# Patient Record
Sex: MALE | Race: BLACK OR AFRICAN AMERICAN | Employment: PART TIME
[De-identification: names, ages, dates, MRNs, and addresses within clinical notes are randomized per-mention and may not be internally consistent; named-entity substitution may affect disease eponyms.]

---

## 2017-01-16 ENCOUNTER — PATIENT MESSAGE (OUTPATIENT)
Dept: INTERNAL MEDICINE | Facility: CLINIC | Age: 37
End: 2017-01-16

## 2017-02-06 ENCOUNTER — OFFICE VISIT (OUTPATIENT)
Dept: INTERNAL MEDICINE | Facility: CLINIC | Age: 37
End: 2017-02-06

## 2017-02-06 VITALS
SYSTOLIC BLOOD PRESSURE: 135 MMHG | BODY MASS INDEX: 37.19 KG/M2 | HEIGHT: 77 IN | DIASTOLIC BLOOD PRESSURE: 86 MMHG | WEIGHT: 315 LBS | HEART RATE: 79 BPM

## 2017-02-06 DIAGNOSIS — G47.33 OSA ON CPAP: ICD-10-CM

## 2017-02-06 DIAGNOSIS — I10 ESSENTIAL HYPERTENSION: Primary | ICD-10-CM

## 2017-02-06 DIAGNOSIS — E66.01 MORBID OBESITY DUE TO EXCESS CALORIES (HCC): ICD-10-CM

## 2017-02-06 DIAGNOSIS — Z13.31 POSITIVE DEPRESSION SCREENING: ICD-10-CM

## 2017-02-06 DIAGNOSIS — R73.03 PREDIABETES: ICD-10-CM

## 2017-02-06 DIAGNOSIS — Z99.89 OSA ON CPAP: ICD-10-CM

## 2017-02-06 DIAGNOSIS — I42.9 CARDIOMYOPATHY (HCC): ICD-10-CM

## 2017-02-06 DIAGNOSIS — R79.89 LOW VITAMIN D LEVEL: ICD-10-CM

## 2017-02-06 DIAGNOSIS — J31.0 CHRONIC RHINITIS: ICD-10-CM

## 2017-02-06 DIAGNOSIS — K76.0 FATTY LIVER DISEASE, NONALCOHOLIC: ICD-10-CM

## 2017-02-06 PROCEDURE — G8431 POS CLIN DEPRES SCRN F/U DOC: HCPCS | Performed by: INTERNAL MEDICINE

## 2017-02-06 PROCEDURE — 99214 OFFICE O/P EST MOD 30 MIN: CPT | Performed by: INTERNAL MEDICINE

## 2017-02-06 RX ORDER — HYDROCODONE BITARTRATE AND ACETAMINOPHEN 5; 325 MG/1; MG/1
TABLET ORAL
Refills: 0 | COMMUNITY
Start: 2017-01-27 | End: 2017-04-06 | Stop reason: ALTCHOICE

## 2017-02-06 RX ORDER — FLUTICASONE PROPIONATE 50 MCG
1 SPRAY, SUSPENSION (ML) NASAL DAILY
Qty: 1 BOTTLE | Refills: 3 | Status: SHIPPED | OUTPATIENT
Start: 2017-02-06 | End: 2017-06-01 | Stop reason: SDUPTHER

## 2017-02-06 RX ORDER — ERGOCALCIFEROL 1.25 MG/1
CAPSULE ORAL
Qty: 4 CAPSULE | Refills: 2 | OUTPATIENT
Start: 2017-02-06

## 2017-02-06 RX ORDER — ERGOCALCIFEROL 1.25 MG/1
CAPSULE ORAL
Qty: 4 CAPSULE | Refills: 2 | Status: CANCELLED | OUTPATIENT
Start: 2017-02-06

## 2017-02-06 RX ORDER — MELOXICAM 15 MG/1
TABLET ORAL
Refills: 0 | COMMUNITY
Start: 2017-01-16 | End: 2017-04-06 | Stop reason: ALTCHOICE

## 2017-02-06 ASSESSMENT — ENCOUNTER SYMPTOMS
ABDOMINAL PAIN: 0
EYE REDNESS: 0
BACK PAIN: 0
COUGH: 0
NAUSEA: 0
BLURRED VISION: 0
CONSTIPATION: 0
SHORTNESS OF BREATH: 0
SORE THROAT: 0

## 2017-02-06 ASSESSMENT — PATIENT HEALTH QUESTIONNAIRE - PHQ9
9. THOUGHTS THAT YOU WOULD BE BETTER OFF DEAD, OR OF HURTING YOURSELF: 3
SUM OF ALL RESPONSES TO PHQ QUESTIONS 1-9: 15
6. FEELING BAD ABOUT YOURSELF - OR THAT YOU ARE A FAILURE OR HAVE LET YOURSELF OR YOUR FAMILY DOWN: 2
1. LITTLE INTEREST OR PLEASURE IN DOING THINGS: 2
8. MOVING OR SPEAKING SO SLOWLY THAT OTHER PEOPLE COULD HAVE NOTICED. OR THE OPPOSITE, BEING SO FIGETY OR RESTLESS THAT YOU HAVE BEEN MOVING AROUND A LOT MORE THAN USUAL: 2
3. TROUBLE FALLING OR STAYING ASLEEP: 2
SUM OF ALL RESPONSES TO PHQ9 QUESTIONS 1 & 2: 4
4. FEELING TIRED OR HAVING LITTLE ENERGY: 0
7. TROUBLE CONCENTRATING ON THINGS, SUCH AS READING THE NEWSPAPER OR WATCHING TELEVISION: 0
10. IF YOU CHECKED OFF ANY PROBLEMS, HOW DIFFICULT HAVE THESE PROBLEMS MADE IT FOR YOU TO DO YOUR WORK, TAKE CARE OF THINGS AT HOME, OR GET ALONG WITH OTHER PEOPLE: 0
5. POOR APPETITE OR OVEREATING: 2
2. FEELING DOWN, DEPRESSED OR HOPELESS: 2

## 2017-02-09 ENCOUNTER — OFFICE VISIT (OUTPATIENT)
Dept: BEHAVIORAL/MENTAL HEALTH | Facility: CLINIC | Age: 37
End: 2017-02-09

## 2017-02-09 ENCOUNTER — TELEPHONE (OUTPATIENT)
Dept: INTERNAL MEDICINE | Facility: CLINIC | Age: 37
End: 2017-02-09

## 2017-02-09 ENCOUNTER — HOSPITAL ENCOUNTER (OUTPATIENT)
Age: 37
Setting detail: SPECIMEN
Discharge: HOME | End: 2017-02-09
Payer: MEDICAID

## 2017-02-09 DIAGNOSIS — R79.89 LOW VITAMIN D LEVEL: ICD-10-CM

## 2017-02-09 DIAGNOSIS — R73.03 PREDIABETES: ICD-10-CM

## 2017-02-09 DIAGNOSIS — E66.01 MORBID OBESITY DUE TO EXCESS CALORIES (HCC): ICD-10-CM

## 2017-02-09 DIAGNOSIS — K76.0 FATTY LIVER DISEASE, NONALCOHOLIC: ICD-10-CM

## 2017-02-09 DIAGNOSIS — I10 ESSENTIAL HYPERTENSION: ICD-10-CM

## 2017-02-09 DIAGNOSIS — Z13.31 POSITIVE DEPRESSION SCREENING: Primary | ICD-10-CM

## 2017-02-09 LAB
CHOLESTEROL/HDL RATIO: 5.3
CHOLESTEROL: 169 MG/DL
ESTIMATED AVERAGE GLUCOSE: 123 MG/DL
HBA1C MFR BLD: 5.9 % (ref 4–6)
HDLC SERPL-MCNC: 32 MG/DL
LDL CHOLESTEROL: 109 MG/DL (ref 0–130)
TRIGL SERPL-MCNC: 138 MG/DL
TSH SERPL DL<=0.05 MIU/L-ACNC: 2.26 MIU/L (ref 0.3–5)
VITAMIN D 25-HYDROXY: 24.4 NG/ML (ref 30–100)
VLDLC SERPL CALC-MCNC: ABNORMAL MG/DL (ref 1–30)

## 2017-02-09 PROCEDURE — 82306 VITAMIN D 25 HYDROXY: CPT

## 2017-02-09 PROCEDURE — 84443 ASSAY THYROID STIM HORMONE: CPT

## 2017-02-09 PROCEDURE — 90791 PSYCH DIAGNOSTIC EVALUATION: CPT | Performed by: PSYCHOLOGIST

## 2017-02-09 PROCEDURE — 83036 HEMOGLOBIN GLYCOSYLATED A1C: CPT

## 2017-02-09 PROCEDURE — 80061 LIPID PANEL: CPT

## 2017-02-09 PROCEDURE — 36415 COLL VENOUS BLD VENIPUNCTURE: CPT

## 2017-02-14 ENCOUNTER — PATIENT MESSAGE (OUTPATIENT)
Dept: INTERNAL MEDICINE | Age: 37
End: 2017-02-14

## 2017-02-21 ENCOUNTER — OFFICE VISIT (OUTPATIENT)
Dept: BEHAVIORAL/MENTAL HEALTH | Facility: CLINIC | Age: 37
End: 2017-02-21

## 2017-02-21 DIAGNOSIS — F33.0 MAJOR DEPRESSIVE DISORDER, RECURRENT, MILD (HCC): Primary | ICD-10-CM

## 2017-02-21 PROCEDURE — 90834 PSYTX W PT 45 MINUTES: CPT | Performed by: PSYCHOLOGIST

## 2017-02-21 ASSESSMENT — PATIENT HEALTH QUESTIONNAIRE - PHQ9
9. THOUGHTS THAT YOU WOULD BE BETTER OFF DEAD, OR OF HURTING YOURSELF: 1
2. FEELING DOWN, DEPRESSED OR HOPELESS: 1
8. MOVING OR SPEAKING SO SLOWLY THAT OTHER PEOPLE COULD HAVE NOTICED. OR THE OPPOSITE, BEING SO FIGETY OR RESTLESS THAT YOU HAVE BEEN MOVING AROUND A LOT MORE THAN USUAL: 0
10. IF YOU CHECKED OFF ANY PROBLEMS, HOW DIFFICULT HAVE THESE PROBLEMS MADE IT FOR YOU TO DO YOUR WORK, TAKE CARE OF THINGS AT HOME, OR GET ALONG WITH OTHER PEOPLE: 1
7. TROUBLE CONCENTRATING ON THINGS, SUCH AS READING THE NEWSPAPER OR WATCHING TELEVISION: 0
1. LITTLE INTEREST OR PLEASURE IN DOING THINGS: 0
6. FEELING BAD ABOUT YOURSELF - OR THAT YOU ARE A FAILURE OR HAVE LET YOURSELF OR YOUR FAMILY DOWN: 1
4. FEELING TIRED OR HAVING LITTLE ENERGY: 0
SUM OF ALL RESPONSES TO PHQ QUESTIONS 1-9: 6
3. TROUBLE FALLING OR STAYING ASLEEP: 1
SUM OF ALL RESPONSES TO PHQ9 QUESTIONS 1 & 2: 1
5. POOR APPETITE OR OVEREATING: 2

## 2017-02-23 ENCOUNTER — OFFICE VISIT (OUTPATIENT)
Dept: BARIATRICS/WEIGHT MGMT | Facility: CLINIC | Age: 37
End: 2017-02-23

## 2017-02-23 VITALS
HEIGHT: 76 IN | HEART RATE: 72 BPM | WEIGHT: 315 LBS | BODY MASS INDEX: 38.36 KG/M2 | DIASTOLIC BLOOD PRESSURE: 68 MMHG | SYSTOLIC BLOOD PRESSURE: 128 MMHG

## 2017-02-23 DIAGNOSIS — K76.0 FATTY LIVER DISEASE, NONALCOHOLIC: ICD-10-CM

## 2017-02-23 DIAGNOSIS — R73.03 PREDIABETES: ICD-10-CM

## 2017-02-23 DIAGNOSIS — Z99.89 OSA ON CPAP: ICD-10-CM

## 2017-02-23 DIAGNOSIS — I42.9 CARDIOMYOPATHY (HCC): ICD-10-CM

## 2017-02-23 DIAGNOSIS — E66.01 OBESITY, MORBID, BMI 40.0-49.9 (HCC): ICD-10-CM

## 2017-02-23 DIAGNOSIS — I10 ESSENTIAL HYPERTENSION: Primary | ICD-10-CM

## 2017-02-23 DIAGNOSIS — G47.33 OSA ON CPAP: ICD-10-CM

## 2017-02-23 PROCEDURE — 99204 OFFICE O/P NEW MOD 45 MIN: CPT | Performed by: NURSE PRACTITIONER

## 2017-02-28 RX ORDER — IBUPROFEN 800 MG/1
800 TABLET ORAL EVERY 8 HOURS PRN
Qty: 30 TABLET | Refills: 2 | Status: SHIPPED | OUTPATIENT
Start: 2017-02-28 | End: 2017-05-08 | Stop reason: SDUPTHER

## 2017-03-03 DIAGNOSIS — I10 ESSENTIAL HYPERTENSION: ICD-10-CM

## 2017-03-03 RX ORDER — HYDROCHLOROTHIAZIDE 25 MG/1
TABLET ORAL
Qty: 30 TABLET | Refills: 6 | Status: SHIPPED | OUTPATIENT
Start: 2017-03-03 | End: 2017-06-12 | Stop reason: SDUPTHER

## 2017-03-03 RX ORDER — LISINOPRIL 20 MG/1
TABLET ORAL
Qty: 30 TABLET | Refills: 6 | Status: SHIPPED | OUTPATIENT
Start: 2017-03-03 | End: 2017-06-12 | Stop reason: SDUPTHER

## 2017-03-07 ENCOUNTER — OFFICE VISIT (OUTPATIENT)
Dept: BEHAVIORAL/MENTAL HEALTH | Facility: CLINIC | Age: 37
End: 2017-03-07

## 2017-03-07 ENCOUNTER — HOSPITAL ENCOUNTER (OUTPATIENT)
Age: 37
Setting detail: SPECIMEN
Discharge: HOME | End: 2017-03-07
Payer: MEDICAID

## 2017-03-07 DIAGNOSIS — E87.6 HYPOKALEMIA: ICD-10-CM

## 2017-03-07 DIAGNOSIS — I42.9 CARDIOMYOPATHY (HCC): ICD-10-CM

## 2017-03-07 DIAGNOSIS — I10 ESSENTIAL HYPERTENSION: ICD-10-CM

## 2017-03-07 DIAGNOSIS — K76.0 FATTY LIVER DISEASE, NONALCOHOLIC: ICD-10-CM

## 2017-03-07 DIAGNOSIS — G47.33 OSA ON CPAP: ICD-10-CM

## 2017-03-07 DIAGNOSIS — F33.0 MAJOR DEPRESSIVE DISORDER, RECURRENT, MILD (HCC): Primary | ICD-10-CM

## 2017-03-07 DIAGNOSIS — R73.03 PREDIABETES: ICD-10-CM

## 2017-03-07 DIAGNOSIS — Z99.89 OSA ON CPAP: ICD-10-CM

## 2017-03-07 DIAGNOSIS — E55.9 VITAMIN D DEFICIENCY: Primary | ICD-10-CM

## 2017-03-07 DIAGNOSIS — E66.01 OBESITY, MORBID, BMI 40.0-49.9 (HCC): ICD-10-CM

## 2017-03-07 LAB
ABSOLUTE EOS #: 0.2 K/UL (ref 0–0.4)
ABSOLUTE LYMPH #: 1.9 K/UL (ref 1–4.8)
ABSOLUTE MONO #: 0.5 K/UL (ref 0.1–1.2)
ALBUMIN SERPL-MCNC: 4 G/DL (ref 3.5–5.2)
ALBUMIN/GLOBULIN RATIO: 1 (ref 1–2.5)
ALP BLD-CCNC: 82 U/L (ref 40–129)
ALT SERPL-CCNC: 13 U/L (ref 5–41)
ANION GAP SERPL CALCULATED.3IONS-SCNC: 16 MMOL/L (ref 9–17)
AST SERPL-CCNC: 12 U/L
BASOPHILS # BLD: 0 % (ref 0–2)
BASOPHILS ABSOLUTE: 0 K/UL (ref 0–0.2)
BILIRUB SERPL-MCNC: 0.44 MG/DL (ref 0.3–1.2)
BUN BLDV-MCNC: 15 MG/DL (ref 6–20)
BUN/CREAT BLD: ABNORMAL (ref 9–20)
CALCIUM SERPL-MCNC: 9.3 MG/DL (ref 8.6–10.4)
CHLORIDE BLD-SCNC: 101 MMOL/L (ref 98–107)
CO2: 26 MMOL/L (ref 20–31)
CREAT SERPL-MCNC: 1.13 MG/DL (ref 0.7–1.2)
DIFFERENTIAL TYPE: ABNORMAL
EOSINOPHILS RELATIVE PERCENT: 2 % (ref 1–4)
GFR AFRICAN AMERICAN: >60 ML/MIN
GFR NON-AFRICAN AMERICAN: >60 ML/MIN
GFR SERPL CREATININE-BSD FRML MDRD: ABNORMAL ML/MIN/{1.73_M2}
GFR SERPL CREATININE-BSD FRML MDRD: ABNORMAL ML/MIN/{1.73_M2}
GLUCOSE BLD-MCNC: 95 MG/DL (ref 70–99)
HCT VFR BLD CALC: 39.1 % (ref 41–53)
HEMOGLOBIN: 13 G/DL (ref 13.5–17.5)
LYMPHOCYTES # BLD: 26 % (ref 24–44)
MCH RBC QN AUTO: 28.6 PG (ref 26–34)
MCHC RBC AUTO-ENTMCNC: 33.1 G/DL (ref 31–37)
MCV RBC AUTO: 86.4 FL (ref 80–100)
MONOCYTES # BLD: 6 % (ref 2–11)
PDW BLD-RTO: 16.1 % (ref 12.5–15.4)
PLATELET # BLD: 296 K/UL (ref 140–450)
PLATELET ESTIMATE: ABNORMAL
PMV BLD AUTO: 8.1 FL (ref 6–12)
POTASSIUM SERPL-SCNC: 3.6 MMOL/L (ref 3.7–5.3)
RBC # BLD: 4.53 M/UL (ref 4.5–5.9)
RBC # BLD: ABNORMAL 10*6/UL
SEG NEUTROPHILS: 66 % (ref 36–66)
SEGMENTED NEUTROPHILS ABSOLUTE COUNT: 4.9 K/UL (ref 1.8–7.7)
SODIUM BLD-SCNC: 143 MMOL/L (ref 135–144)
TOTAL PROTEIN: 8 G/DL (ref 6.4–8.3)
URIC ACID: 7 MG/DL (ref 3.4–7)
WBC # BLD: 7.4 K/UL (ref 3.5–11)
WBC # BLD: ABNORMAL 10*3/UL

## 2017-03-07 PROCEDURE — 80053 COMPREHEN METABOLIC PANEL: CPT

## 2017-03-07 PROCEDURE — 90834 PSYTX W PT 45 MINUTES: CPT | Performed by: PSYCHOLOGIST

## 2017-03-07 PROCEDURE — 36415 COLL VENOUS BLD VENIPUNCTURE: CPT

## 2017-03-07 PROCEDURE — 84550 ASSAY OF BLOOD/URIC ACID: CPT

## 2017-03-07 PROCEDURE — 85025 COMPLETE CBC W/AUTO DIFF WBC: CPT

## 2017-03-07 RX ORDER — POTASSIUM CHLORIDE 750 MG/1
10 TABLET, EXTENDED RELEASE ORAL DAILY
Qty: 30 TABLET | Refills: 0 | Status: SHIPPED | OUTPATIENT
Start: 2017-03-07 | End: 2018-04-04 | Stop reason: ALTCHOICE

## 2017-03-07 RX ORDER — ERGOCALCIFEROL 1.25 MG/1
50000 CAPSULE ORAL WEEKLY
Qty: 8 CAPSULE | Refills: 0 | Status: SHIPPED | OUTPATIENT
Start: 2017-03-07 | End: 2017-04-06

## 2017-03-10 ENCOUNTER — TELEPHONE (OUTPATIENT)
Dept: BARIATRICS/WEIGHT MGMT | Facility: CLINIC | Age: 37
End: 2017-03-10

## 2017-03-21 ENCOUNTER — OFFICE VISIT (OUTPATIENT)
Dept: BEHAVIORAL/MENTAL HEALTH CLINIC | Age: 37
End: 2017-03-21
Payer: MEDICAID

## 2017-03-21 DIAGNOSIS — F33.0 MAJOR DEPRESSIVE DISORDER, RECURRENT, MILD (HCC): Primary | ICD-10-CM

## 2017-03-21 PROCEDURE — 90837 PSYTX W PT 60 MINUTES: CPT | Performed by: PSYCHOLOGIST

## 2017-03-23 ENCOUNTER — HOSPITAL ENCOUNTER (OUTPATIENT)
Age: 37
Discharge: HOME | End: 2017-03-23
Payer: MEDICAID

## 2017-03-23 LAB
EKG ATRIAL RATE: 77 BPM
EKG P AXIS: 65 DEGREES
EKG P-R INTERVAL: 156 MS
EKG Q-T INTERVAL: 366 MS
EKG QRS DURATION: 90 MS
EKG QTC CALCULATION (BAZETT): 414 MS
EKG R AXIS: 23 DEGREES
EKG T AXIS: 12 DEGREES
EKG VENTRICULAR RATE: 77 BPM

## 2017-03-23 PROCEDURE — 93005 ELECTROCARDIOGRAM TRACING: CPT

## 2017-04-06 ENCOUNTER — OFFICE VISIT (OUTPATIENT)
Dept: BEHAVIORAL/MENTAL HEALTH CLINIC | Age: 37
End: 2017-04-06
Payer: MEDICAID

## 2017-04-06 ENCOUNTER — OFFICE VISIT (OUTPATIENT)
Dept: INTERNAL MEDICINE | Age: 37
End: 2017-04-06
Payer: MEDICAID

## 2017-04-06 VITALS
DIASTOLIC BLOOD PRESSURE: 84 MMHG | HEART RATE: 71 BPM | WEIGHT: 315 LBS | BODY MASS INDEX: 49.9 KG/M2 | SYSTOLIC BLOOD PRESSURE: 132 MMHG

## 2017-04-06 DIAGNOSIS — F33.0 MAJOR DEPRESSIVE DISORDER, RECURRENT, MILD (HCC): Primary | ICD-10-CM

## 2017-04-06 DIAGNOSIS — E66.01 MORBID OBESITY WITH BODY MASS INDEX (BMI) OF 45.0 TO 49.9 IN ADULT (HCC): ICD-10-CM

## 2017-04-06 DIAGNOSIS — E55.9 VITAMIN D DEFICIENCY: ICD-10-CM

## 2017-04-06 DIAGNOSIS — Z99.89 OSA ON CPAP: ICD-10-CM

## 2017-04-06 DIAGNOSIS — I10 ESSENTIAL HYPERTENSION: Primary | ICD-10-CM

## 2017-04-06 DIAGNOSIS — R73.03 PREDIABETES: ICD-10-CM

## 2017-04-06 DIAGNOSIS — K76.0 FATTY LIVER DISEASE, NONALCOHOLIC: ICD-10-CM

## 2017-04-06 DIAGNOSIS — G47.33 OSA ON CPAP: ICD-10-CM

## 2017-04-06 DIAGNOSIS — F32.9 SINGLE CURRENT EPISODE OF MAJOR DEPRESSIVE DISORDER, UNSPECIFIED DEPRESSION EPISODE SEVERITY: ICD-10-CM

## 2017-04-06 PROCEDURE — 99213 OFFICE O/P EST LOW 20 MIN: CPT | Performed by: INTERNAL MEDICINE

## 2017-04-06 PROCEDURE — 90834 PSYTX W PT 45 MINUTES: CPT | Performed by: PSYCHOLOGIST

## 2017-04-06 RX ORDER — ERGOCALCIFEROL 1.25 MG/1
50000 CAPSULE ORAL WEEKLY
Qty: 8 CAPSULE | Refills: 0 | Status: CANCELLED | OUTPATIENT
Start: 2017-04-06 | End: 2017-05-26

## 2017-04-11 ENCOUNTER — TELEPHONE (OUTPATIENT)
Dept: INTERNAL MEDICINE | Age: 37
End: 2017-04-11

## 2017-04-18 ENCOUNTER — OFFICE VISIT (OUTPATIENT)
Dept: BARIATRICS/WEIGHT MGMT | Age: 37
End: 2017-04-18
Payer: MEDICAID

## 2017-04-18 VITALS
HEART RATE: 72 BPM | BODY MASS INDEX: 38.36 KG/M2 | HEIGHT: 76 IN | SYSTOLIC BLOOD PRESSURE: 116 MMHG | WEIGHT: 315 LBS | DIASTOLIC BLOOD PRESSURE: 78 MMHG

## 2017-04-18 DIAGNOSIS — E66.01 OBESITY, MORBID, BMI 40.0-49.9 (HCC): ICD-10-CM

## 2017-04-18 DIAGNOSIS — G47.33 OSA ON CPAP: Primary | ICD-10-CM

## 2017-04-18 DIAGNOSIS — I10 ESSENTIAL HYPERTENSION: ICD-10-CM

## 2017-04-18 DIAGNOSIS — Z99.89 OSA ON CPAP: Primary | ICD-10-CM

## 2017-04-18 DIAGNOSIS — R73.03 PREDIABETES: ICD-10-CM

## 2017-04-18 DIAGNOSIS — K76.0 FATTY LIVER DISEASE, NONALCOHOLIC: ICD-10-CM

## 2017-04-18 PROCEDURE — 99213 OFFICE O/P EST LOW 20 MIN: CPT | Performed by: NURSE PRACTITIONER

## 2017-04-20 ENCOUNTER — OFFICE VISIT (OUTPATIENT)
Dept: BEHAVIORAL/MENTAL HEALTH CLINIC | Age: 37
End: 2017-04-20
Payer: MEDICAID

## 2017-04-20 DIAGNOSIS — F33.0 MAJOR DEPRESSIVE DISORDER, RECURRENT, MILD (HCC): Primary | ICD-10-CM

## 2017-04-20 PROCEDURE — 90832 PSYTX W PT 30 MINUTES: CPT | Performed by: PSYCHOLOGIST

## 2017-04-25 ENCOUNTER — OFFICE VISIT (OUTPATIENT)
Dept: BARIATRICS/WEIGHT MGMT | Age: 37
End: 2017-04-25
Payer: MEDICAID

## 2017-04-25 VITALS
DIASTOLIC BLOOD PRESSURE: 74 MMHG | WEIGHT: 315 LBS | BODY MASS INDEX: 38.36 KG/M2 | SYSTOLIC BLOOD PRESSURE: 122 MMHG | HEIGHT: 76 IN

## 2017-04-25 DIAGNOSIS — I42.9 CARDIOMYOPATHY (HCC): ICD-10-CM

## 2017-04-25 DIAGNOSIS — E66.01 OBESITY, MORBID, BMI 40.0-49.9 (HCC): ICD-10-CM

## 2017-04-25 DIAGNOSIS — Z99.89 OSA ON CPAP: ICD-10-CM

## 2017-04-25 DIAGNOSIS — K76.0 FATTY LIVER DISEASE, NONALCOHOLIC: ICD-10-CM

## 2017-04-25 DIAGNOSIS — R73.03 PREDIABETES: ICD-10-CM

## 2017-04-25 DIAGNOSIS — I10 ESSENTIAL HYPERTENSION: Primary | ICD-10-CM

## 2017-04-25 DIAGNOSIS — G47.33 OSA ON CPAP: ICD-10-CM

## 2017-04-25 PROCEDURE — 99213 OFFICE O/P EST LOW 20 MIN: CPT | Performed by: NURSE PRACTITIONER

## 2017-04-27 ENCOUNTER — OFFICE VISIT (OUTPATIENT)
Dept: INTERNAL MEDICINE | Age: 37
End: 2017-04-27
Payer: MEDICAID

## 2017-04-27 VITALS
BODY MASS INDEX: 38.36 KG/M2 | HEART RATE: 87 BPM | SYSTOLIC BLOOD PRESSURE: 145 MMHG | DIASTOLIC BLOOD PRESSURE: 105 MMHG | HEIGHT: 76 IN | WEIGHT: 315 LBS

## 2017-04-27 DIAGNOSIS — R73.03 PREDIABETES: ICD-10-CM

## 2017-04-27 DIAGNOSIS — M17.0 PRIMARY OSTEOARTHRITIS OF BOTH KNEES: Primary | ICD-10-CM

## 2017-04-27 DIAGNOSIS — E66.01 MORBID OBESITY WITH BMI OF 45.0-49.9, ADULT (HCC): ICD-10-CM

## 2017-04-27 DIAGNOSIS — I10 ESSENTIAL HYPERTENSION: ICD-10-CM

## 2017-04-27 PROCEDURE — 99213 OFFICE O/P EST LOW 20 MIN: CPT | Performed by: INTERNAL MEDICINE

## 2017-04-27 RX ORDER — HYDROCODONE BITARTRATE AND ACETAMINOPHEN 5; 325 MG/1; MG/1
TABLET ORAL
Refills: 0 | COMMUNITY
Start: 2017-04-10 | End: 2017-10-08

## 2017-04-27 ASSESSMENT — ENCOUNTER SYMPTOMS
SHORTNESS OF BREATH: 0
COUGH: 0
BACK PAIN: 0

## 2017-04-28 ASSESSMENT — ENCOUNTER SYMPTOMS
CONSTIPATION: 0
ABDOMINAL PAIN: 0
HEARTBURN: 1
NAUSEA: 0

## 2017-05-02 ENCOUNTER — OFFICE VISIT (OUTPATIENT)
Dept: BEHAVIORAL/MENTAL HEALTH CLINIC | Age: 37
End: 2017-05-02
Payer: MEDICAID

## 2017-05-02 DIAGNOSIS — F33.0 MAJOR DEPRESSIVE DISORDER, RECURRENT, MILD (HCC): Primary | ICD-10-CM

## 2017-05-02 PROCEDURE — 90832 PSYTX W PT 30 MINUTES: CPT | Performed by: PSYCHOLOGIST

## 2017-05-05 ENCOUNTER — OFFICE VISIT (OUTPATIENT)
Dept: BARIATRICS/WEIGHT MGMT | Age: 37
End: 2017-05-05
Payer: MEDICAID

## 2017-05-05 VITALS
BODY MASS INDEX: 38.36 KG/M2 | DIASTOLIC BLOOD PRESSURE: 74 MMHG | WEIGHT: 315 LBS | HEIGHT: 76 IN | HEART RATE: 74 BPM | SYSTOLIC BLOOD PRESSURE: 126 MMHG

## 2017-05-05 DIAGNOSIS — Z99.89 OSA ON CPAP: ICD-10-CM

## 2017-05-05 DIAGNOSIS — E66.01 OBESITY, MORBID, BMI 40.0-49.9 (HCC): ICD-10-CM

## 2017-05-05 DIAGNOSIS — I10 ESSENTIAL HYPERTENSION: Primary | ICD-10-CM

## 2017-05-05 DIAGNOSIS — K76.0 FATTY LIVER DISEASE, NONALCOHOLIC: ICD-10-CM

## 2017-05-05 DIAGNOSIS — R73.03 PREDIABETES: ICD-10-CM

## 2017-05-05 DIAGNOSIS — G47.33 OSA ON CPAP: ICD-10-CM

## 2017-05-05 PROCEDURE — 99213 OFFICE O/P EST LOW 20 MIN: CPT | Performed by: NURSE PRACTITIONER

## 2017-05-08 DIAGNOSIS — J31.0 CHRONIC RHINITIS: ICD-10-CM

## 2017-05-08 DIAGNOSIS — I10 ESSENTIAL HYPERTENSION: ICD-10-CM

## 2017-05-08 RX ORDER — ECHINACEA PURPUREA EXTRACT 125 MG
1 TABLET ORAL PRN
Qty: 1 BOTTLE | Refills: 3 | Status: SHIPPED | OUTPATIENT
Start: 2017-05-08 | End: 2017-12-07 | Stop reason: SDUPTHER

## 2017-05-08 RX ORDER — IBUPROFEN 800 MG/1
800 TABLET ORAL EVERY 8 HOURS PRN
Qty: 30 TABLET | Refills: 2 | Status: SHIPPED | OUTPATIENT
Start: 2017-05-08 | End: 2017-07-10 | Stop reason: SDUPTHER

## 2017-05-12 ENCOUNTER — OFFICE VISIT (OUTPATIENT)
Dept: BARIATRICS/WEIGHT MGMT | Age: 37
End: 2017-05-12
Payer: MEDICAID

## 2017-05-12 VITALS
DIASTOLIC BLOOD PRESSURE: 70 MMHG | SYSTOLIC BLOOD PRESSURE: 124 MMHG | BODY MASS INDEX: 48.95 KG/M2 | HEART RATE: 76 BPM | WEIGHT: 315 LBS

## 2017-05-12 DIAGNOSIS — I42.9 CARDIOMYOPATHY (HCC): ICD-10-CM

## 2017-05-12 DIAGNOSIS — Z99.89 OSA ON CPAP: ICD-10-CM

## 2017-05-12 DIAGNOSIS — R73.03 PREDIABETES: ICD-10-CM

## 2017-05-12 DIAGNOSIS — E66.01 OBESITY, MORBID, BMI 40.0-49.9 (HCC): ICD-10-CM

## 2017-05-12 DIAGNOSIS — G47.33 OSA ON CPAP: ICD-10-CM

## 2017-05-12 DIAGNOSIS — I10 ESSENTIAL HYPERTENSION: Primary | ICD-10-CM

## 2017-05-12 DIAGNOSIS — K76.0 FATTY LIVER DISEASE, NONALCOHOLIC: ICD-10-CM

## 2017-05-12 PROCEDURE — 99213 OFFICE O/P EST LOW 20 MIN: CPT | Performed by: NURSE PRACTITIONER

## 2017-05-16 ENCOUNTER — OFFICE VISIT (OUTPATIENT)
Dept: BEHAVIORAL/MENTAL HEALTH CLINIC | Age: 37
End: 2017-05-16
Payer: MEDICAID

## 2017-05-16 DIAGNOSIS — F33.0 MAJOR DEPRESSIVE DISORDER, RECURRENT, MILD (HCC): Primary | ICD-10-CM

## 2017-05-16 PROCEDURE — 90834 PSYTX W PT 45 MINUTES: CPT | Performed by: PSYCHOLOGIST

## 2017-05-19 ENCOUNTER — OFFICE VISIT (OUTPATIENT)
Dept: BARIATRICS/WEIGHT MGMT | Age: 37
End: 2017-05-19
Payer: MEDICAID

## 2017-05-19 VITALS
SYSTOLIC BLOOD PRESSURE: 118 MMHG | HEIGHT: 76 IN | WEIGHT: 315 LBS | BODY MASS INDEX: 38.36 KG/M2 | DIASTOLIC BLOOD PRESSURE: 72 MMHG | HEART RATE: 74 BPM

## 2017-05-19 DIAGNOSIS — I10 ESSENTIAL HYPERTENSION: ICD-10-CM

## 2017-05-19 DIAGNOSIS — K76.0 FATTY LIVER DISEASE, NONALCOHOLIC: ICD-10-CM

## 2017-05-19 DIAGNOSIS — E66.01 OBESITY, MORBID, BMI 40.0-49.9 (HCC): ICD-10-CM

## 2017-05-19 DIAGNOSIS — R73.03 PREDIABETES: ICD-10-CM

## 2017-05-19 DIAGNOSIS — Z99.89 OSA ON CPAP: Primary | ICD-10-CM

## 2017-05-19 DIAGNOSIS — G47.33 OSA ON CPAP: Primary | ICD-10-CM

## 2017-05-19 DIAGNOSIS — I42.9 CARDIOMYOPATHY (HCC): ICD-10-CM

## 2017-05-19 PROCEDURE — 99213 OFFICE O/P EST LOW 20 MIN: CPT | Performed by: NURSE PRACTITIONER

## 2017-05-26 ENCOUNTER — OFFICE VISIT (OUTPATIENT)
Dept: BARIATRICS/WEIGHT MGMT | Age: 37
End: 2017-05-26
Payer: MEDICAID

## 2017-05-26 VITALS
BODY MASS INDEX: 38.36 KG/M2 | WEIGHT: 315 LBS | HEIGHT: 76 IN | DIASTOLIC BLOOD PRESSURE: 74 MMHG | HEART RATE: 68 BPM | SYSTOLIC BLOOD PRESSURE: 124 MMHG

## 2017-05-26 DIAGNOSIS — K76.0 FATTY LIVER DISEASE, NONALCOHOLIC: ICD-10-CM

## 2017-05-26 DIAGNOSIS — Z99.89 OSA ON CPAP: ICD-10-CM

## 2017-05-26 DIAGNOSIS — I10 ESSENTIAL HYPERTENSION: Primary | ICD-10-CM

## 2017-05-26 DIAGNOSIS — E66.01 OBESITY, MORBID, BMI 40.0-49.9 (HCC): ICD-10-CM

## 2017-05-26 DIAGNOSIS — I42.9 CARDIOMYOPATHY (HCC): ICD-10-CM

## 2017-05-26 DIAGNOSIS — R73.03 PREDIABETES: ICD-10-CM

## 2017-05-26 DIAGNOSIS — G47.33 OSA ON CPAP: ICD-10-CM

## 2017-05-26 PROCEDURE — 99213 OFFICE O/P EST LOW 20 MIN: CPT | Performed by: NURSE PRACTITIONER

## 2017-06-01 DIAGNOSIS — J31.0 CHRONIC RHINITIS: ICD-10-CM

## 2017-06-01 RX ORDER — FLUTICASONE PROPIONATE 50 MCG
SPRAY, SUSPENSION (ML) NASAL
Qty: 1 BOTTLE | Refills: 3 | Status: SHIPPED | OUTPATIENT
Start: 2017-06-01 | End: 2017-09-02 | Stop reason: SDUPTHER

## 2017-06-05 ENCOUNTER — OFFICE VISIT (OUTPATIENT)
Dept: BEHAVIORAL/MENTAL HEALTH CLINIC | Age: 37
End: 2017-06-05
Payer: MEDICAID

## 2017-06-05 DIAGNOSIS — F33.0 MAJOR DEPRESSIVE DISORDER, RECURRENT, MILD (HCC): Primary | ICD-10-CM

## 2017-06-05 DIAGNOSIS — E87.6 HYPOKALEMIA: ICD-10-CM

## 2017-06-05 PROCEDURE — 90834 PSYTX W PT 45 MINUTES: CPT | Performed by: PSYCHOLOGIST

## 2017-06-05 RX ORDER — POTASSIUM CHLORIDE 750 MG/1
10 TABLET, EXTENDED RELEASE ORAL DAILY
Qty: 30 TABLET | Refills: 0 | Status: CANCELLED | OUTPATIENT
Start: 2017-06-05

## 2017-06-05 ASSESSMENT — PATIENT HEALTH QUESTIONNAIRE - PHQ9
6. FEELING BAD ABOUT YOURSELF - OR THAT YOU ARE A FAILURE OR HAVE LET YOURSELF OR YOUR FAMILY DOWN: 3
SUM OF ALL RESPONSES TO PHQ9 QUESTIONS 1 & 2: 1
9. THOUGHTS THAT YOU WOULD BE BETTER OFF DEAD, OR OF HURTING YOURSELF: 1
2. FEELING DOWN, DEPRESSED OR HOPELESS: 1
3. TROUBLE FALLING OR STAYING ASLEEP: 2
1. LITTLE INTEREST OR PLEASURE IN DOING THINGS: 0
8. MOVING OR SPEAKING SO SLOWLY THAT OTHER PEOPLE COULD HAVE NOTICED. OR THE OPPOSITE, BEING SO FIGETY OR RESTLESS THAT YOU HAVE BEEN MOVING AROUND A LOT MORE THAN USUAL: 0
4. FEELING TIRED OR HAVING LITTLE ENERGY: 0
7. TROUBLE CONCENTRATING ON THINGS, SUCH AS READING THE NEWSPAPER OR WATCHING TELEVISION: 0
SUM OF ALL RESPONSES TO PHQ QUESTIONS 1-9: 8
10. IF YOU CHECKED OFF ANY PROBLEMS, HOW DIFFICULT HAVE THESE PROBLEMS MADE IT FOR YOU TO DO YOUR WORK, TAKE CARE OF THINGS AT HOME, OR GET ALONG WITH OTHER PEOPLE: 1
5. POOR APPETITE OR OVEREATING: 1

## 2017-06-05 ASSESSMENT — ANXIETY QUESTIONNAIRES
2. NOT BEING ABLE TO STOP OR CONTROL WORRYING: 1-SEVERAL DAYS
4. TROUBLE RELAXING: 3-NEARLY EVERY DAY
1. FEELING NERVOUS, ANXIOUS, OR ON EDGE: 2-OVER HALF THE DAYS
7. FEELING AFRAID AS IF SOMETHING AWFUL MIGHT HAPPEN: 0-NOT AT ALL SURE
5. BEING SO RESTLESS THAT IT IS HARD TO SIT STILL: 0-NOT AT ALL SURE
GAD7 TOTAL SCORE: 9
6. BECOMING EASILY ANNOYED OR IRRITABLE: 0-NOT AT ALL SURE
3. WORRYING TOO MUCH ABOUT DIFFERENT THINGS: 3-NEARLY EVERY DAY

## 2017-06-07 RX ORDER — ERGOCALCIFEROL 1.25 MG/1
CAPSULE ORAL
Qty: 8 CAPSULE | Refills: 0 | Status: SHIPPED | OUTPATIENT
Start: 2017-06-07 | End: 2017-07-17

## 2017-06-12 DIAGNOSIS — I10 ESSENTIAL HYPERTENSION: ICD-10-CM

## 2017-06-12 RX ORDER — LISINOPRIL 20 MG/1
20 TABLET ORAL DAILY
Qty: 30 TABLET | Refills: 6 | Status: SHIPPED | OUTPATIENT
Start: 2017-06-12 | End: 2018-01-04 | Stop reason: SDUPTHER

## 2017-06-12 RX ORDER — HYDROCHLOROTHIAZIDE 25 MG/1
25 TABLET ORAL DAILY
Qty: 30 TABLET | Refills: 6 | Status: SHIPPED | OUTPATIENT
Start: 2017-06-12 | End: 2017-10-06 | Stop reason: SDUPTHER

## 2017-06-16 ENCOUNTER — OFFICE VISIT (OUTPATIENT)
Dept: BARIATRICS/WEIGHT MGMT | Age: 37
End: 2017-06-16
Payer: MEDICAID

## 2017-06-16 VITALS
SYSTOLIC BLOOD PRESSURE: 122 MMHG | DIASTOLIC BLOOD PRESSURE: 72 MMHG | WEIGHT: 315 LBS | HEART RATE: 68 BPM | BODY MASS INDEX: 38.36 KG/M2 | HEIGHT: 76 IN

## 2017-06-16 DIAGNOSIS — I10 ESSENTIAL HYPERTENSION: Primary | ICD-10-CM

## 2017-06-16 DIAGNOSIS — Z99.89 OSA ON CPAP: ICD-10-CM

## 2017-06-16 DIAGNOSIS — G47.33 OSA ON CPAP: ICD-10-CM

## 2017-06-16 DIAGNOSIS — R73.03 PREDIABETES: ICD-10-CM

## 2017-06-16 DIAGNOSIS — K76.0 FATTY LIVER DISEASE, NONALCOHOLIC: ICD-10-CM

## 2017-06-16 DIAGNOSIS — I42.9 CARDIOMYOPATHY (HCC): ICD-10-CM

## 2017-06-16 DIAGNOSIS — E66.01 OBESITY, MORBID, BMI 40.0-49.9 (HCC): ICD-10-CM

## 2017-06-16 PROCEDURE — 99213 OFFICE O/P EST LOW 20 MIN: CPT | Performed by: NURSE PRACTITIONER

## 2017-06-23 ENCOUNTER — OFFICE VISIT (OUTPATIENT)
Dept: BARIATRICS/WEIGHT MGMT | Age: 37
End: 2017-06-23
Payer: MEDICAID

## 2017-06-23 VITALS
WEIGHT: 315 LBS | BODY MASS INDEX: 38.36 KG/M2 | SYSTOLIC BLOOD PRESSURE: 118 MMHG | HEIGHT: 76 IN | HEART RATE: 76 BPM | DIASTOLIC BLOOD PRESSURE: 74 MMHG

## 2017-06-23 DIAGNOSIS — Z99.89 OSA ON CPAP: ICD-10-CM

## 2017-06-23 DIAGNOSIS — I10 ESSENTIAL HYPERTENSION: Primary | ICD-10-CM

## 2017-06-23 DIAGNOSIS — I42.9 CARDIOMYOPATHY (HCC): ICD-10-CM

## 2017-06-23 DIAGNOSIS — G47.33 OSA ON CPAP: ICD-10-CM

## 2017-06-23 DIAGNOSIS — E66.01 OBESITY, MORBID, BMI 40.0-49.9 (HCC): ICD-10-CM

## 2017-06-23 DIAGNOSIS — K76.0 FATTY LIVER DISEASE, NONALCOHOLIC: ICD-10-CM

## 2017-06-23 DIAGNOSIS — R73.03 PREDIABETES: ICD-10-CM

## 2017-06-23 PROCEDURE — 99213 OFFICE O/P EST LOW 20 MIN: CPT | Performed by: NURSE PRACTITIONER

## 2017-06-26 ENCOUNTER — TELEPHONE (OUTPATIENT)
Dept: BEHAVIORAL/MENTAL HEALTH CLINIC | Age: 37
End: 2017-06-26

## 2017-06-30 ENCOUNTER — OFFICE VISIT (OUTPATIENT)
Dept: BARIATRICS/WEIGHT MGMT | Age: 37
End: 2017-06-30
Payer: MEDICAID

## 2017-06-30 VITALS
DIASTOLIC BLOOD PRESSURE: 68 MMHG | SYSTOLIC BLOOD PRESSURE: 122 MMHG | BODY MASS INDEX: 38.36 KG/M2 | HEIGHT: 76 IN | HEART RATE: 68 BPM | WEIGHT: 315 LBS

## 2017-06-30 DIAGNOSIS — G47.33 OSA ON CPAP: ICD-10-CM

## 2017-06-30 DIAGNOSIS — I42.9 CARDIOMYOPATHY (HCC): ICD-10-CM

## 2017-06-30 DIAGNOSIS — Z99.89 OSA ON CPAP: ICD-10-CM

## 2017-06-30 DIAGNOSIS — R73.03 PREDIABETES: ICD-10-CM

## 2017-06-30 DIAGNOSIS — K76.0 FATTY LIVER DISEASE, NONALCOHOLIC: ICD-10-CM

## 2017-06-30 DIAGNOSIS — I10 ESSENTIAL HYPERTENSION: Primary | ICD-10-CM

## 2017-06-30 DIAGNOSIS — E66.01 OBESITY, MORBID, BMI 40.0-49.9 (HCC): ICD-10-CM

## 2017-06-30 PROCEDURE — 99213 OFFICE O/P EST LOW 20 MIN: CPT | Performed by: NURSE PRACTITIONER

## 2017-07-07 ENCOUNTER — OFFICE VISIT (OUTPATIENT)
Dept: BARIATRICS/WEIGHT MGMT | Age: 37
End: 2017-07-07
Payer: MEDICAID

## 2017-07-07 VITALS
SYSTOLIC BLOOD PRESSURE: 122 MMHG | DIASTOLIC BLOOD PRESSURE: 74 MMHG | BODY MASS INDEX: 38.36 KG/M2 | HEIGHT: 76 IN | WEIGHT: 315 LBS | HEART RATE: 70 BPM

## 2017-07-07 DIAGNOSIS — I10 ESSENTIAL HYPERTENSION: Primary | ICD-10-CM

## 2017-07-07 DIAGNOSIS — Z99.89 OSA ON CPAP: ICD-10-CM

## 2017-07-07 DIAGNOSIS — G47.33 OSA ON CPAP: ICD-10-CM

## 2017-07-07 DIAGNOSIS — K76.0 FATTY LIVER DISEASE, NONALCOHOLIC: ICD-10-CM

## 2017-07-07 DIAGNOSIS — I42.9 CARDIOMYOPATHY (HCC): ICD-10-CM

## 2017-07-07 DIAGNOSIS — E66.01 OBESITY, MORBID, BMI 40.0-49.9 (HCC): ICD-10-CM

## 2017-07-07 DIAGNOSIS — R73.03 PREDIABETES: ICD-10-CM

## 2017-07-07 PROCEDURE — 99213 OFFICE O/P EST LOW 20 MIN: CPT | Performed by: NURSE PRACTITIONER

## 2017-07-10 RX ORDER — IBUPROFEN 800 MG/1
800 TABLET ORAL EVERY 8 HOURS PRN
Qty: 30 TABLET | Refills: 0 | Status: SHIPPED | OUTPATIENT
Start: 2017-07-10 | End: 2017-07-17 | Stop reason: SDUPTHER

## 2017-07-14 ENCOUNTER — OFFICE VISIT (OUTPATIENT)
Dept: BARIATRICS/WEIGHT MGMT | Age: 37
End: 2017-07-14
Payer: MEDICAID

## 2017-07-14 VITALS
DIASTOLIC BLOOD PRESSURE: 74 MMHG | HEIGHT: 76 IN | WEIGHT: 315 LBS | BODY MASS INDEX: 38.36 KG/M2 | HEART RATE: 72 BPM | SYSTOLIC BLOOD PRESSURE: 126 MMHG

## 2017-07-14 DIAGNOSIS — K76.0 FATTY LIVER DISEASE, NONALCOHOLIC: ICD-10-CM

## 2017-07-14 DIAGNOSIS — I42.9 CARDIOMYOPATHY (HCC): ICD-10-CM

## 2017-07-14 DIAGNOSIS — I10 ESSENTIAL HYPERTENSION: Primary | ICD-10-CM

## 2017-07-14 DIAGNOSIS — E66.01 OBESITY, MORBID, BMI 40.0-49.9 (HCC): ICD-10-CM

## 2017-07-14 DIAGNOSIS — G47.33 OSA ON CPAP: ICD-10-CM

## 2017-07-14 DIAGNOSIS — R73.03 PREDIABETES: ICD-10-CM

## 2017-07-14 DIAGNOSIS — Z99.89 OSA ON CPAP: ICD-10-CM

## 2017-07-14 PROCEDURE — 99213 OFFICE O/P EST LOW 20 MIN: CPT | Performed by: NURSE PRACTITIONER

## 2017-07-17 ENCOUNTER — OFFICE VISIT (OUTPATIENT)
Dept: BEHAVIORAL/MENTAL HEALTH CLINIC | Age: 37
End: 2017-07-17
Payer: MEDICAID

## 2017-07-17 ENCOUNTER — OFFICE VISIT (OUTPATIENT)
Dept: INTERNAL MEDICINE | Age: 37
End: 2017-07-17
Payer: MEDICAID

## 2017-07-17 VITALS
DIASTOLIC BLOOD PRESSURE: 87 MMHG | SYSTOLIC BLOOD PRESSURE: 124 MMHG | HEIGHT: 76 IN | WEIGHT: 315 LBS | BODY MASS INDEX: 38.36 KG/M2 | HEART RATE: 69 BPM

## 2017-07-17 DIAGNOSIS — M17.0 PRIMARY OSTEOARTHRITIS OF BOTH KNEES: ICD-10-CM

## 2017-07-17 DIAGNOSIS — E66.01 OBESITY, MORBID, BMI 40.0-49.9 (HCC): ICD-10-CM

## 2017-07-17 DIAGNOSIS — F33.1 MODERATE EPISODE OF RECURRENT MAJOR DEPRESSIVE DISORDER (HCC): ICD-10-CM

## 2017-07-17 DIAGNOSIS — G47.33 OSA ON CPAP: ICD-10-CM

## 2017-07-17 DIAGNOSIS — E55.9 VITAMIN D DEFICIENCY: ICD-10-CM

## 2017-07-17 DIAGNOSIS — R73.03 PREDIABETES: ICD-10-CM

## 2017-07-17 DIAGNOSIS — K76.0 FATTY LIVER DISEASE, NONALCOHOLIC: ICD-10-CM

## 2017-07-17 DIAGNOSIS — Z99.89 OSA ON CPAP: ICD-10-CM

## 2017-07-17 DIAGNOSIS — I10 ESSENTIAL HYPERTENSION: Primary | ICD-10-CM

## 2017-07-17 DIAGNOSIS — F33.0 MAJOR DEPRESSIVE DISORDER, RECURRENT, MILD (HCC): Primary | ICD-10-CM

## 2017-07-17 PROCEDURE — 90832 PSYTX W PT 30 MINUTES: CPT | Performed by: PSYCHOLOGIST

## 2017-07-17 PROCEDURE — 99214 OFFICE O/P EST MOD 30 MIN: CPT | Performed by: INTERNAL MEDICINE

## 2017-07-17 RX ORDER — ERGOCALCIFEROL 1.25 MG/1
CAPSULE ORAL
Qty: 8 CAPSULE | Refills: 2 | Status: CANCELLED | OUTPATIENT
Start: 2017-07-17

## 2017-07-17 RX ORDER — IBUPROFEN 800 MG/1
800 TABLET ORAL EVERY 8 HOURS PRN
Qty: 30 TABLET | Refills: 2 | Status: SHIPPED | OUTPATIENT
Start: 2017-07-17 | End: 2017-08-13

## 2017-07-17 ASSESSMENT — ENCOUNTER SYMPTOMS
COUGH: 0
CONSTIPATION: 0
HEARTBURN: 1
NAUSEA: 0
BACK PAIN: 0
SHORTNESS OF BREATH: 0
ABDOMINAL PAIN: 0

## 2017-07-20 ENCOUNTER — HOSPITAL ENCOUNTER (OUTPATIENT)
Age: 37
Setting detail: SPECIMEN
Discharge: HOME | End: 2017-07-20
Payer: MEDICAID

## 2017-07-20 DIAGNOSIS — R73.03 PREDIABETES: ICD-10-CM

## 2017-07-20 DIAGNOSIS — I10 ESSENTIAL HYPERTENSION: ICD-10-CM

## 2017-07-20 DIAGNOSIS — K76.0 FATTY LIVER DISEASE, NONALCOHOLIC: ICD-10-CM

## 2017-07-20 DIAGNOSIS — E55.9 VITAMIN D DEFICIENCY: ICD-10-CM

## 2017-07-20 LAB
ALBUMIN SERPL-MCNC: 4.2 G/DL (ref 3.5–5.2)
ALBUMIN/GLOBULIN RATIO: 1.2 (ref 1–2.5)
ALP BLD-CCNC: 72 U/L (ref 40–129)
ALT SERPL-CCNC: 39 U/L (ref 5–41)
ANION GAP SERPL CALCULATED.3IONS-SCNC: 16 MMOL/L (ref 9–17)
AST SERPL-CCNC: 25 U/L
BILIRUB SERPL-MCNC: 0.27 MG/DL (ref 0.3–1.2)
BILIRUBIN DIRECT: <0.08 MG/DL
BILIRUBIN, INDIRECT: ABNORMAL MG/DL (ref 0–1)
BUN BLDV-MCNC: 15 MG/DL (ref 6–20)
BUN/CREAT BLD: ABNORMAL (ref 9–20)
CALCIUM SERPL-MCNC: 9.6 MG/DL (ref 8.6–10.4)
CHLORIDE BLD-SCNC: 97 MMOL/L (ref 98–107)
CHOLESTEROL/HDL RATIO: 4.3
CHOLESTEROL: 136 MG/DL
CO2: 26 MMOL/L (ref 20–31)
CREAT SERPL-MCNC: 0.96 MG/DL (ref 0.7–1.2)
ESTIMATED AVERAGE GLUCOSE: 123 MG/DL
GFR AFRICAN AMERICAN: >60 ML/MIN
GFR NON-AFRICAN AMERICAN: >60 ML/MIN
GFR SERPL CREATININE-BSD FRML MDRD: ABNORMAL ML/MIN/{1.73_M2}
GFR SERPL CREATININE-BSD FRML MDRD: ABNORMAL ML/MIN/{1.73_M2}
GLOBULIN: ABNORMAL G/DL (ref 1.5–3.8)
GLUCOSE BLD-MCNC: 98 MG/DL (ref 70–99)
HBA1C MFR BLD: 5.9 % (ref 4–6)
HCT VFR BLD CALC: 40.1 % (ref 41–53)
HDLC SERPL-MCNC: 32 MG/DL
HEMOGLOBIN: 13.2 G/DL (ref 13.5–17.5)
LDL CHOLESTEROL: 86 MG/DL (ref 0–130)
MCH RBC QN AUTO: 28.7 PG (ref 26–34)
MCHC RBC AUTO-ENTMCNC: 32.8 G/DL (ref 31–37)
MCV RBC AUTO: 87.3 FL (ref 80–100)
PDW BLD-RTO: 15.9 % (ref 12.5–15.4)
PLATELET # BLD: 253 K/UL (ref 140–450)
PMV BLD AUTO: 8.5 FL (ref 6–12)
POTASSIUM SERPL-SCNC: 3.5 MMOL/L (ref 3.7–5.3)
RBC # BLD: 4.59 M/UL (ref 4.5–5.9)
SODIUM BLD-SCNC: 139 MMOL/L (ref 135–144)
TOTAL PROTEIN: 7.7 G/DL (ref 6.4–8.3)
TRIGL SERPL-MCNC: 89 MG/DL
VITAMIN D 25-HYDROXY: 35.8 NG/ML (ref 30–100)
VLDLC SERPL CALC-MCNC: ABNORMAL MG/DL (ref 1–30)
WBC # BLD: 6.7 K/UL (ref 3.5–11)

## 2017-07-20 PROCEDURE — 80048 BASIC METABOLIC PNL TOTAL CA: CPT

## 2017-07-20 PROCEDURE — 83036 HEMOGLOBIN GLYCOSYLATED A1C: CPT

## 2017-07-20 PROCEDURE — 80076 HEPATIC FUNCTION PANEL: CPT

## 2017-07-20 PROCEDURE — 82306 VITAMIN D 25 HYDROXY: CPT

## 2017-07-20 PROCEDURE — 85027 COMPLETE CBC AUTOMATED: CPT

## 2017-07-20 PROCEDURE — 80061 LIPID PANEL: CPT

## 2017-07-20 PROCEDURE — 36415 COLL VENOUS BLD VENIPUNCTURE: CPT

## 2017-08-01 ENCOUNTER — OFFICE VISIT (OUTPATIENT)
Dept: BARIATRICS/WEIGHT MGMT | Age: 37
End: 2017-08-01
Payer: MEDICAID

## 2017-08-01 VITALS
DIASTOLIC BLOOD PRESSURE: 70 MMHG | HEART RATE: 74 BPM | WEIGHT: 315 LBS | BODY MASS INDEX: 45.25 KG/M2 | SYSTOLIC BLOOD PRESSURE: 126 MMHG

## 2017-08-01 DIAGNOSIS — K76.0 FATTY LIVER DISEASE, NONALCOHOLIC: ICD-10-CM

## 2017-08-01 DIAGNOSIS — R73.03 PREDIABETES: ICD-10-CM

## 2017-08-01 DIAGNOSIS — E66.01 OBESITY, MORBID, BMI 40.0-49.9 (HCC): ICD-10-CM

## 2017-08-01 DIAGNOSIS — Z99.89 OSA ON CPAP: ICD-10-CM

## 2017-08-01 DIAGNOSIS — G47.33 OSA ON CPAP: ICD-10-CM

## 2017-08-01 DIAGNOSIS — I10 ESSENTIAL HYPERTENSION: Primary | ICD-10-CM

## 2017-08-01 PROCEDURE — 99213 OFFICE O/P EST LOW 20 MIN: CPT | Performed by: NURSE PRACTITIONER

## 2017-08-13 ENCOUNTER — APPOINTMENT (OUTPATIENT)
Dept: GENERAL RADIOLOGY | Age: 37
End: 2017-08-13
Payer: MEDICAID

## 2017-08-13 ENCOUNTER — HOSPITAL ENCOUNTER (EMERGENCY)
Age: 37
Discharge: HOME | End: 2017-08-13
Attending: EMERGENCY MEDICINE
Payer: MEDICAID

## 2017-08-13 VITALS
OXYGEN SATURATION: 96 % | RESPIRATION RATE: 18 BRPM | TEMPERATURE: 99.5 F | SYSTOLIC BLOOD PRESSURE: 138 MMHG | HEART RATE: 103 BPM | DIASTOLIC BLOOD PRESSURE: 83 MMHG

## 2017-08-13 DIAGNOSIS — S39.012A LOW BACK STRAIN, INITIAL ENCOUNTER: Primary | ICD-10-CM

## 2017-08-13 PROCEDURE — 6370000000 HC RX 637 (ALT 250 FOR IP): Performed by: EMERGENCY MEDICINE

## 2017-08-13 PROCEDURE — 99283 EMERGENCY DEPT VISIT LOW MDM: CPT

## 2017-08-13 PROCEDURE — 72100 X-RAY EXAM L-S SPINE 2/3 VWS: CPT

## 2017-08-13 RX ORDER — CYCLOBENZAPRINE HCL 10 MG
10 TABLET ORAL ONCE
Status: COMPLETED | OUTPATIENT
Start: 2017-08-13 | End: 2017-08-13

## 2017-08-13 RX ORDER — IBUPROFEN 800 MG/1
800 TABLET ORAL EVERY 8 HOURS PRN
Qty: 30 TABLET | Refills: 0 | Status: SHIPPED | OUTPATIENT
Start: 2017-08-13 | End: 2017-10-08

## 2017-08-13 RX ORDER — IBUPROFEN 800 MG/1
800 TABLET ORAL ONCE
Status: COMPLETED | OUTPATIENT
Start: 2017-08-13 | End: 2017-08-13

## 2017-08-13 RX ORDER — CYCLOBENZAPRINE HCL 10 MG
10 TABLET ORAL 3 TIMES DAILY PRN
Qty: 30 TABLET | Refills: 0 | Status: SHIPPED | OUTPATIENT
Start: 2017-08-13 | End: 2017-08-23

## 2017-08-13 RX ADMIN — IBUPROFEN 800 MG: 800 TABLET ORAL at 20:52

## 2017-08-13 RX ADMIN — CYCLOBENZAPRINE HYDROCHLORIDE 10 MG: 10 TABLET, FILM COATED ORAL at 20:52

## 2017-08-13 ASSESSMENT — PAIN DESCRIPTION - DESCRIPTORS: DESCRIPTORS: ACHING

## 2017-08-13 ASSESSMENT — PAIN DESCRIPTION - ORIENTATION: ORIENTATION: LOWER

## 2017-08-13 ASSESSMENT — PAIN DESCRIPTION - PAIN TYPE: TYPE: ACUTE PAIN

## 2017-08-13 ASSESSMENT — PAIN SCALES - GENERAL: PAINLEVEL_OUTOF10: 9

## 2017-08-13 ASSESSMENT — PAIN DESCRIPTION - LOCATION: LOCATION: BACK

## 2017-08-21 ENCOUNTER — OFFICE VISIT (OUTPATIENT)
Dept: BEHAVIORAL/MENTAL HEALTH CLINIC | Age: 37
End: 2017-08-21
Payer: MEDICAID

## 2017-08-21 DIAGNOSIS — F33.0 MAJOR DEPRESSIVE DISORDER, RECURRENT, MILD (HCC): Primary | ICD-10-CM

## 2017-08-21 PROCEDURE — 90834 PSYTX W PT 45 MINUTES: CPT | Performed by: PSYCHOLOGIST

## 2017-08-25 ENCOUNTER — TELEPHONE (OUTPATIENT)
Dept: BEHAVIORAL/MENTAL HEALTH CLINIC | Age: 37
End: 2017-08-25

## 2017-08-28 ENCOUNTER — OFFICE VISIT (OUTPATIENT)
Dept: INTERNAL MEDICINE | Age: 37
End: 2017-08-28
Payer: MEDICAID

## 2017-08-28 VITALS
HEART RATE: 86 BPM | BODY MASS INDEX: 38.36 KG/M2 | DIASTOLIC BLOOD PRESSURE: 84 MMHG | HEIGHT: 76 IN | WEIGHT: 315 LBS | SYSTOLIC BLOOD PRESSURE: 122 MMHG

## 2017-08-28 DIAGNOSIS — H00.15 CHALAZION LEFT LOWER EYELID: Primary | ICD-10-CM

## 2017-08-28 PROCEDURE — 99213 OFFICE O/P EST LOW 20 MIN: CPT | Performed by: STUDENT IN AN ORGANIZED HEALTH CARE EDUCATION/TRAINING PROGRAM

## 2017-08-28 RX ORDER — ERYTHROMYCIN 20 MG/ML
SOLUTION TOPICAL
Qty: 60 ML | Refills: 1 | Status: CANCELLED | OUTPATIENT
Start: 2017-08-28

## 2017-08-30 RX ORDER — ERGOCALCIFEROL 1.25 MG/1
CAPSULE ORAL
Qty: 8 CAPSULE | Refills: 0 | OUTPATIENT
Start: 2017-08-30

## 2017-09-02 DIAGNOSIS — J31.0 CHRONIC RHINITIS: ICD-10-CM

## 2017-09-05 ENCOUNTER — OFFICE VISIT (OUTPATIENT)
Dept: BARIATRICS/WEIGHT MGMT | Age: 37
End: 2017-09-05
Payer: MEDICAID

## 2017-09-05 VITALS
HEIGHT: 76 IN | BODY MASS INDEX: 38.36 KG/M2 | WEIGHT: 315 LBS | DIASTOLIC BLOOD PRESSURE: 74 MMHG | HEART RATE: 72 BPM | SYSTOLIC BLOOD PRESSURE: 130 MMHG

## 2017-09-05 DIAGNOSIS — I10 ESSENTIAL HYPERTENSION: Primary | ICD-10-CM

## 2017-09-05 DIAGNOSIS — R73.03 PREDIABETES: ICD-10-CM

## 2017-09-05 DIAGNOSIS — Z99.89 OSA ON CPAP: ICD-10-CM

## 2017-09-05 DIAGNOSIS — G47.33 OSA ON CPAP: ICD-10-CM

## 2017-09-05 DIAGNOSIS — K76.0 FATTY LIVER DISEASE, NONALCOHOLIC: ICD-10-CM

## 2017-09-05 DIAGNOSIS — E66.01 OBESITY, MORBID, BMI 40.0-49.9 (HCC): ICD-10-CM

## 2017-09-05 PROCEDURE — 99213 OFFICE O/P EST LOW 20 MIN: CPT | Performed by: NURSE PRACTITIONER

## 2017-09-05 RX ORDER — FLUTICASONE PROPIONATE 50 MCG
SPRAY, SUSPENSION (ML) NASAL
Qty: 1 BOTTLE | Refills: 3 | Status: SHIPPED | OUTPATIENT
Start: 2017-09-05 | End: 2017-12-07 | Stop reason: SDUPTHER

## 2017-09-07 DIAGNOSIS — I10 ESSENTIAL HYPERTENSION: ICD-10-CM

## 2017-09-14 ENCOUNTER — TELEPHONE (OUTPATIENT)
Dept: BARIATRICS/WEIGHT MGMT | Age: 37
End: 2017-09-14

## 2017-09-18 DIAGNOSIS — E55.9 VITAMIN D DEFICIENCY: ICD-10-CM

## 2017-10-03 ENCOUNTER — OFFICE VISIT (OUTPATIENT)
Dept: INTERNAL MEDICINE | Age: 37
End: 2017-10-03
Payer: MEDICAID

## 2017-10-03 ENCOUNTER — OFFICE VISIT (OUTPATIENT)
Dept: BEHAVIORAL/MENTAL HEALTH CLINIC | Age: 37
End: 2017-10-03
Payer: MEDICAID

## 2017-10-03 VITALS
DIASTOLIC BLOOD PRESSURE: 83 MMHG | SYSTOLIC BLOOD PRESSURE: 130 MMHG | HEIGHT: 77 IN | HEART RATE: 75 BPM | TEMPERATURE: 97.3 F | WEIGHT: 315 LBS | BODY MASS INDEX: 37.19 KG/M2

## 2017-10-03 DIAGNOSIS — E66.01 OBESITY, MORBID, BMI 40.0-49.9 (HCC): ICD-10-CM

## 2017-10-03 DIAGNOSIS — R73.03 PREDIABETES: ICD-10-CM

## 2017-10-03 DIAGNOSIS — I10 ESSENTIAL HYPERTENSION: ICD-10-CM

## 2017-10-03 DIAGNOSIS — K76.0 FATTY LIVER DISEASE, NONALCOHOLIC: ICD-10-CM

## 2017-10-03 DIAGNOSIS — M17.0 PRIMARY OSTEOARTHRITIS OF BOTH KNEES: ICD-10-CM

## 2017-10-03 DIAGNOSIS — Z23 NEED FOR IMMUNIZATION AGAINST INFLUENZA: ICD-10-CM

## 2017-10-03 DIAGNOSIS — H00.015 HORDEOLUM EXTERNUM OF LEFT LOWER EYELID: Primary | ICD-10-CM

## 2017-10-03 DIAGNOSIS — F33.0 MAJOR DEPRESSIVE DISORDER, RECURRENT, MILD (HCC): Primary | ICD-10-CM

## 2017-10-03 PROCEDURE — 90471 IMMUNIZATION ADMIN: CPT | Performed by: INTERNAL MEDICINE

## 2017-10-03 PROCEDURE — 99213 OFFICE O/P EST LOW 20 MIN: CPT | Performed by: INTERNAL MEDICINE

## 2017-10-03 PROCEDURE — 90688 IIV4 VACCINE SPLT 0.5 ML IM: CPT | Performed by: INTERNAL MEDICINE

## 2017-10-03 PROCEDURE — 90832 PSYTX W PT 30 MINUTES: CPT | Performed by: PSYCHOLOGIST

## 2017-10-03 RX ORDER — OFLOXACIN 3 MG/ML
1 SOLUTION/ DROPS OPHTHALMIC 4 TIMES DAILY
Qty: 1 BOTTLE | Refills: 0 | Status: SHIPPED | OUTPATIENT
Start: 2017-10-03 | End: 2017-10-13

## 2017-10-03 ASSESSMENT — ENCOUNTER SYMPTOMS
EYE DISCHARGE: 0
EYE PAIN: 0
BACK PAIN: 0
ABDOMINAL PAIN: 0
BLURRED VISION: 0
COUGH: 0
CONSTIPATION: 0
HEARTBURN: 0
SHORTNESS OF BREATH: 0
EYE REDNESS: 0
NAUSEA: 0

## 2017-10-03 NOTE — PROGRESS NOTES
Behavioral Health Consultation  Hannah JASON  Licensed Clinical Psychologist #[id number]  10/3/2017  9:14 AM- 9:50 AM    Time spent with Patient: 36 minutes  This is patient's 12th  [facility] consultation. Reason for Consult:  depression  Referring Provider: Amy Cartwright MD    Feedback given to PCP. S:   Previous Recommendation(s):   1. See book chapter on ways that you can learn to control your emotions such as anger.- no mention. Pt processed his significant progress with weight loss. Explore the impact of support and lack of support through different interpersonal relationships, and the impact that these experiences have on his mood and motivation. Plans to take the driving test soon. O:  MSE:   Mood was Euthymic, with Full & Congruent affect. Suicidal ideation was denied. Homicidal ideation was denied. Hygiene was Good. Dress was Appropriate and Casual.   Behavior was WNL & unremarkable with Yes observation or self-report of difficulties standing/ambulating. Attitude was Engageable and Friendly. Eye-contact was Good. Speech: rate- WNL, rhythm-  WNL, volume- WNL  Verbalizations were  verbose. Thought processes were intact and goal-oriented without evidence of delusions, hallucinations, obsessions, or andrew; with moderate cognitive distortions. Associations were characterized by intact cognitive processes. Pt was orientated oriented to person, place, time, and general circumstances;  recent:  good and remote:  good. Insight and judgment were estimated to be fair, AEB, a fair  understanding of cyclical maladaptive patterns, and the ability to use insight to inform behavior change. A:   Pt making good progress at challing cyclical maladaptive patterns and continues to persevere in his retraining of his family as to how to treat him. Training in deep breathing today and practiced together.      Pt interventions:  Supportive techniques, CBT to target symptoms of anxiety and worry and Explained relaxed breathing technique in detail and practiced this with pt in visit    Pt Behavioral Change Plan:   1. Remember the rapid eye-movement exercise before you study and before you start question 1 on the test. Look to the R and L without moving your head, 20 seconds then stop. 2. Practice deep breathing 3 times per day; morning, midday, nighttime, or before and after you study or are stressed. 10 breathes each time, which should be less than 2 minutes. 3. Check out YouTube video: Catherine Lucio Rights vs Privileges   4. Follow up in 2 weeks. Diagnosis:  The encounter diagnosis was Major depressive disorder, recurrent, mild ([facility] [address]       Diagnosis Date    Anxiety     Arthritis     CAD (coronary artery disease)     Fatty liver     Fatty liver disease, nonalcoholic 0/60/3590    Hypertension     Obesity     Unspecified sleep apnea     cpap       History:    Medications:   Current Outpatient Prescriptions   Medication Sig Dispense Refill    ofloxacin (OCUFLOX) 0.3 % solution Place 1 drop into the left eye 4 times daily for 10 days 1 Bottle 0    metoprolol tartrate (LOPRESSOR) 25 MG tablet take 1 tablet by mouth twice a day 60 tablet 5    fluticasone (FLONASE) 50 MCG/ACT nasal spray instill 1 spray into each nostril once daily 1 Bottle 3    ibuprofen (ADVIL;MOTRIN) 800 MG tablet Take 1 tablet by mouth every 8 hours as needed for Pain 30 tablet 0    vitamin D (CHOLECALCIFEROL) 1000 units TABS tablet Take 1 tablet by mouth daily 30 tablet 5    lisinopril (PRINIVIL;ZESTRIL) 20 MG tablet Take 1 tablet by mouth daily 30 tablet 6    hydrochlorothiazide (HYDRODIURIL) 25 MG tablet Take 1 tablet by mouth daily 30 tablet 6    sodium chloride (ALTAMIST SPRAY) 0.65 % nasal spray 1 spray by Nasal route as needed for Congestion 1 Bottle 3    HYDROcodone-acetaminophen (NORCO) 5-325 MG per tablet take 1 tablet by mouth every 4 hours to every 6 hours if needed pain  0    potassium chloride (KLOR-CON M) 10 MEQ extended release tablet Take 1 tablet by mouth daily 30 tablet 0    albuterol sulfate HFA (PROAIR HFA) 108 (90 BASE) MCG/ACT inhaler Inhale 2 puffs into the lungs every 6 hours as needed for Wheezing or Shortness of Breath Space out to every 6 hours as symptoms improve. 1 Inhaler 3     No current facility-administered medications for this visit. Social History:   Social History     Social History    Marital status: Single     Spouse name: N/A    Number of children: N/A    Years of education: N/A     Occupational History    Not on file. Social History Main Topics    Smoking status: Never Smoker    Smokeless tobacco: Never Used    Alcohol use No    Drug use: No    Sexual activity: Not on file     Other Topics Concern    Not on file     Social History Narrative    ** Merged History Encounter **            TOBACCO:   reports that he has never smoked. He has never used smokeless tobacco.  ETOH:   reports that he does not drink alcohol.     Family History:   Family History   Problem Relation Age of Onset    Arthritis Mother     Diabetes Father     Arthritis Father     Stroke Maternal Grandmother     Heart Disease Maternal Grandmother     Stroke Maternal Grandfather     Heart Disease Maternal Grandfather     Early Death Maternal Grandfather     Stroke Paternal Grandmother     Heart Disease Paternal Grandmother     Stroke Paternal Grandfather     Heart Disease Paternal Grandfather     Early Death Paternal Grandfather

## 2017-10-03 NOTE — MR AVS SNAPSHOT
After Visit Summary             Julio Morales   10/3/2017 10:00 AM   Office Visit    Description:  Male : 1980   Provider:  Neal Art PSYD   Department:  Tiffany Patel at [address] [place] [address] and Future Appointments         Below is a list of your follow-up and future appointments. This may not be a complete list as you may have made appointments directly with providers that we are not aware of or your providers may have made some for you. Please call your providers to confirm appointments. It is important to keep your appointments. Please bring your current insurance card, photo ID, co-pay, and all medication bottles to your appointment. If self-pay, payment is expected at the time of service. Your To-Do List     Future Appointments Provider Department Dept Phone    10/10/2017 1:00 PM Mack Kelly NP [facility] [phone]    Please arrive 15 minutes prior to appointment time, bring insurance card and photo ID.     10/16/2017 8:00 AM [address] [facility]    Please arrive 15 minutes prior to appointment time, bring insurance card and photo ID.     2018 9:30 AM Madina Morris MD [address] [place] [address] [phone]    Please arrive 15 minutes prior to appointment time, bring insurance card and photo ID. Information from Your Visit        Department     Name Address Phone Fax    Tiffany Patel at [address] [phone]      You Were Seen for:         Comments    Major depressive disorder, recurrent, mild ([facility]   [141253]         Vital Signs     Smoking Status                   Never Smoker           Additional Information about your Body Mass Index (BMI)           Your BMI as listed above is considered obese (30 or more). BMI is an estimate of body fat, calculated from your height and weight.   The higher at first.  With practice, it will feel more natural.    4.  Take some deep breaths, concentrating on only moving your stomach. Hold each breath for 2 to 3 seconds before exhaling. 5.   Get your mind on your side    One other important factor in getting relaxed is your mind. Your mind and body are connected. The mind influences the body and the body influences the mind. What you do with your mind when you are trying to relax is very important. The key is to avoid thinking about stressful things. You can think about      Neutral things (e.g., counting, saying a word like calm or relax)   Pleasant things (e.g., imagining a pleasant place)    6. Return to regular breathing, continuing to breathe so that only your stomach moves. 7.  It is recommended that you practice 2 times per day, 10 minutes each time. Today's Medication Changes          These changes are accurate as of: 10/3/17  9:51 AM.  If you have any questions, ask your nurse or doctor. START taking these medications           ofloxacin 0.3 % solution   Commonly known as:  OCUFLOX   Instructions:  Place 1 drop into the left eye 4 times daily for 10 days   Quantity:  1 Bottle   Refills:  0   Started by:  Glenny Mauricio MD            Where to Get Your Medications      These medications were sent to [address]  Rosa Su [phone] Brenden Regan [phone]  [facility] [address]     Phone:  [phone]     ofloxacin 0.3 % solution               Your Current Medications Are              ofloxacin (OCUFLOX) 0.3 % solution Place 1 drop into the left eye 4 times daily for 10 days    metoprolol tartrate (LOPRESSOR) 25 MG tablet take 1 tablet by mouth twice a day    fluticasone (FLONASE) 50 MCG/ACT nasal spray instill 1 spray into each nostril once daily    ibuprofen (ADVIL;MOTRIN) 800 MG tablet Take 1 tablet by mouth every 8 hours as needed for Pain vitamin D (CHOLECALCIFEROL) 1000 units TABS tablet Take 1 tablet by mouth daily    lisinopril (PRINIVIL;ZESTRIL) 20 MG tablet Take 1 tablet by mouth daily    hydrochlorothiazide (HYDRODIURIL) 25 MG tablet Take 1 tablet by mouth daily    sodium chloride (ALTAMIST SPRAY) 0.65 % nasal spray 1 spray by Nasal route as needed for Congestion    HYDROcodone-acetaminophen (NORCO) 5-325 MG per tablet take 1 tablet by mouth every 4 hours to every 6 hours if needed pain    potassium chloride (KLOR-CON M) 10 MEQ extended release tablet Take 1 tablet by mouth daily    albuterol sulfate HFA (PROAIR HFA) 108 (90 BASE) MCG/ACT inhaler Inhale 2 puffs into the lungs every 6 hours as needed for Wheezing or Shortness of Breath Space out to every 6 hours as symptoms improve. Allergies              Apple Swelling         Additional Information        Basic Information     Date Of Birth Sex Race Ethnicity Preferred Language Preferred Written Language    1980 Male Black Non-/Non  English English      Problem List as of 10/3/2017  Date Reviewed: 10/3/2017                Hypokalemia    Vitamin D deficiency    Obesity, morbid, BMI 40.0-49.9 ([facility] [address]    MIGEL on CPAP    Prediabetes    Positive depression screening    Fatty liver disease, nonalcoholic    Hypertension      Immunizations as of 10/3/2017     Name Date    Influenza Virus Vaccine 10/9/2015    Influenza, Jay Pillai, 3 Years and older, IM 10/3/2016    Influenza, Quadv, 6 mo and older, IM (FLULAVAL QUADV) 10/3/2017    Pneumococcal Polysaccharide (Pwcvenesm89) 1/13/2016    Tdap (Boostrix, Adacel) 10/9/2015      Preventive Care        Date Due    Tetanus Combination Vaccine (2 - Td) 10/9/2025            CloudLink Techhart Signup           Our records indicate that you have an active TakeLessons account. You can view your After Visit Summary by going to [email or web address] and logging in with your TakeLessons username and password.

## 2017-10-03 NOTE — PROGRESS NOTES
Chronic Disease Visit Information    BP Readings from Last 3 Encounters:   09/05/17 130/74   08/28/17 122/84   08/13/17 138/83          Hemoglobin A1C (%)   Date Value   07/20/2017 5.9   02/09/2017 5.9   10/25/2016 5.7     LDL Cholesterol (mg/dL)   Date Value   07/20/2017 86     HDL (mg/dL)   Date Value   07/20/2017 32 (L)     BUN (mg/dL)   Date Value   07/20/2017 15     CREATININE (mg/dL)   Date Value   07/20/2017 0.96     Glucose (mg/dL)   Date Value   07/20/2017 98   05/08/2012 83            Have you changed or started any medications since your last visit including any over-the-counter medicines, vitamins, or herbal medicines? no   Are you having any side effects from any of your medications? -  no  Have you stopped taking any of your medications? Is so, why? -  no    Have you seen any other physician or provider since your last visit? No  Have you had any other diagnostic tests since your last visit? No  Have you been seen in the emergency room and/or had an admission to a hospital since we last saw you? No  Have you had your annual diabetic retinal (eye) exam? No  Have you had your routine dental cleaning in the past 6 months? no    Have you activated your [email or web address] account? If not, what are your barriers?  Yes     Patient Care Team:  Leti Carlos MD as PCP - General (Internal Medicine)  Steven Khan MD as Surgeon (Orthopedic Surgery)         Medical History Review  Past Medical, Family, and Social History reviewed and does not contribute to the patient presenting condition    Health Maintenance   Topic Date Due    Flu vaccine (1) 10/28/2017 (Originally 9/1/2017)    DTaP/Tdap/Td vaccine (2 - Td) 10/09/2025    Pneumococcal med risk  Completed    HIV screen  Completed

## 2017-10-03 NOTE — PATIENT INSTRUCTIONS
1. Remember the rapid eye-movement exercise before you study and before you start question 1 on the test. Look to the R and L without moving your head, 20 seconds then stop. 2. Practice deep breathing 3 times per day; morning, midday, nighttime, or before and after you study or are stressed. 10 breathes each time, which should be less than 2 minutes. 3. Check out [email or web address] video: Luz Haney Rights vs Privileges   4. Follow up in 2 weeks. Relaxation:  Diaphragmatic Breathing             ______________________________________________________________________________    1. Sit in a comfortable position  2. Place one hand on your stomach and the other on your chest  3. Try to breathe so that only your stomach rises and falls    As you inhale, concentrate on your chest remaining relatively still while your stomach rises. It may be helpful for you to imagine that your pants are too big and you need to push your stomach out to hold them up. When exhaling, allow your stomach to fall in and the air to fully escape. Inhale slowly. You may choose to hold the air in for about a second. Exhale slowly. Dont push the air out, but just let the natural pressure of your body slowly move it out. It is normal for this healthy method of breathing to feel a little awkward at first.  With practice, it will feel more natural.    4.  Take some deep breaths, concentrating on only moving your stomach. Hold each breath for 2 to 3 seconds before exhaling. 5.   Get your mind on your side    One other important factor in getting relaxed is your mind. Your mind and body are connected. The mind influences the body and the body influences the mind. What you do with your mind when you are trying to relax is very important. The key is to avoid thinking about stressful things.       You can think about      Neutral things (e.g., counting, saying a word like calm or

## 2017-10-03 NOTE — PATIENT INSTRUCTIONS
Your medications for this visit were escribed to your preferred pharmacy. Avs was given and reviewed appt card given with next appt.  MM

## 2017-10-03 NOTE — MR AVS SNAPSHOT
After Visit Summary             Hua Peter   10/3/2017 8:45 AM   Office Visit    Description:  Male : 1980   Provider:  Levi Casarez MD   Department:  [facility] and Future Appointments         Below is a list of your follow-up and future appointments. This may not be a complete list as you may have made appointments directly with providers that we are not aware of or your providers may have made some for you. Please call your providers to confirm appointments. It is important to keep your appointments. Please bring your current insurance card, photo ID, co-pay, and all medication bottles to your appointment. If self-pay, payment is expected at the time of service. Your To-Do List     Future Appointments Provider Department Dept Phone    10/3/2017 10:00 AM [address] at [facility]    Please arrive 15 minutes prior to appointment time, bring insurance card and photo ID.     10/10/2017 1:00 PM Fany Valdes NP [facility] [phone]    Please arrive 15 minutes prior to appointment time, bring insurance card and photo ID.     2018 9:30 AM MD RICH [place]/ [place] [address] [phone]    Please arrive 15 minutes prior to appointment time, bring insurance card and photo ID. Follow-Up    Return in about 3 months (around 1/3/2018).          Information from Your Visit        Department     Name Address Phone Fax    [address] Charity Ovalle chris  [address] [phone]      You Were Seen for:         Comments    Hordeolum externum of left lower eyelid   [[id number]]         Vital Signs     Blood Pressure Pulse Temperature Height Weight Body Mass Index    130/83 (Site: Left Arm, Position: Sitting, Cuff Size: Large Adult) 75 97.3 °F (36.3 °C) (Oral) 6' 4.5\" (1.943 m) 342 lb (155.1 kg) 41.09 kg/m2    Smoking Status Tetanus Combination Vaccine (2 - Td) 10/9/2025            MyChart Signup           Our records indicate that you have an active Redditt account. You can view your After Visit Summary by going to [email or web address] and logging in with your GetNotes username and password. If you don't have a GetNotes username and password but a parent or guardian has access to your record, the parent or guardian should login with their own GetNotes username and password and access your record to view the After Visit Summary. Additional Information  If you have questions, please contact the physician practice where you receive care. Remember, GetNotes is NOT to be used for urgent needs. For medical emergencies, dial 911. For questions regarding your GetNotes account call [phone]. If you have a clinical question, please call your doctor's office.

## 2017-10-03 NOTE — PROGRESS NOTES
[facility]    Progress Note    Date of patient's visit: 10/3/2017    Patient's Name:  Bernadette Cummings    YOB: 1980            Patient Care Team:  Mini Stanford MD as PCP - General (Internal Medicine)  Leslie Cohn MD as Surgeon (Orthopedic Surgery)    REASON FOR VISIT: Routine outpatient follow     Chief Complaint   Patient presents with    Stye     patient states that he has had this for about 2 months in the left eye          HISTORY OF PRESENT ILLNESS:    History was obtained from the patient. Bernadette Cummings is a 42 y.o. is here for routine follow up of HTN, Morbid obesity, prediabetes. He is c/o left eye stye for 2 months. He denies pain. He denies discharge. It's in his lower eyelid. He says previously he had one in his right upper eyelid which lasted for years. He is denying any bloody vision. He says he's been using warm compresses on and off. He continues to follow up with Bariatric clinic. He has lost 45 pounds. He continues to follow up with  for his depression. He is feeling better. His blood pressure is better today. He has a history of hypertension, morbid obesity, prediabetes, fatty liver and obstructive sleep apnea along with osteoarthritis of his knees. He says is taking ibuprofen for pain. He was given meloxicam by Dr. Joss Randall which he is not taking as it has not helped in the past.  He has a follow-up again in 2 weeks' time. He says his left knee is been hurting more and swelling more than the right side. GERD symptoms are better. He has decreased his NSAID use and eating healthier. Past Medical History:   Diagnosis Date    Anxiety     Arthritis     CAD (coronary artery disease)     Fatty liver     Fatty liver disease, nonalcoholic 9/55/3535    Hypertension     Obesity     Unspecified sleep apnea     cpap       No past surgical history on file.       ALLERGIES      Allergies   Allergen redness. Respiratory: Negative for cough and shortness of breath. Cardiovascular: Negative for chest pain, palpitations and leg swelling. Gastrointestinal: Negative for abdominal pain, constipation, heartburn and nausea. Genitourinary: Negative for dysuria and hematuria. Musculoskeletal: Positive for joint pain. Negative for back pain, falls and myalgias. Skin: Negative for itching and rash. Neurological: Negative for dizziness, sensory change, focal weakness, loss of consciousness, weakness and headaches. Psychiatric/Behavioral: Positive for depression. Negative for substance abuse and suicidal ideas. PHYSICAL EXAM:      Vitals:    10/03/17 0854   BP: 130/83   Site: Left Arm   Position: Sitting   Cuff Size: Large Adult   Pulse: 75   Temp: 97.3 °F (36.3 °C)   TempSrc: Oral   Weight: (!) 342 lb (155.1 kg)   Height: 6' 4.5\" (1.943 m)     Body mass index is 41.09 kg/(m^2). BP Readings from Last 3 Encounters:   10/03/17 130/83   09/05/17 130/74   08/28/17 122/84        Wt Readings from Last 3 Encounters:   10/03/17 (!) 342 lb (155.1 kg)   09/05/17 (!) 359 lb 6.4 oz (163 kg)   08/28/17 (!) 363 lb (164.7 kg)       Physical Exam   Constitutional: He is oriented to person, place, and time and well-developed, well-nourished, and in no distress. No distress. HENT:   Head: Normocephalic and atraumatic. Eyes: Conjunctivae and EOM are normal. Pupils are equal, round, and reactive to light. No scleral icterus. Cardiovascular: Normal rate and regular rhythm. Pulmonary/Chest: Effort normal and breath sounds normal. He has no wheezes. Musculoskeletal: He exhibits no edema. Right knee: He exhibits normal range of motion, no swelling and no effusion. No tenderness found. Left knee: He exhibits normal range of motion, no swelling, no effusion, no ecchymosis, no deformity and normal patellar mobility. Tenderness found. Medial joint line and lateral joint line tenderness noted.  No following healthy behaviors: nutrition, exercise and medication adherence    2. Reviewed prior labs and health maintenance. 3. Discussed use, benefit, and side effects of prescribed medications. Barriers to medication compliance addressed. All patient questions answered. Pt voiced understanding.        Bhavana Rebolledo  Attending Physician, [address] [place] [address], Internal Medicine Residency Program  [address] [place] [address]  10/3/2017, 8:56 AM

## 2017-10-06 DIAGNOSIS — I10 ESSENTIAL HYPERTENSION: ICD-10-CM

## 2017-10-06 NOTE — TELEPHONE ENCOUNTER
From: Bernadette Cummings  To: Mini Stanford MD  Sent: 10/5/2017 4:39 PM EDT  Subject: Medication Renewal Request    Original authorizing provider: Mini Stanford MD    [address] [facility] [place] Sellers would like a refill of the following medications:  hydrochlorothiazide (HYDRODIURIL) 25 MG tablet Mnii Stanford MD]    Preferred pharmacy: [place] [address].  Jose Me [phone] - F [phone]    Comment:

## 2017-10-08 ENCOUNTER — HOSPITAL ENCOUNTER (EMERGENCY)
Age: 37
Discharge: HOME | End: 2017-10-08
Attending: EMERGENCY MEDICINE
Payer: MEDICAID

## 2017-10-08 VITALS
BODY MASS INDEX: 38.36 KG/M2 | OXYGEN SATURATION: 99 % | HEART RATE: 86 BPM | HEIGHT: 76 IN | DIASTOLIC BLOOD PRESSURE: 90 MMHG | RESPIRATION RATE: 18 BRPM | SYSTOLIC BLOOD PRESSURE: 155 MMHG | WEIGHT: 315 LBS | TEMPERATURE: 98.1 F

## 2017-10-08 DIAGNOSIS — M25.561 CHRONIC PAIN OF RIGHT KNEE: Primary | ICD-10-CM

## 2017-10-08 DIAGNOSIS — G89.29 CHRONIC PAIN OF RIGHT KNEE: Primary | ICD-10-CM

## 2017-10-08 PROCEDURE — 99283 EMERGENCY DEPT VISIT LOW MDM: CPT

## 2017-10-08 RX ORDER — NAPROXEN 500 MG/1
500 TABLET ORAL 2 TIMES DAILY
Qty: 60 TABLET | Refills: 0 | Status: SHIPPED | OUTPATIENT
Start: 2017-10-08 | End: 2018-04-04 | Stop reason: ALTCHOICE

## 2017-10-08 RX ORDER — HYDROCODONE BITARTRATE AND ACETAMINOPHEN 5; 325 MG/1; MG/1
1 TABLET ORAL EVERY 6 HOURS PRN
Qty: 10 TABLET | Refills: 0 | Status: SHIPPED | OUTPATIENT
Start: 2017-10-08 | End: 2017-10-15

## 2017-10-08 RX ORDER — NAPROXEN 500 MG/1
500 TABLET ORAL 2 TIMES DAILY
Qty: 60 TABLET | Refills: 0 | Status: SHIPPED | OUTPATIENT
Start: 2017-10-08 | End: 2017-10-08

## 2017-10-08 RX ORDER — HYDROCODONE BITARTRATE AND ACETAMINOPHEN 5; 325 MG/1; MG/1
1 TABLET ORAL EVERY 6 HOURS PRN
Qty: 10 TABLET | Refills: 0 | Status: SHIPPED | OUTPATIENT
Start: 2017-10-08 | End: 2017-10-08

## 2017-10-08 ASSESSMENT — PAIN SCALES - GENERAL: PAINLEVEL_OUTOF10: 10

## 2017-10-08 ASSESSMENT — PAIN DESCRIPTION - ORIENTATION: ORIENTATION: RIGHT

## 2017-10-08 ASSESSMENT — PAIN DESCRIPTION - FREQUENCY: FREQUENCY: CONTINUOUS

## 2017-10-08 ASSESSMENT — PAIN DESCRIPTION - PROGRESSION: CLINICAL_PROGRESSION: NOT CHANGED

## 2017-10-08 ASSESSMENT — ENCOUNTER SYMPTOMS: BACK PAIN: 0

## 2017-10-08 ASSESSMENT — PAIN DESCRIPTION - PAIN TYPE: TYPE: ACUTE PAIN;CHRONIC PAIN

## 2017-10-08 ASSESSMENT — PAIN DESCRIPTION - DESCRIPTORS: DESCRIPTORS: CONSTANT

## 2017-10-08 ASSESSMENT — PAIN DESCRIPTION - LOCATION: LOCATION: KNEE

## 2017-10-08 NOTE — ED PROVIDER NOTES
[facility]  ED  Emergency Department Encounter  Emergency Medicine Resident     Pt Name: Julio Morales  MRN: [id number]  [place] 1980  Date of evaluation: 10/8/17  PCP:  Madina Morris MD    [address] [place] [address]       Chief Complaint   Patient presents with    Knee Pain     Pt reports right knee pain that is chronic and reports that the pain got worse last week. Pt reports he was washing clothes when the pain began, denies fall or injury. Pt ambulates with steady gait       HISTORY OF PRESENT ILLNESS  (Location/Symptom, Timing/Onset, Context/Setting, Quality, Duration, Modifying Factors, Severity.)      Julio Morales is a 42 y.o. male who presents with Chronic right knee pain. Patient states that the pain started increasing while he was washing his clothes. Patient denies any acute trauma to his right knee pain. Patient states that he is being followed by Dr. Saurabh Foster his orthopedic surgeon who has been giving him steroid injections to the right knee. Patient states that he last received one of these approximate one month ago. PAST MEDICAL / SURGICAL / SOCIAL / FAMILY HISTORY      has a past medical history of Anxiety; Arthritis; CAD (coronary artery disease); Fatty liver; Fatty liver disease, nonalcoholic; Hypertension; Obesity; and Unspecified sleep apnea. has no past surgical history on file. Social History     Social History    Marital status: Single     Spouse name: N/A    Number of children: N/A    Years of education: N/A     Occupational History    Not on file.      Social History Main Topics    Smoking status: Never Smoker    Smokeless tobacco: Never Used    Alcohol use No    Drug use: No    Sexual activity: Not on file     Other Topics Concern    Not on file     Social History Narrative    ** Merged History Encounter **            Family History   Problem Relation Age of Onset    Arthritis Mother     Diabetes Father     Arthritis Father     Stroke Maternal SYSTEMS    (2-9 systems for level 4, 10 or more for level 5)      Review of Systems   Constitutional: Negative for chills and fever. Musculoskeletal: Positive for arthralgias. Negative for back pain and neck pain. Skin: Negative for rash. Neurological: Negative for dizziness, weakness and numbness. PHYSICAL EXAM   (up to 7 for level 4, 8 or more for level 5)      INITIAL VITALS:   BP (!) 155/90   Pulse 86   Temp 98.1 °F (36.7 °C) (Oral)   Resp 18   Ht 6' 4\" (1.93 m)   Wt (!) 338 lb (153.3 kg)   SpO2 99%   BMI 41.14 kg/m²     Physical Exam   Constitutional: He is oriented to person, place, and time. He appears well-developed and well-nourished. HENT:   Head: Normocephalic and atraumatic. Musculoskeletal:   +2 PT DP pulses to the right foot. No obvious effusion no warmth of the joint, no overlying erythema of the skin. Negative anterior-posterior drawer test, no tenderness to palpation over the popliteal fossa, lateral or medial aspects of the knee. Patient has 5/5 strength with flexion and extension of the right knee. Neurological: He is alert and oriented to person, place, and time. Skin: Skin is warm. No rash noted. DIFFERENTIAL  DIAGNOSIS     PLAN (LABS / IMAGING / EKG):  Orders Placed This Encounter   Procedures    Apply ace wrap       MEDICATIONS ORDERED:  Orders Placed This Encounter   Medications    DISCONTD: naproxen (NAPROSYN) 500 MG tablet     Sig: Take 1 tablet by mouth 2 times daily     Dispense:  60 tablet     Refill:  0    DISCONTD: HYDROcodone-acetaminophen (NORCO) 5-325 MG per tablet     Sig: Take 1 tablet by mouth every 6 hours as needed for Pain . Dispense:  10 tablet     Refill:  0    naproxen (NAPROSYN) 500 MG tablet     Sig: Take 1 tablet by mouth 2 times daily     Dispense:  60 tablet     Refill:  0    HYDROcodone-acetaminophen (NORCO) 5-325 MG per tablet     Sig: Take 1 tablet by mouth every 6 hours as needed for Pain .      Dispense:  10 tablet Refill:  0       DDX: Posterior arthritis, DJD, septic joint, ligamentous injury    DIAGNOSTIC RESULTS / EMERGENCY DEPARTMENT COURSE / MDM     LABS:  No results found for this visit on 10/08/17. IMPRESSION: 53-year-old male presenting with acute on chronic right knee pain. Plan for switching his analgesia from Motrin to naproxen and discharged patient home with a short course of Norco for his pain. Low suspicion for septic joint given no erythema or warmth to the joint itself. Patient has no other constitutional symptoms. Patient states that he has crutches at home. We will also provide patient with an Ace wrap for the right knee. Also advised patient to continue to ice his knees 20-30 minutes on and 20-30 minutes off. Patient agreeable with this plan. Patient does have follow-up with his orthopedic surgeon. Patient was to be discharged at this time. RADIOLOGY:  None     EKG  None    EMERGENCY DEPARTMENT COURSE:      PROCEDURES:  Procedures    CONSULTS:  None    CRITICAL CARE:  None    FINAL IMPRESSION      1.  Chronic pain of right knee          DISPOSITION / PLAN     DISPOSITION Decision to Discharge    PATIENT REFERRED TO:  Renetta Valencia MD  [address] [place] [address] Rd[address] [place] [phone]      For further evaluation of your knee pain      DISCHARGE MEDICATIONS:  Discharge Medication List as of 10/8/2017  5:37 PM          Isis Dahl MD  Emergency Medicine Resident    (Please note that portions of this note were completed with a voice recognition program.  Efforts were made to edit the dictations but occasionally words are mis-transcribed.)        Isis Dahl MD  Resident  10/08/17 3210

## 2017-10-08 NOTE — ED NOTES
Pt arrived to ED alert and oriented x4. Pt reports chronic right knee pain. Pt reports that he always has right knee pain but reports that in the past week it has gotten worse. Pt states that he was washing clothes when the pain worsened. Pt denies recent injury or fall. Pt reports numbness and tingling to RLE, reports that these symptoms are new. Pt has full sensation in RLE, ambulates with steady gait. RR even and unlabored. NAD noted. Will continue to monitor.       Juno Fraga RN  10/08/17 5315

## 2017-10-09 RX ORDER — HYDROCHLOROTHIAZIDE 25 MG/1
25 TABLET ORAL DAILY
Qty: 30 TABLET | Refills: 6 | Status: SHIPPED | OUTPATIENT
Start: 2017-10-09 | End: 2018-04-04 | Stop reason: ALTCHOICE

## 2017-10-10 ENCOUNTER — OFFICE VISIT (OUTPATIENT)
Dept: BARIATRICS/WEIGHT MGMT | Age: 37
End: 2017-10-10
Payer: MEDICAID

## 2017-10-10 VITALS
WEIGHT: 315 LBS | SYSTOLIC BLOOD PRESSURE: 128 MMHG | HEIGHT: 76 IN | HEART RATE: 74 BPM | BODY MASS INDEX: 38.36 KG/M2 | DIASTOLIC BLOOD PRESSURE: 76 MMHG

## 2017-10-10 DIAGNOSIS — R73.03 PREDIABETES: ICD-10-CM

## 2017-10-10 DIAGNOSIS — I10 ESSENTIAL HYPERTENSION: Primary | ICD-10-CM

## 2017-10-10 DIAGNOSIS — K76.0 FATTY LIVER DISEASE, NONALCOHOLIC: ICD-10-CM

## 2017-10-10 DIAGNOSIS — Z99.89 OSA ON CPAP: ICD-10-CM

## 2017-10-10 DIAGNOSIS — E66.01 OBESITY, MORBID, BMI 40.0-49.9 (HCC): ICD-10-CM

## 2017-10-10 DIAGNOSIS — G47.33 OSA ON CPAP: ICD-10-CM

## 2017-10-10 PROCEDURE — 99213 OFFICE O/P EST LOW 20 MIN: CPT | Performed by: NURSE PRACTITIONER

## 2017-10-10 NOTE — PROGRESS NOTES
naproxen (NAPROSYN) 500 MG tablet Take 1 tablet by mouth 2 times daily 60 tablet 0    HYDROcodone-acetaminophen (NORCO) 5-325 MG per tablet Take 1 tablet by mouth every 6 hours as needed for Pain . 10 tablet 0    ofloxacin (OCUFLOX) 0.3 % solution Place 1 drop into the left eye 4 times daily for 10 days 1 Bottle 0    metoprolol tartrate (LOPRESSOR) 25 MG tablet take 1 tablet by mouth twice a day 60 tablet 5    fluticasone (FLONASE) 50 MCG/ACT nasal spray instill 1 spray into each nostril once daily 1 Bottle 3    vitamin D (CHOLECALCIFEROL) 1000 units TABS tablet Take 1 tablet by mouth daily 30 tablet 5    lisinopril (PRINIVIL;ZESTRIL) 20 MG tablet Take 1 tablet by mouth daily 30 tablet 6    sodium chloride (ALTAMIST SPRAY) 0.65 % nasal spray 1 spray by Nasal route as needed for Congestion 1 Bottle 3    potassium chloride (KLOR-CON M) 10 MEQ extended release tablet Take 1 tablet by mouth daily 30 tablet 0    albuterol sulfate HFA (PROAIR HFA) 108 (90 BASE) MCG/ACT inhaler Inhale 2 puffs into the lungs every 6 hours as needed for Wheezing or Shortness of Breath Space out to every 6 hours as symptoms improve. 1 Inhaler 3     No current facility-administered medications for this visit. Vital Signs:  /76 (Site: Left Arm, Position: Sitting, Cuff Size: Large Adult)   Pulse 74   Ht 6' 3.98\" (1.93 m)   Wt (!) 336 lb 3.2 oz (152.5 kg)   BMI 40.94 kg/m²     BMI/Height/Weight:  Body mass index is 40.94 kg/m². Review of Systems  Constitutional: Weight loss      Objective:      Physical Exam   Vital signs reviewed. General Appearance: Well-developed and well-nourished. No acute distress. Skin: Warm, dry. Head: Normocephalic. Pulmonary/Chest: Normal respiratory effort. Musculoskeletal: Movement x4. Neurological:  Alert and oriented. Individual goal for this encounter:  Eat Healthy. Get down in weight and lose my belly. Build up my muscles and keep the fat off.   Help out people to lose weight. [x] Vital signs reviewed and discussed with patient.   [] Labs/EKG reviewed and discussed with patient. [] Blood sugar log reviewed and discussed with patient. [] Physical activity discussed. [] Medication changes recommended. [] Smoking cessation discussed. [x] Specific questions/concerns addressed. Specific group medical question(s) addressed in this encounter:   Discussion about obesity and obesity hypoventilation syndrome. Group discussion topic for this encounter:     [] Welcome Jumpstart   [] Be a Fat & Calorie    [] Healthy Eating   [] Move Those Muscles   [] Tip the Calorie Balance   [] Take charge of What's Around You   [] Problem Solving   [] Four Keys to Healthy Eating Out   [] S[place] of Lifestyle Change   [] Jumpstart your Activity Plan   [] Make Social Cues Work for [place]   [] Ways to Stay Motivated   [] Preparing for Long Term Self-Management   [] More Volume, Fewer Calories   [] Balance Your Thoughts   [] Strengthen your Exercise Program   [] Mindful Eating   [x] Stress and Time Managment   [] Standing Up for Your Health   [] Heart Health   [] Stretching:  The Truth About Flexibility   [] Looking Back and Looking Forward    Total time:  90 minutes, greater than 50% of visit spent in group counseling/education. Assessment:      1. Essential hypertension     2. Prediabetes     3. MIGEL on CPAP     4. Fatty liver disease, nonalcoholic     5. Obesity, morbid, BMI 40.0-49.9 ([address]         Plan:      Track food and weight. Return to clinic as per group medical appointment schedule. Follow-up:  Return in about 1 month (around 11/10/2017). Orders:  No orders of the defined types were placed in this encounter. Prescriptions:  No orders of the defined types were placed in this encounter.       Electronically signed by:  Pedro Duron CNP

## 2017-10-16 ENCOUNTER — OFFICE VISIT (OUTPATIENT)
Dept: BEHAVIORAL/MENTAL HEALTH CLINIC | Age: 37
End: 2017-10-16
Payer: MEDICAID

## 2017-10-16 DIAGNOSIS — F33.0 MAJOR DEPRESSIVE DISORDER, RECURRENT, MILD (HCC): Primary | ICD-10-CM

## 2017-10-16 PROCEDURE — 90834 PSYTX W PT 45 MINUTES: CPT | Performed by: PSYCHOLOGIST

## 2017-10-16 NOTE — PATIENT INSTRUCTIONS
1. Remember the rapid eye-movement exercise before you study and before you start question 1 on the test. Look to the R and L without moving your head, 20 seconds then stop. 2. Practice deep breathing 3 times per day; morning, midday, nighttime, or before and after you study or are stressed. 10 breathes each time, which should be less than 2 minutes. 3. Check out [employer] video: Catherine Dickeyy Rights vs Privileges   4. New ideas to help you move forward: 1. \"cut the wires\" so that others cannot push your \"buttons. \" 2. Learn different ways to send communications to other people in ways that increase the probability or chance that your message is received in the way that you intended. There is a large difference for most people with their intention with communication and the actually impact, and we only get to deal with the impact and not our intention. 5. Follow up in 1 month. Deep Breathing       What Is Deep Breathing? Deep breathing involves using your diaphragm muscle to help bring about a state of physiological relaxation. The diaphragm is a large muscle that rests across the bottom of your rib cage. When you inhale, the diaphragm muscle drops, opening up space so air can come in. When watching someone do this it looks like your stomach is filling with air. This type of breathing helps activate the part of your nervous system that controls relaxation. It can lead to decreased heart rate, blood pressure, decreased muscle tension, and overall feelings of relaxation. Why Be Concerned With How Im Breathing?  To increase your awareness of the role that breathing plays in increased physical tension and in contributing to increasing your bodys stress response.  To lower your level of stress-related arousal and tension.  To give you a method of taking calm, relaxing breaths to break the cycle of increasing arousal during stressful situations.      What Is the Best Way To Use Deep Breathing four.   Hold your breath for a count of seven.  Exhale completely through your mouth, making a whoosh sound to a count of eight.  This is one breath. Now inhale again and repeat the cycle three more times for a total of four breaths. Note that you always inhale quietly through your nose and exhale audibly through your mouth. The tip of your tongue stays in position the whole time. Exhalation takes twice as long as inhalation. The absolute time you spend on each phase is not important; the ratio of 4:7:8 is important. If you have trouble holding your breath, speed the exercise up but keep to the ratio of 4:7:8 for the three phases. With practice you can slow it all down and get used to inhaling and exhaling more and more deeply. This exercise is a natural tranquilizer for the nervous system. Unlike tranquilizing drugs, which are often effective when you first take them but then lose their power over time, this exercise is subtle when you first try it but gains in power with repetition and practice. Do it at least twice a day. You cannot do it too frequently. Do NOT do more than 4 breaths at one time for the first month of practice. Later, if you wish, you can extend it to eight breaths. If you feel a little lightheaded when you first breathe this way, do not be concerned; it will pass. Once you develop this technique by practicing it every day, it will be a very useful tool that you will always have with you. Use it whenever anything upsetting happens - before you react. Use it whenever you are aware of internal tension. Use it to help you fall asleep. This exercise cannot be recommended too highly. Everyone can benefit from it. Exercise 3: Meditation exercise  Breath Counting  If you want to get a feel for this challenging work, try your hand at breath counting, a deceptively simple technique. Sit in a comfortable position with the spine straight and head inclined slightly forward.  Gently close your eyes and take a few deep breaths. Then let the breath come naturally without trying to influence it. Ideally it will be quiet and slow, but depth and rhythm may vary.  To begin the exercise, count \"one\" to yourself as you exhale.  The next time you exhale, count \"two,\" and so on up to [place]. \"   Then begin a new cycle, counting \"one\" on the next exhalation. Never count higher than \"five,\" and count only when you exhale. You will know your attention has wandered when you find yourself up to \"eight,\" \"12,\" even \"19. \"  Try to do 10 minutes of this form of meditation.

## 2017-10-16 NOTE — PROGRESS NOTES
Shortness of Breath Space out to every 6 hours as symptoms improve. 1 Inhaler 3     No current facility-administered medications for this visit. Social History:   Social History     Social History    Marital status: Single     Spouse name: N/A    Number of children: N/A    Years of education: N/A     Occupational History    Not on file. Social History Main Topics    Smoking status: Never Smoker    Smokeless tobacco: Never Used    Alcohol use No    Drug use: No    Sexual activity: Not on file     Other Topics Concern    Not on file     Social History Narrative    ** Merged History Encounter **            TOBACCO:   reports that he has never smoked. He has never used smokeless tobacco.  ETOH:   reports that he does not drink alcohol.     Family History:   Family History   Problem Relation Age of Onset    Arthritis Mother     Diabetes Father     Arthritis Father     Stroke Maternal Grandmother     Heart Disease Maternal Grandmother     Stroke Maternal Grandfather     Heart Disease Maternal Grandfather     Early Death Maternal Grandfather     Stroke Paternal Grandmother     Heart Disease Paternal Grandmother     Stroke Paternal Grandfather     Heart Disease Paternal Grandfather     Early Death Paternal Grandfather

## 2017-11-07 ENCOUNTER — OFFICE VISIT (OUTPATIENT)
Dept: BARIATRICS/WEIGHT MGMT | Age: 37
End: 2017-11-07
Payer: MEDICAID

## 2017-11-07 VITALS
BODY MASS INDEX: 38.36 KG/M2 | HEART RATE: 62 BPM | DIASTOLIC BLOOD PRESSURE: 68 MMHG | WEIGHT: 315 LBS | HEIGHT: 76 IN | SYSTOLIC BLOOD PRESSURE: 115 MMHG

## 2017-11-07 DIAGNOSIS — R73.03 PREDIABETES: ICD-10-CM

## 2017-11-07 DIAGNOSIS — G47.33 OSA ON CPAP: ICD-10-CM

## 2017-11-07 DIAGNOSIS — Z99.89 OSA ON CPAP: ICD-10-CM

## 2017-11-07 DIAGNOSIS — E66.01 OBESITY, MORBID, BMI 40.0-49.9 (HCC): ICD-10-CM

## 2017-11-07 DIAGNOSIS — I10 ESSENTIAL HYPERTENSION: Primary | ICD-10-CM

## 2017-11-07 DIAGNOSIS — K76.0 FATTY LIVER DISEASE, NONALCOHOLIC: ICD-10-CM

## 2017-11-07 PROCEDURE — 99213 OFFICE O/P EST LOW 20 MIN: CPT | Performed by: NURSE PRACTITIONER

## 2017-11-07 PROCEDURE — G8427 DOCREV CUR MEDS BY ELIG CLIN: HCPCS | Performed by: NURSE PRACTITIONER

## 2017-11-07 PROCEDURE — G8484 FLU IMMUNIZE NO ADMIN: HCPCS | Performed by: NURSE PRACTITIONER

## 2017-11-07 PROCEDURE — 1036F TOBACCO NON-USER: CPT | Performed by: NURSE PRACTITIONER

## 2017-11-07 PROCEDURE — G8417 CALC BMI ABV UP PARAM F/U: HCPCS | Performed by: NURSE PRACTITIONER

## 2017-11-07 NOTE — PROGRESS NOTES
Group Lifestyle Balance Follow-up Progress Note      Subjective     Patient is here for group medical appointment for Group Lifestyle Balance weight management program follow-up for the chronic conditions of HTN, Fatty Liver, Prediabetes, MIGEL, . Patient continues on the program and tolerating well. Total weight loss to date is 92 lbs. No current issues. Allergies: Allergies   Allergen Reactions    Apple Swelling       Past Medical History:     Past Medical History:   Diagnosis Date    Anxiety     Arthritis     CAD (coronary artery disease)     Fatty liver     Fatty liver disease, nonalcoholic 4/09/8816    Hypertension     Obesity     Unspecified sleep apnea     cpap   . Past Surgical History:  History reviewed. No pertinent surgical history. Family History:  Family History   Problem Relation Age of Onset    Arthritis Mother     Diabetes Father     Arthritis Father     Stroke Maternal Grandmother     Heart Disease Maternal Grandmother     Stroke Maternal Grandfather     Heart Disease Maternal Grandfather     Early Death Maternal Grandfather     Stroke Paternal Grandmother     Heart Disease Paternal Grandmother     Stroke Paternal Grandfather     Heart Disease Paternal Grandfather     Early Death Paternal Grandfather        Social History:  Social History     Social History    Marital status: Single     Spouse name: N/A    Number of children: N/A    Years of education: N/A     Occupational History    Not on file.      Social History Main Topics    Smoking status: Never Smoker    Smokeless tobacco: Never Used    Alcohol use No    Drug use: No    Sexual activity: Not on file     Other Topics Concern    Not on file     Social History Narrative    ** Merged History Encounter **            Current Medications:  Current Outpatient Prescriptions   Medication Sig Dispense Refill    hydrochlorothiazide (HYDRODIURIL) 25 MG tablet Take 1 tablet by mouth daily 30 tablet 6   

## 2017-11-27 ENCOUNTER — OFFICE VISIT (OUTPATIENT)
Dept: BEHAVIORAL/MENTAL HEALTH CLINIC | Age: 37
End: 2017-11-27
Payer: MEDICAID

## 2017-11-27 DIAGNOSIS — F33.0 MAJOR DEPRESSIVE DISORDER, RECURRENT, MILD (HCC): Primary | ICD-10-CM

## 2017-11-27 PROCEDURE — 90832 PSYTX W PT 30 MINUTES: CPT | Performed by: PSYCHOLOGIST

## 2017-11-27 NOTE — PROGRESS NOTES
Behavioral Health Consultation  Krys JASON  Licensed Clinical Psychologist #[id number]  11/27/2017  9:58 AM- 10:28 AM    Time spent with Patient: 30 minutes  This is patient's 14th  [facility] consultation. Reason for Consult:  depression  Referring Provider: Levi Casarez MD    Feedback given to PCP. S:   Previous Recommendation(s)10/16/17:   - no mention, when inquired he stated that he attempted to watch the video; however, was repeatedly interrupted and has not completed. Holiday stress with mother's criticizing his cooking, in front of his significant other. This reportedly resulted in the next day his throwing out of all the food that he cooked. This was noted as self- improvement as he maintained his composure in the moment without becoming outwardly angry or disrespecting. Pt also reports he has reached triple digit weight loss. O:  MSE:   Mood was Euthymic, with Full & Congruent affect. Suicidal ideation was denied. Homicidal ideation was denied. Hygiene was Good. Dress was Appropriate and Casual.   Behavior was WNL & unremarkable with Yes observation or self-report of difficulties standing/ambulating. Attitude was Engageable and Friendly. Eye-contact was Good. Speech: rate- WNL, rhythm-  slightly dysarthric, volume- WNL  Verbalizations were  verbose. Thought processes were intact and goal-oriented without evidence of delusions, hallucinations, obsessions, or andrew; with moderate cognitive distortions. Associations were characterized by circumstantial cognitive processes. Pt was orientated oriented to person, place, time, and general circumstances;  recent:  good and remote:  good. Insight and judgment were estimated to be fair, AEB, a fair  understanding of cyclical maladaptive patterns, and the ability to use insight to inform behavior change. A:   Processed interaction; what went well, and what could be different or better in the future.      Pt interventions:  Trained in strategies for increasing balanced thinking, Trained in improving communication skills and Supportive techniques      Pt Behavioral Change Plan:   1. Remember the rapid eye-movement exercise before you study and before you start question 1 on the test. Look to the R and L without moving your head, 20 seconds then stop. 2. Practice deep breathing 3 times per day; morning, midday, nighttime, or before and after you study or are stressed. 10 breathes each time, which should be less than 2 minutes. 3. Check out [employer] video: Serge Amble Rights vs Privileges   4. New ideas to help you move forward: 1. \"cut the wires\" so that others cannot push your \"buttons. \" 2. Learn different ways to send communications to other people in ways that increase the probability or chance that your message is received in the way that you intended. There is a large difference for most people with their intention with communication and the actually impact, and we only get to deal with the impact and not our intention. 5. Follow up in 2 weeks. Diagnosis:  The encounter diagnosis was Major depressive disorder, recurrent, mild ([facility] [address]       Diagnosis Date    Anxiety     Arthritis     CAD (coronary artery disease)     Fatty liver     Fatty liver disease, nonalcoholic 7/09/5493    Hypertension     Obesity     Unspecified sleep apnea     cpap       History:    Medications:   Current Outpatient Prescriptions   Medication Sig Dispense Refill    hydrochlorothiazide (HYDRODIURIL) 25 MG tablet Take 1 tablet by mouth daily 30 tablet 6    naproxen (NAPROSYN) 500 MG tablet Take 1 tablet by mouth 2 times daily 60 tablet 0    metoprolol tartrate (LOPRESSOR) 25 MG tablet take 1 tablet by mouth twice a day 60 tablet 5    fluticasone (FLONASE) 50 MCG/ACT nasal spray instill 1 spray into each nostril once daily 1 Bottle 3    vitamin D (CHOLECALCIFEROL) 1000 units TABS tablet Take 1 tablet by mouth daily 30 tablet 5    lisinopril

## 2017-12-07 DIAGNOSIS — I10 ESSENTIAL HYPERTENSION: ICD-10-CM

## 2017-12-07 DIAGNOSIS — J31.0 CHRONIC RHINITIS: ICD-10-CM

## 2017-12-07 RX ORDER — ECHINACEA PURPUREA EXTRACT 125 MG
1 TABLET ORAL PRN
Qty: 1 BOTTLE | Refills: 3 | Status: SHIPPED | OUTPATIENT
Start: 2017-12-07 | End: 2018-04-04 | Stop reason: ALTCHOICE

## 2017-12-07 RX ORDER — FLUTICASONE PROPIONATE 50 MCG
1 SPRAY, SUSPENSION (ML) NASAL DAILY
Qty: 1 BOTTLE | Refills: 3 | Status: SHIPPED | OUTPATIENT
Start: 2017-12-07 | End: 2018-04-04 | Stop reason: ALTCHOICE

## 2017-12-12 ENCOUNTER — OFFICE VISIT (OUTPATIENT)
Dept: BARIATRICS/WEIGHT MGMT | Age: 37
End: 2017-12-12
Payer: MEDICAID

## 2017-12-12 VITALS
DIASTOLIC BLOOD PRESSURE: 70 MMHG | SYSTOLIC BLOOD PRESSURE: 122 MMHG | BODY MASS INDEX: 36.28 KG/M2 | WEIGHT: 297.9 LBS | HEIGHT: 76 IN | HEART RATE: 72 BPM

## 2017-12-12 DIAGNOSIS — K76.0 FATTY LIVER DISEASE, NONALCOHOLIC: ICD-10-CM

## 2017-12-12 DIAGNOSIS — I10 ESSENTIAL HYPERTENSION: Primary | ICD-10-CM

## 2017-12-12 DIAGNOSIS — E66.9 OBESITY (BMI 30-39.9): ICD-10-CM

## 2017-12-12 DIAGNOSIS — R73.03 PREDIABETES: ICD-10-CM

## 2017-12-12 DIAGNOSIS — G47.33 OSA ON CPAP: ICD-10-CM

## 2017-12-12 DIAGNOSIS — Z99.89 OSA ON CPAP: ICD-10-CM

## 2017-12-12 PROBLEM — E66.01 OBESITY, MORBID, BMI 40.0-49.9 (HCC): Status: RESOLVED | Noted: 2017-02-23 | Resolved: 2017-12-12

## 2017-12-12 PROCEDURE — 99213 OFFICE O/P EST LOW 20 MIN: CPT | Performed by: NURSE PRACTITIONER

## 2017-12-12 PROCEDURE — G8484 FLU IMMUNIZE NO ADMIN: HCPCS | Performed by: NURSE PRACTITIONER

## 2017-12-12 PROCEDURE — 1036F TOBACCO NON-USER: CPT | Performed by: NURSE PRACTITIONER

## 2017-12-12 PROCEDURE — G8427 DOCREV CUR MEDS BY ELIG CLIN: HCPCS | Performed by: NURSE PRACTITIONER

## 2017-12-12 PROCEDURE — G8417 CALC BMI ABV UP PARAM F/U: HCPCS | Performed by: NURSE PRACTITIONER

## 2017-12-12 NOTE — PROGRESS NOTES
Congestion 1 Bottle 3    fluticasone (FLONASE) 50 MCG/ACT nasal spray 1 spray by Nasal route daily 1 Bottle 3    metoprolol tartrate (LOPRESSOR) 25 MG tablet take 1 tablet by mouth twice a day 60 tablet 2    hydrochlorothiazide (HYDRODIURIL) 25 MG tablet Take 1 tablet by mouth daily 30 tablet 6    naproxen (NAPROSYN) 500 MG tablet Take 1 tablet by mouth 2 times daily 60 tablet 0    vitamin D (CHOLECALCIFEROL) 1000 units TABS tablet Take 1 tablet by mouth daily 30 tablet 5    lisinopril (PRINIVIL;ZESTRIL) 20 MG tablet Take 1 tablet by mouth daily 30 tablet 6    potassium chloride (KLOR-CON M) 10 MEQ extended release tablet Take 1 tablet by mouth daily 30 tablet 0    albuterol sulfate HFA (PROAIR HFA) 108 (90 BASE) MCG/ACT inhaler Inhale 2 puffs into the lungs every 6 hours as needed for Wheezing or Shortness of Breath Space out to every 6 hours as symptoms improve. 1 Inhaler 3     No current facility-administered medications for this visit. Vital Signs:  /70 (Site: Right Arm, Position: Sitting, Cuff Size: Large Adult)   Pulse 72   Ht 6' 3.98\" (1.93 m)   Wt 297 lb 14.4 oz (135.1 kg)   BMI 36.28 kg/m²     BMI/Height/Weight:  Body mass index is 36.28 kg/m². Review of Systems  Constitutional: Weight loss      Objective:      Physical Exam   Vital signs reviewed. General Appearance: Well-developed and well-nourished. No acute distress. Skin: Warm, dry. Head: Normocephalic. Pulmonary/Chest: Normal respiratory effort. Musculoskeletal: Movement x4. Neurological:  Alert and oriented. Individual goal for this encounter:  Eat Healthy. Get down in weight and lose my belly. Build up my muscles and keep the fat off. Help out people to lose weight. [x] Vital signs reviewed and discussed with patient.   [] Labs/EKG reviewed and discussed with patient. [] Blood sugar log reviewed and discussed with patient. [] Physical activity discussed.    [] Medication changes recommended. [] Smoking cessation discussed. [x] Specific questions/concerns addressed. Specific group medical question(s) addressed in this encounter:   Discussion about Vitamin D.      Group discussion topic for this encounter:     [] Welcome Sebas Jurado   [] Be a Fat & Calorie    [] Healthy Eating   [] Move Those Muscles   [] Tip the Calorie Balance   [] Take charge of What's Around You   [] Problem Solving   [] Four Keys to Healthy Eating Out   [] Slippery Henry of Lifestyle Change   [] Jumpstart your Activity Plan   [] Make Social Cues Work for Hola Shane   [] Ways to Stay Motivated   [] Preparing for Long Term Self-Management   [x] More Volume, Fewer Calories   [] Balance Your Thoughts   [] Strengthen your Exercise Program   [] Mindful Eating   [] Stress and Time Managment   [] Standing Up for Your Health   [] Heart Health   [] Stretching:  The Truth About Flexibility   [] Looking Back and Looking Forward    Total time:  90 minutes, greater than 50% of visit spent in group counseling/education. Assessment:      1. Essential hypertension     2. MIGEL on CPAP     3. Prediabetes     4. Fatty liver disease, nonalcoholic     5. Obesity (BMI 30-39. 9)         Plan:      Track food and weight. Return to clinic as per group medical appointment schedule. Follow-up:  Return in about 1 month (around 1/12/2018). Orders:  No orders of the defined types were placed in this encounter. Prescriptions:  No orders of the defined types were placed in this encounter.       Electronically signed by:  Alea Alonso CNP

## 2017-12-19 ENCOUNTER — OFFICE VISIT (OUTPATIENT)
Dept: BEHAVIORAL/MENTAL HEALTH CLINIC | Age: 37
End: 2017-12-19
Payer: MEDICAID

## 2017-12-19 DIAGNOSIS — F33.0 MAJOR DEPRESSIVE DISORDER, RECURRENT, MILD (HCC): Primary | ICD-10-CM

## 2017-12-19 PROCEDURE — 90832 PSYTX W PT 30 MINUTES: CPT | Performed by: PSYCHOLOGIST

## 2017-12-19 ASSESSMENT — PATIENT HEALTH QUESTIONNAIRE - PHQ9
6. FEELING BAD ABOUT YOURSELF - OR THAT YOU ARE A FAILURE OR HAVE LET YOURSELF OR YOUR FAMILY DOWN: 0
8. MOVING OR SPEAKING SO SLOWLY THAT OTHER PEOPLE COULD HAVE NOTICED. OR THE OPPOSITE, BEING SO FIGETY OR RESTLESS THAT YOU HAVE BEEN MOVING AROUND A LOT MORE THAN USUAL: 1
4. FEELING TIRED OR HAVING LITTLE ENERGY: 0
10. IF YOU CHECKED OFF ANY PROBLEMS, HOW DIFFICULT HAVE THESE PROBLEMS MADE IT FOR YOU TO DO YOUR WORK, TAKE CARE OF THINGS AT HOME, OR GET ALONG WITH OTHER PEOPLE: 1
2. FEELING DOWN, DEPRESSED OR HOPELESS: 0
3. TROUBLE FALLING OR STAYING ASLEEP: 2
SUM OF ALL RESPONSES TO PHQ QUESTIONS 1-9: 5
7. TROUBLE CONCENTRATING ON THINGS, SUCH AS READING THE NEWSPAPER OR WATCHING TELEVISION: 0
9. THOUGHTS THAT YOU WOULD BE BETTER OFF DEAD, OR OF HURTING YOURSELF: 0
1. LITTLE INTEREST OR PLEASURE IN DOING THINGS: 1
SUM OF ALL RESPONSES TO PHQ9 QUESTIONS 1 & 2: 1
5. POOR APPETITE OR OVEREATING: 1

## 2017-12-19 NOTE — PROGRESS NOTES
Behavioral Health Consultation  Ramy JASON  Licensed Clinical Psychologist #[id number]  12/19/2017  10:57 AM- 11:30 AM    Time spent with Patient: 33 minutes  This is patient's 15th  [facility] consultation. Reason for Consult:  depression  Referring Provider: Tracy Putnam MD    Feedback given to PCP. S:   Previous Recommendation(s):   1. Remember the rapid eye-movement exercise before you study and before you start question 1 on the test. Look to the R and L without moving your head, 20 seconds then stop. - notice retention increased and shared with his partner. 2. Practice deep breathing 3 times per day; morning, midday, nighttime, or before and after you study or are stressed. 10 breathes each time, which should be less than 2 minutes. - yes to good effect; more challenging with cousin. 3. Check out [employer] video: [employer] Poll Rights vs Privileges-  Enjoyed. 4. New ideas to help you move forward: 1. \"cut the wires\" so that others cannot push your \"buttons. \" 2. Learn different ways to send communications to other people in ways that increase the probability or chance that your message is received in the way that you intended. There is a large difference for most people with their intention with communication and the actually impact, and we only get to deal with the impact and not our intention. - continued to explore    Despite losing his wallet with ID, Social Security card, and credit card has not allowed that situation to be a problem or a sources of high stress. Also working to accept his step-father who reportedly has dementia. O:  MSE:   Mood was Euthymic, with Full & Congruent affect. Suicidal ideation was denied. Homicidal ideation was denied. Hygiene was Good. Dress was Appropriate and Casual.   Behavior was WNL & unremarkable with Sometimes observation or self-report of difficulties standing/ambulating. Attitude was Engageable and Friendly. Eye-contact was Good.   Speech: rate- WNL, (HYDRODIURIL) 25 MG tablet Take 1 tablet by mouth daily 30 tablet 6    naproxen (NAPROSYN) 500 MG tablet Take 1 tablet by mouth 2 times daily 60 tablet 0    vitamin D (CHOLECALCIFEROL) 1000 units TABS tablet Take 1 tablet by mouth daily 30 tablet 5    lisinopril (PRINIVIL;ZESTRIL) 20 MG tablet Take 1 tablet by mouth daily 30 tablet 6    potassium chloride (KLOR-CON M) 10 MEQ extended release tablet Take 1 tablet by mouth daily 30 tablet 0    albuterol sulfate HFA (PROAIR HFA) 108 (90 BASE) MCG/ACT inhaler Inhale 2 puffs into the lungs every 6 hours as needed for Wheezing or Shortness of Breath Space out to every 6 hours as symptoms improve. 1 Inhaler 3     No current facility-administered medications for this visit. Social History:   Social History     Social History    Marital status: Single     Spouse name: N/A    Number of children: N/A    Years of education: N/A     Occupational History    Not on file. Social History Main Topics    Smoking status: Never Smoker    Smokeless tobacco: Never Used    Alcohol use No    Drug use: No    Sexual activity: Not on file     Other Topics Concern    Not on file     Social History Narrative    ** Merged History Encounter **            TOBACCO:   reports that he has never smoked. He has never used smokeless tobacco.  ETOH:   reports that he does not drink alcohol.     Family History:   Family History   Problem Relation Age of Onset    Arthritis Mother     Diabetes Father     Arthritis Father     Stroke Maternal Grandmother     Heart Disease Maternal Grandmother     Stroke Maternal Grandfather     Heart Disease Maternal Grandfather     Early Death Maternal Grandfather     Stroke Paternal Grandmother     Heart Disease Paternal Grandmother     Stroke Paternal Grandfather     Heart Disease Paternal Grandfather     Early Death Paternal Grandfather

## 2018-01-04 ENCOUNTER — OFFICE VISIT (OUTPATIENT)
Dept: BEHAVIORAL/MENTAL HEALTH CLINIC | Age: 38
End: 2018-01-04
Payer: MEDICAID

## 2018-01-04 ENCOUNTER — OFFICE VISIT (OUTPATIENT)
Dept: INTERNAL MEDICINE | Age: 38
End: 2018-01-04
Payer: MEDICAID

## 2018-01-04 VITALS
HEART RATE: 69 BPM | HEIGHT: 76 IN | DIASTOLIC BLOOD PRESSURE: 85 MMHG | BODY MASS INDEX: 36.53 KG/M2 | WEIGHT: 300 LBS | SYSTOLIC BLOOD PRESSURE: 134 MMHG

## 2018-01-04 DIAGNOSIS — Z99.89 OSA ON CPAP: ICD-10-CM

## 2018-01-04 DIAGNOSIS — E66.9 OBESITY (BMI 30-39.9): ICD-10-CM

## 2018-01-04 DIAGNOSIS — K76.0 FATTY LIVER DISEASE, NONALCOHOLIC: ICD-10-CM

## 2018-01-04 DIAGNOSIS — I10 ESSENTIAL HYPERTENSION: Primary | ICD-10-CM

## 2018-01-04 DIAGNOSIS — F33.0 MAJOR DEPRESSIVE DISORDER, RECURRENT, MILD (HCC): Primary | ICD-10-CM

## 2018-01-04 DIAGNOSIS — F32.4 MAJOR DEPRESSIVE DISORDER WITH SINGLE EPISODE, IN PARTIAL REMISSION (HCC): ICD-10-CM

## 2018-01-04 DIAGNOSIS — G47.33 OSA ON CPAP: ICD-10-CM

## 2018-01-04 DIAGNOSIS — R73.03 PREDIABETES: ICD-10-CM

## 2018-01-04 PROCEDURE — 90837 PSYTX W PT 60 MINUTES: CPT | Performed by: PSYCHOLOGIST

## 2018-01-04 PROCEDURE — 1036F TOBACCO NON-USER: CPT | Performed by: INTERNAL MEDICINE

## 2018-01-04 PROCEDURE — G8427 DOCREV CUR MEDS BY ELIG CLIN: HCPCS | Performed by: INTERNAL MEDICINE

## 2018-01-04 PROCEDURE — G8417 CALC BMI ABV UP PARAM F/U: HCPCS | Performed by: INTERNAL MEDICINE

## 2018-01-04 PROCEDURE — G8484 FLU IMMUNIZE NO ADMIN: HCPCS | Performed by: INTERNAL MEDICINE

## 2018-01-04 PROCEDURE — 99213 OFFICE O/P EST LOW 20 MIN: CPT | Performed by: INTERNAL MEDICINE

## 2018-01-04 RX ORDER — LISINOPRIL 20 MG/1
20 TABLET ORAL DAILY
Qty: 30 TABLET | Refills: 6 | Status: SHIPPED | OUTPATIENT
Start: 2018-01-04 | End: 2018-04-10 | Stop reason: SDUPTHER

## 2018-01-04 NOTE — PROGRESS NOTES
[facility]    Progress Note    Date of patient's visit: 1/4/2018    Patient's Name:  Rolin Mcardle    YOB: 1980            Patient Care Team:  Franci Bonilla MD as PCP - General (Internal Medicine)  Daya Nye MD as Surgeon (Orthopedic Surgery)    REASON FOR VISIT: Routine outpatient follow     Chief Complaint   Patient presents with    Hypertension     pt states he has no concerns     Hand Pain     pt states he has some pain in his left hand due to frost bites          HISTORY OF PRESENT ILLNESS:    History was obtained from the patient. Rolin Mcardle is a 42 y.o. is here for Follow-up on hypertension, major depression and morbid obesity. He has lost mother 100 pounds in the last year. He is exercising daily and monitoring his diet. He is following up with bariatric clinic and the dietitian. He feels much better. He says his depression is also improved. He still following up with Dr. Karli Cruz regularly. He was recently helping his mother with groceries. He thinks he got frostbite though he has no ulcers or damage to his skin on the hands. He is having some pain at the tips of his fingers. He would like labs done. He has a history of prediabetes and fatty liver. Past Medical History:   Diagnosis Date    Anxiety     Arthritis     CAD (coronary artery disease)     Fatty liver     Fatty liver disease, nonalcoholic 5/54/5834    Hypertension     Obesity     Unspecified sleep apnea     cpap       History reviewed. No pertinent surgical history.       ALLERGIES      Allergies   Allergen Reactions    Apple Swelling       MEDICATIONS:      Current Outpatient Prescriptions on File Prior to Visit   Medication Sig Dispense Refill    fluticasone (FLONASE) 50 MCG/ACT nasal spray 1 spray by Nasal route daily 1 Bottle 3    metoprolol tartrate (LOPRESSOR) 25 MG tablet take 1 tablet by mouth twice a day 60 tablet 2   

## 2018-01-04 NOTE — PROGRESS NOTES
handout on mindfulness. 3. Follow up in 2 weeks. Diagnosis:  There were no encounter diagnoses. Diagnosis Date    Anxiety     Arthritis     CAD (coronary artery disease)     Fatty liver     Fatty liver disease, nonalcoholic 9/21/3701    Hypertension     Obesity     Unspecified sleep apnea     cpap       History:    Medications:   Current Outpatient Prescriptions   Medication Sig Dispense Refill    sodium chloride (ALTAMIST SPRAY) 0.65 % nasal spray 1 spray by Nasal route as needed for Congestion 1 Bottle 3    fluticasone (FLONASE) 50 MCG/ACT nasal spray 1 spray by Nasal route daily 1 Bottle 3    metoprolol tartrate (LOPRESSOR) 25 MG tablet take 1 tablet by mouth twice a day 60 tablet 2    hydrochlorothiazide (HYDRODIURIL) 25 MG tablet Take 1 tablet by mouth daily 30 tablet 6    naproxen (NAPROSYN) 500 MG tablet Take 1 tablet by mouth 2 times daily 60 tablet 0    vitamin D (CHOLECALCIFEROL) 1000 units TABS tablet Take 1 tablet by mouth daily 30 tablet 5    lisinopril (PRINIVIL;ZESTRIL) 20 MG tablet Take 1 tablet by mouth daily 30 tablet 6    potassium chloride (KLOR-CON M) 10 MEQ extended release tablet Take 1 tablet by mouth daily 30 tablet 0    albuterol sulfate HFA (PROAIR HFA) 108 (90 BASE) MCG/ACT inhaler Inhale 2 puffs into the lungs every 6 hours as needed for Wheezing or Shortness of Breath Space out to every 6 hours as symptoms improve. 1 Inhaler 3     No current facility-administered medications for this visit. Social History:   Social History     Social History    Marital status: Single     Spouse name: N/A    Number of children: N/A    Years of education: N/A     Occupational History    Not on file.      Social History Main Topics    Smoking status: Never Smoker    Smokeless tobacco: Never Used    Alcohol use No    Drug use: No    Sexual activity: Not on file     Other Topics Concern    Not on file     Social History Narrative    ** Merged History Encounter **

## 2018-01-07 ASSESSMENT — ENCOUNTER SYMPTOMS
ABDOMINAL PAIN: 0
BLURRED VISION: 0
CONSTIPATION: 0
COUGH: 0
NAUSEA: 0
SINUS PAIN: 0
EYE REDNESS: 0
SHORTNESS OF BREATH: 0
SPUTUM PRODUCTION: 0

## 2018-01-08 ENCOUNTER — HOSPITAL ENCOUNTER (OUTPATIENT)
Dept: PHYSICAL THERAPY | Age: 38
Setting detail: THERAPIES SERIES
Discharge: HOME | End: 2018-01-08
Payer: MEDICAID

## 2018-01-08 PROCEDURE — 97110 THERAPEUTIC EXERCISES: CPT

## 2018-01-08 PROCEDURE — 97161 PT EVAL LOW COMPLEX 20 MIN: CPT

## 2018-01-08 NOTE — CONSULTS
Demonstrates/verbalizes understanding of HEP/Ed previously given. Treatment Plan:  [x] Therapeutic Exercise    [] Modalities:  [x] Dry Needling  [] Therapeutic Activity    [] Ultrasound  [] Electrical Stimulation  [] Gait Training     [] Massage       [] Lumbar/Cervical Traction  [x] Neuromuscular Re-education [x] Cold/hotpack [] Iontophoresis: 4 mg/mL  [x] Instruction in HEP             Dexamethasone Sodium  [] Manual Therapy             Phosphate 40-80 mAmin  [] Aquatic Therapy  [x] Vasocompression/    [] Other:       Game Ready    []  Medication allergies reviewed for use of    Dexamethasone Sodium Phosphate 4mg/ml     with iontophoresis treatments. Pt is not allergic. Frequency:  2 x/week for 18 visits        Todays Treatment:  Modalities:   Precautions:  Exercises:  Exercise Reps/ Time Weight/ Level Comments   SLR, SLR with ER, bridge, bridge with butterfly 10 x ea  HEP  RED tband for butterfly -- issued green tband for home   Prone hip ext 10 x ea  HEP   Side hip abd 10 x ea  HEP               Other:    Specific Instructions for next treatment:  Improve bilateral knee/hip/glut/core strength.       Evaluation Complexity:  History (Personal factors, comorbidities) [] 0 [x] 1-2 [] 3+   Exam (limitations, restrictions) [x] 1-2 [] 3 [] 4+   Clinical presentation (progression) [x] Stable [] Evolving  [] Unstable   Decision Making [x] Low [] Moderate [] High    [x] Low Complexity [] Moderate Complexity [] High Complexity       Treatment Charges: Mins Units   [x] Evaluation       [x]  Low       []  Moderate       []  High 15 1   []  Modalities     [x]  Ther Exercise 15 1   []  Manual Therapy     []  Ther Activities     []  Aquatics     []  Vasocompression     []  Other       TOTAL TREATMENT TIME: 30    Time in:1403   Time UXH:4784    Electronically signed by: Don Aranda PT        Physician Signature:________________________________Date:__________________  By signing above or cosigning this note, I have reviewed this plan of care and certify a need for medically necessary rehabilitation services.      *PLEASE SIGN ABOVE AND FAX BACK ALL PAGES*

## 2018-01-09 ENCOUNTER — OFFICE VISIT (OUTPATIENT)
Dept: BARIATRICS/WEIGHT MGMT | Age: 38
End: 2018-01-09
Payer: MEDICAID

## 2018-01-09 VITALS
SYSTOLIC BLOOD PRESSURE: 118 MMHG | BODY MASS INDEX: 35.29 KG/M2 | WEIGHT: 289.8 LBS | DIASTOLIC BLOOD PRESSURE: 68 MMHG | HEIGHT: 76 IN | HEART RATE: 72 BPM

## 2018-01-09 DIAGNOSIS — E66.9 OBESITY (BMI 30-39.9): ICD-10-CM

## 2018-01-09 DIAGNOSIS — Z99.89 OSA ON CPAP: ICD-10-CM

## 2018-01-09 DIAGNOSIS — I10 ESSENTIAL HYPERTENSION: Primary | ICD-10-CM

## 2018-01-09 DIAGNOSIS — R73.03 PREDIABETES: ICD-10-CM

## 2018-01-09 DIAGNOSIS — K76.0 FATTY LIVER DISEASE, NONALCOHOLIC: ICD-10-CM

## 2018-01-09 DIAGNOSIS — G47.33 OSA ON CPAP: ICD-10-CM

## 2018-01-09 PROCEDURE — G8427 DOCREV CUR MEDS BY ELIG CLIN: HCPCS | Performed by: NURSE PRACTITIONER

## 2018-01-09 PROCEDURE — 99213 OFFICE O/P EST LOW 20 MIN: CPT | Performed by: NURSE PRACTITIONER

## 2018-01-09 PROCEDURE — 1036F TOBACCO NON-USER: CPT | Performed by: NURSE PRACTITIONER

## 2018-01-09 PROCEDURE — G8417 CALC BMI ABV UP PARAM F/U: HCPCS | Performed by: NURSE PRACTITIONER

## 2018-01-09 PROCEDURE — G8484 FLU IMMUNIZE NO ADMIN: HCPCS | Performed by: NURSE PRACTITIONER

## 2018-01-10 ENCOUNTER — HOSPITAL ENCOUNTER (OUTPATIENT)
Dept: PHYSICAL THERAPY | Age: 38
Setting detail: THERAPIES SERIES
Discharge: HOME | End: 2018-01-10
Payer: MEDICAID

## 2018-01-10 PROCEDURE — 97110 THERAPEUTIC EXERCISES: CPT

## 2018-01-15 ENCOUNTER — HOSPITAL ENCOUNTER (OUTPATIENT)
Dept: PHYSICAL THERAPY | Age: 38
Setting detail: THERAPIES SERIES
Discharge: HOME | End: 2018-01-15
Payer: MEDICAID

## 2018-01-15 PROCEDURE — 97110 THERAPEUTIC EXERCISES: CPT

## 2018-01-15 NOTE — FLOWSHEET NOTE
[x] [facility]       Outpatient Physical        Therapy       [address]       Phone: [phone]       Fax: [phone] [] [facility] at [address]       Phone: [phone]       Fax: [phone] [] [address] [facility]  [address] [place] [address]   Phone: [phone]   Fax:  [phone]     Physical Therapy Daily Treatment Note    Date:  1/15/2018  Patient Name:  Jeff Zhu    :  1980  MRN: [id number]   Physician: Dr. Phillips Pro: [facility]  Medical Diagnosis: Osteoarthritis of knee, bilateral               Rehab Codes: M 25.561, M 25.562, M 62.81, R 26.2  Onset date:                      Next 's appt.: 2018  Visit# / total visits:   Cancels/No Shows: 0/0    Subjective:    Pain:  [x] Yes  [] No Location:  Tomy knees Pain Rating: (0-10 scale) 8/10 R,  4/10 L  Pain altered Tx:  [x] No  [] Yes  Action:  Comments:   Reports he fell on icy steps today and hit his back. States he is fine and can address PT. No outward bruising or swelling noted on back. Objective:  Modalities: ICE PRN. Precautions:  Exercises:  Exercise Reps/ Time Weight/ Level Comments   Scit fit  5 mins L2 Added 1/15               Sitting Tomy LE      HS stretching 3x20\"   stool. Piriformis stretching 3x20\"  Figure 4 with LE on mat. LAQw/ iso add 15x3\"  Ball,     Hip abd 15x2 Green TB  HEP, progressed sets 1/15           Supine Tomy LE      Heel prop 3 mins  Blue 1/2 bolster tomy knees, added 15    SLR tomy  10x 1.5 lbs L  A-R LE HEP   SLR ER tomy  10 x ea 1.5 lbs L  A-R LE HEP   Bridging 12x3\"  HEP   Bridging with butterfly 12x Green  HEP     side lying      SL IT band stretch  Fwd and bwd 2x20\"   R LE bwd  only 1/15 HEP.     Side hip abd 10 x ea  AA-R  A-L HEP, held due to time constraints 1/15   SL clamshells  12x A Held due to tie         Prone hip ext 10x HEP          Standing tomy LE       Gastro stretch 3x20\"  Wedge,     4 way hip w/ iso abdom 15x  1.5 lbs Added 1/15   Lateral step ups  15x/1.5 lbs 4 in  Added  wt 1/15   Retro step ups  15x/1.5 lbs 4 in Added  wt 1/15   TG squats 15x L25 Verbal cues for eccentric, controlled extension. Added 1/15                         Other: Issued fall prevention handout.      Specific Instructions for next treatment:  Improve bilateral knee/hip/glut/core strength. Treatment Charges: Mins Units   []  Modalities     [x]  Ther Exercise 50 3   []  Manual Therapy     []  Ther Activities     []  Aquatics     []  Vasocompression     []  Other     Total Treatment time  50 3       Assessment: [x] Progressing toward. Initiated sci fit. Pt demonstrates fairly good understanding of stretching techniques. Added, TG squats and  standing 4 way PRE hip exercises for strengthening/stability. [] No change. [] Other:       STG: (to be met in 9 treatments)  1. ? Pain: Right knee pain 4/10 at max; left knee pain 3/10 at max  2. ? Strength: Able to complete 30 minutes of strengthening exs with minimal fatigue  3. ? Function: LEFS score of 40% functionally impaired or less   4. Independent with Home Exercise Programs     LTG: (to be met in 18 treatments)  1. Right knee pain of 1/10 at max with activity  2. Left knee pain 0/10  3. LEFS score of 30% functionally impaired or less  4. Patient to report improved ability to do physical activity with less knee pain and fatigue. 5. Patient to report not being awoken due to right knee pain.                    Patient goals: \"Get better\"    Pt. Education:  [x] Yes  [] No  [] Reviewed Prior HEP/Ed  Method of Education: [x] Verbal  [x] Demo  [x] Written  Comprehension of Education:  [x] Verbalizes understanding. [x] Demonstrates understanding. [x] Needs review IT and and piriformis stretching. [x] Demonstrates/verbalizes HEP/Ed previously given.   1/10/18:Stretching: HS, piriformis, IT band, hip

## 2018-01-17 ENCOUNTER — HOSPITAL ENCOUNTER (OUTPATIENT)
Dept: PHYSICAL THERAPY | Age: 38
Setting detail: THERAPIES SERIES
Discharge: HOME | End: 2018-01-17
Payer: MEDICAID

## 2018-01-17 PROCEDURE — 97110 THERAPEUTIC EXERCISES: CPT

## 2018-01-17 NOTE — FLOWSHEET NOTE
[x] [facility]       Outpatient Physical        Therapy       [address]       Phone: [phone]       Fax: [phone] [] [facility] at [address]       Phone: [phone]       Fax: [phone] [] [place]. [facility]5 [facility] Promotion  [address] [place] [address]   Phone: [phone]   Fax:  [phone]     Physical Therapy Daily Treatment Note    Date:  2018  Patient Name:  Alan Hill    :  1980  MRN: [id number]   Physician: Dr. Purvis Nic: [facility]  Medical Diagnosis: Osteoarthritis of knee, bilateral               Rehab Codes: M 25.561, M 25.562, M 62.81, R 26.2  Onset date:                      Next 's appt.: 2018  Visit# / total visits:   Cancels/No Shows: 0/0    Subjective:    Pain:  [x] Yes  [] No Location:  Tomy knees Pain Rating: (0-10 scale) 9/10 R,  7/10 L  Pain altered Tx:  [x] No  [] Yes  Action:  Comments:    Reports pain is up both knees but reports he is walking more due to wt management exercises (does not have orthotics) Reports LB is painful also. Objective:  Modalities: ICE PRN. Precautions:  Exercises:  Exercise Reps/ Time Weight/ Level Comments   Scit fit  5 mins L2                 Sitting Tomy LE      HS stretching 3x20\"   stool. Piriformis stretching 3x20\"  Figure 4 with LE on mat. LAQ w/ iso add 15x3\"  Ball,     Hip abd 15x Green TB  HEP, progressed sets 1/15           Supine Tomy LE      SKTC  3x20\"  Education for home.     Heel prop 3 mins  Blue 1/2 bolster tomy knees, added /15    SLR tomy  10x   HEP   SLR ER tomy  10 x ea  HEP   Bridging 15x3\"  HEP   Bridging with butterfly 12x Green  HEP           side lying      SL IT band stretch  Fwd and bwd 2x20\"   R LE bwd      Side hip abd 10 x ea  AA-R  A-L HEP, held due to time constraints 1/15   SL clamshells  12x A           Prone hip ext 10x  HEP Written  Comprehension of Education:  [x] Verbalizes understanding. [x] Demonstrates understanding. [x] Needs review IT and and piriformis stretching. [x] Demonstrates/verbalizes HEP/Ed previously given. 1/10/18:Stretching: HS, piriformis, IT band, hip abd, clamshells SL.supine hip flexor stretching. 1/15/18: Fall prevention handout. Plan: [x] Continue per plan of care.    [] Other:      Time In: 5906    PM        Time Out: 145   PM  Electronically signed by:  Nahum Harmon PTA

## 2018-01-18 ENCOUNTER — HOSPITAL ENCOUNTER (OUTPATIENT)
Age: 38
Setting detail: SPECIMEN
Discharge: HOME | End: 2018-01-18
Payer: MEDICAID

## 2018-01-18 ENCOUNTER — OFFICE VISIT (OUTPATIENT)
Dept: BEHAVIORAL/MENTAL HEALTH CLINIC | Age: 38
End: 2018-01-18
Payer: MEDICAID

## 2018-01-18 ENCOUNTER — HOSPITAL ENCOUNTER (EMERGENCY)
Age: 38
Discharge: HOME | End: 2018-01-18
Attending: EMERGENCY MEDICINE
Payer: MEDICAID

## 2018-01-18 ENCOUNTER — APPOINTMENT (OUTPATIENT)
Dept: GENERAL RADIOLOGY | Age: 38
End: 2018-01-18
Payer: MEDICAID

## 2018-01-18 VITALS
OXYGEN SATURATION: 98 % | TEMPERATURE: 97.2 F | BODY MASS INDEX: 34.58 KG/M2 | SYSTOLIC BLOOD PRESSURE: 148 MMHG | RESPIRATION RATE: 18 BRPM | DIASTOLIC BLOOD PRESSURE: 80 MMHG | HEART RATE: 97 BPM | WEIGHT: 284 LBS | HEIGHT: 76 IN

## 2018-01-18 DIAGNOSIS — I10 ESSENTIAL HYPERTENSION: ICD-10-CM

## 2018-01-18 DIAGNOSIS — F33.0 MAJOR DEPRESSIVE DISORDER, RECURRENT, MILD (HCC): Primary | ICD-10-CM

## 2018-01-18 DIAGNOSIS — K76.0 FATTY LIVER DISEASE, NONALCOHOLIC: ICD-10-CM

## 2018-01-18 DIAGNOSIS — R73.03 PREDIABETES: ICD-10-CM

## 2018-01-18 DIAGNOSIS — S39.012A STRAIN OF LUMBAR REGION, INITIAL ENCOUNTER: Primary | ICD-10-CM

## 2018-01-18 LAB
ALBUMIN SERPL-MCNC: 4.4 G/DL (ref 3.5–5.2)
ALBUMIN/GLOBULIN RATIO: 1.3 (ref 1–2.5)
ALP BLD-CCNC: 65 U/L (ref 40–129)
ALT SERPL-CCNC: 17 U/L (ref 5–41)
ANION GAP SERPL CALCULATED.3IONS-SCNC: 14 MMOL/L (ref 9–17)
AST SERPL-CCNC: 15 U/L
BILIRUB SERPL-MCNC: 0.61 MG/DL (ref 0.3–1.2)
BUN BLDV-MCNC: 19 MG/DL (ref 6–20)
BUN/CREAT BLD: ABNORMAL (ref 9–20)
CALCIUM SERPL-MCNC: 10.1 MG/DL (ref 8.6–10.4)
CHLORIDE BLD-SCNC: 97 MMOL/L (ref 98–107)
CHOLESTEROL/HDL RATIO: 5.1
CHOLESTEROL: 162 MG/DL
CO2: 27 MMOL/L (ref 20–31)
CREAT SERPL-MCNC: 1.33 MG/DL (ref 0.7–1.2)
ESTIMATED AVERAGE GLUCOSE: 111 MG/DL
GFR AFRICAN AMERICAN: >60 ML/MIN
GFR NON-AFRICAN AMERICAN: 60 ML/MIN
GFR SERPL CREATININE-BSD FRML MDRD: ABNORMAL ML/MIN/{1.73_M2}
GFR SERPL CREATININE-BSD FRML MDRD: ABNORMAL ML/MIN/{1.73_M2}
GLUCOSE BLD-MCNC: 81 MG/DL (ref 70–99)
HBA1C MFR BLD: 5.5 % (ref 4–6)
HCT VFR BLD CALC: 42 % (ref 40.7–50.3)
HDLC SERPL-MCNC: 32 MG/DL
HEMOGLOBIN: 13.4 G/DL (ref 13–17)
LDL CHOLESTEROL: 113 MG/DL (ref 0–130)
MCH RBC QN AUTO: 29.3 PG (ref 25.2–33.5)
MCHC RBC AUTO-ENTMCNC: 31.9 G/DL (ref 28.4–34.8)
MCV RBC AUTO: 91.7 FL (ref 82.6–102.9)
NRBC AUTOMATED: 0 PER 100 WBC
PDW BLD-RTO: 15.9 % (ref 11.8–14.4)
PLATELET # BLD: 280 K/UL (ref 138–453)
PMV BLD AUTO: 11.2 FL (ref 8.1–13.5)
POTASSIUM SERPL-SCNC: 3.4 MMOL/L (ref 3.7–5.3)
RBC # BLD: 4.58 M/UL (ref 4.21–5.77)
SODIUM BLD-SCNC: 138 MMOL/L (ref 135–144)
TOTAL PROTEIN: 7.9 G/DL (ref 6.4–8.3)
TRIGL SERPL-MCNC: 87 MG/DL
VLDLC SERPL CALC-MCNC: ABNORMAL MG/DL (ref 1–30)
WBC # BLD: 7.5 K/UL (ref 3.5–11.3)

## 2018-01-18 PROCEDURE — 85027 COMPLETE CBC AUTOMATED: CPT

## 2018-01-18 PROCEDURE — 36415 COLL VENOUS BLD VENIPUNCTURE: CPT

## 2018-01-18 PROCEDURE — 72100 X-RAY EXAM L-S SPINE 2/3 VWS: CPT

## 2018-01-18 PROCEDURE — 6370000000 HC RX 637 (ALT 250 FOR IP): Performed by: EMERGENCY MEDICINE

## 2018-01-18 PROCEDURE — 90832 PSYTX W PT 30 MINUTES: CPT | Performed by: PSYCHOLOGIST

## 2018-01-18 PROCEDURE — 83036 HEMOGLOBIN GLYCOSYLATED A1C: CPT

## 2018-01-18 PROCEDURE — 80053 COMPREHEN METABOLIC PANEL: CPT

## 2018-01-18 PROCEDURE — 99283 EMERGENCY DEPT VISIT LOW MDM: CPT

## 2018-01-18 PROCEDURE — 72072 X-RAY EXAM THORAC SPINE 3VWS: CPT

## 2018-01-18 PROCEDURE — 80061 LIPID PANEL: CPT

## 2018-01-18 RX ORDER — CYCLOBENZAPRINE HCL 10 MG
10 TABLET ORAL 3 TIMES DAILY PRN
Qty: 15 TABLET | Refills: 0 | Status: SHIPPED | OUTPATIENT
Start: 2018-01-18 | End: 2018-01-28

## 2018-01-18 RX ORDER — CYCLOBENZAPRINE HCL 10 MG
10 TABLET ORAL ONCE
Status: COMPLETED | OUTPATIENT
Start: 2018-01-18 | End: 2018-01-18

## 2018-01-18 RX ORDER — IBUPROFEN 800 MG/1
800 TABLET ORAL EVERY 8 HOURS PRN
Qty: 30 TABLET | Refills: 0 | Status: SHIPPED | OUTPATIENT
Start: 2018-01-18 | End: 2018-04-04 | Stop reason: ALTCHOICE

## 2018-01-18 RX ORDER — IBUPROFEN 800 MG/1
800 TABLET ORAL ONCE
Status: COMPLETED | OUTPATIENT
Start: 2018-01-18 | End: 2018-01-18

## 2018-01-18 RX ADMIN — CYCLOBENZAPRINE HYDROCHLORIDE 10 MG: 10 TABLET, FILM COATED ORAL at 11:23

## 2018-01-18 RX ADMIN — IBUPROFEN 800 MG: 800 TABLET, FILM COATED ORAL at 11:24

## 2018-01-18 ASSESSMENT — ENCOUNTER SYMPTOMS
COUGH: 0
SHORTNESS OF BREATH: 0
VOMITING: 0
BACK PAIN: 1

## 2018-01-18 ASSESSMENT — PAIN DESCRIPTION - LOCATION: LOCATION: BACK

## 2018-01-18 ASSESSMENT — PAIN DESCRIPTION - ORIENTATION: ORIENTATION: LOWER

## 2018-01-18 ASSESSMENT — PAIN SCALES - GENERAL
PAINLEVEL_OUTOF10: 10
PAINLEVEL_OUTOF10: 10

## 2018-01-18 ASSESSMENT — PAIN DESCRIPTION - DESCRIPTORS: DESCRIPTORS: CONSTANT

## 2018-01-18 ASSESSMENT — PAIN DESCRIPTION - PAIN TYPE: TYPE: ACUTE PAIN

## 2018-01-18 ASSESSMENT — PAIN DESCRIPTION - PROGRESSION: CLINICAL_PROGRESSION: NOT CHANGED

## 2018-01-18 ASSESSMENT — PAIN DESCRIPTION - FREQUENCY: FREQUENCY: CONTINUOUS

## 2018-01-18 NOTE — ED PROVIDER NOTES
[facility] ED  Emergency Department Encounter  Emergency Medicine Resident     Pt Name: Maura Paulino  MRN: [id number]  [place] 1980  Date of evaluation: 1/18/18  PCP:  Cosmo Dinh MD    [address] [place] [address]       Chief Complaint   Patient presents with    Back Pain     Pt reports lower back pain since Sunday s/p fall       HISTORY OF PRESENT ILLNESS  (Location/Symptom, Timing/Onset, Context/Setting, Quality, Duration, Modifying Factors, Severity.)      Maura Paulino is a 42 y.o. male who presents with Back pain. Patient states he fell on the ice 4 days ago. States he didn't try and take Tylenol however symptoms have not improved. He denies hitting his head or loss of consciousness. He denies any extremity injury or any other injury. He has not had any nausea or vomiting or abdominal pain. He denies any history of chronic back pain however states when he has had back pain in the past that \"pills don't work\". He denies any numbness, tingling, urinary retention or incontinence, saddle anesthesia, weakness. PAST MEDICAL / SURGICAL / SOCIAL / FAMILY HISTORY      has a past medical history of Anxiety; Arthritis; CAD (coronary artery disease); Fatty liver; Fatty liver disease, nonalcoholic; Hypertension; Obesity; and Unspecified sleep apnea. has no past surgical history on file. Social History     Social History    Marital status: Single     Spouse name: N/A    Number of children: N/A    Years of education: N/A     Occupational History    Not on file.      Social History Main Topics    Smoking status: Never Smoker    Smokeless tobacco: Never Used    Alcohol use No    Drug use: No    Sexual activity: Not on file     Other Topics Concern    Not on file     Social History Narrative    ** Merged History Encounter **            Family History   Problem Relation Age of Onset    Arthritis Mother     Diabetes Father     Arthritis Father     Stroke Maternal Grandmother  Heart Disease Maternal Grandmother     Stroke Maternal Grandfather     Heart Disease Maternal Grandfather     Early Death Maternal Grandfather     Stroke Paternal Grandmother     Heart Disease Paternal Grandmother     Stroke Paternal Grandfather     Heart Disease Paternal Grandfather     Early Death Paternal Grandfather        Allergies:  Apple    Home Medications:  Prior to Admission medications    Medication Sig Start Date End Date Taking? Authorizing Provider   ibuprofen (ADVIL;MOTRIN) 800 MG tablet Take 1 tablet by mouth every 8 hours as needed for Pain 1/18/18  Yes Guerline Agosto DO   cyclobenzaprine (FLEXERIL) 10 MG tablet Take 1 tablet by mouth 3 times daily as needed for Muscle spasms 1/18/18 1/28/18 Yes Guerline Agosto DO   lisinopril (PRINIVIL;ZESTRIL) 20 MG tablet Take 1 tablet by mouth daily 1/4/18   Kendra Peoples MD   sodium chloride (ALTAMIST SPRAY) 0.65 % nasal spray 1 spray by Nasal route as needed for Congestion 12/7/17   Kendra Peoples MD   fluticasone Rogenia Crater) 50 MCG/ACT nasal spray 1 spray by Nasal route daily 12/7/17   Kendra Peoples MD   metoprolol tartrate (LOPRESSOR) 25 MG tablet take 1 tablet by mouth twice a day 12/7/17   Kendra Peoples MD   hydrochlorothiazide (HYDRODIURIL) 25 MG tablet Take 1 tablet by mouth daily 10/9/17   Kendra Peoples MD   naproxen (NAPROSYN) 500 MG tablet Take 1 tablet by mouth 2 times daily 10/8/17   Marah Rojas MD   vitamin D (CHOLECALCIFEROL) 1000 units TABS tablet Take 1 tablet by mouth daily 7/17/17   Kendra Peoples MD   potassium chloride (KLOR-CON M) 10 MEQ extended release tablet Take 1 tablet by mouth daily 3/7/17   Elvia Perrin NP   albuterol sulfate HFA (PROAIR HFA) 108 (90 BASE) MCG/ACT inhaler Inhale 2 puffs into the lungs every 6 hours as needed for Wheezing or Shortness of Breath Space out to every 6 hours as symptoms improve.  10/3/16   Kendra Peoples MD       REVIEW OF SYSTEMS    (2-9 systems for level 4, 10 or more ibuprofen (ADVIL;MOTRIN) 800 MG tablet     Sig: Take 1 tablet by mouth every 8 hours as needed for Pain     Dispense:  30 tablet     Refill:  0    cyclobenzaprine (FLEXERIL) 10 MG tablet     Sig: Take 1 tablet by mouth 3 times daily as needed for Muscle spasms     Dispense:  15 tablet     Refill:  0       DDX: Low back pain following a fall, given midline nature we'll check x-rays. There is also tenderness over paraspinal muscles. No advice or warning symptoms. No other injuries. We'll check x-rays, medicate with ibuprofen and Flexeril and reassess. DIAGNOSTIC RESULTS / EMERGENCY DEPARTMENT COURSE / [facility]   RADIOLOGY:  Xr Thoracic Spine (3 Views)    Result Date: 1/18/2018  EXAMINATION: 3 VIEWS OF THE THORACIC SPINE 1/18/2018 11:09 am COMPARISON: None. HISTORY: Reason for exam:->Pain over thoracic/lumbar spine after fall 4 days ago FINDINGS: Thoracic vertebral bodies are normal in height and alignment. Intervertebral disc spaces are maintained. Mild scattered anterior osteophytosis. No evidence of fracture. Pedicles are symmetric and intact. Visualized lungs are clear. No acute abnormality of the thoracic spine. Xr Lumbar Spine (2-3 Views)    Result Date: 1/18/2018  EXAMINATION: 3 VIEWS OF THE LUMBAR SPINE 1/18/2018 11:09 am COMPARISON: August 13, 2017 HISTORY: Reason for exam:->Pain over thoracic/lumbar spine after fall 4 days ago FINDINGS: Lumbar vertebral bodies are normal in height and alignment. Re- demonstration of L1 limbus vertebrae. No evidence of fracture. Visualized sacrum is unremarkable. Mild scattered anterior osteophytosis. Stable lumbar spine with no acute abnormality. EMERGENCY DEPARTMENT COURSE:  11:43 AM  X-rays negative, patient feeling better after symptomatic medication. We'll discharge her same medications. Advised that he follow-up with his PCP, call for appointment today or tomorrow.   Patient already enrolled in physical therapy and plans to talk with them as well.  Recommend he return immediately for any urinary retention, urinary incontinence, fecal incontinence, saddle anesthesia, or any other worsening or changing symptoms. Patient verbalized understanding, will discharge. PROCEDURES:  None    CONSULTS:  None    CRITICAL CARE:  None    FINAL IMPRESSION      1.  Strain of lumbar region, initial encounter          DISPOSITION / PLAN     DISPOSITION Decision To Discharge 01/18/2018 11:31:56 AM      PATIENT REFERRED TO:  Hoyle Barthel, MD [address] [facility] [address] [facility] [address] [facility] [address]  [phone]    In 2 days      [facility]  [address] [facility] [address]  [phone]    As needed, If symptoms worsen      DISCHARGE MEDICATIONS:  Discharge Medication List as of 1/18/2018 11:34 AM      START taking these medications    Details   ibuprofen (ADVIL;MOTRIN) 800 MG tablet Take 1 tablet by mouth every 8 hours as needed for Pain, Disp-30 tablet, R-0Print      cyclobenzaprine (FLEXERIL) 10 MG tablet Take 1 tablet by mouth 3 times daily as needed for Muscle spasms, Disp-15 tablet, R-0Print             Kisha Mcginnis DO  Emergency Medicine Resident    (Please note that portions of this note were completed with a voice recognition program.  Efforts were made to edit the dictations but occasionally words are mis-transcribed.)       Kisha Mcginnis DO  Resident  01/18/18 3497

## 2018-01-18 NOTE — ED NOTES
Dr. Madhu Rosales and Dr. Jose Francisco Aguilar at bedside to assess pt     Yumiko Strickland RN  01/18/18 4311

## 2018-01-18 NOTE — ED NOTES
Pt arrived to ED alert and oriented x4. Pt reports lower back pain since Sunday. Pt reports that he slipped and fell on ice, reports that he has been trying to put off being seen but Motrin and Tylenol are not helping with the pain. Pt ambulates with steady gait, denies numbness or tingling to extremities. RR even and unlabored. NAD noted. Will continue to monitor.       Niyah Bennett RN  01/18/18 2141

## 2018-01-22 ENCOUNTER — HOSPITAL ENCOUNTER (OUTPATIENT)
Dept: PHYSICAL THERAPY | Age: 38
Setting detail: THERAPIES SERIES
Discharge: HOME | End: 2018-01-22
Payer: MEDICAID

## 2018-01-22 PROCEDURE — 97110 THERAPEUTIC EXERCISES: CPT

## 2018-01-24 ENCOUNTER — HOSPITAL ENCOUNTER (OUTPATIENT)
Dept: PHYSICAL THERAPY | Age: 38
Setting detail: THERAPIES SERIES
Discharge: HOME | End: 2018-01-24
Payer: MEDICAID

## 2018-01-24 PROCEDURE — 97110 THERAPEUTIC EXERCISES: CPT

## 2018-01-24 NOTE — FLOWSHEET NOTE
fatigue. 5. Patient to report not being awoken due to right knee pain.                    Patient goals: \"Get better\"    Pt. Education:  [x] Yes  [] No  [x] Reviewed Prior HEP/Ed  Method of Education: [x] Verbal  [x] Demo  [] Written  Comprehension of Education:  [x] Verbalizes understanding. [x] Demonstrates understanding. [x] Needs review IT and and piriformis stretching. [x] Demonstrates/verbalizes HEP/Ed previously given. 1/10/18:Stretching: HS, piriformis, IT band, hip abd, clamshells SL.supine hip flexor stretching. 1/15/18: Fall prevention handout. Plan: [x] Continue per plan of care.    [] Other:      Time In:  8577   PM        Time Out:   155   PM  Electronically signed by:  Yared Bustamante PTA

## 2018-01-29 ENCOUNTER — HOSPITAL ENCOUNTER (OUTPATIENT)
Dept: PHYSICAL THERAPY | Age: 38
Setting detail: THERAPIES SERIES
Discharge: HOME | End: 2018-01-29
Payer: MEDICAID

## 2018-01-29 PROCEDURE — 97110 THERAPEUTIC EXERCISES: CPT

## 2018-01-29 NOTE — FLOWSHEET NOTE
[x] [facility]       Outpatient Physical        Therapy       [address]       Phone: [phone]       Fax: [phone] [] [facility] for Health Promotion at [address] [facility]       Phone: [phone]       Fax: [phone] [] [facility] for Health Promotion  [address] [facility]   Phone: [phone]   Fax:  [phone]     Physical Therapy Daily Treatment Note    Date:  2018  Patient Name:  Lizabeth Awan    :  1980  MRN: [id number]   Physician: Dr. Marilyn Rosales: [facility]  Medical Diagnosis: Osteoarthritis of knee, bilateral               Rehab Codes: M 25.561, M 25.562, M 62.81, R 26.2  Onset date:                      Next 's appt.: 2018  Visit# / total visits:   Cancels/No Shows: 0/0    Subjective:    Pain:  [x] Yes  [] No Location:  Jesus knees Pain Rating: (0-10 scale) 7/10 R knee,  4/10 L knee   Pain altered Tx:  [x] No  [] Yes  Action:   Comments   Reports the k taping (edema tech) was helpful. He removed tape this AM.  Reports he walked 2 hrs a day and he was  swimming (wt loss mngt program)  Objective:  Modalities: JESUS CP knees x 10 mins post exercises- pt declined   Precautions:  Exercises:  Exercise Reps/ Time Weight/ Level Comments   Scit fit  6 mins L2                  Sitting Jesus LE      HS stretching 3x20\"   stool. Piriformis stretching 3x20\"  Figure 4 with LE on mat.    LAQ w/ iso add 15x3\"  Ball,     Hip abd 15x Green TB  HEP, progressed sets 1/15     HS curls  15x green          Supine Jesus LE      Heel prop 3 mins  Blue 1/2 bolster jesus knees, added 1/15    SLR jesus  15x 1.5 lbs HEP,  Increased reps    SLR ER jesus  15 x ea 1.5 lbs HEP, increased reps    Bridging 15x3\"  HEP   Bridging with butterfly 12x Green  HEP          Side lying      SL IT band stretch  Fwd and bwd 2x20\"   R LE bwd      Side hip abd 10 x ea   1.5 lbs HEP,    SL

## 2018-01-31 ENCOUNTER — HOSPITAL ENCOUNTER (OUTPATIENT)
Dept: PHYSICAL THERAPY | Age: 38
Setting detail: THERAPIES SERIES
Discharge: HOME | End: 2018-01-31
Payer: MEDICAID

## 2018-01-31 PROCEDURE — 97110 THERAPEUTIC EXERCISES: CPT

## 2018-02-05 ENCOUNTER — HOSPITAL ENCOUNTER (OUTPATIENT)
Dept: PHYSICAL THERAPY | Age: 38
Setting detail: THERAPIES SERIES
Discharge: HOME | End: 2018-02-05
Payer: MEDICAID

## 2018-02-05 PROCEDURE — 97110 THERAPEUTIC EXERCISES: CPT

## 2018-02-05 NOTE — FLOWSHEET NOTE
[x] Julisa [facility]       Outpatient Physical        Therapy       [address]       Phone: [phone]       Fax: [phone] [] [facility] Promotion at [address]       Phone: [phone]       Fax: [phone] [] [facility]. [address] [place] [address] Health Promotion  [address] [place] [address]   Phone: [phone]   Fax:  [phone]     Physical Therapy Daily Treatment Note    Date:  2018  Patient Name:  Olivia Mendez    :  1980  MRN: [id number]   Physician: Dr. Hernandez Maze: [facility]  Medical Diagnosis: Osteoarthritis of knee, bilateral               Rehab Codes: M 25.561, M 25.562, M 62.81, R 26.2  Onset date:                      Next 's appt.: 2018  Visit# / total visits:   Cancels/No Shows: 0/0    Subjective:    Pain:  [x] Yes  [] No Location:  Jesus knees Pain Rating: (0-10 scale) 6/10 R knee,  3/10 L knee   Pain altered Tx:  [x] No  [] Yes  Action:   Comments    Reports he did a fair amount of jogging over weekend. Reports R knee pain remains higher than L. Addressing HEP. Objective:  Modalities: JESUS CP knees x 10 mins post exercises -pt declined   Precautions:  Exercises:  Exercise Reps/ Time Weight/ Level Comments   Scit fit  6 mins L2                  Sitting Jesus LE      HS stretching 3x20\"   stool. Piriformis stretching 3x20\"  Figure 4 with LE on mat.    LAQ w/ iso add 15x3\" 2 lbs Ball, added wt     HS curls  15x green          Supine Jesus LE      Heel prop 3 mins  Blue / bolster jesus knees, added 1/15    SLR jesus  15x  2  lbs HEP, increased wt     SLR ER jesus  15 x ea  1.5 lbs  lbs HEP    Bridging 15x3\"  HEP   Bridging with butterfly 15x Green  HEP          Side lying      SL IT band stretch  Fwd and bwd 2x20\"   R LE bwd      Side hip abd 10 x ea    2 lbs HEP,     SL clamshells 15x   green             Prone      Prone hip ext 10x 1.5   lbs HEP,

## 2018-02-06 ENCOUNTER — OFFICE VISIT (OUTPATIENT)
Dept: BARIATRICS/WEIGHT MGMT | Age: 38
End: 2018-02-06
Payer: MEDICAID

## 2018-02-06 VITALS
HEART RATE: 72 BPM | WEIGHT: 278.9 LBS | BODY MASS INDEX: 33.96 KG/M2 | HEIGHT: 76 IN | DIASTOLIC BLOOD PRESSURE: 70 MMHG | SYSTOLIC BLOOD PRESSURE: 118 MMHG

## 2018-02-06 DIAGNOSIS — I10 ESSENTIAL HYPERTENSION: Primary | ICD-10-CM

## 2018-02-06 DIAGNOSIS — Z99.89 OSA ON CPAP: ICD-10-CM

## 2018-02-06 DIAGNOSIS — G47.33 OSA ON CPAP: ICD-10-CM

## 2018-02-06 DIAGNOSIS — R73.03 PREDIABETES: ICD-10-CM

## 2018-02-06 DIAGNOSIS — K76.0 FATTY LIVER DISEASE, NONALCOHOLIC: ICD-10-CM

## 2018-02-06 DIAGNOSIS — E66.9 OBESITY (BMI 30-39.9): ICD-10-CM

## 2018-02-06 PROCEDURE — G8484 FLU IMMUNIZE NO ADMIN: HCPCS | Performed by: NURSE PRACTITIONER

## 2018-02-06 PROCEDURE — G8427 DOCREV CUR MEDS BY ELIG CLIN: HCPCS | Performed by: NURSE PRACTITIONER

## 2018-02-06 PROCEDURE — 1036F TOBACCO NON-USER: CPT | Performed by: NURSE PRACTITIONER

## 2018-02-06 PROCEDURE — G8417 CALC BMI ABV UP PARAM F/U: HCPCS | Performed by: NURSE PRACTITIONER

## 2018-02-06 PROCEDURE — 99213 OFFICE O/P EST LOW 20 MIN: CPT | Performed by: NURSE PRACTITIONER

## 2018-02-07 ENCOUNTER — HOSPITAL ENCOUNTER (OUTPATIENT)
Dept: PHYSICAL THERAPY | Age: 38
Setting detail: THERAPIES SERIES
Discharge: HOME | End: 2018-02-07
Payer: MEDICAID

## 2018-02-07 ENCOUNTER — PATIENT MESSAGE (OUTPATIENT)
Dept: INTERNAL MEDICINE | Age: 38
End: 2018-02-07

## 2018-02-07 PROCEDURE — 97110 THERAPEUTIC EXERCISES: CPT

## 2018-02-07 NOTE — FLOWSHEET NOTE
[x] [facility]       Outpatient Physical        Therapy       [address]       Phone: [phone]       Fax: [phone] [] [facility] for Health Promotion at [address] [facility]       Phone: [phone]       Fax: [phone] [] Edi [facility]osie Breaker for Health Promotion  [address] [facility]   Phone: [phone]   Fax:  [phone]     Physical Therapy Daily Treatment Note    Date:  2018  Patient Name:  Author Orlando    :  1980  MRN: [id number]   Physician: Dr. Corbett Law: [facility]  Medical Diagnosis: Osteoarthritis of knee, bilateral               Rehab Codes: M 25.561, M 25.562, M 62.81, R 26.2  Onset date:                      Next 's appt.: 2018  Visit# / total visits: 10/18  Cancels/No Shows: 0/0    Subjective:    Pain:  [x] Yes  [] No Location:  Jesus knees Pain Rating: (0-10 scale) 5/10 R knee,  0/10 L knee   Pain altered Tx:  [x] No  [] Yes  Action:   Comments    Nothing new to report. Objective:  Modalities: JESUS CP knees x 10 mins post exercises -pt declined   Precautions:  Exercises:  Exercise Reps/ Time Weight/ Level Comments   Scit fit  6 mins L2                  Sitting Jesus LE      HS stretching 3x20\"   stool. Piriformis stretching 3x20\"  Figure 4 with LE on mat.    LAQ w/ iso add 20x3\" 2 lbs Ball, increased reps 2/7    HS curls  20x green Increased reps 2/7         Supine Jesus LE      Heel prop 3 mins  Blue /2 bolster jesus knees, added 1/15    SLR jesus  15x  2  lbs HEP,     SLR ER jesus  15 x ea  2 lbs  lbs HEP    Bridging 15x3\"  HEP   Bridging with butterfly 15x Green  HEP          Side lying      SL IT band stretch  Fwd and bwd 2x20\"   R LE bwd      Side hip abd/add 15 x ea    2 lbs    Added hip adduction and progressed from with abd   SL clamshells 15x   green             Prone      Prone hip ext 10x 1.5   lbs HEP,     Prone quad stretch 3x20\"  Belt,  lg functionally impaired or less  4. Patient to report improved ability to do physical activity with less knee pain and fatigue. 5. Patient to report not being awoken due to right knee pain.                    Patient goals: \"Get better\"    Pt. Education:  [x] Yes  [] No  [x] Reviewed Prior HEP/Ed  Method of Education: [x] Verbal  [x] Demo  [] Written  Comprehension of Education:  [x] Verbalizes understanding. [x] Demonstrates understanding. [x] Needs review IT and and piriformis stretching. [x] Demonstrates/verbalizes HEP/Ed previously given. 1/10/18:Stretching: HS, piriformis, IT band, hip abd, clamshells SL.supine hip flexor stretching. 1/15/18: Fall prevention handout. Plan: [x] Continue per plan of care.    [] Other:       Time In:  5806    PM        Time Out:  130    PM  Electronically signed by:  Kali Paulino PTA

## 2018-02-13 ENCOUNTER — HOSPITAL ENCOUNTER (EMERGENCY)
Age: 38
Discharge: HOME | End: 2018-02-13
Payer: MEDICAID

## 2018-02-13 VITALS
TEMPERATURE: 98.4 F | WEIGHT: 260 LBS | BODY MASS INDEX: 31.66 KG/M2 | HEART RATE: 85 BPM | SYSTOLIC BLOOD PRESSURE: 132 MMHG | DIASTOLIC BLOOD PRESSURE: 72 MMHG | RESPIRATION RATE: 18 BRPM | OXYGEN SATURATION: 98 %

## 2018-02-13 DIAGNOSIS — S39.012A BACK STRAIN, INITIAL ENCOUNTER: Primary | ICD-10-CM

## 2018-02-13 LAB
-: NORMAL
AMORPHOUS: NORMAL
BACTERIA: NORMAL
BILIRUBIN URINE: NEGATIVE
CASTS UA: NORMAL /LPF
COLOR: YELLOW
COMMENT UA: ABNORMAL
CRYSTALS, UA: NORMAL /HPF
EPITHELIAL CELLS UA: NORMAL /HPF (ref 0–5)
GLUCOSE URINE: NEGATIVE
KETONES, URINE: ABNORMAL
LEUKOCYTE ESTERASE, URINE: NEGATIVE
MUCUS: NORMAL
NITRITE, URINE: NEGATIVE
OTHER OBSERVATIONS UA: NORMAL
PH UA: 5.5 (ref 5–9)
PROTEIN UA: NEGATIVE
RBC UA: NORMAL /HPF (ref 0–2)
RENAL EPITHELIAL, UA: NORMAL /HPF
SPECIFIC GRAVITY UA: 1.02 (ref 1.01–1.02)
TRICHOMONAS: NORMAL
TURBIDITY: CLEAR
URINE HGB: NEGATIVE
UROBILINOGEN, URINE: NORMAL
WBC UA: NORMAL /HPF (ref 0–5)
YEAST: NORMAL

## 2018-02-13 PROCEDURE — 6370000000 HC RX 637 (ALT 250 FOR IP): Performed by: PHYSICIAN ASSISTANT

## 2018-02-13 PROCEDURE — 99283 EMERGENCY DEPT VISIT LOW MDM: CPT

## 2018-02-13 PROCEDURE — 81001 URINALYSIS AUTO W/SCOPE: CPT

## 2018-02-13 RX ORDER — METHOCARBAMOL 750 MG/1
750 TABLET, FILM COATED ORAL 4 TIMES DAILY
Qty: 20 TABLET | Refills: 0 | Status: SHIPPED | OUTPATIENT
Start: 2018-02-13 | End: 2018-02-18

## 2018-02-13 RX ORDER — IBUPROFEN 800 MG/1
800 TABLET ORAL ONCE
Status: COMPLETED | OUTPATIENT
Start: 2018-02-13 | End: 2018-02-13

## 2018-02-13 RX ADMIN — IBUPROFEN 800 MG: 800 TABLET, FILM COATED ORAL at 15:18

## 2018-02-13 ASSESSMENT — ENCOUNTER SYMPTOMS
EYE PAIN: 0
SINUS PRESSURE: 0
DIARRHEA: 0
NAUSEA: 0
RHINORRHEA: 0
COUGH: 0
VOMITING: 0
TROUBLE SWALLOWING: 0
BACK PAIN: 1
EYE DISCHARGE: 0
WHEEZING: 0
ABDOMINAL PAIN: 0

## 2018-02-13 ASSESSMENT — PAIN DESCRIPTION - LOCATION: LOCATION: BACK

## 2018-02-13 ASSESSMENT — PAIN DESCRIPTION - DESCRIPTORS: DESCRIPTORS: SPASM

## 2018-02-13 ASSESSMENT — PAIN DESCRIPTION - PAIN TYPE: TYPE: ACUTE PAIN

## 2018-02-13 ASSESSMENT — PAIN SCALES - GENERAL
PAINLEVEL_OUTOF10: 9
PAINLEVEL_OUTOF10: 9

## 2018-02-13 ASSESSMENT — PAIN DESCRIPTION - ORIENTATION: ORIENTATION: LOWER

## 2018-02-13 NOTE — ED PROVIDER NOTES
daily    POTASSIUM CHLORIDE (KLOR-CON M) 10 MEQ EXTENDED RELEASE TABLET    Take 1 tablet by mouth daily    SODIUM CHLORIDE (ALTAMIST SPRAY) 0.65 % NASAL SPRAY    1 spray by Nasal route as needed for Congestion    VITAMIN D (CHOLECALCIFEROL) 1000 UNITS TABS TABLET    Take 1 tablet by mouth daily       ALLERGIES     Apple    FAMILY HISTORY       Family History   Problem Relation Age of Onset    Arthritis Mother     Diabetes Father     Arthritis Father     Stroke Maternal Grandmother     Heart Disease Maternal Grandmother     Stroke Maternal Grandfather     Heart Disease Maternal Grandfather     Early Death Maternal Grandfather     Stroke Paternal Grandmother     Heart Disease Paternal Grandmother     Stroke Paternal Grandfather     Heart Disease Paternal Grandfather     Early Death Paternal Grandfather         SOCIAL HISTORY       Social History     Social History    Marital status: Single     Spouse name: N/A    Number of children: N/A    Years of education: N/A     Social History Main Topics    Smoking status: Never Smoker    Smokeless tobacco: Never Used    Alcohol use No    Drug use: No    Sexual activity: Not on file     Other Topics Concern    Not on file     Social History Narrative    ** Merged History Encounter **            REVIEW OF SYSTEMS       Review of Systems   Constitutional: Negative for chills and fever. HENT: Negative for congestion, ear pain, rhinorrhea, sinus pressure and trouble swallowing. Eyes: Negative for pain and discharge. Respiratory: Negative for cough and wheezing. Cardiovascular: Negative for chest pain and palpitations. Gastrointestinal: Negative for abdominal pain, diarrhea, nausea and vomiting. Endocrine: Negative for polyuria. Genitourinary: Negative for difficulty urinating, dysuria, flank pain and frequency. Musculoskeletal: Positive for back pain. Skin: Negative for rash. Neurological: Negative for headaches.    Psychiatric/Behavioral: Interpretation per the Radiologist below, if available at the time of this note:    No orders to display       LABS:  Labs Reviewed   UA W/REFLEX CULTURE - Abnormal; Notable for the following:        Result Value    Ketones, Urine TRACE (*)     All other components within normal limits   MICROSCOPIC URINALYSIS       All other labs were within normal range or not returned as of this dictation. EMERGENCY DEPARTMENT COURSE and Medical Decision Making:     MDM/  ED Course      Patient was given results to his urine single. He is resting comfortable. She has already in physical therapy. He is to continue with this. Strict return precautions and follow up instructions were discussed with the patient with which the patient agrees    CONSULTS: (None if blank)  None    Procedures: (None if blank)       CLINICAL IMPRESSION      1.  Back strain, initial encounter          DISPOSITION/PLAN   DISPOSITION Decision To Discharge 02/13/2018 03:17:15 PM      PATIENT REFERRED TO:  [facility]  [phone]    In 5 days        DISCHARGE MEDICATIONS:  New Prescriptions    ETODOLAC (LODINE) 300 MG CAPSULE    Take 1 capsule by mouth every 8 hours    METHOCARBAMOL (ROBAXIN) 750 MG TABLET    Take 1 tablet by mouth 4 times daily for 5 days              (Please note that portions of this note were completed with a voice recognition program.  Efforts were made to edit the dictations but occasionally words are mis-transcribed.)      Rad Barry PA-C (electronically signed)  Attending Emergency Physician            Rad Barry PA-C  02/13/18 9692

## 2018-02-13 NOTE — ED NOTES
Pt aware of high volume in the department, awaiting room availability     Jean Paul Mccartney, GRACE  02/13/18 8882

## 2018-02-19 ENCOUNTER — HOSPITAL ENCOUNTER (OUTPATIENT)
Dept: PHYSICAL THERAPY | Age: 38
Setting detail: THERAPIES SERIES
Discharge: HOME | End: 2018-02-19
Payer: MEDICAID

## 2018-02-19 PROCEDURE — 97110 THERAPEUTIC EXERCISES: CPT

## 2018-02-19 NOTE — FLOWSHEET NOTE
[x] Paige Gutierrez       Outpatient Physical        Therapy       [address]       Phone: [phone]       Fax: [phone] [] Merged with [facility] Promotion at [address]       Phone: [phone]       Fax: [phone] [] [facility] Health Promotion  [address] [place] [address]   Phone: [phone]   Fax:  [phone]     Physical Therapy Daily Treatment Note    Date:  2018  Patient Name:  Ilir Cox    :  1980  MRN: [id number]   Physician: Dr. Eliud Landers: [facility]  Medical Diagnosis: Osteoarthritis of knee, bilateral               Rehab Codes: M 25.561, M 25.562, M 62.81, R 26.2  Onset date: 2016                     Next Dr's appt.: 2018  Visit# / total visits:   Cancels/No Shows: 0/0    Subjective:    Pain:  [x] Yes  [] No Location:  Jesus knees Pain Rating: (0-10 scale) 5/10 R knee,  5/10 L knee   Pain altered Tx:  [x] No  [] Yes  Action:   Comments      Reports he went to ER due to back pain from helping family member move to new home. Reports he was given pain meds and muscle relaxer for 5 day. States back feels fine now but L knee pain is still up from the move. Objective:  Modalities: JESUS CP knees x 10 mins post exercises -pt declined    Precautions:  Exercises:  Exercise Reps/ Time Weight/ Level Comments   Scit fit  6 mins L3   Increased resistance                Sitting Jesus LE      HS stretching 3x20\"   stool. Piriformis stretching 3x20\"  Figure 4 with LE on mat. LAQ w/ iso add 20x3\" 2 lbs Ball,     HS curls  20x green I          Supine Jesus LE      Heel prop 3 mins  Blue / bolster jesus knees, added 1/15    SLR jesus  13x  2  lbs HEP, decreased reps due to fatigue.     SLR ER jesus  15 x ea  2 lbs   HEP    Bridging 15x3\"  HEP   Bridging with butterfly 15x Green  HEP          Side lying      SL IT band stretch  Fwd and bwd 2x20\"   R LE bwd to increased pain L LE and decreased  technique. STG: (to be met in 9 treatments)  1. ? Pain: Right knee pain 4/10 at max; left knee pain 3/10 at max 2/5/18 NOT MET  2. ? Strength: Able to complete 30 minutes of strengthening exs with minimal fatigue 2/5/18 MET  3. ? Function: LEFS score of 40% functionally impaired or less 2/5/18 MET 25% impaired functionally. 4.Independent with Home Exercise Programs 1/29/18 MET     LTG: (to be met in 18 treatments)  1. Right knee pain of 1/10 at max with activity  2. Left knee pain 0/10  3. LEFS score of 30% functionally impaired or less  4. Patient to report improved ability to do physical activity with less knee pain and fatigue. 5. Patient to report not being awoken due to right knee pain.                    Patient goals: \"Get better\"    Pt. Education:  [x] Yes  [] No  [x] Reviewed Prior HEP/Ed  Method of Education: [x] Verbal  [x] Demo  [] Written  Comprehension of Education:  [x] Verbalizes understanding. [x] Demonstrates understanding. [x] Needs review IT and and piriformis stretching. [x] Demonstrates/verbalizes HEP/Ed previously given. 1/10/18:Stretching: HS, piriformis, IT band, hip abd, clamshells SL.supine hip flexor stretching. 1/15/18: Fall prevention handout. Plan: [x] Continue per plan of care.    [] Other:       Time In:  1295     PM        Time Out:   143    PM  Electronically signed by:  Tone Velasco PTA

## 2018-02-20 ENCOUNTER — OFFICE VISIT (OUTPATIENT)
Dept: BEHAVIORAL/MENTAL HEALTH CLINIC | Age: 38
End: 2018-02-20
Payer: MEDICAID

## 2018-02-20 DIAGNOSIS — F33.0 MAJOR DEPRESSIVE DISORDER, RECURRENT, MILD (HCC): Primary | ICD-10-CM

## 2018-02-20 PROCEDURE — 90834 PSYTX W PT 45 MINUTES: CPT | Performed by: PSYCHOLOGIST

## 2018-02-20 NOTE — PROGRESS NOTES
Inhaler 3     No current facility-administered medications for this visit. Social History:   Social History     Social History    Marital status: Single     Spouse name: N/A    Number of children: N/A    Years of education: N/A     Occupational History    Not on file. Social History Main Topics    Smoking status: Never Smoker    Smokeless tobacco: Never Used    Alcohol use No    Drug use: No    Sexual activity: Not on file     Other Topics Concern    Not on file     Social History Narrative    ** Merged History Encounter **            TOBACCO:   reports that he has never smoked. He has never used smokeless tobacco.  ETOH:   reports that he does not drink alcohol.     Family History:   Family History   Problem Relation Age of Onset    Arthritis Mother     Diabetes Father     Arthritis Father     Stroke Maternal Grandmother     Heart Disease Maternal Grandmother     Stroke Maternal Grandfather     Heart Disease Maternal Grandfather     Early Death Maternal Grandfather     Stroke Paternal Grandmother     Heart Disease Paternal Grandmother     Stroke Paternal Grandfather     Heart Disease Paternal Grandfather     Early Death Paternal Grandfather

## 2018-02-20 NOTE — PATIENT INSTRUCTIONS
1. See handouts on validation and how to heal from invalidation. 2. We can review mindfulness next consult if you choose. 3. Follow up in 2 weeks.

## 2018-02-21 ENCOUNTER — HOSPITAL ENCOUNTER (OUTPATIENT)
Dept: PHYSICAL THERAPY | Age: 38
Setting detail: THERAPIES SERIES
Discharge: HOME | End: 2018-02-21
Payer: MEDICAID

## 2018-02-21 PROCEDURE — 97110 THERAPEUTIC EXERCISES: CPT
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
## 2018-02-26 ENCOUNTER — HOSPITAL ENCOUNTER (OUTPATIENT)
Dept: PHYSICAL THERAPY | Age: 38
Setting detail: THERAPIES SERIES
Discharge: HOME | End: 2018-02-26
Payer: MEDICAID

## 2018-02-26 PROCEDURE — 97110 THERAPEUTIC EXERCISES: CPT

## 2018-02-26 NOTE — FLOWSHEET NOTE
bwd 2x20\"   R LE bwd      Side hip abd/add 15 x ea    2 lbs Added hip adduction and progressed from with abd   SL clamshells 15x   green       hernesto abdom 10x     hernesto abdom w/ marching 15x 2 lbs added   hernesto abdom w/ alt oppos UE/LE 15x 2 lbs UE/LE added   hernesto abdom w/ alt same UE/LE 15x 2 lbs UE/LE added         Prone      Prone hip ext 10x 1.5   lbs HEP,     Prone quad stretch 3x20\"  Belt,  lg towel            Standing tomy LE       Gastro stretch 3x20\"  Wedge,     Heel raises 20x\" 4 lbs     4 way hip w/ iso abdom   3 lbs    marching 20x 4 lbs     HS curls. 20x 4 lbs    SLS on foam 4x7-16\" ea      TKE  15x5\" blue     4 way hip tband tomy  20x grn    Fwd step ups  20x 6 in/4 lbs    Lateral step ups  20x 6 in /4 lbs    Heel taps 20x 6 in/4lbs     Step overs 10x 6 in      Retro step ups  15x  6 in/4 lbs    TG squats 20x L35          Cybex rotary 45 degrees 15x1 24 lbs 45 degrees , increased wt 2/21   cybex back ext 15x2 60 lbs     Cybex resistance walking 15 lbs 5 x ea Added 2/21, fwd, lateral                             Other:   Completed bold typed exercises.                Specific Instructions for next treatment:  Improve bilateral knee/hip/glut/core strength. Progress core strengthening   . Treatment Charges: Mins Units   []  Modalities CP      [x]  Ther Exercise 45 3   []  Manual Therapy     []  Ther Activities     []  Aquatics     []  Vasocompression     []  Other     Total Treatment time  45 3       Assessment: [x] Progressing toward  Focus on core strengthening with progressions as listed in log. Continue with hip and VMO strengthening. No reports of increased pain. [] No change.      [] Other:          STG: (to be met in 9 treatments)  1. ? Pain: Right knee pain 4/10 at max; left knee pain 3/10 at max 2/5/18 NOT MET  2. ? Strength: Able to complete 30 minutes of strengthening exs with minimal fatigue 2/5/18 MET  3. ? Function: LEFS score of 40% functionally impaired or less 2/5/18 MET 25% impaired functionally. 4.Independent with Home Exercise Programs 1/29/18 MET     LTG: (to be met in 18 treatments)  1. Right knee pain of 1/10 at max with activity  2. Left knee pain 0/10  3. LEFS score of 30% functionally impaired or less  4. Patient to report improved ability to do physical activity with less knee pain and fatigue. 5. Patient to report not being awoken due to right knee pain.                    Patient goals: \"Get better\"    Pt. Education:  [x] Yes  [] No  [x] Reviewed Prior HEP/Ed  Method of Education: [x] Verbal  [x] Demo  [] Written  Comprehension of Education:  [x] Verbalizes understanding. [x] Demonstrates understanding. [x] Needs review IT and and piriformis stretching. [x] Demonstrates/verbalizes HEP/Ed previously given. 1/10/18:Stretching: HS, piriformis, IT band, hip abd, clamshells SL.supine hip flexor stretching. 1/15/18: Fall prevention handout. Plan: [x] Continue per plan of care.    [] Other:       Time In:  9168     PM        Time Out:   140    PM  Electronically signed by:  Ayaan Dhillon PTA

## 2018-02-28 ENCOUNTER — HOSPITAL ENCOUNTER (OUTPATIENT)
Dept: PHYSICAL THERAPY | Age: 38
Setting detail: THERAPIES SERIES
Discharge: HOME | End: 2018-02-28
Payer: MEDICAID

## 2018-02-28 PROCEDURE — 97110 THERAPEUTIC EXERCISES: CPT

## 2018-02-28 NOTE — FLOWSHEET NOTE
[x] Nir Rao       Outpatient Physical        Therapy       [address]       Phone: [phone]       Fax: [phone] [] [facility] for [facility] Promotion at [address] [facility]       Phone: [phone]       Fax: [phone] [] [facility] for [facility]  [address] [facility]   Phone: [phone]   Fax:  [phone]     Physical Therapy Daily Treatment Note    Date:  2018  Patient Name:  Rochelle Heath    :  1980  MRN: [id number]   Physician: Dr. Angeles Bur: [facility]  Medical Diagnosis: Osteoarthritis of knee, bilateral               Rehab Codes: M 25.561, M 25.562, M 62.81, R 26.2  Onset date: 2016                     Next 's appt.: 2018  Visit# / total visits:     Cancels/No Shows: 0/0    Subjective:    Pain:  [x] Yes  [] No Location:  Jesus knees Pain Rating: (0-10 scale) 3/10 R knee,  3/10 L knee   Pain altered Tx:  [x] No  [] Yes  Action:   Comments   Reports knee feel better, states he has not been working out as much (wt [employer])         Objective:  Modalities: JESUS CP knees x 10 mins post exercises -pt declined     Precautions:  Exercises:  Exercise Reps/ Time Weight/ Level Comments   Scit fit  6 mins L3                  Sitting Jesus LE      HS stretching 3x20\"   stool. Piriformis stretching 3x20\"  Figure 4 with LE on mat. LAQ w/ iso add 20x3\" 2 lbs Ball,     HS curls  20x green I          Supine Jesus LE      Heel prop 3 mins  Blue 1/2 bolster jesus knees, added 1/15    SLR jesus  10x  2  lbs HEP, decreased reps due to fatigue.     SLR ER jesus  10 x ea  2 lbs   HEP    Bridging 15x3\"  HEP   Bridging with butterfly 15x Green  HEP          Side lying      SL IT band stretch  Fwd and bwd 2x20\"   R LE bwd      Side hip abd/add 15 x ea    2 lbs Added hip adduction and progressed from with abd   SL clamshells 15x   green       hernesto abdom 10x     hernesto abdom w/

## 2018-03-05 ENCOUNTER — HOSPITAL ENCOUNTER (OUTPATIENT)
Dept: PHYSICAL THERAPY | Age: 38
Setting detail: THERAPIES SERIES
Discharge: HOME | End: 2018-03-05
Payer: MEDICAID

## 2018-03-05 PROCEDURE — 97110 THERAPEUTIC EXERCISES: CPT

## 2018-03-06 ENCOUNTER — OFFICE VISIT (OUTPATIENT)
Dept: BARIATRICS/WEIGHT MGMT | Age: 38
End: 2018-03-06
Payer: MEDICAID

## 2018-03-06 VITALS
HEART RATE: 74 BPM | DIASTOLIC BLOOD PRESSURE: 70 MMHG | WEIGHT: 270.8 LBS | BODY MASS INDEX: 32.98 KG/M2 | SYSTOLIC BLOOD PRESSURE: 126 MMHG | HEIGHT: 76 IN

## 2018-03-06 DIAGNOSIS — E66.9 OBESITY (BMI 30-39.9): ICD-10-CM

## 2018-03-06 DIAGNOSIS — I10 ESSENTIAL HYPERTENSION: Primary | ICD-10-CM

## 2018-03-06 PROBLEM — E87.6 HYPOKALEMIA: Status: RESOLVED | Noted: 2017-03-07 | Resolved: 2018-03-06

## 2018-03-06 PROBLEM — R73.03 PREDIABETES: Status: RESOLVED | Noted: 2017-02-06 | Resolved: 2018-03-06

## 2018-03-06 PROCEDURE — G8427 DOCREV CUR MEDS BY ELIG CLIN: HCPCS | Performed by: NURSE PRACTITIONER

## 2018-03-06 PROCEDURE — 1036F TOBACCO NON-USER: CPT | Performed by: NURSE PRACTITIONER

## 2018-03-06 PROCEDURE — G8484 FLU IMMUNIZE NO ADMIN: HCPCS | Performed by: NURSE PRACTITIONER

## 2018-03-06 PROCEDURE — G8417 CALC BMI ABV UP PARAM F/U: HCPCS | Performed by: NURSE PRACTITIONER

## 2018-03-06 PROCEDURE — 99213 OFFICE O/P EST LOW 20 MIN: CPT | Performed by: NURSE PRACTITIONER

## 2018-03-06 NOTE — PROGRESS NOTES
Group Lifestyle Balance Follow-up Progress Note      Subjective     Patient is here for group medical appointment for Group Lifestyle Balance weight management program follow-up for the chronic conditions of HTN, Fatty Liver, Prediabetes, MIGEL, . Patient continues on the program and tolerating well. Total weight loss to date is 142 lbs. No current issues. Allergies: Allergies   Allergen Reactions    Apple Swelling       Past Medical History:     Past Medical History:   Diagnosis Date    Anxiety     Arthritis     CAD (coronary artery disease)     Fatty liver     Fatty liver disease, nonalcoholic 2/65/3580    Hypertension     Obesity     Unspecified sleep apnea     cpap   . Past Surgical History:  History reviewed. No pertinent surgical history. Family History:  Family History   Problem Relation Age of Onset    Arthritis Mother     Diabetes Father     Arthritis Father     Stroke Maternal Grandmother     Heart Disease Maternal Grandmother     Stroke Maternal Grandfather     Heart Disease Maternal Grandfather     Early Death Maternal Grandfather     Stroke Paternal Grandmother     Heart Disease Paternal Grandmother     Stroke Paternal Grandfather     Heart Disease Paternal Grandfather     Early Death Paternal Grandfather        Social History:  Social History     Social History    Marital status: Single     Spouse name: N/A    Number of children: N/A    Years of education: N/A     Occupational History    Not on file.      Social History Main Topics    Smoking status: Never Smoker    Smokeless tobacco: Never Used    Alcohol use No    Drug use: No    Sexual activity: Not on file     Other Topics Concern    Not on file     Social History Narrative    ** Merged History Encounter **            Current Medications:  Current Outpatient Prescriptions   Medication Sig Dispense Refill    etodolac (LODINE) 300 MG capsule Take 1 capsule by mouth every 8 hours 15 capsule 0   

## 2018-03-07 ENCOUNTER — OFFICE VISIT (OUTPATIENT)
Dept: BEHAVIORAL/MENTAL HEALTH CLINIC | Age: 38
End: 2018-03-07
Payer: MEDICAID

## 2018-03-07 DIAGNOSIS — F33.0 MAJOR DEPRESSIVE DISORDER, RECURRENT, MILD (HCC): Primary | ICD-10-CM

## 2018-03-07 PROCEDURE — 90834 PSYTX W PT 45 MINUTES: CPT | Performed by: PSYCHOLOGIST

## 2018-03-07 NOTE — PROGRESS NOTES
Behavioral Health Consultation  Triston JASON  Licensed Clinical Psychologist #[id number]  3/7/2018  9:28 AM- 10:14 AM    Time spent with Patient: 46 minutes  This is patient's 19th'  [facility] consultation. Reason for Consult:  depression  Referring Provider: Mendez John MD    Feedback given to PCP. S:   Previous Recommendation(s):   1. See handouts on validation and how to heal from invalidation. - no mention of reading; however, focus of consult. 2. We can review mindfulness next consult if you choose. - no mention. Pt reports total weight loss is now 142 lb. Describes conflicts and distress related to familial interpersonal dynamics. O:  MSE:   Mood was Euthymic, with Full & Congruent affect. Suicidal ideation was denied. Homicidal ideation was denied. Hygiene was Good. Dress was Appropriate and Casual.   Behavior was WNL & unremarkable with No observation or self-report of difficulties standing/ambulating. Attitude was Engageable and Friendly. Eye-contact was Good. Speech: rate- WNL, rhythm-  WNL, volume- WNL  Verbalizations were  goal-directed, coherent and verbose. Thought processes were intact and goal-oriented without evidence of delusions, hallucinations, obsessions, or andrew; with little cognitive distortions. Associations were characterized by tangential cognitive processes. Pt was orientated oriented to person, place, time, and general circumstances;  recent:  good and remote:  good. Insight and judgment were estimated to be good to fair, AEB, a fair  understanding of cyclical maladaptive patterns, and the ability to use insight to inform behavior change. A:   Highlighted how in addition to weight loss and improvement in all future chronic conditions or health metrics, he has significantly improved his psychological functioning.  Pt has evidenced persistence, consistency, self-motivation, patience with himself, openness to learn, flexibility of thinking, which will serve

## 2018-03-09 ENCOUNTER — HOSPITAL ENCOUNTER (OUTPATIENT)
Dept: PHYSICAL THERAPY | Age: 38
Setting detail: THERAPIES SERIES
Discharge: HOME | End: 2018-03-09
Payer: MEDICAID

## 2018-03-09 NOTE — FLOWSHEET NOTE
[x] Dusty Khan        Outpatient Physical                Therapy       [address]       Phone: [phone]       Fax: [phone] [] [facility] for Health       Promotion at [address] [place] [address]       Phone: [phone]       Fax: [phone] [] [facility]      for Health Promotion     [address] [place] [address]      Phone: [phone]      Fax:  [phone]     Physical Therapy Cancel/No Show note    Date: 3/9/2018  Patient: Cruz Matt  : 1980  MRN: [id number]    Cancels/No Shows to date:     For today's appointment patient:  [x]  Cancelled  []  Rescheduled appointment  []  No-show     Reason given by patient:  []  Patient ill  []  Conflicting appointment  []  No transportation    []  Conflict with work  []  No reason given  []  Weather related  []  Other:      Comments:      [x]  Next appointment was confirmed -- left message re: next appt 3/12/18 at 1245pm    Electronically signed by: Pema Cardenas PT

## 2018-03-12 ENCOUNTER — HOSPITAL ENCOUNTER (OUTPATIENT)
Dept: PHYSICAL THERAPY | Age: 38
Setting detail: THERAPIES SERIES
Discharge: HOME | End: 2018-03-12
Payer: MEDICAID

## 2018-03-12 PROCEDURE — 97110 THERAPEUTIC EXERCISES: CPT

## 2018-03-12 NOTE — FLOWSHEET NOTE
[x] May Even       Outpatient Physical        Therapy       [address]       Phone: [phone]       Fax: [phone] [] [facility] for [facility] Promotion at [address]       Phone: [phone]       Fax: [phone] [] [facility]. [address] [place] [address] Health Promotion  [address] [facility]   Phone: [phone]   Fax:  [phone]     Physical Therapy Daily Treatment Note    Date:  3/12/2018  Patient Name:  Darryn Rosas    :  1980  MRN: [id number]   Physician: Dr. Branden Wisdom: [facility]  Medical Diagnosis: Osteoarthritis of knee, bilateral               Rehab Codes: M 25.561, M 25.562, M 62.81, R 26.2  Onset date: 2016                     Next 's appt.: 2018  Visit# / total visits:     Cancels/No Shows: 1/0    Subjective:    Pain:  [x] Yes  [] No Location:  Jesus knees Pain Rating: (0-10 scale) 1/10 jesus knee,   Pain altered Tx:  [x] No  [] Yes  Action:   Comments   Pain down today and over weekend. Was able to jog yesterday. (has lost 144 lbs at wt mngt)       Objective:  Modalities: JESUS CP knees x 10 mins post exercises -pt declined     Precautions:  Exercises:  Exercise Reps/ Time Weight/ Level Comments   Scit fit  6 mins L3      cybex bike 6 mins L2 Full revolutions, added 3/12         Sitting Jesus LE      HS stretching 3x20\"   stool. Piriformis stretching 3x20\"  Figure 4 with LE on mat. LAQ w/ iso add 20x3\" 2 lbs Ball,     HS curls  20x green I          Supine Jesus LE      Heel prop 3 mins  Blue 1/2 bolster jesus knees, added 1/15    SLR jesus  10x  2  lbs HEP, decreased reps due to fatigue.     SLR ER jesus  10 x ea  2 lbs   HEP    Bridging 15x3\"  HEP   Bridging with butterfly 15x Green  HEP          Side lying      SL IT band stretch  Fwd and bwd 2x20\"   R LE bwd      Side hip abd/add 15 x ea    2 lbs Added hip adduction and progressed from with abd   SL clamshells jesus  15x MET  3. ? Function: LEFS score of 40% functionally impaired or less 2/5/18 MET 25% impaired functionally. 4.Independent with Home Exercise Programs 1/29/18 MET     LTG: (to be met in 18 treatments)  1. Right knee pain of 1/10 at max with activity  2. Left knee pain 0/10  3. LEFS score of 30% functionally impaired or less  4. Patient to report improved ability to do physical activity with less knee pain and fatigue. 5. Patient to report not being awoken due to right knee pain.                    Patient goals: \"Get better\"    Pt. Education:  [x] Yes  [] No  [x] Reviewed Prior HEP/Ed  Method of Education: [x] Verbal  [x] Demo  [] Written  Comprehension of Education:  [x] Verbalizes understanding. [x] Demonstrates understanding. [x] Needs review IT and and piriformis stretching. [x] Demonstrates/verbalizes HEP/Ed previously given. 1/10/18:Stretching: HS, piriformis, IT band, hip abd, clamshells SL.supine hip flexor stretching. 1/15/18: Fall prevention handout. Plan: [x] Continue per plan of care. [x] Other: Complete last three sessions.        Time In:     1240    PM        Time Out:   138  PM  Electronically signed by:  Bang Gant PTA

## 2018-03-13 ENCOUNTER — OFFICE VISIT (OUTPATIENT)
Dept: BEHAVIORAL/MENTAL HEALTH CLINIC | Age: 38
End: 2018-03-13
Payer: MEDICAID

## 2018-03-13 DIAGNOSIS — F33.0 MAJOR DEPRESSIVE DISORDER, RECURRENT, MILD (HCC): Primary | ICD-10-CM

## 2018-03-13 PROCEDURE — 90834 PSYTX W PT 45 MINUTES: CPT | Performed by: PSYCHOLOGIST

## 2018-03-13 NOTE — PROGRESS NOTES
daily 30 tablet 6    naproxen (NAPROSYN) 500 MG tablet Take 1 tablet by mouth 2 times daily 60 tablet 0    vitamin D (CHOLECALCIFEROL) 1000 units TABS tablet Take 1 tablet by mouth daily 30 tablet 5    potassium chloride (KLOR-CON M) 10 MEQ extended release tablet Take 1 tablet by mouth daily 30 tablet 0    albuterol sulfate HFA (PROAIR HFA) 108 (90 BASE) MCG/ACT inhaler Inhale 2 puffs into the lungs every 6 hours as needed for Wheezing or Shortness of Breath Space out to every 6 hours as symptoms improve. 1 Inhaler 3     No current facility-administered medications for this visit. Social History:   Social History     Social History    Marital status: Single     Spouse name: N/A    Number of children: N/A    Years of education: N/A     Occupational History    Not on file. Social History Main Topics    Smoking status: Never Smoker    Smokeless tobacco: Never Used    Alcohol use No    Drug use: No    Sexual activity: Not on file     Other Topics Concern    Not on file     Social History Narrative    ** Merged History Encounter **            TOBACCO:   reports that he has never smoked. He has never used smokeless tobacco.  ETOH:   reports that he does not drink alcohol.     Family History:   Family History   Problem Relation Age of Onset    Arthritis Mother     Diabetes Father     Arthritis Father     Stroke Maternal Grandmother     Heart Disease Maternal Grandmother     Stroke Maternal Grandfather     Heart Disease Maternal Grandfather     Early Death Maternal Grandfather     Stroke Paternal Grandmother     Heart Disease Paternal Grandmother     Stroke Paternal Grandfather     Heart Disease Paternal Grandfather     Early Death Paternal Grandfather

## 2018-03-13 NOTE — PATIENT INSTRUCTIONS
1. See handout for relationship skills- what do you need a new tool for in your relationships? 2. Make a list of the important variables or components of a healthy relationship. 3. Follow up in 2 weeks.

## 2018-03-14 ENCOUNTER — HOSPITAL ENCOUNTER (OUTPATIENT)
Dept: PHYSICAL THERAPY | Age: 38
Setting detail: THERAPIES SERIES
Discharge: HOME | End: 2018-03-14
Payer: MEDICAID

## 2018-03-14 PROCEDURE — 97110 THERAPEUTIC EXERCISES: CPT

## 2018-03-14 NOTE — FLOWSHEET NOTE
pain 4/10 at max; left knee pain 3/10 at max 2/5/18 NOT MET  2. ? Strength: Able to complete 30 minutes of strengthening exs with minimal fatigue 2/5/18 MET  3. ? Function: LEFS score of 40% functionally impaired or less 2/5/18 MET 25% impaired functionally. 4.Independent with Home Exercise Programs 1/29/18 MET     LTG: (to be met in 18 treatments)  1. Right knee pain of 1/10 at max with activity  2. Left knee pain 0/10  3. LEFS score of 30% functionally impaired or less  4. Patient to report improved ability to do physical activity with less knee pain and fatigue. 5. Patient to report not being awoken due to right knee pain.                    Patient goals: \"Get better\"    Pt. Education:  [x] Yes  [] No  [x] Reviewed Prior HEP/Ed  Method of Education: [x] Verbal  [x] Demo  [] Written  Comprehension of Education:  [x] Verbalizes understanding. [x] Demonstrates understanding. [x] Needs review IT and and piriformis stretching. [x] Demonstrates/verbalizes HEP/Ed previously given. 1/10/18:Stretching: HS, piriformis, IT band, hip abd, clamshells SL.supine hip flexor stretching. 1/15/18: Fall prevention handout. Plan: [x] Continue per plan of care. [x] Other: Complete last three sessions.        Time In:     3690     PM        Time Out: 140      PM  Electronically signed by:  Dusty Lira PTA

## 2018-03-19 ENCOUNTER — HOSPITAL ENCOUNTER (OUTPATIENT)
Dept: PHYSICAL THERAPY | Age: 38
Setting detail: THERAPIES SERIES
Discharge: HOME | End: 2018-03-19
Payer: MEDICAID

## 2018-03-19 PROCEDURE — 97110 THERAPEUTIC EXERCISES: CPT

## 2018-03-19 NOTE — FLOWSHEET NOTE
hip adduction and progressed from with abd   SL clamshells tomy  15x   green       hernesto abdom 10x     hernesto abdom w/ marching 15x 2 lbs     hernesto abdom w/ alt oppos UE/LE 15x 2 lbs UE/LE     hernesto abdom w/ alt same UE/LE 15x 2 lbs UE/LE          Prone      Prone hip ext 10x 1.5   lbs HEP,     Prone quad stretch 3x20\"  Belt,  lg towel            Standing tomy LE       Gastro stretch 3x20\"  Wedge,     Heel raises 20x\" 4 lbs     4 way hip w/ iso abdom   3 lbs    marching 20x 4 lbs     HS curls. 20x 4 lbs    SLS on foam  3x10--31 sec ea   each LE. TKE  15x5\" blue      4 way hip tband tomy  10x grn Red 3/19   Fwd step ups  15x 6 in/4 lbs    Lateral step ups  15x 6 in /4 lbs    Heel taps 15x 6 in/4lbs     Step overs 15x 6 in       Retro step ups  15x  6 in/4 lbs    TG squats 20x L35 grn band. Rebounder, SLS ea LE 10x ea 5\" ball  SLS on level floor. Clock matrix  Each LE 6x5 each  no UE support,     Cybex rotary 45 degrees 15x2 36 lbs 45 degrees , increased wt 3/5/18   cybex back ext 15x2 60 lbs     Cybex resistance walking 15 lbs 5 x ea  fwd, lateral                             Other:   Completed bold typed exercises. Many exercises held due to time for reassessment. 3/19/18  LEFS: raw 74, 7% impairment.       Specific Instructions for next treatment:  Discharge from PT. Treatment Charges: Mins Units   []  Modalities CP      [x]  Ther Exercise  25 2   []  Manual Therapy     []  Ther Activities     []  Aquatics     []  Vasocompression     []  Other     Total Treatment time  25 2       Assessment: [x] Progressing toward  Pt reports no increased pain or knee buckling with PT session. Pt demonstrates improved understanding of 4 way hip tband exercises. Discharge as pt completed 18/18 sessions. [] No change.      [] Other:           STG: (to be met in 9 treatments)  1. ? Pain: Right knee pain 4/10 at max; left knee pain 3/10 at max 2/5/18 NOT MET  2. ? Strength: Able to complete 30 minutes of strengthening exs with

## 2018-04-02 ENCOUNTER — OFFICE VISIT (OUTPATIENT)
Dept: BEHAVIORAL/MENTAL HEALTH CLINIC | Age: 38
End: 2018-04-02
Payer: MEDICAID

## 2018-04-02 DIAGNOSIS — F33.0 MAJOR DEPRESSIVE DISORDER, RECURRENT, MILD (HCC): Primary | ICD-10-CM

## 2018-04-02 PROCEDURE — 90837 PSYTX W PT 60 MINUTES: CPT | Performed by: PSYCHOLOGIST

## 2018-04-04 ENCOUNTER — HOSPITAL ENCOUNTER (EMERGENCY)
Age: 38
Discharge: HOME | End: 2018-04-04
Attending: EMERGENCY MEDICINE
Payer: MEDICAID

## 2018-04-04 ENCOUNTER — APPOINTMENT (OUTPATIENT)
Dept: GENERAL RADIOLOGY | Age: 38
End: 2018-04-04
Payer: MEDICAID

## 2018-04-04 VITALS
HEART RATE: 63 BPM | HEIGHT: 76 IN | SYSTOLIC BLOOD PRESSURE: 117 MMHG | WEIGHT: 264 LBS | DIASTOLIC BLOOD PRESSURE: 74 MMHG | RESPIRATION RATE: 14 BRPM | TEMPERATURE: 98.3 F | BODY MASS INDEX: 32.15 KG/M2 | OXYGEN SATURATION: 100 %

## 2018-04-04 DIAGNOSIS — R07.9 CHEST PAIN, UNSPECIFIED TYPE: Primary | ICD-10-CM

## 2018-04-04 LAB
ABSOLUTE EOS #: <0.03 K/UL (ref 0–0.44)
ABSOLUTE IMMATURE GRANULOCYTE: 0.03 K/UL (ref 0–0.3)
ABSOLUTE LYMPH #: 1.01 K/UL (ref 1.1–3.7)
ABSOLUTE MONO #: 0.21 K/UL (ref 0.1–1.2)
ALBUMIN SERPL-MCNC: 4.3 G/DL (ref 3.5–5.2)
ALBUMIN/GLOBULIN RATIO: 1.1 (ref 1–2.5)
ALP BLD-CCNC: 70 U/L (ref 40–129)
ALT SERPL-CCNC: 15 U/L (ref 5–41)
ANION GAP SERPL CALCULATED.3IONS-SCNC: 12 MMOL/L (ref 9–17)
AST SERPL-CCNC: 19 U/L
BASOPHILS # BLD: 0 % (ref 0–2)
BASOPHILS ABSOLUTE: <0.03 K/UL (ref 0–0.2)
BILIRUB SERPL-MCNC: 0.46 MG/DL (ref 0.3–1.2)
BILIRUBIN DIRECT: 0.09 MG/DL
BILIRUBIN, INDIRECT: 0.37 MG/DL (ref 0–1)
BUN BLDV-MCNC: 19 MG/DL (ref 6–20)
BUN/CREAT BLD: ABNORMAL (ref 9–20)
CALCIUM SERPL-MCNC: 10.1 MG/DL (ref 8.6–10.4)
CHLORIDE BLD-SCNC: 102 MMOL/L (ref 98–107)
CO2: 26 MMOL/L (ref 20–31)
CREAT SERPL-MCNC: 0.85 MG/DL (ref 0.7–1.2)
DIFFERENTIAL TYPE: ABNORMAL
EKG ATRIAL RATE: 64 BPM
EKG P AXIS: 55 DEGREES
EKG P-R INTERVAL: 144 MS
EKG Q-T INTERVAL: 398 MS
EKG QRS DURATION: 88 MS
EKG QTC CALCULATION (BAZETT): 410 MS
EKG R AXIS: 16 DEGREES
EKG T AXIS: 24 DEGREES
EKG VENTRICULAR RATE: 64 BPM
EOSINOPHILS RELATIVE PERCENT: 0 % (ref 1–4)
GFR AFRICAN AMERICAN: >60 ML/MIN
GFR NON-AFRICAN AMERICAN: >60 ML/MIN
GFR SERPL CREATININE-BSD FRML MDRD: ABNORMAL ML/MIN/{1.73_M2}
GFR SERPL CREATININE-BSD FRML MDRD: ABNORMAL ML/MIN/{1.73_M2}
GLOBULIN: NORMAL G/DL (ref 1.5–3.8)
GLUCOSE BLD-MCNC: 145 MG/DL (ref 70–99)
HCT VFR BLD CALC: 41.5 % (ref 40.7–50.3)
HEMOGLOBIN: 13.8 G/DL (ref 13–17)
IMMATURE GRANULOCYTES: 0 %
LYMPHOCYTES # BLD: 11 % (ref 24–43)
MAGNESIUM: 2 MG/DL (ref 1.6–2.6)
MCH RBC QN AUTO: 29.7 PG (ref 25.2–33.5)
MCHC RBC AUTO-ENTMCNC: 33.3 G/DL (ref 28.4–34.8)
MCV RBC AUTO: 89.4 FL (ref 82.6–102.9)
MONOCYTES # BLD: 2 % (ref 3–12)
NRBC AUTOMATED: 0 PER 100 WBC
PDW BLD-RTO: 15.5 % (ref 11.8–14.4)
PLATELET # BLD: 281 K/UL (ref 138–453)
PLATELET ESTIMATE: ABNORMAL
PMV BLD AUTO: 10.5 FL (ref 8.1–13.5)
POC TROPONIN I: 0 NG/ML (ref 0–0.1)
POC TROPONIN I: 0.01 NG/ML (ref 0–0.1)
POC TROPONIN INTERP: NORMAL
POC TROPONIN INTERP: NORMAL
POTASSIUM SERPL-SCNC: 3.9 MMOL/L (ref 3.7–5.3)
RBC # BLD: 4.64 M/UL (ref 4.21–5.77)
RBC # BLD: ABNORMAL 10*6/UL
SEG NEUTROPHILS: 87 % (ref 36–65)
SEGMENTED NEUTROPHILS ABSOLUTE COUNT: 7.77 K/UL (ref 1.5–8.1)
SODIUM BLD-SCNC: 140 MMOL/L (ref 135–144)
TOTAL PROTEIN: 8.3 G/DL (ref 6.4–8.3)
WBC # BLD: 9 K/UL (ref 3.5–11.3)
WBC # BLD: ABNORMAL 10*3/UL

## 2018-04-04 PROCEDURE — 84484 ASSAY OF TROPONIN QUANT: CPT

## 2018-04-04 PROCEDURE — 6370000000 HC RX 637 (ALT 250 FOR IP): Performed by: EMERGENCY MEDICINE

## 2018-04-04 PROCEDURE — 80076 HEPATIC FUNCTION PANEL: CPT

## 2018-04-04 PROCEDURE — 83735 ASSAY OF MAGNESIUM: CPT

## 2018-04-04 PROCEDURE — 99285 EMERGENCY DEPT VISIT HI MDM: CPT

## 2018-04-04 PROCEDURE — 93005 ELECTROCARDIOGRAM TRACING: CPT

## 2018-04-04 PROCEDURE — 80048 BASIC METABOLIC PNL TOTAL CA: CPT

## 2018-04-04 PROCEDURE — 85025 COMPLETE CBC W/AUTO DIFF WBC: CPT

## 2018-04-04 PROCEDURE — 71046 X-RAY EXAM CHEST 2 VIEWS: CPT

## 2018-04-04 RX ORDER — ASPIRIN 81 MG/1
324 TABLET, CHEWABLE ORAL ONCE
Status: COMPLETED | OUTPATIENT
Start: 2018-04-04 | End: 2018-04-04

## 2018-04-04 RX ADMIN — ASPIRIN 81 MG 324 MG: 81 TABLET ORAL at 18:12

## 2018-04-04 ASSESSMENT — PAIN DESCRIPTION - PAIN TYPE: TYPE: ACUTE PAIN

## 2018-04-04 ASSESSMENT — HEART SCORE: ECG: 1

## 2018-04-04 ASSESSMENT — PAIN DESCRIPTION - LOCATION: LOCATION: CHEST

## 2018-04-04 ASSESSMENT — PAIN DESCRIPTION - ORIENTATION: ORIENTATION: RIGHT;MID

## 2018-04-04 ASSESSMENT — PAIN DESCRIPTION - ONSET: ONSET: PROGRESSIVE

## 2018-04-04 ASSESSMENT — PAIN DESCRIPTION - FREQUENCY: FREQUENCY: INTERMITTENT

## 2018-04-04 ASSESSMENT — PAIN DESCRIPTION - DESCRIPTORS: DESCRIPTORS: TIGHTNESS

## 2018-04-04 ASSESSMENT — PAIN SCALES - GENERAL: PAINLEVEL_OUTOF10: 7

## 2018-04-05 ASSESSMENT — ENCOUNTER SYMPTOMS
SORE THROAT: 0
SHORTNESS OF BREATH: 0
PHOTOPHOBIA: 0
EYE PAIN: 0
SINUS PRESSURE: 0
BLOOD IN STOOL: 0
NAUSEA: 0
ABDOMINAL PAIN: 0
RHINORRHEA: 0
VOMITING: 0
COUGH: 0
DIARRHEA: 0

## 2018-04-10 ENCOUNTER — OFFICE VISIT (OUTPATIENT)
Dept: BEHAVIORAL/MENTAL HEALTH CLINIC | Age: 38
End: 2018-04-10
Payer: MEDICAID

## 2018-04-10 ENCOUNTER — OFFICE VISIT (OUTPATIENT)
Dept: INTERNAL MEDICINE | Age: 38
End: 2018-04-10
Payer: MEDICAID

## 2018-04-10 VITALS
WEIGHT: 271 LBS | BODY MASS INDEX: 33 KG/M2 | OXYGEN SATURATION: 100 % | SYSTOLIC BLOOD PRESSURE: 129 MMHG | HEART RATE: 61 BPM | DIASTOLIC BLOOD PRESSURE: 72 MMHG | HEIGHT: 76 IN

## 2018-04-10 DIAGNOSIS — F33.0 MAJOR DEPRESSIVE DISORDER, RECURRENT, MILD (HCC): Primary | ICD-10-CM

## 2018-04-10 DIAGNOSIS — R73.02 IGT (IMPAIRED GLUCOSE TOLERANCE): ICD-10-CM

## 2018-04-10 DIAGNOSIS — K76.0 FATTY LIVER DISEASE, NONALCOHOLIC: ICD-10-CM

## 2018-04-10 DIAGNOSIS — E66.9 OBESITY (BMI 30-39.9): ICD-10-CM

## 2018-04-10 DIAGNOSIS — M25.561 CHRONIC PAIN OF RIGHT KNEE: ICD-10-CM

## 2018-04-10 DIAGNOSIS — G89.29 CHRONIC PAIN OF RIGHT KNEE: ICD-10-CM

## 2018-04-10 DIAGNOSIS — I10 ESSENTIAL HYPERTENSION: Primary | ICD-10-CM

## 2018-04-10 DIAGNOSIS — G47.33 OSA (OBSTRUCTIVE SLEEP APNEA): ICD-10-CM

## 2018-04-10 PROCEDURE — G8417 CALC BMI ABV UP PARAM F/U: HCPCS | Performed by: INTERNAL MEDICINE

## 2018-04-10 PROCEDURE — 99213 OFFICE O/P EST LOW 20 MIN: CPT | Performed by: INTERNAL MEDICINE

## 2018-04-10 PROCEDURE — 90834 PSYTX W PT 45 MINUTES: CPT | Performed by: PSYCHOLOGIST

## 2018-04-10 PROCEDURE — 99213 OFFICE O/P EST LOW 20 MIN: CPT

## 2018-04-10 PROCEDURE — G8427 DOCREV CUR MEDS BY ELIG CLIN: HCPCS | Performed by: INTERNAL MEDICINE

## 2018-04-10 PROCEDURE — 1036F TOBACCO NON-USER: CPT | Performed by: INTERNAL MEDICINE

## 2018-04-10 RX ORDER — LISINOPRIL 20 MG/1
20 TABLET ORAL DAILY
Qty: 30 TABLET | Refills: 6 | Status: SHIPPED | OUTPATIENT
Start: 2018-04-10 | End: 2018-09-18 | Stop reason: SDUPTHER

## 2018-04-10 RX ORDER — HYDROCHLOROTHIAZIDE 25 MG/1
25 TABLET ORAL DAILY
Qty: 30 TABLET | Refills: 5 | Status: SHIPPED | OUTPATIENT
Start: 2018-04-10 | End: 2018-07-17

## 2018-04-10 RX ORDER — HYDROCHLOROTHIAZIDE 25 MG/1
1 TABLET ORAL DAILY
COMMUNITY
End: 2018-04-10 | Stop reason: SDUPTHER

## 2018-04-10 RX ORDER — FLUTICASONE PROPIONATE 50 MCG
2 SPRAY, SUSPENSION (ML) NASAL DAILY
COMMUNITY
End: 2018-05-03 | Stop reason: SDUPTHER

## 2018-04-10 ASSESSMENT — ENCOUNTER SYMPTOMS
EYE REDNESS: 0
BLURRED VISION: 0
CONSTIPATION: 0
BACK PAIN: 1
COUGH: 0
SHORTNESS OF BREATH: 0
ABDOMINAL PAIN: 0
NAUSEA: 0
SPUTUM PRODUCTION: 0

## 2018-04-11 RX ORDER — ALBUTEROL SULFATE 90 UG/1
2 AEROSOL, METERED RESPIRATORY (INHALATION) EVERY 6 HOURS PRN
Qty: 1 INHALER | Refills: 3 | Status: SHIPPED | OUTPATIENT
Start: 2018-04-11 | End: 2018-11-13 | Stop reason: SDUPTHER

## 2018-04-23 ENCOUNTER — OFFICE VISIT (OUTPATIENT)
Dept: INTERNAL MEDICINE | Age: 38
End: 2018-04-23
Payer: MEDICAID

## 2018-04-23 ENCOUNTER — OFFICE VISIT (OUTPATIENT)
Dept: BEHAVIORAL/MENTAL HEALTH CLINIC | Age: 38
End: 2018-04-23
Payer: MEDICAID

## 2018-04-23 VITALS
HEART RATE: 74 BPM | SYSTOLIC BLOOD PRESSURE: 127 MMHG | BODY MASS INDEX: 32.03 KG/M2 | HEIGHT: 76 IN | WEIGHT: 263 LBS | DIASTOLIC BLOOD PRESSURE: 80 MMHG

## 2018-04-23 DIAGNOSIS — F33.0 MAJOR DEPRESSIVE DISORDER, RECURRENT, MILD (HCC): Primary | ICD-10-CM

## 2018-04-23 DIAGNOSIS — G89.29 CHRONIC PAIN OF BOTH KNEES: Primary | ICD-10-CM

## 2018-04-23 DIAGNOSIS — M25.562 CHRONIC PAIN OF BOTH KNEES: Primary | ICD-10-CM

## 2018-04-23 DIAGNOSIS — M25.561 CHRONIC PAIN OF BOTH KNEES: Primary | ICD-10-CM

## 2018-04-23 PROCEDURE — 99212 OFFICE O/P EST SF 10 MIN: CPT

## 2018-04-23 PROCEDURE — 1036F TOBACCO NON-USER: CPT | Performed by: INTERNAL MEDICINE

## 2018-04-23 PROCEDURE — G8417 CALC BMI ABV UP PARAM F/U: HCPCS | Performed by: INTERNAL MEDICINE

## 2018-04-23 PROCEDURE — G8427 DOCREV CUR MEDS BY ELIG CLIN: HCPCS | Performed by: INTERNAL MEDICINE

## 2018-04-23 PROCEDURE — 90832 PSYTX W PT 30 MINUTES: CPT | Performed by: PSYCHOLOGIST

## 2018-04-23 PROCEDURE — 99213 OFFICE O/P EST LOW 20 MIN: CPT | Performed by: INTERNAL MEDICINE

## 2018-04-23 RX ORDER — MELOXICAM 15 MG/1
15 TABLET ORAL DAILY
COMMUNITY
End: 2018-05-03 | Stop reason: SDUPTHER

## 2018-04-23 RX ORDER — ACETAMINOPHEN 500 MG
500 TABLET ORAL 2 TIMES DAILY PRN
Qty: 60 TABLET | Refills: 1 | Status: SHIPPED | OUTPATIENT
Start: 2018-04-23 | End: 2018-05-22 | Stop reason: SDUPTHER

## 2018-04-24 DIAGNOSIS — M25.561 RIGHT KNEE PAIN, UNSPECIFIED CHRONICITY: Primary | ICD-10-CM

## 2018-04-25 ENCOUNTER — OFFICE VISIT (OUTPATIENT)
Dept: ORTHOPEDIC SURGERY | Age: 38
End: 2018-04-25
Payer: MEDICAID

## 2018-04-25 VITALS
SYSTOLIC BLOOD PRESSURE: 119 MMHG | WEIGHT: 261 LBS | HEIGHT: 76 IN | BODY MASS INDEX: 31.78 KG/M2 | DIASTOLIC BLOOD PRESSURE: 78 MMHG

## 2018-04-25 DIAGNOSIS — M25.561 CHRONIC PAIN OF RIGHT KNEE: Primary | ICD-10-CM

## 2018-04-25 DIAGNOSIS — M25.561 CHRONIC PAIN OF BOTH KNEES: ICD-10-CM

## 2018-04-25 DIAGNOSIS — G89.29 CHRONIC PAIN OF BOTH KNEES: ICD-10-CM

## 2018-04-25 DIAGNOSIS — G89.29 CHRONIC PAIN OF RIGHT KNEE: Primary | ICD-10-CM

## 2018-04-25 DIAGNOSIS — M25.562 CHRONIC PAIN OF BOTH KNEES: ICD-10-CM

## 2018-04-25 PROCEDURE — 1036F TOBACCO NON-USER: CPT | Performed by: STUDENT IN AN ORGANIZED HEALTH CARE EDUCATION/TRAINING PROGRAM

## 2018-04-25 PROCEDURE — G8417 CALC BMI ABV UP PARAM F/U: HCPCS | Performed by: STUDENT IN AN ORGANIZED HEALTH CARE EDUCATION/TRAINING PROGRAM

## 2018-04-25 PROCEDURE — G8427 DOCREV CUR MEDS BY ELIG CLIN: HCPCS | Performed by: STUDENT IN AN ORGANIZED HEALTH CARE EDUCATION/TRAINING PROGRAM

## 2018-04-25 PROCEDURE — 99203 OFFICE O/P NEW LOW 30 MIN: CPT | Performed by: STUDENT IN AN ORGANIZED HEALTH CARE EDUCATION/TRAINING PROGRAM

## 2018-04-25 RX ORDER — MELOXICAM 15 MG/1
15 TABLET ORAL DAILY
Qty: 30 TABLET | Refills: 3 | Status: SHIPPED | OUTPATIENT
Start: 2018-04-25 | End: 2018-07-05

## 2018-04-26 DIAGNOSIS — J31.0 CHRONIC RHINITIS: ICD-10-CM

## 2018-04-26 RX ORDER — FLUTICASONE PROPIONATE 50 MCG
SPRAY, SUSPENSION (ML) NASAL
Qty: 16 G | Refills: 3 | Status: SHIPPED | OUTPATIENT
Start: 2018-04-26 | End: 2018-08-16 | Stop reason: SDUPTHER

## 2018-04-27 ENCOUNTER — APPOINTMENT (OUTPATIENT)
Dept: GENERAL RADIOLOGY | Age: 38
End: 2018-04-27
Payer: MEDICAID

## 2018-04-27 ENCOUNTER — HOSPITAL ENCOUNTER (EMERGENCY)
Age: 38
Discharge: HOME | End: 2018-04-27
Attending: EMERGENCY MEDICINE
Payer: MEDICAID

## 2018-04-27 VITALS
TEMPERATURE: 97.8 F | HEIGHT: 76 IN | WEIGHT: 260 LBS | OXYGEN SATURATION: 100 % | DIASTOLIC BLOOD PRESSURE: 98 MMHG | RESPIRATION RATE: 18 BRPM | HEART RATE: 75 BPM | BODY MASS INDEX: 31.66 KG/M2 | SYSTOLIC BLOOD PRESSURE: 150 MMHG

## 2018-04-27 DIAGNOSIS — L02.91 ABSCESS: Primary | ICD-10-CM

## 2018-04-27 DIAGNOSIS — M25.561 CHRONIC PAIN OF BOTH KNEES: ICD-10-CM

## 2018-04-27 DIAGNOSIS — G89.29 CHRONIC PAIN OF BOTH KNEES: ICD-10-CM

## 2018-04-27 DIAGNOSIS — M25.562 CHRONIC PAIN OF BOTH KNEES: ICD-10-CM

## 2018-04-27 PROCEDURE — 87205 SMEAR GRAM STAIN: CPT

## 2018-04-27 PROCEDURE — 90471 IMMUNIZATION ADMIN: CPT | Performed by: EMERGENCY MEDICINE

## 2018-04-27 PROCEDURE — 99283 EMERGENCY DEPT VISIT LOW MDM: CPT

## 2018-04-27 PROCEDURE — 90715 TDAP VACCINE 7 YRS/> IM: CPT | Performed by: EMERGENCY MEDICINE

## 2018-04-27 PROCEDURE — 10060 I&D ABSCESS SIMPLE/SINGLE: CPT

## 2018-04-27 PROCEDURE — 6360000002 HC RX W HCPCS: Performed by: EMERGENCY MEDICINE

## 2018-04-27 PROCEDURE — 6370000000 HC RX 637 (ALT 250 FOR IP): Performed by: EMERGENCY MEDICINE

## 2018-04-27 PROCEDURE — 87070 CULTURE OTHR SPECIMN AEROBIC: CPT

## 2018-04-27 RX ORDER — IBUPROFEN 800 MG/1
800 TABLET ORAL ONCE
Status: COMPLETED | OUTPATIENT
Start: 2018-04-27 | End: 2018-04-27

## 2018-04-27 RX ORDER — ACETAMINOPHEN 500 MG
1000 TABLET ORAL ONCE
Status: COMPLETED | OUTPATIENT
Start: 2018-04-27 | End: 2018-04-27

## 2018-04-27 RX ORDER — IBUPROFEN 800 MG/1
800 TABLET ORAL EVERY 8 HOURS PRN
Qty: 30 TABLET | Refills: 1 | Status: SHIPPED | OUTPATIENT
Start: 2018-04-27 | End: 2018-05-18 | Stop reason: SDUPTHER

## 2018-04-27 RX ADMIN — IBUPROFEN 800 MG: 800 TABLET ORAL at 09:59

## 2018-04-27 RX ADMIN — ACETAMINOPHEN 1000 MG: 500 TABLET ORAL at 09:58

## 2018-04-27 RX ADMIN — TETANUS TOXOID, REDUCED DIPHTHERIA TOXOID AND ACELLULAR PERTUSSIS VACCINE, ADSORBED 0.5 ML: 5; 2.5; 8; 8; 2.5 SUSPENSION INTRAMUSCULAR at 11:35

## 2018-04-27 ASSESSMENT — PAIN DESCRIPTION - ORIENTATION: ORIENTATION: RIGHT;LEFT

## 2018-04-27 ASSESSMENT — ENCOUNTER SYMPTOMS
RHINORRHEA: 0
SHORTNESS OF BREATH: 0
ABDOMINAL PAIN: 0

## 2018-04-27 ASSESSMENT — PAIN DESCRIPTION - PAIN TYPE: TYPE: ACUTE PAIN

## 2018-04-27 ASSESSMENT — PAIN SCALES - GENERAL
PAINLEVEL_OUTOF10: 10
PAINLEVEL_OUTOF10: 10

## 2018-04-27 ASSESSMENT — PAIN DESCRIPTION - DESCRIPTORS: DESCRIPTORS: DISCOMFORT

## 2018-04-27 ASSESSMENT — PAIN DESCRIPTION - LOCATION: LOCATION: KNEE;GROIN

## 2018-04-29 LAB
CULTURE: ABNORMAL
CULTURE: ABNORMAL
DIRECT EXAM: ABNORMAL
Lab: ABNORMAL
SPECIMEN DESCRIPTION: ABNORMAL
STATUS: ABNORMAL

## 2018-04-30 ENCOUNTER — CARE COORDINATION (OUTPATIENT)
Dept: CARE COORDINATION | Age: 38
End: 2018-04-30

## 2018-05-01 ENCOUNTER — HOSPITAL ENCOUNTER (OUTPATIENT)
Dept: PHYSICAL THERAPY | Age: 38
Setting detail: THERAPIES SERIES
Discharge: HOME | End: 2018-05-01
Payer: MEDICAID

## 2018-05-01 PROCEDURE — 97140 MANUAL THERAPY 1/> REGIONS: CPT

## 2018-05-01 PROCEDURE — 97110 THERAPEUTIC EXERCISES: CPT

## 2018-05-01 PROCEDURE — 97164 PT RE-EVAL EST PLAN CARE: CPT

## 2018-05-02 ENCOUNTER — CARE COORDINATION (OUTPATIENT)
Dept: CARE COORDINATION | Age: 38
End: 2018-05-02

## 2018-05-03 ENCOUNTER — OFFICE VISIT (OUTPATIENT)
Dept: INTERNAL MEDICINE | Age: 38
End: 2018-05-03
Payer: MEDICAID

## 2018-05-03 VITALS
SYSTOLIC BLOOD PRESSURE: 117 MMHG | WEIGHT: 260 LBS | HEIGHT: 76 IN | HEART RATE: 69 BPM | DIASTOLIC BLOOD PRESSURE: 76 MMHG | BODY MASS INDEX: 31.66 KG/M2

## 2018-05-03 DIAGNOSIS — L02.415 ABSCESS OF RIGHT THIGH: Primary | ICD-10-CM

## 2018-05-03 PROCEDURE — G8427 DOCREV CUR MEDS BY ELIG CLIN: HCPCS | Performed by: INTERNAL MEDICINE

## 2018-05-03 PROCEDURE — G8417 CALC BMI ABV UP PARAM F/U: HCPCS | Performed by: INTERNAL MEDICINE

## 2018-05-03 PROCEDURE — 1036F TOBACCO NON-USER: CPT | Performed by: INTERNAL MEDICINE

## 2018-05-03 PROCEDURE — 99213 OFFICE O/P EST LOW 20 MIN: CPT | Performed by: INTERNAL MEDICINE

## 2018-05-03 PROCEDURE — 99211 OFF/OP EST MAY X REQ PHY/QHP: CPT | Performed by: INTERNAL MEDICINE

## 2018-05-03 RX ORDER — IBUPROFEN AND FAMOTIDINE 26.6; 8 MG/1; MG/1
1 TABLET, FILM COATED ORAL 3 TIMES DAILY
COMMUNITY
End: 2018-07-05 | Stop reason: ALTCHOICE

## 2018-05-04 ENCOUNTER — HOSPITAL ENCOUNTER (OUTPATIENT)
Dept: PHYSICAL THERAPY | Age: 38
Setting detail: THERAPIES SERIES
Discharge: HOME | End: 2018-05-04
Payer: MEDICAID

## 2018-05-04 PROCEDURE — 97110 THERAPEUTIC EXERCISES: CPT

## 2018-05-04 PROCEDURE — 97035 APP MDLTY 1+ULTRASOUND EA 15: CPT

## 2018-05-07 ENCOUNTER — HOSPITAL ENCOUNTER (OUTPATIENT)
Dept: PHYSICAL THERAPY | Age: 38
Setting detail: THERAPIES SERIES
Discharge: HOME | End: 2018-05-07
Payer: MEDICAID

## 2018-05-07 PROCEDURE — 97110 THERAPEUTIC EXERCISES: CPT

## 2018-05-07 PROCEDURE — 97035 APP MDLTY 1+ULTRASOUND EA 15: CPT

## 2018-05-07 ASSESSMENT — ENCOUNTER SYMPTOMS
ABDOMINAL PAIN: 0
COUGH: 0
NAUSEA: 0
SPUTUM PRODUCTION: 0
CONSTIPATION: 0
SHORTNESS OF BREATH: 0

## 2018-05-09 ENCOUNTER — HOSPITAL ENCOUNTER (OUTPATIENT)
Dept: PHYSICAL THERAPY | Age: 38
Setting detail: THERAPIES SERIES
Discharge: HOME | End: 2018-05-09
Payer: MEDICAID

## 2018-05-09 PROCEDURE — 97035 APP MDLTY 1+ULTRASOUND EA 15: CPT

## 2018-05-09 PROCEDURE — 97110 THERAPEUTIC EXERCISES: CPT

## 2018-05-14 ENCOUNTER — HOSPITAL ENCOUNTER (OUTPATIENT)
Dept: PHYSICAL THERAPY | Age: 38
Setting detail: THERAPIES SERIES
Discharge: HOME | End: 2018-05-14
Payer: MEDICAID

## 2018-05-14 PROCEDURE — 97110 THERAPEUTIC EXERCISES: CPT

## 2018-05-14 PROCEDURE — 97035 APP MDLTY 1+ULTRASOUND EA 15: CPT

## 2018-05-16 ENCOUNTER — OFFICE VISIT (OUTPATIENT)
Dept: BEHAVIORAL/MENTAL HEALTH CLINIC | Age: 38
End: 2018-05-16
Payer: MEDICAID

## 2018-05-16 ENCOUNTER — TELEPHONE (OUTPATIENT)
Dept: INTERNAL MEDICINE | Age: 38
End: 2018-05-16

## 2018-05-16 ENCOUNTER — HOSPITAL ENCOUNTER (OUTPATIENT)
Dept: PHYSICAL THERAPY | Age: 38
Setting detail: THERAPIES SERIES
Discharge: HOME | End: 2018-05-16
Payer: MEDICAID

## 2018-05-16 DIAGNOSIS — F33.0 MAJOR DEPRESSIVE DISORDER, RECURRENT, MILD (HCC): Primary | ICD-10-CM

## 2018-05-16 PROCEDURE — 90834 PSYTX W PT 45 MINUTES: CPT | Performed by: PSYCHOLOGIST

## 2018-05-17 ENCOUNTER — APPOINTMENT (OUTPATIENT)
Dept: PHYSICAL THERAPY | Age: 38
End: 2018-05-17
Payer: MEDICAID

## 2018-05-21 ENCOUNTER — APPOINTMENT (OUTPATIENT)
Dept: PHYSICAL THERAPY | Age: 38
End: 2018-05-21
Payer: MEDICAID

## 2018-05-21 ENCOUNTER — HOSPITAL ENCOUNTER (OUTPATIENT)
Dept: PHYSICAL THERAPY | Age: 38
Setting detail: THERAPIES SERIES
Discharge: HOME | End: 2018-05-21
Payer: MEDICAID

## 2018-05-21 PROCEDURE — 97035 APP MDLTY 1+ULTRASOUND EA 15: CPT

## 2018-05-21 PROCEDURE — 97110 THERAPEUTIC EXERCISES: CPT

## 2018-05-22 DIAGNOSIS — G89.29 CHRONIC PAIN OF BOTH KNEES: ICD-10-CM

## 2018-05-22 DIAGNOSIS — M25.561 CHRONIC PAIN OF BOTH KNEES: ICD-10-CM

## 2018-05-22 DIAGNOSIS — M25.562 CHRONIC PAIN OF BOTH KNEES: ICD-10-CM

## 2018-05-22 RX ORDER — ACETAMINOPHEN 500 MG
TABLET ORAL
Qty: 60 TABLET | Refills: 1 | Status: SHIPPED | OUTPATIENT
Start: 2018-05-22 | End: 2018-07-05 | Stop reason: SDUPTHER

## 2018-05-23 ENCOUNTER — APPOINTMENT (OUTPATIENT)
Dept: PHYSICAL THERAPY | Age: 38
End: 2018-05-23
Payer: MEDICAID

## 2018-05-30 ENCOUNTER — PATIENT MESSAGE (OUTPATIENT)
Dept: INTERNAL MEDICINE | Age: 38
End: 2018-05-30

## 2018-05-30 ENCOUNTER — HOSPITAL ENCOUNTER (OUTPATIENT)
Dept: PHYSICAL THERAPY | Age: 38
Setting detail: THERAPIES SERIES
Discharge: HOME | End: 2018-05-30
Payer: MEDICAID

## 2018-05-30 PROCEDURE — 97035 APP MDLTY 1+ULTRASOUND EA 15: CPT

## 2018-05-30 PROCEDURE — 97110 THERAPEUTIC EXERCISES: CPT

## 2018-05-31 RX ORDER — IBUPROFEN 800 MG/1
800 TABLET ORAL EVERY 8 HOURS PRN
Qty: 30 TABLET | Refills: 1 | Status: SHIPPED | OUTPATIENT
Start: 2018-05-31 | End: 2018-06-20 | Stop reason: SDUPTHER

## 2018-06-04 ENCOUNTER — HOSPITAL ENCOUNTER (OUTPATIENT)
Dept: PHYSICAL THERAPY | Age: 38
Setting detail: THERAPIES SERIES
Discharge: HOME | End: 2018-06-04
Payer: MEDICAID

## 2018-06-04 ENCOUNTER — OFFICE VISIT (OUTPATIENT)
Dept: BEHAVIORAL/MENTAL HEALTH CLINIC | Age: 38
End: 2018-06-04
Payer: MEDICAID

## 2018-06-04 DIAGNOSIS — F33.0 MAJOR DEPRESSIVE DISORDER, RECURRENT, MILD (HCC): Primary | ICD-10-CM

## 2018-06-04 PROCEDURE — 90837 PSYTX W PT 60 MINUTES: CPT | Performed by: PSYCHOLOGIST

## 2018-06-04 PROCEDURE — 97110 THERAPEUTIC EXERCISES: CPT

## 2018-06-08 ENCOUNTER — HOSPITAL ENCOUNTER (OUTPATIENT)
Dept: PHYSICAL THERAPY | Age: 38
Setting detail: THERAPIES SERIES
Discharge: HOME | End: 2018-06-08
Payer: MEDICAID

## 2018-06-11 ENCOUNTER — HOSPITAL ENCOUNTER (OUTPATIENT)
Dept: PHYSICAL THERAPY | Age: 38
Setting detail: THERAPIES SERIES
Discharge: HOME | End: 2018-06-11
Payer: MEDICAID

## 2018-06-11 PROCEDURE — 97110 THERAPEUTIC EXERCISES: CPT

## 2018-06-13 ENCOUNTER — HOSPITAL ENCOUNTER (OUTPATIENT)
Dept: PHYSICAL THERAPY | Age: 38
Setting detail: THERAPIES SERIES
Discharge: HOME | End: 2018-06-13
Payer: MEDICAID

## 2018-06-13 PROCEDURE — 97110 THERAPEUTIC EXERCISES: CPT

## 2018-06-18 ENCOUNTER — HOSPITAL ENCOUNTER (OUTPATIENT)
Dept: PHYSICAL THERAPY | Age: 38
Setting detail: THERAPIES SERIES
Discharge: HOME | End: 2018-06-18
Payer: MEDICAID

## 2018-06-18 PROCEDURE — 97110 THERAPEUTIC EXERCISES: CPT

## 2018-06-20 RX ORDER — IBUPROFEN 800 MG/1
800 TABLET ORAL EVERY 8 HOURS PRN
Qty: 30 TABLET | Refills: 1 | Status: SHIPPED | OUTPATIENT
Start: 2018-06-20 | End: 2018-07-05 | Stop reason: SDUPTHER

## 2018-06-25 ENCOUNTER — HOSPITAL ENCOUNTER (OUTPATIENT)
Dept: PHYSICAL THERAPY | Age: 38
Setting detail: THERAPIES SERIES
Discharge: HOME | End: 2018-06-25
Payer: MEDICAID

## 2018-06-25 PROCEDURE — 97110 THERAPEUTIC EXERCISES: CPT

## 2018-07-02 ENCOUNTER — CARE COORDINATION (OUTPATIENT)
Dept: CARE COORDINATION | Age: 38
End: 2018-07-02

## 2018-07-05 ENCOUNTER — OFFICE VISIT (OUTPATIENT)
Dept: INTERNAL MEDICINE | Age: 38
End: 2018-07-05
Payer: MEDICAID

## 2018-07-05 ENCOUNTER — CARE COORDINATION (OUTPATIENT)
Dept: INTERNAL MEDICINE | Age: 38
End: 2018-07-05

## 2018-07-05 VITALS
HEIGHT: 76 IN | DIASTOLIC BLOOD PRESSURE: 83 MMHG | SYSTOLIC BLOOD PRESSURE: 132 MMHG | BODY MASS INDEX: 30.93 KG/M2 | WEIGHT: 254 LBS | HEART RATE: 65 BPM

## 2018-07-05 DIAGNOSIS — G89.29 CHRONIC PAIN OF BOTH KNEES: ICD-10-CM

## 2018-07-05 DIAGNOSIS — M25.562 CHRONIC PAIN OF BOTH KNEES: ICD-10-CM

## 2018-07-05 DIAGNOSIS — I10 ESSENTIAL HYPERTENSION: Primary | ICD-10-CM

## 2018-07-05 DIAGNOSIS — R73.02 IGT (IMPAIRED GLUCOSE TOLERANCE): ICD-10-CM

## 2018-07-05 DIAGNOSIS — M25.561 CHRONIC PAIN OF BOTH KNEES: ICD-10-CM

## 2018-07-05 DIAGNOSIS — E66.9 OBESITY (BMI 30-39.9): ICD-10-CM

## 2018-07-05 PROCEDURE — G8417 CALC BMI ABV UP PARAM F/U: HCPCS | Performed by: INTERNAL MEDICINE

## 2018-07-05 PROCEDURE — G8427 DOCREV CUR MEDS BY ELIG CLIN: HCPCS | Performed by: INTERNAL MEDICINE

## 2018-07-05 PROCEDURE — 1036F TOBACCO NON-USER: CPT | Performed by: INTERNAL MEDICINE

## 2018-07-05 PROCEDURE — 99213 OFFICE O/P EST LOW 20 MIN: CPT | Performed by: INTERNAL MEDICINE

## 2018-07-05 RX ORDER — ACETAMINOPHEN 500 MG
TABLET ORAL
Qty: 60 TABLET | Refills: 2 | Status: SHIPPED | OUTPATIENT
Start: 2018-07-05 | End: 2018-09-18 | Stop reason: ALTCHOICE

## 2018-07-05 RX ORDER — IBUPROFEN 800 MG/1
800 TABLET ORAL EVERY 8 HOURS PRN
Qty: 30 TABLET | Refills: 1 | Status: SHIPPED | OUTPATIENT
Start: 2018-07-05 | End: 2018-10-12 | Stop reason: SDUPTHER

## 2018-07-05 ASSESSMENT — PATIENT HEALTH QUESTIONNAIRE - PHQ9: SUM OF ALL RESPONSES TO PHQ QUESTIONS 1-9: 0

## 2018-07-05 NOTE — CARE COORDINATION
4/10/18   James Mckinney MD       Future Appointments  Date Time Provider Kathi Renetta   7/10/2018 10:00 AM Radha Minaya PSYD [facility]   10/22/2018 9:00 AM James Mckinney MD [facility] IM [address] [facility]

## 2018-07-05 NOTE — PROGRESS NOTES
Visit Information    Have you changed or started any medications since your last visit including any over-the-counter medicines, vitamins, or herbal medicines? no   Are you having any side effects from any of your medications? -  no  Have you stopped taking any of your medications? Is so, why? -  no    Have you seen any other physician or provider since your last visit? Yes - Records Obtained  Have you had any other diagnostic tests since your last visit? Yes - Records Obtained  Have you been seen in the emergency room and/or had an admission to a hospital since we last saw you? No  Have you had your routine dental cleaning in the past 6 months? no    Have you activated your [facility] account? If not, what are your barriers?  Yes     Patient Care Team:  Skylar Logan MD as PCP - General (Internal Medicine)  Skylar Logan MD as PCP - S Attributed Provider  Andrae Ruano MD as Surgeon (Orthopedic Surgery)  Janes Natarajan RN as Care Coordinator    Medical History Review  Past Medical, Family, and Social History reviewed and does not contribute to the patient presenting condition    Health Maintenance   Topic Date Due    Flu vaccine (1) 09/01/2018    Potassium monitoring  04/04/2019    Creatinine monitoring  04/04/2019    Diabetes screen  01/18/2021    Lipid screen  01/18/2023    DTaP/Tdap/Td vaccine (3 - Td) 04/27/2028    Pneumococcal med risk  Completed    HIV screen  Completed

## 2018-07-05 NOTE — PROGRESS NOTES
[facility]    Progress Note    Date of patient's visit: 7/5/2018    Patient's Name:  Bobby Bruno    YOB: 1980            Patient Care Team:  Petrona Kc MD as PCP - General (Internal Medicine)  Petrona Kc MD as PCP - S Attributed Provider  Tiburcio Newberry MD as Surgeon (Orthopedic Surgery)  Alexis Henley RN as Care Coordinator    REASON FOR VISIT: Routine outpatient follow     Chief Complaint   Patient presents with    Blood Pressure Check     pt states that his bp has been too low he was seen at ortho and told this          HISTORY OF PRESENT ILLNESS:    History was obtained from the patient. Bobby Bruno is a 34 y.o. is here for Follow-up on his blood pressure. Apparently the last 2 times he went to orthopedic his systolic blood pressure was in the 110s range. He is therefore here to discuss blood pressure medications. He denies any dizziness. He feels good. He's been exercising daily. Denies chest pain or shortness of breath. He is losing weight and has gradually lost almost 100 pounds in the last year. Depression is better. He continues to have knee pains though and is following up with orthopedics. Needs a refill on acetaminophen which she takes as needed for pain. Past Medical History:   Diagnosis Date    Anxiety     Arthritis     CAD (coronary artery disease)     Fatty liver     Fatty liver disease, nonalcoholic 1/72/1552    Hypertension     Obesity     Unspecified sleep apnea     cpap       History reviewed. No pertinent surgical history.       ALLERGIES      Allergies   Allergen Reactions    Apple Swelling       MEDICATIONS:      Current Outpatient Prescriptions on File Prior to Visit   Medication Sig Dispense Refill    fluticasone (FLONASE) 50 MCG/ACT nasal spray instill 1 spray into each nostril once daily 16 g 3    diclofenac (PENNSAID) 2 % SOLN Pennsaid 20 mg/gram/actuation (2 %) topical soln in metered-dose pump   APPLY 2 PUMPS (40 MG) TO THE AFFECTED KNEE(S) BY TOPICAL ROUTE 2 TIMES PER DAY      diclofenac sodium (VOLTAREN) 1 % GEL Apply 2 g topically 2 times daily 1 Tube 3    albuterol sulfate HFA (PROAIR HFA) 108 (90 Base) MCG/ACT inhaler Inhale 2 puffs into the lungs every 6 hours as needed for Wheezing or Shortness of Breath Space out to every 6 hours as symptoms improve. 1 Inhaler 3    hydrochlorothiazide (HYDRODIURIL) 25 MG tablet Take 1 tablet by mouth daily 30 tablet 5    lisinopril (PRINIVIL;ZESTRIL) 20 MG tablet Take 1 tablet by mouth daily 30 tablet 6     No current facility-administered medications on file prior to visit. SOCIAL HISTORY    Reviewed and no change from previous record. Sowmya Chisholm  reports that he has never smoked. He has never used smokeless tobacco.    FAMILY HISTORY:    Reviewed and No change from previous visit    HEALTH MAINTENANCE DUE:    There are no preventive care reminders to display for this patient. REVIEW OF SYSTEMS:    12 point review of symptoms completed and found to be normal except noted in the HPI    Review of Systems   Constitutional: Negative for fever, malaise/fatigue and weight loss. Eyes: Negative for blurred vision and redness. Respiratory: Negative for cough, sputum production and shortness of breath. Cardiovascular: Negative for chest pain, palpitations and leg swelling. Gastrointestinal: Negative for abdominal pain, constipation and nausea. Genitourinary: Negative for dysuria and frequency. Musculoskeletal: Positive for joint pain. Negative for back pain and falls. Neurological: Negative for dizziness, loss of consciousness and headaches. Endo/Heme/Allergies: Negative for polydipsia. Does not bruise/bleed easily. Psychiatric/Behavioral: Positive for depression. Negative for substance abuse and suicidal ideas. The patient is not nervous/anxious.          PHYSICAL EXAM:      Vitals:    07/05/18 1440   BP: 132/83

## 2018-07-07 ASSESSMENT — ENCOUNTER SYMPTOMS
COUGH: 0
ABDOMINAL PAIN: 0
BLURRED VISION: 0
EYE REDNESS: 0
CONSTIPATION: 0
SHORTNESS OF BREATH: 0
NAUSEA: 0
BACK PAIN: 0
SPUTUM PRODUCTION: 0

## 2018-07-10 ENCOUNTER — HOSPITAL ENCOUNTER (OUTPATIENT)
Age: 38
Setting detail: SPECIMEN
Discharge: HOME | End: 2018-07-10
Payer: MEDICAID

## 2018-07-10 ENCOUNTER — OFFICE VISIT (OUTPATIENT)
Dept: BEHAVIORAL/MENTAL HEALTH CLINIC | Age: 38
End: 2018-07-10
Payer: MEDICAID

## 2018-07-10 DIAGNOSIS — R73.02 IGT (IMPAIRED GLUCOSE TOLERANCE): ICD-10-CM

## 2018-07-10 DIAGNOSIS — F33.0 MAJOR DEPRESSIVE DISORDER, RECURRENT, MILD (HCC): Primary | ICD-10-CM

## 2018-07-10 DIAGNOSIS — I10 ESSENTIAL HYPERTENSION: ICD-10-CM

## 2018-07-10 LAB
ANION GAP SERPL CALCULATED.3IONS-SCNC: 14 MMOL/L (ref 9–17)
BUN BLDV-MCNC: 18 MG/DL (ref 6–20)
BUN/CREAT BLD: ABNORMAL (ref 9–20)
CALCIUM SERPL-MCNC: 9 MG/DL (ref 8.6–10.4)
CHLORIDE BLD-SCNC: 106 MMOL/L (ref 98–107)
CO2: 26 MMOL/L (ref 20–31)
CREAT SERPL-MCNC: 0.79 MG/DL (ref 0.7–1.2)
ESTIMATED AVERAGE GLUCOSE: 111 MG/DL
GFR AFRICAN AMERICAN: >60 ML/MIN
GFR NON-AFRICAN AMERICAN: >60 ML/MIN
GFR SERPL CREATININE-BSD FRML MDRD: ABNORMAL ML/MIN/{1.73_M2}
GFR SERPL CREATININE-BSD FRML MDRD: ABNORMAL ML/MIN/{1.73_M2}
GLUCOSE BLD-MCNC: 79 MG/DL (ref 70–99)
HBA1C MFR BLD: 5.5 % (ref 4–6)
POTASSIUM SERPL-SCNC: 3.5 MMOL/L (ref 3.7–5.3)
SODIUM BLD-SCNC: 146 MMOL/L (ref 135–144)

## 2018-07-10 PROCEDURE — 90832 PSYTX W PT 30 MINUTES: CPT | Performed by: PSYCHOLOGIST

## 2018-07-10 PROCEDURE — 83036 HEMOGLOBIN GLYCOSYLATED A1C: CPT

## 2018-07-10 PROCEDURE — 36415 COLL VENOUS BLD VENIPUNCTURE: CPT

## 2018-07-10 PROCEDURE — 80048 BASIC METABOLIC PNL TOTAL CA: CPT

## 2018-07-10 NOTE — PATIENT INSTRUCTIONS
1. Encourage yourself. You have passed many tests that are \"harder\" than the driving test. Stop limiting yourself, stop telling yourself you cannot. Start encouraging yourself, start telling yourself that you can. 2. Follow up in 1 month.

## 2018-07-10 NOTE — PROGRESS NOTES
Occupational History    Not on file. Social History Main Topics    Smoking status: Never Smoker    Smokeless tobacco: Never Used    Alcohol use No    Drug use: No    Sexual activity: Not on file     Other Topics Concern    Not on file     Social History Narrative    ** Merged History Encounter **            TOBACCO:   reports that he has never smoked. He has never used smokeless tobacco.  ETOH:   reports that he does not drink alcohol.     Family History:   Family History   Problem Relation Age of Onset    Arthritis Mother     Diabetes Father     Arthritis Father     Stroke Maternal Grandmother     Heart Disease Maternal Grandmother     Stroke Maternal Grandfather     Heart Disease Maternal Grandfather     Early Death Maternal Grandfather     Stroke Paternal Grandmother     Heart Disease Paternal Grandmother     Stroke Paternal Grandfather     Heart Disease Paternal Grandfather     Early Death Paternal Grandfather

## 2018-07-13 ENCOUNTER — HOSPITAL ENCOUNTER (EMERGENCY)
Age: 38
Discharge: HOME | End: 2018-07-14
Attending: FAMILY MEDICINE
Payer: MEDICAID

## 2018-07-13 ENCOUNTER — APPOINTMENT (OUTPATIENT)
Dept: CT IMAGING | Age: 38
End: 2018-07-13
Payer: MEDICAID

## 2018-07-13 DIAGNOSIS — R10.9 ACUTE LEFT FLANK PAIN: Primary | ICD-10-CM

## 2018-07-13 DIAGNOSIS — R82.998 CALCIUM OXALATE CRYSTALS PRESENT IN URINE: ICD-10-CM

## 2018-07-13 LAB
-: ABNORMAL
ABSOLUTE EOS #: 0.17 K/UL (ref 0–0.44)
ABSOLUTE IMMATURE GRANULOCYTE: <0.03 K/UL (ref 0–0.3)
ABSOLUTE LYMPH #: 2.34 K/UL (ref 1.1–3.7)
ABSOLUTE MONO #: 0.42 K/UL (ref 0.1–1.2)
ALBUMIN SERPL-MCNC: 3.7 G/DL (ref 3.5–5.2)
ALBUMIN/GLOBULIN RATIO: 1.2 (ref 1–2.5)
ALP BLD-CCNC: 58 U/L (ref 40–129)
ALT SERPL-CCNC: 13 U/L (ref 5–41)
AMORPHOUS: ABNORMAL
ANION GAP SERPL CALCULATED.3IONS-SCNC: 11 MMOL/L (ref 9–17)
AST SERPL-CCNC: 13 U/L
BACTERIA: ABNORMAL
BASOPHILS # BLD: 1 % (ref 0–2)
BASOPHILS ABSOLUTE: 0.04 K/UL (ref 0–0.2)
BILIRUB SERPL-MCNC: 0.47 MG/DL (ref 0.3–1.2)
BILIRUBIN URINE: NEGATIVE
BUN BLDV-MCNC: 15 MG/DL (ref 6–20)
BUN/CREAT BLD: 17 (ref 9–20)
CALCIUM SERPL-MCNC: 9.1 MG/DL (ref 8.6–10.4)
CASTS UA: ABNORMAL /LPF
CHLORIDE BLD-SCNC: 102 MMOL/L (ref 98–107)
CO2: 27 MMOL/L (ref 20–31)
COLOR: YELLOW
COMMENT UA: ABNORMAL
CREAT SERPL-MCNC: 0.9 MG/DL (ref 0.7–1.2)
CRYSTALS, UA: ABNORMAL /HPF
DIFFERENTIAL TYPE: ABNORMAL
EOSINOPHILS RELATIVE PERCENT: 3 % (ref 1–4)
EPITHELIAL CELLS UA: ABNORMAL /HPF (ref 0–5)
GFR AFRICAN AMERICAN: >60 ML/MIN
GFR NON-AFRICAN AMERICAN: >60 ML/MIN
GFR SERPL CREATININE-BSD FRML MDRD: ABNORMAL ML/MIN/{1.73_M2}
GFR SERPL CREATININE-BSD FRML MDRD: ABNORMAL ML/MIN/{1.73_M2}
GLUCOSE BLD-MCNC: 82 MG/DL (ref 70–99)
GLUCOSE URINE: NEGATIVE
HCT VFR BLD CALC: 35.6 % (ref 40.7–50.3)
HEMOGLOBIN: 12 G/DL (ref 13–17)
IMMATURE GRANULOCYTES: 0 %
KETONES, URINE: ABNORMAL
LACTIC ACID, WHOLE BLOOD: NORMAL MMOL/L (ref 0.7–2.1)
LACTIC ACID: 0.7 MMOL/L (ref 0.5–2.2)
LEUKOCYTE ESTERASE, URINE: NEGATIVE
LIPASE: 21 U/L (ref 13–60)
LYMPHOCYTES # BLD: 41 % (ref 24–43)
MCH RBC QN AUTO: 30.5 PG (ref 25.2–33.5)
MCHC RBC AUTO-ENTMCNC: 33.7 G/DL (ref 28.4–34.8)
MCV RBC AUTO: 90.6 FL (ref 82.6–102.9)
MONOCYTES # BLD: 7 % (ref 3–12)
MUCUS: ABNORMAL
NITRITE, URINE: NEGATIVE
NRBC AUTOMATED: 0 PER 100 WBC
OTHER OBSERVATIONS UA: ABNORMAL
PDW BLD-RTO: 14.9 % (ref 11.8–14.4)
PH UA: 6 (ref 5–9)
PLATELET # BLD: 213 K/UL (ref 138–453)
PLATELET ESTIMATE: ABNORMAL
PMV BLD AUTO: 10.9 FL (ref 8.1–13.5)
POTASSIUM SERPL-SCNC: 3.4 MMOL/L (ref 3.7–5.3)
PROTEIN UA: NEGATIVE
RBC # BLD: 3.93 M/UL (ref 4.21–5.77)
RBC # BLD: ABNORMAL 10*6/UL
RBC UA: ABNORMAL /HPF (ref 0–2)
RENAL EPITHELIAL, UA: ABNORMAL /HPF
SEG NEUTROPHILS: 48 % (ref 36–65)
SEGMENTED NEUTROPHILS ABSOLUTE COUNT: 2.69 K/UL (ref 1.5–8.1)
SODIUM BLD-SCNC: 140 MMOL/L (ref 135–144)
SPECIFIC GRAVITY UA: >1.03 (ref 1.01–1.02)
TOTAL PROTEIN: 6.9 G/DL (ref 6.4–8.3)
TRICHOMONAS: ABNORMAL
TURBIDITY: CLEAR
URINE HGB: NEGATIVE
UROBILINOGEN, URINE: NORMAL
WBC # BLD: 5.7 K/UL (ref 3.5–11.3)
WBC # BLD: ABNORMAL 10*3/UL
WBC UA: ABNORMAL /HPF (ref 0–5)
YEAST: ABNORMAL

## 2018-07-13 PROCEDURE — 87086 URINE CULTURE/COLONY COUNT: CPT

## 2018-07-13 PROCEDURE — 81001 URINALYSIS AUTO W/SCOPE: CPT

## 2018-07-13 PROCEDURE — 80053 COMPREHEN METABOLIC PANEL: CPT

## 2018-07-13 PROCEDURE — 83605 ASSAY OF LACTIC ACID: CPT

## 2018-07-13 PROCEDURE — 83690 ASSAY OF LIPASE: CPT

## 2018-07-13 PROCEDURE — 74176 CT ABD & PELVIS W/O CONTRAST: CPT

## 2018-07-13 PROCEDURE — 99284 EMERGENCY DEPT VISIT MOD MDM: CPT

## 2018-07-13 PROCEDURE — 85025 COMPLETE CBC W/AUTO DIFF WBC: CPT

## 2018-07-13 ASSESSMENT — PAIN DESCRIPTION - ORIENTATION: ORIENTATION: LEFT

## 2018-07-13 ASSESSMENT — PAIN SCALES - GENERAL: PAINLEVEL_OUTOF10: 9

## 2018-07-14 VITALS
RESPIRATION RATE: 16 BRPM | TEMPERATURE: 98.3 F | HEART RATE: 78 BPM | DIASTOLIC BLOOD PRESSURE: 72 MMHG | OXYGEN SATURATION: 99 % | SYSTOLIC BLOOD PRESSURE: 128 MMHG

## 2018-07-14 LAB
CULTURE: NORMAL
Lab: NORMAL
SPECIMEN DESCRIPTION: NORMAL
STATUS: NORMAL

## 2018-07-14 RX ORDER — TAMSULOSIN HYDROCHLORIDE 0.4 MG/1
0.4 CAPSULE ORAL DAILY
Qty: 10 CAPSULE | Refills: 0 | Status: SHIPPED | OUTPATIENT
Start: 2018-07-14 | End: 2018-07-24

## 2018-07-14 NOTE — ED PROVIDER NOTES
DICLOFENAC SODIUM (VOLTAREN) 1 % GEL    Apply 2 g topically 2 times daily    FLUTICASONE (FLONASE) 50 MCG/ACT NASAL SPRAY    instill 1 spray into each nostril once daily    HYDROCHLOROTHIAZIDE (HYDRODIURIL) 25 MG TABLET    Take 1 tablet by mouth daily    IBUPROFEN (ADVIL;MOTRIN) 800 MG TABLET    Take 1 tablet by mouth every 8 hours as needed for Pain    LISINOPRIL (PRINIVIL;ZESTRIL) 20 MG TABLET    Take 1 tablet by mouth daily       ALLERGIES     is allergic to apple. FAMILY HISTORY     indicated that his mother is alive. He indicated that his father is alive. He indicated that the status of his maternal grandmother is unknown. He indicated that the status of his maternal grandfather is unknown. He indicated that the status of his paternal grandmother is unknown. He indicated that the status of his paternal grandfather is unknown.    family history includes Arthritis in his father and mother; Diabetes in his father; Early Death in his maternal grandfather and paternal grandfather; Heart Disease in his maternal grandfather, maternal grandmother, paternal grandfather, and paternal grandmother; Stroke in his maternal grandfather, maternal grandmother, paternal grandfather, and paternal grandmother. SOCIAL HISTORY      reports that he has never smoked. He has never used smokeless tobacco. He reports that he does not drink alcohol or use drugs. PHYSICAL EXAM     INITIAL VITALS:  tympanic temperature is 98.1 °F (36.7 °C). His blood pressure is 135/91 (abnormal) and his pulse is 77. His respiration is 16 and oxygen saturation is 98%. Physical Exam   Constitutional: Patient is oriented to person, place, and time. Patient appears well-developed and well-nourished. Patient is active and cooperative. Neck: Full passive range of motion without pain and phonation normal.   Cardiovascular:  Normal rate, regular rhythm and intact distal pulses.      Pulses: Right radial pulse  2+   Pulmonary/Chest: Effort normal. SpO2: 98%     Patient history physical exam taken at bedside, discussed patient's symptoms and exam findings discussed initial plan workup to include blood and urine studies, IV access, discussed holding on any imaging into labs reviewed, patient acknowledges. Patient sitting semi-Fowlers in bed with significant other at bedside    OARRS reviewed    Lab workup reviewed, noting calcium oxalate in the urine, otherwise normal/nominal labs    Discussed the patient calcium oxygen in the urine, along with his CVA tenderness and description of pain, are highly suggestive kidney stones, we did discuss patient's current pain as being virtually absent, patient was given option for CT abdomen and pelvis to look for kidney stones, and has agreed to exam    Radiology report reviewed    Discussed the patient that at this time he does not show any stones, however noting his history, physical exam findings, lab eyes, side suggestive of a kidney stone, the patient may have passed. Discussed importance of hydration, follow-up with PCP, noted pressures for Flomax that the symptoms return patient can initiate at that time, his many how this medication may help with full ureteral spasms as well as dilated the ureter for potential stone passing. Patient can return to emergency room if any symptoms change or concerns, acknowledges    FINAL IMPRESSION      1. Acute left flank pain    2.  Calcium oxalate crystals present in urine          DISPOSITION/PLAN   Discharge    PATIENT REFERRED TO:  [facility] [address] [phone]    [facility] ED  [facility] [place] [address]  [phone]    If symptoms worsen, As needed      DISCHARGE MEDICATIONS:  New Prescriptions    TAMSULOSIN (FLOMAX) 0.4 MG CAPSULE    Take 1 capsule by mouth daily for 10 days           Summation      Patient Course:  Discharge    ED Medications administered this visit:  Medications - No

## 2018-07-16 DIAGNOSIS — G89.29 CHRONIC PAIN OF BOTH KNEES: ICD-10-CM

## 2018-07-16 DIAGNOSIS — M25.562 CHRONIC PAIN OF BOTH KNEES: ICD-10-CM

## 2018-07-16 DIAGNOSIS — M25.561 CHRONIC PAIN OF BOTH KNEES: ICD-10-CM

## 2018-07-16 DIAGNOSIS — I10 ESSENTIAL HYPERTENSION: ICD-10-CM

## 2018-07-16 RX ORDER — LISINOPRIL 20 MG/1
20 TABLET ORAL DAILY
Qty: 30 TABLET | Refills: 6 | OUTPATIENT
Start: 2018-07-16

## 2018-07-16 RX ORDER — HYDROCHLOROTHIAZIDE 25 MG/1
25 TABLET ORAL DAILY
Qty: 30 TABLET | Refills: 5 | OUTPATIENT
Start: 2018-07-16

## 2018-07-16 NOTE — TELEPHONE ENCOUNTER
lia request for METOPROLOL TARTRATE 25 MG TAB future appointment scheduled. DC on 4/4/18 per another clinic.     Health Maintenance   Topic Date Due    Flu vaccine (1) 09/01/2018    Potassium monitoring  07/13/2019    Creatinine monitoring  07/13/2019    Lipid screen  01/18/2023    DTaP/Tdap/Td vaccine (3 - Td) 04/27/2028    HIV screen  Completed             (applicable per patient's age: Cancer Screenings, Depression Screening, Fall Risk Screening, Immunizations)    Hemoglobin A1C (%)   Date Value   07/10/2018 5.5   01/18/2018 5.5   07/20/2017 5.9     LDL Cholesterol (mg/dL)   Date Value   01/18/2018 113     AST (U/L)   Date Value   07/13/2018 13     ALT (U/L)   Date Value   07/13/2018 13     BUN (mg/dL)   Date Value   07/13/2018 15      (goal A1C is < 7)   (goal LDL is <100) need 30-50% reduction from baseline     BP Readings from Last 3 Encounters:   07/14/18 128/72   07/05/18 132/83   05/03/18 117/76    (goal /80)      All Future Testing planned in [facility]:      Next Visit Date:  Future Appointments  Date Time Provider Kathi Crowderi   8/14/2018 10:00 AM Tawny Jurado PSYD Psych [address]   10/22/2018 9:00 AM Olaf Urias MD [facility]            Patient Active Problem List:     Hypertension     Fatty liver disease, nonalcoholic     MIGEL on CPAP     Positive depression screening     Vitamin D deficiency     Obesity (BMI 30-39.9)     IGT (impaired glucose tolerance)

## 2018-07-16 NOTE — TELEPHONE ENCOUNTER
PC to pt to verify what BP medication he is taking pt did nto answer left msg for pt to contact office.

## 2018-07-16 NOTE — TELEPHONE ENCOUNTER
Pt requesting refill on Hydrochlorothiazide, Lisinopril and Volatren gel.       Hydrochlorothiazide was filled 4/10/18 with 5 refills (not due)    Lisinopril was filled on 4/10/18 with 6 refills (not due)    Health Maintenance   Topic Date Due    Flu vaccine (1) 09/01/2018    Potassium monitoring  07/13/2019    Creatinine monitoring  07/13/2019    Lipid screen  01/18/2023    DTaP/Tdap/Td vaccine (3 - Td) 04/27/2028    HIV screen  Completed             (applicable per patient's age: Cancer Screenings, Depression Screening, Fall Risk Screening, Immunizations)    Hemoglobin A1C (%)   Date Value   07/10/2018 5.5   01/18/2018 5.5   07/20/2017 5.9     LDL Cholesterol (mg/dL)   Date Value   01/18/2018 113     AST (U/L)   Date Value   07/13/2018 13     ALT (U/L)   Date Value   07/13/2018 13     BUN (mg/dL)   Date Value   07/13/2018 15      (goal A1C is < 7)   (goal LDL is <100) need 30-50% reduction from baseline     BP Readings from Last 3 Encounters:   07/14/18 128/72   07/05/18 132/83   05/03/18 117/76    (goal /80)      All Future Testing planned in [facility]:      Next Visit Date:  Future Appointments  Date Time Provider Kathi Jackson   8/14/2018 10:00 AM Jorge Pencil, PSYD Psych [address]   10/22/2018 9:00 AM Mike Diaz MD [facility]            Patient Active Problem List:     Hypertension     Fatty liver disease, nonalcoholic     MIGEL on CPAP     Positive depression screening     Vitamin D deficiency     Obesity (BMI 30-39.9)     IGT (impaired glucose tolerance)

## 2018-07-24 ENCOUNTER — NURSE ONLY (OUTPATIENT)
Dept: INTERNAL MEDICINE | Age: 38
End: 2018-07-24
Payer: MEDICAID

## 2018-07-24 VITALS — SYSTOLIC BLOOD PRESSURE: 127 MMHG | DIASTOLIC BLOOD PRESSURE: 78 MMHG | HEART RATE: 55 BPM

## 2018-07-24 DIAGNOSIS — I10 ESSENTIAL HYPERTENSION: Primary | ICD-10-CM

## 2018-07-24 PROCEDURE — 99213 OFFICE O/P EST LOW 20 MIN: CPT | Performed by: INTERNAL MEDICINE

## 2018-07-24 PROCEDURE — 99211 OFF/OP EST MAY X REQ PHY/QHP: CPT | Performed by: INTERNAL MEDICINE

## 2018-07-24 RX ORDER — HYDROCHLOROTHIAZIDE 25 MG/1
25 TABLET ORAL DAILY
COMMUNITY
End: 2018-09-18 | Stop reason: SDUPTHER

## 2018-07-24 NOTE — PROGRESS NOTES
S: pt presents at clinic today for blood pressure check    O: pt oriented times 4 , Skin warm and dry. Patient currently taking HCTZ 25 mg daily and lisinopril 20 mg daily. Denies dizziness, lightheadedness. States he is not taking metoprolol. A:  Vitals obtained as charted, discussed with Dr. Moses Maher. Patient to continue current medications HCTZ 25 mg daily and lisinopril 20 mg daily. P:  pt return to clinic 10/22/18.

## 2018-07-25 ENCOUNTER — CARE COORDINATION (OUTPATIENT)
Dept: INTERNAL MEDICINE | Age: 38
End: 2018-07-25

## 2018-07-25 NOTE — CARE COORDINATION
Ambulatory Care Coordination Note  7/25/2018  CM Risk Score: 8  Yamilet Mortality Risk Score:      ACC: Carmen Casas RN    Summary Note: phone call to patient. Message left on voicemail for a return call. Will attempt next ACC call in 1 week. Care Coordination Interventions    Program Enrollment:  Complex Care  Referral from Primary Care Provider:  No  Suggested Interventions and Community Resources  Physical Therapy:  Completed         Goals Addressed     None          Prior to Admission medications    Medication Sig Start Date End Date Taking? Authorizing Provider   hydrochlorothiazide (HYDRODIURIL) 25 MG tablet Take 25 mg by mouth daily    Historical Provider, MD   metoprolol tartrate (LOPRESSOR) 25 MG tablet take 1 tablet by mouth twice a day 7/17/18   Ibeth Armenta MD   diclofenac sodium (VOLTAREN) 1 % GEL Apply 2 g topically 2 times daily 7/16/18   Ibeth Armenta MD   tamsulosin [facility]) 0.4 MG capsule Take 1 capsule by mouth daily for 10 days 7/14/18 7/24/18  Chilango Lao MD   ibuprofen (ADVIL;MOTRIN) 800 MG tablet Take 1 tablet by mouth every 8 hours as needed for Pain 7/5/18   Ibeth Armenta MD   acetaminophen (RA ACETAMINOPHEN EX ST) 500 MG tablet take 1 tablet by mouth twice a day if needed for pain 7/5/18   Ibeth Armenta MD   fluticasone [facility]) 50 MCG/ACT nasal spray instill 1 spray into each nostril once daily 4/26/18   Ibeth Armenta MD   diclofenac (PENNSAID) 2 % SOLN Pennsaid 20 mg/gram/actuation (2 %) topical soln in metered-dose pump   APPLY 2 PUMPS (40 MG) TO THE AFFECTED KNEE(S) BY TOPICAL ROUTE 2 TIMES PER DAY    Historical Provider, MD   albuterol sulfate HFA (PROAIR HFA) 108 (90 Base) MCG/ACT inhaler Inhale 2 puffs into the lungs every 6 hours as needed for Wheezing or Shortness of Breath Space out to every 6 hours as symptoms improve.  4/11/18   Ibeth Armenta MD   lisinopril (PRINIVIL;ZESTRIL) 20 MG tablet Take 1 tablet by mouth daily 4/10/18   [facility] Santosh Torre MD       Future Appointments  Date Time Provider Kathi Jackson   8/14/2018 10:00 AM Radha Minaya PSYD Psych [facility]   10/22/2018 9:00 AM James Mckinney MD [facility] IM [address] [facility]

## 2018-08-01 ENCOUNTER — HOSPITAL ENCOUNTER (EMERGENCY)
Age: 38
Discharge: HOME | End: 2018-08-02
Attending: EMERGENCY MEDICINE
Payer: MEDICAID

## 2018-08-01 VITALS
OXYGEN SATURATION: 100 % | TEMPERATURE: 98.2 F | DIASTOLIC BLOOD PRESSURE: 94 MMHG | WEIGHT: 248 LBS | HEART RATE: 86 BPM | SYSTOLIC BLOOD PRESSURE: 141 MMHG | RESPIRATION RATE: 14 BRPM | BODY MASS INDEX: 30.19 KG/M2

## 2018-08-01 DIAGNOSIS — R30.0 DYSURIA: Primary | ICD-10-CM

## 2018-08-01 PROCEDURE — 87086 URINE CULTURE/COLONY COUNT: CPT

## 2018-08-01 PROCEDURE — 87591 N.GONORRHOEAE DNA AMP PROB: CPT

## 2018-08-01 PROCEDURE — 81001 URINALYSIS AUTO W/SCOPE: CPT

## 2018-08-01 PROCEDURE — 87491 CHLMYD TRACH DNA AMP PROBE: CPT

## 2018-08-01 PROCEDURE — 99283 EMERGENCY DEPT VISIT LOW MDM: CPT

## 2018-08-01 ASSESSMENT — PAIN DESCRIPTION - PAIN TYPE: TYPE: ACUTE PAIN

## 2018-08-01 ASSESSMENT — PAIN DESCRIPTION - FREQUENCY: FREQUENCY: INTERMITTENT

## 2018-08-01 ASSESSMENT — PAIN DESCRIPTION - DESCRIPTORS: DESCRIPTORS: BURNING

## 2018-08-01 ASSESSMENT — PAIN DESCRIPTION - LOCATION: LOCATION: PENIS

## 2018-08-01 ASSESSMENT — PAIN SCALES - GENERAL: PAINLEVEL_OUTOF10: 9

## 2018-08-01 ASSESSMENT — PAIN DESCRIPTION - PROGRESSION: CLINICAL_PROGRESSION: GRADUALLY WORSENING

## 2018-08-02 ENCOUNTER — OFFICE VISIT (OUTPATIENT)
Dept: BEHAVIORAL/MENTAL HEALTH CLINIC | Age: 38
End: 2018-08-02
Payer: MEDICAID

## 2018-08-02 DIAGNOSIS — F33.0 MAJOR DEPRESSIVE DISORDER, RECURRENT, MILD (HCC): Primary | ICD-10-CM

## 2018-08-02 LAB
-: ABNORMAL
AMORPHOUS: ABNORMAL
BACTERIA: ABNORMAL
BILIRUBIN URINE: NEGATIVE
CASTS UA: ABNORMAL /LPF (ref 0–2)
COLOR: ABNORMAL
CRYSTALS, UA: ABNORMAL /HPF
EPITHELIAL CELLS UA: ABNORMAL /HPF (ref 0–5)
GLUCOSE URINE: NEGATIVE
KETONES, URINE: ABNORMAL
LEUKOCYTE ESTERASE, URINE: NEGATIVE
MUCUS: ABNORMAL
NITRITE, URINE: NEGATIVE
OTHER OBSERVATIONS UA: ABNORMAL
PH UA: 5.5 (ref 5–8)
PROTEIN UA: ABNORMAL
RBC UA: ABNORMAL /HPF (ref 0–2)
RENAL EPITHELIAL, UA: ABNORMAL /HPF
SPECIFIC GRAVITY UA: 1.04 (ref 1–1.03)
TRICHOMONAS: ABNORMAL
TURBIDITY: CLEAR
URINE HGB: ABNORMAL
UROBILINOGEN, URINE: NORMAL
WBC UA: ABNORMAL /HPF (ref 0–5)
YEAST: ABNORMAL

## 2018-08-02 PROCEDURE — 90834 PSYTX W PT 45 MINUTES: CPT | Performed by: PSYCHOLOGIST
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
## 2018-08-02 NOTE — ED NOTES
Pt updated om POC. States satisfied with care thus far.      [facility]  08/02/18 [id number]

## 2018-08-02 NOTE — ED PROVIDER NOTES
Alessandro Galarza  ED     Emergency Department     Faculty Attestation    I performed a history and physical examination of the patient and discussed management with the resident. I reviewed the residents note and agree with the documented findings and plan of care. Any areas of disagreement are noted on the chart. I was personally present for the key portions of any procedures. I have documented in the chart those procedures where I was not present during the key portions. I have reviewed the emergency nurses triage note. I agree with the chief complaint, past medical history, past surgical history, allergies, medications, social and family history as documented unless otherwise noted below. For Physician Assistant/ Nurse Practitioner cases/documentation I have personally evaluated this patient and have completed at least one if not all key elements of the E/M (history, physical exam, and MDM). Additional findings are as noted. Urinary symptoms, dysuria. No new partners. Denies discharge or history UTIs or prostate issues. Denies penile lesions or testicular pain.   We'll check urine      Critical Care     Leelee Andrade MD, Meggan Wang  Attending Emergency  Physician             Bo Up MD  08/01/18 [id number]

## 2018-08-02 NOTE — ED PROVIDER NOTES
congestion  Respiratory ROS - No cough, No shortness of breath  Cardiovascular ROS - No chest pain, No palpitations   Gastrointestinal ROS - No abdominal pain, No nausea, No vomiting  Genito-Urinary ROS - +dysuria, +hematuria  Musculoskeletal ROS - No back pain, No neck pain  Dermatological ROS - No wound, No rash      PHYSICAL EXAM   (up to 7 for level 4, 8 or more for level 5)      INITIAL VITALS:   BP (!) 141/94   Pulse 86   Temp 98.2 °F (36.8 °C) (Oral)   Resp 14   Wt 248 lb (112.5 kg)   SpO2 100%   BMI 30.19 kg/m²     CONSTITUTIONAL: AOx4, no apparent distress, appears stated age   HEAD: normocephalic, atraumatic   EYES: PERRL, EOMI    ENT: moist mucous membranes, uvula midline   NECK: supple, symmetric   BACK: symmetric   LUNGS: clear to auscultation bilaterally   CARDIOVASCULAR: regular rate and rhythm, no murmurs, rubs or gallops   ABDOMEN: soft, non-tender, non-distended   : No signs of drainage or mass, no lesions    NEUROLOGIC:  MAEx4, no focal sensory or motor deficits   MUSCULOSKELETAL: no clubbing, cyanosis or edema   SKIN: no exposed rash     DIFFERENTIAL  DIAGNOSIS     PLAN (LABS / IMAGING / EKG):  Orders Placed This Encounter   Procedures    C.trachomatis N.gonorrhoeae DNA, Urine    Urine Culture    Urinalysis with Microscopic       MEDICATIONS ORDERED:  No orders of the defined types were placed in this encounter.       DDX: Kidney stone, UTI, STI    DIAGNOSTIC RESULTS / EMERGENCY DEPARTMENT COURSE / MDM     LABS:  Results for orders placed or performed during the hospital encounter of 08/01/18   Urinalysis with Microscopic   Result Value Ref Range    Color, UA DARK YELLOW (A) YEL    Turbidity UA CLEAR CLEAR    Glucose, Ur NEGATIVE NEG    Bilirubin Urine NEGATIVE NEG    Ketones, Urine TRACE (A) NEG    Specific Gravity, UA 1.039 (H) 1.005 - 1.030    Urine Hgb Manual diff required (A) NEG    pH, UA 5.5 5.0 - 8.0    Protein, UA TRACE (A) NEG    Urobilinogen, Urine Normal NORM    Nitrite, Urine NEGATIVE NEG    Leukocyte Esterase, Urine NEGATIVE NEG    -          WBC, UA 2 TO 5 0 - 5 /HPF    RBC, UA 2 TO 5 0 - 2 /HPF    Casts UA NOT REPORTED 0 - 2 /LPF    Crystals UA MANY (A) NONE /HPF    Epithelial Cells UA 0 TO 2 0 - 5 /HPF    Renal Epithelial, Urine NOT REPORTED 0 /HPF    Bacteria, UA NOT REPORTED NONE    Mucus, UA NOT REPORTED NONE    Trichomonas, UA NOT REPORTED NONE    Amorphous, UA NOT REPORTED NONE    Other Observations UA NOT REPORTED NREQ    Yeast, UA NOT REPORTED NONE       IMPRESSION: Possible kidney stone    RADIOLOGY:  Ct Abdomen Pelvis Wo Contrast    Result Date: 7/14/2018  FINAL REPORT EXAM:  CT ABDOMEN PELVIS WO CONTRAST HISTORY:  left flank pain, (+) Calcium Oxalate urine TECHNIQUE:  Helical CT scan through the abdomen and pelvis without contrast. Images are reconstructed in the sagittal and coronal planes. PRIORS:  None. FINDINGS:  Solid organ and bowel evaluation is limited without intravenous contrast. Bowel evaluation is limited without oral contrast. The lung bases are clear. The liver, gallbladder, pancreas, spleen and adrenal glands appear normal. The kidneys appear grossly normal. The pelvic organs appear grossly normal. The stomach appears grossly within normal limits. There are no abnormally dilated loops of bowel or acute inflammatory changes. There is an appendicolith in an otherwise normal-appearing appendix. No evidence of appendicitis. The abdominal aorta has a normal diameter. The bones and subcutaneous soft tissues are unremarkable for age. Impression:  No evidence of hydronephrosis or urolithiasis Electronically Signed By: Carson Saucedo   on  07/14/2018  00:10        EKG      All EKG's are interpreted by the Emergency Department Physician who either signs or Co-signs this chart in the absence of a cardiologist.    EMERGENCY DEPARTMENT COURSE:  34 y.o. male with dysuria.   Patient states despite this when he is passing kidney stones in the past.  Patient did have

## 2018-08-02 NOTE — ED TRIAGE NOTES
Pt states he has had burning w/ urination for four days. Pt states it became worse today. No difficulty with urinating, just burning when he does. Denies any other complaints.

## 2018-08-03 LAB
CULTURE: NORMAL
Lab: NORMAL
SPECIMEN DESCRIPTION: NORMAL
STATUS: NORMAL

## 2018-08-04 LAB
C. TRACHOMATIS DNA ,URINE: NEGATIVE
N. GONORRHOEAE DNA, URINE: NEGATIVE

## 2018-08-07 DIAGNOSIS — M25.561 CHRONIC PAIN OF BOTH KNEES: Primary | ICD-10-CM

## 2018-08-07 DIAGNOSIS — M25.562 CHRONIC PAIN OF BOTH KNEES: Primary | ICD-10-CM

## 2018-08-07 DIAGNOSIS — G89.29 CHRONIC PAIN OF BOTH KNEES: Primary | ICD-10-CM

## 2018-08-07 NOTE — TELEPHONE ENCOUNTER
Patient last seen on 4/25/18 for bilateral knee pain. The patient is requesting a refill on his mobic at this time. Please advise, thank you.

## 2018-08-13 ENCOUNTER — PATIENT MESSAGE (OUTPATIENT)
Dept: INTERNAL MEDICINE | Age: 38
End: 2018-08-13

## 2018-08-15 RX ORDER — MELOXICAM 15 MG/1
15 TABLET ORAL DAILY
Qty: 30 TABLET | Refills: 2 | Status: SHIPPED | OUTPATIENT
Start: 2018-08-15 | End: 2018-10-11 | Stop reason: SDUPTHER

## 2018-08-16 DIAGNOSIS — J31.0 CHRONIC RHINITIS: ICD-10-CM

## 2018-08-16 NOTE — TELEPHONE ENCOUNTER
E-scribe request for Flonase nasal spray . Please review and e-scribe if applicable.      Health Maintenance   Topic Date Due    Flu vaccine (1) 2018    Potassium monitoring  2019    Creatinine monitoring  2019    Lipid screen  2023    DTaP/Tdap/Td vaccine (3 - Td) 2028    Pneumococcal med risk  Completed    HIV screen  Completed             (applicable per patient's age: Cancer Screenings, Depression Screening, Fall Risk Screening, Immunizations)    Hemoglobin A1C (%)   Date Value   07/10/2018 5.5   2018 5.5   2017 5.9     LDL Cholesterol (mg/dL)   Date Value   2018 113     AST (U/L)   Date Value   2018 13     ALT (U/L)   Date Value   2018 13     BUN (mg/dL)   Date Value   2018 15      (goal A1C is < 7)   (goal LDL is <100) need 30-50% reduction from baseline     BP Readings from Last 3 Encounters:   18 (!) 141/94   18 127/78   18 128/72    (goal /80)      All Future Testing planned in [facility]:      Next Visit Date:  Future Appointments  Date Time Provider Kathi Jackson   10/22/2018 9:00 AM Ritesh Chacon MD [facility]            Patient Active Problem List:     Hypertension     Fatty liver disease, nonalcoholic     MIGEL on CPAP     Positive depression screening     Vitamin D deficiency     Obesity (BMI 30-39.9)     IGT (impaired glucose tolerance)

## 2018-08-17 RX ORDER — FLUTICASONE PROPIONATE 50 MCG
SPRAY, SUSPENSION (ML) NASAL
Qty: 16 G | Refills: 3 | Status: SHIPPED | OUTPATIENT
Start: 2018-08-17 | End: 2019-01-09 | Stop reason: SDUPTHER

## 2018-08-20 ENCOUNTER — CARE COORDINATION (OUTPATIENT)
Dept: INTERNAL MEDICINE | Age: 38
End: 2018-08-20

## 2018-09-10 ENCOUNTER — OFFICE VISIT (OUTPATIENT)
Dept: BEHAVIORAL/MENTAL HEALTH CLINIC | Age: 38
End: 2018-09-10
Payer: MEDICAID

## 2018-09-10 DIAGNOSIS — F33.0 MAJOR DEPRESSIVE DISORDER, RECURRENT, MILD (HCC): Primary | ICD-10-CM

## 2018-09-10 PROCEDURE — 90832 PSYTX W PT 30 MINUTES: CPT | Performed by: PSYCHOLOGIST

## 2018-09-10 NOTE — PROGRESS NOTES
Behavioral Health Consultation  Veronica JASON  Licensed Clinical Psychologist #[id number]  9/10/2018  11:00 AM- 11:29 AM    Time spent with Patient: 29 minutes  This is patient's 28th  [facility] consultation. Reason for Consult:  depression  Referring Provider: Etelvina Santiago MD    Feedback given to PCP. S:   Previous Recommendation(s):   1. [email or web address]  2. Follow up in 1 month    Processed thoughts and feelings related to his father's side of the family; specifically, behavior at a recent family reunion. No one, including his father, informed the pt that his father was hospitalized and my have a leg amputated. His aunts decided that the family reunion was the time to discuss the pt's father's refusal to acknowledge that the pt is his son. O:  MSE:   Mood was Irritable and Sad, with Congruent affect. Suicidal ideation was denied. Homicidal ideation was denied. Hygiene was Good. Dress was Appropriate and Casual.   Behavior was withdrawn with No observation or self-report of difficulties standing/ambulating. Attitude was Guarded and Help-seeking. Eye-contact was Fair. Speech: rate- Rapid, rhythm-  WNL, volume- WNL  Verbalizations were  goal-directed and verbose. Thought processes were intact and goal-oriented without evidence of delusions, hallucinations, obsessions, or andrew; with moderate cognitive distortions. Associations were characterized by circumstantial cognitive processes. Pt was orientated oriented to person, place, time, and general circumstances;  recent:  good and remote:  good. Insight and judgment were estimated to be good to fair, AEB, a good  understanding of cyclical maladaptive patterns, and the ability to use insight to inform behavior change. A:   Pt continues to make progress towards redefining his life and writing his own narrative, while continuing to have difficulty with differentiating from his family of origin.      Pt interventions:  Supportive techniques and CBT to target process of individuation and differentiation. Pt Behavioral Change Plan:   1. It can be, (usually is), difficult when the image we have in our mind is not reflective back from the mirror or from important relationships. This is a painful part of maturity and growing up. Another task of growing up is learning how to accept this truth, and move forward without sacrificing our self-worth. 2. For example, how do these two words and definitions apply to what you experienced at the renunion:   Ignorant: lack of knowledge, education, or awareness   [place]:  a person who is obstinately or intolerantly devoted to his or her own opinions and prejudices; especially : one who regards or treats the members of a group (such as a racial or ethnic group) with hatred and intolerance   3. [employer] and the [employer]= see below. 4. Follow up in 3 weeks. Diagnosis:  The encounter diagnosis was Major depressive disorder, recurrent, mild ([facility] [address]       Diagnosis Date    Anxiety     Arthritis     CAD (coronary artery disease)     Fatty liver     Fatty liver disease, nonalcoholic 3/36/8319    Hypertension     Obesity     Unspecified sleep apnea     cpap       History:    Medications:   Current Outpatient Prescriptions   Medication Sig Dispense Refill    fluticasone (FLONASE) 50 MCG/ACT nasal spray instill 1 spray into each nostril once daily 16 g 3    meloxicam (MOBIC) 15 MG tablet Take 1 tablet by mouth daily 30 tablet 2    hydrochlorothiazide (HYDRODIURIL) 25 MG tablet Take 25 mg by mouth daily      metoprolol tartrate (LOPRESSOR) 25 MG tablet take 1 tablet by mouth twice a day 60 tablet 2    diclofenac sodium (VOLTAREN) 1 % GEL Apply 2 g topically 2 times daily 1 Tube 3    tamsulosin (FLOMAX) 0.4 MG capsule Take 1 capsule by mouth daily for 10 days 10 capsule 0    ibuprofen (ADVIL;MOTRIN) 800 MG tablet Take 1 tablet by mouth every 8 hours as needed

## 2018-09-10 NOTE — PATIENT INSTRUCTIONS
1. It can be, (usually is), difficult when the image we have in our mind is not reflective back from the mirror or from important relationships. This is a painful part of maturity and growing up. Another task of growing up is learning how to accept this truth, and move forward without sacrificing our self-worth. 2. For example, how do these two words and definitions apply to what you experienced at the [facility]:   Ignorant: lack of knowledge, education, or awareness   Lisette Banker:  a person who is obstinately or intolerantly devoted to his or her own opinions and prejudices; especially : one who regards or treats the members of a group (such as a racial or ethnic group) with hatred and intolerance   3. Cleo Yadav and the [employer]= see below. 4. Follow up in 3 weeks.        Verse 1:  Spend your life trying to make all your money  Your 'counts are full but all you've found is that you're empty  Ain't it funny how the simple things in life are the only things that really matter  Your life is complicated, found your dreams are overrated  Now you're old and Thersia Freehold go back  But you can't    HA    Chorus:  We're rich  We don't got no money  We're just a bunch of kids  We're rich, but we don't got no money    HA    Verse 2:  Spend your life running away from your family  Your old age now it's all that you're wanting  Ain't it funny how the simple things in life are the only things that really matter  Your life is complicated, found your dreams are overrated  Now you're old and Thersia Freehold go back  But you can't  1, 'u-xx_large_top_margin': $height 0}\" instance-id=\"in_read_ad\"     HA    Chorus:  We're rich  We don't got no money  Yeah we're just a bunch of kids  We're rich, we don't got no money    GUTIERREZ    Bridge:  Yeah we're a bunch of rich rich rich rich kids  Yeah we're a bunch of rich rich rich rich kids  Yeah we're a bunch of rich rich rich rich rich rich rich rich rich rich kids    Chorus:  Yeah we're rich, we don;t got no

## 2018-09-13 ENCOUNTER — CARE COORDINATION (OUTPATIENT)
Dept: INTERNAL MEDICINE | Age: 38
End: 2018-09-13

## 2018-09-18 ENCOUNTER — OFFICE VISIT (OUTPATIENT)
Dept: INTERNAL MEDICINE | Age: 38
End: 2018-09-18
Payer: MEDICAID

## 2018-09-18 VITALS
SYSTOLIC BLOOD PRESSURE: 119 MMHG | HEIGHT: 76 IN | HEART RATE: 68 BPM | WEIGHT: 243 LBS | BODY MASS INDEX: 29.59 KG/M2 | DIASTOLIC BLOOD PRESSURE: 73 MMHG

## 2018-09-18 DIAGNOSIS — Z23 NEEDS FLU SHOT: ICD-10-CM

## 2018-09-18 DIAGNOSIS — F32.A MILD DEPRESSION: ICD-10-CM

## 2018-09-18 DIAGNOSIS — I10 ESSENTIAL HYPERTENSION: Primary | ICD-10-CM

## 2018-09-18 DIAGNOSIS — R73.02 IGT (IMPAIRED GLUCOSE TOLERANCE): ICD-10-CM

## 2018-09-18 PROBLEM — E66.9 OBESITY (BMI 30-39.9): Status: RESOLVED | Noted: 2017-12-12 | Resolved: 2018-09-18

## 2018-09-18 PROCEDURE — G8417 CALC BMI ABV UP PARAM F/U: HCPCS | Performed by: INTERNAL MEDICINE

## 2018-09-18 PROCEDURE — G0008 ADMIN INFLUENZA VIRUS VAC: HCPCS | Performed by: INTERNAL MEDICINE

## 2018-09-18 PROCEDURE — 90471 IMMUNIZATION ADMIN: CPT | Performed by: INTERNAL MEDICINE

## 2018-09-18 PROCEDURE — G8427 DOCREV CUR MEDS BY ELIG CLIN: HCPCS | Performed by: INTERNAL MEDICINE

## 2018-09-18 PROCEDURE — 99211 OFF/OP EST MAY X REQ PHY/QHP: CPT | Performed by: INTERNAL MEDICINE

## 2018-09-18 PROCEDURE — 99213 OFFICE O/P EST LOW 20 MIN: CPT | Performed by: INTERNAL MEDICINE

## 2018-09-18 PROCEDURE — 1036F TOBACCO NON-USER: CPT | Performed by: INTERNAL MEDICINE

## 2018-09-18 RX ORDER — LISINOPRIL 20 MG/1
20 TABLET ORAL DAILY
Qty: 30 TABLET | Refills: 6 | Status: SHIPPED | OUTPATIENT
Start: 2018-09-18 | End: 2019-01-16 | Stop reason: SDUPTHER

## 2018-09-18 NOTE — PROGRESS NOTES
kg/m².    BP Readings from Last 3 Encounters:   09/18/18 119/73   08/01/18 (!) 141/94   07/24/18 127/78        Wt Readings from Last 3 Encounters:   09/18/18 243 lb (110.2 kg)   08/01/18 248 lb (112.5 kg)   07/05/18 254 lb (115.2 kg)       Physical Exam      HENT:  Normocephalic, Atraumatic,  Neck- Normal range of motion, No tenderness, Supple, No stridor. Eyes:  PERRL, EOMI, Conjunctiva normal, No discharge. Respiratory:  Normal breath sounds, No respiratory distress, No wheezing  Cardiovascular:  Normal heart rate, Normal rhythm, No murmur  Musculoskeletal:  Intact distal pulses, No edema. Lymphatic:  No lymphadenopathy noted. Neurologic:  Alert & oriented x 3, Normal motor function, Normal sensory function. LABORATORY FINDINGS:    CBC:  Lab Results   Component Value Date    WBC 5.7 07/13/2018    HGB 12.0 07/13/2018     07/13/2018     05/08/2012     BMP:    Lab Results   Component Value Date     07/13/2018    K 3.4 07/13/2018     07/13/2018    CO2 27 07/13/2018    BUN 15 07/13/2018    CREATININE 0.90 07/13/2018    GLUCOSE 82 07/13/2018    GLUCOSE 83 05/08/2012     HEMOGLOBIN A1C:   Lab Results   Component Value Date    LABA1C 5.5 07/10/2018     MICROALBUMIN URINE: No results found for: MICROALBUR  FASTING LIPID PANEL:  Lab Results   Component Value Date    CHOL 162 01/18/2018    HDL 32 (L) 01/18/2018    TRIG 87 01/18/2018     Lab Results   Component Value Date    LDLCHOLESTEROL 113 01/18/2018       LIVER PROFILE:  Lab Results   Component Value Date    ALT 13 07/13/2018    AST 13 07/13/2018    PROT 6.9 07/13/2018    BILITOT 0.47 07/13/2018    BILIDIR 0.09 04/04/2018    LABALBU 3.7 07/13/2018      THYROID FUNCTION:   Lab Results   Component Value Date    TSH 2.26 02/09/2017      URINE ANALYSIS: No results found for: LABURIN  ASSESSMENT AND PLAN:    1.  Essential hypertension    - metoprolol tartrate (LOPRESSOR) 25 MG tablet; take 1 tablet by mouth twice a day  Dispense: 60 tablet;

## 2018-09-23 ASSESSMENT — ENCOUNTER SYMPTOMS
BACK PAIN: 0
NAUSEA: 0
CONSTIPATION: 0
COUGH: 0
ABDOMINAL PAIN: 0
SHORTNESS OF BREATH: 0
SPUTUM PRODUCTION: 0

## 2018-10-01 ENCOUNTER — OFFICE VISIT (OUTPATIENT)
Dept: BEHAVIORAL/MENTAL HEALTH CLINIC | Age: 38
End: 2018-10-01
Payer: MEDICAID

## 2018-10-01 DIAGNOSIS — F33.0 MAJOR DEPRESSIVE DISORDER, RECURRENT, MILD (HCC): Primary | ICD-10-CM

## 2018-10-01 PROCEDURE — 90834 PSYTX W PT 45 MINUTES: CPT | Performed by: PSYCHOLOGIST

## 2018-10-01 NOTE — PROGRESS NOTES
Behavioral Health Consultation  Forerst JASON  Licensed Clinical Psychologist #[id number]  10/1/2018  9:48 AM- 10:30 AM    Time spent with Patient: 42 minutes  This is patient's 29th  [facility] consultation. Reason for Consult:  depression  Referring Provider: Carmen Garcia MD    Feedback given to PCP. S:   Previous Recommendation(s):   1. It can be, (usually is), difficult when the image we have in our mind is not reflective back from the mirror or from important relationships. This is a painful part of maturity and growing up. Another task of growing up is learning how to accept this truth, and move forward without sacrificing our self-worth. 2. For example, how do these two words and definitions apply to what you experienced at the renunion:   Ignorant: lack of knowledge, education, or awareness   Aileen Genta:  a person who is obstinately or intolerantly devoted to his or her own opinions and prejudices; especially : one who regards or treats the members of a group (such as a racial or ethnic group) with hatred and intolerance   3. Kym [place] and the [employer]= see below. 4. Follow up in 3 weeks. Continues to reconcile changes in his relationships with food and family. O:  MSE:   Mood was Euthymic, with Full & Congruent affect. Suicidal ideation was denied. Homicidal ideation was denied. Hygiene was Good. Dress was Appropriate and Casual.   Behavior was WNL & unremarkable with No observation or self-report of difficulties standing/ambulating. Attitude was Engageable and Friendly. Eye-contact was Good. Speech: rate- Rapid, rhythm-  WNL, volume- WNL  Verbalizations were  goal-directed, coherent and verbose. Thought processes were intact and goal-oriented without evidence of delusions, hallucinations, obsessions, or andrew; with little cognitive distortions. Associations were characterized by intact and racing thoughts cognitive processes.   Pt was orientated oriented to person, place, time,

## 2018-10-11 DIAGNOSIS — M25.562 CHRONIC PAIN OF BOTH KNEES: ICD-10-CM

## 2018-10-11 DIAGNOSIS — G89.29 CHRONIC PAIN OF BOTH KNEES: ICD-10-CM

## 2018-10-11 DIAGNOSIS — M25.561 CHRONIC PAIN OF BOTH KNEES: ICD-10-CM

## 2018-10-13 RX ORDER — MELOXICAM 15 MG/1
TABLET ORAL
Qty: 30 TABLET | Refills: 2 | Status: SHIPPED | OUTPATIENT
Start: 2018-10-13 | End: 2018-11-27

## 2018-10-14 RX ORDER — IBUPROFEN 800 MG/1
800 TABLET ORAL EVERY 8 HOURS PRN
Qty: 30 TABLET | Refills: 1 | Status: SHIPPED | OUTPATIENT
Start: 2018-10-14 | End: 2018-11-23 | Stop reason: SDUPTHER

## 2018-10-15 ENCOUNTER — CARE COORDINATION (OUTPATIENT)
Dept: CARE COORDINATION | Age: 38
End: 2018-10-15

## 2018-10-15 NOTE — CARE COORDINATION
ACC: Jorge Hernandez RN  CM Risk Score: 5  Yamilet Mortality Risk Score:      Care Coordination Interventions    Program Enrollment:  Complex Care  Referral from Primary Care Provider:  No  Suggested Interventions and Community Resources  Physical Therapy:  Completed        and Ambulatory Care Coordination Note  10/15/2018  CM Risk Score: 5  Yamilet Mortality Risk Score:      ACC: Jorge Hernandez RN    Summary Note: Phone call to patient, message left on VM with my contact information. will attempt call in 1 week        Care Coordination Interventions    Program Enrollment:  Complex Care  Referral from Primary Care Provider:  No  Suggested Interventions and Community Resources  Physical Therapy:  Completed         Goals Addressed     None          Prior to Admission medications    Medication Sig Start Date End Date Taking?  Authorizing Provider   RA ACETAMINOPHEN EX  MG tablet take 1 tablet by mouth twice a day if needed for pain 10/15/18   Jhon Vazquez MD   ibuprofen (ADVIL;MOTRIN) 800 MG tablet Take 1 tablet by mouth every 8 hours as needed for Pain 10/14/18   Jhon Vazquez MD   meloxicam [facility]ACOB R[facility]) 15 MG tablet take 1 tablet by mouth once daily 10/13/18   Konrad Suarez   metoprolol tartrate (LOPRESSOR) 25 MG tablet take 1 tablet by mouth twice a day 9/18/18   Jhon Vazquez MD   lisinopril (PRINIVIL;ZESTRIL) 20 MG tablet Take 1 tablet by mouth daily 9/18/18   Jhon Vazquez MD   hydrochlorothiazide (HYDRODIURIL) 25 MG tablet take 1 tablet by mouth once daily 9/14/18   Jhon Vazquez MD   fluticasone Ovi Hernán) 50 MCG/ACT nasal spray instill 1 spray into each nostril once daily 8/17/18   Jhon Vazquez MD   diclofenac sodium (VOLTAREN) 1 % GEL Apply 2 g topically 2 times daily 7/16/18   Jhon Vazquez MD   albuterol sulfate HFA (PROAIR HFA) 108 (90 Base) MCG/ACT inhaler Inhale 2 puffs into the lungs every 6 hours as needed for Wheezing or Shortness of Breath Space out to every 6 hours as

## 2018-11-06 ENCOUNTER — OFFICE VISIT (OUTPATIENT)
Dept: BEHAVIORAL/MENTAL HEALTH CLINIC | Age: 38
End: 2018-11-06
Payer: MEDICAID

## 2018-11-06 DIAGNOSIS — F33.0 MAJOR DEPRESSIVE DISORDER, RECURRENT, MILD (HCC): Primary | ICD-10-CM

## 2018-11-06 PROCEDURE — 90834 PSYTX W PT 45 MINUTES: CPT | Performed by: PSYCHOLOGIST

## 2018-11-06 NOTE — PROGRESS NOTES
Behavioral Health Consultation  Familia JASON  Licensed Clinical Psychologist #[id number]  11/6/2018  9:10 AM- 9:48 AM    Time spent with Patient: 38 minutes  This is patient's 30th  [facility] consultation. Reason for Consult:  depression  Referring Provider: Shelby Martin MD    Feedback given to PCP. S:   Previous Recommendation(s):   1. Please complete Patient Health Questionnaire and bring this with you to your next consultation.- no.    2. Follow up in 1 month.      Significant distress and dysregulation from interactions with his mother and other family members. O:  MSE:   Mood was Depressed, with Congruent affect. Suicidal ideation was denied. Homicidal ideation was denied. Hygiene was Good. Dress was Appropriate and Casual.   Behavior was WNL & unremarkable with Yes observation or self-report of difficulties standing/ambulating. Attitude was Help-seeking. Eye-contact was Fair. Speech: rate- WNL, rhythm-  WNL, volume- WNL  Verbalizations were  verbose. Thought processes were intact and goal-oriented without evidence of delusions, hallucinations, obsessions, or andrew; with moderate cognitive distortions. Associations were characterized by perseverative cognitive processes. Pt was orientated oriented to person, place, time, and general circumstances;  recent:  fair and remote:  fair. Insight and judgment were estimated to be fair, AEB, a fair  understanding of cyclical maladaptive patterns, and the ability to use insight to inform behavior change. A:   Encouraged to take accountability over his reactions to repetitive behavior of his family. Used examples about his using these same skill set with their opinion about food. Pt interventions:  Supportive techniques and CBT to target cyclical maladaptive patterns. Pt Behavioral Change Plan:   1. You said, \"I can do it with my friends but not with my family. \" You can do this with your family and you are choosing not to.  However, you on file     Other Topics Concern    Not on file     Social History Narrative    ** Merged History Encounter **            TOBACCO:   reports that he has never smoked. He has never used smokeless tobacco.  ETOH:   reports that he does not drink alcohol.     Family History:   Family History   Problem Relation Age of Onset    Arthritis Mother     Diabetes Father     Arthritis Father     Stroke Maternal Grandmother     Heart Disease Maternal Grandmother     Stroke Maternal Grandfather     Heart Disease Maternal Grandfather     Early Death Maternal Grandfather     Stroke Paternal Grandmother     Heart Disease Paternal Grandmother     Stroke Paternal Grandfather     Heart Disease Paternal Grandfather     Early Death Paternal Grandfather

## 2018-11-23 DIAGNOSIS — G89.29 CHRONIC PAIN OF BOTH KNEES: ICD-10-CM

## 2018-11-23 DIAGNOSIS — M25.562 CHRONIC PAIN OF BOTH KNEES: ICD-10-CM

## 2018-11-23 DIAGNOSIS — M25.561 CHRONIC PAIN OF BOTH KNEES: ICD-10-CM

## 2018-11-23 RX ORDER — IBUPROFEN 800 MG/1
800 TABLET ORAL EVERY 8 HOURS PRN
Qty: 30 TABLET | Refills: 1 | Status: SHIPPED | OUTPATIENT
Start: 2018-11-23 | End: 2018-11-27

## 2018-11-27 ENCOUNTER — APPOINTMENT (OUTPATIENT)
Dept: GENERAL RADIOLOGY | Age: 38
End: 2018-11-27
Payer: MEDICAID

## 2018-11-27 ENCOUNTER — HOSPITAL ENCOUNTER (EMERGENCY)
Age: 38
Discharge: HOME | End: 2018-11-27
Attending: EMERGENCY MEDICINE
Payer: MEDICAID

## 2018-11-27 VITALS
BODY MASS INDEX: 27.4 KG/M2 | RESPIRATION RATE: 14 BRPM | HEART RATE: 72 BPM | OXYGEN SATURATION: 100 % | HEIGHT: 76 IN | SYSTOLIC BLOOD PRESSURE: 132 MMHG | DIASTOLIC BLOOD PRESSURE: 83 MMHG | TEMPERATURE: 99.3 F | WEIGHT: 225 LBS

## 2018-11-27 DIAGNOSIS — S39.012A STRAIN OF LUMBAR REGION, INITIAL ENCOUNTER: Primary | ICD-10-CM

## 2018-11-27 PROCEDURE — 72100 X-RAY EXAM L-S SPINE 2/3 VWS: CPT

## 2018-11-27 PROCEDURE — G0383 LEV 4 HOSP TYPE B ED VISIT: HCPCS

## 2018-11-27 PROCEDURE — 6370000000 HC RX 637 (ALT 250 FOR IP): Performed by: EMERGENCY MEDICINE

## 2018-11-27 RX ORDER — ACETAMINOPHEN 500 MG
1000 TABLET ORAL ONCE
Status: COMPLETED | OUTPATIENT
Start: 2018-11-27 | End: 2018-11-27

## 2018-11-27 RX ORDER — IBUPROFEN 800 MG/1
800 TABLET ORAL ONCE
Status: COMPLETED | OUTPATIENT
Start: 2018-11-27 | End: 2018-11-27

## 2018-11-27 RX ORDER — IBUPROFEN 800 MG/1
800 TABLET ORAL EVERY 8 HOURS PRN
Qty: 30 TABLET | Refills: 0 | Status: SHIPPED | OUTPATIENT
Start: 2018-11-27 | End: 2019-01-16 | Stop reason: SDUPTHER

## 2018-11-27 RX ADMIN — ACETAMINOPHEN 1000 MG: 500 TABLET ORAL at 17:29

## 2018-11-27 RX ADMIN — IBUPROFEN 800 MG: 800 TABLET, FILM COATED ORAL at 17:29

## 2018-11-27 ASSESSMENT — ENCOUNTER SYMPTOMS
BACK PAIN: 1
RHINORRHEA: 0
SHORTNESS OF BREATH: 0
ABDOMINAL PAIN: 0

## 2018-11-27 ASSESSMENT — PAIN DESCRIPTION - LOCATION: LOCATION: BACK;SHOULDER

## 2018-11-27 ASSESSMENT — PAIN DESCRIPTION - DESCRIPTORS: DESCRIPTORS: ACHING;CONSTANT;DISCOMFORT

## 2018-11-27 ASSESSMENT — PAIN DESCRIPTION - ORIENTATION: ORIENTATION: LEFT;LOWER

## 2018-11-27 ASSESSMENT — PAIN SCALES - GENERAL
PAINLEVEL_OUTOF10: 10
PAINLEVEL_OUTOF10: 10

## 2018-11-27 ASSESSMENT — PAIN DESCRIPTION - FREQUENCY: FREQUENCY: CONTINUOUS

## 2018-11-27 NOTE — ED NOTES
Pt to ed with c/o lower back pain and left shoulder pain. Pt rpeorts that today he was helping his family move a piano and he tripped and fell onto his shoulder and twisted his back. Pt denies tingling or numbness sensation and just c/o the pain. Pt ambulatory with a steady gait to ed room.       Boo Shipman RN  11/27/18 [id number]

## 2018-12-05 ENCOUNTER — CARE COORDINATION (OUTPATIENT)
Dept: INTERNAL MEDICINE | Age: 38
End: 2018-12-05

## 2018-12-05 ENCOUNTER — TELEPHONE (OUTPATIENT)
Dept: BEHAVIORAL/MENTAL HEALTH CLINIC | Age: 38
End: 2018-12-05

## 2018-12-05 NOTE — CARE COORDINATION
ACC: Harjit Russell RN  CM Risk Score: 5  Yamilet Mortality Risk Score:      Care Coordination Interventions    Program Enrollment:  Complex Care  Referral from Primary Care Provider:  No  Suggested Interventions and Community Resources  Physical Therapy:  Completed        and Ambulatory Care Coordination Note  12/5/2018  CM Risk Score: 5  Yamilet Mortality Risk Score:      ACC: Harjit Russell RN    Summary Note: call to patient. Message left on voice mail with contact information        Care Coordination Interventions    Program Enrollment:  Complex Care  Referral from Primary Care Provider:  No  Suggested Interventions and Community Resources  Physical Therapy:  Completed         Goals Addressed     None          Prior to Admission medications    Medication Sig Start Date End Date Taking?  Authorizing Provider   ibuprofen (ADVIL;MOTRIN) 800 MG tablet Take 1 tablet by mouth every 8 hours as needed for Pain 11/27/18   Sarmad Styles MD   diclofenac sodium (VOLTAREN) 1 % GEL Apply 2 g topically 2 times daily 11/23/18   Mario Sheehan MD   VENTOLIN  (90 Base) MCG/ACT inhaler inhale 2 puffs by mouth every 6 hours if needed for wheezing or shortness of breath (SPACE OUT TO EVERY 6 HOURS AS SYMPTOMS IMPROVE.) 11/13/18   Mario Sheehan MD   RA ACETAMINOPHEN EX  MG tablet take 1 tablet by mouth twice a day if needed for pain 10/15/18   Mario Sheehan MD   metoprolol tartrate (LOPRESSOR) 25 MG tablet take 1 tablet by mouth twice a day 9/18/18   Mario Sheehan MD   lisinopril (PRINIVIL;ZESTRIL) 20 MG tablet Take 1 tablet by mouth daily 9/18/18   Mario Sheehan MD   hydrochlorothiazide (HYDRODIURIL) 25 MG tablet take 1 tablet by mouth once daily 9/14/18   Mario Sheehan MD   fluticasone [facility]) 50 MCG/ACT nasal spray instill 1 spray into each nostril once daily 8/17/18   Mario Sheehan MD       Future Appointments  Date Time Provider Kathi Jackson   12/6/2018 9:00 AM [facility] Spears[facility]   12/11/2018 11:30 AM Vic Elaine PSYD Psych [facility]   1/16/2019 2:15 PM Jhon Vazquez MD [facility]

## 2018-12-06 ENCOUNTER — HOSPITAL ENCOUNTER (OUTPATIENT)
Dept: PHYSICAL THERAPY | Age: 38
Setting detail: THERAPIES SERIES
Discharge: HOME | End: 2018-12-06
Payer: MEDICAID

## 2018-12-06 PROCEDURE — 97110 THERAPEUTIC EXERCISES: CPT

## 2018-12-06 PROCEDURE — 97162 PT EVAL MOD COMPLEX 30 MIN: CPT

## 2018-12-06 NOTE — CONSULTS
restore full shoulder ROM/strength, decrease tendon irritability, reduce muscle spasm in L upper trap/scalenes, and allow for pt to improve participation in ADLs and in assisting grandmother with ADLs as well, who he frequently cares for. Of note, pt may have trigger point in infraspinatus as well, d/t some radicular pain reproduced with infraspinatus and posterior capsule palpation (again will consider this treatment should pt not improve with traditional RTC tendinopathy management). Problems:    [x] ? Pain:  [x] ? ROM:  [x] ? Strength:  [] ? Function:  [] Other:    STG: (to be met in 10 treatments)  1. ? Pain: Pt will report no greater than 3/10 pain at rest, and no greater than 5/10 max pain when using LUE for ADLs. 2. ? ROM: Pt will demonstrate the following AROM in L shoulder to demonstrate improved mobility and improve ease in completing household chores and dressindeg active abd/flex, to C7 with active ER, and to T3 with little to no pain in all planes. 3. ? Strength: Pt will demonstrate at least 5-/5 strength without reports of pain in L shoulder abduction in order to demonstrate decreased irritability in condition and continue with progressive shoulder strengthening. 4. ? Function:   a. Pt will report ability to hold 1 bags of groceries at side in LUE with only minimally increased pain. b. Pt will be able to lift 5lbs to 90deg flex/abd without increased pain in order to improve shoulder strength and stability needed for completion of lifting laundry basket, vacuuming, etc.  5. Independent with Home Exercise Programs    LTG: (to be met in 16 treatments)   1. Pain:   a. Pt will report at least 75% improvement in condition. b. Pt will report no pain in shoulder at rest, and no greater than 1-2/10 pain with active shoulder mobility. 2. ROM: Pt will demonstrate symmetrical shoulder AROM in all planes without increased pain reported during assessment.   3. Strength: Pt will demonstrate Other:            Game Ready   []  Medication allergies reviewed for use of    Dexamethasone Sodium Phosphate 4mg/ml     with iontophoresis treatments. Pt is not allergic. Frequency: 1-2 x/week for 16 visits    Todays Treatment:  Modalities:   Precautions:  Exercises:  Exercise Reps/ Time Weight/ Level Comments   Cane shoulder flexion 15x     Cane shoulder abd 15x     Upper trap stretch 3x30\" L    Abduction isometric 10x10\"           Other:    Specific Instructions for next treatment: Pulleys for warm up. Manual for decreased L upper trap/scalene/pec tightness; assess shoulder AROM after to see if any improvement made. AP glenohumoral joint mobs and postural strengthening for improved shoulder position/posture. AAROM for improved L shoulder mobility in all planes and to reduce muscle guarding. Progressive loading for supraspinatus tendon through isometrics (wall, holding weight out at 90deg, etc), progressing to isotonic RTC contraction and strengthening. Progress to multi-joint movements with overhead lifting (ex. overhead shoulder press) in order to return to weightlifting pain-free.        Evaluation Complexity:  History (Personal factors, comorbidities) [] 0 [x] 1-2 [] 3+   Exam (limitations, restrictions) [] 1-2 [] 3 [x] 4+   Clinical presentation (progression) [] Stable [x] Evolving  [] Unstable   Decision Making [] Low [x] Moderate [] High    [] Low Complexity [x] Moderate Complexity [] High Complexity       Treatment Charges: Mins Units   [x] Evaluation       []  Low       [x]  Moderate       []  High 35 1   []  Modalities     [x]  Ther Exercise 15 1   []  Manual Therapy     []  Ther Activities     []  Aquatics     []  Vasocompression     []  Other       TOTAL TREATMENT TIME: 50 min    Time in: 9:10am    Time Out: 10:00am    Electronically signed by: Abhishek Kim PT        Physician Signature:________________________________Date:__________________  By signing above or cosigning this note, I have

## 2018-12-11 ENCOUNTER — OFFICE VISIT (OUTPATIENT)
Dept: BEHAVIORAL/MENTAL HEALTH CLINIC | Age: 38
End: 2018-12-11
Payer: MEDICAID

## 2018-12-11 DIAGNOSIS — F33.0 MAJOR DEPRESSIVE DISORDER, RECURRENT, MILD (HCC): Primary | ICD-10-CM

## 2018-12-11 PROCEDURE — 90832 PSYTX W PT 30 MINUTES: CPT | Performed by: PSYCHOLOGIST

## 2018-12-11 NOTE — PROGRESS NOTES
Behavioral Health Consultation  Norma JASON  Licensed Clinical Psychologist #[id number]  12/11/2018  10:53 AM- 11:15 AM    Time spent with Patient: 22 minutes  This is patient's 31st  [facility] consultation. Reason for Consult:  depression  Referring Provider: Shaheed Patterson MD    Feedback given to PCP. S:   Previous Recommendation(s):   1. You said, \"I can do it with my friends but not with my family. \" You can do this with your family and you are choosing not to. However, you are choosing not to, likely, for good reason(s). 2. http://[email or web address]/what-is-irrelationship/  3. Follow up 3 weeks. Continued to address interpersonal difficulties and distress related to his familial relationships. O:  MSE:   Mood was Depressed, with Congruent affect. Suicidal ideation was reports less thoughts of self-harm when he has more distance and less contact with his family of origin. No ideation today. Homicidal ideation was denied. Hygiene was Good. Dress was Appropriate and Casual.   Behavior was WNL & unremarkable with Sometimes observation or self-report of difficulties standing/ambulating. Attitude was Engageable and Help-seeking. Eye-contact was Good. Speech: rate- WNL, rhythm-  WNL, volume- WNL  Verbalizations were  tangential and verbose. Thought processes were intact and goal-oriented without evidence of delusions, hallucinations, obsessions, or andrew; with moderate cognitive distortions. Associations were characterized by circumstantial cognitive processes. Pt was orientated oriented to person, place, time, and general circumstances;  recent:  fair and remote:  fair. Insight and judgment were estimated to be fair, AEB, a fair  understanding of cyclical maladaptive patterns, and the ability to use insight to inform behavior change. A:   No significant changes.      Pt interventions:  Trained in strategies for increasing balanced thinking, Supportive techniques and CBT to target

## 2018-12-12 ENCOUNTER — HOSPITAL ENCOUNTER (OUTPATIENT)
Dept: PHYSICAL THERAPY | Age: 38
Setting detail: THERAPIES SERIES
Discharge: HOME | End: 2018-12-12
Payer: MEDICAID

## 2018-12-12 PROCEDURE — 97140 MANUAL THERAPY 1/> REGIONS: CPT

## 2018-12-12 PROCEDURE — 97016 VASOPNEUMATIC DEVICE THERAPY: CPT

## 2018-12-12 PROCEDURE — 97110 THERAPEUTIC EXERCISES: CPT

## 2018-12-18 ENCOUNTER — APPOINTMENT (OUTPATIENT)
Dept: PHYSICAL THERAPY | Age: 38
End: 2018-12-18
Payer: MEDICAID

## 2019-01-03 ENCOUNTER — HOSPITAL ENCOUNTER (OUTPATIENT)
Dept: PHYSICAL THERAPY | Age: 39
Setting detail: THERAPIES SERIES
Discharge: HOME | End: 2019-01-03
Payer: MEDICAID

## 2019-01-03 PROCEDURE — 97140 MANUAL THERAPY 1/> REGIONS: CPT

## 2019-01-03 PROCEDURE — 97110 THERAPEUTIC EXERCISES: CPT

## 2019-01-03 PROCEDURE — 97016 VASOPNEUMATIC DEVICE THERAPY: CPT

## 2019-01-07 ENCOUNTER — HOSPITAL ENCOUNTER (OUTPATIENT)
Dept: PHYSICAL THERAPY | Age: 39
Setting detail: THERAPIES SERIES
Discharge: HOME | End: 2019-01-07
Payer: MEDICAID

## 2019-01-07 ENCOUNTER — OFFICE VISIT (OUTPATIENT)
Dept: BEHAVIORAL/MENTAL HEALTH CLINIC | Age: 39
End: 2019-01-07
Payer: MEDICAID

## 2019-01-07 DIAGNOSIS — F33.0 MAJOR DEPRESSIVE DISORDER, RECURRENT, MILD (HCC): Primary | ICD-10-CM

## 2019-01-07 PROCEDURE — 90832 PSYTX W PT 30 MINUTES: CPT | Performed by: PSYCHOLOGIST

## 2019-01-07 PROCEDURE — 97110 THERAPEUTIC EXERCISES: CPT

## 2019-01-09 ENCOUNTER — PATIENT MESSAGE (OUTPATIENT)
Dept: INTERNAL MEDICINE | Age: 39
End: 2019-01-09

## 2019-01-09 DIAGNOSIS — G89.29 CHRONIC PAIN OF BOTH KNEES: ICD-10-CM

## 2019-01-09 DIAGNOSIS — I10 ESSENTIAL HYPERTENSION: ICD-10-CM

## 2019-01-09 DIAGNOSIS — I10 ESSENTIAL HYPERTENSION: Primary | ICD-10-CM

## 2019-01-09 DIAGNOSIS — M25.562 CHRONIC PAIN OF BOTH KNEES: ICD-10-CM

## 2019-01-09 DIAGNOSIS — M25.561 CHRONIC PAIN OF BOTH KNEES: ICD-10-CM

## 2019-01-09 DIAGNOSIS — J31.0 CHRONIC RHINITIS: ICD-10-CM

## 2019-01-10 ENCOUNTER — HOSPITAL ENCOUNTER (OUTPATIENT)
Dept: PHYSICAL THERAPY | Age: 39
Setting detail: THERAPIES SERIES
Discharge: HOME | End: 2019-01-10
Payer: MEDICAID

## 2019-01-10 DIAGNOSIS — M25.561 CHRONIC PAIN OF BOTH KNEES: ICD-10-CM

## 2019-01-10 DIAGNOSIS — M25.562 CHRONIC PAIN OF BOTH KNEES: ICD-10-CM

## 2019-01-10 DIAGNOSIS — G89.29 CHRONIC PAIN OF BOTH KNEES: ICD-10-CM

## 2019-01-10 RX ORDER — HYDROCHLOROTHIAZIDE 25 MG/1
25 TABLET ORAL DAILY
Qty: 30 TABLET | Refills: 5 | Status: SHIPPED | OUTPATIENT
Start: 2019-01-10 | End: 2019-09-28 | Stop reason: SDUPTHER

## 2019-01-10 RX ORDER — FLUTICASONE PROPIONATE 50 MCG
SPRAY, SUSPENSION (ML) NASAL
Qty: 16 G | Refills: 3 | Status: SHIPPED | OUTPATIENT
Start: 2019-01-10 | End: 2019-05-12 | Stop reason: SDUPTHER

## 2019-01-10 RX ORDER — ACETAMINOPHEN 500 MG
TABLET ORAL
Qty: 60 TABLET | Refills: 2 | Status: SHIPPED | OUTPATIENT
Start: 2019-01-10 | End: 2019-04-12 | Stop reason: SDUPTHER

## 2019-01-11 RX ORDER — MELOXICAM 15 MG/1
TABLET ORAL
Qty: 30 TABLET | Refills: 2 | Status: SHIPPED | OUTPATIENT
Start: 2019-01-11 | End: 2019-01-16

## 2019-01-16 ENCOUNTER — OFFICE VISIT (OUTPATIENT)
Dept: INTERNAL MEDICINE | Age: 39
End: 2019-01-16
Payer: MEDICAID

## 2019-01-16 VITALS
WEIGHT: 258 LBS | HEIGHT: 76 IN | HEART RATE: 62 BPM | BODY MASS INDEX: 31.42 KG/M2 | SYSTOLIC BLOOD PRESSURE: 112 MMHG | DIASTOLIC BLOOD PRESSURE: 73 MMHG

## 2019-01-16 DIAGNOSIS — E66.9 CLASS 1 OBESITY WITHOUT SERIOUS COMORBIDITY WITH BODY MASS INDEX (BMI) OF 31.0 TO 31.9 IN ADULT, UNSPECIFIED OBESITY TYPE: ICD-10-CM

## 2019-01-16 DIAGNOSIS — R73.02 IGT (IMPAIRED GLUCOSE TOLERANCE): ICD-10-CM

## 2019-01-16 DIAGNOSIS — I10 ESSENTIAL HYPERTENSION: Primary | ICD-10-CM

## 2019-01-16 DIAGNOSIS — F32.A MILD DEPRESSION: ICD-10-CM

## 2019-01-16 DIAGNOSIS — K76.0 FATTY LIVER DISEASE, NONALCOHOLIC: ICD-10-CM

## 2019-01-16 PROCEDURE — G8482 FLU IMMUNIZE ORDER/ADMIN: HCPCS | Performed by: INTERNAL MEDICINE

## 2019-01-16 PROCEDURE — G8417 CALC BMI ABV UP PARAM F/U: HCPCS | Performed by: INTERNAL MEDICINE

## 2019-01-16 PROCEDURE — 99211 OFF/OP EST MAY X REQ PHY/QHP: CPT | Performed by: INTERNAL MEDICINE

## 2019-01-16 PROCEDURE — 1036F TOBACCO NON-USER: CPT | Performed by: INTERNAL MEDICINE

## 2019-01-16 PROCEDURE — G8427 DOCREV CUR MEDS BY ELIG CLIN: HCPCS | Performed by: INTERNAL MEDICINE

## 2019-01-16 PROCEDURE — 99213 OFFICE O/P EST LOW 20 MIN: CPT | Performed by: INTERNAL MEDICINE

## 2019-01-16 RX ORDER — IBUPROFEN 800 MG/1
800 TABLET ORAL EVERY 8 HOURS PRN
Qty: 30 TABLET | Refills: 3 | Status: SHIPPED | OUTPATIENT
Start: 2019-01-16 | End: 2019-02-14 | Stop reason: SDUPTHER

## 2019-01-16 RX ORDER — LISINOPRIL 20 MG/1
20 TABLET ORAL DAILY
Qty: 30 TABLET | Refills: 6 | Status: SHIPPED | OUTPATIENT
Start: 2019-01-16 | End: 2019-10-03 | Stop reason: SDUPTHER

## 2019-01-17 ENCOUNTER — HOSPITAL ENCOUNTER (OUTPATIENT)
Dept: PHYSICAL THERAPY | Age: 39
Setting detail: THERAPIES SERIES
Discharge: HOME | End: 2019-01-17
Payer: MEDICAID

## 2019-01-17 PROCEDURE — 97110 THERAPEUTIC EXERCISES: CPT

## 2019-01-17 PROCEDURE — 97140 MANUAL THERAPY 1/> REGIONS: CPT

## 2019-01-22 ENCOUNTER — APPOINTMENT (OUTPATIENT)
Dept: PHYSICAL THERAPY | Age: 39
End: 2019-01-22
Payer: MEDICAID

## 2019-01-25 ENCOUNTER — HOSPITAL ENCOUNTER (OUTPATIENT)
Dept: PHYSICAL THERAPY | Age: 39
Setting detail: THERAPIES SERIES
Discharge: HOME | End: 2019-01-25
Payer: MEDICAID

## 2019-01-25 PROCEDURE — 97110 THERAPEUTIC EXERCISES: CPT

## 2019-01-28 ENCOUNTER — HOSPITAL ENCOUNTER (OUTPATIENT)
Dept: PHYSICAL THERAPY | Age: 39
Setting detail: THERAPIES SERIES
Discharge: HOME | End: 2019-01-28
Payer: MEDICAID

## 2019-01-28 PROCEDURE — 97110 THERAPEUTIC EXERCISES: CPT

## 2019-01-30 ENCOUNTER — HOSPITAL ENCOUNTER (OUTPATIENT)
Dept: PHYSICAL THERAPY | Age: 39
Setting detail: THERAPIES SERIES
Discharge: HOME | End: 2019-01-30
Payer: MEDICAID

## 2019-02-04 ENCOUNTER — HOSPITAL ENCOUNTER (OUTPATIENT)
Dept: PHYSICAL THERAPY | Age: 39
Setting detail: THERAPIES SERIES
Discharge: HOME | End: 2019-02-04
Payer: MEDICAID

## 2019-02-04 PROCEDURE — 97140 MANUAL THERAPY 1/> REGIONS: CPT

## 2019-02-04 PROCEDURE — 97110 THERAPEUTIC EXERCISES: CPT

## 2019-02-05 ENCOUNTER — CARE COORDINATION (OUTPATIENT)
Dept: INTERNAL MEDICINE | Age: 39
End: 2019-02-05

## 2019-02-05 ASSESSMENT — PATIENT HEALTH QUESTIONNAIRE - PHQ9
SUM OF ALL RESPONSES TO PHQ QUESTIONS 1-9: 2
SUM OF ALL RESPONSES TO PHQ QUESTIONS 1-9: 2

## 2019-02-11 ENCOUNTER — HOSPITAL ENCOUNTER (OUTPATIENT)
Dept: PHYSICAL THERAPY | Age: 39
Setting detail: THERAPIES SERIES
Discharge: HOME | End: 2019-02-11
Payer: MEDICAID

## 2019-02-12 ENCOUNTER — OFFICE VISIT (OUTPATIENT)
Dept: BEHAVIORAL/MENTAL HEALTH CLINIC | Age: 39
End: 2019-02-12
Payer: MEDICAID

## 2019-02-12 DIAGNOSIS — F33.0 MAJOR DEPRESSIVE DISORDER, RECURRENT, MILD (HCC): Primary | ICD-10-CM

## 2019-02-12 PROCEDURE — 90832 PSYTX W PT 30 MINUTES: CPT | Performed by: PSYCHOLOGIST

## 2019-02-13 ENCOUNTER — HOSPITAL ENCOUNTER (OUTPATIENT)
Dept: PHYSICAL THERAPY | Age: 39
Setting detail: THERAPIES SERIES
Discharge: HOME | End: 2019-02-13
Payer: MEDICAID

## 2019-02-13 PROCEDURE — 97110 THERAPEUTIC EXERCISES: CPT

## 2019-02-13 PROCEDURE — 97016 VASOPNEUMATIC DEVICE THERAPY: CPT

## 2019-02-14 DIAGNOSIS — G89.29 CHRONIC PAIN OF BOTH KNEES: ICD-10-CM

## 2019-02-14 DIAGNOSIS — M25.561 CHRONIC PAIN OF BOTH KNEES: ICD-10-CM

## 2019-02-14 DIAGNOSIS — M25.562 CHRONIC PAIN OF BOTH KNEES: ICD-10-CM

## 2019-02-14 RX ORDER — IBUPROFEN 800 MG/1
800 TABLET ORAL EVERY 8 HOURS PRN
Qty: 30 TABLET | Refills: 3 | Status: SHIPPED | OUTPATIENT
Start: 2019-02-14 | End: 2019-04-12 | Stop reason: ALTCHOICE

## 2019-02-19 ENCOUNTER — HOSPITAL ENCOUNTER (OUTPATIENT)
Dept: PHYSICAL THERAPY | Age: 39
Setting detail: THERAPIES SERIES
Discharge: HOME | End: 2019-02-19
Payer: MEDICAID

## 2019-02-19 PROCEDURE — 97110 THERAPEUTIC EXERCISES: CPT

## 2019-02-22 ENCOUNTER — HOSPITAL ENCOUNTER (OUTPATIENT)
Dept: PHYSICAL THERAPY | Age: 39
Setting detail: THERAPIES SERIES
Discharge: HOME | End: 2019-02-22
Payer: MEDICAID

## 2019-02-22 PROCEDURE — 97110 THERAPEUTIC EXERCISES: CPT

## 2019-02-28 ENCOUNTER — HOSPITAL ENCOUNTER (OUTPATIENT)
Dept: PHYSICAL THERAPY | Age: 39
Setting detail: THERAPIES SERIES
Discharge: HOME | End: 2019-02-28
Payer: MEDICAID

## 2019-02-28 PROCEDURE — 97110 THERAPEUTIC EXERCISES: CPT

## 2019-02-28 PROCEDURE — 97140 MANUAL THERAPY 1/> REGIONS: CPT

## 2019-02-28 PROCEDURE — 97112 NEUROMUSCULAR REEDUCATION: CPT

## 2019-03-01 ENCOUNTER — HOSPITAL ENCOUNTER (OUTPATIENT)
Dept: PHYSICAL THERAPY | Age: 39
Setting detail: THERAPIES SERIES
Discharge: HOME | End: 2019-03-01
Payer: MEDICAID

## 2019-03-01 PROCEDURE — 97140 MANUAL THERAPY 1/> REGIONS: CPT

## 2019-03-01 PROCEDURE — 97110 THERAPEUTIC EXERCISES: CPT

## 2019-03-04 ENCOUNTER — OFFICE VISIT (OUTPATIENT)
Dept: BEHAVIORAL/MENTAL HEALTH CLINIC | Age: 39
End: 2019-03-04
Payer: MEDICAID

## 2019-03-04 DIAGNOSIS — F33.0 MAJOR DEPRESSIVE DISORDER, RECURRENT, MILD (HCC): Primary | ICD-10-CM

## 2019-03-04 PROCEDURE — 90837 PSYTX W PT 60 MINUTES: CPT | Performed by: PSYCHOLOGIST

## 2019-03-05 ENCOUNTER — HOSPITAL ENCOUNTER (OUTPATIENT)
Dept: PHYSICAL THERAPY | Age: 39
Setting detail: THERAPIES SERIES
Discharge: HOME | End: 2019-03-05
Payer: MEDICAID

## 2019-03-05 PROCEDURE — 97140 MANUAL THERAPY 1/> REGIONS: CPT

## 2019-03-05 PROCEDURE — 97110 THERAPEUTIC EXERCISES: CPT

## 2019-03-12 ENCOUNTER — HOSPITAL ENCOUNTER (OUTPATIENT)
Dept: PHYSICAL THERAPY | Age: 39
Setting detail: THERAPIES SERIES
Discharge: HOME | End: 2019-03-12
Payer: MEDICAID

## 2019-03-12 PROCEDURE — 97140 MANUAL THERAPY 1/> REGIONS: CPT

## 2019-03-12 PROCEDURE — 97110 THERAPEUTIC EXERCISES: CPT

## 2019-03-12 PROCEDURE — 97530 THERAPEUTIC ACTIVITIES: CPT

## 2019-03-18 ENCOUNTER — OFFICE VISIT (OUTPATIENT)
Dept: BEHAVIORAL/MENTAL HEALTH CLINIC | Age: 39
End: 2019-03-18
Payer: MEDICAID

## 2019-03-18 DIAGNOSIS — F33.0 MAJOR DEPRESSIVE DISORDER, RECURRENT, MILD (HCC): Primary | ICD-10-CM

## 2019-03-18 PROCEDURE — 90832 PSYTX W PT 30 MINUTES: CPT | Performed by: PSYCHOLOGIST

## 2019-03-18 NOTE — PATIENT INSTRUCTIONS
1. You do not have to live up to anyone else's expectations for you, and s/he does not have to live up to expectations for him/her. 2. How do you define yourself? What variables about your identity do you see as important? 3. Follow up in 2 weeks.

## 2019-03-18 NOTE — PROGRESS NOTES
identity do you see as important? 3. Follow up in 2 weeks. Diagnosis:  The encounter diagnosis was Major depressive disorder, recurrent, mild ([facility] [address] Diagnosis Date    Anxiety     Arthritis     CAD (coronary artery disease)     Fatty liver     Fatty liver disease, nonalcoholic 4/38/6362    Hypertension     Obesity     Unspecified sleep apnea     cpap       History:    Social History:   Social History     Socioeconomic History    Marital status: Single     Spouse name: Not on file    Number of children: Not on file    Years of education: Not on file    Highest education level: Not on file   Occupational History    Not on file   Social Needs    Financial resource strain: Not on file    Food insecurity:     Worry: Not on file     Inability: Not on file    Transportation needs:     Medical: Not on file     Non-medical: Not on file   Tobacco Use    Smoking status: Never Smoker    Smokeless tobacco: Never Used   Substance and Sexual Activity    Alcohol use: No    Drug use: No    Sexual activity: Not on file   Lifestyle    Physical activity:     Days per week: Not on file     Minutes per session: Not on file    Stress: Not on file   Relationships    Social connections:     Talks on phone: Not on file     Gets together: Not on file     Attends Catholic service: Not on file     Active member of club or organization: Not on file     Attends meetings of clubs or organizations: Not on file     Relationship status: Not on file    Intimate partner violence:     Fear of current or ex partner: Not on file     Emotionally abused: Not on file     Physically abused: Not on file     Forced sexual activity: Not on file   Other Topics Concern    Not on file   Social History Narrative    ** Merged History Encounter **            TOBACCO:   reports that he has never smoked. He has never used smokeless tobacco.  ETOH:   reports that he does not drink alcohol.     Family History:   Family History   Problem Relation Age of Onset    Arthritis Mother     Diabetes Father     Arthritis Father     Stroke Maternal Grandmother     Heart Disease Maternal Grandmother     Stroke Maternal Grandfather     Heart Disease Maternal Grandfather     Early Death Maternal Grandfather     Stroke Paternal Grandmother     Heart Disease Paternal Grandmother     Stroke Paternal Grandfather     Heart Disease Paternal Grandfather     Early Death Paternal Grandfather

## 2019-03-19 ENCOUNTER — HOSPITAL ENCOUNTER (OUTPATIENT)
Dept: PHYSICAL THERAPY | Age: 39
Setting detail: THERAPIES SERIES
Discharge: HOME | End: 2019-03-19
Payer: MEDICAID

## 2019-03-19 PROCEDURE — 97110 THERAPEUTIC EXERCISES: CPT

## 2019-04-02 ENCOUNTER — CARE COORDINATION (OUTPATIENT)
Dept: CARE COORDINATION | Age: 39
End: 2019-04-02

## 2019-04-02 NOTE — CARE COORDINATION
ACC: Sara Giraldo RN  CM Risk Score: 5  Yamilet Mortality Risk Score:      Care Coordination Interventions    Program Enrollment:  Complex Care  Referral from Primary Care Provider:  No  Suggested Interventions and Community Resources  Behavorial Health: In Process  Physical Therapy: In Process        and Ambulatory Care Coordination Note  4/2/2019  CM Risk Score: 5  Yamilet Mortality Risk Score:      ACC: Sara Giraldo RN    Summary Note: called patient left message for a return call. If no return call by 2, will attempt again. No return call   Attempt in a week        Care Coordination Interventions    Program Enrollment:  Complex Care  Referral from Primary Care Provider:  No  Suggested Interventions and Community Resources  Behavorial Health: In Process  Physical Therapy: In Process         Goals Addressed     None          Prior to Admission medications    Medication Sig Start Date End Date Taking?  Authorizing Provider   diclofenac sodium (VOLTAREN) 1 % GEL Apply 2 g topically 2 times daily 2/14/19   Chi Ferreira MD   ibuprofen (ADVIL;MOTRIN) 800 MG tablet Take 1 tablet by mouth every 8 hours as needed for Pain 2/14/19   Chi Ferreira MD   lisinopril (PRINIVIL;ZESTRIL) 20 MG tablet Take 1 tablet by mouth daily 1/16/19   Chi Ferreira MD   metoprolol tartrate (LOPRESSOR) 25 MG tablet take 1 tablet by mouth twice a day 1/16/19   Chi Ferreira MD   fluticasone [facility]) 50 MCG/ACT nasal spray instill 1 spray into each nostril once daily 1/10/19   Chi Ferreira MD   RA ACETAMINOPHEN EX  MG tablet take 1 tablet by mouth twice a day if needed for pain 1/10/19   Chi Ferreira MD   hydrochlorothiazide (HYDRODIURIL) 25 MG tablet Take 1 tablet by mouth daily 1/10/19   Chi Ferreira MD   VENTOLIN  (90 Base) MCG/ACT inhaler inhale 2 puffs by mouth every 6 hours if needed for wheezing or shortness of breath (SPACE OUT TO EVERY 6 HOURS AS SYMPTOMS IMPROVE.) 11/13/18   Ishan Chinchilla Sarina Montano MD       Future Appointments   Date Time Provider Kathi Jackson   4/3/2019  8:00 AM Jj Sequeira PSYD Psych [address] [facility]   7/17/2019  1:00 PM Katarzyna Swain MD [address] IM Michael Colmenares

## 2019-04-03 ENCOUNTER — OFFICE VISIT (OUTPATIENT)
Dept: BEHAVIORAL/MENTAL HEALTH CLINIC | Age: 39
End: 2019-04-03
Payer: MEDICAID

## 2019-04-03 DIAGNOSIS — F33.0 MAJOR DEPRESSIVE DISORDER, RECURRENT, MILD (HCC): Primary | ICD-10-CM

## 2019-04-03 PROCEDURE — 90834 PSYTX W PT 45 MINUTES: CPT | Performed by: PSYCHOLOGIST

## 2019-04-03 NOTE — PATIENT INSTRUCTIONS
1. Everyone can only try so much to help other people around him/her to become \"better,\" (e.g., less ignorant and more educated, more independent while acknowledging the need for support, more empathetic and less bigoted, more respectful and mature) before you decide that \"system\" is closed. This is tough and sad, yet when you accept that is the current reality you will be able to reclaim your values and start taking responsibility for your life, your heart, your mind. Trying all the \"experiments\" with your family is not been a waste of time, but building the case to walk away with your values intact, to life without guilt or shame that you didn't \"save them. \"  2. Follow up in 2 weeks.

## 2019-04-03 NOTE — PROGRESS NOTES
Behavioral Health Consultation  Elham JASON  Licensed Clinical Psychologist #[id number]  4/3/2019  7:42 AM- 8:30 AM    Time spent with Patient: 48 minutes  This is patient's 36th  [facility] consultation. Reason for Consult:  depression  Referring Provider: Mark Frederick MD    Feedback given to PCP. S:   Previous Recommendation(s):   1. You do not have to live up to anyone else's expectations for you, and s/he does not have to live up to expectations for him/her. 2. How do you define yourself? What variables about your identity do you see as important? 3. Follow up in 2 weeks. Pt reports working on his resume and studying online for drivers test, to good effect on his mood. O:  MSE:   Mood was Euthymic, with Full & Congruent affect. Suicidal ideation was denied. Homicidal ideation was denied. Hygiene was Good. Dress was Appropriate and Casual.   Behavior was WNL & unremarkable with Sometimes observation or self-report of difficulties standing/ambulating. Attitude was Cooperative and [place]. Eye-contact was Good. Speech: rate- WNL, rhythm-  WNL, volume- WNL  Verbalizations were  goal-directed and verbose. Thought processes were intact and goal-oriented without evidence of delusions, hallucinations, obsessions, or andrew; with moderate cognitive distortions. Associations were characterized by intact cognitive processes. Pt was orientated oriented to person, place, time, and general circumstances;  recent:  good and fair and remote:  good and fair. Insight and judgment were estimated to be fair, AEB, a fair  understanding of cyclical maladaptive patterns, and the ability to use insight to inform behavior change. A:   Provided psychoeducation around dynamic systems and how they can be either open or close to new information and \"updates\" to processing information. Encouraged experimenting with various ways to help better align with his family.      Pt interventions:  Supportive techniques and CBT to target cyclical maladaptive patterns. Pt Behavioral Change Plan:   1. Everyone can only try so much to help other people around him/her to become \"better,\" (e.g., less ignorant and more educated, more independent while acknowledging the need for support, more empathetic and less bigoted, more respectful and mature) before you decide that \"system\" is closed. This is tough and sad, yet when you accept that is the current reality you will be able to reclaim your values and start taking responsibility for your life, your heart, your mind. Trying all the \"experiments\" with your family is not been a waste of time, but building the case to walk away with your values intact, to life without guilt or shame that you didn't \"save them. \"  2. Follow up in 2 weeks. Diagnosis:  The encounter diagnosis was Major depressive disorder, recurrent, mild ([facility] [address]       Diagnosis Date    Anxiety     Arthritis     CAD (coronary artery disease)     Fatty liver     Fatty liver disease, nonalcoholic 2/61/0059    Hypertension     Obesity     Unspecified sleep apnea     cpap       History:    Social History:   Social History     Socioeconomic History    Marital status: Single     Spouse name: Not on file    Number of children: Not on file    Years of education: Not on file    Highest education level: Not on file   Occupational History    Not on file   Social Needs    Financial resource strain: Not on file    Food insecurity:     Worry: Not on file     Inability: Not on file    Transportation needs:     Medical: Not on file     Non-medical: Not on file   Tobacco Use    Smoking status: Never Smoker    Smokeless tobacco: Never Used   Substance and Sexual Activity    Alcohol use: No    Drug use: No    Sexual activity: Not on file   Lifestyle    Physical activity:     Days per week: Not on file     Minutes per session: Not on file    Stress: Not on file   Relationships    Social connections: Talks on phone: Not on file     Gets together: Not on file     Attends Jew service: Not on file     Active member of club or organization: Not on file     Attends meetings of clubs or organizations: Not on file     Relationship status: Not on file    Intimate partner violence:     Fear of current or ex partner: Not on file     Emotionally abused: Not on file     Physically abused: Not on file     Forced sexual activity: Not on file   Other Topics Concern    Not on file   Social History Narrative    ** Merged History Encounter **            TOBACCO:   reports that he has never smoked. He has never used smokeless tobacco.  ETOH:   reports that he does not drink alcohol.     Family History:   Family History   Problem Relation Age of Onset    Arthritis Mother     Diabetes Father     Arthritis Father     Stroke Maternal Grandmother     Heart Disease Maternal Grandmother     Stroke Maternal Grandfather     Heart Disease Maternal Grandfather     Early Death Maternal Grandfather     Stroke Paternal Grandmother     Heart Disease Paternal Grandmother     Stroke Paternal Grandfather     Heart Disease Paternal Grandfather     Early Death Paternal Grandfather

## 2019-04-10 ENCOUNTER — CARE COORDINATION (OUTPATIENT)
Dept: CARE COORDINATION | Age: 39
End: 2019-04-10

## 2019-04-10 DIAGNOSIS — M25.562 CHRONIC PAIN OF BOTH KNEES: ICD-10-CM

## 2019-04-10 DIAGNOSIS — I10 ESSENTIAL HYPERTENSION: ICD-10-CM

## 2019-04-10 DIAGNOSIS — M25.561 CHRONIC PAIN OF BOTH KNEES: ICD-10-CM

## 2019-04-10 DIAGNOSIS — J31.0 CHRONIC RHINITIS: ICD-10-CM

## 2019-04-10 DIAGNOSIS — G89.29 CHRONIC PAIN OF BOTH KNEES: ICD-10-CM

## 2019-04-10 NOTE — CARE COORDINATION
ACC: Michelle Grossman RN  CM Risk Score: 5  Yamilet Mortality Risk Score:      Care Coordination Interventions    Program Enrollment:  Complex Care  Referral from Primary Care Provider:  No  Suggested Interventions and Community Resources  Behavorial Health: In Process  Physical Therapy: In Process        and Ambulatory Care Coordination Note  4/10/2019  CM Risk Score: 5  Yamilet Mortality Risk Score:      ACC: Michelle Grossman RN    Summary Note: call to patient. Message left with contact information. Will call in 1 week to see if patient wants to continue with care coordination. Care Coordination Interventions    Program Enrollment:  Complex Care  Referral from Primary Care Provider:  No  Suggested Interventions and Community Resources  Behavorial Health: In Process  Physical Therapy: In Process         Goals Addressed     None          Prior to Admission medications    Medication Sig Start Date End Date Taking?  Authorizing Provider   diclofenac sodium (VOLTAREN) 1 % GEL Apply 2 g topically 2 times daily 2/14/19   Emili Kemp MD   ibuprofen (ADVIL;MOTRIN) 800 MG tablet Take 1 tablet by mouth every 8 hours as needed for Pain 2/14/19   Emili Kemp MD   lisinopril (PRINIVIL;ZESTRIL) 20 MG tablet Take 1 tablet by mouth daily 1/16/19   Emili Kemp MD   metoprolol tartrate (LOPRESSOR) 25 MG tablet take 1 tablet by mouth twice a day 1/16/19   Emili Kmep MD   fluticasone [facility]) 50 MCG/ACT nasal spray instill 1 spray into each nostril once daily 1/10/19   Emili Kemp MD   RA ACETAMINOPHEN EX  MG tablet take 1 tablet by mouth twice a day if needed for pain 1/10/19   Emili Kemp MD   hydrochlorothiazide (HYDRODIURIL) 25 MG tablet Take 1 tablet by mouth daily 1/10/19   Emili Kemp MD   VENTOLIN  (90 Base) MCG/ACT inhaler inhale 2 puffs by mouth every 6 hours if needed for wheezing or shortness of breath (SPACE OUT TO EVERY 6 HOURS AS SYMPTOMS IMPROVE.) 11/13/18 Ewa Juarez MD       Future Appointments   Date Time Provider Kathi Jackson   4/22/2019  8:00 AM Elizabeth Sommers PSYD [facility]   7/17/2019  1:00 PM Ewa Juarez MD [facility] IM [address]

## 2019-04-11 NOTE — TELEPHONE ENCOUNTER
Escribe request for 8 medications .   Please escribe if appropriate      Next Visit Date:  7/17/19     Health Maintenance   Topic Date Due    Potassium monitoring  07/13/2019    Creatinine monitoring  07/13/2019    Lipid screen  01/18/2023    DTaP/Tdap/Td vaccine (3 - Td) 04/27/2028    Flu vaccine  Completed    Pneumococcal 0-64 years Vaccine  Completed    HIV screen  Completed       Hemoglobin A1C (%)   Date Value   07/10/2018 5.5   01/18/2018 5.5   07/20/2017 5.9             ( goal A1C is < 7)   No results found for: LABMICR  LDL Cholesterol (mg/dL)   Date Value   01/18/2018 113       (goal LDL is <100)   AST (U/L)   Date Value   07/13/2018 13     ALT (U/L)   Date Value   07/13/2018 13     BUN (mg/dL)   Date Value   07/13/2018 15     BP Readings from Last 3 Encounters:   01/16/19 112/73   11/27/18 132/83   09/18/18 119/73          (goal 120/80)          Patient Active Problem List:     Hypertension     Fatty liver disease, nonalcoholic     MIGEL on CPAP     Positive depression screening     Vitamin D deficiency     IGT (impaired glucose tolerance)

## 2019-04-12 DIAGNOSIS — M25.562 CHRONIC PAIN OF BOTH KNEES: ICD-10-CM

## 2019-04-12 DIAGNOSIS — M25.561 CHRONIC PAIN OF BOTH KNEES: ICD-10-CM

## 2019-04-12 DIAGNOSIS — G89.29 CHRONIC PAIN OF BOTH KNEES: ICD-10-CM

## 2019-04-12 RX ORDER — HYDROCHLOROTHIAZIDE 25 MG/1
25 TABLET ORAL DAILY
Qty: 30 TABLET | Refills: 5 | OUTPATIENT
Start: 2019-04-12

## 2019-04-12 RX ORDER — ACETAMINOPHEN 500 MG
TABLET ORAL
Qty: 60 TABLET | Refills: 2 | OUTPATIENT
Start: 2019-04-12

## 2019-04-12 RX ORDER — FLUTICASONE PROPIONATE 50 MCG
SPRAY, SUSPENSION (ML) NASAL
Qty: 16 G | Refills: 3 | OUTPATIENT
Start: 2019-04-12

## 2019-04-12 RX ORDER — LISINOPRIL 20 MG/1
20 TABLET ORAL DAILY
Qty: 30 TABLET | Refills: 6 | OUTPATIENT
Start: 2019-04-12

## 2019-04-12 RX ORDER — MELOXICAM 15 MG/1
TABLET ORAL
Qty: 30 TABLET | Refills: 2 | Status: SHIPPED | OUTPATIENT
Start: 2019-04-12 | End: 2019-05-14 | Stop reason: ALTCHOICE

## 2019-04-12 RX ORDER — IBUPROFEN 800 MG/1
800 TABLET ORAL EVERY 8 HOURS PRN
Qty: 30 TABLET | Refills: 3 | OUTPATIENT
Start: 2019-04-12

## 2019-04-12 RX ORDER — ALBUTEROL SULFATE 90 UG/1
AEROSOL, METERED RESPIRATORY (INHALATION)
Qty: 18 G | Refills: 3 | OUTPATIENT
Start: 2019-04-12

## 2019-04-12 NOTE — TELEPHONE ENCOUNTER
E-scribe request for Acetaminophen 500 MG. Please review and e-scribe if applicable.        Next Visit Date:  4/12/2019    Health Maintenance   Topic Date Due    Potassium monitoring  07/13/2019    Creatinine monitoring  07/13/2019    Lipid screen  01/18/2023    DTaP/Tdap/Td vaccine (3 - Td) 04/27/2028    Flu vaccine  Completed    Pneumococcal 0-64 years Vaccine  Completed    HIV screen  Completed             (applicable per patient's age: Cancer Screenings, Depression Screening, Fall Risk Screening, Immunizations)    Hemoglobin A1C (%)   Date Value   07/10/2018 5.5   01/18/2018 5.5   07/20/2017 5.9     LDL Cholesterol (mg/dL)   Date Value   01/18/2018 113     AST (U/L)   Date Value   07/13/2018 13     ALT (U/L)   Date Value   07/13/2018 13     BUN (mg/dL)   Date Value   07/13/2018 15      (goal A1C is < 7)   (goal LDL is <100) need 30-50% reduction from baseline     BP Readings from Last 3 Encounters:   01/16/19 112/73   11/27/18 132/83   09/18/18 119/73    (goal /80)      All Future Testing planned in [facility]:      Next Visit Date:  Future Appointments   Date Time Provider Kathi Jackson   4/22/2019  8:00 AM Bernabe Graham PSYD Psych [address]   7/17/2019  1:00 PM Blanca Hartmann MD [facility]            Patient Active Problem List:     Hypertension     Fatty liver disease, nonalcoholic     MIGEL on CPAP     Positive depression screening     Vitamin D deficiency     IGT (impaired glucose tolerance)

## 2019-04-12 NOTE — TELEPHONE ENCOUNTER
E-scribe request for Ibuprofen 800 MG. Please review and e-scribe if applicable.        Next Visit Date:  7/17/2019    Health Maintenance   Topic Date Due    Potassium monitoring  07/13/2019    Creatinine monitoring  07/13/2019    Lipid screen  01/18/2023    DTaP/Tdap/Td vaccine (3 - Td) 04/27/2028    Flu vaccine  Completed    Pneumococcal 0-64 years Vaccine  Completed    HIV screen  Completed             (applicable per patient's age: Cancer Screenings, Depression Screening, Fall Risk Screening, Immunizations)    Hemoglobin A1C (%)   Date Value   07/10/2018 5.5   01/18/2018 5.5   07/20/2017 5.9     LDL Cholesterol (mg/dL)   Date Value   01/18/2018 113     AST (U/L)   Date Value   07/13/2018 13     ALT (U/L)   Date Value   07/13/2018 13     BUN (mg/dL)   Date Value   07/13/2018 15      (goal A1C is < 7)   (goal LDL is <100) need 30-50% reduction from baseline     BP Readings from Last 3 Encounters:   01/16/19 112/73   11/27/18 132/83   09/18/18 119/73    (goal /80)      All Future Testing planned in [facility]:      Next Visit Date:  Future Appointments   Date Time Provider Kathi Jackson   4/22/2019  8:00 AM Enrique Hansen PSYD Psych [address]   7/17/2019  1:00 PM Chi Ferreira MD [facility] IM [facility]            Patient Active Problem List:     Hypertension     Fatty liver disease, nonalcoholic     MIGEL on CPAP     Positive depression screening     Vitamin D deficiency     IGT (impaired glucose tolerance)

## 2019-04-15 RX ORDER — ACETAMINOPHEN 500 MG
TABLET ORAL
Qty: 60 TABLET | Refills: 2 | Status: SHIPPED | OUTPATIENT
Start: 2019-04-15 | End: 2019-08-04 | Stop reason: SDUPTHER

## 2019-04-15 RX ORDER — IBUPROFEN 800 MG/1
800 TABLET ORAL EVERY 8 HOURS PRN
Qty: 30 TABLET | Refills: 3 | Status: SHIPPED | OUTPATIENT
Start: 2019-04-15 | End: 2019-05-14 | Stop reason: SDUPTHER

## 2019-04-17 ENCOUNTER — HOSPITAL ENCOUNTER (OUTPATIENT)
Dept: PHYSICAL THERAPY | Age: 39
Setting detail: THERAPIES SERIES
Discharge: HOME | End: 2019-04-17
Payer: MEDICAID

## 2019-04-17 ENCOUNTER — CARE COORDINATION (OUTPATIENT)
Dept: CARE COORDINATION | Age: 39
End: 2019-04-17

## 2019-04-17 PROCEDURE — 97110 THERAPEUTIC EXERCISES: CPT

## 2019-04-17 PROCEDURE — 97161 PT EVAL LOW COMPLEX 20 MIN: CPT

## 2019-04-17 PROCEDURE — G0283 ELEC STIM OTHER THAN WOUND: HCPCS

## 2019-04-17 ASSESSMENT — PAIN DESCRIPTION - ORIENTATION: ORIENTATION: LOWER

## 2019-04-17 ASSESSMENT — PAIN DESCRIPTION - FREQUENCY: FREQUENCY: CONTINUOUS

## 2019-04-17 ASSESSMENT — PAIN DESCRIPTION - DESCRIPTORS: DESCRIPTORS: ACHING;BURNING

## 2019-04-17 ASSESSMENT — PAIN - FUNCTIONAL ASSESSMENT: PAIN_FUNCTIONAL_ASSESSMENT: ACTIVITIES ARE NOT PREVENTED

## 2019-04-17 ASSESSMENT — PAIN DESCRIPTION - PAIN TYPE: TYPE: CHRONIC PAIN

## 2019-04-17 ASSESSMENT — PAIN DESCRIPTION - PROGRESSION: CLINICAL_PROGRESSION: GRADUALLY WORSENING

## 2019-04-17 ASSESSMENT — PAIN DESCRIPTION - LOCATION: LOCATION: BACK

## 2019-04-17 ASSESSMENT — PAIN DESCRIPTION - ONSET: ONSET: ON-GOING

## 2019-04-17 ASSESSMENT — PAIN SCALES - GENERAL: PAINLEVEL_OUTOF10: 9

## 2019-04-17 NOTE — PROGRESS NOTES
Walk  Occupation: Unemployed  Leisure & Hobbies: poetry, riding bike, fixing things, video games, plumbing, lawn care  Additional Comments: Pt states he does everything for everone else despite the pain. Objective     Observation/Palpation  Posture: Good  Palpation: tender R>L lumbar paraspinals    AROM RLE (degrees)  RLE AROM: WFL  AROM LLE (degrees)  LLE AROM : WFL  Spine  Lumbar: WFL- inc pain with flex and ext. Inc LE pain with extension  Special Tests: REIL inc LLE sxs; RFIL no change    Strength RLE  Strength RLE: WFL  Strength LLE  Strength LLE: WFL     Additional Measures  Special Tests: SLR(-) tomy  Sensation  Overall Sensation Status: WNL  Bed mobility  Scooting: Independent  Transfers  Sit to Stand: Independent  Stand to sit: Independent          Balance  Posture: Good  Exercises  Exercise 1: supine trA contraction 10x3\"  Exercise 2: DLS alt UE x 10  Exercise 3: DLS alt LE x 10  Ortho Screen  Posture: unremarkable  Muscle Spasm: R>L lumbar paraspinals     Assessment   Conditions Requiring Skilled Therapeutic Intervention  Body structures, Functions, Activity limitations: Increased Pain  Assessment: Pt would benefit from short term therapy to focus on HEP for lumbar/core strengthening. May benefit from aquatic therapy if poor tolerance to land. Treatment Diagnosis: back pain M54.5  Prognosis: Fair  Decision Making: Low Complexity  History: >300# wt loss in two yrs, HTN  Exam: sympt not reproduced with movement or positions, ROM, strength, palpation  Clinical Presentation: stable  Patient Education: HEP as noted in ex's above BID  Barriers to Learning: possible learning deficit  Treatment Initiated : HP, stim, ther ex/HEP  Activity Tolerance  Activity Tolerance: Patient Tolerated treatment well         Plan  Patient Goals: \"Decrease my pain so I can get my drivers license and go to work. \"     Comments/Assessment:    Rehab Potential:  [] Good  [x] Fair  [] Poor   Suggested Professional Referral:  [x] No

## 2019-04-17 NOTE — CARE COORDINATION
ACC: Dougie Bonds RN  CM Risk Score: 5  Yamilet Mortality Risk Score:      Care Coordination Interventions    Program Enrollment:  Complex Care  Referral from Primary Care Provider:  No  Suggested Interventions and Community Resources  Behavorial Health: In Process  Physical Therapy: In Process        and Ambulatory Care Coordination Note  4/17/2019  CM Risk Score: 5  Yamilet Mortality Risk Score:      ACC: Dougie Bonds RN    Summary Note: message left on voice mail for a return call. Will attempt call after 2 pm. If no return call will attempt in 3-4 days  Attempted call. Message left on voicemail. Care Coordination Interventions    Program Enrollment:  Complex Care  Referral from Primary Care Provider:  No  Suggested Interventions and Community Resources  Behavorial Health: In Process  Physical Therapy: In Process         Goals Addressed     None          Prior to Admission medications    Medication Sig Start Date End Date Taking?  Authorizing Provider   RA ACETAMINOPHEN EX  MG tablet take 1 tablet by mouth twice a day if needed for pain 4/15/19   Glenys Hawk MD   ibuprofen (ADVIL;MOTRIN) 800 MG tablet Take 1 tablet by mouth every 8 hours as needed for Pain 4/15/19   Glenys Hawk MD   meloxicam (MOBIC) 15 MG tablet take 1 tablet by mouth once daily 4/12/19   Nick Alaniz DO   lisinopril (PRINIVIL;ZESTRIL) 20 MG tablet Take 1 tablet by mouth daily 1/16/19   Glenys Hawk MD   metoprolol tartrate (LOPRESSOR) 25 MG tablet take 1 tablet by mouth twice a day 1/16/19   Glenys Hawk MD   fluticasone [facility]) 50 MCG/ACT nasal spray instill 1 spray into each nostril once daily 1/10/19   Glenys Hawk MD   hydrochlorothiazide (HYDRODIURIL) 25 MG tablet Take 1 tablet by mouth daily 1/10/19   Glenys Hawk MD   VENTOLIN  (90 Base) MCG/ACT inhaler inhale 2 puffs by mouth every 6 hours if needed for wheezing or shortness of breath (SPACE OUT TO EVERY 6 HOURS AS SYMPTOMS IMPROVE.) 11/13/18   Latanya Zuniga MD       Future Appointments   Date Time Provider Kathi Jackson   4/17/2019 11:00 AM James Aranda PT DWAINE PAGAN PT [facility]   4/22/2019  8:00 AM Khoi Vidales PSYD Psych [facility]   7/17/2019  1:00 PM Latanya Zuniga MD [facility] JAN Wagn

## 2019-04-22 ENCOUNTER — HOSPITAL ENCOUNTER (OUTPATIENT)
Dept: PHYSICAL THERAPY | Age: 39
Setting detail: THERAPIES SERIES
Discharge: HOME | End: 2019-04-22
Payer: MEDICAID

## 2019-04-22 ENCOUNTER — OFFICE VISIT (OUTPATIENT)
Dept: BEHAVIORAL/MENTAL HEALTH CLINIC | Age: 39
End: 2019-04-22
Payer: MEDICAID

## 2019-04-22 DIAGNOSIS — F33.0 MAJOR DEPRESSIVE DISORDER, RECURRENT, MILD (HCC): Primary | ICD-10-CM

## 2019-04-22 PROCEDURE — 97110 THERAPEUTIC EXERCISES: CPT

## 2019-04-22 PROCEDURE — 90832 PSYTX W PT 30 MINUTES: CPT | Performed by: PSYCHOLOGIST

## 2019-04-22 PROCEDURE — G0283 ELEC STIM OTHER THAN WOUND: HCPCS

## 2019-04-22 ASSESSMENT — PAIN DESCRIPTION - FREQUENCY: FREQUENCY: CONTINUOUS

## 2019-04-22 ASSESSMENT — PAIN DESCRIPTION - ORIENTATION: ORIENTATION: LOWER

## 2019-04-22 ASSESSMENT — PAIN DESCRIPTION - PAIN TYPE: TYPE: CHRONIC PAIN

## 2019-04-22 ASSESSMENT — PAIN DESCRIPTION - LOCATION: LOCATION: BACK

## 2019-04-22 ASSESSMENT — PAIN SCALES - GENERAL: PAINLEVEL_OUTOF10: 9

## 2019-04-22 ASSESSMENT — PAIN DESCRIPTION - DESCRIPTORS: DESCRIPTORS: ACHING;BURNING

## 2019-04-22 ASSESSMENT — PAIN DESCRIPTION - PROGRESSION: CLINICAL_PROGRESSION: GRADUALLY WORSENING

## 2019-04-22 ASSESSMENT — PAIN DESCRIPTION - ONSET: ONSET: ON-GOING

## 2019-04-22 NOTE — PATIENT INSTRUCTIONS
1. See handout on growth vs fixed mindset. How can you continue to develop your growth mindset? You cannot change your family's decisions and choices about how they decide to interact with you. You can choose ____________ (think about choices that you do have). 2. See handout on ways to think about how intensely to say \"no. \"  3. Follow up in 2 weeks.

## 2019-04-22 NOTE — PROGRESS NOTES
Behavioral Health Consultation  Winston JASON  Licensed Clinical Psychologist #[id number]  4/22/2019  8:00 AM- 8:30 AM    Time spent with Patient: 30 minutes  This is patient's 37th  [facility] consultation. Reason for Consult:  depression and stress  Referring Provider: Yulisa Higgins MD    Feedback given to PCP. S:   Previous Recommendation(s):   1. Everyone can only try so much to help other people around him/her to become \"better,\" (e.g., less ignorant and more educated, more independent while acknowledging the need for support, more empathetic and less bigoted, more respectful and mature) before you decide that \"system\" is closed. This is tough and sad, yet when you accept that is the current reality you will be able to reclaim your values and start taking responsibility for your life, your heart, your mind. Trying all the \"experiments\" with your family is not been a waste of time, but building the case to walk away with your values intact, to life without guilt or shame that you didn't \"save them. \"  2. Follow up in 2 weeks. Pt distressed and dysregulated this morning. Reports difficult time with his family and their either inability or unwillingness to be open to hearing his opinion (e.g., not wanting to accept any job or to work with his cousin, not willing to learn new and more healthy ways to cook as he has). Significanlly he was receiving a lot of support from his grandmother and considered her home a safe place; however, today he feels betrayed by her attempts to get him to work with a cousin. Additionally, the very people that want his \"help\" now are the people who did not help him through his medical crisis or his weight loss efforts. O:  MSE:   Mood was Depressed and Irritable, with Congruent affect. Suicidal ideation was denied. Homicidal ideation was denied. Hygiene was Good and Fair.   Dress was Appropriate and Casual.   Behavior was characterized by psychomotor agitation with Sometimes observation or self-report of difficulties standing/ambulating. Attitude was Help-seeking. Eye-contact was Fair. Speech: rate- Rapid, rhythm-  WNL; however, more difficult to understand today, volume- WNL  Verbalizations were  verbose. Thought processes were intact and goal-oriented without evidence of delusions, hallucinations, obsessions, or andrew; with moderate cognitive distortions. Associations were characterized by circumstantial and racing thoughts cognitive processes. Pt was orientated oriented to person, place, time, and general circumstances;  recent:  good and fair and remote:  good and fair. Insight and judgment were estimated to be fair, AEB, a fair  understanding of cyclical maladaptive patterns, and the ability to use insight to inform behavior change. A:   Encouaged pt to processes his very real and valid thoughts and feelings about his family and current occupational situation. Encouraged flexible thinking approach around what options he does have, as he cannot change his/her behavior. During session he discussed obtaining his 's license and employment. Pt interventions:  Supportive techniques and CBT to target cyclical maladaptive patterns and interpersonal distress. Pt Behavioral Change Plan:   1. See handout on growth vs fixed mindset. How can you continue to develop your growth mindset? You cannot change your family's decisions and choices about how they decide to interact with you. You can choose ____________ (think about choices that you do have). 2. See handout on ways to think about how intensely to say \"no. \"  3. Follow up in 2 weeks. Diagnosis:  The encounter diagnosis was Major depressive disorder, recurrent, mild ([facility] [address]       Diagnosis Date    Anxiety     Arthritis     CAD (coronary artery disease)     Fatty liver     Fatty liver disease, nonalcoholic 8/33/9728    Hypertension     Obesity     Unspecified sleep apnea     cpap Grandfather     Heart Disease Paternal Grandfather     Early Death Paternal Grandfather

## 2019-04-24 ENCOUNTER — CARE COORDINATION (OUTPATIENT)
Dept: INTERNAL MEDICINE | Age: 39
End: 2019-04-24

## 2019-04-24 ASSESSMENT — PATIENT HEALTH QUESTIONNAIRE - PHQ9
SUM OF ALL RESPONSES TO PHQ QUESTIONS 1-9: 2
SUM OF ALL RESPONSES TO PHQ QUESTIONS 1-9: 2

## 2019-04-26 ENCOUNTER — HOSPITAL ENCOUNTER (OUTPATIENT)
Dept: PHYSICAL THERAPY | Age: 39
Setting detail: THERAPIES SERIES
Discharge: HOME | End: 2019-04-26
Payer: MEDICAID

## 2019-04-26 PROCEDURE — 97110 THERAPEUTIC EXERCISES: CPT

## 2019-04-26 PROCEDURE — G0283 ELEC STIM OTHER THAN WOUND: HCPCS

## 2019-04-26 NOTE — PROGRESS NOTES
Physical Therapy  Daily Treatment Note  Date: 2019  Patient Name: Renee Connelly  MRN:   [id number]     :   1980     Subjective:   General  Additional Pertinent Hx: weight loss, HTN  Referring Practitioner: Karol Mcleod  PT Visit Information  Onset Date: 19  PT Insurance Information: [employer] of [place]  Total # of Visits to Date: 3  Subjective  Subjective: Patient again noting significant complaints of pain in the (B) midthoracic region that radiates in triangular shape into lower lumbar spine, to L5 region. High pain levels today, unsure of reason why. Did note that he thought that the ES was temporarily helpful. Patient Currently in Pain: Yes  Pain Assessment  Pain Assessment: 0-10  Pain Level: 8  Patient's Stated Pain Goal: 1  Pain Type: Chronic pain  Pain Location: Back  Pain Orientation: Lower  Pain Radiating Towards: (L) more than (R) intermittently into (B) LEs, (L) ankle  Pain Descriptors: Aching;Burning  Pain Frequency: Continuous  Pain Onset: On-going  Clinical Progression: Gradually worsening  Vital Signs  Patient Currently in Pain: Yes       Treatment Activities:   Exercises  Exercise 1: supine trA contraction 10x3\"  Exercise 2: DLS alt UE x 10  Exercise 3: DLS alt LE x 10  Exercise 4: DKTC 10 x 5\"  Exercise 5:  SKTC 10 x 5\"  Exercise 6: LTR 15x  Exercise 7: glut/piriformis stretch 10 x 5\"  Exercise 8:  Standing rows/extension R3 20x     Modalities: ES/HP supine with wedge 15'      Assessment:   Conditions Requiring Skilled Therapeutic Intervention  Body structures, Functions, Activity limitations: Increased Pain  Assessment: Continued moderate to maximal pain levels, was able to add the resisted exercises per the flow sheet today with minimal complaints of increased symptoms.   Treatment Diagnosis: back pain M54.5  Prognosis: Fair  Decision Making: Low Complexity      Goals:  Short term goals  Time Frame for Short term goals: 6 visits  Short term goal 1: Decrease pain to 7 or less  Short term goal 2: Good demo of HEP given  Short term goal 3: Centralize LBP  Short term goal 4: set LTG as appropriate  Long term goals  Time Frame for Long term goals : 12 visits  Long term goal 1: to be determined  Patient Goals   Patient goals : Get rid of my pain so I can drive and work.      Therapy Time    Individual Concurrent Group Co-treatment   Time In  56      Time Out  1100      Minutes  30         Eugenie Lynch, PT

## 2019-04-29 ENCOUNTER — HOSPITAL ENCOUNTER (OUTPATIENT)
Dept: PHYSICAL THERAPY | Age: 39
Setting detail: THERAPIES SERIES
Discharge: HOME | End: 2019-04-29
Payer: MEDICAID

## 2019-04-29 PROCEDURE — 97110 THERAPEUTIC EXERCISES: CPT

## 2019-04-29 PROCEDURE — G0283 ELEC STIM OTHER THAN WOUND: HCPCS

## 2019-04-30 NOTE — PROGRESS NOTES
Physical Therapy  Daily Treatment Note  Date: 2019  Patient Name: Ranjeet Castro  DIY:   [id number]     :   1980     Subjective:   General  Additional Pertinent Hx: weight loss, HTN  Referring Practitioner: Tyler Castro  PT Visit Information  Onset Date: 19  PT Insurance Information: [facility] [employer] of [place]  Total # of Visits to Date: 4  Subjective  Subjective: Patient noting slightly improved back pain today, pain only at 6/10 today and in low back up to lateral thoracic region in triangle pattern. Patient Currently in Pain: Yes  Pain Assessment  Pain Assessment: 0-10  Pain Level: 6  Patient's Stated Pain Goal: 1  Pain Type: Chronic pain  Pain Location: Back  Pain Orientation: Lower  Pain Radiating Towards: (L) more than (R) intermittently into (B) LEs, (L) ankle  Pain Descriptors: Aching;Burning  Pain Frequency: Continuous  Pain Onset: On-going  Clinical Progression: Gradually worsening  Vital Signs  Patient Currently in Pain: Yes       Treatment Activities:   Exercises  Exercise 1: supine trA contraction 10x3\"  Exercise 2: DLS alt UE x 10  Exercise 3: DLS alt LE x 10  Exercise 4: DKTC 10 x 5\"  Exercise 5:  SKTC 10 x 5\"  Exercise 6: LTR 15x  Exercise 7: glut/piriformis stretch 10 x 5\"  Exercise 8:  Standing rows/extension R3 20x  Exercise 9:  Rotation R3 10x (B)  Exercise 10: bridges 15x     Modalities: ES/HP supine with wedge 15'     Assessment:   Conditions Requiring Skilled Therapeutic Intervention  Body structures, Functions, Activity limitations: Increased Pain  Assessment: Able to tolerate additional exercises without increased complaints of pain.   Treatment Diagnosis: back pain M54.5  Prognosis: Fair  Decision Making: Low Complexity      Goals:  Short term goals  Time Frame for Short term goals: 6 visits  Short term goal 1: Decrease pain to 7 or less  Short term goal 2: Good demo of HEP given  Short term goal 3: Centralize LBP  Short term goal 4: set LTG as appropriate  Long term goals  Time Frame for Long term goals : 12 visits  Long term goal 1: to be determined  Patient Goals   Patient goals : Get rid of my pain so I can drive and work.      Therapy Time    Individual Concurrent Group Co-treatment   Time In  1130         Time Out  1210         Minutes  40           Treatment Charges: Minutes Units   []  Ultrasound     [x]  Electrical-Stim 15 1   []  Iontophoresis     []  Traction     []  Massage       []  Eval     []  Gait     []  Ther Exercise 25  2    []  Manual Therapy       []  Ther Activities       []  Aquatics     []  Vasopneumatic Device     []  Neuro Re-Ed       []  Other       Total Treatment Time: 36  3        Monik Milton, PT

## 2019-05-02 ENCOUNTER — HOSPITAL ENCOUNTER (EMERGENCY)
Age: 39
Discharge: HOME | End: 2019-05-02
Attending: EMERGENCY MEDICINE
Payer: MEDICAID

## 2019-05-02 VITALS
RESPIRATION RATE: 16 BRPM | SYSTOLIC BLOOD PRESSURE: 141 MMHG | OXYGEN SATURATION: 99 % | TEMPERATURE: 98.4 F | HEART RATE: 65 BPM | DIASTOLIC BLOOD PRESSURE: 86 MMHG

## 2019-05-02 DIAGNOSIS — M54.50 CHRONIC BILATERAL LOW BACK PAIN WITHOUT SCIATICA: Primary | ICD-10-CM

## 2019-05-02 DIAGNOSIS — G89.29 CHRONIC BILATERAL LOW BACK PAIN WITHOUT SCIATICA: Primary | ICD-10-CM

## 2019-05-02 PROCEDURE — 6370000000 HC RX 637 (ALT 250 FOR IP): Performed by: EMERGENCY MEDICINE

## 2019-05-02 PROCEDURE — 99282 EMERGENCY DEPT VISIT SF MDM: CPT

## 2019-05-02 RX ORDER — IBUPROFEN 800 MG/1
800 TABLET ORAL ONCE
Status: COMPLETED | OUTPATIENT
Start: 2019-05-02 | End: 2019-05-02

## 2019-05-02 RX ORDER — KETOROLAC TROMETHAMINE 10 MG/1
20 TABLET, FILM COATED ORAL ONCE
Status: DISCONTINUED | OUTPATIENT
Start: 2019-05-02 | End: 2019-05-02 | Stop reason: CLARIF

## 2019-05-02 RX ADMIN — IBUPROFEN 800 MG: 800 TABLET, FILM COATED ORAL at 09:38

## 2019-05-02 ASSESSMENT — ENCOUNTER SYMPTOMS
NAUSEA: 0
CONSTIPATION: 0
SORE THROAT: 0
VOMITING: 0
BACK PAIN: 1
DIARRHEA: 0
WHEEZING: 0
BLOOD IN STOOL: 0
COUGH: 0
SHORTNESS OF BREATH: 0
ABDOMINAL PAIN: 0

## 2019-05-02 ASSESSMENT — PAIN DESCRIPTION - ORIENTATION: ORIENTATION: LOWER

## 2019-05-02 ASSESSMENT — PAIN DESCRIPTION - PAIN TYPE: TYPE: CHRONIC PAIN

## 2019-05-02 ASSESSMENT — PAIN DESCRIPTION - FREQUENCY: FREQUENCY: CONTINUOUS

## 2019-05-02 ASSESSMENT — PAIN DESCRIPTION - LOCATION: LOCATION: BACK

## 2019-05-02 ASSESSMENT — PAIN SCALES - GENERAL: PAINLEVEL_OUTOF10: 10

## 2019-05-02 NOTE — ED PROVIDER NOTES
[facility] ED  Emergency Department Encounter  EmergencyMedicine Resident     Pt Tootie Magana  MRN: [id number]  [place] 1980  Date of evaluation: 5/2/19  PCP:  Amadou Huertas MD    CHIEF COMPLAINT       Chief Complaint   Patient presents with    Back Pain       HISTORY OF PRESENT ILLNESS  (Location/Symptom, Timing/Onset, Context/Setting, Quality, Duration, Modifying Factors, Severity.)      Deandre Barr is a 37 y.o. male who presents with primary complaint that he is having chronic lower back pain more so on left side he has no radiculopathy he has no loss of sensation in the lower extremity no bowel or bladder incontinence, he reports no new falls. The patient has had this low back pain since May 2018. He is been going to physical therapy for this chronic low back pain. He has x-ray imaging that revealed no acute fractures no acute anterior or posterior listhesis. PAST MEDICAL / SURGICAL / SOCIAL / FAMILY HISTORY      has a past medical history of Anxiety, Arthritis, CAD (coronary artery disease), Fatty liver, Fatty liver disease, nonalcoholic, Hypertension, Obesity, and Unspecified sleep apnea. has no past surgical history on file.     Social History     Socioeconomic History    Marital status: Single     Spouse name: Not on file    Number of children: Not on file    Years of education: Not on file    Highest education level: Not on file   Occupational History    Not on file   Social Needs    Financial resource strain: Not on file    Food insecurity:     Worry: Not on file     Inability: Not on file    Transportation needs:     Medical: Not on file     Non-medical: Not on file   Tobacco Use    Smoking status: Never Smoker    Smokeless tobacco: Never Used   Substance and Sexual Activity    Alcohol use: No    Drug use: No    Sexual activity: Not on file   Lifestyle    Physical activity:     Days per week: Not on file     Minutes per session: Not hydrochlorothiazide (HYDRODIURIL) 25 MG tablet Take 1 tablet by mouth daily 1/10/19   Katarzyna Swain MD   VENTOLIN  (90 Base) MCG/ACT inhaler inhale 2 puffs by mouth every 6 hours if needed for wheezing or shortness of breath (SPACE OUT TO EVERY 6 HOURS AS SYMPTOMS IMPROVE.) 11/13/18   Katarzyna Swain MD       REVIEW OF SYSTEMS    (2-9 systems for level 4, 10 or more for level 5)      Review of Systems   Constitutional: Negative for diaphoresis and fever. HENT: Negative for sore throat. Respiratory: Negative for cough, shortness of breath and wheezing. Cardiovascular: Negative for chest pain, palpitations and leg swelling. Gastrointestinal: Negative for abdominal pain, blood in stool, constipation, diarrhea, nausea and vomiting. Genitourinary: Negative for dysuria, frequency and hematuria. Musculoskeletal: Positive for back pain. Negative for neck pain. Skin: Negative for rash. Neurological: Negative for dizziness and headaches. Hematological: Does not bruise/bleed easily. PHYSICAL EXAM   (up to 7 for level 4, 8 or more for level 5)      INITIAL VITALS:   BP (!) 141/86   Pulse 65   Temp 98.4 °F (36.9 °C) (Oral)   Resp 16   SpO2 99%     Physical Exam   Constitutional: He is oriented to person, place, and time. He appears well-developed and well-nourished. No distress. HENT:   Head: Normocephalic and atraumatic. Cardiovascular: Normal rate, regular rhythm and normal heart sounds. Exam reveals no gallop and no friction rub. No murmur heard. Pulmonary/Chest: Effort normal and breath sounds normal. No respiratory distress. He has no wheezes. He has no rales. Abdominal: Soft. Bowel sounds are normal. He exhibits no distension. There is no tenderness. There is no guarding. Musculoskeletal: He exhibits tenderness. He exhibits no edema or deformity.    Patient has some mild tenderness more so on the left paraspinal than the right paraspinal.   Neurological: He is alert and oriented to person, place, and time. Skin: Skin is warm and dry. He is not diaphoretic. No erythema. Psychiatric: He has a normal mood and affect. His behavior is normal.       DIFFERENTIAL  DIAGNOSIS     PLAN (LABS / IMAGING / EKG):  No orders of the defined types were placed in this encounter. MEDICATIONS ORDERED:  Orders Placed This Encounter   Medications    ketorolac (TORADOL) tablet 20 mg     Order Specific Question:   Please select a reason the therapeutic interchange was not accepted: Answer: Other (Please Comment)       DDX: PE, vertebral fracture, epidural abscess  Lower: cauda equina, herniated disc   Mid: AAA rupture, pyelonephritis, kidney stone, pancreatitis, PUD  Upper: pneumothorax, aortic dissection, PE, nonspecific    Evaluate for: trauma, duration, IV drug use, urinary retention, pulsatile abdominal mass, equal femoral pulses, full ROM, 2+ distal pulses, <2 cap refill, no saddle anesthesia, spinal tenderness, para-spinal tenderness, straight leg raise, CVA tenderness. Rectal exam for cauda equina      DIAGNOSTIC RESULTS / EMERGENCY DEPARTMENT COURSE / [facility]     LABS:  No results found for this visit on 05/02/19. RADIOLOGY:     No results found. MDM/EMERGENCY DEPARTMENT COURSE:      ED Course as of May 02 0920   Thu May 02, 2019   0916 A healthy male that is overweight complaining of chronic low back pain, offered steroids but did not want to use those will give dose of oral Toradol as he did not want a shot, patient's vitals are stable he has no red flag signs including no loss of our bladder control no radiculopathy is able to ambulate on his own accord. Itching of his abdomen from less than 1 year ago does not show any acute abdominal dilation.     [KW]   3577 Discussed follow-up with patient, patient has not followed with pain clinic and considering the chronicity of his low back pain as well as his attempt at physical therapy we will refer him to the pain clinic for further evaluation. [KW]      ED Course User Index  [KW] Deon Jasso DO         PROCEDURES:  None    CONSULTS:  None    CRITICAL CARE:  None    FINAL IMPRESSION      1.  Low back pain          DISPOSITION / PLAN     DISPOSITION     PATIENT REFERRED TO:  MD Charity Agarwal [address] [facility] [address]  [address] [address] [facility] [address]  [phone]  Schedule an appointment as soon as possible for a visit   For Follow Up      DISCHARGE MEDICATIONS:  New Prescriptions    No medications on file       Deon Jasso DO  Emergency Medicine Resident    (Please note that portions of this note were completed with a voicerecognition program.  Efforts were made to edit the dictations but occasionally words are mis-transcribed.)       Deon Jasso DO  05/02/19 0911

## 2019-05-02 NOTE — ED NOTES
Pt arrived to ER with reports of lower back pain x 3 weeks. Pt reports going to therapy 3 days a week and \"they are working me good\" causing continued pain. Pt stated he is taking Ibuprofen with some relief but doesn't \"take it away\". Pt ambulated to room, steady gait noted. No new injury.  Pt updated      Gustavo Preciado RN  05/02/19 0372

## 2019-05-06 ENCOUNTER — HOSPITAL ENCOUNTER (OUTPATIENT)
Dept: PHYSICAL THERAPY | Age: 39
Setting detail: THERAPIES SERIES
Discharge: HOME | End: 2019-05-06
Payer: MEDICAID

## 2019-05-06 PROCEDURE — 97110 THERAPEUTIC EXERCISES: CPT

## 2019-05-06 ASSESSMENT — PAIN DESCRIPTION - FREQUENCY: FREQUENCY: CONTINUOUS

## 2019-05-06 ASSESSMENT — PAIN SCALES - GENERAL: PAINLEVEL_OUTOF10: 10

## 2019-05-06 ASSESSMENT — PAIN DESCRIPTION - LOCATION: LOCATION: BACK

## 2019-05-06 ASSESSMENT — PAIN DESCRIPTION - DESCRIPTORS: DESCRIPTORS: ACHING;BURNING

## 2019-05-06 ASSESSMENT — PAIN DESCRIPTION - PAIN TYPE: TYPE: CHRONIC PAIN

## 2019-05-06 ASSESSMENT — PAIN DESCRIPTION - PROGRESSION: CLINICAL_PROGRESSION: GRADUALLY WORSENING

## 2019-05-06 ASSESSMENT — PAIN DESCRIPTION - ORIENTATION: ORIENTATION: LOWER

## 2019-05-06 ASSESSMENT — PAIN DESCRIPTION - ONSET: ONSET: ON-GOING

## 2019-05-06 NOTE — PROGRESS NOTES
Physical Therapy  Daily Treatment Note  Date: 2019  Patient Name: Christy Thrasher  MRN: [id number]     :   1980    Subjective:   General  Additional Pertinent Hx: weight loss, HTN  Referring Practitioner: Sheryle Overcast  PT Visit Information  Onset Date: 19  PT Insurance Information: [facility] [employer]. of [place]  Total # of Visits to Date: 4  No Show: 1  Subjective  Subjective: Stated forgot about last appointment session. Had severe incident and increase of pain at the end of last week per the patient, was helping friend move out of home/apartment and this resulted in severe pain the next day. Went to ER and had injection in area which helped. Physician is aware of incident. Pain Screening  Patient Currently in Pain: Yes  Pain Assessment  Pain Assessment: 0-10  Pain Level: 10  Patient's Stated Pain Goal: No pain  Pain Type: Chronic pain  Pain Location: Back  Pain Orientation: Lower  Pain Radiating Towards: (L) more than (R) intermittently into (B) LEs, (L) ankle  Pain Descriptors: Aching;Burning  Pain Frequency: Continuous  Pain Onset: On-going  Clinical Progression: Gradually worsening  Vital Signs  Patient Currently in Pain: Yes       Treatment Activities:   Exercises  Exercise 1: supine trA contraction 10x3\"  Exercise 2: DLS alt UE x 10  Exercise 3: DLS alt LE x 10  Exercise 4: DKTC 10 x 5\"  Exercise 5:  SKTC 10 x 5\"  Exercise 6: LTR 15x  Exercise 7: glut/piriformis stretch 10 x 5\"  Exercise 8: shoulder extension R3 15x  Exercise 9: scapular rows R3 15x  Exercise 10: trunk rotation R3 15x  Exercise 11: posterior pelvic tilt 15x  Exercise 12:  abdominal ISOMs 15x  Exercise 13: bridges 15x      Assessment:   Conditions Requiring Skilled Therapeutic Intervention  Body structures, Functions, Activity limitations: Increased Pain  Assessment: Higher pain levels today, wanted to skip modalities to see how he is truly responding to therapy per the patient.   Was able to do exercises per flow sheet with soreness but not increased symptoms. Treatment Diagnosis: back pain M54.5  Prognosis: Fair  Decision Making: Low Complexity     Goals:  Short term goals  Time Frame for Short term goals: 6 visits  Short term goal 1: Decrease pain to 7 or less  Short term goal 2: Good demo of HEP given  Short term goal 3: Centralize LBP  Short term goal 4: set LTG as appropriate  Long term goals  Time Frame for Long term goals : 12 visits  Long term goal 1: to be determined  Patient Goals   Patient goals : Get rid of my pain so I can drive and work. Plan:    Continue per POC at this time.     Therapy Time   Individual Concurrent Group Co-treatment   Time In  1000         Time Out  1030         Minutes  30             Treatment Charges: Minutes Units   []  Ultrasound     []  Electrical-Stim     []  Iontophoresis     []  Traction     []  Massage       []  Eval     []  Gait     []  Ther Exercise  25 2    []  Manual Therapy       []  Ther Activities       []  Aquatics     []  Vasopneumatic Device     []  Neuro Re-Ed       []  Other       Total Treatment Time: 25  2            Sabino Salomon PT

## 2019-05-07 ENCOUNTER — OFFICE VISIT (OUTPATIENT)
Dept: BEHAVIORAL/MENTAL HEALTH CLINIC | Age: 39
End: 2019-05-07
Payer: MEDICAID

## 2019-05-07 DIAGNOSIS — F33.0 MAJOR DEPRESSIVE DISORDER, RECURRENT, MILD (HCC): Primary | ICD-10-CM

## 2019-05-07 PROCEDURE — 90832 PSYTX W PT 30 MINUTES: CPT | Performed by: PSYCHOLOGIST

## 2019-05-07 NOTE — PROGRESS NOTES
Behavioral Health Consultation  Corwin JASON  Licensed Clinical Psychologist #[id number]  5/7/2019  7:57 AM- 8:30 AM    Time spent with Patient: 33 minutes  This is patient's 36th  [facility] consultation. Reason for Consult:  depression  Referring Provider: Ron Small MD    Feedback given to PCP. S:   Previous Recommendation(s):   1. See handout on growth vs fixed mindset. How can you continue to develop your growth mindset? You cannot change your family's decisions and choices about how they decide to interact with you. You can choose ____________ (think about choices that you do have). 2. See handout on ways to think about how intensely to say \"no. \"  3. Follow up in 2 weeks. Pt continues to struggle with setting limits with his family and describes significant depressive feelings related to these interactions that he describes as unempathetic, understanding or supportive. O:  MSE:   Mood was Depressed and Irritable, with Congruent affect. Suicidal ideation was denied. Homicidal ideation was denied. Hygiene was Good. Dress was Appropriate and Casual.   Behavior was characterized by psychomotor agitation with Yes observation or self-report of difficulties standing/ambulating. Attitude was Distant and Help-seeking. Eye-contact was Fair. Speech: rate- Rapid, rhythm-  more dysarthric today, volume- WNL  Verbalizations were  repetitive and verbose. Thought processes were intact and goal-oriented without evidence of delusions, hallucinations, obsessions, or andrew; with significant cognitive distortions. Associations were characterized by circumstantial cognitive processes. Pt was orientated oriented to person, place, time, and general circumstances;  recent:  good and fair and remote:  good and fair. Insight and judgment were estimated to be fair, AEB, a fair  understanding of cyclical maladaptive patterns, and the ability to use insight to inform behavior change.      A:   Encouraged taking a on file     Relationship status: Not on file    Intimate partner violence:     Fear of current or ex partner: Not on file     Emotionally abused: Not on file     Physically abused: Not on file     Forced sexual activity: Not on file   Other Topics Concern    Not on file   Social History Narrative    ** Merged History Encounter **            TOBACCO:   reports that he has never smoked. He has never used smokeless tobacco.  ETOH:   reports that he does not drink alcohol.     Family History:   Family History   Problem Relation Age of Onset    Arthritis Mother     Diabetes Father     Arthritis Father     Stroke Maternal Grandmother     Heart Disease Maternal Grandmother     Stroke Maternal Grandfather     Heart Disease Maternal Grandfather     Early Death Maternal Grandfather     Stroke Paternal Grandmother     Heart Disease Paternal Grandmother     Stroke Paternal Grandfather     Heart Disease Paternal Grandfather     Early Death Paternal Grandfather

## 2019-05-07 NOTE — PATIENT INSTRUCTIONS
1. See handouts on problem solving and biosocial theory; specifically look at the section of invalidating environments. Apply this to your current frustration(s). 2. Follow up in 2 weeks.

## 2019-05-08 ENCOUNTER — HOSPITAL ENCOUNTER (OUTPATIENT)
Dept: PHYSICAL THERAPY | Age: 39
Setting detail: THERAPIES SERIES
Discharge: HOME | End: 2019-05-08
Payer: MEDICAID

## 2019-05-08 PROCEDURE — 97110 THERAPEUTIC EXERCISES: CPT

## 2019-05-08 PROCEDURE — G0283 ELEC STIM OTHER THAN WOUND: HCPCS

## 2019-05-10 ASSESSMENT — PAIN DESCRIPTION - ORIENTATION: ORIENTATION: LOWER

## 2019-05-10 ASSESSMENT — PAIN DESCRIPTION - FREQUENCY: FREQUENCY: CONTINUOUS

## 2019-05-10 ASSESSMENT — PAIN DESCRIPTION - PROGRESSION: CLINICAL_PROGRESSION: GRADUALLY WORSENING

## 2019-05-10 ASSESSMENT — PAIN DESCRIPTION - LOCATION: LOCATION: BACK

## 2019-05-10 ASSESSMENT — PAIN DESCRIPTION - ONSET: ONSET: ON-GOING

## 2019-05-10 ASSESSMENT — PAIN SCALES - GENERAL: PAINLEVEL_OUTOF10: 10

## 2019-05-10 ASSESSMENT — PAIN DESCRIPTION - PAIN TYPE: TYPE: CHRONIC PAIN

## 2019-05-10 ASSESSMENT — PAIN DESCRIPTION - DESCRIPTORS: DESCRIPTORS: ACHING;BURNING

## 2019-05-10 NOTE — PROGRESS NOTES
Goals:  Short term goals  Time Frame for Short term goals: 6 visits  Short term goal 1: Decrease pain to 7 or less  Short term goal 2: Good demo of HEP given  Short term goal 3: Centralize LBP  Short term goal 4: set LTG as appropriate  Long term goals  Time Frame for Long term goals : 12 visits  Long term goal 1: Decrease pain to 2 or less  Long term goal 2: Able to lift/carry up to 20# without pain  Long term goal 3: Full tolerance of walking with minimal to no pain up to 30 minutes    Plan:  Continue per plan of care           Therapy Time   Individual Concurrent Group Co-treatment   Time In  1030         Time Out  1105         Minutes  35                Treatment Charges: Minutes Units   []  Ultrasound     []  Electrical-Stim     []  Iontophoresis     []  Traction     []  Massage       []  Eval     []  Gait     []  Ther Exercise 35  2    []  Manual Therapy       []  Ther Activities       []  Aquatics     []  Vasopneumatic Device     []  Neuro Re-Ed       []  Other       Total Treatment Time: 35  2         Tarik Eubanks, PT

## 2019-05-12 DIAGNOSIS — J31.0 CHRONIC RHINITIS: ICD-10-CM

## 2019-05-13 NOTE — TELEPHONE ENCOUNTER
Request for Flonase.       Next Visit Date:  Future Appointments   Date Time Provider Kathi Jackson   5/15/2019 11:15 AM Vale Wood, PT [facility]   5/17/2019 11:15 AM Vale Wood PT [facility]   5/23/2019  8:30 AM Deepika Howe PSYD Psych [facility]   7/17/2019  1:00 PM Amarjit Diaz MD [address] Maintenance   Topic Date Due    Potassium monitoring  07/13/2019    Creatinine monitoring  07/13/2019    Lipid screen  01/18/2023    DTaP/Tdap/Td vaccine (3 - Td) 04/27/2028    Flu vaccine  Completed    Pneumococcal 0-64 years Vaccine  Completed    HIV screen  Completed       Hemoglobin A1C (%)   Date Value   07/10/2018 5.5   01/18/2018 5.5   07/20/2017 5.9             ( goal A1C is < 7)   No results found for: LABMICR  LDL Cholesterol (mg/dL)   Date Value   01/18/2018 113       (goal LDL is <100)   AST (U/L)   Date Value   07/13/2018 13     ALT (U/L)   Date Value   07/13/2018 13     BUN (mg/dL)   Date Value   07/13/2018 15     BP Readings from Last 3 Encounters:   05/02/19 (!) 141/86   01/16/19 112/73   11/27/18 132/83          (goal 120/80)    All Future Testing planned in [facility]        Patient Active Problem List:     Hypertension     Fatty liver disease, nonalcoholic     MIGEL on CPAP     Positive depression screening     Vitamin D deficiency     IGT (impaired glucose tolerance)

## 2019-05-14 ENCOUNTER — OFFICE VISIT (OUTPATIENT)
Dept: INTERNAL MEDICINE | Age: 39
End: 2019-05-14
Payer: MEDICAID

## 2019-05-14 VITALS
BODY MASS INDEX: 32.47 KG/M2 | HEIGHT: 76 IN | WEIGHT: 266.6 LBS | HEART RATE: 66 BPM | DIASTOLIC BLOOD PRESSURE: 79 MMHG | SYSTOLIC BLOOD PRESSURE: 125 MMHG

## 2019-05-14 DIAGNOSIS — R73.02 IGT (IMPAIRED GLUCOSE TOLERANCE): ICD-10-CM

## 2019-05-14 DIAGNOSIS — M54.50 ACUTE BILATERAL LOW BACK PAIN WITHOUT SCIATICA: ICD-10-CM

## 2019-05-14 DIAGNOSIS — F32.A MILD DEPRESSION: ICD-10-CM

## 2019-05-14 DIAGNOSIS — I10 ESSENTIAL HYPERTENSION: Primary | ICD-10-CM

## 2019-05-14 DIAGNOSIS — K76.0 FATTY LIVER DISEASE, NONALCOHOLIC: ICD-10-CM

## 2019-05-14 PROCEDURE — 1036F TOBACCO NON-USER: CPT | Performed by: INTERNAL MEDICINE

## 2019-05-14 PROCEDURE — G8417 CALC BMI ABV UP PARAM F/U: HCPCS | Performed by: INTERNAL MEDICINE

## 2019-05-14 PROCEDURE — 99213 OFFICE O/P EST LOW 20 MIN: CPT | Performed by: INTERNAL MEDICINE

## 2019-05-14 PROCEDURE — G8427 DOCREV CUR MEDS BY ELIG CLIN: HCPCS | Performed by: INTERNAL MEDICINE

## 2019-05-14 PROCEDURE — 99211 OFF/OP EST MAY X REQ PHY/QHP: CPT | Performed by: INTERNAL MEDICINE

## 2019-05-14 RX ORDER — FLUTICASONE PROPIONATE 50 MCG
SPRAY, SUSPENSION (ML) NASAL
Qty: 16 G | Refills: 3 | Status: SHIPPED | OUTPATIENT
Start: 2019-05-14 | End: 2019-09-28 | Stop reason: SDUPTHER

## 2019-05-14 RX ORDER — TIZANIDINE 4 MG/1
4 TABLET ORAL 3 TIMES DAILY PRN
Qty: 30 TABLET | Refills: 0 | Status: SHIPPED | OUTPATIENT
Start: 2019-05-14 | End: 2019-09-09 | Stop reason: SDUPTHER

## 2019-05-14 RX ORDER — IBUPROFEN 800 MG/1
800 TABLET ORAL EVERY 8 HOURS PRN
Qty: 30 TABLET | Refills: 3 | Status: SHIPPED | OUTPATIENT
Start: 2019-05-14 | End: 2019-06-10 | Stop reason: SDUPTHER

## 2019-05-14 ASSESSMENT — ENCOUNTER SYMPTOMS
BLOOD IN STOOL: 0
COUGH: 0
BACK PAIN: 1
WHEEZING: 0
ABDOMINAL PAIN: 0
CONSTIPATION: 0
SHORTNESS OF BREATH: 0
DIARRHEA: 0

## 2019-05-14 NOTE — PATIENT INSTRUCTIONS
Medications e-scribe to pharmacy of pt's choice. Return To Clinic 7/17/2019 at 1:00 PM. After Visit Summary  given and reviewed. It is very important for your care that you keep your appointment. If for some reason you are unable to keep your appointment it is equally important that you call our office at [phone] to cancel your appointment and reschedule. Failure to do so may result in your termination from our practice.     SL

## 2019-05-14 NOTE — PROGRESS NOTES
[facility]    Progress Note    Date of patient's visit: 5/14/2019    Patient's Name:  Virginia Segovia    YOB: 1980            Patient Care Team:  Danny Olmstead MD as PCP - General (Internal Medicine)  Danny Olmstead MD as PCP - S Attributed Provider  Mack Whaley MD as Surgeon (Orthopedic Surgery)  Phuc Marcos RN as Care Coordinator    REASON FOR VISIT: Routine outpatient follow     Chief Complaint   Patient presents with    Pain     back pain x2 months and hip pain, pt. has been going to PT         HISTORY OF PRESENT ILLNESS:    History was obtained from the patient. Virginia Segovia is a 37 y.o. is here for follow-up on hypertension. He has chronic knee pain. He has mild depression. He is still following up with Dr. Lina Collins. He is complaining of some worsening of his low back pain in the last month. He apparently was helping a friend move some furniture and he fell backwards. He went to the ER. He is taking ibuprofen which is relieving his pain only partially. He has restarted physical therapy. He is asking for a muscle relaxer. He has tried that in the past and it has helped. Past Medical History:   Diagnosis Date    Anxiety     Arthritis     CAD (coronary artery disease)     Fatty liver     Fatty liver disease, nonalcoholic 0/28/1634    Hypertension     Obesity     Unspecified sleep apnea     cpap       History reviewed. No pertinent surgical history.       ALLERGIES      Allergies   Allergen Reactions    Apple Swelling     No longer allergy       MEDICATIONS:      Current Outpatient Medications on File Prior to Visit   Medication Sig Dispense Refill    fluticasone (FLONASE) 50 MCG/ACT nasal spray instill 1 spray into each nostril once daily 16 g 3    RA ACETAMINOPHEN EX  MG tablet take 1 tablet by mouth twice a day if needed for pain 60 tablet 2    ibuprofen (ADVIL;MOTRIN) 800 MG tablet Take 1 tablet by mouth every 8 hours as needed for Pain 30 tablet 3    lisinopril (PRINIVIL;ZESTRIL) 20 MG tablet Take 1 tablet by mouth daily 30 tablet 6    metoprolol tartrate (LOPRESSOR) 25 MG tablet take 1 tablet by mouth twice a day 60 tablet 2    hydrochlorothiazide (HYDRODIURIL) 25 MG tablet Take 1 tablet by mouth daily 30 tablet 5    VENTOLIN  (90 Base) MCG/ACT inhaler inhale 2 puffs by mouth every 6 hours if needed for wheezing or shortness of breath (SPACE OUT TO EVERY 6 HOURS AS SYMPTOMS IMPROVE.) 18 g 3    meloxicam (MOBIC) 15 MG tablet take 1 tablet by mouth once daily 30 tablet 2     No current facility-administered medications on file prior to visit. SOCIAL HISTORY    Reviewed and no change from previous record. Sabrina Martin  reports that he has never smoked. He has never used smokeless tobacco.    FAMILY HISTORY:    Reviewed and No change from previous visit    HEALTH MAINTENANCE DUE:    There are no preventive care reminders to display for this patient. REVIEW OF SYSTEMS:    12 point review of symptoms completed and found to be normal except noted in the HPI    Review of Systems   Constitutional: Negative for chills, fatigue and unexpected weight change. Respiratory: Negative for cough, shortness of breath and wheezing. Cardiovascular: Negative for chest pain, palpitations and leg swelling. Gastrointestinal: Negative for abdominal pain, blood in stool, constipation and diarrhea. Endocrine: Negative for polydipsia and polyuria. Genitourinary: Negative for dysuria and hematuria. Musculoskeletal: Positive for arthralgias and back pain. Neurological: Negative for dizziness, syncope, weakness and light-headedness. Psychiatric/Behavioral: Positive for dysphoric mood. Negative for sleep disturbance. The patient is not nervous/anxious.          PHYSICAL EXAM:     Vitals:    05/14/19 1247   BP: 125/79   Site: Left Upper Arm   Position: Sitting   Cuff Size: Large Adult   Pulse: 66   Weight: 266 lb 9.6 oz (120.9 kg)   Height: 6' 4\" (1.93 m)     Body mass index is 32.45 kg/m². BP Readings from Last 3 Encounters:   05/14/19 125/79   05/02/19 (!) 141/86   01/16/19 112/73        Wt Readings from Last 3 Encounters:   05/14/19 266 lb 9.6 oz (120.9 kg)   01/16/19 258 lb (117 kg)   11/27/18 225 lb (102.1 kg)       Physical Exam   Constitutional: He appears well-developed and well-nourished. No distress. HENT:   Head: Normocephalic and atraumatic. Mouth/Throat: Oropharynx is clear and moist.   Eyes: Pupils are equal, round, and reactive to light. Conjunctivae and EOM are normal.   Neck: Normal range of motion. Neck supple. No thyromegaly present. Cardiovascular: Normal rate and regular rhythm. No murmur heard. Pulmonary/Chest: Effort normal and breath sounds normal. He has no wheezes. Musculoskeletal: He exhibits no edema. Lumbar back: He exhibits decreased range of motion and tenderness. He exhibits no bony tenderness and no deformity. Neurological: He has normal strength. No cranial nerve deficit or sensory deficit. Skin: He is not diaphoretic. Nursing note and vitals reviewed.         LABORATORY FINDINGS:    CBC:  Lab Results   Component Value Date    WBC 5.7 07/13/2018    HGB 12.0 07/13/2018     07/13/2018     05/08/2012     BMP:    Lab Results   Component Value Date     07/13/2018    K 3.4 07/13/2018     07/13/2018    CO2 27 07/13/2018    BUN 15 07/13/2018    CREATININE 0.90 07/13/2018    GLUCOSE 82 07/13/2018    GLUCOSE 83 05/08/2012     HEMOGLOBIN A1C:   Lab Results   Component Value Date    LABA1C 5.5 07/10/2018     MICROALBUMIN URINE: No results found for: MICROALBUR  FASTING LIPID [email or web address]  Lab Results   Component Value Date    LDLCHOLESTEROL 113 01/18/2018       LIVER PROFILE:  Lab Results   Component Value Date    ALT 13 07/13/2018    AST 13 07/13/2018    PROT 6.9 07/13/2018    BILITOT 0.47 07/13/2018    BILIDIR 0.09 04/04/2018    LABALBU 3.7 07/13/2018      THYROID FUNCTION:   Lab Results   Component Value Date    TSH 2.26 02/09/2017      URINEANALYSIS: No results found for: LABURIN  ASSESSMENT AND PLAN:    1. Essential hypertension  Same meds    - metoprolol tartrate (LOPRESSOR) 25 MG tablet; take 1 tablet by mouth twice a day  Dispense: 60 tablet; Refill: 2    2. Acute bilateral low back pain without sciatica  Advised about sedation with muscle relaxants  Continue PT    - ibuprofen (ADVIL;MOTRIN) 800 MG tablet; Take 1 tablet by mouth every 8 hours as needed for Pain  Dispense: 30 tablet; Refill: 3  - tiZANidine (ZANAFLEX) 4 MG tablet; Take 1 tablet by mouth 3 times daily as needed (muscle spasms)  Dispense: 30 tablet; Refill: 0    3. IGT (impaired glucose tolerance)  Monitor      4. Fatty liver disease, nonalcoholic  Continue diet changes  weight loss    5. Mild depression ([facility] [address]  Follow up with           FOLLOW UP AND INSTRUCTIONS:   Return in about 3 months (around 8/14/2019). 1. Depree received counseling on the following healthy behaviors: nutrition, exercise and medication adherence    2. Reviewed prior labs and health maintenance. 3. Discussed use, benefit, and side effects of prescribed medications. Barriers to medication compliance addressed. All patient questions answered. Pt voiced understanding.      4. Patient given educational materials - see patient instructions    Racheal Yuan  Attending Physician, [address] [place] [address], Internal Medicine Residency Program  [address] [place] [address]  5/14/2019, 1:09 PM

## 2019-05-14 NOTE — PROGRESS NOTES
HYPERTENSION visit     BP Readings from Last 3 Encounters:   05/02/19 (!) 141/86   01/16/19 112/73   11/27/18 132/83       LDL Cholesterol (mg/dL)   Date Value   01/18/2018 113     HDL (mg/dL)   Date Value   01/18/2018 32 (L)     BUN (mg/dL)   Date Value   07/13/2018 15     CREATININE (mg/dL)   Date Value   07/13/2018 0.90     Glucose (mg/dL)   Date Value   07/13/2018 82   05/08/2012 83              Have you changed or started any medications since your last visit including any over-the-counter medicines, vitamins, or herbal medicines? no   Have you stopped taking any of your medications? Is so, why? -  yes - Mobic- pt. States it makes his BP really high  Are you having any side effects from any of your medications? - yes - Mobic- gives pt. High BP  How often do you miss doses of your medication? no      Have you seen any other physician or provider since your last visit?  no   Have you had any other diagnostic tests since your last visit?  no   Have you been seen in the emergency room and/or had an admission in a hospital since we last saw you?  yes - [facility] 2 weeks ago for back pain   Have you had your routine dental cleaning in the past 6 months?  no     Do you have an active MyChart account? If no, what is the barrier?   Yes    Patient Care Team:  Chester Diaz MD as PCP - General (Internal Medicine)  Chester Diaz MD as PCP - S Attributed Provider  Alicia Méndez MD as Surgeon (Orthopedic Surgery)  Siri Whitt RN as Care Coordinator    Medical History Review  Past Medical, Family, and Social History reviewed and does not contribute to the patient presenting condition    Health Maintenance   Topic Date Due    Potassium monitoring  07/13/2019    Creatinine monitoring  07/13/2019    Lipid screen  01/18/2023    DTaP/Tdap/Td vaccine (3 - Td) 04/27/2028    Flu vaccine  Completed    Pneumococcal 0-64 years Vaccine  Completed    HIV screen  Completed

## 2019-05-15 ENCOUNTER — HOSPITAL ENCOUNTER (OUTPATIENT)
Dept: PHYSICAL THERAPY | Age: 39
Setting detail: THERAPIES SERIES
Discharge: HOME | End: 2019-05-15
Payer: MEDICAID

## 2019-05-15 PROCEDURE — 97110 THERAPEUTIC EXERCISES: CPT

## 2019-05-15 PROCEDURE — G0283 ELEC STIM OTHER THAN WOUND: HCPCS

## 2019-05-16 ASSESSMENT — PAIN SCALES - GENERAL: PAINLEVEL_OUTOF10: 7

## 2019-05-16 ASSESSMENT — PAIN DESCRIPTION - LOCATION: LOCATION: BACK

## 2019-05-16 ASSESSMENT — PAIN DESCRIPTION - PAIN TYPE: TYPE: CHRONIC PAIN

## 2019-05-16 ASSESSMENT — PAIN DESCRIPTION - ORIENTATION: ORIENTATION: LOWER

## 2019-05-16 NOTE — PROGRESS NOTES
Physical Therapy  Daily Treatment Note  Date: 5/15/2019  Patient Name: Luis Treviño  MRN: [id number]     :   1980    Subjective:   General  Additional Pertinent Hx: weight loss, HTN  Referring Practitioner: Perez Hua  PT Visit Information  Onset Date: 19  PT Insurance Information: [facility]  Total # of Visits to Date: 6  No Show: 1  Subjective  Subjective: Patient again went to ER due to severe back pain with lifting/carrying. Was helping friend move, was moving multiple boxes that were greater than 20-30# and this resulted in signficantly reduced pain. Referring physician aware. Patient back down to manageable pain level,7/10 today in typical area. Pain Screening  Patient Currently in Pain: Yes  Pain Assessment  Pain Assessment: 0-10  Pain Level: 7  Patient's Stated Pain Goal: No pain  Pain Type: Chronic pain  Pain Location: Back  Pain Orientation: Lower  Pain Radiating Towards: (L) more than (R) intermittently into (B) LEs, (L) ankle  Vital Signs  Patient Currently in Pain: Yes       Treatment Activities:   Exercises  Exercise 1: supine trA contraction 10x3\"  Exercise 2: DLS alt UE x 10  Exercise 3: DLS alt LE x 10  Exercise 4: DKTC 10 x 5\"  Exercise 5:  SKTC 10 x 5\"  Exercise 6: LTR 15x  Exercise 7: glut/piriformis stretch 10 x 5\"  Exercise 8: shoulder extension R3 15x  Exercise 9: scapular rows R3 15x  Exercise 10: trunk rotation R3 15x  Exercise 11: posterior pelvic tilt 15x  Exercise 12:  abdominal ISOMs 15x  Exercise 13: bridges 15x  Exercise 14: seated ball posture 30\" x 2      Assessment:   Conditions Requiring Skilled Therapeutic Intervention  Body structures, Functions, Activity limitations: Increased Pain  Assessment: Patient is still noting continued high pain levels, noted significantly increased symptoms about 2-3 days ago with lifting boxes, this is back down to \"normal\" 7/10. Went back to ES to monitor effect on pain.   Treatment Diagnosis: back pain M54.5  Prognosis: Fair  Decision Making: Low Complexity     Goals:  Short term goals  Time Frame for Short term goals: 6 visits  Short term goal 1: Decrease pain to 7 or less  Short term goal 2: Good demo of HEP given  Short term goal 3: Centralize LBP  Short term goal 4: set LTG as appropriate  Long term goals  Time Frame for Long term goals : 12 visits  Long term goal 1: Decrease pain to 2 or less  Long term goal 2: Able to lift/carry up to 20# without pain  Long term goal 3: Full tolerance of walking with minimal to no pain up to 30 minutes  Patient Goals   Patient goals : Get rid of my pain so I can drive and work. Plan:  Patient to continue at this time, continue to monitor high pain levels.   Therapy Time   Individual Concurrent Group Co-treatment   Time In  1115         Time Out  1200         Minutes  45             Treatment Charges: Minutes Units   []  Ultrasound     [x]  Electrical-Stim 15 1   []  Iontophoresis     []  Traction     []  Massage       []  Eval     []  Gait     [x]  Ther Exercise  30  2   []  Manual Therapy       []  Ther Activities       []  Aquatics     []  Vasopneumatic Device     []  Neuro Re-Ed       []  Other       Total Treatment Time: 39 3             Rita Kee, PT

## 2019-05-23 ENCOUNTER — OFFICE VISIT (OUTPATIENT)
Dept: BEHAVIORAL/MENTAL HEALTH CLINIC | Age: 39
End: 2019-05-23
Payer: MEDICAID

## 2019-05-23 DIAGNOSIS — F33.0 MAJOR DEPRESSIVE DISORDER, RECURRENT, MILD (HCC): Primary | ICD-10-CM

## 2019-05-23 PROCEDURE — 90832 PSYTX W PT 30 MINUTES: CPT | Performed by: PSYCHOLOGIST

## 2019-05-23 NOTE — PROGRESS NOTES
file     Emotionally abused: Not on file     Physically abused: Not on file     Forced sexual activity: Not on file   Other Topics Concern    Not on file   Social History Narrative    ** Merged History Encounter **            TOBACCO:   reports that he has never smoked. He has never used smokeless tobacco.  ETOH:   reports that he does not drink alcohol.     Family History:   Family History   Problem Relation Age of Onset    Arthritis Mother     Diabetes Father     Arthritis Father     Stroke Maternal Grandmother     Heart Disease Maternal Grandmother     Stroke Maternal Grandfather     Heart Disease Maternal Grandfather     Early Death Maternal Grandfather     Stroke Paternal Grandmother     Heart Disease Paternal Grandmother     Stroke Paternal Grandfather     Heart Disease Paternal Grandfather     Early Death Paternal Grandfather

## 2019-05-30 ENCOUNTER — HOSPITAL ENCOUNTER (OUTPATIENT)
Dept: PHYSICAL THERAPY | Age: 39
Setting detail: THERAPIES SERIES
Discharge: HOME | End: 2019-05-30
Payer: MEDICAID

## 2019-05-30 PROCEDURE — 97110 THERAPEUTIC EXERCISES: CPT

## 2019-05-30 ASSESSMENT — PAIN DESCRIPTION - LOCATION: LOCATION: BACK

## 2019-05-30 ASSESSMENT — PAIN DESCRIPTION - ORIENTATION: ORIENTATION: LOWER

## 2019-05-30 ASSESSMENT — PAIN SCALES - GENERAL: PAINLEVEL_OUTOF10: 5

## 2019-05-30 ASSESSMENT — PAIN DESCRIPTION - PAIN TYPE: TYPE: CHRONIC PAIN

## 2019-05-30 NOTE — PROGRESS NOTES
Physical Therapy  Daily Treatment Note  Date: 2019  Patient Name: Sharon Schmidt  MRN: [id number]     :   1980    Subjective:   General  Additional Pertinent Hx: weight loss, HTN  Referring Practitioner: Reyna Minaya  PT Visit Information  Onset Date: 19  PT Insurance Information: [facility]  Total # of Visits Approved: 12  Total # of Visits to Date: 7  No Show: 1  Canceled Appointment: 1  Subjective  Subjective: Patient noting improved overall back pain today, rated at only 5/10. Pain Screening  Patient Currently in Pain: Yes  Pain Assessment  Pain Assessment: 0-10  Pain Level: 5  Patient's Stated Pain Goal: No pain  Pain Type: Chronic pain  Pain Location: Back  Pain Orientation: Lower  Pain Radiating Towards: (L) more than (R) intermittently into (B) LEs, (L) ankle; just in back today  Vital Signs  Patient Currently in Pain: Yes       Treatment Activities:   Exercises  Exercise 1: supine trA contraction 10x3\"  Exercise 2: DLS alt UE x 10  Exercise 3: DLS alt LE x 10  Exercise 4: DKTC 10 x 5\"  Exercise 5:  SKTC 10 x 5\"  Exercise 6: LTR 15x  Exercise 7: glut/piriformis stretch 10 x 5\"  Exercise 8: shoulder extension R3 15x  Exercise 9: scapular rows R3 15x  Exercise 10: trunk rotation R3 15x  Exercise 11: posterior pelvic tilt 15x  Exercise 12:  abdominal ISOMs 15x  Exercise 13: bridges 15x  Exercise 14: seated ball posture 30\" x 2     Assessment:   Conditions Requiring Skilled Therapeutic Intervention  Body structures, Functions, Activity limitations: Increased Pain  Assessment: Tolerated full program today without increased symptoms.   Treatment Diagnosis: back pain M54.5  Prognosis: Fair     Therapy Time   Individual Concurrent Group Co-treatment   Time In  130         Time Out  205         Minutes  35                Treatment Charges: Minutes Units   []  Ultrasound     []  Electrical-Stim     []  Iontophoresis     []  Traction     []  Massage       []  Eval     []  Gait     [x]  Ther Exercise  35 2    []  Manual Therapy       []  Ther Activities       []  Aquatics     []  Vasopneumatic Device     []  Neuro Re-Ed       []  Other       Total Treatment Time: 28  2           Rosario Gibson, PT

## 2019-05-31 ENCOUNTER — APPOINTMENT (OUTPATIENT)
Dept: PHYSICAL THERAPY | Age: 39
End: 2019-05-31
Payer: MEDICAID

## 2019-06-03 ENCOUNTER — HOSPITAL ENCOUNTER (OUTPATIENT)
Dept: PHYSICAL THERAPY | Age: 39
Setting detail: THERAPIES SERIES
Discharge: HOME | End: 2019-06-03
Payer: MEDICAID

## 2019-06-03 PROCEDURE — 97110 THERAPEUTIC EXERCISES: CPT

## 2019-06-03 ASSESSMENT — PAIN SCALES - GENERAL: PAINLEVEL_OUTOF10: 5

## 2019-06-03 ASSESSMENT — PAIN DESCRIPTION - PAIN TYPE: TYPE: CHRONIC PAIN

## 2019-06-03 ASSESSMENT — PAIN DESCRIPTION - LOCATION: LOCATION: BACK

## 2019-06-03 ASSESSMENT — PAIN DESCRIPTION - ORIENTATION: ORIENTATION: LOWER

## 2019-06-03 NOTE — PROGRESS NOTES
Physical Therapy  [address]   Outpatient Physical Therapy  [address] [facility]  Phone: [phone]  Fax: [phone]  Daily Progress Note    Date: 6/3/19    Patient Name: Mell Ledbetter        MRN: [id number]  Account: [de-identified] : 1980      General Information:  Additional Pertinent Hx: weight loss, HTN  Referring Practitioner: Ashley Gramajo  Referral Date : 19  Diagnosis: Lumbar disc degeneration M51.36  Follows Commands: Within Functional Limits  Onset Date: 19  PT Insurance Information: [employer]  [facility] [place]  Total # of Visits Approved: 12  Total # of Visits to Date: 8  No Show: 1  Canceled Appointment: 1    Subjective:  Subjective: Pain not bad today. Pain:  Patient Currently in Pain: Yes  Pain Assessment: 0-10  Pain Level: 5  Patient's Stated Pain Goal: No pain  Pain Type: Chronic pain  Pain Location: Back  Pain Orientation: Lower  Pain Radiating Towards: L>R low back. Not into the legs. Objective:  Exercise 1: supine trA contraction 10x3\"  Exercise 2: DLS alt 3#UE x 10  Exercise 3: DLS alt LE x 10  Exercise 4: DKTC 10 x 5\"  Exercise 5:  SKTC 10 x 5\"  Exercise 6: LTR 15x  Exercise 7: glute/piriformis stretch 10 x 5\"  Exercise 8: shoulder extension purple 20x  Exercise 9: scapular rows purple 20x  Exercise 10: trunk rotation pink 20x  Exercise 13: bridges 15x3\" hold  Exercise 14: prone hip ext  Exercise 15: Prone UE lift  Exercise 16: Reviewed HEP-Band Rows, KTC, DKTC, syl ext, trunk rotation          Comment:       Assessment: Body structures, Functions, Activity limitations: Increased Pain  Assessment: Tolerated full program today without increased symptoms. Treatment Diagnosis: back pain M54.5  Prognosis: Fair  Comments: Progressed wts/reps due to low pain to work on strengthening.      Plan:  Plan: Continue with current plan    Therapy Time:          Treatment Charges: Minutes Units   []  Ultrasound     []  Electrical-Stim     []  Iontophoresis     [] Traction     []  Massage       []  Eval     []  Gait     []  Vasopneumatic Device     [x]  Ther Exercise 35  2   []  Manual Therapy       []  Ther Activities       []  Aquatics     []  Neuro Re-Ed       []  Other       Total Treatment Time: 28 2       Vincenzo Su, PT

## 2019-06-05 ENCOUNTER — HOSPITAL ENCOUNTER (OUTPATIENT)
Dept: PHYSICAL THERAPY | Age: 39
Setting detail: THERAPIES SERIES
Discharge: HOME | End: 2019-06-05
Payer: MEDICAID

## 2019-06-05 PROCEDURE — 97110 THERAPEUTIC EXERCISES: CPT

## 2019-06-05 ASSESSMENT — PAIN DESCRIPTION - PAIN TYPE: TYPE: CHRONIC PAIN

## 2019-06-05 ASSESSMENT — PAIN DESCRIPTION - ORIENTATION: ORIENTATION: LOWER

## 2019-06-05 ASSESSMENT — PAIN DESCRIPTION - LOCATION: LOCATION: BACK

## 2019-06-05 ASSESSMENT — PAIN SCALES - GENERAL: PAINLEVEL_OUTOF10: 7

## 2019-06-05 NOTE — PROGRESS NOTES
goal 2: (P) Good demo of HEP given  Short term goal 3: (P) Centralize LBP  Short term goal 4: (P) set LTG as appropriate  Long term goals  Time Frame for Long term goals : (P) 12 visits  Long term goal 1: (P) Decrease pain to 2 or less  Long term goal 2: (P) Able to lift/carry up to 20# without pain  Long term goal 3: (P) Full tolerance of walking with minimal to no pain up to 30 minutes    Plan:  Cont with POC progress next visit as héctor   Therapy Time   Individual Concurrent Group Co-treatment   Time In  1030         Time Out  1105         Minutes  35             Treatment Charges: Minutes Units   []  Ultrasound     []  Electrical-Stim     []  Iontophoresis     []  Traction     []  Massage       []  Eval     []  Gait     [x]  Ther Exercise 35  2   []  Manual Therapy       []  Ther Activities       []  Aquatics     []  Vasopneumatic Device     []  Neuro Re-Ed       []  Other       Total Treatment Time: 28 2      Pedro Luis Ott PTA

## 2019-06-07 ENCOUNTER — CARE COORDINATION (OUTPATIENT)
Dept: CARE COORDINATION | Age: 39
End: 2019-06-07

## 2019-06-10 ENCOUNTER — HOSPITAL ENCOUNTER (OUTPATIENT)
Dept: PHYSICAL THERAPY | Age: 39
Setting detail: THERAPIES SERIES
Discharge: HOME | End: 2019-06-10
Payer: MEDICAID

## 2019-06-10 DIAGNOSIS — M54.50 ACUTE BILATERAL LOW BACK PAIN WITHOUT SCIATICA: ICD-10-CM

## 2019-06-10 PROCEDURE — 97110 THERAPEUTIC EXERCISES: CPT

## 2019-06-10 ASSESSMENT — PAIN DESCRIPTION - LOCATION: LOCATION: BACK

## 2019-06-10 ASSESSMENT — PAIN SCALES - GENERAL: PAINLEVEL_OUTOF10: 7

## 2019-06-10 ASSESSMENT — PAIN DESCRIPTION - PAIN TYPE: TYPE: CHRONIC PAIN

## 2019-06-10 ASSESSMENT — PAIN DESCRIPTION - ORIENTATION: ORIENTATION: LOWER

## 2019-06-10 NOTE — TELEPHONE ENCOUNTER
Request for albuterol and ibuprofen.       Next Visit Date:  Future Appointments   Date Time Provider Kathi Renetta   6/11/2019  8:30 AM Denver Fang, PSYD Psych [facility]   6/13/2019  8:45 AM Eugenie Lynch, PT [facility] [place] Renetta   6/17/2019  9:30 AM Eugenie Lynch, PT [facility] [place] Renetta   7/17/2019  1:00 PM Manoj Strong MD [id number] Maintenance   Topic Date Due    Potassium monitoring  07/13/2019    Creatinine monitoring  07/13/2019    Lipid screen  01/18/2023    DTaP/Tdap/Td vaccine (3 - Td) 04/27/2028    Flu vaccine  Completed    Pneumococcal 0-64 years Vaccine  Completed    HIV screen  Completed       Hemoglobin A1C (%)   Date Value   07/10/2018 5.5   01/18/2018 5.5   07/20/2017 5.9             ( goal A1C is < 7)   No results found for: LABMICR  LDL Cholesterol (mg/dL)   Date Value   01/18/2018 113       (goal LDL is <100)   AST (U/L)   Date Value   07/13/2018 13     ALT (U/L)   Date Value   07/13/2018 13     BUN (mg/dL)   Date Value   07/13/2018 15     BP Readings from Last 3 Encounters:   05/14/19 125/79   05/02/19 (!) 141/86   01/16/19 112/73          (goal 120/80)    All Future Testing planned in [facility]        Patient Active Problem List:     Hypertension     Fatty liver disease, nonalcoholic     MIGEL on CPAP     Positive depression screening     Vitamin D deficiency     IGT (impaired glucose tolerance)

## 2019-06-10 NOTE — PROGRESS NOTES
[address]   Outpatient Physical Therapy  [address] [facility]. [address]  Phone: [phone]  Fax: [phone]  Daily Progress Note    Date: 6/10/19    Patient Name: Jj Toribio        MRN: [id number]  Account: [de-identified] : 1980      General Information:  Additional Pertinent Hx: weight loss, HTN  Referring Practitioner: Evette Pugh  Referral Date : 19  Diagnosis: Lumbar disc degeneration M51.36  Follows Commands: Within Functional Limits  Onset Date: 19  PT Insurance Information: [employer]  of [place]  Total # of Visits Approved: 12  Total # of Visits to Date: 10  No Show: 1  Canceled Appointment: 1    Subjective:  Subjective: Pt. notes he walked a lot over the weekend and had some mild soreness because he didn't take many breaks. Pt. also reports that he sometimes has to walk to therapy which is why his pain was pretty high last time around. Pain:  Patient Currently in Pain: Yes  Pain Assessment: 0-10  Pain Level: 5  Pain Type: Chronic pain  Pain Location: Back  Pain Orientation: Lower       Objective:  Exercise 1: supine trA contraction 10x3\"  Exercise 2: DLS alt 3#UE x 10  Exercise 3: DLS alt LE x 10  Exercise 4: DKTC 10 x 5\"  Exercise 5:  SKTC 10 x 5\"  Exercise 6: LTR 15x  Exercise 7: glute/piriformis stretch 10 x 5\"  Exercise 8: shoulder extension purple 20x  Exercise 9: scapular rows purple 20x  Exercise 10: trunk rotation pink 20x  Exercise 13: bridges 15x3\" hold  Exercise 14: prone hip ext  Exercise 15: Prone UE lift  Exercise 16: Reviewed HEP-Band Rows, KTC, DKTC, syl ext, trunk rotation    Assessment: Body structures, Functions, Activity limitations: Increased Pain  Assessment: Tolerated full program today without increased symptoms.   Treatment Diagnosis: back pain M54.5  Prognosis: Fair  Activity Tolerance: Patient Tolerated treatment well    Plan:  Plan: Continue with current plan    Therapy Time:  Time In:810am  Time Out:855am    Treatment Charges: Minutes Units   [] Ultrasound     []  Electrical-Stim     []  Iontophoresis     []  Traction     []  Massage       []  Eval     []  Gait     []  Vasopneumatic Device     [x]  Ther Exercise 40  3   []  Manual Therapy       []  Ther Activities       []  Aquatics     []  Neuro Re-Ed       []  Other       Total Treatment Time: 36 3       Yuni See, PTA

## 2019-06-11 ENCOUNTER — OFFICE VISIT (OUTPATIENT)
Dept: BEHAVIORAL/MENTAL HEALTH CLINIC | Age: 39
End: 2019-06-11
Payer: MEDICAID

## 2019-06-11 DIAGNOSIS — F33.0 MAJOR DEPRESSIVE DISORDER, RECURRENT, MILD (HCC): Primary | ICD-10-CM

## 2019-06-11 PROCEDURE — 90834 PSYTX W PT 45 MINUTES: CPT | Performed by: PSYCHOLOGIST

## 2019-06-11 RX ORDER — IBUPROFEN 800 MG/1
800 TABLET ORAL EVERY 8 HOURS PRN
Qty: 30 TABLET | Refills: 3 | Status: SHIPPED | OUTPATIENT
Start: 2019-06-11 | End: 2019-08-18 | Stop reason: SDUPTHER

## 2019-06-11 RX ORDER — ALBUTEROL SULFATE 90 UG/1
2 AEROSOL, METERED RESPIRATORY (INHALATION) EVERY 6 HOURS PRN
Qty: 18 G | Refills: 3 | Status: SHIPPED | OUTPATIENT
Start: 2019-06-11 | End: 2019-08-18 | Stop reason: SDUPTHER

## 2019-06-11 NOTE — PROGRESS NOTES
Behavioral Health Consultation  Nishant JASON  Licensed Clinical Psychologist #[id number]  6/11/2019  8:05 AM- 8:45 AM    Time spent with Patient: 40 minutes  This is patient's 40th  [facility] consultation. Reason for Consult:  depression  Referring Provider: Garo Bo MD    Feedback given to PCP. S:   Previous Recommendation(s):   1. Give yourself credit for all that you have achieved. It is incredibly helpful and painful when other people refuse to see personal growth and development. Someone recently shared this thought, \"other people's opinion of you do not matter. \" This applies to his/her negative view of you and also to his/her positive view of you. Your identity and ego need not be dependent on others. You are enough without out that. 2. Follow up in 2 weeks. Pt continues to be surprised and distressed at the lack of support that he receives from his family while receiving no shortage of criticism and disbelief in his value as a human being. O:  MSE:   Mood was Depressed and Irritable, with Congruent affect. Suicidal ideation was denied. Homicidal ideation was denied. Hygiene was Good. Dress was Appropriate and Casual.   Behavior was WNL & unremarkable with Sometimes observation or self-report of difficulties standing/ambulating. Attitude was Help-seeking. Eye-contact was Good. Speech: rate- WNL, rhythm-  WNL, volume- WNL  Verbalizations were  verbose. Thought processes were intact and goal-oriented without evidence of delusions, hallucinations, obsessions, or andrew; with moderate cognitive distortions. Associations were characterized by circumstantial cognitive processes. Pt was orientated oriented to person, place, time, and general circumstances;  recent:  good and fair and remote:  good and fair. Insight and judgment were estimated to be good to fair, AEB, a fair  understanding of cyclical maladaptive patterns, and the ability to use insight to inform behavior change.      A: Informed that this writer will be leaving [address] [facility] in August and began to discuss this change. Pt interventions:  Supportive techniques and CBT to target impact of invalidating and oppressive envrionment. Pt Behavioral Change Plan:   1. Bring in the book for the 's license. Write down questions that you have. 2. If there are questions that you do not understand or think you are not answering in an appropriate manner, bring in those questions. 3. Follow up in 2 weeks. Diagnosis:  The encounter diagnosis was Major depressive disorder, recurrent, mild ([facility] [address]       Diagnosis Date    Anxiety     Arthritis     CAD (coronary artery disease)     Fatty liver     Fatty liver disease, nonalcoholic 5/93/0529    Hypertension     Obesity     Unspecified sleep apnea     cpap       History:    Social History:   Social History     Socioeconomic History    Marital status: Single     Spouse name: Not on file    Number of children: Not on file    Years of education: Not on file    Highest education level: Not on file   Occupational History    Not on file   Social Needs    Financial resource strain: Not on file    Food insecurity:     Worry: Not on file     Inability: Not on file    Transportation needs:     Medical: Not on file     Non-medical: Not on file   Tobacco Use    Smoking status: Never Smoker    Smokeless tobacco: Never Used   Substance and Sexual Activity    Alcohol use: No    Drug use: No    Sexual activity: Not on file   Lifestyle    Physical activity:     Days per week: Not on file     Minutes per session: Not on file    Stress: Not on file   Relationships    Social connections:     Talks on phone: Not on file     Gets together: Not on file     Attends Mormonism service: Not on file     Active member of club or organization: Not on file     Attends meetings of clubs or organizations: Not on file     Relationship status: Not on file    Intimate partner violence:     Fear of

## 2019-06-11 NOTE — PATIENT INSTRUCTIONS
1. Bring in the book for the 's license. Write down questions that you have. 2. If there are questions that you do not understand or think you are not answering in an appropriate manner, bring in those questions. 3. Follow up in 2 weeks.

## 2019-06-13 ENCOUNTER — HOSPITAL ENCOUNTER (OUTPATIENT)
Dept: PHYSICAL THERAPY | Age: 39
Setting detail: THERAPIES SERIES
Discharge: HOME | End: 2019-06-13
Payer: MEDICAID

## 2019-06-13 PROCEDURE — 97110 THERAPEUTIC EXERCISES: CPT

## 2019-06-13 ASSESSMENT — PAIN DESCRIPTION - ORIENTATION: ORIENTATION: LOWER

## 2019-06-13 ASSESSMENT — PAIN DESCRIPTION - LOCATION: LOCATION: BACK

## 2019-06-13 ASSESSMENT — PAIN SCALES - GENERAL: PAINLEVEL_OUTOF10: 7

## 2019-06-13 ASSESSMENT — PAIN DESCRIPTION - PAIN TYPE: TYPE: CHRONIC PAIN

## 2019-06-13 NOTE — PROGRESS NOTES
[address] [facility]   Outpatient Physical Therapy  [address] [place] [address]. [address]  Phone: [phone]  Fax: [phone]  Daily Progress Note    Date: 19    Patient Name: Virginia Segovia        MRN: [id number]  Account: [de-identified] : 1980      General Information:  Additional Pertinent Hx: weight loss, HTN  Referring Practitioner: Diane Salas  Referral Date : 19  Diagnosis: Lumbar disc degeneration M51.36  Follows Commands: Within Functional Limits  Onset Date: 19  PT Insurance Information: [facility] of [place]  Total # of Visits Approved: 12  Total # of Visits to Date: 11  No Show: 1  Canceled Appointment: 1    Subjective:  Subjective: Patient notes pain is still persistent even in the mornings its hard to get up because pain is so bad. Comment:  Comments: Patient arrived 20 minutes late to session due to cab service arriving late to pick him up. Pain:  Patient Currently in Pain: Yes  Pain Assessment: 0-10  Pain Level: 7  Pain Type: Chronic pain  Pain Location: Back  Pain Orientation: Lower       Objective:  Exercise 1: supine trA contraction 10x3\"  Exercise 2: DLS alt 3#UE x 10  Exercise 3: DLS alt LE x 10  Exercise 4: DKTC 10 x 5\"  Exercise 5:  SKTC 10 x 5\"  Exercise 6: LTR 15x  Exercise 7: glute/piriformis stretch 10 x 5\"  Exercise 8: shoulder extension purple 20x  Exercise 9: scapular rows purple 20x  Exercise 10: trunk rotation pink 20x  Exercise 13: bridges 15x3\" hold  Exercise 14: prone hip ext  Exercise 15: Prone UE lift  Exercise 16: Reviewed HEP-Band Rows, KTC, DKTC, syl ext, trunk rotation    Assessment: Body structures, Functions, Activity limitations: Increased Pain  Assessment: Tolerated full program today without increased symptoms.   Treatment Diagnosis: back pain M54.5  Prognosis: Fair  Activity Tolerance: Patient Tolerated treatment well    Plan:  Plan: Continue with current plan    Therapy Time:  Time In: 820am  Time Out: 905 am    Treatment Charges: Minutes Units

## 2019-06-17 ENCOUNTER — APPOINTMENT (OUTPATIENT)
Dept: PHYSICAL THERAPY | Age: 39
End: 2019-06-17
Payer: MEDICAID

## 2019-06-20 ENCOUNTER — APPOINTMENT (OUTPATIENT)
Dept: PHYSICAL THERAPY | Age: 39
End: 2019-06-20
Payer: MEDICAID

## 2019-06-24 ENCOUNTER — CARE COORDINATION (OUTPATIENT)
Dept: CARE COORDINATION | Age: 39
End: 2019-06-24

## 2019-06-24 SDOH — HEALTH STABILITY: MENTAL HEALTH: HOW OFTEN DO YOU HAVE A DRINK CONTAINING ALCOHOL?: NEVER

## 2019-06-24 ASSESSMENT — PATIENT HEALTH QUESTIONNAIRE - PHQ9
SUM OF ALL RESPONSES TO PHQ QUESTIONS 1-9: 2
SUM OF ALL RESPONSES TO PHQ QUESTIONS 1-9: 2

## 2019-06-24 NOTE — CARE COORDINATION
ACC: Idalia Talbert RN  CM Risk Score: 5  Yamilet Mortality Risk Score:      Care Coordination Interventions    Program Enrollment:  Complex Care  Referral from Primary Care Provider:  No  Suggested Interventions and Community Resources  Behavorial Health: In Process (Comment: with Dr. Curt Perla and [facility])  Physical Therapy: In Process  Other Services or Interventions:  Pain Management       , Ambulatory Care Coordination Note  6/24/2019  CM Risk Score: 5  Yamilet Mortality Risk Score:      ACC: Idalia Talbert RN    Summary Note: patient reminder of appointment with Dr. Curt Perla tomorrow at 8:30. Patient states that he thinks that he is bipolar and feels this appointment would be beneficial.  The patient is also concerned about colon cancer. He states that he has a cousin that was diagnosed at 36. Explained to patient that a screening text would be done first (I-Fit or Cologuard) if he has concerns. Plan  Cologuard or I Fit at visit on 7/17/19  Appointment with Dr. Osiel Murillo up in 1 week        Care Coordination Interventions    Program Enrollment:  Complex Care  Referral from Primary Care Provider:  No  Suggested Interventions and [facility] [address] [facility]: In Process (Comment: with Dr. Curt Perla and [facility])  Physical Therapy: In Process  Other Services or Interventions:  Pain Management         Goals Addressed                 This Visit's Progress     Community Resource Goal   On track     I will complete referral to community agency [facility], [facility], or [facility] for assistance. Barriers: lack of support and stress  Plan for overcoming my barriers: PCPLEANNE in clinic, [facility]  Confidence: 8/10  Anticipated Goal Completion Date: 02/28/19       Conditions and Symptoms   On track     I will schedule office visits, as directed by my provider. I will keep my appointment or reschedule if I have to cancel.   I will notify my provider of any barriers to my plan of

## 2019-06-25 ENCOUNTER — OFFICE VISIT (OUTPATIENT)
Dept: BEHAVIORAL/MENTAL HEALTH CLINIC | Age: 39
End: 2019-06-25
Payer: MEDICAID

## 2019-06-25 DIAGNOSIS — F33.0 MAJOR DEPRESSIVE DISORDER, RECURRENT, MILD (HCC): Primary | ICD-10-CM

## 2019-06-25 PROCEDURE — 90834 PSYTX W PT 45 MINUTES: CPT | Performed by: PSYCHOLOGIST

## 2019-06-25 NOTE — PATIENT INSTRUCTIONS
1. In my opinion, based on our work together since 2/9/17 and your responses to screening assessment tools today you do not qualify for a diagnosis of Bipolar Disorder. You do evidence symptoms of anxiety with a depressed mood, and many of these symptoms, per your self-report, are due to unhealthy, chaotic and unsupportive familial intrpersonal (i.e., relational) dynamics. 2. Follow up in 2 weeks.

## 2019-06-25 NOTE — PROGRESS NOTES
Behavioral Health Consultation  Abram JASON  Licensed Clinical Psychologist #[id number]  6/25/2019  8:26 AM- 9:05 AM    Time spent with Patient: 39 minutes  This is patient's 41st  [facility] consultation. Reason for Consult:  depression  Referring Provider: Chester Diaz MD    Feedback given to PCP. S:   Previous Recommendation(s):   1. Bring in the book for the 's license. Write down questions that you have.- no.   2. If there are questions that you do not understand or think you are not answering in an appropriate manner, bring in those questions. 3. Follow up in 2 weeks. Pt's family members are reportedly telling him that he has Bipolar Disorder and pt would like to determine if he has this mental illness. Item 4 MDQ \"when I big, not having a job, couldn't lose weight\" mom & cousin would call me at night. Item 5 I get that a lot now when talking with my mom. Observed in session when he is distressed either angry and/or anxious    O:  MSE:   Mood was Anxious, with Congruent affect. Suicidal ideation was denied. Homicidal ideation was denied. Hygiene was Good. Dress was Appropriate and Casual.   Behavior was WNL & unremarkable with No observation or self-report of difficulties standing/ambulating. Attitude was Help-seeking. Eye-contact was Good. Speech: rate- Slow, rhythm-  WNL, volume- Soft  Verbalizations were  verbose. Thought processes were intact and goal-oriented without evidence of delusions, hallucinations, obsessions, or andrew; with moderate cognitive distortions. Associations were characterized by racing thoughts cognitive processes. Pt was orientated oriented to person, place, time, and general circumstances;  recent:  good and fair and remote:  good and fair. Insight and judgment were estimated to be fair, AEB, a fair  understanding of cyclical maladaptive patterns, and the ability to use insight to inform behavior change.      A:   Administered the Mood Disorder sleep apnea     cpap       History:    Social History:   Social History     Socioeconomic History    Marital status: Single     Spouse name: Not on file    Number of children: Not on file    Years of education: Not on file    Highest education level: Not on file   Occupational History    Not on file   Social Needs    Financial resource strain: Not on file    Food insecurity:     Worry: Not on file     Inability: Not on file    Transportation needs:     Medical: Not on file     Non-medical: Not on file   Tobacco Use    Smoking status: Never Smoker    Smokeless tobacco: Never Used   Substance and Sexual Activity    Alcohol use: No     Frequency: Never     Binge frequency: Never    Drug use: No    Sexual activity: Yes     Partners: Female   Lifestyle    Physical activity:     Days per week: Not on file     Minutes per session: Not on file    Stress: Not on file   Relationships    Social connections:     Talks on phone: Not on file     Gets together: Not on file     Attends Muslim service: Not on file     Active member of club or organization: Not on file     Attends meetings of clubs or organizations: Not on file     Relationship status: Not on file    Intimate partner violence:     Fear of current or ex partner: Not on file     Emotionally abused: Not on file     Physically abused: Not on file     Forced sexual activity: Not on file   Other Topics Concern    Not on file   Social History Narrative    ** Merged History Encounter **            TOBACCO:   reports that he has never smoked. He has never used smokeless tobacco.  ETOH:   reports that he does not drink alcohol.     Family History:   Family History   Problem Relation Age of Onset    Arthritis Mother     Diabetes Father     Arthritis Father     Stroke Maternal Grandmother     Heart Disease Maternal Grandmother     Stroke Maternal Grandfather     Heart Disease Maternal Grandfather     Early Death Maternal Grandfather     Stroke

## 2019-06-27 ENCOUNTER — HOSPITAL ENCOUNTER (OUTPATIENT)
Dept: PHYSICAL THERAPY | Age: 39
Setting detail: THERAPIES SERIES
Discharge: HOME | End: 2019-06-27
Payer: MEDICAID

## 2019-06-27 PROCEDURE — 97110 THERAPEUTIC EXERCISES: CPT

## 2019-06-27 PROCEDURE — 97112 NEUROMUSCULAR REEDUCATION: CPT

## 2019-06-27 ASSESSMENT — PAIN SCALES - GENERAL: PAINLEVEL_OUTOF10: 7

## 2019-06-27 NOTE — PROGRESS NOTES
[]  Manual Therapy       []  Ther Activities       []  Aquatics     []  Vasopneumatic Device     []  Neuro Re-Ed  10 1    []  Other       Total Treatment Time: 36  3       Patient Goals: Return to work. Comments/Assessment: May benefit from additional treatment to work on functional limitations with lifting/carrying, tolerance    Rehab Potential:  [x] Good  [] Fair  [] Poor   Suggested Professional Referral:  [x] No  [] Yes:  Barriers to Goal Achievement:  [x] No  [] Yes:  Domestic Concerns:  [x] No  [] Yes:    Treatment Plan:  [x] Therapeutic Exercise    [] Modalities:  [] Therapeutic Activity    [] Ultrasound  [] Electrical Stimulation  [] Gait Training     [] Massage       [] Lumbar/Cervical Traction  [x] Neuromuscular Re-education [] Cold/hot pack [] Other:  [x] Instruction in HEP              [] Work Conditioning                                  [x] Manual Therapy                     [] Aquatic Therapy     [] Vasocompression  [] Iontophoresis: 4 mg/mL                      Dexamethasone Sodium      Phosphate 40-80mAmin (Allergies Reviewed)     Frequency:       2-3    X/wk x 8 visits      [x] Plans/Goals, Risk/Benefits discussed with pt/family  Comprehension of Education [x] yes  [] Needs Review  Pt/Family Education: [x] Verbal  [] Demo  [x] Written    More objective information is available upon request.  Thank you for this referral.        Medicare/Regulatory Requirements:  I have reviewed this plan of care and certify a need for   Medically necessary rehabilitation services.   [x] Physician Signature    Date:6/27/19     Electronically signed by: Ted Mayo, [id number] [place] [address] [address] [facility] [address] [facility].  Phone [phone]  Fax [phone]

## 2019-06-28 ASSESSMENT — PATIENT HEALTH QUESTIONNAIRE - PHQ9
8. MOVING OR SPEAKING SO SLOWLY THAT OTHER PEOPLE COULD HAVE NOTICED. OR THE OPPOSITE, BEING SO FIGETY OR RESTLESS THAT YOU HAVE BEEN MOVING AROUND A LOT MORE THAN USUAL: 1
SUM OF ALL RESPONSES TO PHQ QUESTIONS 1-9: 5
10. IF YOU CHECKED OFF ANY PROBLEMS, HOW DIFFICULT HAVE THESE PROBLEMS MADE IT FOR YOU TO DO YOUR WORK, TAKE CARE OF THINGS AT HOME, OR GET ALONG WITH OTHER PEOPLE: 1
7. TROUBLE CONCENTRATING ON THINGS, SUCH AS READING THE NEWSPAPER OR WATCHING TELEVISION: 0
SUM OF ALL RESPONSES TO PHQ QUESTIONS 1-9: 5
2. FEELING DOWN, DEPRESSED OR HOPELESS: 0
SUM OF ALL RESPONSES TO PHQ9 QUESTIONS 1 & 2: 1
6. FEELING BAD ABOUT YOURSELF - OR THAT YOU ARE A FAILURE OR HAVE LET YOURSELF OR YOUR FAMILY DOWN: 0
5. POOR APPETITE OR OVEREATING: 1
3. TROUBLE FALLING OR STAYING ASLEEP: 1
9. THOUGHTS THAT YOU WOULD BE BETTER OFF DEAD, OR OF HURTING YOURSELF: 1
4. FEELING TIRED OR HAVING LITTLE ENERGY: 0
1. LITTLE INTEREST OR PLEASURE IN DOING THINGS: 1

## 2019-06-28 ASSESSMENT — ANXIETY QUESTIONNAIRES
GAD7 TOTAL SCORE: 7
2. NOT BEING ABLE TO STOP OR CONTROL WORRYING: 2-OVER HALF THE DAYS
6. BECOMING EASILY ANNOYED OR IRRITABLE: 0-NOT AT ALL SURE
4. TROUBLE RELAXING: 1-SEVERAL DAYS
3. WORRYING TOO MUCH ABOUT DIFFERENT THINGS: 2-OVER HALF THE DAYS
7. FEELING AFRAID AS IF SOMETHING AWFUL MIGHT HAPPEN: 1-SEVERAL DAYS
5. BEING SO RESTLESS THAT IT IS HARD TO SIT STILL: 0-NOT AT ALL SURE
1. FEELING NERVOUS, ANXIOUS, OR ON EDGE: 1-SEVERAL DAYS

## 2019-07-02 ENCOUNTER — HOSPITAL ENCOUNTER (OUTPATIENT)
Dept: PHYSICAL THERAPY | Age: 39
Setting detail: THERAPIES SERIES
Discharge: HOME | End: 2019-07-02
Payer: MEDICAID

## 2019-07-02 PROCEDURE — 97110 THERAPEUTIC EXERCISES: CPT

## 2019-07-02 ASSESSMENT — PAIN DESCRIPTION - LOCATION: LOCATION: BACK

## 2019-07-02 ASSESSMENT — PAIN DESCRIPTION - ORIENTATION: ORIENTATION: LOWER

## 2019-07-02 ASSESSMENT — PAIN DESCRIPTION - PAIN TYPE: TYPE: CHRONIC PAIN

## 2019-07-02 ASSESSMENT — PAIN SCALES - GENERAL: PAINLEVEL_OUTOF10: 4

## 2019-07-02 NOTE — PROGRESS NOTES
[facility]   Outpatient Physical Therapy  [address] [place] [address]. [address]  Phone: [phone]  Fax: [phone]  Daily Progress Note    Date: 19    Patient Name: Renee Santoyo        MRN: [id number]  Account: [de-identified] : 1980      General Information:  Chart Reviewed: Yes  Patient assessed for rehabilitation services?: Yes  Additional Pertinent Hx: weight loss, HTN  Referring Practitioner: Samina Valdivia  Referral Date : 19  Diagnosis: Lumbar disc degeneration M51.36  Onset Date: 19  PT Insurance Information: [employer]  of [place]  Total # of Visits Approved: 18  Total # of Visits to Date: 13  No Show: 1  Canceled Appointment: 1    Subjective:  Subjective: Patient reports pain is better than the normal today, states nothing has really been \"irritating\" it. Pain:  Patient Currently in Pain: Yes  Pain Assessment: 0-10  Pain Level: 4  Pain Type: Chronic pain  Pain Location: Back  Pain Orientation: Lower       Objective:  Exercise 1: supine trA contraction 10x3\"  Exercise 2: DLS alt 3#UE x 10  Exercise 3: DLS alt LE x 10  Exercise 4: DKTC 10 x 5\"  Exercise 5:  SKTC 10 x 5\"  Exercise 6: LTR 15x  Exercise 7: glute/piriformis stretch 10 x 5\"  Exercise 8: shoulder extension pink 20x  Exercise 9: scapular rows pink 20x  Exercise 10: trunk rotation pink 20x  Exercise 11: posterior pelvic tilt 15x  Exercise 12:  abdominal ISOMs 15x  Exercise 13: bridges 15x3\"   Exercise 14: prone hip ext  Exercise 15: Prone UE lift    Assessment:   Body structures, Functions, Activity limitations: Increased Pain  Treatment Diagnosis: back pain M54.5  Prognosis: Fair  Decision Making: Low Complexity  Activity Tolerance: Patient Tolerated treatment well    Plan:  Plan: Continue with current plan    Therapy Time:   Time In:100pm  Time Out:137pm    Treatment Charges: Minutes Units   []  Ultrasound     []  Electrical-Stim     []  Iontophoresis     []  Traction     []  Massage       []  Eval     []  Gait     []

## 2019-07-05 ENCOUNTER — HOSPITAL ENCOUNTER (OUTPATIENT)
Dept: PHYSICAL THERAPY | Age: 39
Setting detail: THERAPIES SERIES
Discharge: HOME | End: 2019-07-05
Payer: MEDICAID

## 2019-07-05 PROCEDURE — 97112 NEUROMUSCULAR REEDUCATION: CPT

## 2019-07-05 PROCEDURE — 97110 THERAPEUTIC EXERCISES: CPT

## 2019-07-09 ENCOUNTER — OFFICE VISIT (OUTPATIENT)
Dept: BEHAVIORAL/MENTAL HEALTH CLINIC | Age: 39
End: 2019-07-09
Payer: MEDICAID

## 2019-07-09 DIAGNOSIS — F33.0 MAJOR DEPRESSIVE DISORDER, RECURRENT, MILD (HCC): Primary | ICD-10-CM

## 2019-07-09 PROCEDURE — 90832 PSYTX W PT 30 MINUTES: CPT | Performed by: PSYCHOLOGIST

## 2019-07-09 NOTE — PROGRESS NOTES
Behavioral Health Consultation  David JASON  Licensed Clinical Psychologist #[id number]  7/9/2019  8:56 AM- 9:21 AM    Time spent with Patient: 35 minutes  This is patient's 42nd  [facility] consultation. Reason for Consult:  depression and anxiety  Referring Provider: Sherry Sanders MD    Feedback given to PCP. S:   Previous Recommendation(s):   1. In my opinion, based on our work together since 2/9/17 and your responses to screening assessment tools today you do not qualify for a diagnosis of Bipolar Disorder. You do evidence symptoms of anxiety with a depressed mood, and many of these symptoms, per your self-report, are due to unhealthy, chaotic and unsupportive familial intrpersonal (i.e., relational) dynamics. 2. Follow up in 2 weeks. Continued to process, and explore changes with the pt's perception related to his weight loss, prosocial behavior vs antisocial behavior, and family dynamics. O:  MSE:   Mood was Depressed, with Congruent affect. Suicidal ideation was denied. Homicidal ideation was denied. Hygiene was Good. Dress was Appropriate and Casual.   Behavior was characterized by psychomotor agitation with Sometimes observation or self-report of difficulties standing/ambulating. Attitude was Help-seeking. Eye-contact was Fair. Speech: rate- Rapid, rhythm-  WNL, volume- WNL  Verbalizations were  verbose. Thought processes were intact and goal-oriented without evidence of delusions, hallucinations, obsessions, or andrew; with moderate cognitive distortions. Associations were characterized by racing thoughts cognitive processes. Pt was orientated oriented to person, place, time, and general circumstances;  recent:  good and fair and remote:  good and fair. Insight and judgment were estimated to be fair, AEB, a fair  understanding of cyclical maladaptive patterns, and the ability to use insight to inform behavior change.      A:   Pt request assistance in running a provider finder search during his next consultation. Pt interventions:  Supportive techniques and CBT to target cyclical maladaptive patterns. Pt Behavioral Change Plan:   1. You are encouraged to use the same mindset and skills that you used to change your name and lose the weight to your relationships, occupational (work) status and housing situation. 2. Follow up as scheduled. Diagnosis:  The encounter diagnosis was Major depressive disorder, recurrent, mild ([facility] [address]       Diagnosis Date    Anxiety     Arthritis     CAD (coronary artery disease)     Fatty liver     Fatty liver disease, nonalcoholic 7/97/4145    Hypertension     Obesity     Unspecified sleep apnea     cpap       History:    Social History:   Social History     Socioeconomic History    Marital status: Single     Spouse name: Not on file    Number of children: Not on file    Years of education: Not on file    Highest education level: Not on file   Occupational History    Not on file   Social Needs    Financial resource strain: Not on file    Food insecurity:     Worry: Not on file     Inability: Not on file    Transportation needs:     Medical: Not on file     Non-medical: Not on file   Tobacco Use    Smoking status: Never Smoker    Smokeless tobacco: Never Used   Substance and Sexual Activity    Alcohol use: No     Frequency: Never     Binge frequency: Never    Drug use: No    Sexual activity: Yes     Partners: Female   Lifestyle    Physical activity:     Days per week: Not on file     Minutes per session: Not on file    Stress: Not on file   Relationships    Social connections:     Talks on phone: Not on file     Gets together: Not on file     Attends [employer] service: Not on file     Active member of club or organization: Not on file     Attends meetings of clubs or organizations: Not on file     Relationship status: Not on file    Intimate partner violence:     Fear of current or ex partner: Not on file     Emotionally

## 2019-07-10 ENCOUNTER — HOSPITAL ENCOUNTER (OUTPATIENT)
Dept: PHYSICAL THERAPY | Age: 39
Setting detail: THERAPIES SERIES
Discharge: HOME | End: 2019-07-10
Payer: MEDICAID

## 2019-07-10 PROCEDURE — 97112 NEUROMUSCULAR REEDUCATION: CPT

## 2019-07-10 PROCEDURE — 97110 THERAPEUTIC EXERCISES: CPT

## 2019-07-11 ASSESSMENT — PAIN DESCRIPTION - ORIENTATION: ORIENTATION: LOWER

## 2019-07-11 ASSESSMENT — PAIN DESCRIPTION - PAIN TYPE: TYPE: CHRONIC PAIN

## 2019-07-11 ASSESSMENT — PAIN DESCRIPTION - LOCATION: LOCATION: BACK

## 2019-07-11 ASSESSMENT — PAIN SCALES - GENERAL: PAINLEVEL_OUTOF10: 2

## 2019-07-12 ENCOUNTER — HOSPITAL ENCOUNTER (OUTPATIENT)
Dept: PHYSICAL THERAPY | Age: 39
Setting detail: THERAPIES SERIES
Discharge: HOME | End: 2019-07-12
Payer: MEDICAID

## 2019-07-15 ENCOUNTER — HOSPITAL ENCOUNTER (OUTPATIENT)
Dept: PHYSICAL THERAPY | Age: 39
Setting detail: THERAPIES SERIES
Discharge: HOME | End: 2019-07-15
Payer: MEDICAID

## 2019-07-15 PROCEDURE — 97110 THERAPEUTIC EXERCISES: CPT

## 2019-07-15 ASSESSMENT — PAIN DESCRIPTION - PAIN TYPE: TYPE: CHRONIC PAIN

## 2019-07-15 ASSESSMENT — PAIN DESCRIPTION - LOCATION: LOCATION: BACK

## 2019-07-15 ASSESSMENT — PAIN DESCRIPTION - ORIENTATION: ORIENTATION: LOWER

## 2019-07-15 ASSESSMENT — PAIN SCALES - GENERAL: PAINLEVEL_OUTOF10: 2

## 2019-07-15 NOTE — PROGRESS NOTES
[facility]   Outpatient Physical Therapy  [address] [place] [address]. [address]  Phone: [phone]  Fax: [phone]  Daily Progress Note    Date: 7/15/19    Patient Name: Floyd Levin        MRN: [id number]  Account: [de-identified] : 1980      General Information:  Chart Reviewed: Yes  Additional Pertinent Hx: weight loss, HTN  Referring Practitioner: Yessi Serrano  Referral Date : 19  Diagnosis: Lumbar disc degeneration M51.36  Onset Date: 19  PT Insurance Information: THE [facility] [place]  Total # of Visits Approved: 18  Total # of Visits to Date: 16  No Show: 1  Canceled Appointment: 2    Subjective:  Subjective: Mowed 7 lawns over the weekend. Patient reports little pain today, about 2/10. Pain:  Patient Currently in Pain: Yes  Pain Assessment: 0-10  Pain Level: 2  Patient's Stated Pain Goal: No pain  Pain Type: Chronic pain  Pain Location: Back  Pain Orientation: Lower       Objective:  Exercise 1: supine trA contraction 10x3\"  Exercise 2: DLS alt 3#UE x 10  Exercise 3: DLS alt LE x 10  Exercise 4: DKTC 10 x 5\"  Exercise 5:  SKTC 10 x 5\"  Exercise 6: LTR 15x  Exercise 7: glut/piriformis stretch 10 x 5\"  Exercise 8: shoulder extension pink 20x  Exercise 9: scapular rows pink 20x  Exercise 10: trunk rotation pink 20x  Exercise 11: posterior pelvic tilt 15x  Exercise 12:  abdominal ISOMs 15x  Exercise 13: bridges 15x3\"   Exercise 14: prone hip ext  Exercise 15: Prone UE lift        Assessment:  Assessment: (P) Patient continues to benefit from his exercises at this point. His symptoms are gradually decreasing.   Treatment Diagnosis: back pain M54.5  Prognosis: Fair  Decision Making: Low Complexity       Therapy Time:  Time In: 1030  Time Out: 1116  Minutes: 46       Treatment Charges: Minutes Units   []  Ultrasound     []  Electrical-Stim     []  Iontophoresis     []  Traction     []  Massage       []  Eval     []  Gait     []  Vasopneumatic Device     [x]  Ther Exercise  40  3   []  Manual Therapy       []  Ther Activities       []  Aquatics     []  Neuro Re-Ed       []  Other       Total Treatment Time: [address] [facility] [address] [place] [address], [facility]

## 2019-07-18 ENCOUNTER — HOSPITAL ENCOUNTER (OUTPATIENT)
Dept: PHYSICAL THERAPY | Age: 39
Setting detail: THERAPIES SERIES
Discharge: HOME | End: 2019-07-18
Payer: MEDICAID

## 2019-07-18 PROCEDURE — 97110 THERAPEUTIC EXERCISES: CPT

## 2019-07-18 ASSESSMENT — PAIN DESCRIPTION - ORIENTATION: ORIENTATION: LOWER

## 2019-07-18 ASSESSMENT — PAIN DESCRIPTION - PAIN TYPE: TYPE: CHRONIC PAIN

## 2019-07-18 ASSESSMENT — PAIN DESCRIPTION - LOCATION: LOCATION: BACK

## 2019-07-18 ASSESSMENT — PAIN SCALES - GENERAL: PAINLEVEL_OUTOF10: 5

## 2019-07-22 ENCOUNTER — HOSPITAL ENCOUNTER (OUTPATIENT)
Dept: PHYSICAL THERAPY | Age: 39
Setting detail: THERAPIES SERIES
Discharge: HOME | End: 2019-07-22
Payer: MEDICAID

## 2019-07-22 PROCEDURE — 97112 NEUROMUSCULAR REEDUCATION: CPT

## 2019-07-22 PROCEDURE — 97110 THERAPEUTIC EXERCISES: CPT

## 2019-07-22 ASSESSMENT — PAIN SCALES - GENERAL: PAINLEVEL_OUTOF10: 1

## 2019-07-22 NOTE — PROGRESS NOTES
Abdominal Isometrics 15x  Exercise 13: Bridges 15x3\"   Exercise 14: Prone hip ext 20x  Exercise 15: Prone UE lift 20x  Exercise 16: ball seated marches 15x  Exercise 17: ball seated shoulder flexion, abduction 3# 15x       Assessment   Conditions Requiring Skilled Therapeutic Intervention  Body structures, Functions, Activity limitations: Increased Pain  Assessment: Patient to discharge to [facility] with improved overall symptoms. Patient with improved pain levels, improved tolerance of walking more than 30 minutes, lifting/carrying more than 20# with minimal symptoms. Patient does report increased soreness with yard work, cutting grass but symptoms did not get to more than 4/10 in the last week. Treatment Diagnosis: back pain M54.5  Prognosis: Excellent  Decision Making: Low Complexity         Plan Patient with improved overall symptoms, plan DC to [facility] at this time. Good improvement thus far and good prognosis for full function secondary to progress thus far and pending compliance to HEP. Goals  Short term goals  Time Frame for Short term goals: 6 visits  Short term goal 1: Decrease pain to 7 or less- met  Short term goal 2: Good demo of HEP given- met  Short term goal 3: Centralize LBP- met  Long term goals  Time Frame for Long term goals : 12 visits  Long term goal 1: Decrease pain to 2 or less- met  Long term goal 2: Able to lift/carry up to 20# without pain- met  Long term goal 3: Full tolerance of walking with minimal to no pain up to 30 minutes-met  Patient Goals   Patient goals : Get rid of my pain so I can drive and work.         Therapy Time   Individual Concurrent Group Co-treatment   Time In  1115         Time Out  1200         Minutes  45             Treatment Charges: Minutes Units   []  Ultrasound     []  Electrical-Stim     []  Iontophoresis     []  Traction     []  Massage       []  Eval     []  Gait     [x]  Ther Exercise 30 2    []  Manual Therapy       []  Ther Activities       []  Aquatics

## 2019-08-04 DIAGNOSIS — M25.562 CHRONIC PAIN OF BOTH KNEES: ICD-10-CM

## 2019-08-04 DIAGNOSIS — M25.561 CHRONIC PAIN OF BOTH KNEES: ICD-10-CM

## 2019-08-04 DIAGNOSIS — G89.29 CHRONIC PAIN OF BOTH KNEES: ICD-10-CM

## 2019-08-05 RX ORDER — ACETAMINOPHEN 500 MG
TABLET ORAL
Qty: 60 TABLET | Refills: 1 | Status: SHIPPED | OUTPATIENT
Start: 2019-08-05 | End: 2019-10-01 | Stop reason: SDUPTHER

## 2019-08-08 ENCOUNTER — OFFICE VISIT (OUTPATIENT)
Dept: BEHAVIORAL/MENTAL HEALTH CLINIC | Age: 39
End: 2019-08-08
Payer: MEDICAID

## 2019-08-08 DIAGNOSIS — F33.0 MAJOR DEPRESSIVE DISORDER, RECURRENT, MILD (HCC): Primary | ICD-10-CM

## 2019-08-08 PROCEDURE — 90832 PSYTX W PT 30 MINUTES: CPT | Performed by: PSYCHOLOGIST

## 2019-08-08 NOTE — PATIENT INSTRUCTIONS
1. Some places that offer [address], [place] [facility] [address]    [facility]  [address], [place], [address]  [phone]    PQNHQFIEX VKCABDM  [address], [place], [address]  [phone]    [facility], [address] [place] [address]  [phone]    2. See handout for list of behavioral health providers. 3. Follow up next week for our final consultation.

## 2019-08-08 NOTE — PROGRESS NOTES
bedrooms  CLAYTON TOMMY [facility]  [address] [facility], [place] [address]  [address], [facility], [address]  [phone]    OAUVXLFSN FFKAZXG  [address], [facility], [address]  [phone]    Rik Hooper [place], [address]  [phone]    2. See handout for list of behavioral health providers. 3. Follow up next week for our final consultation. Diagnosis:  The encounter diagnosis was Major depressive disorder, recurrent, mild ([address]       Diagnosis Date    Anxiety     Arthritis     CAD (coronary artery disease)     Fatty liver     Fatty liver disease, nonalcoholic 3/15/5082    Hypertension     Obesity     Unspecified sleep apnea     cpap       History:    Social History:   Social History     Socioeconomic History    Marital status: Single     Spouse name: Not on file    Number of children: Not on file    Years of education: Not on file    Highest education level: Not on file   Occupational History    Not on file   Social Needs    Financial resource strain: Not on file    Food insecurity:     Worry: Not on file     Inability: Not on file    Transportation needs:     Medical: Not on file     Non-medical: Not on file   Tobacco Use    Smoking status: Never Smoker    Smokeless tobacco: Never Used   Substance and Sexual Activity    Alcohol use: No     Frequency: Never     Binge frequency: Never    Drug use: No    Sexual activity: Yes     Partners: Female   Lifestyle    Physical activity:     Days per week: Not on file     Minutes per session: Not on file    Stress: Not on file   Relationships    Social connections:     Talks on phone: Not on file     Gets together: Not on file     Attends [employer] service: Not on file     Active member of club or organization: Not on file     Attends meetings of clubs or organizations: Not on file     Relationship status: Not on file   Nidhi Nguyễn

## 2019-08-15 ENCOUNTER — OFFICE VISIT (OUTPATIENT)
Dept: BEHAVIORAL/MENTAL HEALTH CLINIC | Age: 39
End: 2019-08-15
Payer: MEDICAID

## 2019-08-15 DIAGNOSIS — F33.0 MAJOR DEPRESSIVE DISORDER, RECURRENT, MILD (HCC): Primary | ICD-10-CM

## 2019-08-15 PROCEDURE — 90832 PSYTX W PT 30 MINUTES: CPT | Performed by: PSYCHOLOGIST

## 2019-08-17 ENCOUNTER — CARE COORDINATION (OUTPATIENT)
Dept: CARE COORDINATION | Age: 39
End: 2019-08-17

## 2019-08-17 DIAGNOSIS — M54.50 ACUTE BILATERAL LOW BACK PAIN WITHOUT SCIATICA: ICD-10-CM

## 2019-08-17 SDOH — SOCIAL STABILITY: SOCIAL NETWORK: HOW OFTEN DO YOU ATTENT MEETINGS OF THE CLUB OR ORGANIZATION YOU BELONG TO?: NEVER

## 2019-08-17 SDOH — SOCIAL STABILITY: SOCIAL NETWORK
DO YOU BELONG TO ANY CLUBS OR ORGANIZATIONS SUCH AS CHURCH GROUPS UNIONS, FRATERNAL OR ATHLETIC GROUPS, OR SCHOOL GROUPS?: NO

## 2019-08-17 SDOH — ECONOMIC STABILITY: TRANSPORTATION INSECURITY
IN THE PAST 12 MONTHS, HAS LACK OF TRANSPORTATION KEPT YOU FROM MEETINGS, WORK, OR FROM GETTING THINGS NEEDED FOR DAILY LIVING?: NO

## 2019-08-17 SDOH — ECONOMIC STABILITY: INCOME INSECURITY: HOW HARD IS IT FOR YOU TO PAY FOR THE VERY BASICS LIKE FOOD, HOUSING, MEDICAL CARE, AND HEATING?: NOT VERY HARD

## 2019-08-17 SDOH — HEALTH STABILITY: MENTAL HEALTH
STRESS IS WHEN SOMEONE FEELS TENSE, NERVOUS, ANXIOUS, OR CAN'T SLEEP AT NIGHT BECAUSE THEIR MIND IS TROUBLED. HOW STRESSED ARE YOU?: TO SOME EXTENT

## 2019-08-17 SDOH — ECONOMIC STABILITY: TRANSPORTATION INSECURITY
IN THE PAST 12 MONTHS, HAS THE LACK OF TRANSPORTATION KEPT YOU FROM MEDICAL APPOINTMENTS OR FROM GETTING MEDICATIONS?: NO

## 2019-08-17 SDOH — SOCIAL STABILITY: SOCIAL INSECURITY
WITHIN THE LAST YEAR, HAVE YOU BEEN KICKED, HIT, SLAPPED, OR OTHERWISE PHYSICALLY HURT BY YOUR PARTNER OR EX-PARTNER?: NO

## 2019-08-17 SDOH — SOCIAL STABILITY: SOCIAL NETWORK: HOW OFTEN DO YOU ATTEND CHURCH OR RELIGIOUS SERVICES?: 1 TO 4 TIMES PER YEAR

## 2019-08-17 SDOH — ECONOMIC STABILITY: FOOD INSECURITY: WITHIN THE PAST 12 MONTHS, YOU WORRIED THAT YOUR FOOD WOULD RUN OUT BEFORE YOU GOT MONEY TO BUY MORE.: NEVER TRUE

## 2019-08-17 SDOH — SOCIAL STABILITY: SOCIAL NETWORK: ARE YOU MARRIED, WIDOWED, DIVORCED, SEPARATED, NEVER MARRIED, OR LIVING WITH A PARTNER?: NEVER MARRIED

## 2019-08-17 SDOH — HEALTH STABILITY: PHYSICAL HEALTH: ON AVERAGE, HOW MANY DAYS PER WEEK DO YOU ENGAGE IN MODERATE TO STRENUOUS EXERCISE (LIKE A BRISK WALK)?: 2 DAYS

## 2019-08-17 SDOH — HEALTH STABILITY: PHYSICAL HEALTH: ON AVERAGE, HOW MANY MINUTES DO YOU ENGAGE IN EXERCISE AT THIS LEVEL?: 30 MIN

## 2019-08-17 SDOH — SOCIAL STABILITY: SOCIAL NETWORK: HOW OFTEN DO YOU GET TOGETHER WITH FRIENDS OR RELATIVES?: TWICE A WEEK

## 2019-08-17 SDOH — SOCIAL STABILITY: SOCIAL INSECURITY
WITHIN THE LAST YEAR, HAVE TO BEEN RAPED OR FORCED TO HAVE ANY KIND OF SEXUAL ACTIVITY BY YOUR PARTNER OR EX-PARTNER?: NO

## 2019-08-17 SDOH — ECONOMIC STABILITY: FOOD INSECURITY: WITHIN THE PAST 12 MONTHS, THE FOOD YOU BOUGHT JUST DIDN'T LAST AND YOU DIDN'T HAVE MONEY TO GET MORE.: NEVER TRUE

## 2019-08-17 SDOH — SOCIAL STABILITY: SOCIAL INSECURITY: WITHIN THE LAST YEAR, HAVE YOU BEEN HUMILIATED OR EMOTIONALLY ABUSED IN OTHER WAYS BY YOUR PARTNER OR EX-PARTNER?: NO

## 2019-08-17 SDOH — SOCIAL STABILITY: SOCIAL NETWORK: IN A TYPICAL WEEK, HOW MANY TIMES DO YOU TALK ON THE PHONE WITH FAMILY, FRIENDS, OR NEIGHBORS?: THREE TIMES A WEEK

## 2019-08-17 SDOH — SOCIAL STABILITY: SOCIAL INSECURITY: WITHIN THE LAST YEAR, HAVE YOU BEEN AFRAID OF YOUR PARTNER OR EX-PARTNER?: NO

## 2019-08-17 ASSESSMENT — PATIENT HEALTH QUESTIONNAIRE - PHQ9
SUM OF ALL RESPONSES TO PHQ QUESTIONS 1-9: 2
SUM OF ALL RESPONSES TO PHQ QUESTIONS 1-9: 2

## 2019-08-17 NOTE — CARE COORDINATION
ACC: Tamela Cronin RN  CM Risk Score: 5  Charlson 10 Year Mortality Risk Score: 10%     Care Coordination Interventions    Program Enrollment:  Complex Care  Referral from Primary Care Provider:  No  Suggested Interventions and Community Resources  Behavorial Health:  Completed (Comment: with Dr. Flavio Agarwal and [facility])  Physical Therapy:  Completed (Comment: finished 7/22. improved rom and reduced pain)  Other Services or Interventions:  Pain Management and Housing        , Ambulatory Care Coordination Note  8/17/2019  CM Risk Score: 5  Charlson 10 Year Mortality Risk Score: 10%     ACC: Tamela Cronin RN    Summary Note: Patient states in a good mood. Less stress than in previous months. Less \"drama\" with family  [facility]  Hosing is good for him  Wants to concentrate on weigh loss and begin to exercise  Plan  Provide written information on diet and exercise  F/U [address] [place] [address]  PCP appointment  Refill medications  Call in about 10 days   Discuss graduation        Care Coordination Interventions    Program Enrollment:  Complex Care  Referral from Primary Care Provider:  No  Suggested Interventions and [facility] [address]:  Completed (Comment: with Dr. Flavio Agarwal and [facility])  Physical Therapy:  Completed (Comment: finished 7/22. improved rom and reduced pain)  Other Services or Interventions:  Pain Management and Housing          Goals Addressed                 This Visit's Progress     COMPLETED: Community Resource Goal        I will complete referral to community agency [facility] [place], [employer] or [employer] for assistance. Barriers: lack of support and stress  Plan for overcoming my barriers: PCPLEANNE in clinic, [address]  Confidence: 8/10  Anticipated Goal Completion Date: 02/28/19  No see private [facility] counselor in [address]: Conditions and Symptoms   On track     I will schedule office visits, as directed by my provider.   I will keep my appointment or

## 2019-08-18 RX ORDER — ALBUTEROL SULFATE 90 UG/1
2 AEROSOL, METERED RESPIRATORY (INHALATION) EVERY 6 HOURS PRN
Qty: 18 G | Refills: 3 | Status: SHIPPED | OUTPATIENT
Start: 2019-08-18 | End: 2020-03-07 | Stop reason: SDUPTHER

## 2019-08-18 RX ORDER — IBUPROFEN 800 MG/1
800 TABLET ORAL EVERY 8 HOURS PRN
Qty: 30 TABLET | Refills: 3 | Status: SHIPPED | OUTPATIENT
Start: 2019-08-18 | End: 2019-12-03 | Stop reason: SDUPTHER

## 2019-08-23 ENCOUNTER — APPOINTMENT (OUTPATIENT)
Dept: MRI IMAGING | Age: 39
End: 2019-08-23
Payer: MEDICAID

## 2019-08-23 ENCOUNTER — HOSPITAL ENCOUNTER (EMERGENCY)
Age: 39
Discharge: HOME | End: 2019-08-23
Attending: EMERGENCY MEDICINE
Payer: MEDICAID

## 2019-08-23 VITALS
DIASTOLIC BLOOD PRESSURE: 112 MMHG | TEMPERATURE: 98 F | WEIGHT: 270 LBS | SYSTOLIC BLOOD PRESSURE: 170 MMHG | BODY MASS INDEX: 32.88 KG/M2 | OXYGEN SATURATION: 100 % | HEART RATE: 89 BPM | HEIGHT: 76 IN | RESPIRATION RATE: 16 BRPM

## 2019-08-23 DIAGNOSIS — G51.0 BELL'S PALSY: Primary | ICD-10-CM

## 2019-08-23 LAB — SEDIMENTATION RATE, ERYTHROCYTE: 8 MM (ref 0–10)

## 2019-08-23 PROCEDURE — 86618 LYME DISEASE ANTIBODY: CPT

## 2019-08-23 PROCEDURE — 99285 EMERGENCY DEPT VISIT HI MDM: CPT

## 2019-08-23 PROCEDURE — 85651 RBC SED RATE NONAUTOMATED: CPT

## 2019-08-23 RX ORDER — VALACYCLOVIR HYDROCHLORIDE 1 G/1
1000 TABLET, FILM COATED ORAL 3 TIMES DAILY
Qty: 21 TABLET | Refills: 0 | Status: SHIPPED | OUTPATIENT
Start: 2019-08-23 | End: 2019-08-30

## 2019-08-23 RX ORDER — PREDNISONE 10 MG/1
TABLET ORAL
Qty: 15 TABLET | Refills: 0 | Status: SHIPPED | OUTPATIENT
Start: 2019-08-23 | End: 2019-11-01 | Stop reason: ALTCHOICE

## 2019-08-23 ASSESSMENT — ENCOUNTER SYMPTOMS
CHEST TIGHTNESS: 0
SHORTNESS OF BREATH: 0
DIARRHEA: 0
WHEEZING: 0
APNEA: 0
CONSTIPATION: 0
ABDOMINAL DISTENTION: 0
SORE THROAT: 0
PHOTOPHOBIA: 0
EYE PAIN: 0
TROUBLE SWALLOWING: 0
COUGH: 0
CHOKING: 0
VOMITING: 0
STRIDOR: 0
NAUSEA: 0
ABDOMINAL PAIN: 0

## 2019-08-23 NOTE — ED PROVIDER NOTES
[facility]  ED  Emergency Department Encounter  Emergency Medicine Resident     Patient Name: Floyd Levin  MRN: [id number]  [place]: 1980  Date of evaluation: 8/23/19  PCP:  Jayden Yung MD    CHIEF COMPLAINT       Right sided facial droop    HISTORY OF PRESENT ILLNESS  (Location/Symptom, Timing/Onset, Context/Setting, Quality, Duration, Modifying Factors, Severity.)      Floyd Levin is a 37 y.o. male who presents with a chief complaint of right facial droop and dysarthria, onset 4:30 PM yesterday, duration is constant since onset, severity is moderate. Patient reports he has had a right facial droop in the past, however he does not remember his speech being involved in the previous times. Patient reports his symptoms normally resolve in 3 to 4 days. Patient notes that symptoms started when he was watching TV. Patient is a history of hypertension for which she reports compliance with his medications, no history of hyperlipidemia. No history of first-degree relatives with prior stroke. Patient denies headache, numbness, tingling, gait disturbance, chest pain, shortness of breath, abdominal pain, nausea, vomiting. PAST MEDICAL / SURGICAL / SOCIAL / FAMILY HISTORY      has a past medical history of Anxiety, Arthritis, CAD (coronary artery disease), Fatty liver, Fatty liver disease, nonalcoholic, Hypertension, Obesity, and Unspecified sleep apnea. has no past surgical history on file.     Social History     Socioeconomic History    Marital status: Single     Spouse name: Not on file    Number of children: Not on file    Years of education: Not on file    Highest education level: Not on file   Occupational History    Not on file   Social Needs    Financial resource strain: Not very hard    Food insecurity:     Worry: Never true     Inability: Never true   [employer] [employer] [place] needs:     Medical: No     Non-medical: No   Tobacco Use    Smoking status: Never Smoker oriented to person, place, and time. He appears well-developed and well-nourished. No distress. HENT:   Head: Normocephalic and atraumatic. Eyes: Pupils are equal, round, and reactive to light. EOM are normal.   Neck: No tracheal deviation present. Small lymph node enlarged near pinna and manidble   Cardiovascular: Normal rate, regular rhythm, normal heart sounds and intact distal pulses. Pulmonary/Chest: Effort normal and breath sounds normal. No stridor. No respiratory distress. He has no wheezes. He has no rales. Abdominal: Soft. Bowel sounds are normal. He exhibits no distension and no mass. There is no tenderness. There is no rebound and no guarding. Musculoskeletal: Normal range of motion. He exhibits no deformity. Neurological: He is alert and oriented to person, place, and time. A cranial nerve deficit is present. No sensory deficit. Strength b/l upper and lower extremities: 5/5. Right facial droop noted. Right eye does not close completely  Facial weakness involved right eyebrow  Sensation of face intact b/l  Right eye conjunctival injection  Mild dysarthria, patient is still fully understandable   Skin: Skin is warm and dry. No rash noted. He is not diaphoretic. Psychiatric: He has a normal mood and affect.  His speech is normal and behavior is normal.       WORK-UP     PLAN (LABS / IMAGING /EKG):  Orders Placed This Encounter   Procedures    MRI BRAIN WO CONTRAST    MRI BRAIN WO CONTRAST    Sedimentation Rate    LYME AB    Inpatient Consult to Neurology       MEDICATIONS ORDERED:  Orders Placed This Encounter   Medications    valACYclovir (VALTREX) 1 g tablet     Sig: Take 1 tablet by mouth 3 times daily for 7 days     Dispense:  21 tablet     Refill:  0    predniSONE (DELTASONE) 10 MG tablet     Sig: Take 5 tablets by mouth on day one  Take 4 tablets by mouth on day two  Take 3 tablets by mouth on day three  Take 2 tablets by mouth on day four  Take 1 tablet by mouth on day five and also use eyedrops to keep his eye moist.  Will discharge patient at this time. Patient is aware that he can return at any time to get his MRI and to complete his work-up. Additional verbal and written discharge instructions and return precautions were given to patient. All questions were answered prior to discharge. [BJ]   1946 Neurology updated that patient is refusing MRI. WIll order MRI for outpatient and discharge. [BJ]      ED Course User Index  [BJ] Bria Vieira DO       PROCEDURES:  None    CONSULTS:  IP CONSULT TO NEUROLOGY    CRITICAL CARE:  Please see attending physician note. FINAL IMPRESSION      1.  Bell's palsy          DISPOSITION / PLAN     DISPOSITION Decision To Discharge 08/23/2019 07:09:24 PM    PATIENT REFERRED TO:  [address] [facility]  [address] [facility] [address]  [phone]  Schedule an appointment as soon as possible for a visit   to follow up in 1-2 weeks    [facility] ED  [address]  Go to   As needed, If symptoms worsen      DISCHARGE MEDICATIONS:  Discharge Medication List as of 8/23/2019  7:21 PM      START taking these medications    Details   valACYclovir (VALTREX) 1 g tablet Take 1 tablet by mouth 3 times daily for 7 days, Disp-21 tablet, R-0Print      predniSONE (DELTASONE) 10 MG tablet Take 5 tablets by mouth on day one  Take 4 tablets by mouth on day two  Take 3 tablets by mouth on day three  Take 2 tablets by mouth on day four  Take 1 tablet by mouth on day five, Disp-15 tablet, R-0Print             Bria Vieira DO  Emergency Medicine Resident    (Please note that portions ofthis note were completed with a voice recognition program.  Efforts were made to edit the dictations but occasionally words are mis-transcribed.)        Bria Vieira DO  Resident  08/23/19 9490

## 2019-08-23 NOTE — CONSULTS
Neurology Consult Note      Reason for Consult:  R facial droop, dysarthria x1 day  Requesting Physician:  Rosalinda Leija COMPLAINT:  R facial droop    History Obtained From:  patient       HISTORY OF PRESENT ILLNESS:              The patient is a 37 y.o. male with significant past medical history of hypertension who presents with right-sided facial drooping x1 day. The patient stated he started feeling his right eyebrow starting to droop yesterday however this is happened multiple times in the past.  The patient states this is been occurring since he was a child with a right eyebrow will droop slightly and then recover over 2 to 3-day period. This morning however he noticed when he woke up that his right side of his face was \"paralyzed\". This is never happened before to the severity. He does endorse right otalgia ongoing for approximately 1 day. History of smoking IV drug abuse. Patient is compliant with all antihypertensive medications. Past Medical History:        Diagnosis Date    Anxiety     Arthritis     CAD (coronary artery disease)     Fatty liver     Fatty liver disease, nonalcoholic 4/64/4415    Hypertension     Obesity     Unspecified sleep apnea     cpap     Past Surgical History:    History reviewed. No pertinent surgical history. Current Medications:   No current facility-administered medications for this encounter. Allergies:  Apple    Social History:  TOBACCO:   reports that he has never smoked. He has never used smokeless tobacco.  ETOH:   reports that he does not drink alcohol. DRUGS:   reports that he does not use drugs. ACTIVITIES OF DAILY LIVING:    INSTRUMENTAL ACTIVITIES OF DAILY LIVING:  Patient is able to perform all instrumental activities of daily living.     Family History:       Problem Relation Age of Onset    Arthritis Mother     Diabetes Father     Arthritis Father     Stroke Maternal Grandmother     Heart Disease Maternal Grandmother     Stroke Maternal

## 2019-08-23 NOTE — ED PROVIDER NOTES
TENDERNESSS, EDEMA, ERYTHEMA. LUNGS CLEAR JESUS. CARDIAC-S1S2, RRR, NO MRG. ABD SOFT, FLAT, NONTENDER, NORMAL BOWEL SOUNDS. IMP-PERIPHERAL RIGHT CNVII DEFICIT. PLAN-NEURO CONSULT DUE TO POSS DYSARTHRIA, DYSPHAGIA. REASSESS.           Priscilla Mulligan MD  08/23/19 1700

## 2019-08-27 LAB — LYME ANTIBODY: 0.67

## 2019-09-03 ENCOUNTER — HOSPITAL ENCOUNTER (OUTPATIENT)
Dept: MRI IMAGING | Facility: CLINIC | Age: 39
Discharge: HOME | End: 2019-09-05
Payer: MEDICAID

## 2019-09-03 DIAGNOSIS — G51.0 BELL'S PALSY: ICD-10-CM

## 2019-09-03 PROCEDURE — 70551 MRI BRAIN STEM W/O DYE: CPT

## 2019-09-09 DIAGNOSIS — M54.50 ACUTE BILATERAL LOW BACK PAIN WITHOUT SCIATICA: ICD-10-CM

## 2019-09-10 RX ORDER — TIZANIDINE 4 MG/1
4 TABLET ORAL 3 TIMES DAILY PRN
Qty: 30 TABLET | Refills: 0 | Status: SHIPPED | OUTPATIENT
Start: 2019-09-10 | End: 2019-11-01 | Stop reason: SDUPTHER

## 2019-09-28 DIAGNOSIS — I10 ESSENTIAL HYPERTENSION: ICD-10-CM

## 2019-09-28 DIAGNOSIS — J31.0 CHRONIC RHINITIS: ICD-10-CM

## 2019-09-30 RX ORDER — FLUTICASONE PROPIONATE 50 MCG
SPRAY, SUSPENSION (ML) NASAL
Qty: 16 G | Refills: 2 | Status: SHIPPED | OUTPATIENT
Start: 2019-09-30 | End: 2019-12-24

## 2019-09-30 RX ORDER — HYDROCHLOROTHIAZIDE 25 MG/1
TABLET ORAL
Qty: 30 TABLET | Refills: 2 | Status: SHIPPED | OUTPATIENT
Start: 2019-09-30 | End: 2019-10-03 | Stop reason: SDUPTHER

## 2019-10-01 DIAGNOSIS — G89.29 CHRONIC PAIN OF BOTH KNEES: ICD-10-CM

## 2019-10-01 DIAGNOSIS — M25.561 CHRONIC PAIN OF BOTH KNEES: ICD-10-CM

## 2019-10-01 DIAGNOSIS — M25.562 CHRONIC PAIN OF BOTH KNEES: ICD-10-CM

## 2019-10-01 RX ORDER — ACETAMINOPHEN 500 MG
TABLET ORAL
Qty: 60 TABLET | Refills: 1 | Status: SHIPPED | OUTPATIENT
Start: 2019-10-01 | End: 2019-11-24 | Stop reason: SDUPTHER

## 2019-10-03 DIAGNOSIS — I10 ESSENTIAL HYPERTENSION: ICD-10-CM

## 2019-10-04 RX ORDER — LISINOPRIL 20 MG/1
20 TABLET ORAL DAILY
Qty: 30 TABLET | Refills: 2 | Status: SHIPPED | OUTPATIENT
Start: 2019-10-04 | End: 2019-12-03 | Stop reason: SDUPTHER

## 2019-10-04 RX ORDER — HYDROCHLOROTHIAZIDE 25 MG/1
25 TABLET ORAL DAILY
Qty: 30 TABLET | Refills: 2 | Status: SHIPPED | OUTPATIENT
Start: 2019-10-04 | End: 2019-12-03 | Stop reason: SDUPTHER

## 2019-10-07 ENCOUNTER — CARE COORDINATION (OUTPATIENT)
Dept: CARE COORDINATION | Age: 39
End: 2019-10-07

## 2019-11-01 ENCOUNTER — HOSPITAL ENCOUNTER (OUTPATIENT)
Age: 39
Setting detail: SPECIMEN
Discharge: HOME | End: 2019-11-01
Payer: MEDICAID

## 2019-11-01 ENCOUNTER — CARE COORDINATION (OUTPATIENT)
Dept: INTERNAL MEDICINE | Age: 39
End: 2019-11-01

## 2019-11-01 ENCOUNTER — OFFICE VISIT (OUTPATIENT)
Dept: INTERNAL MEDICINE | Age: 39
End: 2019-11-01
Payer: MEDICAID

## 2019-11-01 VITALS
DIASTOLIC BLOOD PRESSURE: 82 MMHG | HEART RATE: 63 BPM | BODY MASS INDEX: 34.57 KG/M2 | WEIGHT: 284 LBS | SYSTOLIC BLOOD PRESSURE: 137 MMHG

## 2019-11-01 DIAGNOSIS — K76.0 FATTY LIVER DISEASE, NONALCOHOLIC: ICD-10-CM

## 2019-11-01 DIAGNOSIS — Z23 NEEDS FLU SHOT: ICD-10-CM

## 2019-11-01 DIAGNOSIS — F32.A MILD DEPRESSION: ICD-10-CM

## 2019-11-01 DIAGNOSIS — E66.9 CLASS 1 OBESITY WITHOUT SERIOUS COMORBIDITY WITH BODY MASS INDEX (BMI) OF 31.0 TO 31.9 IN ADULT, UNSPECIFIED OBESITY TYPE: ICD-10-CM

## 2019-11-01 DIAGNOSIS — I10 ESSENTIAL HYPERTENSION: Primary | ICD-10-CM

## 2019-11-01 DIAGNOSIS — I10 ESSENTIAL HYPERTENSION: ICD-10-CM

## 2019-11-01 DIAGNOSIS — R73.02 IGT (IMPAIRED GLUCOSE TOLERANCE): ICD-10-CM

## 2019-11-01 DIAGNOSIS — M54.50 ACUTE BILATERAL LOW BACK PAIN WITHOUT SCIATICA: ICD-10-CM

## 2019-11-01 DIAGNOSIS — G47.33 OSA ON CPAP: ICD-10-CM

## 2019-11-01 DIAGNOSIS — Z99.89 OSA ON CPAP: ICD-10-CM

## 2019-11-01 LAB
ABSOLUTE EOS #: 0.15 K/UL (ref 0–0.44)
ABSOLUTE IMMATURE GRANULOCYTE: <0.03 K/UL (ref 0–0.3)
ABSOLUTE LYMPH #: 1.45 K/UL (ref 1.1–3.7)
ABSOLUTE MONO #: 0.21 K/UL (ref 0.1–1.2)
ALBUMIN SERPL-MCNC: 4.2 G/DL (ref 3.5–5.2)
ALBUMIN/GLOBULIN RATIO: 1.1 (ref 1–2.5)
ALP BLD-CCNC: 59 U/L (ref 40–129)
ALT SERPL-CCNC: 10 U/L (ref 5–41)
ANION GAP SERPL CALCULATED.3IONS-SCNC: 11 MMOL/L (ref 9–17)
ANION GAP SERPL CALCULATED.3IONS-SCNC: 11 MMOL/L (ref 9–17)
AST SERPL-CCNC: 16 U/L
BASOPHILS # BLD: 1 % (ref 0–2)
BASOPHILS ABSOLUTE: <0.03 K/UL (ref 0–0.2)
BILIRUB SERPL-MCNC: 0.58 MG/DL (ref 0.3–1.2)
BUN BLDV-MCNC: 20 MG/DL (ref 6–20)
BUN BLDV-MCNC: 20 MG/DL (ref 6–20)
BUN/CREAT BLD: NORMAL (ref 9–20)
BUN/CREAT BLD: NORMAL (ref 9–20)
CALCIUM SERPL-MCNC: 9.6 MG/DL (ref 8.6–10.4)
CALCIUM SERPL-MCNC: 9.6 MG/DL (ref 8.6–10.4)
CHLORIDE BLD-SCNC: 101 MMOL/L (ref 98–107)
CHLORIDE BLD-SCNC: 101 MMOL/L (ref 98–107)
CHOLESTEROL/HDL RATIO: 4
CHOLESTEROL: 159 MG/DL
CO2: 28 MMOL/L (ref 20–31)
CO2: 28 MMOL/L (ref 20–31)
CREAT SERPL-MCNC: 0.86 MG/DL (ref 0.7–1.2)
CREAT SERPL-MCNC: 0.86 MG/DL (ref 0.7–1.2)
DIFFERENTIAL TYPE: ABNORMAL
EOSINOPHILS RELATIVE PERCENT: 5 % (ref 1–4)
ESTIMATED AVERAGE GLUCOSE: 108 MG/DL
GFR AFRICAN AMERICAN: >60 ML/MIN
GFR AFRICAN AMERICAN: >60 ML/MIN
GFR NON-AFRICAN AMERICAN: >60 ML/MIN
GFR NON-AFRICAN AMERICAN: >60 ML/MIN
GFR SERPL CREATININE-BSD FRML MDRD: NORMAL ML/MIN/{1.73_M2}
GLUCOSE BLD-MCNC: 92 MG/DL (ref 70–99)
GLUCOSE BLD-MCNC: 92 MG/DL (ref 70–99)
HBA1C MFR BLD: 5.4 % (ref 4–6)
HCT VFR BLD CALC: 43.9 % (ref 40.7–50.3)
HDLC SERPL-MCNC: 40 MG/DL
HEMOGLOBIN: 14.4 G/DL (ref 13–17)
IMMATURE GRANULOCYTES: 0 %
LDL CHOLESTEROL: 104 MG/DL (ref 0–130)
LYMPHOCYTES # BLD: 52 % (ref 24–43)
MCH RBC QN AUTO: 31 PG (ref 25.2–33.5)
MCHC RBC AUTO-ENTMCNC: 32.8 G/DL (ref 28.4–34.8)
MCV RBC AUTO: 94.6 FL (ref 82.6–102.9)
MONOCYTES # BLD: 8 % (ref 3–12)
NRBC AUTOMATED: 0 PER 100 WBC
PDW BLD-RTO: 13.8 % (ref 11.8–14.4)
PLATELET # BLD: 247 K/UL (ref 138–453)
PLATELET ESTIMATE: ABNORMAL
PMV BLD AUTO: 10 FL (ref 8.1–13.5)
POTASSIUM SERPL-SCNC: 4 MMOL/L (ref 3.7–5.3)
POTASSIUM SERPL-SCNC: 4 MMOL/L (ref 3.7–5.3)
RBC # BLD: 4.64 M/UL (ref 4.21–5.77)
RBC # BLD: ABNORMAL 10*6/UL
SEG NEUTROPHILS: 34 % (ref 36–65)
SEGMENTED NEUTROPHILS ABSOLUTE COUNT: 0.96 K/UL (ref 1.5–8.1)
SODIUM BLD-SCNC: 140 MMOL/L (ref 135–144)
SODIUM BLD-SCNC: 140 MMOL/L (ref 135–144)
T3 TOTAL: 102 NG/DL (ref 80–200)
THYROXINE, FREE: 1.21 NG/DL (ref 0.93–1.7)
TOTAL PROTEIN: 8.2 G/DL (ref 6.4–8.3)
TRIGL SERPL-MCNC: 73 MG/DL
TSH SERPL DL<=0.05 MIU/L-ACNC: 1.64 MIU/L (ref 0.3–5)
VITAMIN D 25-HYDROXY: 24 NG/ML (ref 30–100)
VLDLC SERPL CALC-MCNC: ABNORMAL MG/DL (ref 1–30)
WBC # BLD: 2.8 K/UL (ref 3.5–11.3)
WBC # BLD: ABNORMAL 10*3/UL

## 2019-11-01 PROCEDURE — 82306 VITAMIN D 25 HYDROXY: CPT

## 2019-11-01 PROCEDURE — 90688 IIV4 VACCINE SPLT 0.5 ML IM: CPT | Performed by: INTERNAL MEDICINE

## 2019-11-01 PROCEDURE — 99211 OFF/OP EST MAY X REQ PHY/QHP: CPT | Performed by: INTERNAL MEDICINE

## 2019-11-01 PROCEDURE — 85025 COMPLETE CBC W/AUTO DIFF WBC: CPT

## 2019-11-01 PROCEDURE — 80053 COMPREHEN METABOLIC PANEL: CPT

## 2019-11-01 PROCEDURE — 96160 PT-FOCUSED HLTH RISK ASSMT: CPT | Performed by: INTERNAL MEDICINE

## 2019-11-01 PROCEDURE — G8417 CALC BMI ABV UP PARAM F/U: HCPCS | Performed by: INTERNAL MEDICINE

## 2019-11-01 PROCEDURE — 99213 OFFICE O/P EST LOW 20 MIN: CPT | Performed by: INTERNAL MEDICINE

## 2019-11-01 PROCEDURE — 83036 HEMOGLOBIN GLYCOSYLATED A1C: CPT

## 2019-11-01 PROCEDURE — 36415 COLL VENOUS BLD VENIPUNCTURE: CPT

## 2019-11-01 PROCEDURE — 1036F TOBACCO NON-USER: CPT | Performed by: INTERNAL MEDICINE

## 2019-11-01 PROCEDURE — G8427 DOCREV CUR MEDS BY ELIG CLIN: HCPCS | Performed by: INTERNAL MEDICINE

## 2019-11-01 PROCEDURE — 84439 ASSAY OF FREE THYROXINE: CPT

## 2019-11-01 PROCEDURE — 84480 ASSAY TRIIODOTHYRONINE (T3): CPT

## 2019-11-01 PROCEDURE — G8482 FLU IMMUNIZE ORDER/ADMIN: HCPCS | Performed by: INTERNAL MEDICINE

## 2019-11-01 PROCEDURE — 80048 BASIC METABOLIC PNL TOTAL CA: CPT

## 2019-11-01 PROCEDURE — 84443 ASSAY THYROID STIM HORMONE: CPT

## 2019-11-01 PROCEDURE — 80061 LIPID PANEL: CPT

## 2019-11-01 RX ORDER — ARIPIPRAZOLE 5 MG/1
5 TABLET ORAL DAILY
COMMUNITY
End: 2020-06-04 | Stop reason: DRUGHIGH

## 2019-11-01 RX ORDER — TIZANIDINE 4 MG/1
4 TABLET ORAL 3 TIMES DAILY PRN
Qty: 30 TABLET | Refills: 0 | Status: SHIPPED | OUTPATIENT
Start: 2019-11-01 | End: 2020-06-07 | Stop reason: SDUPTHER

## 2019-11-01 SDOH — HEALTH STABILITY: PHYSICAL HEALTH: ON AVERAGE, HOW MANY DAYS PER WEEK DO YOU ENGAGE IN MODERATE TO STRENUOUS EXERCISE (LIKE A BRISK WALK)?: 4 DAYS

## 2019-11-01 SDOH — HEALTH STABILITY: MENTAL HEALTH
STRESS IS WHEN SOMEONE FEELS TENSE, NERVOUS, ANXIOUS, OR CAN'T SLEEP AT NIGHT BECAUSE THEIR MIND IS TROUBLED. HOW STRESSED ARE YOU?: ONLY A LITTLE

## 2019-11-01 SDOH — HEALTH STABILITY: PHYSICAL HEALTH: ON AVERAGE, HOW MANY MINUTES DO YOU ENGAGE IN EXERCISE AT THIS LEVEL?: 50 MIN

## 2019-11-01 SDOH — ECONOMIC STABILITY: TRANSPORTATION INSECURITY
IN THE PAST 12 MONTHS, HAS THE LACK OF TRANSPORTATION KEPT YOU FROM MEDICAL APPOINTMENTS OR FROM GETTING MEDICATIONS?: YES

## 2019-11-01 ASSESSMENT — PATIENT HEALTH QUESTIONNAIRE - PHQ9: SUM OF ALL RESPONSES TO PHQ QUESTIONS 1-9: 2

## 2019-11-24 DIAGNOSIS — M25.561 CHRONIC PAIN OF BOTH KNEES: ICD-10-CM

## 2019-11-24 DIAGNOSIS — M25.562 CHRONIC PAIN OF BOTH KNEES: ICD-10-CM

## 2019-11-24 DIAGNOSIS — G89.29 CHRONIC PAIN OF BOTH KNEES: ICD-10-CM

## 2019-11-25 RX ORDER — ACETAMINOPHEN 500 MG
TABLET ORAL
Qty: 60 TABLET | Refills: 1 | Status: SHIPPED | OUTPATIENT
Start: 2019-11-25 | End: 2020-01-20

## 2019-12-21 DIAGNOSIS — J31.0 CHRONIC RHINITIS: ICD-10-CM

## 2019-12-24 RX ORDER — FLUTICASONE PROPIONATE 50 MCG
SPRAY, SUSPENSION (ML) NASAL
Qty: 16 G | Refills: 2 | Status: SHIPPED | OUTPATIENT
Start: 2019-12-24 | End: 2020-03-07 | Stop reason: SDUPTHER

## 2020-01-20 RX ORDER — ACETAMINOPHEN 500 MG
TABLET ORAL
Qty: 60 TABLET | Refills: 1 | Status: SHIPPED | OUTPATIENT
Start: 2020-01-20 | End: 2020-06-07 | Stop reason: SDUPTHER

## 2020-01-20 NOTE — TELEPHONE ENCOUNTER
E-scribe request for RA ACETAMINOPHEN 500 MG CAPLET. Please review and e-scribe if applicable. Pt is on wait list for f/u in [place]     Next Visit Date:  No future appointments.   Health Maintenance   Topic Date Due    Potassium monitoring  11/01/2020    Creatinine monitoring  11/01/2020    Lipid screen  11/01/2024    DTaP/Tdap/Td vaccine (3 - Td) 04/27/2028    Flu vaccine  Completed    HIV screen  Completed    Pneumococcal 0-64 years Vaccine  Aged Out               (applicable per patient's age: Cancer Screenings, Depression Screening, Fall Risk Screening, Immunizations)    Hemoglobin A1C (%)   Date Value   11/01/2019 5.4   07/10/2018 5.5   01/18/2018 5.5     LDL Cholesterol (mg/dL)   Date Value   11/01/2019 104     AST (U/L)   Date Value   11/01/2019 16     ALT (U/L)   Date Value   11/01/2019 10     BUN (mg/dL)   Date Value   11/01/2019 20      (goal A1C is < 7)   (goal LDL is <100) need 30-50% reduction from baseline     BP Readings from Last 3 Encounters:   11/01/19 137/82   08/23/19 (!) 170/112   05/14/19 125/79    (goal /80)      All Future Testing planned in [facility]:           Patient Active Problem List:     Hypertension     Fatty liver disease, nonalcoholic     MIGEL on CPAP     Positive depression screening     Vitamin D deficiency     IGT (impaired glucose tolerance)

## 2020-03-09 RX ORDER — ALBUTEROL SULFATE 90 UG/1
2 AEROSOL, METERED RESPIRATORY (INHALATION) EVERY 6 HOURS PRN
Qty: 18 G | Refills: 3 | Status: SHIPPED | OUTPATIENT
Start: 2020-03-09 | End: 2020-04-15 | Stop reason: SDUPTHER

## 2020-03-09 RX ORDER — IBUPROFEN 800 MG/1
800 TABLET ORAL EVERY 8 HOURS PRN
Qty: 30 TABLET | Refills: 3 | Status: SHIPPED | OUTPATIENT
Start: 2020-03-09 | End: 2020-04-15 | Stop reason: SDUPTHER

## 2020-03-09 RX ORDER — FLUTICASONE PROPIONATE 50 MCG
2 SPRAY, SUSPENSION (ML) NASAL DAILY
Qty: 16 G | Refills: 2 | Status: SHIPPED | OUTPATIENT
Start: 2020-03-09 | End: 2020-06-10

## 2020-03-31 NOTE — TELEPHONE ENCOUNTER
Request for metoprolol - medication pended. Please fill if appropriate.       Next Visit Date:  Future Appointments   Date Time Provider Kathi Crowderi   4/28/2020  9:15 AM Anahi Eng MD [facility] [address] Maintenance   Topic Date Due    Potassium monitoring  11/01/2020    Creatinine monitoring  11/01/2020    Lipid screen  11/01/2024    DTaP/Tdap/Td vaccine (3 - Td) 04/27/2028    Flu vaccine  Completed    HIV screen  Completed    Hepatitis A vaccine  Aged Out    Hepatitis B vaccine  Aged Out    Hib vaccine  Aged Out    Meningococcal (ACWY) vaccine  Aged Out    Pneumococcal 0-64 years Vaccine  Aged Out       Hemoglobin A1C (%)   Date Value   11/01/2019 5.4   07/10/2018 5.5   01/18/2018 5.5             ( goal A1C is < 7)   No results found for: LABMICR  LDL Cholesterol (mg/dL)   Date Value   11/01/2019 104       (goal LDL is <100)   AST (U/L)   Date Value   11/01/2019 16     ALT (U/L)   Date Value   11/01/2019 10     BUN (mg/dL)   Date Value   11/01/2019 20     BP Readings from Last 3 Encounters:   11/01/19 137/82   08/23/19 (!) 170/112   05/14/19 125/79          (goal 120/80)    All Future Testing planned in [facility]        Patient Active Problem List:     Hypertension     Fatty liver disease, nonalcoholic     MIGEL on CPAP     Positive depression screening     Vitamin D deficiency     IGT (impaired glucose tolerance)

## 2020-04-16 RX ORDER — ALBUTEROL SULFATE 90 UG/1
2 AEROSOL, METERED RESPIRATORY (INHALATION) EVERY 6 HOURS PRN
Qty: 18 G | Refills: 3 | Status: SHIPPED | OUTPATIENT
Start: 2020-04-16 | End: 2020-07-01 | Stop reason: SDUPTHER

## 2020-04-16 RX ORDER — IBUPROFEN 800 MG/1
800 TABLET ORAL EVERY 8 HOURS PRN
Qty: 30 TABLET | Refills: 3 | Status: SHIPPED | OUTPATIENT
Start: 2020-04-16 | End: 2020-09-05 | Stop reason: SDUPTHER

## 2020-04-28 ENCOUNTER — TELEMEDICINE (OUTPATIENT)
Dept: INTERNAL MEDICINE | Age: 40
End: 2020-04-28
Payer: MEDICAID

## 2020-04-28 PROCEDURE — 99422 OL DIG E/M SVC 11-20 MIN: CPT | Performed by: INTERNAL MEDICINE

## 2020-04-28 RX ORDER — LISINOPRIL 20 MG/1
20 TABLET ORAL DAILY
Qty: 90 TABLET | Refills: 1 | Status: SHIPPED | OUTPATIENT
Start: 2020-04-28 | End: 2020-10-16 | Stop reason: SDUPTHER

## 2020-04-28 RX ORDER — HYDROCHLOROTHIAZIDE 25 MG/1
25 TABLET ORAL DAILY
Qty: 90 TABLET | Refills: 1 | Status: SHIPPED | OUTPATIENT
Start: 2020-04-28 | End: 2020-10-16 | Stop reason: SDUPTHER

## 2020-04-28 ASSESSMENT — ENCOUNTER SYMPTOMS
ABDOMINAL PAIN: 0
SHORTNESS OF BREATH: 0
CONSTIPATION: 0
COUGH: 0
WHEEZING: 0
PHOTOPHOBIA: 0
BACK PAIN: 0

## 2020-04-28 NOTE — PROGRESS NOTES
of difficulty breathing or respiratory distress        [] Abnormal-      Musculoskeletal:   [] Normal gait with no signs of ataxia         [] Normal range of motion of neck        [] Abnormal-       Neurological:        [x] No Facial Asymmetry (Cranial nerve 7 motor function) (limited exam to video visit)          [x] No gaze palsy        [] Abnormal-         Skin:        [x] No significant exanthematous lesions or discoloration noted on facial skin         [] Abnormal-            Psychiatric:       [x] Normal Affect [] No Hallucinations        [] Abnormal-     Other pertinent observable physical exam findings-     ASSESSMENT/PLAN:  1. Essential hypertension    - lisinopril (PRINIVIL;ZESTRIL) 20 MG tablet; Take 1 tablet by mouth daily  Dispense: 90 tablet; Refill: 1  - hydroCHLOROthiazide (HYDRODIURIL) 25 MG tablet; Take 1 tablet by mouth daily  Dispense: 90 tablet; Refill: 1  - metoprolol tartrate (LOPRESSOR) 25 MG tablet; Take 2/day  Dispense: 180 tablet; Refill: 1    2. IGT (impaired glucose tolerance)  Controlled  Continue diet changes    3. Fatty liver disease, nonalcoholic  Low fat diet  Weight loss        Return in about 3 months (around 7/28/2020). Nidia Agarwal is a 41 y.o. male being evaluated by a Virtual Visit (video visit) encounter to address concerns as mentioned above. A caregiver was present when appropriate. Due to this being a TeleHealth encounter ([facility]), evaluation of the following organ systems was limited: Vitals/Constitutional/EENT/Resp/CV/GI//MS/Neuro/Skin/Heme-Lymph-Imm. Pursuant to the emergency declaration under the [address] [place] [address], [address] [place] [address] and the [employer] and [employer] Act, this Virtual Visit was conducted with patient's (and/or legal guardian's) consent, to reduce the patient's risk of exposure to COVID-19 and provide necessary medical care.   The patient (and/or legal guardian) has also been advised to contact this office for worsening conditions or problems, and seek emergency medical treatment and/or call 911 if deemed necessary. Patient identification was verified at the start of the visit: Yes    Total time spent on this encounter: 15 minutes    Services were provided through a video synchronous discussion virtually to substitute for in-person clinic visit. Patient and provider were located at their individual homes. --Albertina Chappell MD on 4/28/2020 at 9:10 AM    An electronic signature was used to authenticate this note.

## 2020-04-28 NOTE — PATIENT INSTRUCTIONS
-Pt due for 3 month f/u in July-- pt to call in June to set up an appt--reminder in [facility] to contact patient as well--AVS given to patient    ERICA Joe

## 2020-06-04 ENCOUNTER — OFFICE VISIT (OUTPATIENT)
Dept: INTERNAL MEDICINE | Age: 40
End: 2020-06-04
Payer: MEDICAID

## 2020-06-04 VITALS
WEIGHT: 314 LBS | SYSTOLIC BLOOD PRESSURE: 118 MMHG | DIASTOLIC BLOOD PRESSURE: 74 MMHG | TEMPERATURE: 96.6 F | HEART RATE: 81 BPM | BODY MASS INDEX: 38.22 KG/M2

## 2020-06-04 PROCEDURE — 99211 OFF/OP EST MAY X REQ PHY/QHP: CPT | Performed by: INTERNAL MEDICINE

## 2020-06-04 PROCEDURE — G8427 DOCREV CUR MEDS BY ELIG CLIN: HCPCS | Performed by: INTERNAL MEDICINE

## 2020-06-04 PROCEDURE — 99213 OFFICE O/P EST LOW 20 MIN: CPT | Performed by: INTERNAL MEDICINE

## 2020-06-04 PROCEDURE — 93010 ELECTROCARDIOGRAM REPORT: CPT | Performed by: INTERNAL MEDICINE

## 2020-06-04 PROCEDURE — 93005 ELECTROCARDIOGRAM TRACING: CPT | Performed by: INTERNAL MEDICINE

## 2020-06-04 PROCEDURE — G8417 CALC BMI ABV UP PARAM F/U: HCPCS | Performed by: INTERNAL MEDICINE

## 2020-06-04 PROCEDURE — 1036F TOBACCO NON-USER: CPT | Performed by: INTERNAL MEDICINE

## 2020-06-04 RX ORDER — IBUPROFEN 800 MG/1
800 TABLET ORAL
Qty: 30 TABLET | Refills: 0 | Status: SHIPPED | OUTPATIENT
Start: 2020-06-04 | End: 2020-07-01 | Stop reason: SDUPTHER

## 2020-06-04 RX ORDER — ARIPIPRAZOLE 10 MG/1
10 TABLET ORAL DAILY
COMMUNITY
Start: 2020-05-24 | End: 2022-02-08 | Stop reason: ALTCHOICE

## 2020-06-04 ASSESSMENT — ENCOUNTER SYMPTOMS
EYE REDNESS: 0
WHEEZING: 0
ABDOMINAL PAIN: 0
CONSTIPATION: 0
SHORTNESS OF BREATH: 0
COUGH: 0

## 2020-06-04 ASSESSMENT — PATIENT HEALTH QUESTIONNAIRE - PHQ9
SUM OF ALL RESPONSES TO PHQ9 QUESTIONS 1 & 2: 0
SUM OF ALL RESPONSES TO PHQ QUESTIONS 1-9: 0
2. FEELING DOWN, DEPRESSED OR HOPELESS: 0
1. LITTLE INTEREST OR PLEASURE IN DOING THINGS: 0
SUM OF ALL RESPONSES TO PHQ QUESTIONS 1-9: 0

## 2020-06-04 NOTE — PROGRESS NOTES
[facility]    Progress Note    Date of patient's visit: 6/4/2020    Patient's Name:  Sulaiman Gonzalez    YOB: 1980            Patient Care Team:  Terrell Dial MD as PCP - General (Internal Medicine)  Terrell Dial MD as PCP - [facility] Empaneled Provider  Viviane Powell MD as Surgeon (Orthopedic Surgery)    REASON FOR VISIT: Routine outpatient follow     Chief Complaint   Patient presents with    Hypertension     pt states that his BP was 200/86 2 days ago     Chest Pain     pt states that he has been having sharp chest pains that come and go x 3 weeks          HISTORY OF PRESENT ILLNESS:    History was obtained from the patient. Sulaiman Gonzalez is a 41 y.o. is here for complaints of left-sided chest wall pain that is been going on for 3 weeks. He does a lot of yard work and lawnmowing. He also has been doing some heavy lifting. He says it started 3 weeks ago and is on the left chest wall. It is relieved with ibuprofen. He has no shortness of breath or palpitations. He has been taking ibuprofen once a day at night before sleep which helps but when he gets up in the morning then he again has chest pains. He has been under a lot of stress also. He was also very worried about this pain which is new. There is no cough, fever or expectoration. He says he is continuing to walk 4-5 times a day. Pain is not any worse. He was given an albuterol inhaler in the past which he says he uses sometimes if he gets short of breath. He has a home blood pressure machine and he says for 2 days the blood pressure was high when he was having chest pain. Today the blood pressure is normal here. He did not bring his home blood pressure monitor. He has a long history of anxiety and he says his stress has been worse has been taking care of a relative with dementia.            Past Medical History:   Diagnosis Date    Anxiety     Arthritis     CAD (coronary artery disease)     Fatty liver     Fatty liver disease, nonalcoholic 3/91/8921    Hypertension     Obesity     Unspecified sleep apnea     cpap       No past surgical history on file. ALLERGIES    No Known Allergies    MEDICATIONS:      Current Outpatient Medications on File Prior to Visit   Medication Sig Dispense Refill    ARIPiprazole (ABILIFY) 10 MG tablet Take 10 mg by mouth daily      lisinopril (PRINIVIL;ZESTRIL) 20 MG tablet Take 1 tablet by mouth daily 90 tablet 1    hydroCHLOROthiazide (HYDRODIURIL) 25 MG tablet Take 1 tablet by mouth daily 90 tablet 1    metoprolol tartrate (LOPRESSOR) 25 MG tablet Take 2/day 180 tablet 1    ibuprofen (ADVIL;MOTRIN) 800 MG tablet Take 1 tablet by mouth every 8 hours as needed for Pain 30 tablet 3    albuterol sulfate HFA (VENTOLIN HFA) 108 (90 Base) MCG/ACT inhaler Inhale 2 puffs into the lungs every 6 hours as needed for Wheezing 18 g 3    fluticasone (FLONASE) 50 MCG/ACT nasal spray 2 sprays by Nasal route daily 16 g 2    RA ACETAMINOPHEN EX  MG tablet take 1 tablet by mouth twice a day AS NEEDED FOR PAIN 60 tablet 1    tiZANidine (ZANAFLEX) 4 MG tablet Take 1 tablet by mouth 3 times daily as needed (muscle spasms) 30 tablet 0     No current facility-administered medications on file prior to visit. SOCIAL HISTORY    Reviewed and no change from previous record. Nahomy Pompa  reports that he has never smoked. He has never used smokeless tobacco.    FAMILY HISTORY:    Reviewed and No change from previous visit    HEALTH MAINTENANCE DUE:    There are no preventive care reminders to display for this patient. REVIEW OF SYSTEMS:    12 point review of symptoms completed and found to be normal except noted in the HPI    Review of Systems   Constitutional: Negative for fatigue, fever and unexpected weight change. Eyes: Negative for redness and visual disturbance. Respiratory: Negative for cough, shortness of breath and wheezing. Cardiovascular: Positive for chest pain. Negative for palpitations and leg swelling. Gastrointestinal: Negative for abdominal pain and constipation. Genitourinary: Negative for dysuria and frequency. Neurological: Negative for dizziness, syncope, weakness and headaches. Psychiatric/Behavioral: Negative for dysphoric mood. The patient is nervous/anxious. PHYSICAL EXAM:     Vitals:    06/04/20 1544   BP: 123/70   Site: Right Upper Arm   Position: Sitting   Cuff Size: Large Adult   Pulse: 82   Temp: 96.6 °F (35.9 °C)   TempSrc: Infrared   Weight: (!) 314 lb (142.4 kg)     Body mass index is 38.22 kg/m². BP Readings from Last 3 Encounters:   06/04/20 123/70   11/01/19 137/82   08/23/19 (!) 170/112        Wt Readings from Last 3 Encounters:   06/04/20 (!) 314 lb (142.4 kg)   11/01/19 284 lb (128.8 kg)   08/23/19 270 lb (122.5 kg)       Physical Exam  Vitals signs and nursing note reviewed. Constitutional:       General: He is not in acute distress. Appearance: Normal appearance. He is obese. He is not ill-appearing or diaphoretic. HENT:      Head: Normocephalic and atraumatic. Mouth/Throat:      Mouth: Mucous membranes are moist.      Pharynx: Oropharynx is clear. Eyes:      General: No scleral icterus. Extraocular Movements: Extraocular movements intact. Conjunctiva/sclera: Conjunctivae normal.      Pupils: Pupils are equal, round, and reactive to light. Neck:      Musculoskeletal: Normal range of motion and neck supple. Cardiovascular:      Rate and Rhythm: Normal rate and regular rhythm. Heart sounds: Normal heart sounds. No murmur. No gallop. Pulmonary:      Effort: Pulmonary effort is normal.      Breath sounds: Normal breath sounds. No wheezing or rales. Chest:      Chest wall: Tenderness (left costochondral junction. reproducible chest pain) present. Musculoskeletal:      Right lower leg: No edema. Left lower leg: No edema.    Skin:     General: Skin

## 2020-06-10 RX ORDER — FLUTICASONE PROPIONATE 50 MCG
SPRAY, SUSPENSION (ML) NASAL
Qty: 16 G | Refills: 2 | Status: SHIPPED | OUTPATIENT
Start: 2020-06-10 | End: 2020-07-01 | Stop reason: SDUPTHER

## 2020-06-10 RX ORDER — ACETAMINOPHEN 500 MG
500 TABLET ORAL EVERY 6 HOURS PRN
Qty: 60 TABLET | Refills: 1 | Status: SHIPPED | OUTPATIENT
Start: 2020-06-10 | End: 2020-09-11

## 2020-06-10 RX ORDER — TIZANIDINE 4 MG/1
4 TABLET ORAL 3 TIMES DAILY PRN
Qty: 30 TABLET | Refills: 0 | Status: SHIPPED | OUTPATIENT
Start: 2020-06-10 | End: 2020-10-16 | Stop reason: ALTCHOICE

## 2020-07-02 NOTE — TELEPHONE ENCOUNTER
Request for metoprolol, flonase, ibu, ventolin - meds pended. Please fill if appropriate. Next Visit Date:  No future appointments.     Health Maintenance   Topic Date Due    Flu vaccine (1) 09/01/2020    Potassium monitoring  11/01/2020    Creatinine monitoring  11/01/2020    Lipid screen  11/01/2024    DTaP/Tdap/Td vaccine (3 - Td) 04/27/2028    HIV screen  Completed    Hepatitis A vaccine  Aged Out    Hepatitis B vaccine  Aged Out    Hib vaccine  Aged Out    Meningococcal (ACWY) vaccine  Aged Out    Pneumococcal 0-64 years Vaccine  Aged Out       Hemoglobin A1C (%)   Date Value   11/01/2019 5.4   07/10/2018 5.5   01/18/2018 5.5             ( goal A1C is < 7)   No results found for: LABMICR  LDL Cholesterol (mg/dL)   Date Value   11/01/2019 104       (goal LDL is <100)   AST (U/L)   Date Value   11/01/2019 16     ALT (U/L)   Date Value   11/01/2019 10     BUN (mg/dL)   Date Value   11/01/2019 20     BP Readings from Last 3 Encounters:   06/04/20 118/74   11/01/19 137/82   08/23/19 (!) 170/112          (goal 120/80)    All Future Testing planned in [facility]        Patient Active Problem List:     Hypertension     Fatty liver disease, nonalcoholic     MIGEL on CPAP     Positive depression screening     Vitamin D deficiency     IGT (impaired glucose tolerance)

## 2020-07-07 RX ORDER — ALBUTEROL SULFATE 90 UG/1
2 AEROSOL, METERED RESPIRATORY (INHALATION) EVERY 6 HOURS PRN
Qty: 18 G | Refills: 3 | Status: SHIPPED | OUTPATIENT
Start: 2020-07-07 | End: 2020-09-05 | Stop reason: SDUPTHER

## 2020-07-07 RX ORDER — IBUPROFEN 800 MG/1
800 TABLET ORAL
Qty: 30 TABLET | Refills: 0 | Status: SHIPPED | OUTPATIENT
Start: 2020-07-07 | End: 2020-11-19

## 2020-07-07 RX ORDER — FLUTICASONE PROPIONATE 50 MCG
1 SPRAY, SUSPENSION (ML) NASAL DAILY
Qty: 16 G | Refills: 2 | Status: SHIPPED | OUTPATIENT
Start: 2020-07-07 | End: 2020-09-13 | Stop reason: SDUPTHER

## 2020-08-03 ENCOUNTER — APPOINTMENT (OUTPATIENT)
Dept: GENERAL RADIOLOGY | Age: 40
End: 2020-08-03
Payer: MEDICAID

## 2020-08-03 ENCOUNTER — HOSPITAL ENCOUNTER (EMERGENCY)
Age: 40
Discharge: HOME | End: 2020-08-03
Attending: EMERGENCY MEDICINE
Payer: MEDICAID

## 2020-08-03 VITALS
TEMPERATURE: 98.6 F | HEART RATE: 64 BPM | BODY MASS INDEX: 38.54 KG/M2 | SYSTOLIC BLOOD PRESSURE: 140 MMHG | WEIGHT: 310 LBS | RESPIRATION RATE: 16 BRPM | HEIGHT: 75 IN | OXYGEN SATURATION: 100 % | DIASTOLIC BLOOD PRESSURE: 96 MMHG

## 2020-08-03 LAB
ABSOLUTE EOS #: 0.53 K/UL (ref 0–0.44)
ABSOLUTE IMMATURE GRANULOCYTE: <0.03 K/UL (ref 0–0.3)
ABSOLUTE LYMPH #: 2.81 K/UL (ref 1.1–3.7)
ABSOLUTE MONO #: 0.44 K/UL (ref 0.1–1.2)
ANION GAP SERPL CALCULATED.3IONS-SCNC: 11 MMOL/L (ref 9–17)
BASOPHILS # BLD: 1 % (ref 0–2)
BASOPHILS ABSOLUTE: 0.04 K/UL (ref 0–0.2)
BUN BLDV-MCNC: 29 MG/DL (ref 6–20)
BUN/CREAT BLD: ABNORMAL (ref 9–20)
CALCIUM SERPL-MCNC: 9.2 MG/DL (ref 8.6–10.4)
CHLORIDE BLD-SCNC: 102 MMOL/L (ref 98–107)
CO2: 23 MMOL/L (ref 20–31)
CREAT SERPL-MCNC: 1.08 MG/DL (ref 0.7–1.2)
DIFFERENTIAL TYPE: ABNORMAL
EKG ATRIAL RATE: 67 BPM
EKG P AXIS: 62 DEGREES
EKG P-R INTERVAL: 148 MS
EKG Q-T INTERVAL: 410 MS
EKG QRS DURATION: 90 MS
EKG QTC CALCULATION (BAZETT): 433 MS
EKG R AXIS: 23 DEGREES
EKG T AXIS: 35 DEGREES
EKG VENTRICULAR RATE: 67 BPM
EOSINOPHILS RELATIVE PERCENT: 7 % (ref 1–4)
GFR AFRICAN AMERICAN: >60 ML/MIN
GFR NON-AFRICAN AMERICAN: >60 ML/MIN
GFR SERPL CREATININE-BSD FRML MDRD: ABNORMAL ML/MIN/{1.73_M2}
GFR SERPL CREATININE-BSD FRML MDRD: ABNORMAL ML/MIN/{1.73_M2}
GLUCOSE BLD-MCNC: 88 MG/DL (ref 70–99)
HCT VFR BLD CALC: 40.1 % (ref 40.7–50.3)
HEMOGLOBIN: 13.3 G/DL (ref 13–17)
IMMATURE GRANULOCYTES: 0 %
LYMPHOCYTES # BLD: 39 % (ref 24–43)
MCH RBC QN AUTO: 31.3 PG (ref 25.2–33.5)
MCHC RBC AUTO-ENTMCNC: 33.2 G/DL (ref 28.4–34.8)
MCV RBC AUTO: 94.4 FL (ref 82.6–102.9)
MONOCYTES # BLD: 6 % (ref 3–12)
NRBC AUTOMATED: 0 PER 100 WBC
PDW BLD-RTO: 14 % (ref 11.8–14.4)
PLATELET # BLD: ABNORMAL K/UL (ref 138–453)
PLATELET ESTIMATE: ABNORMAL
PLATELET, FLUORESCENCE: 235 K/UL (ref 138–453)
PLATELET, IMMATURE FRACTION: NORMAL % (ref 1.1–10.3)
PMV BLD AUTO: ABNORMAL FL (ref 8.1–13.5)
POTASSIUM SERPL-SCNC: 5 MMOL/L (ref 3.7–5.3)
RBC # BLD: 4.25 M/UL (ref 4.21–5.77)
RBC # BLD: ABNORMAL 10*6/UL
SEG NEUTROPHILS: 47 % (ref 36–65)
SEGMENTED NEUTROPHILS ABSOLUTE COUNT: 3.38 K/UL (ref 1.5–8.1)
SODIUM BLD-SCNC: 136 MMOL/L (ref 135–144)
TROPONIN INTERP: NORMAL
TROPONIN INTERP: NORMAL
TROPONIN T: NORMAL NG/ML
TROPONIN T: NORMAL NG/ML
TROPONIN, HIGH SENSITIVITY: <6 NG/L (ref 0–22)
TROPONIN, HIGH SENSITIVITY: <6 NG/L (ref 0–22)
WBC # BLD: 7.2 K/UL (ref 3.5–11.3)
WBC # BLD: ABNORMAL 10*3/UL

## 2020-08-03 PROCEDURE — 80048 BASIC METABOLIC PNL TOTAL CA: CPT

## 2020-08-03 PROCEDURE — 93005 ELECTROCARDIOGRAM TRACING: CPT | Performed by: STUDENT IN AN ORGANIZED HEALTH CARE EDUCATION/TRAINING PROGRAM

## 2020-08-03 PROCEDURE — 93010 ELECTROCARDIOGRAM REPORT: CPT | Performed by: INTERNAL MEDICINE

## 2020-08-03 PROCEDURE — 6370000000 HC RX 637 (ALT 250 FOR IP): Performed by: STUDENT IN AN ORGANIZED HEALTH CARE EDUCATION/TRAINING PROGRAM

## 2020-08-03 PROCEDURE — 85025 COMPLETE CBC W/AUTO DIFF WBC: CPT

## 2020-08-03 PROCEDURE — 71046 X-RAY EXAM CHEST 2 VIEWS: CPT

## 2020-08-03 PROCEDURE — 84484 ASSAY OF TROPONIN QUANT: CPT

## 2020-08-03 PROCEDURE — 85055 RETICULATED PLATELET ASSAY: CPT

## 2020-08-03 PROCEDURE — 99285 EMERGENCY DEPT VISIT HI MDM: CPT

## 2020-08-03 RX ORDER — ASPIRIN 81 MG/1
324 TABLET, CHEWABLE ORAL ONCE
Status: COMPLETED | OUTPATIENT
Start: 2020-08-03 | End: 2020-08-03

## 2020-08-03 RX ORDER — NITROGLYCERIN 0.4 MG/1
0.4 TABLET SUBLINGUAL EVERY 5 MIN PRN
Status: DISCONTINUED | OUTPATIENT
Start: 2020-08-03 | End: 2020-08-03 | Stop reason: HOSPADM

## 2020-08-03 RX ADMIN — ASPIRIN 324 MG: 81 TABLET, CHEWABLE ORAL at 02:25

## 2020-08-03 ASSESSMENT — HEART SCORE
ECG: 0
ECG: 0

## 2020-08-03 ASSESSMENT — ENCOUNTER SYMPTOMS
NAUSEA: 0
COUGH: 0
ABDOMINAL PAIN: 0
SHORTNESS OF BREATH: 0
VOMITING: 0
RHINORRHEA: 0

## 2020-08-03 ASSESSMENT — PAIN DESCRIPTION - ORIENTATION: ORIENTATION: MID

## 2020-08-03 ASSESSMENT — PAIN DESCRIPTION - LOCATION: LOCATION: CHEST

## 2020-08-03 ASSESSMENT — PAIN SCALES - GENERAL: PAINLEVEL_OUTOF10: 6

## 2020-08-03 ASSESSMENT — PAIN DESCRIPTION - PAIN TYPE: TYPE: ACUTE PAIN

## 2020-08-03 ASSESSMENT — PAIN DESCRIPTION - FREQUENCY: FREQUENCY: CONTINUOUS

## 2020-08-03 ASSESSMENT — PAIN DESCRIPTION - DESCRIPTORS: DESCRIPTORS: ACHING;SHARP

## 2020-08-03 NOTE — ED PROVIDER NOTES
[facility]  Emergency Department Encounter  Emergency Medicine Resident     Pt Name: Simon Colon  MRN: [id number]  [place] 1980  Date of evaluation: 8/3/20  PCP:  Cristian Eli MD    [address] [place] [address]       Chief Complaint   Patient presents with    Chest Pain       HISTORY OF PRESENT ILLNESS  (Location/Symptom, Timing/Onset, Context/Setting, Quality, Duration, Modifying Factors, Severity.)    Simon Colon is a 41 y.o. male who presents with centralized chest pain that started around 9 PM on 8/2/2020. 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 Patient states that initially started out sharp and then changed abdominal but eventually went away. Patient stated came back again around 1130 while he was moving some tables and states that he nearly passed out but did not. Patient was able to sit down, compose himself and then kept working. Patient states that he is currently having some centralized chest pain that does not radiate. Denies any nausea or vomiting. Denies any radiation of pain to extremities, neck, back, abdomen. Endorses a history of coronary artery disease, high blood pressure, obesity. Denies any coronary stents. States he has had pain like this previously when he was \"heavier \". Denies taking any nitro or aspirin prior to arrival.    PPE Worn:  Gloves: Yes  Eye Protection: Goggles  Mask: Surgical Mask  Gown: NO    PAST MEDICAL / SURGICAL / SOCIAL / FAMILY HISTORY    has a past medical history of Anxiety, Arthritis, CAD (coronary artery disease), Fatty liver, Fatty liver disease, nonalcoholic, Hypertension, Obesity, and Unspecified sleep apnea. has no past surgical history on file. Social History     Socioeconomic History    Marital status: Single     Spouse name: Suzanne De León Number of children: 0    Years of education: 12    Highest education level:  Bachelor's degree (e.g., BA, AB, BS)   Occupational History    Not on file Social Needs    Financial resource strain: Not very hard    Food insecurity     Worry: Never true     Inability: Never true    Transportation needs     Medical: Yes     Non-medical: No   Tobacco Use    Smoking status: Never Smoker    Smokeless tobacco: Never Used   Substance and Sexual Activity    Alcohol use: No     Frequency: Never     Binge frequency: Never    Drug use: No    Sexual activity: Yes     Partners: Female   Lifestyle    Physical activity     Days per week: 4 days     Minutes per session: 50 min    Stress: Only a little   Relationships    Social connections     Talks on phone: Three times a week     Gets together: Twice a week     Attends Faith service: 1 to 4 times per year     Active member of club or organization: No     Attends meetings of clubs or organizations: Never     Relationship status: Never     Intimate partner violence     Fear of current or ex partner: No     Emotionally abused: No     Physically abused: No     Forced sexual activity: No   Other Topics Concern    Not on file   Social History Narrative    ** Merged History Encounter **            Family History   Problem Relation Age of Onset    Arthritis Mother     Diabetes Father     Arthritis Father     Stroke Maternal Grandmother     Heart Disease Maternal Grandmother     Stroke Maternal Grandfather     Heart Disease Maternal Grandfather     Early Death Maternal Grandfather     Stroke Paternal Grandmother     Heart Disease Paternal Grandmother     Stroke Paternal Grandfather     Heart Disease Paternal Grandfather     Early Death Paternal Grandfather        Allergies:    Patient has no known allergies. Home Medications:  Prior to Admission medications    Medication Sig Start Date End Date Taking?  Authorizing Provider   albuterol sulfate HFA (VENTOLIN HFA) 108 (90 Base) MCG/ACT inhaler Inhale 2 puffs into the lungs every 6 hours as needed for Wheezing 7/7/20   Long George MD   ibuprofen (ADVIL;MOTRIN) 800 MG tablet Take 1 tablet by mouth 3 times daily (with meals) 7/7/20   Micki Whiting MD   fluticasone [facility]) 50 MCG/ACT nasal spray 1 spray by Nasal route daily 7/7/20   Micki Whiting MD   metoprolol tartrate (LOPRESSOR) 25 MG tablet Take 2/day 7/7/20   Micki Whiting MD   tiZANidine (ZANAFLEX) 4 MG tablet Take 1 tablet by mouth 3 times daily as needed (muscle spasms) 6/10/20   Micki Whiting MD   acetaminophen (RA ACETAMINOPHEN EX ST) 500 MG tablet Take 1 tablet by mouth every 6 hours as needed for Pain 6/10/20   Micki Whiting, MD   ARIPiprazole (ABILIFY) 10 MG tablet Take 10 mg by mouth daily 5/24/20   Historical Provider, MD   lisinopril (PRINIVIL;ZESTRIL) 20 MG tablet Take 1 tablet by mouth daily 4/28/20   Micki Whiting MD   hydroCHLOROthiazide (HYDRODIURIL) 25 MG tablet Take 1 tablet by mouth daily 4/28/20   Micki Whiting MD   ibuprofen (ADVIL;MOTRIN) 800 MG tablet Take 1 tablet by mouth every 8 hours as needed for Pain 4/16/20   Micki Whiting MD       REVIEW OF SYSTEMS    (2-9 systems for level 4, 10 or more for level 5)    Review of Systems   Constitutional: Negative for chills, fatigue and fever. HENT: Negative for congestion and rhinorrhea. Respiratory: Negative for cough and shortness of breath. Cardiovascular: Positive for chest pain. Negative for leg swelling. Gastrointestinal: Negative for abdominal pain, nausea and vomiting. Neurological: Negative for light-headedness and headaches. Near syncope   All other systems reviewed and are negative.       PHYSICAL EXAM   (up to 7 for level 4, 8 or more for level 5)    INITIAL VITALS:   ED Triage Vitals   BP Temp Temp Source Pulse Resp SpO2 Height Weight   08/03/20 0206 08/03/20 0156 08/03/20 0156 08/03/20 0206 08/03/20 0208 08/03/20 0206 08/03/20 0206 08/03/20 0206   (!) 140/96 98.6 °F (37 °C) Oral 64 16 99 % 6' 3\" (1.905 m) (!) 310 lb (140.6 kg)       Physical Exam  Vitals signs and nursing note reviewed. Constitutional:       General: He is not in acute distress. Appearance: Normal appearance. He is not ill-appearing. Cardiovascular:      Rate and Rhythm: Normal rate and regular rhythm. Pulses: Normal pulses. Radial pulses are 2+ on the right side and 2+ on the left side. Posterior tibial pulses are 2+ on the right side and 2+ on the left side. Heart sounds: Normal heart sounds. No murmur. No friction rub. Pulmonary:      Effort: Pulmonary effort is normal. No tachypnea or respiratory distress. Breath sounds: Normal breath sounds. No decreased air movement. No decreased breath sounds, wheezing, rhonchi or rales. Chest:      Chest wall: Tenderness present. No deformity, swelling or crepitus. Comments: Tenderness to palpation to the indicated area. Abdominal:      General: Bowel sounds are normal.      Tenderness: There is no abdominal tenderness. Musculoskeletal:      Right lower leg: No edema. Left lower leg: No edema. Skin:     General: Skin is warm and dry. Capillary Refill: Capillary refill takes less than 2 seconds. Neurological:      General: No focal deficit present. Mental Status: He is alert and oriented to person, place, and time. Psychiatric:         Behavior: Behavior is cooperative.          DIFFERENTIAL  DIAGNOSIS   PLAN (LABS / IMAGING / EKG):  Orders Placed This Encounter   Procedures    XR CHEST (2 VW)    CBC Auto Differential    BASIC METABOLIC PANEL    Troponin    Immature Platelet Fraction    Troponin    EKG 12 Lead       MEDICATIONS ORDERED:  Orders Placed This Encounter   Medications    aspirin chewable tablet 324 mg    nitroGLYCERIN (NITROSTAT) SL tablet 0.4 mg         DIAGNOSTIC RESULTS / EMERGENCYDEPARTMENT COURSE / MDM   LABS:  Labs Reviewed   CBC WITH AUTO DIFFERENTIAL - Abnormal; Notable for the following components:       Result Value    Hematocrit 40.1 (*)     Eosinophils % 7 (*)     Absolute Eos # 0.53 (*)     All other components within normal limits   BASIC METABOLIC PANEL - Abnormal; Notable for the following components:    BUN 29 (*)     All other components within normal limits   TROPONIN   IMMATURE PLATELET FRACTION   TROPONIN       RADIOLOGY:  Xr Chest (2 Vw)    Result Date: 8/3/2020  EXAMINATION: TWO XRAY VIEWS OF THE CHEST 8/3/2020 2:31 am COMPARISON: 04/04/2018 HISTORY: ORDERING SYSTEM PROVIDED HISTORY: chest pain, centralized TECHNOLOGIST PROVIDED HISTORY: -SIRD chest pain, centralized Reason for Exam: chest pain since 9 pm FINDINGS: The lungs are clear. There is no pleural effusion. The cardiomediastinal silhouette is normal. There is no pneumothorax. No acute disease. EKG    EKG Interpretation    Interpreted by me    Rhythm: normal sinus   Rate: normal  Axis: normal  Ectopy: none  Conduction: normal  ST Segments: no acute change  T Waves: no acute change  Q Waves: none    Clinical Impression: Sinus arrhythmia rate of 67. No ST segment changes, no arrhythmia, no ectopy. Normal axis with good R wave progression. EKGs appear similar to EKG dated 4/4/2018    All EKG's are interpreted by the Emergency Department Physician whoeither signs or Co-signs this chart in the absence of a cardiologist.    Impression:  Patient presents with centralized chest pain. Ongoing since 9 PM.  Did have an episode of near syncope around 11:30 PM.  Did not take any aspirin or nitro prior to arrival.  No radiation of pain. Is obese, does have a history of coronary artery disease but no stents. We will plan for cardiac work-up, EKG, chest x-ray, troponins. Will give 324 mg aspirin, and up with 3 doses of nitroglycerin. [address] Rules for PE  1. Age > 50 years: No  2. HR > 100: No  3. Room air O2 sat <95%: No  4. Prior History of Venous Thromboembolism: No  5. Trauma or Surgery Within Last 4 weeks: No  6. Hemoptysis: No  7. Exogenous Estrogen: No  8.  Unilateral Leg Swelling: No    If no criteria are positive and clinicians pre-test probability is <15%, PERC Rule criteria are satisfied. Probability of VTE <2%. J Thromb Haemost. 2004 Aug;2(8):1247-55. J Thromb Haemost. 2008 May;6(5):772-80         EMERGENCY DEPARTMENT COURSE:   Initial heart score of 3. Will await second EKG as patient did have a second bout of pain that happened at 11:30 PM.  Will await second troponin, if remaining negative, will discharge with follow-up. Second troponin negative. Patient stable for discharge. MDM  Number of Diagnoses or Management Options  Atypical chest pain: new, needed workup     Amount and/or Complexity of Data Reviewed  Clinical lab tests: ordered and reviewed  Tests in the radiology section of CPT®: ordered and reviewed  Review and summarize past medical records: yes  Discuss the patient with other providers: yes  Independent visualization of images, tracings, or specimens: yes    Risk of Complications, Morbidity, and/or Mortality  Presenting problems: low  Diagnostic procedures: low  Management options: low    Patient Progress  Patient progress: stable      PROCEDURES:  none    CONSULTS:  None    CRITICAL CARE:  Please see attending note    FINAL IMPRESSION     1. Atypical chest pain          DISPOSITION / PLAN   DISPOSITION  - discharged      Evaluation and treatment course in the ED, and plan of care upon discharge was discussed in length with the patient. Patient had no further questions prior to being discharged and was instructed to return to the ED for new or worsening symptoms. Any changes to existing medications or new prescriptions were reviewed with patient and they expressed understanding of how to correctly take their medications and the possible side effects.     PATIENT REFERRED TO:  MD [address]ity [address]  [phone]            DISCHARGE MEDICATIONS:  Discharge Medication List as of 8/3/2020  4:31 AM          Mandy Canales DO  Emergency Medicine Resident Physician, PGY-2    (Please note that portions of this note were completed with a voice recognition program.  Efforts were made to edit the dictations but occasionally words are mis-transcribed.)         Kristina Morton MD  08/03/20 [id number]

## 2020-08-03 NOTE — ED NOTES
Pt to ED with c/o midsternal, nonradiating chest pain since 9pm.   Pt reports he was running around cleaning up at work and he started experiencing the chest pain. Pt states \"we were very short staffed and I think I was just really stressed, because the pain is getting better now.'  Pt denies any SOB, N/V/D. Pt sitting on cot. Placed on full cardiac monitor. RR even and non labored. No signs of distress noted at this time. Will continue to monitor.       Adam Kc RN  08/03/20 [id number]

## 2020-08-03 NOTE — ED PROVIDER NOTES
[facility] ED  Emergency Department  Faculty Attestation       I performed a history and physical examination of the patient and discussed management with the resident. I reviewed the residents note and agree with the documented findings including all diagnostic interpretations and plan of care. Any areas of disagreement are noted on the chart. I was personally present for the key portions of any procedures. I have documented in the chart those procedures where I was not present during the key portions. I have reviewed the emergency nurses triage note. I agree with the chief complaint, past medical history, past surgical history, allergies, medications, social and family history as documented unless otherwise noted below. Documentation of the HPI, Physical Exam and Medical Decision Making performed by [email or web address] is based on my personal performance of the HPI, PE and MDM. For Physician Assistant/ Nurse Practitioner cases/documentation I have personally evaluated this patient and have completed at least one if not all key elements of the E/M (history, physical exam, and MDM). Additional findings are as noted. Pertinent Comments     Primary Care Physician: Yanna Wilson MD    ED Triage Vitals   BP Temp Temp Source Pulse Resp SpO2 Height Weight   08/03/20 0206 08/03/20 0156 08/03/20 0156 08/03/20 0206 08/03/20 0208 08/03/20 0206 08/03/20 0206 08/03/20 0206   (!) 140/96 98.6 °F (37 °C) Oral 64 16 99 % 6' 3\" (1.905 m) (!) 310 lb (140.6 kg)        History/Physical: This is a 41 y.o. male who presents to the Emergency Department with complaint of chest pain. Started around 9 PM today. Patient states he stressed out at work because he wanted to be working. Has history of coronary artery disease but no prior stents. On exam well-appearing in no acute distress but most likely traumatic. Heart sounds regular lungs clear to auscultation. Abdomen soft nontender. No lower extremity edema.   Alert and oriented in no acute distress    MDM/Plan: Chest pain, now resolved. Dysphagia coronary disease. EKG unremarkable. Heart score of 3. Troponins negative likely discharge home. EKG Interpretation    Interpreted by emergency department physician    Rhythm: normal sinus  and sinus arrhythmia  Rate: normal  Axis: normal  Ectopy: none  Conduction: normal  ST Segments: no acute change  T Waves: no acute change  Q Waves: none    Clinical Impression: Normal sinus with no acute changes compared to 6/4/20. Isabell Morales    PPE: Patient was screened and has no clinical signs or symptoms of a CoVID-19 infection at this time. However, given current pandemic and atypical presentations, face mask, eye protection, and gloves were worn during examination.     CRITICAL CARE: None     Isabell Morales MD  Attending Emergency Physician        Isabell Morales MD  08/03/20 6047

## 2020-08-03 NOTE — ED NOTES
Pt requesting juice and food. Box lunch provided. Will continue to monitor.       Diamond Leigh RN  08/03/20 6861 Detail Level: Detailed Detail Level: Generalized

## 2020-08-25 ENCOUNTER — APPOINTMENT (OUTPATIENT)
Dept: GENERAL RADIOLOGY | Age: 40
End: 2020-08-25
Payer: MEDICAID

## 2020-08-25 ENCOUNTER — HOSPITAL ENCOUNTER (EMERGENCY)
Age: 40
Discharge: HOME | End: 2020-08-25
Attending: EMERGENCY MEDICINE
Payer: MEDICAID

## 2020-08-25 VITALS
WEIGHT: 315 LBS | SYSTOLIC BLOOD PRESSURE: 141 MMHG | TEMPERATURE: 98.2 F | HEART RATE: 54 BPM | OXYGEN SATURATION: 98 % | BODY MASS INDEX: 39.5 KG/M2 | RESPIRATION RATE: 16 BRPM | DIASTOLIC BLOOD PRESSURE: 85 MMHG

## 2020-08-25 PROCEDURE — 99283 EMERGENCY DEPT VISIT LOW MDM: CPT

## 2020-08-25 PROCEDURE — 72040 X-RAY EXAM NECK SPINE 2-3 VW: CPT

## 2020-08-25 PROCEDURE — 73610 X-RAY EXAM OF ANKLE: CPT

## 2020-08-25 PROCEDURE — 6370000000 HC RX 637 (ALT 250 FOR IP): Performed by: STUDENT IN AN ORGANIZED HEALTH CARE EDUCATION/TRAINING PROGRAM

## 2020-08-25 RX ORDER — ACETAMINOPHEN 500 MG
1000 TABLET ORAL EVERY 4 HOURS PRN
Status: COMPLETED | OUTPATIENT
Start: 2020-08-25 | End: 2020-08-25

## 2020-08-25 RX ORDER — IBUPROFEN 800 MG/1
800 TABLET ORAL EVERY 6 HOURS PRN
Status: COMPLETED | OUTPATIENT
Start: 2020-08-25 | End: 2020-08-25

## 2020-08-25 RX ADMIN — IBUPROFEN 800 MG: 800 TABLET, FILM COATED ORAL at 16:59

## 2020-08-25 RX ADMIN — ACETAMINOPHEN 1000 MG: 500 TABLET ORAL at 16:59

## 2020-08-25 ASSESSMENT — PAIN SCALES - GENERAL
PAINLEVEL_OUTOF10: 8

## 2020-08-25 ASSESSMENT — PAIN DESCRIPTION - LOCATION
LOCATION: ANKLE
LOCATION: NECK;ANKLE

## 2020-08-25 ASSESSMENT — PAIN DESCRIPTION - DESCRIPTORS: DESCRIPTORS: SHARP

## 2020-08-25 ASSESSMENT — PAIN DESCRIPTION - ORIENTATION
ORIENTATION: RIGHT
ORIENTATION: RIGHT;LEFT

## 2020-08-25 ASSESSMENT — PAIN DESCRIPTION - PAIN TYPE: TYPE: ACUTE PAIN

## 2020-08-25 NOTE — ED NOTES
Pt arrives to ED ambulatory and alert and oriented x4 cc neck, back, and left ankle pain s/p 2 falls. Pt initially fell off a one story roof on Sunday, states he landed on his rt side and has been having neck and lower back pain since. Pt denies hitting head and denies LOC with the first fall. Pt states he also fell this morning in the shower after slipping on the soapy floor. Pt states today he hit his head but denies LOC, states he is also having left ankle pain.  Pt denies any other pain, denies CP/SOB, denies any HA, pt resp are even and non-labored, no signs of distress, call light in reach, pot has no other need at this time, will cont to monitor      Madhuri Bush RN  08/25/20 3750

## 2020-08-25 NOTE — ED PROVIDER NOTES
previous Sunday. Denies any numbness, weakness, paresthesias or radiating pain to lower legs B/L. On exam, there is no swelling, no bruising, no lumbar spine tenderness. patient given tylenol, motrin for pain releif, xray cervical spine and left ankle ordered. Patient asking for work note. No acute osseous abnormality identified on xray left ankle. No acute osseous abnormality or malalignment identified on cervical spine xray. Patient medically cleared for discharge, with no pain relief medication since he has motrin at home and refused a prescripti. pattient advised to rest left ankle, keep it elevated, use ice pack for neck strain and ankle sprain, use pain meds as needed. Work note given for 1 day. Patient given ACE wrap. Follow up with PCP. REVIEW OF SYSTEMS    (2-9 systems for level 4, 10 or more for level 5)     Review of Systems    No fever, chills, SOB, cough, changes in bowel movements, dysuria, hematuria, melena, leg swelling. PAST MEDICAL HISTORY     Past Medical History:   Diagnosis Date    Anxiety     Arthritis     CAD (coronary artery disease)     Fatty liver     Fatty liver disease, nonalcoholic 2/07/9273    Hypertension     Obesity     Unspecified sleep apnea     cpap         SURGICAL HISTORY     History reviewed. No pertinent surgical history.       CURRENT MEDICATIONS       Discharge Medication List as of 8/25/2020  6:19 PM      CONTINUE these medications which have NOT CHANGED    Details   albuterol sulfate HFA (VENTOLIN HFA) 108 (90 Base) MCG/ACT inhaler Inhale 2 puffs into the lungs every 6 hours as needed for Wheezing, Disp-18 g,R-3Normal      !! ibuprofen (ADVIL;MOTRIN) 800 MG tablet Take 1 tablet by mouth 3 times daily (with meals), Disp-30 tablet,R-0Normal      fluticasone (FLONASE) 50 MCG/ACT nasal spray 1 spray by Nasal route daily, Disp-16 g,R-2Normal      metoprolol tartrate (LOPRESSOR) 25 MG tablet Take 2/day, Disp-180 tablet,R-1Normal      tiZANidine (ZANAFLEX) 4 MG tablet Take 1 tablet by mouth 3 times daily as needed (muscle spasms), Disp-30 tablet,R-0Normal      acetaminophen (RA ACETAMINOPHEN EX ST) 500 MG tablet Take 1 tablet by mouth every 6 hours as needed for Pain, Disp-60 tablet,R-1Please have patient contact office to schedule appointment. Normal      ARIPiprazole (ABILIFY) 10 MG tablet Take 10 mg by mouth dailyHistorical Med      lisinopril (PRINIVIL;ZESTRIL) 20 MG tablet Take 1 tablet by mouth daily, Disp-90 tablet, R-1Normal      hydroCHLOROthiazide (HYDRODIURIL) 25 MG tablet Take 1 tablet by mouth daily, Disp-90 tablet, R-1Please have patient contact office to schedule appointment. Normal      !! ibuprofen (ADVIL;MOTRIN) 800 MG tablet Take 1 tablet by mouth every 8 hours as needed for Pain, Disp-30 tablet, R-3Normal       !! - Potential duplicate medications found. Please discuss with provider. ALLERGIES     Patient has no known allergies. FAMILY HISTORY       Family History   Problem Relation Age of Onset    Arthritis Mother     Diabetes Father     Arthritis Father     Stroke Maternal Grandmother     Heart Disease Maternal Grandmother     Stroke Maternal Grandfather     Heart Disease Maternal Grandfather     Early Death Maternal Grandfather     Stroke Paternal Grandmother     Heart Disease Paternal Grandmother     Stroke Paternal Grandfather     Heart Disease Paternal Grandfather     Early Death Paternal Grandfather           SOCIAL HISTORY       Social History     Socioeconomic History    Marital status: Single     Spouse name: Julio Wheeler Number of children: 0    Years of education: 12    Highest education level:  Bachelor's degree (e.g., BA, AB, BS)   Occupational History    None   Social Needs    Financial resource strain: Not very hard    Food insecurity     Worry: Never true     Inability: Never true    Transportation needs     Medical: Yes     Non-medical: No   Tobacco Use    Smoking status: Never Smoker    Smokeless tobacco: Never Used   Substance and Sexual Activity    Alcohol use: No     Frequency: Never     Binge frequency: Never    Drug use: No    Sexual activity: Yes     Partners: Female   Lifestyle    Physical activity     Days per week: 4 days     Minutes per session: 50 min    Stress: Only a little   Relationships    Social connections     Talks on phone: Three times a week     Gets together: Twice a week     Attends Buddhism service: 1 to 4 times per year     Active member of club or organization: No     Attends meetings of clubs or organizations: Never     Relationship status: Never     Intimate partner violence     Fear of current or ex partner: No     Emotionally abused: No     Physically abused: No     Forced sexual activity: No   Other Topics Concern    None   Social History Narrative    ** Merged History Encounter **            SCREENINGS        Howie Coma Scale  Eye Opening: Spontaneous  Best Verbal Response: Oriented  Best Motor Response: Obeys commands  Howie Coma Scale Score: 15               PHYSICAL EXAM    (up to 7 for level 4, 8 or more for level 5)     ED Triage Vitals   BP Temp Temp Source Pulse Resp SpO2 Height Weight   08/25/20 1620 08/25/20 1531 08/25/20 1531 08/25/20 1620 08/25/20 1620 08/25/20 1620 -- 08/25/20 1623   (!) 141/85 98.2 °F (36.8 °C) Oral 54 16 98 %  (!) 316 lb (143.3 kg)       Physical Exam neck exam: limited flexion, extension and sideways movement due to pain. On palpation, there is tenderness in cervical spine, but no occipital tenderness. Foot exam: pain in lateral malleolus, achilles tendon causing limited ROM, no swelling, no erythema, no bruising noted. ROM for left knee is wnl. On back exam, there is no swelling, no bruising, no lumbar spine tenderness.       DIAGNOSTIC RESULTS     EKG: All EKG's are interpreted by the Emergency Department Physician who either signs or Co-signs this chart in the absence of a cardiologist.      RADIOLOGY:   Non-plain film images such as CT, Ultrasound and MRI are read by the radiologist. Plain radiographic images are visualized and preliminarily interpreted by the emergency physician with the below findings:      Interpretation per the Radiologist below, if available at the time of this note:    XR CERVICAL SPINE (2-3 VIEWS)   Final Result   No acute osseous abnormality or malalignment identified. Consider further evaluation with CT if symptoms warrant. XR ANKLE LEFT (MIN 3 VIEWS)   Final Result   No acute osseous abnormality identified. ED BEDSIDE ULTRASOUND:   Performed by ED Physician - none    LABS:  Labs Reviewed - No data to display    All other labs were within normal range or not returned as of this dictation. EMERGENCY DEPARTMENT COURSE and DIFFERENTIAL DIAGNOSIS/MDM:   Vitals:    Vitals:    08/25/20 1531 08/25/20 1620 08/25/20 1623   BP:  (!) 141/85    Pulse:  54    Resp:  16    Temp: 98.2 °F (36.8 °C)     TempSrc: Oral     SpO2:  98%    Weight:   (!) 316 lb (143.3 kg)     MDM      REASSESSMENT          CRITICAL CARE TIME   Total Critical Care time was 0 minutes, excluding separately reportable procedures. There was a high probability of clinically significant/life threatening deterioration in the patient's condition which required my urgent intervention. CONSULTS:  None    PROCEDURES:  Unless otherwise noted below, none     Procedures      FINAL IMPRESSION      1. Neck strain, initial encounter    2. Moderate left ankle sprain, initial encounter          DISPOSITION/PLAN   DISPOSITION  Discharge to home      [facility]:  [facility] [address] [address]  [phone]            DISCHARGE MEDICATIONS:  Discharge Medication List as of 8/25/2020  6:19 PM        Controlled Substances Monitoring:     RX Monitoring 4/27/2018   Attestation The Prescription Monitoring Report for this patient was reviewed today.        (Please note that portions of this note

## 2020-08-25 NOTE — ED PROVIDER NOTES
[facility]  Emergency Department  Faculty Attestation     I performed a history and physical examination of the patient and discussed management with the resident. I reviewed the residents note and agree with the documented findings and plan of care. Any areas of disagreement are noted on the chart. I was personally present for the key portions of any procedures. I have documented in the chart those procedures where I was not present during the key portions. I have reviewed the emergency nurses triage note. I agree with the chief complaint, past medical history, past surgical history, allergies, medications, social and family history as documented unless otherwise noted below. For Physician Assistant/ Nurse Practitioner cases/documentation I have personally evaluated this patient and have completed at least one if not all key elements of the E/M (history, physical exam, and MDM). Additional findings are as noted. Primary Care Physician:  Sylvia Nunez MD    Screenings:  [facility] 7       Chief Complaint   Patient presents with    Ankle Injury     c/o slip fall in his shower this morning, denies loc, denies hitting his head, landed on his rt shoulder. RECENT VITALS:   Temp: 98.2 °F (36.8 °C),  Pulse: 54, Resp: 16, BP: (!) 141/85    LABS:  Labs Reviewed - No data to display    Radiology  XR ANKLE LEFT (MIN 3 VIEWS)    (Results Pending)       Attending Physician Additional  Notes    She has multiple complaints. He sprained his left ankle 2 weeks ago. He had worsening pain after double shift while walking and then sprained it again in the shower on Sunday. Since then has been having pain with ambulation and lateral more than medial pain. He also has right lateral neck discomfort after slipping in the shower. No paresthesias numbness weakness. He also has mild low back pain.   On exam he is mildly uncomfortable states pain score 9/10, vital signs are normal.  GCS 15. Normal speech pupils motor strength. Left ankle has minimal tenderness with no significant swelling. No proximal fibular tenderness. Full range of movement of the knee. Full range of motion of the back without lumbar spine tenderness. There is minimal cervical spine tenderness in the midline, greater tenderness along the right paraspinous and trapezius. Impression is neck strain, ankle sprain. Plan is imaging, plain films, simple analgesics, ice/cool compresses, work note, follow-up. Maris Wade.  Erin Pagan MD, [facility]  Attending Emergency  Physician               Hortensia Owens MD  08/25/20 1756

## 2020-09-05 RX ORDER — IBUPROFEN 800 MG/1
800 TABLET ORAL
Qty: 30 TABLET | Refills: 0 | Status: CANCELLED | OUTPATIENT
Start: 2020-09-05

## 2020-09-08 ENCOUNTER — OFFICE VISIT (OUTPATIENT)
Dept: PODIATRY | Age: 40
End: 2020-09-08
Payer: MEDICAID

## 2020-09-08 ENCOUNTER — PATIENT MESSAGE (OUTPATIENT)
Dept: PODIATRY | Age: 40
End: 2020-09-08

## 2020-09-08 VITALS — BODY MASS INDEX: 38.36 KG/M2 | WEIGHT: 315 LBS | HEIGHT: 76 IN | RESPIRATION RATE: 18 BRPM | TEMPERATURE: 97.2 F

## 2020-09-08 PROCEDURE — G8417 CALC BMI ABV UP PARAM F/U: HCPCS | Performed by: PODIATRIST

## 2020-09-08 PROCEDURE — 1036F TOBACCO NON-USER: CPT | Performed by: PODIATRIST

## 2020-09-08 PROCEDURE — 99203 OFFICE O/P NEW LOW 30 MIN: CPT | Performed by: PODIATRIST

## 2020-09-08 PROCEDURE — G8427 DOCREV CUR MEDS BY ELIG CLIN: HCPCS | Performed by: PODIATRIST

## 2020-09-08 RX ORDER — VALACYCLOVIR HYDROCHLORIDE 1 G/1
TABLET, FILM COATED ORAL
COMMUNITY
Start: 2020-06-23

## 2020-09-08 RX ORDER — ALBUTEROL SULFATE 90 UG/1
2 AEROSOL, METERED RESPIRATORY (INHALATION) EVERY 6 HOURS PRN
Qty: 18 G | Refills: 3 | Status: SHIPPED | OUTPATIENT
Start: 2020-09-08 | End: 2020-10-16 | Stop reason: SDUPTHER

## 2020-09-08 RX ORDER — PREDNISONE 10 MG/1
TABLET ORAL
Qty: 20 TABLET | Refills: 0 | Status: SHIPPED | OUTPATIENT
Start: 2020-09-08 | End: 2020-10-16

## 2020-09-08 RX ORDER — IBUPROFEN 800 MG/1
800 TABLET ORAL EVERY 8 HOURS PRN
Qty: 30 TABLET | Refills: 3 | Status: SHIPPED | OUTPATIENT
Start: 2020-09-08 | End: 2020-10-16

## 2020-09-08 NOTE — TELEPHONE ENCOUNTER
E-scribing request for motrin and ventolin. Pt has future appt.        Health Maintenance   Topic Date Due    Flu vaccine (1) 09/01/2020    Potassium monitoring  08/03/2021    Creatinine monitoring  08/03/2021    Lipid screen  11/01/2024    DTaP/Tdap/Td vaccine (3 - Td) 04/27/2028    HIV screen  Completed    Hepatitis A vaccine  Aged Out    Hepatitis B vaccine  Aged Out    Hib vaccine  Aged Out    Meningococcal (ACWY) vaccine  Aged Out    Pneumococcal 0-64 years Vaccine  Aged Out             (applicable per patient's age: Cancer Screenings, Depression Screening, Fall Risk Screening, Immunizations)    Hemoglobin A1C (%)   Date Value   11/01/2019 5.4   07/10/2018 5.5   01/18/2018 5.5     LDL Cholesterol (mg/dL)   Date Value   11/01/2019 104     AST (U/L)   Date Value   11/01/2019 16     ALT (U/L)   Date Value   11/01/2019 10     BUN (mg/dL)   Date Value   08/03/2020 29 (H)      (goal A1C is < 7)   (goal LDL is <100) need 30-50% reduction from baseline     BP Readings from Last 3 Encounters:   08/25/20 (!) 141/85   08/03/20 (!) 140/96   06/04/20 118/74    (goal /80)      All Future Testing planned in [facility]:      Next Visit Date:  Future Appointments   Date Time Provider Kathi Jackson   9/8/2020  2:45 PM Ralph Tristan DPM [facility]   10/15/2020  8:30 AM [facility]            Patient Active Problem List:     Hypertension     Fatty liver disease, nonalcoholic     MIGEL on CPAP     Positive depression screening     Vitamin D deficiency     IGT (impaired glucose tolerance)

## 2020-09-08 NOTE — PROGRESS NOTES
[facility]) 50 MCG/ACT nasal spray 1 spray by Nasal route daily 7/7/20  Yes Alicia Dial MD   metoprolol tartrate (LOPRESSOR) 25 MG tablet Take 2/day 7/7/20  Yes Alicia Dial MD   tiZANidine (ZANAFLEX) 4 MG tablet Take 1 tablet by mouth 3 times daily as needed (muscle spasms) 6/10/20  Yes Alicia Dial MD   acetaminophen (RA ACETAMINOPHEN EX ST) 500 MG tablet Take 1 tablet by mouth every 6 hours as needed for Pain 6/10/20  Yes Alicia Dial MD   ARIPiprazole (ABILIFY) 10 MG tablet Take 10 mg by mouth daily 5/24/20  Yes Historical Provider, MD   lisinopril (PRINIVIL;ZESTRIL) 20 MG tablet Take 1 tablet by mouth daily 4/28/20  Yes Alicia Dial MD   hydroCHLOROthiazide (HYDRODIURIL) 25 MG tablet Take 1 tablet by mouth daily 4/28/20  Yes Alicia Dial MD       No past surgical history on file. Family History   Problem Relation Age of Onset    Arthritis Mother     Diabetes Father     Arthritis Father     Stroke Maternal Grandmother     Heart Disease Maternal Grandmother     Stroke Maternal Grandfather     Heart Disease Maternal Grandfather     Early Death Maternal Grandfather     Stroke Paternal Grandmother     Heart Disease Paternal Grandmother     Stroke Paternal Grandfather     Heart Disease Paternal Grandfather     Early Death Paternal Grandfather        Social History     Tobacco Use    Smoking status: Never Smoker    Smokeless tobacco: Never Used   Substance Use Topics    Alcohol use: No     Frequency: Never     Binge frequency: Never       Review of Systems    Review of Systems:   History obtained from chart review and the patient  General ROS: negative for - chills, fatigue, fever, night sweats or weight gain  Constitutional: Negative for chills, diaphoresis, fatigue, fever and unexpected weight change. Musculoskeletal: Positive for arthralgias, gait problem and joint swelling. Neurological ROS: negative for - behavioral changes, confusion, headaches or seizures.  Negative for weakness and numbness. Dermatological ROS: negative for - mole changes, rash  Cardiovascular: Negative for leg swelling. Gastrointestinal: Negative for constipation, diarrhea, nausea and vomiting. Lower Extremity Physical Examination:   Vitals:   Vitals:    09/08/20 1439   Resp: 18   Temp: 97.2 °F (36.2 °C)     General: AAO x 3 in NAD. Dermatologic Exam:  Skin lesion/ulceration Absent . Skin No rashes or nodules noted. .       Musculoskeletal:     1st MPJ ROM decreased, Bilateral.  Muscle strength 5/5, Bilateral. POP of the right and left plantar medial calcaneal tuberosity. Pain increased with Dorsiflexion of the right and left lesser toes. Negative pain on compression of the calcaneus bilaterally Medial longitudinal arch, Bilateral WNL. Ankle ROM WNL,Bilateral.    Dorsally contracted digits absent digits 1-5 Bilateral.     Vascular: DP and PT pulses palpable 2/4, Bilateral.  CFT <3 seconds, Bilateral.  Hair growth present to the level of the digits, Bilateral.  Edema absent, Bilateral.  Varicosities absent, Bilateral. Erythema absent, Bilateral    Neurological: Sensation intact to light touch to level of digits, Bilateral.  Protective sensation intact 10/10 sites via 5.07/10g Home-Jamaal Monofilament, Bilateral.  negative Tinel's, Bilateral.  negative Valleix sign, Bilateral.      Integument: Warm, dry, supple, Bilateral.  Open lesion absent, Bilateral.  Interdigital maceration absent to web spaces 1-4, Bilateral.  Nails are normal in length, thickness and color 1-5 bilateral.  Fissures absent, Bilateral.           Asessment: Patient is a 41 y.o. male with:    Diagnosis Orders   1. Foot pain, bilateral  predniSONE (DELTASONE) 10 MG tablet   2. Plantar fascial fibromatosis           Plan: Patient examined and evaluated. Current condition and treatment options discussed in detail. Discussed conservative and surgical options with the patient. Advised pt to his conditino .   rx provided for a tapered steroid to see if this helps with the inflammatino and pain         Arch Pain: Exercises  Introduction  Here are some examples of exercises for you to try. The exercises may be suggested for a condition or for rehabilitation. Start each exercise slowly. Ease off the exercises if you start to have pain. You will be told when to start these exercises and which ones will work best for you. How to do the exercises  Plantar fascia stretch    1. Sit in a chair and put your affected foot on your other knee. 2. Hold the heel of your foot in one hand, and grasp your toes with the other hand. 3. Pull on your heel (toward your body), and at the same time pull your toes back with your other hand. 4. You should feel a stretch along the bottom of your foot. 5. Hold 15 to 30 seconds. 6. Repeat 2 to 4 times. Plantar fascia stretch (kneeling)    1. Get on your hands and knees on the floor. Keep your heels pointing up and the balls of your feet and your toes on the floor. 2. Slowly sit back toward your ankles. 3. If this is too hard, you can try doing it one leg at a time. Stand up, and then kneel on one knee and keep the other leg forward. Place the foot of your forward leg flat on the ground and bend that knee. The heel on the leg still behind you should point up. The ball and toes of that foot should be on the floor. Sit back toward that ankle. 4. Hold 15 to 30 seconds. 5. Repeat 2 to 4 times. Switch legs if you are doing this one leg at a time. Plantar fascia self-massage    1. Sit in a chair. 2. Place your affected foot on a firm, tube-shaped object, such as a can or water bottle. 3. Roll your foot back and forth over the object to massage the bottom of your foot. 4. If you want to do ice massage, fill a water bottle about three-fourths of the way full and freeze before using. 5. Continue for 2 to 5 minutes. Bilateral calf stretch (knees straight)    1.  Place a book on the floor wall.  2. Keeping both knees straight, slowly lift your heels above the step so that you are standing on your toes. Then slowly lower your heels below the step and toward the floor. 3. Return to the starting position, with your feet even with the step. 4. Repeat 8 to 12 times. Follow-up care is a key part of your treatment and safety. Be sure to make and go to all appointments, and call your doctor if you are having problems. It's also a good idea to know your test results and keep a list of the medicines you take. Where can you learn more? Go to https://SimplificarepepicAnywhere to Goeb.MedPassage. org and sign in to your [employer] account. Enter H119 in the Telelogos box to learn more about \"Arch Pain: Exercises. \"     If you do not have an account, please click on the \"Sign Up Now\" link. Current as of: September 20, 2018  Content Version: 12.1  © 7990-9414 [employer], [employer]orporated. Care instructions adapted under license by [facility] ([facility]). If you have questions about a medical condition or this instruction, always ask your healthcare professional. [place] [address] any warranty or liability for your use of this information. .  Verbal and written instructions given to patient. Contact office with any questions/problems/concerns. RTC in 2week(s).     9/8/2020    Electronically signed by Darci Arguello DPM on 9/8/2020 at 2:45 PM  9/8/2020

## 2020-09-11 RX ORDER — ACETAMINOPHEN 500 MG
TABLET ORAL
Qty: 60 TABLET | Refills: 1 | Status: SHIPPED | OUTPATIENT
Start: 2020-09-11 | End: 2020-11-12

## 2020-09-11 NOTE — TELEPHONE ENCOUNTER
Request for acetaminophen - med pended. Please fill if appropriate.       Next Visit Date:  Future Appointments   Date Time Provider Kathi Jackson   9/22/2020  8:30 AM Xander Gonzalez [facility] Pod[facility]   10/15/2020  8:30 AM Raina Burks MD [facility] IM [address] Maintenance   Topic Date Due    Flu vaccine (1) 09/01/2020    Potassium monitoring  08/03/2021    Creatinine monitoring  08/03/2021    Lipid screen  11/01/2024    DTaP/Tdap/Td vaccine (3 - Td) 04/27/2028    HIV screen  Completed    Hepatitis A vaccine  Aged Out    Hepatitis B vaccine  Aged Out    Hib vaccine  Aged Out    Meningococcal (ACWY) vaccine  Aged Out    Pneumococcal 0-64 years Vaccine  Aged Out       Hemoglobin A1C (%)   Date Value   11/01/2019 5.4   07/10/2018 5.5   01/18/2018 5.5             ( goal A1C is < 7)   No results found for: LABMICR  LDL Cholesterol (mg/dL)   Date Value   11/01/2019 104       (goal LDL is <100)   AST (U/L)   Date Value   11/01/2019 16     ALT (U/L)   Date Value   11/01/2019 10     BUN (mg/dL)   Date Value   08/03/2020 29 (H)     BP Readings from Last 3 Encounters:   08/25/20 (!) 141/85   08/03/20 (!) 140/96   06/04/20 118/74          (goal 120/80)    All Future Testing planned in [facility]        Patient Active Problem List:     Hypertension     Fatty liver disease, nonalcoholic     MIGEL on CPAP     Positive depression screening     Vitamin D deficiency     IGT (impaired glucose tolerance)

## 2020-09-14 RX ORDER — ACETAMINOPHEN 500 MG
TABLET ORAL
Qty: 60 TABLET | Refills: 1 | OUTPATIENT
Start: 2020-09-14

## 2020-09-14 RX ORDER — FLUTICASONE PROPIONATE 50 MCG
1 SPRAY, SUSPENSION (ML) NASAL DAILY
Qty: 16 G | Refills: 2 | Status: SHIPPED | OUTPATIENT
Start: 2020-09-14 | End: 2020-12-13 | Stop reason: SDUPTHER

## 2020-09-22 ENCOUNTER — OFFICE VISIT (OUTPATIENT)
Dept: PODIATRY | Age: 40
End: 2020-09-22
Payer: MEDICAID

## 2020-09-22 VITALS — HEIGHT: 76 IN | RESPIRATION RATE: 18 BRPM | TEMPERATURE: 97.1 F | BODY MASS INDEX: 38.36 KG/M2 | WEIGHT: 315 LBS

## 2020-09-22 PROCEDURE — 1036F TOBACCO NON-USER: CPT | Performed by: PODIATRIST

## 2020-09-22 PROCEDURE — G8427 DOCREV CUR MEDS BY ELIG CLIN: HCPCS | Performed by: PODIATRIST

## 2020-09-22 PROCEDURE — 99213 OFFICE O/P EST LOW 20 MIN: CPT | Performed by: PODIATRIST

## 2020-09-22 PROCEDURE — G8417 CALC BMI ABV UP PARAM F/U: HCPCS | Performed by: PODIATRIST

## 2020-09-22 NOTE — PROGRESS NOTES
[facility]  [facility]  [address] [place] [address]  Dept: [phone]  Dept Fax: [phone]    RETURN PATIENT PROGRESS NOTE  Date of patient's visit: 9/22/2020  Patient's Name:  Osmin Martins YOB: 1980            Patient Care Team:  Agustín Multani MD as PCP - General (Internal Medicine)  Agustín Multani MD as PCP - [facility]  Josseline Arreola MD as Surgeon (Orthopedic Surgery)  Adan Strong DPM as Physician (Podiatry)       Ranjeet Swann 41 y.o. male that presents for follow-up of   Chief Complaint   Patient presents with    Nail Problem    Foot Pain     Pt's primary care physician is Agustín Multani MD last seen 06/23/2020  Symptoms began 2 month(s) ago and are decreased . Patient relates pain is Present. Pain is rated 6 out of 10 and is described as constant, moderate. Treatments prior to today's visit include: NSAID, RICE, immobilizatino, physical therapy but it has not helped. Currently denies F/C/N/V. No Known Allergies    Past Medical History:   Diagnosis Date    Anxiety     Arthritis     CAD (coronary artery disease)     Fatty liver     Fatty liver disease, nonalcoholic 0/08/3638    Hypertension     Obesity     Unspecified sleep apnea     cpap       Prior to Admission medications    Medication Sig Start Date End Date Taking?  Authorizing Provider   fluticasone (FLONASE) 50 MCG/ACT nasal spray 1 spray by Nasal route daily 9/14/20  Yes Agustín Multani MD   RA ACETAMINOPHEN EX  MG tablet take 1 tablet by mouth twice a day AS NEEDED FOR PAIN 9/11/20  Yes Agustín Multani MD   ibuprofen (ADVIL;MOTRIN) 800 MG tablet Take 1 tablet by mouth every 8 hours as needed for Pain 9/8/20  Yes Agustín Multani MD   albuterol sulfate HFA (VENTOLIN HFA) 108 (90 Base) MCG/ACT inhaler Inhale 2 puffs into the lungs every 6 hours as needed for Wheezing 9/8/20  Yes Agustín Multani MD   valACYclovir ([address]) 1 g tablet  6/23/20  Yes Historical Provider, MD   predniSONE (DELTASONE) 10 MG tablet Take one PO TID x 3 days  Take one PO BID x 3 days  Take one PO qdaily x 3 days  Take 1/2 PO qdaily  x 4 days 9/8/20  Yes Rajeev Lewis DPM   ibuprofen (ADVIL;MOTRIN) 800 MG tablet Take 1 tablet by mouth 3 times daily (with meals) 7/7/20  Yes Raina Burks MD   metoprolol tartrate (LOPRESSOR) 25 MG tablet Take 2/day 7/7/20  Yes Raina Burks MD   tiZANidine (ZANAFLEX) 4 MG tablet Take 1 tablet by mouth 3 times daily as needed (muscle spasms) 6/10/20  Yes Raina Burks MD   ARIPiprazole (ABILIFY) 10 MG tablet Take 10 mg by mouth daily 5/24/20  Yes Historical Provider, MD   lisinopril (PRINIVIL;ZESTRIL) 20 MG tablet Take 1 tablet by mouth daily 4/28/20  Yes Raina Burks MD   hydroCHLOROthiazide (HYDRODIURIL) 25 MG tablet Take 1 tablet by mouth daily 4/28/20  Yes Raina Burks MD       Review of Systems    Review of Systems:  History obtained from chart review and the patient  General ROS: negative for - chills, fatigue, fever, night sweats or weight gain  Constitutional: Negative for chills, diaphoresis, fatigue, fever and unexpected weight change. Musculoskeletal: Positive for arthralgias, gait problem and joint swelling. Neurological ROS: negative for - behavioral changes, confusion, headaches or seizures. Negative for weakness and numbness. Dermatological ROS: negative for - mole changes, rash  Cardiovascular: Negative for leg swelling. Gastrointestinal: Negative for constipation, diarrhea, nausea and vomiting. Lower Extremity Physical Examination:     Vitals:   Vitals:    09/22/20 0757   Resp: 18   Temp: 97.1 °F (36.2 °C)     General: AAO x 3 in NAD. Dermatologic Exam:  Skin lesion/ulceration Absent . Skin No rashes or nodules noted. .       Musculoskeletal:     1st MPJ ROM decreased, Bilateral.  Muscle strength 5/5, Bilateral.  Pain present upon palpation of left posterior tibial tendon at the navicular tuberosity to the medial ankle. There is pitting edema. .  Medial longitudinal arch, Bilateral WNL. Ankle ROM WNL,Bilateral.    Dorsally contracted digits absent digits 1-5 Bilateral.     Vascular: DP and PT pulses palpable 2/4, Bilateral.  CFT <3 seconds, Bilateral.  Hair growth present to the level of the digits, Bilateral.  Edema absent, Bilateral.  Varicosities absent, Bilateral. Erythema absent, Bilateral    Neurological: Sensation intact to light touch to level of digits, Bilateral.  Protective sensation intact 10/10 sites via 5.07/10g Katy-Jamaal Monofilament, Bilateral.  negative Tinel's, Bilateral.  negative Valleix sign, Bilateral.      Integument: Warm, dry, supple, Bilateral.  Open lesion absent, Bilateral.  Interdigital maceration absent to web spaces 1-4, Bilateral.  Nails are normal in length, thickness and color 1-5 bilateral.  Fissures absent, Bilateral.       Asessment: Patient is a 41 y.o. male with:    Diagnosis Orders   1. Foot pain, bilateral     2. Plantar fascial fibromatosis     3. Posterior tibial tendon tear, traumatic, left, initial encounter  MRI ANKLE LEFT WO CONTRAST         Plan: Patient examined and evaluated. Current condition and treatment options discussed in detail. Advised pt to his condtion  since we have tried NSAID, immobilization,  RICE therapy physical therapy and the symptoms have not improved we will need to order an MRI of the affected limb to assess the area  . Verbal and written instructions given to patient. Contact office with any questions/problems/concerns. No orders of the defined types were placed in this encounter. No orders of the defined types were placed in this encounter.        RTC in 1week(s) after MRI  .    9/22/2020      Electronically signed by Elena Elliott DPM on 9/22/2020 at 8:12 AM  9/22/2020

## 2020-10-03 ENCOUNTER — HOSPITAL ENCOUNTER (EMERGENCY)
Age: 40
Discharge: HOME | End: 2020-10-03
Attending: EMERGENCY MEDICINE
Payer: MEDICAID

## 2020-10-03 ENCOUNTER — APPOINTMENT (OUTPATIENT)
Dept: GENERAL RADIOLOGY | Age: 40
End: 2020-10-03
Payer: MEDICAID

## 2020-10-03 VITALS
DIASTOLIC BLOOD PRESSURE: 104 MMHG | TEMPERATURE: 97.3 F | SYSTOLIC BLOOD PRESSURE: 158 MMHG | OXYGEN SATURATION: 100 % | HEART RATE: 70 BPM | RESPIRATION RATE: 18 BRPM

## 2020-10-03 PROCEDURE — 73080 X-RAY EXAM OF ELBOW: CPT

## 2020-10-03 PROCEDURE — 99283 EMERGENCY DEPT VISIT LOW MDM: CPT

## 2020-10-03 PROCEDURE — 29125 APPL SHORT ARM SPLINT STATIC: CPT

## 2020-10-03 PROCEDURE — 73110 X-RAY EXAM OF WRIST: CPT

## 2020-10-03 PROCEDURE — 6370000000 HC RX 637 (ALT 250 FOR IP): Performed by: STUDENT IN AN ORGANIZED HEALTH CARE EDUCATION/TRAINING PROGRAM

## 2020-10-03 RX ORDER — IBUPROFEN 800 MG/1
800 TABLET ORAL ONCE
Status: COMPLETED | OUTPATIENT
Start: 2020-10-03 | End: 2020-10-03

## 2020-10-03 RX ORDER — ACETAMINOPHEN 500 MG
1000 TABLET ORAL ONCE
Status: COMPLETED | OUTPATIENT
Start: 2020-10-03 | End: 2020-10-03

## 2020-10-03 RX ADMIN — IBUPROFEN 800 MG: 800 TABLET, FILM COATED ORAL at 16:23

## 2020-10-03 RX ADMIN — ACETAMINOPHEN 1000 MG: 500 TABLET ORAL at 16:23

## 2020-10-03 ASSESSMENT — ENCOUNTER SYMPTOMS
DIARRHEA: 0
SHORTNESS OF BREATH: 0
NAUSEA: 0
VOMITING: 0
SORE THROAT: 0
ABDOMINAL PAIN: 0
CONSTIPATION: 0

## 2020-10-03 ASSESSMENT — PAIN SCALES - GENERAL
PAINLEVEL_OUTOF10: 7
PAINLEVEL_OUTOF10: 7

## 2020-10-03 NOTE — ED NOTES
Pt to ED room 7 with c/o wrist pain due to fall in the shower about 2 weeks ago and a fall at work yesterday. Pt arrives in a wrist brace pt has not been previously seen for his injuries. Pt reports a fall one month at work and injured his left ankle. Pt alert and oriented x4 in  No signs of acute distress. Pt reports no hx of fall prior to the three incidents listed above. Pt reports falls are due to floors being wet with no wet floor signs posted.       Priscila Ventura RN  10/03/20 1542

## 2020-10-03 NOTE — ED PROVIDER NOTES
[address]  ED  Emergency Department Encounter  EmergencyMedicine Resident     Pt Cora Sabillon  MRN: [id number]  [place] 1980  Date of evaluation: 10/3/20  PCP:  Emery Thompson MD    CHIEF COMPLAINT       Chief Complaint   Patient presents with    Wrist Injury       HISTORY OF PRESENT ILLNESS  (Location/Symptom, Timing/Onset, Context/Setting, Quality, Duration, Modifying Factors, Severity.)      Maribel Castañeda is a 41 y.o. male history arthritis, CAD, fatty liver disease, hypertension, obesity who presents with left wrist pain that started approximately 2 weeks ago after fall onto outstretched arm in the bathroom. Persistent left wrist pain since then, purchased an over-the-counter brace which she has been using since then. However last night had an additional fall last night in the bathroom, similar mechanism with fall on outstretched left arm. Worsening left wrist pain as well as medial left elbow pain. No loss of consciousness, takes no blood thinners, no additional reported trauma. PAST MEDICAL / SURGICAL / SOCIAL / FAMILY HISTORY      has a past medical history of Anxiety, Arthritis, CAD (coronary artery disease), Fatty liver, Fatty liver disease, nonalcoholic, Hypertension, Obesity, and Unspecified sleep apnea. has no past surgical history on file. No pertinent surgical history on review with patient/family. Social History     Socioeconomic History    Marital status: Single     Spouse name: Lynne De Leon Number of children: 0    Years of education: 12    Highest education level:  Bachelor's degree (e.g., BA, AB, BS)   Occupational History    Not on file   Social Needs    Financial resource strain: Not very hard    Food insecurity     Worry: Never true     Inability: Never true   [employer] needs     Medical: Yes     Non-medical: No   Tobacco Use    Smoking status: Never Smoker    Smokeless tobacco: Never Used   Substance and Sexual Activity    Alcohol use: No     Frequency: Never     Binge frequency: Never    Drug use: No    Sexual activity: Yes     Partners: Female   Lifestyle    Physical activity     Days per week: 4 days     Minutes per session: 50 min    Stress: Only a little   Relationships    Social connections     Talks on phone: Three times a week     Gets together: Twice a week     Attends Denominational service: 1 to 4 times per year     Active member of club or organization: No     Attends meetings of clubs or organizations: Never     Relationship status: Never     Intimate partner violence     Fear of current or ex partner: No     Emotionally abused: No     Physically abused: No     Forced sexual activity: No   Other Topics Concern    Not on file   Social History Narrative    ** Merged History Encounter **            Family History   Problem Relation Age of Onset    Arthritis Mother     Diabetes Father     Arthritis Father     Stroke Maternal Grandmother     Heart Disease Maternal Grandmother     Stroke Maternal Grandfather     Heart Disease Maternal Grandfather     Early Death Maternal Grandfather     Stroke Paternal Grandmother     Heart Disease Paternal Grandmother     Stroke Paternal Grandfather     Heart Disease Paternal Grandfather     Early Death Paternal Grandfather        Allergies:  Patient has no known allergies. Home Medications:  Prior to Admission medications    Medication Sig Start Date End Date Taking?  Authorizing Provider   fluticasone (FLONASE) 50 MCG/ACT nasal spray 1 spray by Nasal route daily 9/14/20   Jasmeet Victor MD   RA ACETAMINOPHEN EX  MG tablet take 1 tablet by mouth twice a day AS NEEDED FOR PAIN 9/11/20   Jasmeet Victor MD   ibuprofen (ADVIL;MOTRIN) 800 MG tablet Take 1 tablet by mouth every 8 hours as needed for Pain 9/8/20   Jasmeet Victor MD   albuterol sulfate HFA (VENTOLIN HFA) 108 (90 Base) MCG/ACT inhaler Inhale 2 puffs into the lungs every 6 hours as needed for Wheezing 9/8/20   Matthew Patricia MD   valACYclovir (VALTREX) 1 g tablet  6/23/20   Historical Provider, MD   predniSONE (DELTASONE) 10 MG tablet Take one PO TID x 3 days  Take one PO BID x 3 days  Take one PO qdaily x 3 days  Take 1/2 PO qdaily  x 4 days 9/8/20   Viktor Giles DPM   ibuprofen (ADVIL;MOTRIN) 800 MG tablet Take 1 tablet by mouth 3 times daily (with meals) 7/7/20   Matthew Patricia MD   metoprolol tartrate (LOPRESSOR) 25 MG tablet Take 2/day 7/7/20   Matthew Patricia MD   tiZANidine (ZANAFLEX) 4 MG tablet Take 1 tablet by mouth 3 times daily as needed (muscle spasms) 6/10/20   Matthew Patricia MD   ARIPiprazole (ABILIFY) 10 MG tablet Take 10 mg by mouth daily 5/24/20   Historical Provider, MD   lisinopril (PRINIVIL;ZESTRIL) 20 MG tablet Take 1 tablet by mouth daily 4/28/20   Matthew Patricia MD   hydroCHLOROthiazide (HYDRODIURIL) 25 MG tablet Take 1 tablet by mouth daily 4/28/20   Matthew Patricia MD       REVIEW OF SYSTEMS    (2-9 systems for level 4, 10 or more for level 5)      Review of Systems   Constitutional: Negative for fever. HENT: Negative for sore throat. Eyes: Negative for visual disturbance. Respiratory: Negative for shortness of breath. Cardiovascular: Negative for chest pain. Gastrointestinal: Negative for abdominal pain, constipation, diarrhea, nausea and vomiting. Genitourinary: Negative for decreased urine volume. Musculoskeletal: Positive for arthralgias (Left wrist and elbow). Negative for joint swelling, myalgias, neck pain and neck stiffness. Skin: Negative for wound. Neurological: Negative for weakness, light-headedness and headaches. Psychiatric/Behavioral: Negative for confusion. PHYSICAL EXAM   (up to 7 for level 4, 8 or more for level 5)      INITIAL VITALS:   BP (!) 158/104   Pulse 70   Temp 97.3 °F (36.3 °C)   Resp 18   SpO2 100%     Physical Exam  Vitals signs and nursing note reviewed.    Constitutional:       Appearance: Normal appearance. He is well-developed. HENT:      Head: Normocephalic and atraumatic. Right Ear: External ear normal.      Left Ear: External ear normal.      Nose: Nose normal.   Eyes:      General:         Right eye: No discharge. Left eye: No discharge. Neck:      Musculoskeletal: Normal range of motion. Trachea: Trachea normal. No tracheal deviation. Cardiovascular:      Rate and Rhythm: Normal rate. Pulses: Normal pulses. Pulmonary:      Effort: Pulmonary effort is normal. No respiratory distress. Musculoskeletal:         General: No deformity. Left elbow: He exhibits normal range of motion, no swelling and no deformity. Tenderness found. Medial epicondyle tenderness noted. Left wrist: He exhibits decreased range of motion, tenderness and bony tenderness. He exhibits no swelling, no crepitus and no deformity. Skin:     General: Skin is warm and dry. Capillary Refill: Capillary refill takes less than 2 seconds. Neurological:      General: No focal deficit present. Mental Status: He is alert and oriented to person, place, and time. Sensory: No sensory deficit. Motor: No weakness. Psychiatric:         Mood and Affect: Mood normal.         Behavior: Behavior normal.         DIFFERENTIAL  DIAGNOSIS     PLAN (LABS / IMAGING / EKG):  Orders Placed This Encounter   Procedures    XR WRIST LEFT (MIN 3 VIEWS)    XR ELBOW LEFT (MIN 3 VIEWS)       MEDICATIONS ORDERED:  Orders Placed This Encounter   Medications    acetaminophen (TYLENOL) tablet 1,000 mg    ibuprofen (ADVIL;MOTRIN) tablet 800 mg       DDX: Sprain versus train versus fracture versus dislocation versus other    DIAGNOSTIC RESULTS / EMERGENCY DEPARTMENT COURSE / MDM     LABS:  No results found for this visit on 10/03/20. RADIOLOGY:  Xr Elbow Left (min 3 Views)    Result Date: 10/3/2020  EXAMINATION: THREE XRAY VIEWS OF THE LEFT ELBOW 10/3/2020 4:50 pm COMPARISON: None.  HISTORY: ORDERING SYSTEM PROVIDED HISTORY: fall, outstretched arm, medial tenderness TECHNOLOGIST PROVIDED HISTORY: fall, outstretched arm, medial tenderness FINDINGS: Mild degenerative changes are present with spurring of the coronoid process of the proximal ulna. No acute fracture, dislocation or gross effusion is noted. Degenerative changes. No acute osseous abnormality. Xr Wrist Left (min 3 Views)    Result Date: 10/3/2020  EXAMINATION: 4 XRAY VIEWS OF THE LEFT WRIST 10/3/2020 4:50 pm COMPARISON: None. HISTORY: ORDERING SYSTEM PROVIDED HISTORY: [address] injury, anatomical snuffbox tenderness TECHNOLOGIST PROVIDED HISTORY: [address] injury, anatomical snuffbox tenderness Acute wrist injury. Initial examination. FINDINGS: No acute fracture or dislocation is demonstrated. There are mild degenerative changes of the articular surfaces of the distal radius and ulna. There is normal carpal alignment. No focal soft tissue swelling. Mild degenerative changes of the distal radius and ulna. No acute osseous abnormality of the left wrist.       EKG  None    All EKG's are interpreted by the Emergency Department Physician who either signs or Co-signs this chart in the absence of a cardiologist.    EMERGENCY DEPARTMENT COURSE:  Patient found seated upright in bed, no acute distress, not ill or toxic appearing. Engaged in cooperative exam.  Physical exam notable for anatomical snuffbox tenderness of the left wrist and medial epicondyle pain of the left elbow. Plan for x-ray of the left wrist and elbow, analgesics with Tylenol and ibuprofen. No acute pathology on imaging. As such patient placed in a thumb spica splint given possibility for occult scaphoid fracture. Plan to follow-up with PCP for repeat imaging. Discharge plan discussed with patient who is in agreement. Educated on likely pathology, medications, return precautions, and follow-up.   Patient understood all educated materials with all questions answered to their satisfaction. PROCEDURES:  None    CONSULTS:  None    CRITICAL CARE:  None    FINAL IMPRESSION      1.  Wrist pain, acute, left          DISPOSITION / PLAN     DISPOSITION        PATIENT REFERRED TO:  MD Charity Dykes [facility] [address] [address]  [phone]    Schedule an appointment as soon as possible for a visit in 1 week  Regarding this visit, reimage left wrist for possible scaphoid fracture    [facility] ED  [address] [facility] [address]  [phone]  Go to   If symptoms worsen      DISCHARGE MEDICATIONS:  Discharge Medication List as of 10/3/2020  5:32 PM          Ozzie Emery MD  Emergency Medicine Resident    (Please note that portions of thisnote were completed with a voice recognition program.  Efforts were made to edit the dictations but occasionally words are mis-transcribed.)        Ozzie Emery MD  Resident  10/03/20 [id number]

## 2020-10-03 NOTE — ED PROVIDER NOTES
[facility]  ED     Emergency Department     Faculty Attestation    I performed a history and physical examination of the patient and discussed management with the resident. I reviewed the residents note and agree with the documented findings and plan of care. Any areas of disagreement are noted on the chart. I was personally present for the key portions of any procedures. I have documented in the chart those procedures where I was not present during the key portions. I have reviewed the emergency nurses triage note. I agree with the chief complaint, past medical history, past surgical history, allergies, medications, social and family history as documented unless otherwise noted below. For Physician Assistant/ Nurse Practitioner cases/documentation I have personally evaluated this patient and have completed at least one if not all key elements of the E/M (history, physical exam, and MDM). Additional findings are as noted. Patient presents with left wrist pain. He says he fell with an outstretched hand a couple weeks ago and injured it at that time. He says he fell again last night the same way and is having worsening pain today which is why he decided to come get checked. He denies hitting his head or have any loss of consciousness or any other pain or injury. On exam, patient is resting comfortably in bed. There is moderate tenderness to the radial aspect of the wrist.  There is snuffbox tenderness. Pulses and sensation are intact. We will get x-rays and treat patient's pain.       Niraj Red MD  Attending Emergency  Physician              Rina Ascencio MD  10/03/20 [id number]

## 2020-10-06 ENCOUNTER — NURSE ONLY (OUTPATIENT)
Dept: INTERNAL MEDICINE | Age: 40
End: 2020-10-06
Payer: MEDICAID

## 2020-10-06 VITALS
SYSTOLIC BLOOD PRESSURE: 179 MMHG | HEIGHT: 76 IN | HEART RATE: 71 BPM | DIASTOLIC BLOOD PRESSURE: 82 MMHG | WEIGHT: 315 LBS | BODY MASS INDEX: 38.36 KG/M2

## 2020-10-06 PROCEDURE — 90688 IIV4 VACCINE SPLT 0.5 ML IM: CPT | Performed by: INTERNAL MEDICINE

## 2020-10-06 NOTE — PROGRESS NOTES
Pt is here for annual Flu vaccination. Pt presents with no complaints. Flu vaccine given in left . Tolerated immunization well, VIS given to patient. Vaccine Information Sheet, \"Influenza - Inactivated\"  given to Maribel Castañeda, or parent/legal guardian of  Maribel Castañeda and verbalized understanding. Patient responses:    Is the person being vaccinated sick today? No    Does the person to be vaccinated have an allergy to a component of the vaccine? No    Has the person to be vaccinated ever had a reaction to the flu vaccine in the past?  No    Have you ever had Guillian [place] Syndrome? No    Flu vaccine given per order. Please see immunization tab.

## 2020-10-07 ENCOUNTER — PATIENT MESSAGE (OUTPATIENT)
Dept: INTERNAL MEDICINE | Age: 40
End: 2020-10-07

## 2020-10-07 VITALS — SYSTOLIC BLOOD PRESSURE: 132 MMHG | DIASTOLIC BLOOD PRESSURE: 85 MMHG

## 2020-10-07 NOTE — TELEPHONE ENCOUNTER
From: Yvrose Don  To:  Juan Garcia MD  Sent: 10/7/2020 8:03 AM EDT  Subject: Non-Urgent Medical Question    My blood pressure was 132/85

## 2020-10-15 ENCOUNTER — TELEPHONE (OUTPATIENT)
Dept: INTERNAL MEDICINE | Age: 40
End: 2020-10-15

## 2020-10-15 NOTE — TELEPHONE ENCOUNTER
Writer called the patient to get him ready for his appointment. Writer was virtually rooming the patient and then the phone hung up in the middle of rooming him. Writer called the patient back but the patient did not answer so the writer LM for the patient to call the office back.  also sent the VV doxy link to the patient but he never called back or logged on. Dr. Kanika Stiles said to just error the appointment out since we were unable to reach the patient.

## 2020-10-16 ENCOUNTER — VIRTUAL VISIT (OUTPATIENT)
Dept: INTERNAL MEDICINE | Age: 40
End: 2020-10-16
Payer: MEDICAID

## 2020-10-16 PROBLEM — J31.0 CHRONIC RHINITIS: Status: ACTIVE | Noted: 2020-10-16

## 2020-10-16 PROBLEM — J31.0 CHRONIC RHINITIS: Status: RESOLVED | Noted: 2020-10-16 | Resolved: 2020-10-16

## 2020-10-16 PROBLEM — I12.9 HYPERTENSIVE NEPHROPATHY: Status: ACTIVE | Noted: 2020-10-16

## 2020-10-16 PROBLEM — I12.9 HYPERTENSIVE NEPHROPATHY: Status: RESOLVED | Noted: 2020-10-16 | Resolved: 2020-10-16

## 2020-10-16 PROBLEM — Z13.31 POSITIVE DEPRESSION SCREENING: Status: RESOLVED | Noted: 2017-02-06 | Resolved: 2020-10-16

## 2020-10-16 PROCEDURE — 99213 OFFICE O/P EST LOW 20 MIN: CPT | Performed by: INTERNAL MEDICINE

## 2020-10-16 RX ORDER — ALBUTEROL SULFATE 90 UG/1
2 AEROSOL, METERED RESPIRATORY (INHALATION) EVERY 6 HOURS PRN
Qty: 18 G | Refills: 3 | Status: SHIPPED | OUTPATIENT
Start: 2020-10-16 | End: 2020-12-13 | Stop reason: SDUPTHER

## 2020-10-16 RX ORDER — HYDROCHLOROTHIAZIDE 25 MG/1
25 TABLET ORAL DAILY
Qty: 90 TABLET | Refills: 1 | Status: SHIPPED | OUTPATIENT
Start: 2020-10-16 | End: 2020-12-13 | Stop reason: SDUPTHER

## 2020-10-16 RX ORDER — LISINOPRIL 20 MG/1
20 TABLET ORAL DAILY
Qty: 90 TABLET | Refills: 1 | Status: SHIPPED | OUTPATIENT
Start: 2020-10-16 | End: 2020-12-13 | Stop reason: SDUPTHER

## 2020-10-16 ASSESSMENT — ENCOUNTER SYMPTOMS
SHORTNESS OF BREATH: 0
DIARRHEA: 0
CHOKING: 0
COUGH: 0
ABDOMINAL PAIN: 0
CONSTIPATION: 0

## 2020-10-16 NOTE — PATIENT INSTRUCTIONS
Patient Education        Hand Arthritis: Exercises  Introduction  Here are some examples of exercises for you to try. The exercises may be suggested for a condition or for rehabilitation. Start each exercise slowly. Ease off the exercises if you start to have pain. You will be told when to start these exercises and which ones will work best for you. How to do the exercises  Tendon glides   1. In this exercise, the steps follow one another to a make a continuous movement. 2. With your affected hand, point your fingers and thumb straight up. Your wrist should be relaxed, following the line of your fingers and thumb. 3. Curl your fingers so that the top two joints in them are bent, and your fingers wrap down. Your fingertips should touch or be near the base of your fingers. Your fingers will look like a hook. 4. Make a fist by bending your knuckles. Your thumb can gently rest against your index (pointing) finger. 5. Unwind your fingers slightly so that your fingertips can touch the base of your palm. Your thumb can rest against your index finger. 6. Move back to your starting position, with your fingers and thumb pointing up. 7. Repeat the series of motions 8 to 12 times. 8. Switch hands and repeat steps 1 through 6, even if only one hand is sore. Intrinsic flexion   1. Rest your affected hand on a table and bend the large joints where your fingers connect to your hand. Keep your thumb and the other joints in your fingers straight. 2. Slowly straighten your fingers. Your wrist should be relaxed, following the line of your fingers and thumb. 3. Move back to your starting position, with your hand bent. 4. Repeat 8 to 12 times. 5. Switch hands and repeat steps 1 through 4, even if only one hand is sore. Finger extension   1. Place your affected hand flat on a table. 2. Lift and then lower one finger at a time off the table. 3. Repeat 8 to 12 times.   4. Switch hands and repeat steps 1 through 3, even if only one hand is sore. MP extension   1. Place your good hand on a table, palm up. Put your affected hand on top of your good hand with your fingers wrapped around the thumb of your good hand like you are making a fist.  2. Slowly uncurl the joints of your affected hand where your fingers connect to your hand so that only the top two joints of your fingers are bent. Your fingers will look like a hook. 3. Move back to your starting position, with your fingers wrapped around your good thumb. 4. Repeat 8 to 12 times. 5. Switch hands and repeat steps 1 through 4, even if only one hand is sore. PIP extension (with MP extension)   1. Place your good hand on a table, palm up. Put your affected hand on top of your good hand, palm up. 2. Use the thumb and fingers of your good hand to grasp below the middle joint of one finger of your affected hand. 3. Straighten the last two joints of that finger. 4. Repeat 8 to 12 times. 5. Repeat steps 1 through 4 with each finger. 6. Switch hands and repeat steps 1 through 5, even if only one hand is sore. DIP flexion   1. With your good hand, grasp one finger of your affected hand. Your thumb will be on the top side of your finger just below the joint that is closest to your fingernail. 2. Slowly bend your affected finger only at the joint closest to your fingernail. 3. Repeat 8 to 12 times. 4. Repeat steps 1 through 3 with each finger. 5. Switch hands and repeat steps 1 through 4, even if only one hand is sore. Follow-up care is a key part of your treatment and safety. Be sure to make and go to all appointments, and call your doctor if you are having problems. It's also a good idea to know your test results and keep a list of the medicines you take. Where can you learn more? Go to https://[employer]. org and sign in to your [employer] account. Enter J614 in the [facility] box to learn more about \"Hand Arthritis: Exercises. \"     If you do not have an account, please click on the \"Sign Up Now\" link. Current as of: March 2, 2020               Content Version: 12.6  © 2006-2020 [employer]. Care instructions adapted under license by [facility] ([facility]). If you have questions about a medical condition or this instruction, always ask your healthcare professional. [address] any warranty or liability for your use of this information. Labs given to patient, they will have them done before their next visit.      Office will call pt in December to schedule December appointment    tv

## 2020-10-16 NOTE — PROGRESS NOTES
10/16/2020    TELEHEALTH EVALUATION -- Audio/Visual (During KTUFI-37 public health emergency)    HPI:    Zehra Pardo (:  1980) has requested an audio/video evaluation for the following concern(s):    Patient had a televisit today to follow with his hypertension. Patient feels generally healthy, currently does not have any complaints. Patient stated his blood pressure running between 130-140 mmHg. Hypertension relatively uncontrolled due to his work stress and family stress factors. Currently on lisinopril 20 and HCTZ 25 mg. Taking daily in the morning and stated compliant with his medications. Denies any chest pain that he had in the past and resolved. Denies any shortness of breath on exertion, palpitations, headache, heartburn. Able to tolerate diet, regular bowel bladder movements. Bilateral foot pain, following podiatry for plantar fascial fibromatosis. Currently controlled. Discussed about his pending MRI to be done. Patient agreed. Acute left elbow and wrist pain. Went to the ER, x-ray shown mild degenerative changes concerning for osteoarthritis. Patient works as a  which includes heavy lifting, fine movements with mostly with his left hand. He been in this kind of works for the past more than 5 years. Requested for a leave of absence for 3 days. Recent A1c 5.4. BMP on  shown elevated creatinine and potassium. Compared to his last year results. Had flu shot last week. Review of Systems   Constitutional: Positive for activity change (Unable to do fine movements with his left hand). Negative for fatigue and fever. Respiratory: Negative for cough, choking and shortness of breath. Cardiovascular: Negative for chest pain and leg swelling. Gastrointestinal: Negative for abdominal pain, constipation and diarrhea. Musculoskeletal: Positive for arthralgias (left elbow, wrist, bilateral ankle joints). Negative for myalgias and neck pain. Skin: Negative. Neurological: Negative for dizziness, light-headedness, numbness and headaches. Psychiatric/Behavioral: Negative for agitation, decreased concentration and dysphoric mood. Prior to Visit Medications    Medication Sig Taking?  Authorizing Provider   fluticasone (FLONASE) 50 MCG/ACT nasal spray 1 spray by Nasal route daily Yes Emery Thompson MD   RA ACETAMINOPHEN EX  MG tablet take 1 tablet by mouth twice a day AS NEEDED FOR PAIN Yes Emery Thompson MD   albuterol sulfate HFA (VENTOLIN HFA) 108 (90 Base) MCG/ACT inhaler Inhale 2 puffs into the lungs every 6 hours as needed for Wheezing Yes Emery Thompson MD   ibuprofen (ADVIL;MOTRIN) 800 MG tablet Take 1 tablet by mouth 3 times daily (with meals) Yes Emery Thompson MD   metoprolol tartrate (LOPRESSOR) 25 MG tablet Take 2/day Yes Emery Thompson MD   ARIPiprazole (ABILIFY) 10 MG tablet Take 10 mg by mouth daily Yes Historical Provider, MD   lisinopril (PRINIVIL;ZESTRIL) 20 MG tablet Take 1 tablet by mouth daily Yes Emery Thompson MD   hydroCHLOROthiazide (HYDRODIURIL) 25 MG tablet Take 1 tablet by mouth daily Yes Emery Thompson MD   valACYclovir (VALTREX) 1 g tablet   Historical Provider, MD       Social History     Tobacco Use    Smoking status: Never Smoker    Smokeless tobacco: Never Used   Substance Use Topics    Alcohol use: No     Frequency: Never     Binge frequency: Never    Drug use: No        No Known Allergies,   Past Medical History:   Diagnosis Date    Anxiety     Arthritis     CAD (coronary artery disease)     Fatty liver     Fatty liver disease, nonalcoholic 1/87/1306    Hypertension     Obesity     Unspecified sleep apnea     cpap   , No past surgical history on file.,   Social History     Tobacco Use    Smoking status: Never Smoker    Smokeless tobacco: Never Used   Substance Use Topics    Alcohol use: No     Frequency: Never     Binge frequency: Never    Drug use: No   ,   Family History Problem Relation Age of Onset    Arthritis Mother     Diabetes Father     Arthritis Father     Stroke Maternal Grandmother     Heart Disease Maternal Grandmother     Stroke Maternal Grandfather     Heart Disease Maternal Grandfather     Early Death Maternal Grandfather     Stroke Paternal Grandmother     Heart Disease Paternal Grandmother     Stroke Paternal Grandfather     Heart Disease Paternal Grandfather     Early Death Paternal Grandfather    ,   Immunization History   Administered Date(s) Administered    Influenza Virus Vaccine 10/09/2015, 10/03/2016, 10/03/2017, 09/18/2018, 11/01/2019    Influenza, Hassel Scarce, 6 mo and older, IM (Fluzone, Flulaval) 10/03/2017    Influenza, Hassel Scarce, IM, (6 mo and older Fluzone, Flulaval, Fluarix and 3 yrs and older Afluria) 10/03/2016, 09/18/2018, 11/01/2019, 10/06/2020    Pneumococcal Polysaccharide ([id number]) 01/13/2016    Tdap (Boostrix, Adacel) 10/09/2015, 04/27/2018       PHYSICAL EXAMINATION:  [ INSTRUCTIONS:  \"[x]\" Indicates a positive item  \"[]\" Indicates a negative item  -- DELETE ALL ITEMS NOT EXAMINED]  Vital Signs: (As obtained by patient/caregiver or practitioner observation)     Blood pressure- 130-140 mmHg Heart rate- not checked   Respiratory rate- not available   Temperature- n/a Pulse oximetry- n/a    Constitutional: [x] Appears well-developed and well-nourished [] No apparent distress      [] Abnormal-   Mental status  [x] Alert and awake  [] Oriented to person/place/time [x]Able to follow commands      Eyes:  EOM    [x]  Normal  [] Abnormal-  Sclera  [x]  Normal  [] Abnormal -         Discharge [x]  None visible  [] Abnormal -    HENT:   [x] Normocephalic, atraumatic.   [] Abnormal   [x] Mouth/Throat: Mucous membranes are moist.     External Ears [] Normal  [] Abnormal-     Neck: [x] No visualized mass     Pulmonary/Chest: [x] Respiratory effort normal.  [] No visualized signs of difficulty breathing or respiratory distress        [] Abnormal-      Musculoskeletal:   [] Normal gait with no signs of ataxia         [x] Normal range of motion of neck        [] Abnormal-       Neurological:        [x] No Facial Asymmetry (Cranial nerve 7 motor function) (limited exam to video visit)          [x] No gaze palsy        [] Abnormal-         Skin:        [x] No significant exanthematous lesions or discoloration noted on facial skin         [] Abnormal-            Psychiatric:       [x] Normal Affect [] No Hallucinations        [] Abnormal-     Other pertinent observable physical exam findings-   Left wrist brace is seen. Able to move all his fingers without difficulty, able to feel his sensation. No pain. Right hand unremarkable. ASSESSMENT/PLAN:  1. Hypertensive nephropathy  Increase creatinine and potassium from last year results. Suspected hypertensive nephropathy. Will repeat BMP. - Basic Metabolic Panel; Future    2. Chronic rhinitis  Denies any seasonal allergies. Occasional albuterol use as needed during exertion. - albuterol sulfate HFA (VENTOLIN HFA) 108 (90 Base) MCG/ACT inhaler; Inhale 2 puffs into the lungs every 6 hours as needed for Wheezing  Dispense: 18 g; Refill: 3    3. Essential hypertension  Currently controlled. Continue taking lisinopril 20, HCTZ 25 mg.    4.  Left wrist degenerative osteoarthritis:  Discussed about changing his work to a lighter activity. Given return of work letter. Discussed about hand exercises, risk of progression to severe osteoarthritis if continues to do heavy work. Patient verbalized understanding. Follow-up in 6 months with Dr. Carly Espino. Rafat Nicholson is a 41 y.o. male being evaluated by a Virtual Visit (video visit) encounter to address concerns as mentioned above. A caregiver was present when appropriate.  Due to this being a TeleHealth encounter (During PMOKY-36 public health emergency), evaluation of the following organ systems was limited: Vitals/Constitutional/EENT/Resp/CV/GI//MS/Neuro/Skin/Heme-Lymph-Imm. Pursuant to the emergency declaration under the [address] [place] [address] and the [employer] Act, this Virtual Visit was conducted with patient's (and/or legal guardian's) consent, to reduce the patient's risk of exposure to COVID-19 and provide necessary medical care. The patient (and/or legal guardian) has also been advised to contact this office for worsening conditions or problems, and seek emergency medical treatment and/or call 911 if deemed necessary. Patient identification was verified at the start of the visit: Yes    Total time spent on this encounter: 15-20 min    Services were provided through a video synchronous discussion virtually to substitute for in-person clinic visit. Patient and provider were located at their individual homes. --Beatriz Oseguera MD on 10/16/2020 at 9:05 AM    An electronic signature was used to authenticate this note.

## 2020-10-16 NOTE — LETTER
[address]  [address] [address]  Phone: [phone]  Fax: [phone]    Austin Salazar MD        October 16, 2020     Patient: Carlos Eduardo Jurado   YOB: 1980   Date of Visit: 10/16/2020       To Whom it May Concern:    Donna Macario had telehealth visit in my clinic on 10/16/2020. He may return to work on 10/19/20. If you have any questions or concerns, please don't hesitate to call.     Sincerely,         Austin Salazar MD

## 2020-10-16 NOTE — PROGRESS NOTES
Patient initiated a video visit via doxy. me. He wanted to discuss his chronic issues and need a letter for work. He was recently seen in the ER for left wrist and elbow pain. He works as a . He had x-rays done which showed minimal arthritis. No soft tissue swelling. He would like a couple of days off work. He states he was given a wrist brace. No swelling. No tingling or numbness. He has been complaining of atypical chest pain for few months. Has been to the ER couple of times. Apparently a stress test was ordered and he was seen by cardiology yesterday. Reports are pending. Blood pressure is better controlled he states. He calls dizziness numbers. He will need his yearly labs done. Letter was provided to patient. Advised to get a flu vaccine. Attending Physician Statement  I have discussed the care of Sonia Muñiz, including pertinent history and exam findings,  with the resident. I have reviewed the key elements of all parts of the encounter with the resident. I agree with the assessment, plan and orders as documented by the resident.   (GE Modifier)

## 2020-10-31 ENCOUNTER — HOSPITAL ENCOUNTER (EMERGENCY)
Age: 40
Discharge: HOME | End: 2020-10-31
Attending: EMERGENCY MEDICINE
Payer: MEDICAID

## 2020-10-31 VITALS
OXYGEN SATURATION: 100 % | HEIGHT: 76 IN | WEIGHT: 315 LBS | DIASTOLIC BLOOD PRESSURE: 87 MMHG | SYSTOLIC BLOOD PRESSURE: 132 MMHG | TEMPERATURE: 97.3 F | HEART RATE: 73 BPM | BODY MASS INDEX: 38.36 KG/M2 | RESPIRATION RATE: 18 BRPM

## 2020-10-31 PROCEDURE — 99283 EMERGENCY DEPT VISIT LOW MDM: CPT

## 2020-10-31 ASSESSMENT — ENCOUNTER SYMPTOMS
ABDOMINAL PAIN: 0
RHINORRHEA: 0
SORE THROAT: 0
VOMITING: 0
NAUSEA: 0
SHORTNESS OF BREATH: 0

## 2020-10-31 NOTE — ED PROVIDER NOTES
[facility]     Emergency Department     Faculty Attestation    I performed a history and physical examination of the patient and discussed management with the resident. I reviewed the residents note and agree with the documented findings and plan of care. Any areas of disagreement are noted on the chart. I was personally present for the key portions of any procedures. I have documented in the chart those procedures where I was not present during the key portions. I have reviewed the emergency nurses triage note. I agree with the chief complaint, past medical history, past surgical history, allergies, medications, social and family history as documented unless otherwise noted below. For Physician Assistant/ Nurse Practitioner cases/documentation I have personally evaluated this patient and have completed at least one if not all key elements of the E/M (history, physical exam, and MDM). Additional findings are as noted. I have personally seen and evaluated the patient. I find the patient's history and physical exam are consistent with the NP/PA documentation. I agree with the care provided, treatment rendered, disposition and follow-up plan. Patient has had a cough however this has been improving he is in no respiratory distress his lungs are clear the patient's lungs are clear bilaterally in no respiratory distress      Critical Care     Andi Berrios M.D.   Attending Emergency  Physician              John Dc MD  10/31/20 [id number]

## 2020-10-31 NOTE — ED PROVIDER NOTES
[facility]  ED  Emergency Department Encounter  Emergency Medicine Resident     Pt Name: Maribel Castañeda  MRN: [id number]  [place] 1980  Date of evaluation: 10/31/20  PCP:  Emery Thompson MD    [address] [place] [address]       Chief Complaint   Patient presents with    Cough    Emesis    Headache       HISTORY OFPRESENT ILLNESS  (Location/Symptom, Timing/Onset, Context/Setting, Quality, Duration, Modifying Factors,Severity.)      Maribel Castañeda is a 39-year-old male who presents with cough. The patient reports that his symptoms started approximately 2 weeks ago. His cough was initially productive but has improved. Patient denies any fevers, congestion, rhinorrhea, sore throat. He did have one episode of posttussive emesis approximately 2 days ago. He also had a headache 5 days ago but that has resolved as well. Patient believes his symptoms are the result of receiving his flu shot approximately 2 weeks ago. Patient was instructed to come to the emergency department for evaluation from his job. PAST MEDICAL / SURGICAL / SOCIAL / FAMILY HISTORY      has a past medical history of Anxiety, Arthritis, CAD (coronary artery disease), Fatty liver, Fatty liver disease, nonalcoholic, Hypertension, Obesity, and Unspecified sleep apnea. has no past surgical history on file. Social History     Socioeconomic History    Marital status: Single     Spouse name: Lynne De Leon Number of children: 0    Years of education: 12    Highest education level:  Bachelor's degree (e.g., BA, AB, BS)   Occupational History    Not on file   Social Needs    Financial resource strain: Not very hard    Food insecurity     Worry: Never true     Inability: Never true   [employer] needs     Medical: Yes     Non-medical: No   Tobacco Use    Smoking status: Never Smoker    Smokeless tobacco: Never Used   Substance and Sexual Activity    Alcohol use: No     Frequency: Never     Binge frequency: Never    Drug use: No    Sexual activity: Yes     Partners: Female   Lifestyle    Physical activity     Days per week: 4 days     Minutes per session: 50 min    Stress: Only a little   Relationships    Social connections     Talks on phone: Three times a week     Gets together: Twice a week     Attends Islam service: 1 to 4 times per year     Active member of club or organization: No     Attends meetings of clubs or organizations: Never     Relationship status: Never     Intimate partner violence     Fear of current or ex partner: No     Emotionally abused: No     Physically abused: No     Forced sexual activity: No   Other Topics Concern    Not on file   Social History Narrative    ** Merged History Encounter **            Family History   Problem Relation Age of Onset    Arthritis Mother     Diabetes Father     Arthritis Father     Stroke Maternal Grandmother     Heart Disease Maternal Grandmother     Stroke Maternal Grandfather     Heart Disease Maternal Grandfather     Early Death Maternal Grandfather     Stroke Paternal Grandmother     Heart Disease Paternal Grandmother     Stroke Paternal Grandfather     Heart Disease Paternal Grandfather     Early Death Paternal Grandfather         Allergies:  Patient has no known allergies. Home Medications:  Prior to Admission medications    Medication Sig Start Date End Date Taking?  Authorizing Provider   albuterol sulfate HFA (VENTOLIN HFA) 108 (90 Base) MCG/ACT inhaler Inhale 2 puffs into the lungs every 6 hours as needed for Wheezing 10/16/20   Kadie Blount MD   lisinopril (PRINIVIL;ZESTRIL) 20 MG tablet Take 1 tablet by mouth daily 10/16/20   Kadie Blount MD   hydroCHLOROthiazide (HYDRODIURIL) 25 MG tablet Take 1 tablet by mouth daily 10/16/20   Kadie Blount MD   fluticasone [facility]) 50 MCG/ACT nasal spray 1 spray by Nasal route daily 9/14/20   Kadie Blount MD   RA ACETAMINOPHEN EX  MG tablet take 1 tablet by mouth twice a day AS NEEDED FOR PAIN 9/11/20   Talita Dang MD   valACYclovir (VALTREX) 1 g tablet  6/23/20   Historical Provider, MD   ibuprofen (ADVIL;MOTRIN) 800 MG tablet Take 1 tablet by mouth 3 times daily (with meals) 7/7/20   Talita Dang MD   metoprolol tartrate (LOPRESSOR) 25 MG tablet Take 2/day 7/7/20   Talita Dang MD   ARIPiprazole (ABILIFY) 10 MG tablet Take 10 mg by mouth daily 5/24/20   Historical Provider, MD       REVIEW OFSYSTEMS    (2-9 systems for level 4, 10 or more for level 5)      Review of Systems   Constitutional: Negative for chills and fever. HENT: Negative for congestion, rhinorrhea and sore throat. Respiratory: Negative for shortness of breath. Cardiovascular: Negative for chest pain. Gastrointestinal: Negative for abdominal pain, nausea and vomiting. Hematological: Negative for adenopathy. PHYSICAL EXAM   (up to 7 for level 4, 8 or more forlevel 5)      INITIAL VITALS:   ED Triage Vitals [10/31/20 1057]   BP Temp Temp Source Pulse Resp SpO2 Height Weight   132/87 97.3 °F (36.3 °C) Oral 73 18 100 % 6' 4\" (1.93 m) (!) 316 lb (143.3 kg)       Physical Exam  Constitutional:       General: He is not in acute distress. Appearance: He is well-developed. He is not diaphoretic. HENT:      Head: Normocephalic and atraumatic. Right Ear: Tympanic membrane normal.      Left Ear: Tympanic membrane normal.   Eyes:      Conjunctiva/sclera: Conjunctivae normal.      Pupils: Pupils are equal, round, and reactive to light. Neck:      Musculoskeletal: Neck supple. Cardiovascular:      Rate and Rhythm: Normal rate and regular rhythm. Heart sounds: No murmur. No friction rub. No gallop. Pulmonary:      Effort: Pulmonary effort is normal. No respiratory distress. Breath sounds: Normal breath sounds. No wheezing or rales. Abdominal:      General: There is no distension. Palpations: Abdomen is soft. Tenderness: There is no abdominal tenderness.  There is

## 2020-10-31 NOTE — ED NOTES
Pt came into ER in [facility] with steady gait   Pt reports cough for 11 days   Pt reports a sore throat and losing his voice but those symptoms has resolved   Cough still remains   Pt denies productive cough   Pt resting in bed in NAD   Will continue to assess       Dale Gonzalez RN  10/31/20 [id number]

## 2020-11-12 RX ORDER — PSEUDOEPHED/ACETAMINOPH/DIPHEN 30MG-500MG
TABLET ORAL
Qty: 60 TABLET | Refills: 1 | Status: SHIPPED | OUTPATIENT
Start: 2020-11-12 | End: 2020-11-19 | Stop reason: SDUPTHER

## 2020-11-12 NOTE — TELEPHONE ENCOUNTER
Request for tylenol ES - med pended. Please fill if appropriate. Next Visit Date:  No future appointments.     Health Maintenance   Topic Date Due    Potassium monitoring  08/03/2021    Creatinine monitoring  08/03/2021    Lipid screen  11/01/2024    DTaP/Tdap/Td vaccine (3 - Td) 04/27/2028    Flu vaccine  Completed    HIV screen  Completed    Hepatitis A vaccine  Aged Out    Hepatitis B vaccine  Aged Out    Hib vaccine  Aged Out    Meningococcal (ACWY) vaccine  Aged Out    Pneumococcal 0-64 years Vaccine  Aged Out       Hemoglobin A1C (%)   Date Value   11/01/2019 5.4   07/10/2018 5.5   01/18/2018 5.5             ( goal A1C is < 7)   No results found for: LABMICR  LDL Cholesterol (mg/dL)   Date Value   11/01/2019 104       (goal LDL is <100)   AST (U/L)   Date Value   11/01/2019 16     ALT (U/L)   Date Value   11/01/2019 10     BUN (mg/dL)   Date Value   08/03/2020 29 (H)     BP Readings from Last 3 Encounters:   10/31/20 132/87   10/07/20 132/85   10/06/20 (!) 179/82          (goal 120/80)    All Future Testing planned in [facility]  Lab Frequency Next Occurrence   MRI ANKLE LEFT WO CONTRAST Once 15/09/5629   Basic Metabolic Panel Once 93/91/7025   Hemoglobin A1C Once 01/17/2021         Patient Active Problem List:     Hypertension     Fatty liver disease, nonalcoholic     MIGEL on CPAP     Vitamin D deficiency     IGT (impaired glucose tolerance)

## 2020-11-19 ENCOUNTER — HOSPITAL ENCOUNTER (EMERGENCY)
Age: 40
Discharge: HOME | End: 2020-11-19
Attending: EMERGENCY MEDICINE
Payer: MEDICAID

## 2020-11-19 VITALS
WEIGHT: 315 LBS | SYSTOLIC BLOOD PRESSURE: 150 MMHG | BODY MASS INDEX: 38.36 KG/M2 | RESPIRATION RATE: 16 BRPM | HEIGHT: 76 IN | DIASTOLIC BLOOD PRESSURE: 94 MMHG | OXYGEN SATURATION: 96 % | HEART RATE: 86 BPM | TEMPERATURE: 98.4 F

## 2020-11-19 PROCEDURE — 6370000000 HC RX 637 (ALT 250 FOR IP): Performed by: STUDENT IN AN ORGANIZED HEALTH CARE EDUCATION/TRAINING PROGRAM

## 2020-11-19 PROCEDURE — 99284 EMERGENCY DEPT VISIT MOD MDM: CPT

## 2020-11-19 RX ORDER — ACETAMINOPHEN 500 MG
500 TABLET ORAL EVERY 6 HOURS PRN
Qty: 60 TABLET | Refills: 0 | Status: SHIPPED | OUTPATIENT
Start: 2020-11-19 | End: 2021-02-04

## 2020-11-19 RX ORDER — ACETAMINOPHEN 500 MG
1000 TABLET ORAL ONCE
Status: COMPLETED | OUTPATIENT
Start: 2020-11-19 | End: 2020-11-19

## 2020-11-19 RX ORDER — IBUPROFEN 800 MG/1
800 TABLET ORAL EVERY 8 HOURS PRN
Qty: 30 TABLET | Refills: 0 | Status: SHIPPED | OUTPATIENT
Start: 2020-11-19 | End: 2020-11-23

## 2020-11-19 RX ORDER — IBUPROFEN 800 MG/1
800 TABLET ORAL ONCE
Status: COMPLETED | OUTPATIENT
Start: 2020-11-19 | End: 2020-11-19

## 2020-11-19 RX ADMIN — IBUPROFEN 800 MG: 800 TABLET, FILM COATED ORAL at 18:17

## 2020-11-19 RX ADMIN — ACETAMINOPHEN 1000 MG: 500 TABLET ORAL at 18:18

## 2020-11-19 ASSESSMENT — PAIN SCALES - GENERAL
PAINLEVEL_OUTOF10: 9
PAINLEVEL_OUTOF10: 9

## 2020-11-19 ASSESSMENT — PAIN DESCRIPTION - ORIENTATION: ORIENTATION: LEFT

## 2020-11-19 ASSESSMENT — PAIN DESCRIPTION - FREQUENCY: FREQUENCY: CONTINUOUS

## 2020-11-19 ASSESSMENT — PAIN DESCRIPTION - PAIN TYPE: TYPE: ACUTE PAIN

## 2020-11-19 ASSESSMENT — PAIN DESCRIPTION - LOCATION: LOCATION: HEAD

## 2020-11-19 NOTE — ED PROVIDER NOTES
[facility] [facility]     Emergency Department     Faculty Attestation        I performed a history and physical examination of the patient and discussed management with the resident. I reviewed the residents note and agree with the documented findings and plan of care. Any areas of disagreement are noted on the chart. I was personally present for the key portions of any procedures. I have documented in the chart those procedures where I was not present during the key portions. I have reviewed the emergency nurses triage note. I agree with the chief complaint, past medical history, past surgical history, allergies, medications, social and family history as documented unless otherwise noted below. For Physician Assistant/ Nurse Practitioner cases/documentation I have personally evaluated this patient and have completed at least one if not all key elements of the E/M (history, physical exam, and MDM). Additional findings are as noted. Vital Signs: BP: (!) 150/94  Pulse: (!) 8  Resp: 16  Temp: 98.4 °F (36.9 °C) SpO2: 96 %  PCP:  Radha Diez MD    Pertinent Comments:         Critical Care  None    This patient was evaluated in the Emergency Department for symptoms described in the history of present illness. He/she was evaluated in the context of the global COVID-19 pandemic, which necessitated consideration that the patient might be at risk for infection with the SARS-CoV-2 virus that causes COVID-19. Institutional protocols and algorithms that pertain to the evaluation of patients at risk for COVID-19 are in a state of rapid change based on information released by regulatory bodies including the CDC and federal and state organizations. These policies and algorithms were followed during the patient's care in the ED.     (Please note that portions of this note were completed with a voice recognition program. Efforts were made to edit the dictations but occasionally words are mis-transcribed.  Whenever words are used in this note in any gender, they shall be construed as though they were used in the gender appropriate to the circumstances; and whenever words are used in this note in the singular or plural form, they shall be construed as though they were used in the form appropriate to the circumstances.)    MD Pankaj Briseno  Attending Emergency Medicine Physician            Neil Arndt MD  11/19/20 2683

## 2020-11-19 NOTE — ED PROVIDER NOTES
[facility] ED  Emergency Department Encounter  Emergency Medicine Resident     Pt Name: Chelo Solis  MRN: [id number]  [place] 1980  Date of evaluation: 11/19/20  PCP:  Ajay Dela Cruz MD    [address] [place] [address]       Chief Complaint   Patient presents with    Headache       HISTORY OFPRESENT ILLNESS  (Location/Symptom, Timing/Onset, Context/Setting, Quality, Duration, Modifying Byrne Foley.)      Chelo Solis is a 41 y.o. male with concerns for headache. Patient was previously seen here on 10/31/2020 with cough, headache. Who presents with concerns for traumatic headache. Patient is working last night, possibly 1:30 AM when a box continues to pull full back and head the left side of his head. Patient had to visit the vomiting, denies loss of consciousness, states that he is still having a headache and would like to be evaluated at this time. Patient currently poses pain at 9 out of 10 intensity, states located on the lateral aspect of the head. Patient denies blurry vision, focal neurological deficit, change in bowel or bladder function. PAST MEDICAL / SURGICAL / SOCIAL / FAMILY HISTORY      has a past medical history of Anxiety, Arthritis, CAD (coronary artery disease), Fatty liver, Fatty liver disease, nonalcoholic, Hypertension, Obesity, and Unspecified sleep apnea. has no past surgical history on file. Social History     Socioeconomic History    Marital status: Single     Spouse name: Aden Sandoval Number of children: 0    Years of education: 12    Highest education level:  Bachelor's degree (e.g., BA, AB, BS)   Occupational History    Not on file   Social Needs    Financial resource strain: Not very hard    Food insecurity     Worry: Never true     Inability: Never true   [employer] needs     Medical: Yes     Non-medical: No   Tobacco Use    Smoking status: Never Smoker    Smokeless tobacco: Never Used   Substance and Sexual Activity    Alcohol use: No     Frequency: Never     Binge frequency: Never    Drug use: No    Sexual activity: Yes     Partners: Female   Lifestyle    Physical activity     Days per week: 4 days     Minutes per session: 50 min    Stress: Only a little   Relationships    Social connections     Talks on phone: Three times a week     Gets together: Twice a week     Attends Mu-ism service: 1 to 4 times per year     Active member of club or organization: No     Attends meetings of clubs or organizations: Never     Relationship status: Never     Intimate partner violence     Fear of current or ex partner: No     Emotionally abused: No     Physically abused: No     Forced sexual activity: No   Other Topics Concern    Not on file   Social History Narrative    ** Merged History Encounter **            Family History   Problem Relation Age of Onset    Arthritis Mother     Diabetes Father     Arthritis Father     Stroke Maternal Grandmother     Heart Disease Maternal Grandmother     Stroke Maternal Grandfather     Heart Disease Maternal Grandfather     Early Death Maternal Grandfather     Stroke Paternal Grandmother     Heart Disease Paternal Grandmother     Stroke Paternal Grandfather     Heart Disease Paternal Grandfather     Early Death Paternal Grandfather         Allergies:  Patient has no known allergies. Home Medications:  Prior to Admission medications    Medication Sig Start Date End Date Taking?  Authorizing Provider   ibuprofen (ADVIL;MOTRIN) 800 MG tablet Take 1 tablet by mouth every 8 hours as needed for Pain 11/19/20  Yes Lauyrn Salvador MD   acetaminophen (ACETAMINOPHEN EXTRA STRENGTH) 500 MG tablet Take 1 tablet by mouth every 6 hours as needed for Pain 11/19/20  Yes Lauryn Salvador MD   albuterol sulfate HFA (VENTOLIN HFA) 108 (90 Base) MCG/ACT inhaler Inhale 2 puffs into the lungs every 6 hours as needed for Wheezing 10/16/20   Masood Donovan MD   lisinopril (PRINIVIL;ZESTRIL) 20 MG tablet Take 1 tablet by mouth daily 10/16/20   Julio C Kingston MD   hydroCHLOROthiazide (HYDRODIURIL) 25 MG tablet Take 1 tablet by mouth daily 10/16/20   Julio C Kingston MD   fluticasone [facility]) 50 MCG/ACT nasal spray 1 spray by Nasal route daily 9/14/20   Julio C Kingston MD   valACYclovir (VALTREX) 1 g tablet  6/23/20   Historical Provider, MD   metoprolol tartrate (LOPRESSOR) 25 MG tablet Take 2/day 7/7/20   Julio C Kingston MD   ARIPiprazole (ABILIFY) 10 MG tablet Take 10 mg by mouth daily 5/24/20   Historical Provider, MD       REVIEW OFSYSTEMS    (2-9 systems for level 4, 10 or more for level 5)      Review of Systems   Constitutional: Negative for chills, diaphoresis, fatigue and fever. HENT: Negative for rhinorrhea, sore throat, tinnitus and trouble swallowing. Eyes: Negative for visual disturbance. Respiratory: Negative for cough, chest tightness, shortness of breath and wheezing. Cardiovascular: Negative for chest pain and leg swelling. Gastrointestinal: Positive for nausea and vomiting. Negative for abdominal distention, abdominal pain, constipation and diarrhea. Endocrine: Negative for polyuria. Genitourinary: Negative for dysuria, flank pain and frequency. Musculoskeletal: Negative for arthralgias, back pain, joint swelling and myalgias. Neurological: Positive for headaches. Negative for dizziness, tremors, seizures, weakness, light-headedness and numbness. PHYSICAL EXAM   (up to 7 for level 4, 8 or more forlevel 5)      INITIAL VITALS:   ED Triage Vitals   BP Temp Temp Source Pulse Resp SpO2 Height Weight   11/19/20 1751 11/19/20 1744 11/19/20 1744 11/19/20 1751 11/19/20 1751 11/19/20 1751 11/19/20 1751 11/19/20 1751   (!) 150/94 98.4 °F (36.9 °C) Temporal (!) 8 16 96 % 6' 4\" (1.93 m) (!) 321 lb (145.6 kg)       Physical Exam  Constitutional:       General: He is not in acute distress. Appearance: He is well-developed. HENT:      Head: Normocephalic and atraumatic. Comments: Notes no murmurs bilaterally, no raccoon eyes, wood sign, no rhinorrhea     Right Ear: External ear normal.      Left Ear: External ear normal.   Eyes:      General: No scleral icterus. Right eye: No discharge. Left eye: No discharge. Conjunctiva/sclera: Conjunctivae normal.      Pupils: Pupils are equal, round, and reactive to light. Neck:      Musculoskeletal: Normal range of motion. Vascular: No JVD. Trachea: No tracheal deviation. Cardiovascular:      Rate and Rhythm: Regular rhythm. Heart sounds: Normal heart sounds. Pulmonary:      Effort: Pulmonary effort is normal. No respiratory distress. Breath sounds: Normal breath sounds. No stridor. No wheezing. Abdominal:      General: Bowel sounds are normal. There is no distension. Palpations: Abdomen is soft. Tenderness: There is no abdominal tenderness. There is no guarding or rebound. Musculoskeletal: Normal range of motion. General: No tenderness or deformity. Skin:     General: Skin is warm. Coloration: Skin is not pale. Neurological:      Mental Status: He is alert and oriented to person, place, and time. Cranial Nerves: No cranial nerve deficit. DIFFERENTIAL  DIAGNOSIS     PLAN (LABS / IMAGING / EKG):  No orders of the defined types were placed in this encounter. MEDICATIONS ORDERED:  Orders Placed This Encounter   Medications    acetaminophen (TYLENOL) tablet 1,000 mg    ibuprofen (ADVIL;MOTRIN) tablet 800 mg    ibuprofen (ADVIL;MOTRIN) 800 MG tablet     Sig: Take 1 tablet by mouth every 8 hours as needed for Pain     Dispense:  30 tablet     Refill:  0    acetaminophen (ACETAMINOPHEN EXTRA STRENGTH) 500 MG tablet     Sig: Take 1 tablet by mouth every 6 hours as needed for Pain     Dispense:  60 tablet     Refill:  0     Please have patient contact office to schedule appointment.        DDX: Headache, traumatic headaches, work accident, concussion, postconcussive syndrome    Initial MDM/Plan/ED COURSE:    41 y.o. male who presents with concerns for headache injury with nausea, vomiting, persistent headache. Patient has no focal neurological deficit, is acting appropriately in the emergency department, is no cervical spinal pain. No blurring of the vision, concerns for a concussion of this patient, states that nausea is predominantly resolved. Like some into the pain. Is currently taking no medications for pain. Patient treated with Tylenol and ibuprofen in the emergency department, patient given information take caution, is treat outpatient with Tylenol, ibuprofen, told to follow-up with primary care doctor:     DIAGNOSTIC RESULTS / Melonie Eisenmenger / DAVID     LABS:  Labs Reviewed - No data to display        No results found. PROCEDURES:  None    CONSULTS:  None    CRITICAL CARE:  Please see attending note    FINAL IMPRESSION      1. Post-traumatic headache, not intractable, unspecified chronicity pattern    2.  Chronic pain of both knees          DISPOSITION / PLAN     DISPOSITION Decision To Discharge 11/19/2020 06:34:58 PM      PATIENT REFERRED TO:  [facility]  [address] [facility] [address]  [phone]    As needed    Joseph Phipps MD  [facility] [address] [facility] [address] [facility] [address]  [phone]      As needed      DISCHARGE MEDICATIONS:  Discharge Medication List as of 11/19/2020  6:56 PM          Carol Duran MD  Emergency Medicine Resident    (Please note that portions of this note were completed with a voice recognition program.Efforts were made to edit the dictations but occasionally words are mis-transcribed.)        Carol Duran MD  Resident  11/20/20 2711

## 2020-11-19 NOTE — ED NOTES
Pt came to ED c/o left sided headache after being hit in the head with a thick metal pole at work last night at approximately 0130 AM. Pt has had a aching sharp and tight pain on the left side of his head ever since impact. Pt reports no LOC, dizziness, or fainting spells. Pt a&o x4 with no respiratory or cardiac distress. All vitals are stable.        Veronica Barraza RN  11/19/20 3914

## 2020-11-20 ASSESSMENT — ENCOUNTER SYMPTOMS
CHEST TIGHTNESS: 0
COUGH: 0
VOMITING: 1
DIARRHEA: 0
SHORTNESS OF BREATH: 0
BACK PAIN: 0
TROUBLE SWALLOWING: 0
RHINORRHEA: 0
WHEEZING: 0
SORE THROAT: 0
NAUSEA: 1
ABDOMINAL DISTENTION: 0
CONSTIPATION: 0
ABDOMINAL PAIN: 0

## 2020-11-23 ENCOUNTER — HOSPITAL ENCOUNTER (EMERGENCY)
Age: 40
Discharge: HOME | End: 2020-11-23
Attending: EMERGENCY MEDICINE
Payer: MEDICAID

## 2020-11-23 VITALS
OXYGEN SATURATION: 100 % | HEART RATE: 78 BPM | TEMPERATURE: 97.3 F | RESPIRATION RATE: 18 BRPM | SYSTOLIC BLOOD PRESSURE: 148 MMHG | DIASTOLIC BLOOD PRESSURE: 99 MMHG

## 2020-11-23 PROCEDURE — 96372 THER/PROPH/DIAG INJ SC/IM: CPT

## 2020-11-23 PROCEDURE — 6370000000 HC RX 637 (ALT 250 FOR IP): Performed by: STUDENT IN AN ORGANIZED HEALTH CARE EDUCATION/TRAINING PROGRAM

## 2020-11-23 PROCEDURE — 99283 EMERGENCY DEPT VISIT LOW MDM: CPT

## 2020-11-23 PROCEDURE — 6360000002 HC RX W HCPCS: Performed by: STUDENT IN AN ORGANIZED HEALTH CARE EDUCATION/TRAINING PROGRAM

## 2020-11-23 RX ORDER — DIPHENHYDRAMINE HYDROCHLORIDE 50 MG/ML
25 INJECTION INTRAMUSCULAR; INTRAVENOUS ONCE
Status: COMPLETED | OUTPATIENT
Start: 2020-11-23 | End: 2020-11-23

## 2020-11-23 RX ORDER — ONDANSETRON 4 MG/1
4 TABLET, ORALLY DISINTEGRATING ORAL EVERY 8 HOURS PRN
Qty: 15 TABLET | Refills: 0 | Status: SHIPPED | OUTPATIENT
Start: 2020-11-23 | End: 2020-11-28

## 2020-11-23 RX ORDER — PROCHLORPERAZINE EDISYLATE 5 MG/ML
10 INJECTION INTRAMUSCULAR; INTRAVENOUS ONCE
Status: COMPLETED | OUTPATIENT
Start: 2020-11-23 | End: 2020-11-23

## 2020-11-23 RX ORDER — IBUPROFEN 800 MG/1
800 TABLET ORAL EVERY 6 HOURS PRN
Qty: 28 TABLET | Refills: 0 | Status: SHIPPED | OUTPATIENT
Start: 2020-11-23 | End: 2020-12-27 | Stop reason: SDUPTHER

## 2020-11-23 RX ORDER — ACETAMINOPHEN 500 MG
1000 TABLET ORAL ONCE
Status: COMPLETED | OUTPATIENT
Start: 2020-11-23 | End: 2020-11-23

## 2020-11-23 RX ORDER — PROCHLORPERAZINE MALEATE 10 MG
10 TABLET ORAL ONCE
Status: COMPLETED | OUTPATIENT
Start: 2020-11-23 | End: 2020-11-23

## 2020-11-23 RX ORDER — DIPHENHYDRAMINE HCL 25 MG
25 TABLET ORAL ONCE
Status: COMPLETED | OUTPATIENT
Start: 2020-11-23 | End: 2020-11-23

## 2020-11-23 RX ORDER — KETOROLAC TROMETHAMINE 15 MG/ML
15 INJECTION, SOLUTION INTRAMUSCULAR; INTRAVENOUS ONCE
Status: COMPLETED | OUTPATIENT
Start: 2020-11-23 | End: 2020-11-23

## 2020-11-23 RX ADMIN — ACETAMINOPHEN 1000 MG: 500 TABLET ORAL at 16:22

## 2020-11-23 RX ADMIN — KETOROLAC TROMETHAMINE 15 MG: 15 INJECTION, SOLUTION INTRAMUSCULAR; INTRAVENOUS at 15:53

## 2020-11-23 RX ADMIN — DIPHENHYDRAMINE HYDROCHLORIDE 25 MG: 50 INJECTION, SOLUTION INTRAMUSCULAR; INTRAVENOUS at 16:22

## 2020-11-23 RX ADMIN — PROCHLORPERAZINE EDISYLATE 10 MG: 5 INJECTION INTRAMUSCULAR; INTRAVENOUS at 16:22

## 2020-11-23 RX ADMIN — DIPHENHYDRAMINE HCL 25 MG: 25 TABLET ORAL at 15:53

## 2020-11-23 RX ADMIN — PROCHLORPERAZINE MALEATE 10 MG: 10 TABLET ORAL at 15:53

## 2020-11-23 ASSESSMENT — ENCOUNTER SYMPTOMS
SORE THROAT: 0
DIARRHEA: 0
BACK PAIN: 0
NAUSEA: 1
EYE PAIN: 0
SHORTNESS OF BREATH: 0
CONSTIPATION: 0
EYE REDNESS: 0
PHOTOPHOBIA: 1
VOMITING: 0
COUGH: 0
RHINORRHEA: 0
ABDOMINAL PAIN: 0

## 2020-11-23 ASSESSMENT — PAIN SCALES - GENERAL: PAINLEVEL_OUTOF10: 5

## 2020-11-23 NOTE — ED PROVIDER NOTES
[facility]  ED  eMERGENCY dEPARTMENT eNCOUnter   Attending Attestation     Pt Name: Mariajose Morgan  MRN: [id number]  [place] 1980  Date of evaluation: 11/23/20       Mariajose Morgan is a 41 y.o. male who presents with No chief complaint on file. History: Patient presents with headache. Patient says that he has left-sided headache with photophobia and phonophobia. Patient hit his head several days ago at work. Patient was seen none. Patient was not on any blood thinners nor was he intoxicated. Patient did not lose any consciousness. Patient said he not get any information for follow-up with occupational health. Exam: Heart rate and rhythm are regular. Lungs are clear to auscultation bilaterally. Abdomen soft, nontender. Patient's awake, alert, acting appropriately. Pupils are equal bilaterally. Plan for symptomatic control for migraine headache. We will have him follow-up with occupational health. Plan for discharge. I performed a history and physical examination of the patient and discussed management with the resident. I reviewed the residents note and agree with the documented findings and plan of care. Any areas of disagreement are noted on the chart. I was personally present for the key portions of any procedures. I have documented in the chart those procedures where I was not present during the key portions. I have personally reviewed all images and agree with the resident's interpretation. I have reviewed the emergency nurses triage note. I agree with the chief complaint, past medical history, past surgical history, allergies, medications, social and family history as documented unless otherwise noted below. Documentation of the HPI, Physical Exam and Medical Decision Making performed by medical students or scribes is based on my personal performance of the HPI, PE and MDM.  For Phys Assistant/ Nurse Practitioner cases/documentation I have had a face to face evaluation of this patient and have completed at least one if not all key elements of the E/M (history, physical exam, and MDM). Additional findings are as noted. For APC cases I have personally evaluated and examined the patient in conjunction with the APC and agree with the treatment plan and disposition of the patient as recorded by the APC.     Connie Huffman MD  Attending Emergency  Physician       Kimberlyn Gastelum MD  11/23/20 1474

## 2020-11-23 NOTE — ED PROVIDER NOTES
Maternal Grandfather     Stroke Paternal Grandmother     Heart Disease Paternal Grandmother     Stroke Paternal Grandfather     Heart Disease Paternal Grandfather     Early Death Paternal Grandfather         Allergies:  Patient has no known allergies. Home Medications:  Prior to Admission medications    Medication Sig Start Date End Date Taking? Authorizing Provider   ibuprofen (IBU) 800 MG tablet Take 1 tablet by mouth every 6 hours as needed for Pain 11/23/20 11/30/20 Yes Maya [place], DO   ondansetron (ZOFRAN ODT) 4 MG disintegrating tablet Take 1 tablet by mouth every 8 hours as needed for Nausea or Vomiting 11/23/20 11/28/20 Yes Maya [place], DO   acetaminophen (ACETAMINOPHEN EXTRA STRENGTH) 500 MG tablet Take 1 tablet by mouth every 6 hours as needed for Pain 11/19/20   Liset Kaba MD   albuterol sulfate HFA (VENTOLIN HFA) 108 (90 Base) MCG/ACT inhaler Inhale 2 puffs into the lungs every 6 hours as needed for Wheezing 10/16/20   Julio C Kingston MD   lisinopril (PRINIVIL;ZESTRIL) 20 MG tablet Take 1 tablet by mouth daily 10/16/20   Julio C Kingston MD   hydroCHLOROthiazide (HYDRODIURIL) 25 MG tablet Take 1 tablet by mouth daily 10/16/20   Julio C Kingston MD   fluticasone [facility]) 50 MCG/ACT nasal spray 1 spray by Nasal route daily 9/14/20   Julio C Kingston MD   valACYclovir (VALTREX) 1 g tablet  6/23/20   Historical Provider, MD   metoprolol tartrate (LOPRESSOR) 25 MG tablet Take 2/day 7/7/20   Julio C Kingston MD   ARIPiprazole (ABILIFY) 10 MG tablet Take 10 mg by mouth daily 5/24/20   Historical Provider, MD       REVIEW OFSYSTEMS    (2-9 systems for level 4, 10 or more for level 5)      Review of Systems   Constitutional: Negative for chills and fever. HENT: Negative for congestion, ear discharge, ear pain, rhinorrhea and sore throat. Phonophobia   Eyes: Positive for photophobia. Negative for pain and redness. Respiratory: Negative for cough and shortness of breath. Cardiovascular: Negative for chest pain and leg swelling. Gastrointestinal: Positive for nausea. Negative for abdominal pain, constipation, diarrhea and vomiting. Genitourinary: Negative for decreased urine volume and difficulty urinating. Musculoskeletal: Negative for arthralgias, back pain and neck pain. Skin: Negative for rash. Neurological: Positive for light-headedness and headaches. Negative for dizziness, weakness and numbness. Psychiatric/Behavioral: Negative for confusion. All other systems reviewed and are negative. PHYSICAL EXAM   (up to 7 for level 4, 8 or more forlevel 5)      INITIAL VITALS:   Vitals:    11/23/20 1337   BP: (!) 148/99   Pulse: 78   Resp: 18   Temp:    SpO2: 100%        Physical Exam  Vitals signs and nursing note reviewed. Constitutional:       General: He is not in acute distress. Appearance: Normal appearance. He is not toxic-appearing. Comments: Adult male sitting up in bed in no acute distress. He speaks in full sentences and answers questions appropriately. He is alert and oriented x4. HENT:      Head: Normocephalic and atraumatic. Comments: No ecchymosis, erythema, edema or laceration to the occiput. There is tenderness to palpation of the left temporal parietal region. Right Ear: Tympanic membrane, ear canal and external ear normal.      Left Ear: Tympanic membrane, ear canal and external ear normal.      Nose: Nose normal. No congestion or rhinorrhea. Mouth/Throat:      Mouth: Mucous membranes are moist.      Pharynx: Oropharynx is clear. No oropharyngeal exudate or posterior oropharyngeal erythema. Eyes:      Extraocular Movements: Extraocular movements intact. Conjunctiva/sclera: Conjunctivae normal.      Pupils: Pupils are equal, round, and reactive to light. Neck:      Musculoskeletal: Normal range of motion and neck supple. No neck rigidity or muscular tenderness.    Cardiovascular:      Rate and Rhythm: Normal rate and regular rhythm. Heart sounds: No murmur. No friction rub. No gallop. Pulmonary:      Effort: Pulmonary effort is normal. No respiratory distress. Breath sounds: Normal breath sounds. No stridor. No wheezing, rhonchi or rales. Abdominal:      General: Abdomen is flat. There is no distension. Palpations: Abdomen is soft. Tenderness: There is no abdominal tenderness. Musculoskeletal: Normal range of motion. General: No swelling or tenderness. Comments: No midline cervical, thoracic, lumbar spinal tenderness to palpation   Skin:     General: Skin is warm and dry. Capillary Refill: Capillary refill takes less than 2 seconds. Findings: No rash. Neurological:      General: No focal deficit present. Mental Status: He is alert and oriented to person, place, and time. Cranial Nerves: No cranial nerve deficit. Comments: Alert and oriented x3, answers questions appropriately, speech clear  CN 2-12 intact, no facial droop  Moves all extremities spontaneously  5/5 strength flexion and extension upper extremities bilaterally.  strength 5/5 and equal  5/5 strength hip flexion, knee extension, dorsi and plantar flexion bilaterally  Sensation intact to light touch extremities x4  No pronator drift, dysmetria or dysdiadochokinesis    Psychiatric:         Mood and Affect: Mood normal.         Behavior: Behavior normal.         DIFFERENTIAL  DIAGNOSIS     DDX: Post head injury, concussion, concussive syndrome, migraine, tension headache, cluster headache    Initial MDM/Plan: 41 y.o. male with a past medical history of hypertension, coronary artery disease, anxiety who presents with left-sided headache that is radiating to his left eye with associated photophobia, phonophobia and nausea since a work-related injury on Thursday. Patient was seen in the emergency department last Thursday and was treated symptomatically.   No labs or imaging were obtained at that time.  Since patient's symptoms are stable and he has had no change in physical exam including an entirely benign neurological exam, I do not feel he warrants any labs or imaging at this time. We will also treat symptomatically with migraine cocktail. Patient reports he does have a ride home. We will also provide a work note as patient has been working over the weekend at [employer] which is likely worsened his concussive and migraine type symptoms. We will also fill out Worker's Comp. paperwork. DIAGNOSTIC RESULTS / EMERGENCYDEPARTMENT COURSE / MDM     LABS:  No results found for this visit on 11/23/20. RADIOLOGY:  No results found. EKG  None    All EKG's are interpreted by the Emergency Department Physicianwho either signs or Co-signs this chart in the absence of a cardiologist.    EMERGENCY DEPARTMENT COURSE:  ED Course as of Nov 23 2019   Mon Nov 23, 2020   1604            1700 Patient with minimal improvement after medications. Will repeat medications and give IM doses since p.o. doses did not touch the headache. We will also give Tylenol and reassess. Patient reports feeling improved after the second doses of medications. Work note has been provided and [employer] paperwork filled out. Patient is stable for discharge with strict return precautions. [KA]      ED Course User Index  [KA] Ryan Cuellar DO          PROCEDURES:  None    CONSULTS:  None      FINAL IMPRESSION      1. Post concussion syndrome    2. Migraine without aura and without status migrainosus, not intractable          DISPOSITION / PLAN     DISPOSITION Decision To Discharge 11/23/2020 05:04:04 PM      PATIENT REFERRED TO:  No follow-up provider specified.     DISCHARGE MEDICATIONS:  Discharge Medication List as of 11/23/2020  4:04 PM      START taking these medications    Details   ondansetron (ZOFRAN ODT) 4 MG disintegrating tablet Take 1 tablet by mouth every 8 hours as needed for Nausea or Vomiting, Disp-15 tablet,R-0Print             Maya Browning DO  Emergency Medicine Resident    (Please note that portions of this note were completed with a voice recognition program.Efforts were made to edit the dictations but occasionally words are mis-transcribed.)        Maya Browning DO  Resident  11/23/20 2023

## 2020-11-29 ENCOUNTER — APPOINTMENT (OUTPATIENT)
Dept: CT IMAGING | Age: 40
End: 2020-11-29
Payer: MEDICAID

## 2020-11-29 ENCOUNTER — HOSPITAL ENCOUNTER (EMERGENCY)
Age: 40
Discharge: HOME | End: 2020-11-29
Attending: EMERGENCY MEDICINE
Payer: MEDICAID

## 2020-11-29 VITALS
RESPIRATION RATE: 14 BRPM | OXYGEN SATURATION: 100 % | WEIGHT: 315 LBS | BODY MASS INDEX: 39.17 KG/M2 | TEMPERATURE: 97.7 F | DIASTOLIC BLOOD PRESSURE: 102 MMHG | HEART RATE: 60 BPM | SYSTOLIC BLOOD PRESSURE: 155 MMHG | HEIGHT: 75 IN

## 2020-11-29 PROCEDURE — 6370000000 HC RX 637 (ALT 250 FOR IP): Performed by: STUDENT IN AN ORGANIZED HEALTH CARE EDUCATION/TRAINING PROGRAM

## 2020-11-29 PROCEDURE — 99284 EMERGENCY DEPT VISIT MOD MDM: CPT

## 2020-11-29 PROCEDURE — 70450 CT HEAD/BRAIN W/O DYE: CPT

## 2020-11-29 RX ORDER — ACETAMINOPHEN, ASPIRIN AND CAFFEINE 250; 250; 65 MG/1; MG/1; MG/1
1 TABLET, FILM COATED ORAL EVERY 8 HOURS PRN
Qty: 30 TABLET | Refills: 0 | Status: SHIPPED | OUTPATIENT
Start: 2020-11-29 | End: 2021-12-28

## 2020-11-29 RX ORDER — IBUPROFEN 400 MG/1
400 TABLET ORAL ONCE
Status: COMPLETED | OUTPATIENT
Start: 2020-11-29 | End: 2020-11-29

## 2020-11-29 RX ORDER — CYCLOBENZAPRINE HCL 10 MG
10 TABLET ORAL ONCE
Status: COMPLETED | OUTPATIENT
Start: 2020-11-29 | End: 2020-11-29

## 2020-11-29 RX ORDER — BUTALBITAL, ACETAMINOPHEN AND CAFFEINE 50; 325; 40 MG/1; MG/1; MG/1
1 TABLET ORAL EVERY 4 HOURS PRN
Status: DISCONTINUED | OUTPATIENT
Start: 2020-11-29 | End: 2020-11-29 | Stop reason: HOSPADM

## 2020-11-29 RX ORDER — ACETAMINOPHEN, ASPIRIN AND CAFFEINE 250; 250; 65 MG/1; MG/1; MG/1
2 TABLET, FILM COATED ORAL ONCE
Status: DISCONTINUED | OUTPATIENT
Start: 2020-11-29 | End: 2020-11-29

## 2020-11-29 RX ADMIN — IBUPROFEN 400 MG: 400 TABLET, FILM COATED ORAL at 12:58

## 2020-11-29 RX ADMIN — BUTALBITAL, ACETAMINOPHEN AND CAFFEINE 1 TABLET: 50; 325; 40 TABLET ORAL at 12:58

## 2020-11-29 RX ADMIN — CYCLOBENZAPRINE 10 MG: 10 TABLET, FILM COATED ORAL at 12:58

## 2020-11-29 ASSESSMENT — ENCOUNTER SYMPTOMS
RHINORRHEA: 0
CHEST TIGHTNESS: 0
SORE THROAT: 0
TROUBLE SWALLOWING: 0
DIARRHEA: 0
NAUSEA: 0
ABDOMINAL DISTENTION: 0
VOMITING: 0
WHEEZING: 0
CONSTIPATION: 0
ABDOMINAL PAIN: 0
BACK PAIN: 0
COUGH: 0
SHORTNESS OF BREATH: 0

## 2020-11-29 ASSESSMENT — PAIN SCALES - GENERAL
PAINLEVEL_OUTOF10: 7
PAINLEVEL_OUTOF10: 9

## 2020-11-29 ASSESSMENT — PAIN DESCRIPTION - FREQUENCY: FREQUENCY: CONTINUOUS

## 2020-11-29 ASSESSMENT — PAIN DESCRIPTION - LOCATION: LOCATION: HEAD

## 2020-11-29 ASSESSMENT — PAIN DESCRIPTION - ORIENTATION: ORIENTATION: LEFT

## 2020-11-29 NOTE — ED PROVIDER NOTES
[facility]  ED  Emergency Department Encounter  Emergency Medicine Resident     Pt Name: Savita Diop  MRN: [id number]  [place] 1980  Date of evaluation: 11/29/20  PCP:  Lonnie Olivo MD    [address] [place] [address]       Chief Complaint   Patient presents with    Head Injury       HISTORY OFPRESENT ILLNESS  (Location/Symptom, Timing/Onset, Context/Setting, Quality, Duration, Modifying Sarahi Pretzel.)      Savita Diop is a 41 y.o. male who presents with persistent left-sided temporal head pain as well as left-sided paraspinal neck pain. Patient has been seen here for similar complaints 3 times over the past 10 days, initially suffered a work accident in which he was struck with pallets left side of his head, patient is here today because he has had persistent pain. Patient states that the pain is throbbing, rates as 8 out of 10 in intensity, states that is located the left temporal region as well as the left paraspinal region, patient does endorse some \"knotting \"of his left arm, is no objective weakness on physical examination here today but does state that he feels as though it has been cramping up more than usual.    PAST MEDICAL / SURGICAL / SOCIAL / FAMILY HISTORY      has a past medical history of Anxiety, Arthritis, CAD (coronary artery disease), Fatty liver, Fatty liver disease, nonalcoholic, Hypertension, Obesity, and Unspecified sleep apnea. has no past surgical history on file. Social History     Socioeconomic History    Marital status: Single     Spouse name: Susanne Pastor Number of children: 0    Years of education: 12    Highest education level:  Bachelor's degree (e.g., BA, AB, BS)   Occupational History    Not on file   Social Needs    Financial resource strain: Not very hard    Food insecurity     Worry: Never true     Inability: Never true   [employer] needs     Medical: Yes     Non-medical: No   Tobacco Use    Smoking status: Never Smoker    Smokeless tobacco: Never Used   Substance and Sexual Activity    Alcohol use: No     Frequency: Never     Binge frequency: Never    Drug use: No    Sexual activity: Yes     Partners: Female   Lifestyle    Physical activity     Days per week: 4 days     Minutes per session: 50 min    Stress: Only a little   Relationships    Social connections     Talks on phone: Three times a week     Gets together: Twice a week     Attends [employer] service: 1 to 4 times per year     Active member of club or organization: No     Attends meetings of clubs or organizations: Never     Relationship status: Never     Intimate partner violence     Fear of current or ex partner: No     Emotionally abused: No     Physically abused: No     Forced sexual activity: No   Other Topics Concern    Not on file   Social History Narrative    ** Merged History Encounter **            Family History   Problem Relation Age of Onset    Arthritis Mother     Diabetes Father     Arthritis Father     Stroke Maternal Grandmother     Heart Disease Maternal Grandmother     Stroke Maternal Grandfather     Heart Disease Maternal Grandfather     Early Death Maternal Grandfather     Stroke Paternal Grandmother     Heart Disease Paternal Grandmother     Stroke Paternal Grandfather     Heart Disease Paternal Grandfather     Early Death Paternal Grandfather         Allergies:  Patient has no known allergies. Home Medications:  Prior to Admission medications    Medication Sig Start Date End Date Taking?  Authorizing Provider   aspirin-acetaminophen-caffeine (EXCEDRIN EXTRA STRENGTH) 439-772-30 MG per tablet Take 1 tablet by mouth every 8 hours as needed for Headaches 11/29/20  Yes Awilda Webster MD   ibuprofen (IBU) 800 MG tablet Take 1 tablet by mouth every 6 hours as needed for Pain 11/23/20 11/30/20  José Roche DO   acetaminophen (ACETAMINOPHEN EXTRA STRENGTH) 500 MG tablet Take 1 tablet by mouth every 6 hours as needed for Pain 11/19/20   Dante Jimenez MD   albuterol sulfate HFA (VENTOLIN HFA) 108 (90 Base) MCG/ACT inhaler Inhale 2 puffs into the lungs every 6 hours as needed for Wheezing 10/16/20   Toribio Cunha MD   lisinopril (PRINIVIL;ZESTRIL) 20 MG tablet Take 1 tablet by mouth daily 10/16/20   Toribio Cunha MD   hydroCHLOROthiazide (HYDRODIURIL) 25 MG tablet Take 1 tablet by mouth daily 10/16/20   Toribio Cunha MD   fluticasone Yulisa Grandchild) 50 MCG/ACT nasal spray 1 spray by Nasal route daily 9/14/20   Torbiio Cunha MD   valACYclovir (VALTREX) 1 g tablet  6/23/20   Historical Provider, MD   metoprolol tartrate (LOPRESSOR) 25 MG tablet Take 2/day 7/7/20   Toribio Cunha MD   ARIPiprazole (ABILIFY) 10 MG tablet Take 10 mg by mouth daily 5/24/20   Historical Provider, MD       REVIEW OFSYSTEMS    (2-9 systems for level 4, 10 or more for level 5)      Review of Systems   Constitutional: Negative for chills, diaphoresis, fatigue and fever. HENT: Negative for rhinorrhea, sore throat, tinnitus and trouble swallowing. Eyes: Negative for visual disturbance. Respiratory: Negative for cough, chest tightness, shortness of breath and wheezing. Cardiovascular: Negative for chest pain and leg swelling. Gastrointestinal: Negative for abdominal distention, abdominal pain, constipation, diarrhea, nausea and vomiting. Endocrine: Negative for polyuria. Genitourinary: Negative for dysuria, flank pain and frequency. Musculoskeletal: Positive for neck pain. Negative for arthralgias, back pain, joint swelling and myalgias. Neurological: Positive for headaches. Negative for dizziness, tremors, seizures, weakness, light-headedness and numbness.        PHYSICAL EXAM   (up to 7 for level 4, 8 or more forlevel 5)      INITIAL VITALS:   ED Triage Vitals [11/29/20 1113]   BP Temp Temp Source Pulse Resp SpO2 Height Weight   -- 97.7 °F (36.5 °C) Skin -- -- -- -- --       Physical Exam  Constitutional:       General: He is not in acute distress. Appearance: He is well-developed. HENT:      Head: Normocephalic and atraumatic. Right Ear: External ear normal.      Left Ear: External ear normal.   Eyes:      General: No scleral icterus. Right eye: No discharge. Left eye: No discharge. Conjunctiva/sclera: Conjunctivae normal.      Pupils: Pupils are equal, round, and reactive to light. Neck:      Musculoskeletal: Normal range of motion. Muscular tenderness (Left paraspinal muscles.) present. Vascular: No JVD. Trachea: No tracheal deviation. Cardiovascular:      Rate and Rhythm: Regular rhythm. Heart sounds: Normal heart sounds. Pulmonary:      Effort: Pulmonary effort is normal. No respiratory distress. Breath sounds: Normal breath sounds. No stridor. No wheezing. Abdominal:      General: Bowel sounds are normal. There is no distension. Palpations: Abdomen is soft. Tenderness: There is no abdominal tenderness. There is no guarding or rebound. Musculoskeletal: Normal range of motion. General: No tenderness or deformity. Skin:     General: Skin is warm. Coloration: Skin is not pale. Neurological:      Mental Status: He is alert and oriented to person, place, and time. Cranial Nerves: No cranial nerve deficit.          DIFFERENTIAL  DIAGNOSIS     PLAN (LABS / IMAGING / EKG):  Orders Placed This Encounter   Procedures    CT HEAD WO CONTRAST       MEDICATIONS ORDERED:  Orders Placed This Encounter   Medications    cyclobenzaprine (FLEXERIL) tablet 10 mg    DISCONTD: aspirin-acetaminophen-caffeine (EXCEDRIN MIGRAINE) per tablet 2 tablet    ibuprofen (ADVIL;MOTRIN) tablet 400 mg    aspirin-acetaminophen-caffeine (EXCEDRIN EXTRA STRENGTH) 250-250-65 MG per tablet     Sig: Take 1 tablet by mouth every 8 hours as needed for Headaches     Dispense:  30 tablet     Refill:  0    DISCONTD: butalbital-acetaminophen-caffeine (FIORICET, ESGIC) per tablet 1 tablet unremarkable. The infratentorial structures are unremarkable. ORBITS: The visualized portion of the orbits demonstrate no acute abnormality. SINUSES: The visualized paranasal sinuses and mastoid air cells demonstrate no acute abnormality. SOFT TISSUES/SKULL:  No acute abnormality of the visualized skull or soft tissues. No acute intracranial abnormality. PROCEDURES:  None    CONSULTS:  None    CRITICAL CARE:  Please see attending note    FINAL IMPRESSION      1.  Injury of head, subsequent encounter          DISPOSITION / PLAN     DISPOSITION Decision To Discharge 11/29/2020 12:48:16 PM      PATIENT REFERRED TO:  [facility]  [address] [place] [address]  [phone]    As needed    MD [address] [id number] [phone]            DISCHARGE MEDICATIONS:  Discharge Medication List as of 11/29/2020 12:49 PM      START taking these medications    Details   aspirin-acetaminophen-caffeine (EXCEDRIN EXTRA STRENGTH) 250-250-65 MG per tablet Take 1 tablet by mouth every 8 hours as needed for Headaches, Disp-30 tablet,R-0Print             Heaven Chun MD  Emergency Medicine Resident    (Please note that portions of this note were completed with a voice recognition program.Efforts were made to edit the dictations but occasionally words are mis-transcribed.)        Heaven Chun MD  Resident  11/29/20 6024

## 2020-11-29 NOTE — ED PROVIDER NOTES
[address] [facility]     Emergency Department     Faculty Attestation    I performed a history and physical examination of the patient and discussed management with the resident. I reviewed the residents note and agree with the documented findings including all diagnostic interpretations and plan of care. Any areas of disagreement are noted on the chart. I was personally present for the key portions of any procedures. I have documented in the chart those procedures where I was not present during the key portions. I have reviewed the emergency nurses triage note. I agree with the chief complaint, past medical history, past surgical history, allergies, medications, social and family history as documented unless otherwise noted below. Documentation of the HPI, Physical Exam and Medical Decision Making performed by scribes is based on my personal performance of the HPI, PE and MDM. For Physician Assistant/ Nurse Practitioner cases/documentation I have personally evaluated this patient and have completed at least one if not all key elements of the E/M (history, physical exam, and MDM). Additional findings are as noted. This patient was evaluated in the Emergency Department for symptoms described in the history of present illness. He/she was evaluated in the context of the global COVID-19 pandemic, which necessitated consideration that the patient might be at risk for infection with the SARS-CoV-2 virus that causes COVID-19. Institutional protocols and algorithms that pertain to the evaluation of patients at risk for COVID-19 are in a state of rapid change based on information released by regulatory bodies including the CDC and federal and state organizations. These policies and algorithms were followed during the patient's care in the ED. Primary Care Physician: Bk Oden MD    History:  This is a 41 y.o. male who presents to the Emergency Department with complaint of head injury. No LOC. This is his third visit for similar issues in the past week. Has had continued headache and sleep disruption. No numbness no weakness no change in vision. Physical:     height is 6' 3\" (1.905 m) and weight is 321 lb (145.6 kg) (abnormal). His skin temperature is 97.7 °F (36.5 °C). His blood pressure is 155/102 (abnormal) and his pulse is 60. His respiration is 14 and oxygen saturation is 100%. 41 y.o. male no acute distress, cardiac exam regular rate and rhythm no murmurs rubs gallops. Strength is 5/5 in all 4 extremities. Pupils are equal and reactive bilaterally extraocular motion is grossly intact. Impression: Closed head injury, suspect postconcussive syndrome    Plan: As this is patient's third visit for similar symptoms would obtain head CT to evaluate for any intracranial injury.     MIPS 415     A head CT was ordered, but not by an emergency care provider: No    A head CT was ordered by an emergency care provider, and some of the indications for ordering the head CT included  Measure Exclusions:  Patient has a ventricular shunt: No  Patient has a brain tumor: No  Patient has multi-system trauma: No  Patient is pregnant: No  Patient is taking an antiplatelet medication (excluding aspirin): No  Patient is 72years old or older: No    Signs and Symptoms:  Patients GCS was less than 15: No  Focal neurological deficit: No  Severe Headache: Yes  Vomiting: No  Physical signs of a basilar skull fracture: No  Coagulopathy: No  Thrombocytopenia: No  Patient suspected of taking an anticoagulant medication: No  Dangerous mechanism of injury: No          Gordon Schaefer MD, Abdiaziz Earl  Attending Emergency Physician         Albert Whelan MD  11/29/20 6884

## 2020-11-29 NOTE — LETTER
[facility] [place] [address] [address]  Phone: [phone]             November 29, 2020    Patient: Regina Claude   YOB: 1980   Date of Visit: 11/29/2020       To Whom It May Concern:    Sonia Muñiz was seen and treated in our emergency department on 11/29/2020.  He may return to work on 11/30/2020      Sincerely,             Signature:__________________________________

## 2020-12-17 RX ORDER — LISINOPRIL 20 MG/1
20 TABLET ORAL DAILY
Qty: 90 TABLET | Refills: 1 | Status: SHIPPED | OUTPATIENT
Start: 2020-12-17 | End: 2021-02-10 | Stop reason: SDUPTHER

## 2020-12-17 RX ORDER — HYDROCHLOROTHIAZIDE 25 MG/1
25 TABLET ORAL DAILY
Qty: 90 TABLET | Refills: 1 | Status: SHIPPED | OUTPATIENT
Start: 2020-12-17 | End: 2021-05-28 | Stop reason: SDUPTHER

## 2020-12-17 RX ORDER — FLUTICASONE PROPIONATE 50 MCG
1 SPRAY, SUSPENSION (ML) NASAL DAILY
Qty: 16 G | Refills: 2 | Status: SHIPPED | OUTPATIENT
Start: 2020-12-17 | End: 2021-02-10 | Stop reason: SDUPTHER

## 2020-12-17 RX ORDER — ALBUTEROL SULFATE 90 UG/1
2 AEROSOL, METERED RESPIRATORY (INHALATION) EVERY 6 HOURS PRN
Qty: 18 G | Refills: 3 | Status: SHIPPED | OUTPATIENT
Start: 2020-12-17 | End: 2021-02-10 | Stop reason: SDUPTHER

## 2020-12-19 NOTE — TELEPHONE ENCOUNTER
Refill request for Metoprolol. If appropriate please send medication(s) to patients pharmacy. Next appt: Patient is currently on wait list for provider.     Last appt: 10/16/2020    Health Maintenance   Topic Date Due    Potassium monitoring  08/03/2021    Creatinine monitoring  08/03/2021    Lipid screen  11/01/2024    DTaP/Tdap/Td vaccine (3 - Td) 04/27/2028    Flu vaccine  Completed    HIV screen  Completed    Hepatitis A vaccine  Aged Out    Hepatitis B vaccine  Aged Out    Hib vaccine  Aged Out    Meningococcal (ACWY) vaccine  Aged Out    Pneumococcal 0-64 years Vaccine  Aged Out       Hemoglobin A1C (%)   Date Value   11/01/2019 5.4   07/10/2018 5.5   01/18/2018 5.5             ( goal A1C is < 7)   No results found for: LABMICR  LDL Cholesterol (mg/dL)   Date Value   11/01/2019 104       (goal LDL is <100)   AST (U/L)   Date Value   11/01/2019 16     ALT (U/L)   Date Value   11/01/2019 10     BUN (mg/dL)   Date Value   08/03/2020 29 (H)     BP Readings from Last 3 Encounters:   11/29/20 (!) 155/102   11/23/20 (!) 148/99   11/19/20 (!) 150/94          (goal 120/80)          Patient Active Problem List:     Hypertension     Fatty liver disease, nonalcoholic     MIGEL on CPAP     Vitamin D deficiency     IGT (impaired glucose tolerance)

## 2020-12-27 ENCOUNTER — HOSPITAL ENCOUNTER (EMERGENCY)
Age: 40
Discharge: HOME | End: 2020-12-27
Attending: EMERGENCY MEDICINE
Payer: MEDICAID

## 2020-12-27 VITALS
OXYGEN SATURATION: 99 % | HEART RATE: 85 BPM | RESPIRATION RATE: 16 BRPM | TEMPERATURE: 97.9 F | SYSTOLIC BLOOD PRESSURE: 156 MMHG | DIASTOLIC BLOOD PRESSURE: 92 MMHG

## 2020-12-27 PROCEDURE — 99282 EMERGENCY DEPT VISIT SF MDM: CPT

## 2020-12-27 RX ORDER — IBUPROFEN 800 MG/1
800 TABLET ORAL ONCE
Status: DISCONTINUED | OUTPATIENT
Start: 2020-12-27 | End: 2020-12-27 | Stop reason: HOSPADM

## 2020-12-27 RX ORDER — IBUPROFEN 800 MG/1
800 TABLET ORAL EVERY 8 HOURS PRN
Qty: 30 TABLET | Refills: 0 | Status: SHIPPED | OUTPATIENT
Start: 2020-12-27 | End: 2021-08-01 | Stop reason: ALTCHOICE

## 2020-12-27 ASSESSMENT — PAIN SCALES - GENERAL: PAINLEVEL_OUTOF10: 8

## 2020-12-27 ASSESSMENT — PAIN DESCRIPTION - PROGRESSION: CLINICAL_PROGRESSION: GRADUALLY WORSENING

## 2020-12-27 ASSESSMENT — PAIN DESCRIPTION - PAIN TYPE: TYPE: ACUTE PAIN

## 2020-12-27 ASSESSMENT — ENCOUNTER SYMPTOMS
ABDOMINAL PAIN: 0
SHORTNESS OF BREATH: 0
COUGH: 0
NAUSEA: 0
VOMITING: 0

## 2020-12-27 ASSESSMENT — PAIN DESCRIPTION - ONSET: ONSET: SUDDEN

## 2020-12-27 ASSESSMENT — PAIN DESCRIPTION - LOCATION: LOCATION: FOOT

## 2020-12-27 ASSESSMENT — PAIN DESCRIPTION - DESCRIPTORS: DESCRIPTORS: ACHING

## 2020-12-27 NOTE — ED PROVIDER NOTES
[address] [facility]     Emergency Department     Faculty Attestation    I performed a history and physical examination of the patient and discussed management with the resident. I reviewed the resident´s note and agree with the documented findings and plan of care. Any areas of disagreement are noted on the chart. I was personally present for the key portions of any procedures. I have documented in the chart those procedures where I was not present during the key portions. I have reviewed the emergency nurses triage note. I agree with the chief complaint, past medical history, past surgical history, allergies, medications, social and family history as documented unless otherwise noted below. For Physician Assistant/ Nurse Practitioner cases/documentation I have personally evaluated this patient and have completed at least one if not all key elements of the E/M (history, physical exam, and MDM). Additional findings are as noted. Mild diffuse pain plantar surface of the right foot without signs of infection, no puncture wound seen, no erythema or swelling, mild pain at the insertion of the Achilles tendon into the right calcaneus. The remainder of the tibia and fibula are nontender, good capillary refill in the toes.      Mar Copeland MD  12/27/20 2300

## 2020-12-27 NOTE — ED PROVIDER NOTES
[facility]  ED  Emergency Department Encounter  Emergency Medicine Resident     Pt Name: Lori Butt  MRN: [id number]  [place] 1980  Date of evaluation: 12/27/20  PCP:  Pankaj Sanders MD    [address] [place] [address]       Chief Complaint   Patient presents with    Foot Pain     pt with c/o pain to right foot x one week. denies injury. HISTORY OFPRESENT ILLNESS  (Location/Symptom, Timing/Onset, Context/Setting, Quality, Duration, Modifying Factors,Severity.)      Lori Butt is a 42 yo male who presents with right foot pain. Patient states that he has a long history of flatfeet and that for the past couple days his right foot has been hurting worse than normal making it difficult to work. He states that he does have a podiatrist however he has not followed up with them recently due to working long hours. Denies any trauma to the foot, rashes, fevers, sweats, history of gout, or any other complaints at this time. He does note that the pain on the bottom of his foot radiating into his heel is worse in the morning when he gets out of bed. PAST MEDICAL / SURGICAL / SOCIAL / FAMILY HISTORY      has a past medical history of Anxiety, Arthritis, CAD (coronary artery disease), Fatty liver, Fatty liver disease, nonalcoholic, Hypertension, Obesity, and Unspecified sleep apnea. has no past surgical history on file. Denies    Social History     Socioeconomic History    Marital status: Single     Spouse name: Lino Tipton Number of children: 0    Years of education: 12    Highest education level:  Bachelor's degree (e.g., BA, AB, BS)   Occupational History    Not on file   Social Needs    Financial resource strain: Not very hard    Food insecurity     Worry: Never true     Inability: Never true   [employer] needs     Medical: Yes     Non-medical: No   Tobacco Use    Smoking status: Never Smoker    Smokeless tobacco: Never Used   Substance and Sexual Activity  Alcohol use: No     Frequency: Never     Binge frequency: Never    Drug use: No    Sexual activity: Yes     Partners: Female   Lifestyle    Physical activity     Days per week: 4 days     Minutes per session: 50 min    Stress: Only a little   Relationships    Social connections     Talks on phone: Three times a week     Gets together: Twice a week     Attends Taoism service: 1 to 4 times per year     Active member of club or organization: No     Attends meetings of clubs or organizations: Never     Relationship status: Never     Intimate partner violence     Fear of current or ex partner: No     Emotionally abused: No     Physically abused: No     Forced sexual activity: No   Other Topics Concern    Not on file   Social History Narrative    ** Merged History Encounter **            Family History   Problem Relation Age of Onset    Arthritis Mother     Diabetes Father     Arthritis Father     Stroke Maternal Grandmother     Heart Disease Maternal Grandmother     Stroke Maternal Grandfather     Heart Disease Maternal Grandfather     Early Death Maternal Grandfather     Stroke Paternal Grandmother     Heart Disease Paternal Grandmother     Stroke Paternal Grandfather     Heart Disease Paternal Grandfather     Early Death Paternal Grandfather         Allergies:  Patient has no known allergies. Home Medications:  Prior to Admission medications    Medication Sig Start Date End Date Taking?  Authorizing Provider   ibuprofen (IBU) 800 MG tablet Take 1 tablet by mouth every 8 hours as needed for Pain 12/27/20  Yes Jr Robb DO   metoprolol tartrate (LOPRESSOR) 25 MG tablet take 1 tablet by mouth twice a day 12/21/20   Angela Epperson MD   fluticasone [facility]) 50 MCG/ACT nasal spray 1 spray by Nasal route daily 12/17/20   Angela Epperson MD albuterol sulfate HFA (VENTOLIN HFA) 108 (90 Base) MCG/ACT inhaler Inhale 2 puffs into the lungs every 6 hours as needed for Wheezing 12/17/20   Pankaj Sanders MD   lisinopril (PRINIVIL;ZESTRIL) 20 MG tablet Take 1 tablet by mouth daily 12/17/20   Pankaj Sanders MD   hydroCHLOROthiazide (HYDRODIURIL) 25 MG tablet Take 1 tablet by mouth daily 12/17/20   Pankaj Sanders MD   aspirin-acetaminophen-caffeine (EXCEDRIN EXTRA STRENGTH) 752-801-92 MG per tablet Take 1 tablet by mouth every 8 hours as needed for Headaches 11/29/20   Marvin Prado MD   acetaminophen (ACETAMINOPHEN EXTRA STRENGTH) 500 MG tablet Take 1 tablet by mouth every 6 hours as needed for Pain 11/19/20   Marvin Prado MD   valACYclovir (VALTREX) 1 g tablet  6/23/20   Historical Provider, MD   metoprolol tartrate (LOPRESSOR) 25 MG tablet Take 2/day 7/7/20   Pankaj Sanders MD   ARIPiprazole (ABILIFY) 10 MG tablet Take 10 mg by mouth daily 5/24/20   Historical Provider, MD       REVIEW OFSYSTEMS    (2-9 systems for level 4, 10 or more for level 5)      Review of Systems   Constitutional: Negative for chills and fever. Respiratory: Negative for cough and shortness of breath. Cardiovascular: Negative for chest pain and leg swelling. Gastrointestinal: Negative for abdominal pain, nausea and vomiting. Musculoskeletal:        Bilateral foot pain right greater than left. Skin: Negative for rash and wound. Neurological: Negative for syncope, weakness, light-headedness and numbness. PHYSICAL EXAM   (up to 7 for level 4, 8 or more forlevel 5)      INITIAL VITALS:   Vitals:    12/27/20 0922 12/27/20 0937   BP:  (!) 156/92   Pulse:  85   Resp:  16   Temp: 97.9 °F (36.6 °C)    TempSrc: Temporal    SpO2:  99%           Physical Exam  Vitals signs and nursing note reviewed. Constitutional:       General: He is not in acute distress. Appearance: Normal appearance. He is not ill-appearing, toxic-appearing or diaphoretic. Cardiovascular:      Rate and Rhythm: Normal rate and regular rhythm. Pulses: Normal pulses. Heart sounds: No murmur. No gallop. Pulmonary:      Effort: Pulmonary effort is normal.      Breath sounds: Normal breath sounds. Abdominal:      General: There is no distension. Palpations: Abdomen is soft. Tenderness: There is no abdominal tenderness. There is no guarding. Musculoskeletal: Normal range of motion. Comments: No tenderness, swelling, deformity to the right foot. Although, patient does appear to have flat feet. Skin:     General: Skin is warm and dry. Findings: No rash. Neurological:      General: No focal deficit present. Mental Status: He is alert. Comments: Right lower extremity is neurovascular intact. Patient has normal gait. DIFFERENTIAL  DIAGNOSIS     PLAN (LABS / IMAGING / EKG):  No orders of the defined types were placed in this encounter. MEDICATIONS ORDERED:  Orders Placed This Encounter   Medications    ibuprofen (ADVIL;MOTRIN) tablet 800 mg    ibuprofen (IBU) 800 MG tablet     Sig: Take 1 tablet by mouth every 8 hours as needed for Pain     Dispense:  30 tablet     Refill:  0         Initial MDM/Plan/ED course: 41 y.o. male who presents with right foot pain to the bottom of his foot. On exam vitals normal patient is in no acute distress. Physical exam is unremarkable with a neurovascularly intact right foot without any deformity, swelling, tenderness, or ecchymosis. Patient's clinical history is most consistent with plantar fasciitis. Patient was provided with Motrin, exercise and stretches for the foot, and was instructed to follow-up with his podiatrist for orthotics. Patient also instructed that he may benefit from a nighttime dorsiflexion boot which we do not have the department but he may find at [facility]. Patient agreed with this plan was discharged home. DIAGNOSTIC RESULTS / EMERGENCY DEPARTMENT COURSE / MDM     LABS:  Labs Reviewed - No data to display      RADIOLOGY:  No results found. EKG      All EKG's are interpreted by the [facility] Physician who either signs or Co-signs this chart in the absence of a cardiologist.      PROCEDURES:  None    CONSULTS:  None    CRITICAL CARE:  Please see attending note    FINAL IMPRESSION      1.  Plantar fasciitis of right foot          DISPOSITION / PLAN     DISPOSITION    D/c    PATIENT REFERRED TO:  MD Charity Johnson [facility] [address] [facility] [address] [id number]  [phone]    Schedule an appointment as soon as possible for a visit   Follow up      DISCHARGE MEDICATIONS:  Discharge Medication List as of 12/27/2020  9:46 AM          Mayur Pressley DO  Emergency Medicine Resident    (Please note that portions of this note were completed with a voice recognition program.Efforts were made to edit the dictations but occasionally words are mis-transcribed.)        Meryle Mares, DO  Resident  12/27/20 1144

## 2021-01-07 ENCOUNTER — OFFICE VISIT (OUTPATIENT)
Dept: PODIATRY | Age: 41
End: 2021-01-07
Payer: MEDICAID

## 2021-01-07 VITALS — TEMPERATURE: 98.4 F | WEIGHT: 315 LBS | HEIGHT: 76 IN | BODY MASS INDEX: 38.36 KG/M2 | RESPIRATION RATE: 18 BRPM

## 2021-01-07 DIAGNOSIS — M79.672 FOOT PAIN, BILATERAL: Primary | ICD-10-CM

## 2021-01-07 DIAGNOSIS — M72.2 PLANTAR FASCIAL FIBROMATOSIS: ICD-10-CM

## 2021-01-07 DIAGNOSIS — M76.61 RIGHT ACHILLES TENDINITIS: ICD-10-CM

## 2021-01-07 DIAGNOSIS — M79.671 FOOT PAIN, BILATERAL: Primary | ICD-10-CM

## 2021-01-07 DIAGNOSIS — M79.671 RIGHT FOOT PAIN: ICD-10-CM

## 2021-01-07 PROCEDURE — 99214 OFFICE O/P EST MOD 30 MIN: CPT | Performed by: PODIATRIST

## 2021-01-07 PROCEDURE — 1036F TOBACCO NON-USER: CPT | Performed by: PODIATRIST

## 2021-01-07 PROCEDURE — G8417 CALC BMI ABV UP PARAM F/U: HCPCS | Performed by: PODIATRIST

## 2021-01-07 PROCEDURE — G8482 FLU IMMUNIZE ORDER/ADMIN: HCPCS | Performed by: PODIATRIST

## 2021-01-07 PROCEDURE — G8427 DOCREV CUR MEDS BY ELIG CLIN: HCPCS | Performed by: PODIATRIST

## 2021-01-07 RX ORDER — PREDNISONE 10 MG/1
TABLET ORAL
Qty: 21 TABLET | Refills: 0 | Status: SHIPPED | OUTPATIENT
Start: 2021-01-07 | End: 2021-12-27

## 2021-01-07 NOTE — PROGRESS NOTES
[facility] PHYSICIANS  [facility]  [address] [place] [address]  Dept: [phone]  Dept Fax: [phone]    RETURN PATIENT PROGRESS NOTE  Date of patient's visit: 1/7/2021  Patient's Name:  Gail Ochoa YOB: 1980            Patient Care Team:  Kaci Amanda MD as PCP - General (Internal Medicine)  Kaci Amanda MD as PCP - [facility]  Jackelyn Pickering MD as Surgeon (Orthopedic Surgery)  Kary Marie DPM as Physician (Podiatry)       Gail Abarcaauvais 41 y.o. male that presents for follow-up of   Chief Complaint   Patient presents with    Foot Pain     right foot     Pt's primary care physician is Kaci Amanda MD last seen 10/16/2020  Symptoms began 4 month(s) ago and are unchanged . Patient relates pain is Present. Pain is rated 9 out of 10 and is described as constant, excruciating. Treatments prior to today's visit include: new shoes. Pt states the back of his heel hurts and so does the bottom during the first few steps in the morning . Currently denies F/C/N/V. No Known Allergies    Past Medical History:   Diagnosis Date    Anxiety     Arthritis     CAD (coronary artery disease)     Fatty liver     Fatty liver disease, nonalcoholic 2/46/1598    Hypertension     Obesity     Unspecified sleep apnea     cpap       Prior to Admission medications    Medication Sig Start Date End Date Taking?  Authorizing Provider   ibuprofen (IBU) 800 MG tablet Take 1 tablet by mouth every 8 hours as needed for Pain 12/27/20  Yes Zakiya Gamble,    metoprolol tartrate (LOPRESSOR) 25 MG tablet take 1 tablet by mouth twice a day 12/21/20  Yes Kaci Amanda MD   fluticasone [facility]) 50 MCG/ACT nasal spray 1 spray by Nasal route daily 12/17/20  Yes Kaci Amanda MD albuterol sulfate HFA (VENTOLIN HFA) 108 (90 Base) MCG/ACT inhaler Inhale 2 puffs into the lungs every 6 hours as needed for Wheezing 12/17/20  Yes Albertus Favre, MD   lisinopril (PRINIVIL;ZESTRIL) 20 MG tablet Take 1 tablet by mouth daily 12/17/20  Yes Albertus Favre, MD   hydroCHLOROthiazide (HYDRODIURIL) 25 MG tablet Take 1 tablet by mouth daily 12/17/20  Yes Albertus Favre, MD   aspirin-acetaminophen-caffeine (EXCEDRIN EXTRA STRENGTH) 026-762-57 MG per tablet Take 1 tablet by mouth every 8 hours as needed for Headaches 11/29/20  Yes Trevin Roman MD   acetaminophen (ACETAMINOPHEN EXTRA STRENGTH) 500 MG tablet Take 1 tablet by mouth every 6 hours as needed for Pain 11/19/20  Yes Trevin Roman MD   valACYclovir (VALTREX) 1 g tablet  6/23/20  Yes Historical Provider, MD   ARIPiprazole (ABILIFY) 10 MG tablet Take 10 mg by mouth daily 5/24/20  Yes Historical Provider, MD   metoprolol tartrate (LOPRESSOR) 25 MG tablet Take 2/day 7/7/20   Albertus Favre, MD       Review of Systems    Review of Systems:  History obtained from chart review and the patient  General ROS: negative for - chills, fatigue, fever, night sweats or weight gain  Constitutional: Negative for chills, diaphoresis, fatigue, fever and unexpected weight change. Musculoskeletal: Positive for arthralgias, gait problem and joint swelling. Neurological ROS: negative for - behavioral changes, confusion, headaches or seizures. Negative for weakness and numbness. Dermatological ROS: negative for - mole changes, rash  Cardiovascular: Negative for leg swelling. Gastrointestinal: Negative for constipation, diarrhea, nausea and vomiting. Lower Extremity Physical Examination:     Vitals:   Vitals:    01/07/21 0837   Resp: 18   Temp: 98.4 °F (36.9 °C)     General: AAO x 3 in NAD. Dermatologic Exam:  Skin lesion/ulceration Absent . Skin No rashes or nodules noted. .       Musculoskeletal:     1st MPJ ROM decreased, Bilateral. Muscle strength 5/5, Bilateral. POP to the right achilles tendon with increased pain with dorsiflexion. POP of the right and left plantar medial calcaneal tuberosity. Pain increased with Dorsiflexion of the right and left lesser toes. Negative pain on compression of the calcaneus bilaterally Medial longitudinal arch, Bilateral WNL. Ankle ROM WNL,Bilateral.    Dorsally contracted digits absent digits 1-5 Bilateral.     Vascular: DP and PT pulses palpable 2/4, Bilateral.  CFT <3 seconds, Bilateral.  Hair growth present to the level of the digits, Bilateral.  Edema absent, Bilateral.  Varicosities absent, Bilateral. Erythema absent, Bilateral    Neurological: Sensation intact to light touch to level of digits, Bilateral.  Protective sensation intact 10/10 sites via 5.07/10g [place] Monofilament, Bilateral.  negative Tinel's, Bilateral.  negative Valleix sign, Bilateral.      Integument: Warm, dry, supple, Bilateral.  Open lesion absent, Bilateral.  Interdigital maceration absent to web spaces 1-4, Bilateral.  Nails are normal in length, thickness and color 1-5 bilateral.  Fissures absent, Bilateral.         X rays right foot 3 views:  Plantar and posterior heel spur present on the lateral x ray of the right foot. No fracture or dislocation seen. No acute findings. Asessment: Patient is a 41 y.o. male with:    Diagnosis Orders   1. Foot pain, bilateral  predniSONE (DELTASONE) 10 MG tablet   2. Plantar fascial fibromatosis  predniSONE (DELTASONE) 10 MG tablet   3. Right Achilles tendinitis  predniSONE (DELTASONE) 10 MG tablet    XR FOOT RIGHT (MIN 3 VIEWS)   4. Right foot pain  XR FOOT RIGHT (MIN 3 VIEWS)       Plan: Patient examined and evaluated. Current condition and treatment options discussed in detail. Advised pt to his conditon and the x ray findings. 1/4 heel lifts added to the right foot to allow tension off of the achilles tendon. rx provided for tapered steroid to be taken as directed. Arch Pain: Exercises  Introduction  Here are some examples of exercises for you to try. The exercises may be suggested for a condition or for rehabilitation. Start each exercise slowly. Ease off the exercises if you start to have pain. You will be told when to start these exercises and which ones will work best for you. How to do the exercises  Plantar fascia stretch    1. Sit in a chair and put your affected foot on your other knee. 2. Hold the heel of your foot in one hand, and grasp your toes with the other hand. 3. Pull on your heel (toward your body), and at the same time pull your toes back with your other hand. 4. You should feel a stretch along the bottom of your foot. 5. Hold 15 to 30 seconds. 6. Repeat 2 to 4 times. Plantar fascia stretch (kneeling)    1. Get on your hands and knees on the floor. Keep your heels pointing up and the balls of your feet and your toes on the floor. 2. Slowly sit back toward your ankles. 3. If this is too hard, you can try doing it one leg at a time. Stand up, and then kneel on one knee and keep the other leg forward. Place the foot of your forward leg flat on the ground and bend that knee. The heel on the leg still behind you should point up. The ball and toes of that foot should be on the floor. Sit back toward that ankle. 4. Hold 15 to 30 seconds. 5. Repeat 2 to 4 times. Switch legs if you are doing this one leg at a time. Plantar fascia self-massage    1. Sit in a chair. 2. Place your affected foot on a firm, tube-shaped object, such as a can or water bottle. 3. Roll your foot back and forth over the object to massage the bottom of your foot. 4. If you want to do ice massage, fill a water bottle about three-fourths of the way full and freeze before using. 5. Continue for 2 to 5 minutes.      Bilateral calf stretch (knees straight) 1. Place a book on the floor a few inches from a wall or countertop, and put the balls of your feet on it. Your heels should be on the floor. The book needs to be thick enough so that you can feel a gentle stretch in each calf. If you are not steady on your feet, hold on to a chair, counter, or wall while you do this stretch. 2. Keep your knees straight, and lean forward until you feel a stretch in each calf. 3. To get more stretch, add another book or use a thicker book, such as a phone book, a dictionary, or an encyclopedia. 4. Hold the stretch for at least 15 to 30 seconds. 5. Repeat 2 to 4 times. Bilateral calf stretch (knees bent)    1. Place a book on the floor a few inches from a wall or countertop, and put the balls of your feet on it. Your heels should be on the floor. The book needs to be thick enough so that you can feel a gentle stretch in each calf. If you are not steady on your feet, hold on to a chair, counter, or wall while you do this stretch. 2. Bend your knees, and lean forward until you feel a stretch in each calf. 3. To get more stretch, add another book or use a thicker book, such as a phone book, a dictionary, or an encyclopedia. 4. Hold the stretch for at least 15 to 30 seconds. 5. Repeat 2 to 4 times. [place] pick-ups    1. Put some marbles on the floor next to a cup.  2. Sit down, and use the toes of your affected foot to lift up one marble from the floor at a time. Then try to put the marble in the cup.  3. Repeat 8 to 12 times. Towel scrunches    1. Sit down, and place your affected foot on a towel on the floor. You may also do this with both feet on the towel. 2. Scrunch the towel toward you with your toes. Then use your toes to push the towel back into place. 3. Repeat 8 to 12 times.      Heel raises on a step 1. Stand on the bottom step of a staircase, facing up toward the stairs. Put the balls of your feet on the step. If you are not steady on your feet, hold on to the banister or wall. 2. Keeping both knees straight, slowly lift your heels above the step so that you are standing on your toes. Then slowly lower your heels below the step and toward the floor. 3. Return to the starting position, with your feet even with the step. 4. Repeat 8 to 12 times. Follow-up care is a key part of your treatment and safety. Be sure to make and go to all appointments, and call your doctor if you are having problems. It's also a good idea to know your test results and keep a list of the medicines you take. Where can you learn more? Go to https://[employer]. org and sign in to your [employer] account. Enter H119 in the [employer] box to learn more about \"Arch Pain: Exercises. \"     If you do not have an account, please click on the \"Sign Up Now\" link. Current as of: September 20, 2018  Content Version: 12.1  © 4193-0065 Healthwise, Incorporated. Care instructions adapted under license by [facility] ([facility]). If you have questions about a medical condition or this instruction, always ask your healthcare professional. [place] [address] any warranty or liability for your use of this information. All labs were reviewed and all imagining including the above findings were reviewed prior to the patients arrival and with the patient today      Time was spent educating the patient and their families/caregivers on proper care of the feet and ankles. All the above diagnosis were addressed at todays visit and all questions were answered. A total of 35 minutes was spent with this patients encounter  . Verbal and written instructions given to patient. Contact office with any questions/problems/concerns. No orders of the defined types were placed in this encounter. No orders of the defined types were placed in this encounter. RTC in 2week(s).     1/7/2021      Electronically signed by Alicia Pratt DPM on 1/7/2021 at 8:48 AM  1/7/2021

## 2021-01-25 ENCOUNTER — TELEPHONE (OUTPATIENT)
Dept: INTERNAL MEDICINE | Age: 41
End: 2021-01-25

## 2021-01-28 ENCOUNTER — OFFICE VISIT (OUTPATIENT)
Dept: PODIATRY | Age: 41
End: 2021-01-28
Payer: MEDICAID

## 2021-01-28 VITALS — TEMPERATURE: 97.2 F | RESPIRATION RATE: 18 BRPM | WEIGHT: 315 LBS | BODY MASS INDEX: 38.36 KG/M2 | HEIGHT: 76 IN

## 2021-01-28 DIAGNOSIS — M79.671 RIGHT FOOT PAIN: ICD-10-CM

## 2021-01-28 DIAGNOSIS — M72.2 PLANTAR FASCIAL FIBROMATOSIS: Primary | ICD-10-CM

## 2021-01-28 PROCEDURE — G8427 DOCREV CUR MEDS BY ELIG CLIN: HCPCS | Performed by: PODIATRIST

## 2021-01-28 PROCEDURE — 99213 OFFICE O/P EST LOW 20 MIN: CPT | Performed by: PODIATRIST

## 2021-01-28 PROCEDURE — G8482 FLU IMMUNIZE ORDER/ADMIN: HCPCS | Performed by: PODIATRIST

## 2021-01-28 PROCEDURE — 1036F TOBACCO NON-USER: CPT | Performed by: PODIATRIST

## 2021-01-28 PROCEDURE — G8417 CALC BMI ABV UP PARAM F/U: HCPCS | Performed by: PODIATRIST

## 2021-01-28 NOTE — PROGRESS NOTES
[facility] PHYSICIANS  [facility]  [address] [place] [address]  Dept: [phone]  Dept Fax: [phone]    RETURN PATIENT PROGRESS NOTE  Date of patient's visit: 1/28/2021  Patient's Name:  Luis Menjivar YOB: 1980            Patient Care Team:  Violeta Guadarrama MD as PCP - General (Internal Medicine)  Violeta Guadarrama MD as PCP - REHABILITATION [facility]  Mini Rosales MD as Surgeon (Orthopedic Surgery)  Rajesh Younger DPM as Physician (Podiatry)       Luis Menjivar 41 y.o. male that presents for follow-up of   Chief Complaint   Patient presents with    Foot Pain     right foot     Pt's primary care physician is Violeta Guadarrama MD last seen 10/16/2020  Symptoms began 4 month(s) ago and are unchanged . Patient relates pain is Present. Pain is rated 4 out of 10 and is described as constant, mild. Treatments prior to today's visit include: orhtoics and stretching. Currently denies F/C/N/V. No Known Allergies    Past Medical History:   Diagnosis Date    Anxiety     Arthritis     CAD (coronary artery disease)     Fatty liver     Fatty liver disease, nonalcoholic 9/49/3262    Hypertension     Obesity     Unspecified sleep apnea     cpap       Prior to Admission medications    Medication Sig Start Date End Date Taking?  Authorizing Provider   predniSONE (DELTASONE) 10 MG tablet Take one PO TID X 3 days  Take one PO BID X 3 days  Take one PO qDaily X 3 days  Take 1/2 PO q daily  X 4 days 1/7/21  Yes Carrington Wang DPM   ibuprofen (IBU) 800 MG tablet Take 1 tablet by mouth every 8 hours as needed for Pain 12/27/20  Yes Naomi Aquino DO   metoprolol tartrate (LOPRESSOR) 25 MG tablet take 1 tablet by mouth twice a day 12/21/20  Yes Violeta Guadarrama MD   fluticasone [facility]) 50 MCG/ACT nasal spray 1 spray by Nasal route daily 12/17/20  Yes Violeta Guadarrama MD albuterol sulfate HFA (VENTOLIN HFA) 108 (90 Base) MCG/ACT inhaler Inhale 2 puffs into the lungs every 6 hours as needed for Wheezing 12/17/20  Yes Julian Monk MD   lisinopril (PRINIVIL;ZESTRIL) 20 MG tablet Take 1 tablet by mouth daily 12/17/20  Yes Julian Monk MD   hydroCHLOROthiazide (HYDRODIURIL) 25 MG tablet Take 1 tablet by mouth daily 12/17/20  Yes Julian Monk MD   aspirin-acetaminophen-caffeine (EXCEDRIN EXTRA STRENGTH) 580-294-32 MG per tablet Take 1 tablet by mouth every 8 hours as needed for Headaches 11/29/20  Yes Laree Goodpasture, MD   acetaminophen (ACETAMINOPHEN EXTRA STRENGTH) 500 MG tablet Take 1 tablet by mouth every 6 hours as needed for Pain 11/19/20  Yes Laree Goodpasture, MD   valACYclovir (VALTREX) 1 g tablet  6/23/20  Yes Historical Provider, MD   ARIPiprazole (ABILIFY) 10 MG tablet Take 10 mg by mouth daily 5/24/20  Yes Historical Provider, MD   metoprolol tartrate (LOPRESSOR) 25 MG tablet Take 2/day 7/7/20   Julian Monk MD       Review of Systems    Review of Systems:  History obtained from chart review and the patient  General ROS: negative for - chills, fatigue, fever, night sweats or weight gain  Constitutional: Negative for chills, diaphoresis, fatigue, fever and unexpected weight change. Musculoskeletal: Positive for arthralgias, gait problem and joint swelling. Neurological ROS: negative for - behavioral changes, confusion, headaches or seizures. Negative for weakness and numbness. Dermatological ROS: negative for - mole changes, rash  Cardiovascular: Negative for leg swelling. Gastrointestinal: Negative for constipation, diarrhea, nausea and vomiting. Lower Extremity Physical Examination:     Vitals:   Vitals:    01/28/21 1418   Resp: 18   Temp: 97.2 °F (36.2 °C)     General: AAO x 3 in NAD. Dermatologic Exam:  Skin lesion/ulceration Absent . Skin No rashes or nodules noted. .       Musculoskeletal:     1st MPJ ROM decreased, Bilateral. Muscle strength 5/5, Bilateral. POP of the right  plantar medial calcaneal tuberosity. Pain increased with Dorsiflexion of the right and left lesser toes. Negative pain on compression of the calcaneus bilaterally Medial longitudinal arch, Bilateral WNL. Ankle ROM WNL,Bilateral.    Dorsally contracted digits absent digits 1-5 Bilateral.     Vascular: DP and PT pulses palpable 2/4, Bilateral.  CFT <3 seconds, Bilateral.  Hair growth present to the level of the digits, Bilateral.  Edema absent, Bilateral.  Varicosities absent, Bilateral. Erythema absent, Bilateral    Neurological: Sensation intact to light touch to level of digits, Bilateral.  Protective sensation intact 10/10 sites via 5.07/10g [place] Monofilament, Bilateral.  negative Tinel's, Bilateral.  negative Valleix sign, Bilateral.      Integument: Warm, dry, supple, Bilateral.  Open lesion absent, Bilateral.  Interdigital maceration absent to web spaces 1-4, Bilateral.  Nails are normal in length, thickness and color 1-5 bilateral.  Fissures absent, Bilateral.           Asessment: Patient is a 41 y.o. male with:   1. Plantar fascial fibromatosis    2. Right foot pain        Plan: Patient examined and evaluated. Current condition and treatment options discussed in detail. Advised pt to his conditon. Pt to continue to use orthtoics and shoes at all times. No barefoot walking. If it continues pt would like an injection but denies one today. Heel Spurs/Plantar Fasciitis      1. Taping- keep on for 5-7 days if possible and DO NOT get it wet. If you see redness or feel itching, then take off the taping: this may be an indication that you are allergic to the tap. 2. Injection- contains a small amount of cortisone to reduce your inflammation. It is possible that after 24 hours you may feel a small increase in pain at the injection site. This is \"Steroid Flare\" and will subside after 24 hours. 3. Anti-inflammatory medications- Take as prescribed      4. Ice massage- There are a number of ways to ice your foot/feet. The best way is to place a full water bottle in the freezer and then roll the bottom of your foot on it up to 3 times a day. 5. Heel cord stretching- stretch at least twice a day. See below    Wall Stretch        Affected Heel flat on floor and keep that knee straight  Bend the other knee and lean into the wall  Hold that stretched position for 20-30 seconds  Repeat 5 times    DO NOT: Bounce or jerk back and forth while doing the stretch  DO Not: Point toes out to the side                  Towel Stretch      Place towel under ball of foot  Pull foot towards you and hold for 5 seconds  Then push foot down and hold for 5 seconds  Repeat this 5-10 times      6. Wear good supportive shoes with adequate shock absorption. 7. Orthotics: We recommend custom molded orthotics and we can call your insurance to see if this is a covered benefit for you. However, if they are not covered, we carry over-the- counter orthotics called Powersteps. They cost $43.00 and can be purchased with cash, check or charge at the . 8. Physical Therapy- will be prescribed as needed. All labs were reviewed and all imagining including the above findings were reviewed prior to the patients arrival and with the patient today. Previous patient encounter was reviewed. Encounters from the patients other medical providers were reviewed and noted. Time was spent educating the patient and their families/caregivers on proper care of the feet and ankles. All the above diagnosis were addressed at todays visit and all questions were answered. A total of 25 minutes was spent with this patients encounter  . Verbal and written instructions given to patient. Contact office with any questions/problems/concerns. No orders of the defined types were placed in this encounter. No orders of the defined types were placed in this encounter.        RTC in 2month(s).    1/28/2021      Electronically signed by Elizabeth Palacios DPM on 1/28/2021 at 2:26 PM  1/28/2021

## 2021-02-01 DIAGNOSIS — G89.29 CHRONIC PAIN OF BOTH KNEES: ICD-10-CM

## 2021-02-01 DIAGNOSIS — M25.561 CHRONIC PAIN OF BOTH KNEES: ICD-10-CM

## 2021-02-01 DIAGNOSIS — M25.562 CHRONIC PAIN OF BOTH KNEES: ICD-10-CM

## 2021-02-01 RX ORDER — ACETAMINOPHEN 500 MG/1
TABLET, FILM COATED ORAL
Qty: 60 TABLET | Refills: 0 | OUTPATIENT
Start: 2021-02-01

## 2021-02-03 ENCOUNTER — OFFICE VISIT (OUTPATIENT)
Dept: PODIATRY | Age: 41
End: 2021-02-03
Payer: MEDICAID

## 2021-02-03 VITALS — TEMPERATURE: 98.2 F | RESPIRATION RATE: 16 BRPM | BODY MASS INDEX: 38.36 KG/M2 | HEIGHT: 76 IN | WEIGHT: 315 LBS

## 2021-02-03 DIAGNOSIS — M79.671 RIGHT FOOT PAIN: Primary | ICD-10-CM

## 2021-02-03 DIAGNOSIS — M76.61 TENDONITIS, ACHILLES, RIGHT: ICD-10-CM

## 2021-02-03 PROCEDURE — G8482 FLU IMMUNIZE ORDER/ADMIN: HCPCS | Performed by: PODIATRIST

## 2021-02-03 PROCEDURE — G8428 CUR MEDS NOT DOCUMENT: HCPCS | Performed by: PODIATRIST

## 2021-02-03 PROCEDURE — G8417 CALC BMI ABV UP PARAM F/U: HCPCS | Performed by: PODIATRIST

## 2021-02-03 PROCEDURE — 99214 OFFICE O/P EST MOD 30 MIN: CPT | Performed by: PODIATRIST

## 2021-02-03 PROCEDURE — L4360 PNEUMAT WALKING BOOT PRE CST: HCPCS | Performed by: PODIATRIST

## 2021-02-03 PROCEDURE — 1036F TOBACCO NON-USER: CPT | Performed by: PODIATRIST

## 2021-02-03 NOTE — PROGRESS NOTES
[facility] PHYSICIANS  [facility]  [address] [place] [address]  Dept: [phone]  Dept Fax: [phone]    RETURN PATIENT PROGRESS NOTE  Date of patient's visit: 2/3/2021  Patient's Name:  Willis Burnett YOB: 1980            Patient Care Team:  Katherine Hobson MD as PCP - General (Internal Medicine)  Katherine Hobson MD as PCP - [facility]  Mary Kate Lafleur MD as Surgeon (Orthopedic Surgery)  Rex Castillo DPM as Physician (Podiatry)  Mindi Arriaga DPM as Physician (Podiatry)       Willis Burnett 35 y.o. male that presents for follow-up of  Pain to right foot  Chief Complaint   Patient presents with    Foot Pain     Pt's primary care physician is Katherine Hobson MD last seen 1/25/21  Symptoms began 2 day(s) ago and are increased . Patient relates pain is Present to right achilles tendonitis. Pain is rated 10 out of 10 and is described as constant. Treatments prior to today's visit include: none. Denies any trauma. States he noticed increased in swelling to right ankle. States pain is worse with walking. Currently denies F/C/N/V. No Known Allergies    Past Medical History:   Diagnosis Date    Anxiety     Arthritis     CAD (coronary artery disease)     Fatty liver     Fatty liver disease, nonalcoholic 2/60/5796    Hypertension     Obesity     Unspecified sleep apnea     cpap       Prior to Admission medications    Medication Sig Start Date End Date Taking?  Authorizing Provider   predniSONE (DELTASONE) 10 MG tablet Take one PO TID X 3 days  Take one PO BID X 3 days  Take one PO qDaily X 3 days  Take 1/2 PO q daily  X 4 days 1/7/21   Rex Castillo DPM   ibuprofen (IBU) 800 MG tablet Take 1 tablet by mouth every 8 hours as needed for Pain 12/27/20   Baldemar Terrell DO   metoprolol tartrate (LOPRESSOR) 25 MG tablet take 1 tablet by mouth twice a day 12/21/20   Katherine Hobson MD fluticasone (FLONASE) 50 MCG/ACT nasal spray 1 spray by Nasal route daily 12/17/20   Dylan Levine MD   albuterol sulfate HFA (VENTOLIN HFA) 108 (90 Base) MCG/ACT inhaler Inhale 2 puffs into the lungs every 6 hours as needed for Wheezing 12/17/20   Dylan Levine MD   lisinopril (PRINIVIL;ZESTRIL) 20 MG tablet Take 1 tablet by mouth daily 12/17/20   Dylan Levine MD   hydroCHLOROthiazide (HYDRODIURIL) 25 MG tablet Take 1 tablet by mouth daily 12/17/20   Dylan Levine MD   aspirin-acetaminophen-caffeine (EXCEDRIN EXTRA STRENGTH) 789-810-25 MG per tablet Take 1 tablet by mouth every 8 hours as needed for Headaches 11/29/20   Rafal Villaseñor MD   acetaminophen (ACETAMINOPHEN EXTRA STRENGTH) 500 MG tablet Take 1 tablet by mouth every 6 hours as needed for Pain 11/19/20   Rafal Villaseñor MD   valACYclovir (VALTREX) 1 g tablet  6/23/20   Historical Provider, MD   metoprolol tartrate (LOPRESSOR) 25 MG tablet Take 2/day 7/7/20   Dylan Levine MD   ARIPiprazole (ABILIFY) 10 MG tablet Take 10 mg by mouth daily 5/24/20   Historical Provider, MD       Review of Systems    Review of Systems:  History obtained from chart review and the patient  General ROS: negative for - chills, fatigue, fever, night sweats or weight gain  Constitutional: Negative for chills, diaphoresis, fatigue, fever and unexpected weight change. Musculoskeletal: Positive for arthralgias, gait problem and joint swelling. Neurological ROS: negative for - behavioral changes, confusion, headaches or seizures. Negative for weakness and numbness. Dermatological ROS: negative for - mole changes, rash  Cardiovascular: Negative for leg swelling. Gastrointestinal: Negative for constipation, diarrhea, nausea and vomiting. Lower Extremity Physical Examination:     Vitals:   Vitals:    02/03/21 1411   Temp: 98.2 °F (36.8 °C)     General: AAO x 3 in NAD. Dermatologic Exam:  Skin lesion/ulceration Absent . Skin No rashes or nodules noted. .       Musculoskeletal:     1st MPJ ROM decreased, Bilateral.  Muscle strength 5/5, Bilateral.  Pain present upon palpation of right posterior ankle along achilles tendon. Medial longitudinal arch, Bilateral WNL. Ankle ROM painful with dorsiflexion and plantarflexion, right. Dorsally contracted digits absent digits 1-5 Bilateral.     Vascular: DP and PT pulses palpable 2/4, Bilateral.  CFT <3 seconds, Bilateral.  Hair growth present to the level of the digits, Bilateral.  Edema absent, Bilateral.  Varicosities absent, Bilateral. Erythema absent, Bilateral    Neurological: Sensation intact to light touch to level of digits, Bilateral.  Protective sensation intact 10/10 sites via 5.07/10g [place] Monofilament, Bilateral.  negative Tinel's, Bilateral.  negative Valleix sign, Bilateral.      Integument: Warm, dry, supple, Bilateral.  Open lesion absent, Bilateral.  Interdigital maceration absent to web spaces 1-4, Bilateral.  Nails are normal in length, thickness and color 1-5 bilateral.  Fissures absent, Bilateral.       Asessment: Patient is a 35 y.o. male with:    Diagnosis Orders   1. Right foot pain  MRI ANKLE RIGHT WO CONTRAST    MN PNEUMA/VAC WALK BOOT PRE OTS   2. Tendonitis, Achilles, right  MRI ANKLE RIGHT WO CONTRAST    MN PNEUMA/VAC WALK BOOT PRE OTS         Plan: Patient examined and evaluated. Current condition and treatment options discussed in detail. I have dispensed a pneumatic equalizer walker. Due to the patient's diagnosis and related symptoms this is medically necessary for the treatment. The function of this device is to restrict and limit motion and provide stabilization and compression to the affected area. This device will allow the patient to slowly increase strength and ROM of the extremity. Specific instructions on weight bearing and ROM exercises were discussed and given to the patient. The goals and function of this device was explained in detail to the patient. Upon gait analysis, the device appeared to be fitting well and the patient states that the device is comfortable at this time. The patient was shown how to properly apply, wear, and care for the device. The patient was able to apply properly and ambulate without distress. At that time, the device was  dispensed, it was suitable for the patient's condition and was not substandard. No guarantees were given and the precautions were reviewed. Written instructions and warranty information was given along with the list of the twenty-one (21) Durable Medical Equipment Supplier Guidelines. All the questions were answered satisfactorily    All labs were reviewed and all imaging including the above findings were reviewed prior to the patients arrival and with the patient today. Previous patient encounter was reviewed. Encounters from the patients other medical providers were reviewed and noted. Time was spent educating the patienton proper care of the feet and ankles. All the above diagnosis were addressed at todays visit and all questions were answered. A total of 35 minutes was spent with this patients encounter    Advised pt to get MRI for surgical planning. Verbal and written instructions given to patient. Contact office with any questions/problems/concerns. No orders of the defined types were placed in this encounter. No orders of the defined types were placed in this encounter. RTC in 3week(s).     2/3/2021      Electronically signed by Citlaly Julian DPM on 2/3/2021 at 2:13 PM  2/3/2021

## 2021-02-03 NOTE — LETTER
[facility]  [address] [facility] [address]  Phone: [phone]  Fax: [phone]    Xander Clark        February 3, 2021     Patient: Estuardo Purvis   YOB: 1980   Date of Visit: 2/3/2021       To Whom It May Concern: It is my medical opinion that Chintan Lima should remain out of work until 02/17/2021 and may return on 02/18/2021. If you have any questions or concerns, please don't hesitate to call.     Sincerely,        Tony Miner DPM

## 2021-02-03 NOTE — TELEPHONE ENCOUNTER
Refill request for Pain Relief 500mg. If appropriate please send medication(s) to patients pharmacy.     Next appt: 2/4/2021      Health Maintenance   Topic Date Due    Potassium monitoring  08/03/2021    Creatinine monitoring  08/03/2021    Lipid screen  11/01/2024    DTaP/Tdap/Td vaccine (3 - Td) 04/27/2028    Flu vaccine  Completed    Hepatitis C screen  Completed    HIV screen  Completed    Hepatitis A vaccine  Aged Out    Hepatitis B vaccine  Aged Out    Hib vaccine  Aged Out    Meningococcal (ACWY) vaccine  Aged Out    Pneumococcal 0-64 years Vaccine  Aged Out       Hemoglobin A1C (%)   Date Value   11/01/2019 5.4   07/10/2018 5.5   01/18/2018 5.5             ( goal A1C is < 7)   No results found for: LABMICR  LDL Cholesterol (mg/dL)   Date Value   11/01/2019 104       (goal LDL is <100)   AST (U/L)   Date Value   11/01/2019 16     ALT (U/L)   Date Value   11/01/2019 10     BUN (mg/dL)   Date Value   08/03/2020 29 (H)     BP Readings from Last 3 Encounters:   12/27/20 (!) 156/92   11/29/20 (!) 155/102   11/23/20 (!) 148/99          (goal 120/80)          Patient Active Problem List:     Hypertension     Fatty liver disease, nonalcoholic     MIGEL on CPAP     Vitamin D deficiency     IGT (impaired glucose tolerance)

## 2021-02-04 ENCOUNTER — TELEMEDICINE (OUTPATIENT)
Dept: INTERNAL MEDICINE | Age: 41
End: 2021-02-04
Payer: MEDICAID

## 2021-02-04 DIAGNOSIS — Z99.89 OSA ON CPAP: ICD-10-CM

## 2021-02-04 DIAGNOSIS — R73.02 IGT (IMPAIRED GLUCOSE TOLERANCE): ICD-10-CM

## 2021-02-04 DIAGNOSIS — K76.0 FATTY LIVER DISEASE, NONALCOHOLIC: ICD-10-CM

## 2021-02-04 DIAGNOSIS — F31.31 BIPOLAR AFFECTIVE DISORDER, CURRENTLY DEPRESSED, MILD (HCC): ICD-10-CM

## 2021-02-04 DIAGNOSIS — E66.01 CLASS 2 SEVERE OBESITY DUE TO EXCESS CALORIES WITH SERIOUS COMORBIDITY AND BODY MASS INDEX (BMI) OF 38.0 TO 38.9 IN ADULT (HCC): ICD-10-CM

## 2021-02-04 DIAGNOSIS — J45.20 MILD INTERMITTENT ASTHMA WITHOUT COMPLICATION: ICD-10-CM

## 2021-02-04 DIAGNOSIS — I10 ESSENTIAL HYPERTENSION: Primary | ICD-10-CM

## 2021-02-04 DIAGNOSIS — G47.33 OSA ON CPAP: ICD-10-CM

## 2021-02-04 PROCEDURE — 99213 OFFICE O/P EST LOW 20 MIN: CPT | Performed by: INTERNAL MEDICINE

## 2021-02-04 RX ORDER — ACETAMINOPHEN 500 MG/1
TABLET, FILM COATED ORAL
Qty: 60 TABLET | Refills: 0 | Status: SHIPPED | OUTPATIENT
Start: 2021-02-04 | End: 2021-02-10 | Stop reason: SDUPTHER

## 2021-02-04 ASSESSMENT — ENCOUNTER SYMPTOMS
SHORTNESS OF BREATH: 0
BACK PAIN: 0
BLOOD IN STOOL: 0
ABDOMINAL PAIN: 0
CONSTIPATION: 0
COUGH: 0
WHEEZING: 0
EYE REDNESS: 0
PHOTOPHOBIA: 0

## 2021-02-04 ASSESSMENT — PATIENT HEALTH QUESTIONNAIRE - PHQ9
1. LITTLE INTEREST OR PLEASURE IN DOING THINGS: 0
SUM OF ALL RESPONSES TO PHQ QUESTIONS 1-9: 1
2. FEELING DOWN, DEPRESSED OR HOPELESS: 1

## 2021-02-04 NOTE — PROGRESS NOTES
2021    TELEHEALTH EVALUATION -- Audio/Visual (During [facility]-26 public health emergency)    HPI:    Christiano Grijalva (:  1980) has requested an audio/video evaluation for the following concern(s):    Patient initiated a video visit via [employer] to go over his chronic problems. He states he is having problems with his right foot foot and ankle since an injury at work few months ago. He is seeing podiatry. X-ray was unrevealing but an MRI is pending for next week. He is concerned he may have surgery. He is also complaining of occasional headaches. He had hit his head at work against a door a few months ago. CT was negative. He still has occasional headaches. On review his blood pressures have been running high it appears at podiatry office and other offices. We will have him come in for a blood pressure check. He says he is taking his medications regularly. He continues to follow-up with psychiatry. He has  Possible bipolar disorder with mild depression which is controlled. He needs labs done. He has mild intermittent asthma and uses albuterol very rarely. No other concerns at this point. Review of Systems   Constitutional: Negative for chills, fatigue and fever. Eyes: Negative for photophobia, redness and visual disturbance. Respiratory: Negative for cough, shortness of breath and wheezing. Cardiovascular: Negative for chest pain, palpitations and leg swelling. Gastrointestinal: Negative for abdominal pain, blood in stool and constipation. Endocrine: Negative for polydipsia and polyuria. Genitourinary: Negative for dysuria and frequency. Musculoskeletal: Positive for arthralgias. Negative for back pain. Neurological: Positive for headaches. Negative for dizziness, seizures, speech difficulty and weakness. Hematological: Negative for adenopathy. Does not bruise/bleed easily. Psychiatric/Behavioral: Negative for dysphoric mood and sleep disturbance. The patient is not nervous/anxious. Prior to Visit Medications    Medication Sig Taking? Authorizing Provider   predniSONE (DELTASONE) 10 MG tablet Take one PO TID X 3 days  Take one PO BID X 3 days  Take one PO qDaily X 3 days  Take 1/2 PO q daily  X 4 days Yes Bartolome Wang DPM   ibuprofen (IBU) 800 MG tablet Take 1 tablet by mouth every 8 hours as needed for Pain Yes Joe Alvarado,    metoprolol tartrate (LOPRESSOR) 25 MG tablet take 1 tablet by mouth twice a day Yes Kianna Henson MD   fluticasone (FLONASE) 50 MCG/ACT nasal spray 1 spray by Nasal route daily Yes Kianna Henson MD   albuterol sulfate HFA (VENTOLIN HFA) 108 (90 Base) MCG/ACT inhaler Inhale 2 puffs into the lungs every 6 hours as needed for Wheezing Yes Kianna Henson MD   lisinopril (PRINIVIL;ZESTRIL) 20 MG tablet Take 1 tablet by mouth daily Yes Kianna Henson MD   hydroCHLOROthiazide (HYDRODIURIL) 25 MG tablet Take 1 tablet by mouth daily Yes Kianna Henson MD   aspirin-acetaminophen-caffeine (EXCEDRIN EXTRA STRENGTH) 431-776-08 MG per tablet Take 1 tablet by mouth every 8 hours as needed for Headaches Yes Elvira Copeland MD   acetaminophen (ACETAMINOPHEN EXTRA STRENGTH) 500 MG tablet Take 1 tablet by mouth every 6 hours as needed for Pain Yes Elvira Copeland MD   valACYclovir (VALTREX) 1 g tablet  Yes Historical Provider, MD   ARIPiprazole (ABILIFY) 10 MG tablet Take 10 mg by mouth daily Yes Historical Provider, MD   metoprolol tartrate (LOPRESSOR) 25 MG tablet Take 2/day  Kianna Henson MD       Social History     Tobacco Use    Smoking status: Never Smoker    Smokeless tobacco: Never Used   Substance Use Topics    Alcohol use: No     Frequency: Never     Binge frequency: Never    Drug use:  No            PHYSICAL EXAMINATION: [ INSTRUCTIONS:  \"[x]\" Indicates a positive item  \"[]\" Indicates a negative item  -- DELETE ALL ITEMS NOT EXAMINED]  Vital Signs: (As obtained by patient/caregiver or practitioner observation)    Blood pressure-  Heart rate-    Respiratory rate-    Temperature-  Pulse oximetry-     Constitutional: [x] Appears well-developed and well-nourished [x] No apparent distress      [] Abnormal-   Mental status  [x] Alert and awake  [x] Oriented to person/place/time []Able to follow commands      Eyes:  EOM    [x]  Normal  [] Abnormal-  Sclera  [x]  Normal  [] Abnormal -         Discharge [x]  None visible  [] Abnormal -    HENT:   [x] Normocephalic, atraumatic. [] Abnormal   [] Mouth/Throat: Mucous membranes are moist.     External Ears [] Normal  [] Abnormal-     Neck: [x] No visualized mass     Pulmonary/Chest: [x] Respiratory effort normal.  [x] No visualized signs of difficulty breathing or respiratory distress        [] Abnormal-      Musculoskeletal:   [] Normal gait with no signs of ataxia         [] Normal range of motion of neck        [] Abnormal-       Neurological:        [x] No Facial Asymmetry (Cranial nerve 7 motor function) (limited exam to video visit)          [x] No gaze palsy        [] Abnormal-         Skin:        [x] No significant exanthematous lesions or discoloration noted on facial skin         [] Abnormal-            Psychiatric:       [x] Normal Affect [] No Hallucinations        [] Abnormal-     Other pertinent observable physical exam findings-     ASSESSMENT/PLAN:  1. Essential hypertension  Continue current meds. Follow-up in the office for blood pressure check. 2. IGT (impaired glucose tolerance)  Labs pending. 3. Class 2 severe obesity due to excess calories with serious comorbidity and body mass index (BMI) of 38.0 to 38.9 in [facility])  Continue dietary changes and physical activity. 4. MIGEL on CPAP  Advised compliant with CPAP and weight loss. 5. Fatty liver disease, nonalcoholic  Monitor labs. Weight loss needed. 6. Bipolar affective disorder, currently depressed, mild ([address]  Continue follow-up with psychiatry. 7. Mild intermittent asthma without complication  Albuterol as needed only. Return in about 3 weeks (around 2/25/2021). Lydia Calderón is a 35 y.o. male being evaluated by a Virtual Visit (video visit) encounter to address concerns as mentioned above. A caregiver was present when appropriate. Due to this being a TeleHealth encounter (During [facility] public health emergency), evaluation of the following organ systems was limited: Vitals/Constitutional/EENT/Resp/CV/GI//MS/Neuro/Skin/Heme-Lymph-Imm. Pursuant to the emergency declaration under the [address] [place] [address], [address] [place] [address] authority and the [email or web address] and [employer] Act, this Virtual Visit was conducted with patient's (and/or legal guardian's) consent, to reduce the patient's risk of exposure to COVID-19 and provide necessary medical care. The patient (and/or legal guardian) has also been advised to contact this office for worsening conditions or problems, and seek emergency medical treatment and/or call 911 if deemed necessary. Patient identification was verified at the start of the visit: Yes    Total time spent on this encounter: 30 minutes    Services were provided through a video synchronous discussion virtually to substitute for in-person clinic visit. Patient and provider were located at their individual homes. --Cheli Mcduffie MD on 2/4/2021 at 9:11 AM    An electronic signature was used to authenticate this note.

## 2021-02-10 DIAGNOSIS — J31.0 CHRONIC RHINITIS: ICD-10-CM

## 2021-02-10 DIAGNOSIS — I10 ESSENTIAL HYPERTENSION: ICD-10-CM

## 2021-02-10 DIAGNOSIS — G89.29 CHRONIC PAIN OF BOTH KNEES: ICD-10-CM

## 2021-02-10 DIAGNOSIS — M25.561 CHRONIC PAIN OF BOTH KNEES: ICD-10-CM

## 2021-02-10 DIAGNOSIS — M25.562 CHRONIC PAIN OF BOTH KNEES: ICD-10-CM

## 2021-02-12 RX ORDER — ACETAMINOPHEN 500 MG
500 TABLET ORAL EVERY 6 HOURS PRN
Qty: 60 TABLET | Refills: 0 | Status: SHIPPED | OUTPATIENT
Start: 2021-02-12 | End: 2021-03-12

## 2021-02-12 RX ORDER — ALBUTEROL SULFATE 90 UG/1
2 AEROSOL, METERED RESPIRATORY (INHALATION) EVERY 6 HOURS PRN
Qty: 18 G | Refills: 3 | Status: SHIPPED | OUTPATIENT
Start: 2021-02-12 | End: 2021-11-02

## 2021-02-12 RX ORDER — FLUTICASONE PROPIONATE 50 MCG
1 SPRAY, SUSPENSION (ML) NASAL DAILY
Qty: 16 G | Refills: 3 | Status: SHIPPED | OUTPATIENT
Start: 2021-02-12 | End: 2021-12-28 | Stop reason: SDUPTHER

## 2021-02-12 RX ORDER — LISINOPRIL 20 MG/1
20 TABLET ORAL DAILY
Qty: 90 TABLET | Refills: 1 | Status: SHIPPED | OUTPATIENT
Start: 2021-02-12 | End: 2021-05-28 | Stop reason: SDUPTHER

## 2021-02-12 NOTE — TELEPHONE ENCOUNTER
Request for nopril,Flonase and Ventolin.       Next Visit Date:  Future Appointments   Date Time Provider Kathi Jackson   2/17/2021  1:30 PM Neha Olmstead DPM [facility]   2/23/2021 10:30 AM Marita Gonzalez MD [address] [facility] Maintenance   Topic Date Due    Potassium monitoring  08/03/2021    Creatinine monitoring  08/03/2021    Lipid screen  11/01/2024    DTaP/Tdap/Td vaccine (3 - Td) 04/27/2028    Flu vaccine  Completed    Pneumococcal 0-64 years Vaccine  Completed    Hepatitis C screen  Completed    HIV screen  Completed    Hepatitis A vaccine  Aged Out    Hepatitis B vaccine  Aged Out    Hib vaccine  Aged Out    Meningococcal (ACWY) vaccine  Aged Out       Hemoglobin A1C (%)   Date Value   11/01/2019 5.4   07/10/2018 5.5   01/18/2018 5.5             ( goal A1C is < 7)   No results found for: LABMICR  LDL Cholesterol (mg/dL)   Date Value   11/01/2019 104       (goal LDL is <100)   AST (U/L)   Date Value   11/01/2019 16     ALT (U/L)   Date Value   11/01/2019 10     BUN (mg/dL)   Date Value   08/03/2020 29 (H)     BP Readings from Last 3 Encounters:   12/27/20 (!) 156/92   11/29/20 (!) 155/102   11/23/20 (!) 148/99          (goal 120/80)    All Future Testing planned in [facility]  Lab Frequency Next Occurrence   MRI ANKLE LEFT WO CONTRAST Once 36/76/7272   Basic Metabolic Panel Once 38/05/0908   Hemoglobin A1C Once 04/25/2021   MRI ANKLE RIGHT [address] Once 02/03/2021         Patient Active Problem List:     Hypertension     Fatty liver disease, nonalcoholic     MIGEL on CPAP     Vitamin D deficiency     IGT (impaired glucose tolerance)     Mild intermittent asthma     Bipolar affective disorder, currently depressed, mild ([address]

## 2021-02-17 ENCOUNTER — OFFICE VISIT (OUTPATIENT)
Dept: PODIATRY | Age: 41
End: 2021-02-17
Payer: MEDICAID

## 2021-02-17 VITALS — HEIGHT: 76 IN | WEIGHT: 315 LBS | TEMPERATURE: 97.3 F | BODY MASS INDEX: 38.36 KG/M2

## 2021-02-17 DIAGNOSIS — M76.61 TENDONITIS, ACHILLES, RIGHT: ICD-10-CM

## 2021-02-17 DIAGNOSIS — M79.671 RIGHT FOOT PAIN: Primary | ICD-10-CM

## 2021-02-17 PROBLEM — G89.29 CHRONIC MIDLINE LOW BACK PAIN WITHOUT SCIATICA: Status: ACTIVE | Noted: 2020-06-23

## 2021-02-17 PROBLEM — M75.40 SUBACROMIAL IMPINGEMENT: Status: ACTIVE | Noted: 2019-01-22

## 2021-02-17 PROBLEM — M17.9 OSTEOARTHRITIS OF KNEE: Status: ACTIVE | Noted: 2021-02-17

## 2021-02-17 PROBLEM — M54.50 CHRONIC MIDLINE LOW BACK PAIN WITHOUT SCIATICA: Status: ACTIVE | Noted: 2020-06-23

## 2021-02-17 PROBLEM — F31.31 BIPOLAR AFFECTIVE DISORDER, CURRENTLY DEPRESSED, MILD (HCC): Status: ACTIVE | Noted: 2019-11-26

## 2021-02-17 PROBLEM — M47.812 CERVICAL SPONDYLOSIS: Status: ACTIVE | Noted: 2017-09-07

## 2021-02-17 PROBLEM — M50.30 DEGENERATION OF CERVICAL INTERVERTEBRAL DISC: Status: ACTIVE | Noted: 2017-09-07

## 2021-02-17 PROBLEM — M41.9 SCOLIOSIS DEFORMITY OF SPINE: Status: ACTIVE | Noted: 2018-09-04

## 2021-02-17 PROCEDURE — G8482 FLU IMMUNIZE ORDER/ADMIN: HCPCS | Performed by: PODIATRIST

## 2021-02-17 PROCEDURE — 99213 OFFICE O/P EST LOW 20 MIN: CPT | Performed by: PODIATRIST

## 2021-02-17 PROCEDURE — G8427 DOCREV CUR MEDS BY ELIG CLIN: HCPCS | Performed by: PODIATRIST

## 2021-02-17 PROCEDURE — 1036F TOBACCO NON-USER: CPT | Performed by: PODIATRIST

## 2021-02-17 PROCEDURE — G8417 CALC BMI ABV UP PARAM F/U: HCPCS | Performed by: PODIATRIST

## 2021-02-17 NOTE — PROGRESS NOTES
[facility] PHYSICIANS  [facility]  [address] [place] [address]  Dept: [phone]  Dept Fax: [phone]    RETURN PATIENT PROGRESS NOTE  Date of patient's visit: 2/17/2021  Patient's Name:  Yevonne Barthel YOB: 1980            Patient Care Team:  Dylan Levine MD as PCP - General (Internal Medicine)  Dylan Levine MD as PCP - [facility]  Gabriella Hill MD as Surgeon (Orthopedic Surgery)  Crystal West DPM as Physician (Podiatry)  Reza Riley DPM as Physician (Podiatry)       Yevonne Barthel 35 y.o. male that presents for follow-up of   Chief Complaint   Patient presents with    Foot Pain     right foot       Symptoms began several week(s) ago and are unchanged . Patient relates pain is Present to right achilles tendon and ankle. Pain is rated 8 out of 10 and is described as constant, moderate, severe. Treatments prior to today's visit include: previous podiatry treatment. Currently denies F/C/N/V. No Known Allergies    Past Medical History:   Diagnosis Date    Anxiety     Arthritis     CAD (coronary artery disease)     Fatty liver     Fatty liver disease, nonalcoholic 1/76/3616    Hypertension     Obesity     Unspecified sleep apnea     cpap       Prior to Admission medications    Medication Sig Start Date End Date Taking?  Authorizing Provider   fluticasone (FLONASE) 50 MCG/ACT nasal spray 1 spray by Nasal route daily 2/12/21  Yes Dylan Levine MD   albuterol sulfate HFA (VENTOLIN HFA) 108 (90 Base) MCG/ACT inhaler Inhale 2 puffs into the lungs every 6 hours as needed for Wheezing 2/12/21  Yes Dylan Levine MD   lisinopril (PRINIVIL;ZESTRIL) 20 MG tablet Take 1 tablet by mouth daily 2/12/21  Yes Dylan Levine MD   metoprolol tartrate (LOPRESSOR) 25 MG tablet take 1 tablet by mouth twice a day 2/12/21  Yes Dylan Levine MD acetaminophen (PAIN RELIEF EXTRA STRENGTH) 500 MG tablet Take 1 tablet by mouth every 6 hours as needed for Pain 2/12/21  Yes Mily Whitaker MD   predniSONE (DELTASONE) 10 MG tablet Take one PO TID X 3 days  Take one PO BID X 3 days  Take one PO qDaily X 3 days  Take 1/2 PO q daily  X 4 days 1/7/21  Yes Ashley Wang DPM   ibuprofen (IBU) 800 MG tablet Take 1 tablet by mouth every 8 hours as needed for Pain 12/27/20  Yes Delia Lemus DO   hydroCHLOROthiazide (HYDRODIURIL) 25 MG tablet Take 1 tablet by mouth daily 12/17/20  Yes Mily Whitaker MD   aspirin-acetaminophen-caffeine (EXCEDRIN EXTRA STRENGTH) 768-181-70 MG per tablet Take 1 tablet by mouth every 8 hours as needed for Headaches 11/29/20  Yes Nicole Cage MD   valACYclovir (VALTREX) 1 g tablet  6/23/20  Yes Historical Provider, MD   ARIPiprazole (ABILIFY) 10 MG tablet Take 10 mg by mouth daily 5/24/20  Yes Historical Provider, MD   metoprolol tartrate (LOPRESSOR) 25 MG tablet Take 2/day 7/7/20   Mily Whitaker MD       Review of Systems    Review of Systems:  History obtained from chart review and the patient  General ROS: negative for - chills, fatigue, fever, night sweats or weight gain  Constitutional: Negative for chills, diaphoresis, fatigue, fever and unexpected weight change. Musculoskeletal: Positive for arthralgias, gait problem and joint swelling. Neurological ROS: negative for - behavioral changes, confusion, headaches or seizures. Negative for weakness and numbness. Dermatological ROS: negative for - mole changes, rash  Cardiovascular: Negative for leg swelling. Gastrointestinal: Negative for constipation, diarrhea, nausea and vomiting. Lower Extremity Physical Examination:     Vitals:   Vitals:    02/17/21 1322   Temp: 97.3 °F (36.3 °C)     General: AAO x 3 in NAD. Dermatologic Exam:  Skin lesion/ulceration Absent . Skin No rashes or nodules noted. .       Musculoskeletal:

## 2021-02-22 ENCOUNTER — HOSPITAL ENCOUNTER (OUTPATIENT)
Age: 41
Setting detail: SPECIMEN
Discharge: HOME | End: 2021-02-22
Payer: MEDICAID

## 2021-02-22 DIAGNOSIS — R73.02 IGT (IMPAIRED GLUCOSE TOLERANCE): ICD-10-CM

## 2021-02-22 DIAGNOSIS — I10 ESSENTIAL HYPERTENSION: ICD-10-CM

## 2021-02-22 LAB
ANION GAP SERPL CALCULATED.3IONS-SCNC: 10 MMOL/L (ref 9–17)
BUN BLDV-MCNC: 17 MG/DL (ref 6–20)
BUN/CREAT BLD: ABNORMAL (ref 9–20)
CALCIUM SERPL-MCNC: 9.4 MG/DL (ref 8.6–10.4)
CHLORIDE BLD-SCNC: 100 MMOL/L (ref 98–107)
CO2: 29 MMOL/L (ref 20–31)
CREAT SERPL-MCNC: 0.98 MG/DL (ref 0.7–1.2)
ESTIMATED AVERAGE GLUCOSE: 111 MG/DL
GFR AFRICAN AMERICAN: >60 ML/MIN
GFR NON-AFRICAN AMERICAN: >60 ML/MIN
GFR SERPL CREATININE-BSD FRML MDRD: ABNORMAL ML/MIN/{1.73_M2}
GFR SERPL CREATININE-BSD FRML MDRD: ABNORMAL ML/MIN/{1.73_M2}
GLUCOSE BLD-MCNC: 79 MG/DL (ref 70–99)
HBA1C MFR BLD: 5.5 % (ref 4–6)
POTASSIUM SERPL-SCNC: 3.6 MMOL/L (ref 3.7–5.3)
SODIUM BLD-SCNC: 139 MMOL/L (ref 135–144)

## 2021-03-03 ENCOUNTER — OFFICE VISIT (OUTPATIENT)
Dept: PODIATRY | Age: 41
End: 2021-03-03
Payer: MEDICAID

## 2021-03-03 VITALS — TEMPERATURE: 97.1 F | BODY MASS INDEX: 38.36 KG/M2 | WEIGHT: 315 LBS | HEIGHT: 76 IN | RESPIRATION RATE: 16 BRPM

## 2021-03-03 DIAGNOSIS — M79.671 RIGHT FOOT PAIN: Primary | ICD-10-CM

## 2021-03-03 DIAGNOSIS — M76.61 TENDONITIS, ACHILLES, RIGHT: ICD-10-CM

## 2021-03-03 PROCEDURE — G8417 CALC BMI ABV UP PARAM F/U: HCPCS | Performed by: PODIATRIST

## 2021-03-03 PROCEDURE — 1036F TOBACCO NON-USER: CPT | Performed by: PODIATRIST

## 2021-03-03 PROCEDURE — 99214 OFFICE O/P EST MOD 30 MIN: CPT | Performed by: PODIATRIST

## 2021-03-03 PROCEDURE — G8428 CUR MEDS NOT DOCUMENT: HCPCS | Performed by: PODIATRIST

## 2021-03-03 PROCEDURE — G8482 FLU IMMUNIZE ORDER/ADMIN: HCPCS | Performed by: PODIATRIST

## 2021-03-03 NOTE — LETTER
[facility]  [address] [facility] [address]  Phone: [phone]  Fax: [phone]    Franceen Oppenheim, [place]        March 3, 2021     Patient: Silas Evans   YOB: 1980   Date of Visit: 3/3/2021       To Whom It May Concern: It is my medical opinion that Betty Fox should remain out of work until [employer] If you have any questions or concerns, please don't hesitate to call.     Sincerely,        Franceen Oppenheim, DPM

## 2021-03-03 NOTE — LETTER
[facility]  [address] [facility] [address]  Phone: [phone]  Fax: [phone]    [facility]        March 3, 2021     Patient: Richard Lazar   YOB: 1980   Date of Visit: 3/3/2021       To Whom It May Concern: It is my medical opinion that Gilberto Newby should remain out of work from 02/28/2021 through 03/03/2021. If you have any questions or concerns, please don't hesitate to call.     Sincerely,        Yao Meier DPM

## 2021-03-03 NOTE — PROGRESS NOTES
[facility] PHYSICIANS  [facility]  [address] [place] [address]  Dept: [phone]  Dept Fax: [phone]    RETURN PATIENT PROGRESS NOTE  Date of patient's visit: 3/3/2021  Patient's Name:  Estefania Betancur YOB: 1980            Patient Care Team:  Kendy Oshea MD as PCP - General (Internal Medicine)  Kendy Oshea MD as PCP - [facility]  Tonya Roman MD as Surgeon (Orthopedic Surgery)  [facility] Suzanne DPMARTY as Physician (Podiatry)  Mert Deras DPM as Physician (Podiatry)       [facility] 35 [facility] that presents for follow-up of right foot/ankle pain  Chief Complaint   Patient presents with    Foot Pain     right     Pt's primary care physician is Kendy Oshea MD last seen 2/20/21  Symptoms began 4 month(s) ago and are decreased . Patient relates pain is Present. Pain is rated 7 out of 10 and is described as intermittent. Treatments prior to today's visit include: CAM boot. Currently denies F/C/N/V. No Known Allergies    Past Medical History:   Diagnosis Date    Anxiety     Arthritis     CAD (coronary artery disease)     Fatty liver     Fatty liver disease, nonalcoholic 0/28/8466    Hypertension     Obesity     Unspecified sleep apnea     cpap       Prior to Admission medications    Medication Sig Start Date End Date Taking?  Authorizing Provider   fluticasone (FLONASE) 50 MCG/ACT nasal spray 1 spray by Nasal route daily 2/12/21   Kendy Oshea MD   albuterol sulfate HFA (VENTOLIN HFA) 108 (90 Base) MCG/ACT inhaler Inhale 2 puffs into the lungs every 6 hours as needed for Wheezing 2/12/21   Kendy Oshea MD   lisinopril (PRINIVIL;ZESTRIL) 20 MG tablet Take 1 tablet by mouth daily 2/12/21   Kendy Oshea MD   metoprolol tartrate (LOPRESSOR) 25 MG tablet take 1 tablet by mouth twice a day 2/12/21   Kendy Oshea MD   acetaminophen (PAIN RELIEF EXTRA STRENGTH) 500 MG tablet Take 1 tablet by mouth achilles tendon and along its insertion. Medial longitudinal arch, Bilateral decreased. Ankle ROM pain with dorsiflexion,Bilateral.    Dorsally contracted digits absent digits 1-5 Bilateral.     Vascular: DP and PT pulses palpable 2/4, Bilateral.  CFT <3 seconds, Bilateral.  Hair growth present to the level of the digits, Bilateral.  Edema absent, Bilateral.  Varicosities absent, Bilateral. Erythema absent, Bilateral    Neurological: Sensation intact to light touch to level of digits, Bilateral.  Protective sensation intact 10/10 sites via 5.07/10g [place] Monofilament, Bilateral.  negative Tinel's, Bilateral.  negative Valleix sign, Bilateral.      Integument: Warm, dry, supple, Bilateral.  Open lesion absent, Bilateral.  Interdigital maceration absent to web spaces 1-4, Bilateral.  Nails are normal in length, thickness and color 1-5 bilateral.  Fissures absent, Bilateral.       Asessment: Patient is a 35 y.o. male with:    Diagnosis Orders   1. Right foot pain  MRI ANKLE RIGHT WO CONTRAST   2. Tendonitis, Achilles, right  MRI ANKLE RIGHT WO CONTRAST       Plan: Patient examined and evaluated. Current condition and treatment options discussed in detail. At this time we have exhausted all conservative treatment such as CAM boot immobilization, NSAID, therapy. Advised pt to consider MRI right ankle for further evaluation and surgical planning. Discussed in depth that surgery is last treatment option. Pt to remain in CAM boot at all times when walking. Verbal and written instructions given to patient. Contact office with any questions/problems/concerns. No orders of the defined types were placed in this encounter. No orders of the defined types were placed in this encounter. RTC in 4week(s).     3/3/2021      Electronically signed by Morena Gallegos DPM on 3/3/2021 at 2:09 PM  3/3/2021

## 2021-03-23 ENCOUNTER — HOSPITAL ENCOUNTER (OUTPATIENT)
Dept: MRI IMAGING | Age: 41
Discharge: HOME | End: 2021-03-25
Payer: MEDICAID

## 2021-03-23 DIAGNOSIS — M79.671 RIGHT FOOT PAIN: ICD-10-CM

## 2021-03-23 DIAGNOSIS — M76.61 TENDONITIS, ACHILLES, RIGHT: ICD-10-CM

## 2021-03-23 PROCEDURE — 73721 MRI JNT OF LWR EXTRE W/O DYE: CPT

## 2021-03-29 ENCOUNTER — OFFICE VISIT (OUTPATIENT)
Dept: PODIATRY | Age: 41
End: 2021-03-29
Payer: MEDICAID

## 2021-03-29 VITALS — TEMPERATURE: 97 F

## 2021-03-29 DIAGNOSIS — M76.61 TENDONITIS, ACHILLES, RIGHT: ICD-10-CM

## 2021-03-29 DIAGNOSIS — M79.671 RIGHT FOOT PAIN: Primary | ICD-10-CM

## 2021-03-29 PROCEDURE — G8417 CALC BMI ABV UP PARAM F/U: HCPCS | Performed by: PODIATRIST

## 2021-03-29 PROCEDURE — 1036F TOBACCO NON-USER: CPT | Performed by: PODIATRIST

## 2021-03-29 PROCEDURE — 99213 OFFICE O/P EST LOW 20 MIN: CPT | Performed by: PODIATRIST

## 2021-03-29 PROCEDURE — G8482 FLU IMMUNIZE ORDER/ADMIN: HCPCS | Performed by: PODIATRIST

## 2021-03-29 PROCEDURE — G8427 DOCREV CUR MEDS BY ELIG CLIN: HCPCS | Performed by: PODIATRIST

## 2021-03-29 NOTE — LETTER
[facility]  [address] [facility] [address]  Phone: [phone]  Fax: [phone]    Remington Chapman        March 29, 2021     Patient: Rea Simmons   YOB: 1980   Date of Visit: 3/29/2021       To Whom It May Concern: It is my medical opinion that Nam Hutton may need surgery on the right achilles tendon if he so chooses. If surgery is completed he will need to be off of work for 2-3 months for healing. If you have any questions or concerns, please don't hesitate to call.     Sincerely,        Deysi Alexis DPM

## 2021-03-29 NOTE — PROGRESS NOTES
[facility]YSICIANS  [facility]  [address] [place] [address]  Dept: [phone]  Dept Fax: [phone]    RETURN PATIENT PROGRESS NOTE  Date of patient's visit: 3/29/2021  Patient's Name:  Radha Medina YOB: 1980            Patient Care Team:  Cameron Tierney MD as PCP - General (Internal Medicine)  Cameron Tierney MD as PCP - REHABILITATION [facility] Provider  Madelaine Nieves MD as Surgeon (Orthopedic Surgery)  Rosy Arteaga DPM as Physician (Podiatry)  Marita Oreilly DPM as Physician (Podiatry)       Radha Medina 35 y.o. male that presents for follow-up of right foot pain  Chief Complaint   Patient presents with    Foot Pain     Pt's primary care physician is Cameron Tierney MD last seen 3/9/21  Symptoms began 4 month(s) ago and are increased . Patient relates pain is Present to right heel. Pain is rated 8 out of 10 and is described as intermittent. Treatments prior to today's visit include: MRI, CAM boot. Currently denies F/C/N/V. No Known Allergies    Past Medical History:   Diagnosis Date    Anxiety     Arthritis     CAD (coronary artery disease)     Fatty liver     Fatty liver disease, nonalcoholic 2/96/5864    Hypertension     Obesity     Unspecified sleep apnea     cpap       Prior to Admission medications    Medication Sig Start Date End Date Taking?  Authorizing Provider   PAIN RELIEF EXTRA STRENGTH 500 MG tablet take 1 tablet by mouth twice a day AS NEEDED FOR PAIN 3/12/21  Yes Cameron Tierney MD   fluticasone Gala Bimler) 50 MCG/ACT nasal spray 1 spray by Nasal route daily 2/12/21  Yes Cameron Tierney MD   albuterol sulfate HFA (VENTOLIN HFA) 108 (90 Base) MCG/ACT inhaler Inhale 2 puffs into the lungs every 6 hours as needed for Wheezing 2/12/21  Yes Cameron Tierney MD   lisinopril (PRINIVIL;ZESTRIL) 20 MG tablet Take 1 tablet by mouth daily 2/12/21  Yes Cameron Tierney MD   metoprolol tartrate (LOPRESSOR) 25 MG tablet take 1 tablet by mouth twice a day 2/12/21  Yes John Islas MD   predniSONE (DELTASONE) 10 MG tablet Take one PO TID X 3 days  Take one PO BID X 3 days  Take one PO qDaily X 3 days  Take 1/2 PO q daily  X 4 days 1/7/21  Yes Caroline Bean DPM   ibuprofen (IBU) 800 MG tablet Take 1 tablet by mouth every 8 hours as needed for Pain 12/27/20  Yes Kirti Regalado DO   hydroCHLOROthiazide (HYDRODIURIL) 25 MG tablet Take 1 tablet by mouth daily 12/17/20  Yes John Islas MD   aspirin-acetaminophen-caffeine (EXCEDRIN EXTRA STRENGTH) 243-611-43 MG per tablet Take 1 tablet by mouth every 8 hours as needed for Headaches 11/29/20  Yes Ines Toribio MD   valACYclovir (VALTREX) 1 g tablet  6/23/20  Yes Historical Provider, MD   ARIPiprazole (ABILIFY) 10 MG tablet Take 10 mg by mouth daily 5/24/20  Yes Historical Provider, MD   metoprolol tartrate (LOPRESSOR) 25 MG tablet Take 2/day 7/7/20   John Islas MD       Review of Systems    Review of Systems:  History obtained from chart review and the patient  General ROS: negative for - chills, fatigue, fever, night sweats or weight gain  Constitutional: Negative for chills, diaphoresis, fatigue, fever and unexpected weight change. Musculoskeletal: Positive for arthralgias, gait problem and joint swelling. Neurological ROS: negative for - behavioral changes, confusion, headaches or seizures. Negative for weakness and numbness. Dermatological ROS: negative for - mole changes, rash  Cardiovascular: Negative for leg swelling. Gastrointestinal: Negative for constipation, diarrhea, nausea and vomiting. Lower Extremity Physical Examination:     Vitals:   Vitals:    03/29/21 0812   Temp: 97 °F (36.1 °C)     General: AAO x 3 in NAD. Dermatologic Exam:  Skin lesion/ulceration Absent . Skin No rashes or nodules noted. .       Musculoskeletal:     1st MPJ ROM decreased, Bilateral.  Muscle strength 5/5, Bilateral.  Pain present upon palpation of right achilles heel insertion. Medial longitudinal arch, Bilateral decreased. +Pain with AJ ROM, right    Vascular: DP and PT pulses palpable 2/4, Bilateral.  CFT <3 seconds, Bilateral.  Hair growth present to the level of the digits, Bilateral.  Edema absent, Bilateral.  Varicosities absent, Bilateral. Erythema absent, Bilateral    Neurological: Sensation intact to light touch to level of digits, Bilateral.  Protective sensation intact 10/10 sites via 5.07/10g [place]-J[place]al Monofilament, Bilateral.  negative Tinel's, Bilateral.  negative Valleix sign, Bilateral.      Integument: Warm, dry, supple, Bilateral.  Open lesion absent, Bilateral.  Interdigital maceration absent to web spaces 1-4, Bilateral.  Nails are normal in length, thickness and color 1-5 bilateral.  Fissures absent, Bilateral.       Asessment: Patient is a 35 y.o. male with:    Diagnosis Orders   1. Right foot pain     2. Tendonitis, Achilles, right         Plan: Patient examined and evaluated. Current condition and treatment options discussed in detail. Advised pt to remain in CAM boot at all times when walking. Discussed that surgery is recommended. Pt will discuss with family as he needs help after surgery. Pt will call to set up surgery date. All labs were reviewed and all imagining including the above findings were reviewed PRIOR to the patients arrival and with the patient today. Previous patient encounter was reviewed. Encounters from the patients other medical providers were reviewed and noted. Time was spent educating the patient and their families/caregivers on proper care of the feet and ankles. All the above diagnosis were addressed at todays visit and all questions were answered. A total of 20 minutes was spent with this patients encounter which included charting after the patients visit    Verbal and written instructions given to patient. Contact office with any questions/problems/concerns.      No orders of the defined types were placed in this encounter. No orders of the defined types were placed in this encounter.        RTC in 2week(s).    3/29/2021      Electronically signed by Zainab Suero DPM on 3/29/2021 at 8:14 AM  3/29/2021

## 2021-03-31 ENCOUNTER — OFFICE VISIT (OUTPATIENT)
Dept: PODIATRY | Age: 41
End: 2021-03-31
Payer: MEDICAID

## 2021-03-31 VITALS — HEIGHT: 76 IN | BODY MASS INDEX: 38.36 KG/M2 | WEIGHT: 315 LBS | TEMPERATURE: 97.1 F

## 2021-03-31 DIAGNOSIS — M76.61 TENDONITIS, ACHILLES, RIGHT: ICD-10-CM

## 2021-03-31 DIAGNOSIS — M79.671 RIGHT FOOT PAIN: Primary | ICD-10-CM

## 2021-03-31 PROCEDURE — G8482 FLU IMMUNIZE ORDER/ADMIN: HCPCS | Performed by: PODIATRIST

## 2021-03-31 PROCEDURE — G8417 CALC BMI ABV UP PARAM F/U: HCPCS | Performed by: PODIATRIST

## 2021-03-31 PROCEDURE — 99213 OFFICE O/P EST LOW 20 MIN: CPT | Performed by: PODIATRIST

## 2021-03-31 PROCEDURE — G8428 CUR MEDS NOT DOCUMENT: HCPCS | Performed by: PODIATRIST

## 2021-03-31 PROCEDURE — 1036F TOBACCO NON-USER: CPT | Performed by: PODIATRIST

## 2021-03-31 NOTE — PROGRESS NOTES
[facility]YSICIANS  [facility]  [address] [place] [address]  Dept: [phone]  Dept Fax: [phone]    RETURN PATIENT PROGRESS NOTE  Date of patient's visit: 3/31/2021  Patient's Name:  Loreto Graham YOB: 1980            Patient Care Team:  Cathy Blackwood MD as PCP - General (Internal Medicine)  Cathy Blackwood MD as PCP - [facility] Provider  Yani Carrera MD as Surgeon (Orthopedic Surgery)  Jaci Reyes DPM as Physician (Podiatry)  Burton Valdivia DPM as Physician (Podiatry)       Loreto Borjachild 35 y.o. male that presents for follow-up of right foot pain  Chief Complaint   Patient presents with    Foot Pain     right     Pt's primary care physician is Cathy Blackwood MD last seen 2/4/21  Symptoms began 4 month(s) ago and are unchanged . Patient relates pain is Present. Pain is rated 7 out of 10 and is described as intermittent. Treatments prior to today's visit include: CAM boot. He would like to discuss surgery and states he cannot work at this time due to pain. Currently denies F/C/N/V. No Known Allergies    Past Medical History:   Diagnosis Date    Anxiety     Arthritis     CAD (coronary artery disease)     Fatty liver     Fatty liver disease, nonalcoholic 0/85/3410    Hypertension     Obesity     Unspecified sleep apnea     cpap       Prior to Admission medications    Medication Sig Start Date End Date Taking?  Authorizing Provider   PAIN RELIEF EXTRA STRENGTH 500 MG tablet take 1 tablet by mouth twice a day AS NEEDED FOR PAIN 3/12/21   Cathy Blackwood MD   fluticasone Linell Garth) 50 MCG/ACT nasal spray 1 spray by Nasal route daily 2/12/21   Cathy Blackwood MD   albuterol sulfate HFA (VENTOLIN HFA) 108 (90 Base) MCG/ACT inhaler Inhale 2 puffs into the lungs every 6 hours as needed for Wheezing 2/12/21   Cathy Blackwood MD   lisinopril (PRINIVIL;ZESTRIL) 20 MG tablet Take 1 tablet by mouth daily 2/12/21   \[facility] [place] [facility]\"" Sylvie Baca MD   metoprolol tartrate (LOPRESSOR) 25 MG tablet take 1 tablet by mouth twice a day 2/12/21   Unknown MD Shilo   predniSONE (DELTASONE) 10 MG tablet Take one PO TID X 3 days  Take one PO BID X 3 days  Take one PO qDaily X 3 days  Take 1/2 PO q daily  X 4 days 1/7/21   Donna Mendez DPM   ibuprofen (IBU) 800 MG tablet Take 1 tablet by mouth every 8 hours as needed for Pain 12/27/20   Clarence Hernandez DO   hydroCHLOROthiazide (HYDRODIURIL) 25 MG tablet Take 1 tablet by mouth daily 12/17/20   Unknown MD Shilo   aspirin-acetaminophen-caffeine (EXCEDRIN EXTRA STRENGTH) 514-323-74 MG per tablet Take 1 tablet by mouth every 8 hours as needed for Headaches 11/29/20   Carmen Srivastava MD   valACYclovir (VALTREX) 1 g tablet  6/23/20   Historical Provider, MD   metoprolol tartrate (LOPRESSOR) 25 MG tablet Take 2/day 7/7/20   Unknown MD Shilo   ARIPiprazole (ABILIFY) 10 MG tablet Take 10 mg by mouth daily 5/24/20   Historical Provider, MD       Review of Systems    Review of Systems:  History obtained from chart review and the patient  General ROS: negative for - chills, fatigue, fever, night sweats or weight gain  Constitutional: Negative for chills, diaphoresis, fatigue, fever and unexpected weight change. Musculoskeletal: Positive for arthralgias, gait problem and joint swelling. Neurological ROS: negative for - behavioral changes, confusion, headaches or seizures. Negative for weakness and numbness. Dermatological ROS: negative for - mole changes, rash  Cardiovascular: Negative for leg swelling. Gastrointestinal: Negative for constipation, diarrhea, nausea and vomiting. Lower Extremity Physical Examination:     Vitals:   Vitals:    03/31/21 1245   Temp: 97.1 °F (36.2 °C)     General: AAO x 3 in NAD. Dermatologic Exam:  Skin lesion/ulceration Absent . Skin No rashes or nodules noted. .       Musculoskeletal:     1st MPJ ROM decreased, Bilateral.  Muscle strength 5/5, Bilateral. Pain present upon palpation of right achilles tendon. Medial longitudinal arch, Bilateral WNL. Ankle ROM pain , right ankle upon dorsiflexion. Dorsally contracted digits absent digits 1-5 Bilateral.     Vascular: DP and PT pulses palpable 2/4, Bilateral.  CFT <3 seconds, Bilateral.  Hair growth present to the level of the digits, Bilateral.  Edema absent, Bilateral.  Varicosities absent, Bilateral. Erythema absent, Bilateral    Neurological: Sensation intact to light touch to level of digits, Bilateral.  Protective sensation intact 10/10 sites via 5.07/10g [place]-Jamaal Monofilament, Bilateral.  negative Tinel's, Bilateral.  negative Valleix sign, Bilateral.      Integument: Warm, dry, supple, Bilateral.  Open lesion absent, Bilateral.  Interdigital maceration absent to web spaces 1-4, Bilateral.  Nails are normal in length, thickness and color 1-5 bilateral.  Fissures absent, Bilateral.       Asessment: Patient is a 35 y.o. male with:    Diagnosis Orders   1. Right foot pain     2. Tendonitis, Achilles, right           Plan: Patient examined and evaluated. Current condition and treatment options discussed in detail. Advised pt to  Remain nonWB to right LE. Work noted written to be off for 2 weeks. At this time he would like to arrange care for after surgery with his family before scheduling surgery. Discussed surgery in depth today. All labs were reviewed and all imagining including the above findings were reviewed PRIOR to the patients arrival and with the patient today. Previous patient encounter was reviewed. Encounters from the patients other medical providers were reviewed and noted. Time was spent educating the patient and their families/caregivers on proper care of the feet and ankles. All the above diagnosis were addressed at todays visit and all questions were answered.   A total of 20 minutes was spent with this patients encounter which included charting after the patients visit

## 2021-04-12 DIAGNOSIS — M25.561 CHRONIC PAIN OF BOTH KNEES: ICD-10-CM

## 2021-04-12 DIAGNOSIS — M25.562 CHRONIC PAIN OF BOTH KNEES: ICD-10-CM

## 2021-04-12 DIAGNOSIS — G89.29 CHRONIC PAIN OF BOTH KNEES: ICD-10-CM

## 2021-04-12 NOTE — TELEPHONE ENCOUNTER
Request for Tylenol. Blowtorch message sent to patient to schedule appt    Next Visit Date:  No future appointments.     Health Maintenance   Topic Date Due    COVID-19 Vaccine (2 - Moderna 2-dose series) 04/23/2021    Potassium monitoring  02/22/2022    Creatinine monitoring  02/22/2022    Lipid screen  11/01/2024    DTaP/Tdap/Td vaccine (3 - Td) 04/27/2028    Flu vaccine  Completed    Pneumococcal 0-64 years Vaccine  Completed    Hepatitis C screen  Completed    HIV screen  Completed    Hepatitis A vaccine  Aged Out    Hepatitis B vaccine  Aged Out    Hib vaccine  Aged Out    Meningococcal (ACWY) vaccine  Aged Out       Hemoglobin A1C (%)   Date Value   02/22/2021 5.5   11/01/2019 5.4   07/10/2018 5.5             ( goal A1C is < 7)   No results found for: LABMICR  LDL Cholesterol (mg/dL)   Date Value   11/01/2019 104       (goal LDL is <100)   AST (U/L)   Date Value   11/01/2019 16     ALT (U/L)   Date Value   11/01/2019 10     BUN (mg/dL)   Date Value   02/22/2021 17     BP Readings from Last 3 Encounters:   12/27/20 (!) 156/92   11/29/20 (!) 155/102   11/23/20 (!) 148/99          (goal 120/80)    All Future Testing planned in [facility]  Lab Frequency Next Occurrence   MRI ANKLE LEFT WO CONTRAST Once 09/22/2020   MRI ANKLE RIGHT WO CONTRAST Once 02/03/2021         Patient Active Problem List:     Essential hypertension     Fatty liver disease, nonalcoholic     MIGEL on CPAP     Morbid (severe) obesity due to excess calories (HCC)     Vitamin D deficiency     Body mass index (BMI) of 38.0-38.9 in adult     IGT (impaired glucose tolerance)     Mild intermittent asthma     Bipolar affective disorder, currently depressed, mild (HCC)     Cervical spondylosis     Chronic midline low back pain without sciatica     Degeneration of cervical intervertebral disc     Osteoarthritis of knee     Scoliosis deformity of spine     Subacromial impingement

## 2021-04-14 RX ORDER — ACETAMINOPHEN 500 MG/1
TABLET, FILM COATED ORAL
Qty: 60 TABLET | Refills: 0 | Status: SHIPPED | OUTPATIENT
Start: 2021-04-14 | End: 2021-05-13

## 2021-05-12 ENCOUNTER — APPOINTMENT (OUTPATIENT)
Dept: GENERAL RADIOLOGY | Age: 41
End: 2021-05-12
Payer: MEDICAID

## 2021-05-12 ENCOUNTER — HOSPITAL ENCOUNTER (EMERGENCY)
Age: 41
Discharge: HOME | End: 2021-05-12
Attending: EMERGENCY MEDICINE
Payer: MEDICAID

## 2021-05-12 VITALS
TEMPERATURE: 97 F | RESPIRATION RATE: 16 BRPM | DIASTOLIC BLOOD PRESSURE: 66 MMHG | OXYGEN SATURATION: 96 % | SYSTOLIC BLOOD PRESSURE: 115 MMHG | HEART RATE: 63 BPM

## 2021-05-12 DIAGNOSIS — M76.60 ACHILLES TENDON PAIN: ICD-10-CM

## 2021-05-12 DIAGNOSIS — M79.671 RIGHT FOOT PAIN: Primary | ICD-10-CM

## 2021-05-12 PROCEDURE — 73630 X-RAY EXAM OF FOOT: CPT

## 2021-05-12 PROCEDURE — 6370000000 HC RX 637 (ALT 250 FOR IP): Performed by: STUDENT IN AN ORGANIZED HEALTH CARE EDUCATION/TRAINING PROGRAM

## 2021-05-12 PROCEDURE — 99283 EMERGENCY DEPT VISIT LOW MDM: CPT

## 2021-05-12 RX ORDER — ACETAMINOPHEN 500 MG
1000 TABLET ORAL ONCE
Status: COMPLETED | OUTPATIENT
Start: 2021-05-12 | End: 2021-05-12

## 2021-05-12 RX ADMIN — ACETAMINOPHEN 1000 MG: 500 TABLET ORAL at 19:01

## 2021-05-12 ASSESSMENT — PAIN DESCRIPTION - ORIENTATION: ORIENTATION: RIGHT

## 2021-05-12 ASSESSMENT — PAIN SCALES - GENERAL
PAINLEVEL_OUTOF10: 8
PAINLEVEL_OUTOF10: 8

## 2021-05-12 ASSESSMENT — ENCOUNTER SYMPTOMS
SHORTNESS OF BREATH: 0
SORE THROAT: 0
ABDOMINAL PAIN: 0
PHOTOPHOBIA: 0

## 2021-05-12 ASSESSMENT — PAIN DESCRIPTION - LOCATION: LOCATION: FOOT

## 2021-05-12 ASSESSMENT — PAIN DESCRIPTION - PAIN TYPE: TYPE: ACUTE PAIN

## 2021-05-12 NOTE — ED PROVIDER NOTES
Only a little   Relationships    Social connections     Talks on phone: Three times a week     Gets together: Twice a week     Attends [employer] service: 1 to 4 times per year     Active member of club or organization: No     Attends meetings of clubs or organizations: Never     Relationship status: Never     Intimate partner violence     Fear of current or ex partner: No     Emotionally abused: No     Physically abused: No     Forced sexual activity: No   Other Topics Concern    Not on file   Social History Narrative    ** Merged History Encounter **            Family History   Problem Relation Age of Onset    Arthritis Mother     Diabetes Father     Arthritis Father     Stroke Maternal Grandmother     Heart Disease Maternal Grandmother     Stroke Maternal Grandfather     Heart Disease Maternal Grandfather     Early Death Maternal Grandfather     Stroke Paternal Grandmother     Heart Disease Paternal Grandmother     Stroke Paternal Grandfather     Heart Disease Paternal Grandfather     Early Death Paternal Grandfather        Allergies:  Patient has no known allergies. Home Medications:  Prior to Admission medications    Medication Sig Start Date End Date Taking?  Authorizing Provider   PAIN RELIEF EXTRA STRENGTH 500 MG tablet take 1 tablet by mouth twice a day AS NEEDED FOR PAIN 4/14/21   Catherine Conde MD   fluticasone Alma Rosa Roup) 50 MCG/ACT nasal spray 1 spray by Nasal route daily 2/12/21   Catherine Conde MD   albuterol sulfate HFA (VENTOLIN HFA) 108 (90 Base) MCG/ACT inhaler Inhale 2 puffs into the lungs every 6 hours as needed for Wheezing 2/12/21   Catherine Conde MD   lisinopril (PRINIVIL;ZESTRIL) 20 MG tablet Take 1 tablet by mouth daily 2/12/21   Catherine Conde MD   metoprolol tartrate (LOPRESSOR) 25 MG tablet take 1 tablet by mouth twice a day 2/12/21   Catherine Conde MD   predniSONE (DELTASONE) 10 MG tablet Take one PO TID X 3 days  Take one PO BID X 3 days  Take one PO qDaily X 3 days  Take 1/2 PO q daily  X 4 days 1/7/21   Kirk Lopez DPM   ibuprofen (IBU) 800 MG tablet Take 1 tablet by mouth every 8 hours as needed for Pain 12/27/20   Celso Lundberg DO   hydroCHLOROthiazide (HYDRODIURIL) 25 MG tablet Take 1 tablet by mouth daily 12/17/20   Husam Chen MD   aspirin-acetaminophen-caffeine (EXCEDRIN EXTRA STRENGTH) 090-499-76 MG per tablet Take 1 tablet by mouth every 8 hours as needed for Headaches 11/29/20   Rai Guzman MD   valACYclovir (VALTREX) 1 g tablet  6/23/20   Historical Provider, MD   metoprolol tartrate (LOPRESSOR) 25 MG tablet Take 2/day 7/7/20   Husam Chen MD   ARIPiprazole (ABILIFY) 10 MG tablet Take 10 mg by mouth daily 5/24/20   Historical Provider, MD       REVIEW OF SYSTEMS    (2-9 systems for level 4, 10 or more for level 5)      Review of Systems   Constitutional: Negative for fever. HENT: Negative for congestion and sore throat. Eyes: Negative for photophobia. Respiratory: Negative for shortness of breath. Cardiovascular: Negative for chest pain. Gastrointestinal: Negative for abdominal pain. Musculoskeletal: Positive for arthralgias. Negative for neck pain. Skin: Negative for wound. Allergic/Immunologic: Negative for environmental allergies and food allergies. Neurological: Negative for syncope and headaches. Hematological: Negative for adenopathy. Psychiatric/Behavioral: Negative for agitation and confusion. PHYSICAL EXAM   (up to 7 for level 4, 8 or more for level 5)      INITIAL VITALS:   /66   Pulse 63   Temp 97 °F (36.1 °C) (Temporal)   Resp 16   SpO2 96%     Physical Exam  Vitals signs and nursing note reviewed. Constitutional:       General: He is not in acute distress. Appearance: He is obese. He is not ill-appearing, toxic-appearing or diaphoretic. HENT:      Head: Normocephalic and atraumatic.       Right Ear: External ear normal.      Left Ear: External ear normal.      Nose: Nose normal.      Mouth/Throat:      Pharynx: Oropharynx is clear. Eyes:      Conjunctiva/sclera: Conjunctivae normal.   Cardiovascular:      Rate and Rhythm: Normal rate. Pulses: Normal pulses. Pulmonary:      Effort: Pulmonary effort is normal.   Abdominal:      Palpations: Abdomen is soft. Musculoskeletal: Normal range of motion. General: No swelling or deformity. Comments: Very mild ttp along the achilles. Full rom of the ankle  Negative fuentes sign   Skin:     General: Skin is warm. Capillary Refill: Capillary refill takes less than 2 seconds. Neurological:      Mental Status: He is alert and oriented to person, place, and time. Psychiatric:         Mood and Affect: Mood normal.         DIFFERENTIAL  DIAGNOSIS     PLAN (LABS / IMAGING / EKG):  Orders Placed This Encounter   Procedures    XR FOOT RIGHT (MIN 3 VIEWS)    Ice to affected area       MEDICATIONS ORDERED:  Orders Placed This Encounter   Medications    acetaminophen (TYLENOL) tablet 1,000 mg       DDX: Achilles tendinitis, strain, fracture    DIAGNOSTIC RESULTS / EMERGENCY DEPARTMENT COURSE / MDM   LAB RESULTS:  No results found for this visit on 05/12/21. IMPRESSION: Alert oriented nontoxic 55-year-old obese male complaining of acute on chronic right Achilles discomfort and pain in his foot. No erythema no calf tenderness Flagyl motion of his hip and knee pulse motor and sensory function are intact distally cap refills less than 2 seconds in each of his toes. Range of motion of the ankle. Patient is able to ambulate I doubt there is any fracture will obtain x-ray imaging, provide analgesia as needed anticipate discharge with podiatry follow-up    RADIOLOGY:  Xr Foot Right (min 3 Views)    Result Date: 5/12/2021  EXAMINATION: THREE XRAY VIEWS OF THE RIGHT FOOT 5/12/2021 7:07 pm COMPARISON: Right foot radiograph 01/07/2021.  HISTORY: ORDERING SYSTEM PROVIDED HISTORY: pain mostly over achilles TECHNOLOGIST PROVIDED HISTORY: pain mostly over achilles Reason for Exam: fall/pain FINDINGS: No acute soft tissue swelling. Redemonstration of mild Achilles tendon thickening suggesting tendinopathy. Stable chronic ossicle adjacent to the medial malleolus consistent with remote injury. Chronic hypertrophic changes of the distal tibial metaphysis suggesting remote injury. No acute fracture. Redemonstration of findings suggesting mild Achilles tendinopathy. No acute osseous abnormality. EKG  none    All EKG's are interpreted by the Emergency Department Physician who either signs or Co-signs this chart in the absence of a cardiologist.    EMERGENCY DEPARTMENT COURSE:  Patient seen and evaluated history physical exam signs and symptoms are consistent with a known Achilles tendinopathy x-ray imaging demonstrated no acute bony abnormalities patient provided ice and acetaminophen with improvement in symptoms discharged home in stable condition with outpatient follow-up. PROCEDURES:  none    CONSULTS:  None    CRITICAL CARE:  Please see attending note    FINAL IMPRESSION      1.  Right foot pain          DISPOSITION / PLAN     DISPOSITION  [facility] with podiatry followup      PATIENT REFERRED TO:  MD Charity Vega [facility] [address]. [facility] [address] [place] [id number]  [address]  [address] [facility] [address]  [phone]  Go to   As needed, If symptoms worsen    [address] [place] [address]  [phone]  Schedule an appointment as soon as possible for a visit         DISCHARGE MEDICATIONS:  Discharge Medication List as of 5/12/2021  7:23 PM          Melinda Edge DO  Emergency Medicine Resident    (Please note that portions of thisnote were completed with a voice recognition program.  Efforts were made to edit the dictations but occasionally words are mis-transcribed.)        Melinda Edge DO  Resident  05/12/21 2008

## 2021-05-12 NOTE — ED PROVIDER NOTES
[facility]     Emergency Department     Faculty Attestation    I performed a history and physical examination of the patient and discussed management with the resident. I reviewed the resident´s note and agree with the documented findings and plan of care. Any areas of disagreement are noted on the chart. I was personally present for the key portions of any procedures. I have documented in the chart those procedures where I was not present during the key portions. I have reviewed the emergency nurses triage note. I agree with the chief complaint, past medical history, past surgical history, allergies, medications, social and family history as documented unless otherwise noted below. For Physician Assistant/ Nurse Practitioner cases/documentation I have personally evaluated this patient and have completed at least one if not all key elements of the E/M (history, physical exam, and MDM). Additional findings are as noted. Pain over the right Achilles tendon, tendon is intact, patient has a note from podiatry that he may need surgery on this tendon. Patient states he twisted the ankle last evening and made the tendon worse.       Pineda Boyle MD  05/12/21 2293

## 2021-05-13 ENCOUNTER — CARE COORDINATION (OUTPATIENT)
Dept: CARE COORDINATION | Age: 41
End: 2021-05-13

## 2021-05-13 DIAGNOSIS — G89.29 CHRONIC PAIN OF BOTH KNEES: ICD-10-CM

## 2021-05-13 DIAGNOSIS — M25.562 CHRONIC PAIN OF BOTH KNEES: ICD-10-CM

## 2021-05-13 DIAGNOSIS — M25.561 CHRONIC PAIN OF BOTH KNEES: ICD-10-CM

## 2021-05-13 RX ORDER — ACETAMINOPHEN 500 MG/1
TABLET, FILM COATED ORAL
Qty: 60 TABLET | Refills: 0 | Status: SHIPPED | OUTPATIENT
Start: 2021-05-13 | End: 2021-06-09

## 2021-05-13 NOTE — CARE COORDINATION
Called for ED F/U. Message left on VM. Will close COVID episode no COVID testing was done.  Will F/U in 7-10 days for possible ACM enrollment

## 2021-05-13 NOTE — TELEPHONE ENCOUNTER
E-scribe request for Tylenol. Please review and e-scribe if applicable.        Next Visit Date:  Future Appointments   Date Time Provider Kathi Jackson   5/17/2021  2:30 PM Kye Head, DPM [facility]   5/28/2021 10:00 AM Stacia Siu MD [address] [place] [address] Maintenance   Topic Date Due    COVID-19 Vaccine (2 - Moderna 2-dose series) 04/23/2021    Potassium monitoring  02/22/2022    Creatinine monitoring  02/22/2022    Lipid screen  11/01/2024    DTaP/Tdap/Td vaccine (3 - Td) 04/27/2028    Flu vaccine  Completed    Pneumococcal 0-64 years Vaccine  Completed    Hepatitis C screen  Completed    HIV screen  Completed    Hepatitis A vaccine  Aged Out    Hepatitis B vaccine  Aged Out    Hib vaccine  Aged Out    Meningococcal (ACWY) vaccine  Aged Out               (applicable per patient's age: Cancer Screenings, Depression Screening, Fall Risk Screening, Immunizations)    Hemoglobin A1C (%)   Date Value   02/22/2021 5.5   11/01/2019 5.4   07/10/2018 5.5     LDL Cholesterol (mg/dL)   Date Value   11/01/2019 104     AST (U/L)   Date Value   11/01/2019 16     ALT (U/L)   Date Value   11/01/2019 10     BUN (mg/dL)   Date Value   02/22/2021 17      (goal A1C is < 7)   (goal LDL is <100) need 30-50% reduction from baseline     BP Readings from Last 3 Encounters:   05/12/21 115/66   12/27/20 (!) 156/92   11/29/20 (!) 155/102    (goal /80)      All Future Testing planned in [facility]:  Lab Frequency Next Occurrence   MRI ANKLE LEFT WO CONTRAST Once 09/22/2020   MRI ANKLE RIGHT WO CONTRAST Once 02/03/2021            Patient Active Problem List:     Essential hypertension     Fatty liver disease, nonalcoholic     MIGEL on CPAP     Morbid (severe) obesity due to excess calories (HCC)     Vitamin D deficiency     Body mass index (BMI) of 38.0-38.9 in adult     IGT (impaired glucose tolerance)     Mild intermittent asthma     Bipolar affective disorder, currently depressed, mild (HCC)     Cervical

## 2021-05-25 ENCOUNTER — CARE COORDINATION (OUTPATIENT)
Dept: CARE COORDINATION | Age: 41
End: 2021-05-25

## 2021-05-25 NOTE — CARE COORDINATION
Ambulatory Care Coordination Note  5/25/2021  CM Risk Score: 6  Charlson 10 Year Mortality Risk Score: 79%     ACC: Erin Terrell RN    Summary Note: called, left message on voicemail with contact information and reason for call. Will F/U in a week for third attempt. Introduction letter mailed            Goals Addressed    None         Prior to Admission medications    Medication Sig Start Date End Date Taking?  Authorizing Provider   PAIN RELIEF EXTRA STRENGTH 500 MG tablet take 1 tablet by mouth twice a day AS NEEDED FOR PAIN 5/13/21   Shayna Villaseñor MD   fluticasone [facility]) 50 MCG/ACT nasal spray 1 spray by Nasal route daily 2/12/21   Shayna Villaseñor MD   albuterol sulfate HFA (VENTOLIN HFA) 108 (90 Base) MCG/ACT inhaler Inhale 2 puffs into the lungs every 6 hours as needed for Wheezing 2/12/21   Shayna Villaseñor MD   lisinopril (PRINIVIL;ZESTRIL) 20 MG tablet Take 1 tablet by mouth daily 2/12/21   Shayna Villaseñor MD   metoprolol tartrate (LOPRESSOR) 25 MG tablet take 1 tablet by mouth twice a day 2/12/21   Shayna Villaseñor MD   predniSONE (DELTASONE) 10 MG tablet Take one PO TID X 3 days  Take one PO BID X 3 days  Take one PO qDaily X 3 days  Take 1/2 PO q daily  X 4 days 1/7/21   Lissa Mackey DPM   ibuprofen (IBU) 800 MG tablet Take 1 tablet by mouth every 8 hours as needed for Pain 12/27/20   Thuan Duran DO   hydroCHLOROthiazide (HYDRODIURIL) 25 MG tablet Take 1 tablet by mouth daily 12/17/20   Shayna Villaseñor MD   aspirin-acetaminophen-caffeine (EXCEDRIN EXTRA STRENGTH) 303-418-27 MG per tablet Take 1 tablet by mouth every 8 hours as needed for Headaches 11/29/20   Gary Mcdaniels MD   valACYclovir (VALTREX) 1 g tablet  6/23/20   Historical Provider, MD   metoprolol tartrate (LOPRESSOR) 25 MG tablet Take 2/day 7/7/20   Shayna Villaseñor MD   ARIPiprazole (ABILIFY) 10 MG tablet Take 10 mg by mouth daily 5/24/20   Historical Provider, MD       Future Appointments   Date Time Provider Kathi Jackson   5/26/2021  2:00 PM Marita Oreilly DPM [facility]   5/28/2021 10:00 AM Cameron Tierney MD [address] [place] [address] [address] [facility]

## 2021-05-26 ENCOUNTER — OFFICE VISIT (OUTPATIENT)
Dept: PODIATRY | Age: 41
End: 2021-05-26
Payer: MEDICAID

## 2021-05-26 ENCOUNTER — CARE COORDINATION (OUTPATIENT)
Dept: CARE COORDINATION | Age: 41
End: 2021-05-26

## 2021-05-26 VITALS — WEIGHT: 315 LBS | HEIGHT: 76 IN | BODY MASS INDEX: 38.36 KG/M2

## 2021-05-26 DIAGNOSIS — M79.671 RIGHT FOOT PAIN: Primary | ICD-10-CM

## 2021-05-26 DIAGNOSIS — M76.61 TENDONITIS, ACHILLES, RIGHT: ICD-10-CM

## 2021-05-26 PROCEDURE — 99214 OFFICE O/P EST MOD 30 MIN: CPT | Performed by: PODIATRIST

## 2021-05-26 PROCEDURE — 1036F TOBACCO NON-USER: CPT | Performed by: PODIATRIST

## 2021-05-26 PROCEDURE — G8427 DOCREV CUR MEDS BY ELIG CLIN: HCPCS | Performed by: PODIATRIST

## 2021-05-26 PROCEDURE — G8417 CALC BMI ABV UP PARAM F/U: HCPCS | Performed by: PODIATRIST

## 2021-05-26 NOTE — PATIENT INSTRUCTIONS
Schedule a Vaccine  When you qualify to receive the vaccine, call the [facility]) COVID-19 Vaccination Hotline to schedule your appointment or to get additional information about the [facility]) locations which are offering the COVID-19 vaccine. To be 94% effective, it's important that you receive two doses of one of the COVID-19 vaccines. -If you are receiving the [place] vaccine, your second shot will be scheduled as close to 21 days after the first shot as possible. -If you are receiving the Moderna vaccine, your second shot will be scheduled as close to 28 days after the first shot as possible. [facility]) COVID-19 Vaccination Hotline: [phone]    Links to [facility]) website and [facility] website:    [email or web address]    [email or web address]

## 2021-05-26 NOTE — PROGRESS NOTES
[facility] PHYSICIANS  [facility]  [address] [place] [address]  Dept: [phone]  Dept Fax: [phone]    RETURN PATIENT PROGRESS NOTE  Date of patient's visit: 5/26/2021  Patient's Name:  Rea Simmons YOB: 1980            Patient Care Team:  Isabela Hyman MD as PCP - General (Internal Medicine)  Isabela Hyman MD as PCP - REHABILITATION [facility]  Autumn Veronica MD as Surgeon (Orthopedic Surgery)  Madai Verdin DPM as Physician (Podiatry)  Deysi Alexis DPM as Physician (Podiatry)  Tari Jolly RN as Ambulatory Care Manager       Elidamackjeanne Danielle 35 y.o. male that presents for follow-up of   Chief Complaint   Patient presents with    Foot Pain     right    Surgical Consult     possible     Pt's primary care physician is Isabela Hyman MD last seen 2/4/21  Symptoms began 6 -7 month(s) ago and are unchanged . Patient relates pain is Present to right heel along achilles tendon. Pain is rated 9 out of 10 and is described as constant, moderate, severe. Patient states the last 3 days foot pain has become worse, especially after work. Treatments prior to today's visit include: previous podiatry treatment. Currently denies F/C/N/V. No Known Allergies    Past Medical History:   Diagnosis Date    Anxiety     Arthritis     CAD (coronary artery disease)     Fatty liver     Fatty liver disease, nonalcoholic 5/22/6443    Hypertension     Obesity     Unspecified sleep apnea     cpap       Prior to Admission medications    Medication Sig Start Date End Date Taking?  Authorizing Provider   PAIN RELIEF EXTRA STRENGTH 500 MG tablet take 1 tablet by mouth twice a day AS NEEDED FOR PAIN 5/13/21  Yes Isabela Hyman MD   fluticasone [facility]) 50 MCG/ACT nasal spray 1 spray by Nasal route daily 2/12/21  Yes Isabela Hyman MD   albuterol sulfate HFA (VENTOLIN HFA) 108 (90 Base) MCG/ACT inhaler Inhale 2 puffs into the lungs every 6 hours as lesion/ulceration Absent . Skin No rashes or nodules noted. .       Musculoskeletal:     1st MPJ ROM decreased, Bilateral.  Muscle strength 5/5, Bilateral.  Pain present upon palpation of right posterior heel along achilles tendon. Medial longitudinal arch, Bilateral decreased. Ankle ROM WNL,Bilateral.    Dorsally contracted digits absent digits 1-5 Bilateral.     Vascular: DP and PT pulses palpable 2/4, Bilateral.  CFT <3 seconds, Bilateral.  Hair growth present to the level of the digits, Bilateral.  Edema absent, Bilateral.  Varicosities absent, Bilateral. Erythema absent, Bilateral    Neurological: Sensation intact to light touch to level of digits, Bilateral.  Protective sensation intact 10/10 sites via 5.07/10g Charlotte-Jamaal Monofilament, Bilateral.  negative Tinel's, Bilateral.  negative Valleix sign, Bilateral.      Integument: Warm, dry, supple, Bilateral.  Open lesion absent, Bilateral.  Interdigital maceration absent to web spaces 1-4, Bilateral.  Nails are normal in length, thickness and color 1-5 bilateral.  Fissures absent, Bilateral.     MRI right ankle:    Achilles tendinopathy with multifocal small intrasubstance tears.  Marrow   edema within the calcaneus at the Achilles tendon insertion is likely   reactive.  Deep retrocalcaneal bursitis.       Peroneus longus and brevis tenosynovitis. Asessment: Patient is a 35 y.o. male with:    Diagnosis Orders   1. Right foot pain     2. Tendonitis, Achilles, right           Plan: Patient examined and evaluated. Current condition and treatment options discussed in detail. All labs were reviewed and all imagining including the above findings were reviewed prior to the patients arrival and with the patient today      Time was spent educating the patient and their families/caregivers on proper care of the feet and ankles. All the above diagnosis were addressed at todays visit and all questions were answered.       I had a long discussion today with the patient about the likely diagnosis and its natural history, physical exam and imaging findings, as well as treatment options. We discussed both surgical and nonsurgical treatments, including risks and benefits. From a nonoperative standpoint, we discussed rest/activity modification, NSAIDs/Acetaminophen/topical anesthetics, orthotics, bracing/immobilization, and physical therapy. Surgically, we recommend surgical repair of achilles tendon , right. I discussed in detail the procedure. He/She was in full agreement and understood risks. The reason for surgery is due to unsuccessful non-operative treatment and/or conservative treatment is not a viable option. It was discussed with the patient that compliance postoperatively is of utmost importance. Any deviation on behalf of the patient will decrease the chances of a successful outcome. Patient acknowledged, understands, and would like to move forward with surgery as discussed. The patient was given a consent outlining the general risk of surgery as well as the specifics to the surgical plan. This was carefully discussed giving all options, indications and contraindications regarding the procedure outlined in the consent. All questions were answered to the patient's satisfaction. The patient signed the consent form confirming complete understanding and acceptance of the risks of stated. I specifically stated and inquired if the patient understands and accepts the risks of surgery including infection, failure, prolonged pain, swelling, numbness, recurrence, limited mobility,painful scar, RSDS, overcorrection, under-correction, and loss of limb/life. Death, bleeding, blood clots in the veins or lungs, tendon or blood vessel disturbance, bony conditions, continued pain,stiffness, weakness, and limited function. These were all listed on the consent. Additionally, the postoperative course and treatment was outlined for the patient.  Discussion consisted of postoperatively the patient needs to keep the foot elevated for at least the first initial two weeks. I have encouraged movements such as moving from the bed to the sofa or recliner, to the kitchen and the bathroom; quick bursts of movement with the foot elevated. Longstanding periods of time such as cooking, cleaning, and shopping are not permitted. I reminded the patient that there are only two reasons to have surgery. That being, if their function is impaired and also if they are having pain. If they can answer yes to both these questions, I will move forward with surgery. If they can not, there is no reason to proceed with surgical intervention. Pt does elect to have the procedure above    A total of 30 minutes was spent with this patients encounter    Verbal and written instructions given to patient. Contact office with any questions/problems/concerns. No orders of the defined types were placed in this encounter. No orders of the defined types were placed in this encounter. RTC in 1week(s) s/p surgery.     5/26/2021      Electronically signed by Valdez Ray DPM on 5/26/2021 at 2:09 PM  5/26/2021

## 2021-05-28 ENCOUNTER — OFFICE VISIT (OUTPATIENT)
Dept: INTERNAL MEDICINE | Age: 41
End: 2021-05-28
Payer: MEDICAID

## 2021-05-28 ENCOUNTER — HOSPITAL ENCOUNTER (OUTPATIENT)
Age: 41
Setting detail: SPECIMEN
Discharge: HOME | End: 2021-05-28
Payer: MEDICAID

## 2021-05-28 VITALS
HEART RATE: 70 BPM | WEIGHT: 315 LBS | HEIGHT: 76 IN | DIASTOLIC BLOOD PRESSURE: 82 MMHG | BODY MASS INDEX: 38.36 KG/M2 | SYSTOLIC BLOOD PRESSURE: 135 MMHG | TEMPERATURE: 98.1 F

## 2021-05-28 DIAGNOSIS — Z99.89 OSA ON CPAP: ICD-10-CM

## 2021-05-28 DIAGNOSIS — K76.0 FATTY LIVER DISEASE, NONALCOHOLIC: ICD-10-CM

## 2021-05-28 DIAGNOSIS — G47.33 OSA ON CPAP: ICD-10-CM

## 2021-05-28 DIAGNOSIS — R73.02 IGT (IMPAIRED GLUCOSE TOLERANCE): Primary | ICD-10-CM

## 2021-05-28 DIAGNOSIS — E66.01 CLASS 2 SEVERE OBESITY DUE TO EXCESS CALORIES WITH SERIOUS COMORBIDITY AND BODY MASS INDEX (BMI) OF 38.0 TO 38.9 IN ADULT (HCC): ICD-10-CM

## 2021-05-28 DIAGNOSIS — I10 ESSENTIAL HYPERTENSION: ICD-10-CM

## 2021-05-28 DIAGNOSIS — F31.31 BIPOLAR AFFECTIVE DISORDER, CURRENTLY DEPRESSED, MILD (HCC): ICD-10-CM

## 2021-05-28 LAB
ALBUMIN SERPL-MCNC: 4 G/DL (ref 3.5–5.2)
ALBUMIN/GLOBULIN RATIO: 1.2 (ref 1–2.5)
ALP BLD-CCNC: 67 U/L (ref 40–129)
ALT SERPL-CCNC: 13 U/L (ref 5–41)
ANION GAP SERPL CALCULATED.3IONS-SCNC: 13 MMOL/L (ref 9–17)
AST SERPL-CCNC: 15 U/L
BILIRUB SERPL-MCNC: 0.41 MG/DL (ref 0.3–1.2)
BUN BLDV-MCNC: 11 MG/DL (ref 6–20)
BUN/CREAT BLD: ABNORMAL (ref 9–20)
CALCIUM SERPL-MCNC: 9.2 MG/DL (ref 8.6–10.4)
CHLORIDE BLD-SCNC: 105 MMOL/L (ref 98–107)
CHOLESTEROL, FASTING: 148 MG/DL
CHOLESTEROL/HDL RATIO: 3.8
CO2: 24 MMOL/L (ref 20–31)
CREAT SERPL-MCNC: 0.77 MG/DL (ref 0.7–1.2)
ESTIMATED AVERAGE GLUCOSE: 114 MG/DL
GFR AFRICAN AMERICAN: >60 ML/MIN
GFR NON-AFRICAN AMERICAN: >60 ML/MIN
GFR SERPL CREATININE-BSD FRML MDRD: ABNORMAL ML/MIN/{1.73_M2}
GFR SERPL CREATININE-BSD FRML MDRD: ABNORMAL ML/MIN/{1.73_M2}
GLUCOSE BLD-MCNC: 85 MG/DL (ref 70–99)
HBA1C MFR BLD: 5.6 % (ref 4–6)
HCT VFR BLD CALC: 41 % (ref 40.7–50.3)
HDLC SERPL-MCNC: 39 MG/DL
HEMOGLOBIN: 13.4 G/DL (ref 13–17)
LDL CHOLESTEROL: 91 MG/DL (ref 0–130)
MCH RBC QN AUTO: 30 PG (ref 25.2–33.5)
MCHC RBC AUTO-ENTMCNC: 32.7 G/DL (ref 28.4–34.8)
MCV RBC AUTO: 91.9 FL (ref 82.6–102.9)
NRBC AUTOMATED: 0 PER 100 WBC
PDW BLD-RTO: 14.6 % (ref 11.8–14.4)
PLATELET # BLD: 291 K/UL (ref 138–453)
PMV BLD AUTO: 10 FL (ref 8.1–13.5)
POTASSIUM SERPL-SCNC: 3.5 MMOL/L (ref 3.7–5.3)
RBC # BLD: 4.46 M/UL (ref 4.21–5.77)
SODIUM BLD-SCNC: 142 MMOL/L (ref 135–144)
THYROXINE, FREE: 1.05 NG/DL (ref 0.93–1.7)
TOTAL PROTEIN: 7.3 G/DL (ref 6.4–8.3)
TRIGLYCERIDE, FASTING: 88 MG/DL
TSH SERPL DL<=0.05 MIU/L-ACNC: 2.03 MIU/L (ref 0.3–5)
VITAMIN D 25-HYDROXY: 14.4 NG/ML (ref 30–100)
VLDLC SERPL CALC-MCNC: ABNORMAL MG/DL (ref 1–30)
WBC # BLD: 5.9 K/UL (ref 3.5–11.3)

## 2021-05-28 PROCEDURE — G8427 DOCREV CUR MEDS BY ELIG CLIN: HCPCS | Performed by: INTERNAL MEDICINE

## 2021-05-28 PROCEDURE — 1036F TOBACCO NON-USER: CPT | Performed by: INTERNAL MEDICINE

## 2021-05-28 PROCEDURE — 99213 OFFICE O/P EST LOW 20 MIN: CPT | Performed by: INTERNAL MEDICINE

## 2021-05-28 PROCEDURE — 99211 OFF/OP EST MAY X REQ PHY/QHP: CPT | Performed by: INTERNAL MEDICINE

## 2021-05-28 PROCEDURE — G8417 CALC BMI ABV UP PARAM F/U: HCPCS | Performed by: INTERNAL MEDICINE

## 2021-05-28 RX ORDER — MELATONIN 10 MG
10 CAPSULE ORAL NIGHTLY
Qty: 30 CAPSULE | Refills: 0 | Status: SHIPPED | OUTPATIENT
Start: 2021-05-28 | End: 2021-06-01 | Stop reason: SDUPTHER

## 2021-05-28 RX ORDER — FLUOXETINE 10 MG/1
10 CAPSULE ORAL 2 TIMES DAILY
COMMUNITY
Start: 2021-05-19 | End: 2021-08-03 | Stop reason: DRUGHIGH

## 2021-05-28 RX ORDER — HYDROCHLOROTHIAZIDE 25 MG/1
25 TABLET ORAL DAILY
Qty: 90 TABLET | Refills: 1 | Status: SHIPPED | OUTPATIENT
Start: 2021-05-28 | End: 2021-11-29

## 2021-05-28 RX ORDER — LISINOPRIL 20 MG/1
20 TABLET ORAL DAILY
Qty: 90 TABLET | Refills: 1 | Status: SHIPPED | OUTPATIENT
Start: 2021-05-28 | End: 2021-11-29

## 2021-05-28 SDOH — ECONOMIC STABILITY: FOOD INSECURITY: WITHIN THE PAST 12 MONTHS, THE FOOD YOU BOUGHT JUST DIDN'T LAST AND YOU DIDN'T HAVE MONEY TO GET MORE.: NEVER TRUE

## 2021-05-28 ASSESSMENT — ENCOUNTER SYMPTOMS
COUGH: 0
SHORTNESS OF BREATH: 0
BACK PAIN: 0
PHOTOPHOBIA: 0
WHEEZING: 0

## 2021-05-28 ASSESSMENT — SOCIAL DETERMINANTS OF HEALTH (SDOH): HOW HARD IS IT FOR YOU TO PAY FOR THE VERY BASICS LIKE FOOD, HOUSING, MEDICAL CARE, AND HEATING?: NOT VERY HARD

## 2021-05-28 NOTE — PROGRESS NOTES
[facility]    Progress Note    Date of patient's visit: 5/28/2021    Patient's Name:  Daron Riley    YOB: 1980            Patient Care Team:  Darcy Torres MD as PCP - General (Internal Medicine)  Darcy Torres MD as PCP - [facility] Empaneled Provider  Karla Boyd MD as Surgeon (Orthopedic Surgery)  Joaquina Wilson DPM as Physician (Podiatry)  Valdez Ray DPM as Physician (Podiatry)  Funmi Cobb RN as Ambulatory Care Manager    REASON FOR VISIT: Routine outpatient follow     Chief Complaint   Patient presents with    Hypertension     Pt had labs done today-- results are not in yet          HISTORY OF PRESENT ILLNESS:    History was obtained from the patient. Daron Riley is a 35 y.o. is here for up on his blood pressure. He did not take his medications this morning. Overall it appears uncontrolled. It was also overall controlled last time he was seen by podiatry. He states compliance with all his medications. He had labs as in this morning and results are not available yet. He continues to follow-up with podiatry because of Achilles tendinitis. He is having continuing pain his job when he is on his feet a long time. He has planned surgery in June or July. He says he is compliant with CPAP and using it daily. He has been having some problems with insomnia. He feels wired up. This started at approximately 9 or 10 months ago. He says he tried something over-the-counter from pharmacy which did not really help. He gets 2 to 3 hours of sleep only. He seems to think it started around the same time he started taking fluoxetine. He sees a psychiatrist at [facility]. I have advised him to take his fluoxetine in the morning as he has been taking it late in the evening. We will see if that helps and will try some melatonin for now. He has questions regarding diet. He would like to maintain his weight loss. Unfortunately is not able to exercise because of his ankle issues for now. Past Medical History:   Diagnosis Date    Anxiety     Arthritis     CAD (coronary artery disease)     Fatty liver     Fatty liver disease, nonalcoholic 6/56/1325    Hypertension     Obesity     Unspecified sleep apnea     cpap       No past surgical history on file. ALLERGIES    No Known Allergies    MEDICATIONS:      Current Outpatient Medications on File Prior to Visit   Medication Sig Dispense Refill    FLUoxetine (PROZAC) 10 MG capsule Take 10 mg by mouth 2 times daily      PAIN RELIEF EXTRA STRENGTH 500 MG tablet take 1 tablet by mouth twice a day AS NEEDED FOR PAIN 60 tablet 0    fluticasone (FLONASE) 50 MCG/ACT nasal spray 1 spray by Nasal route daily 16 g 3    albuterol sulfate HFA (VENTOLIN HFA) 108 (90 Base) MCG/ACT inhaler Inhale 2 puffs into the lungs every 6 hours as needed for Wheezing 18 g 3    lisinopril (PRINIVIL;ZESTRIL) 20 MG tablet Take 1 tablet by mouth daily 90 tablet 1    metoprolol tartrate (LOPRESSOR) 25 MG tablet take 1 tablet by mouth twice a day 180 tablet 1    predniSONE (DELTASONE) 10 MG tablet Take one PO TID X 3 days  Take one PO BID X 3 days  Take one PO qDaily X 3 days  Take 1/2 PO q daily  X 4 days 21 tablet 0    ibuprofen (IBU) 800 MG tablet Take 1 tablet by mouth every 8 hours as needed for Pain 30 tablet 0    hydroCHLOROthiazide (HYDRODIURIL) 25 MG tablet Take 1 tablet by mouth daily 90 tablet 1    aspirin-acetaminophen-caffeine (EXCEDRIN EXTRA STRENGTH) 250-250-65 MG per tablet Take 1 tablet by mouth every 8 hours as needed for Headaches 30 tablet 0    valACYclovir (VALTREX) 1 g tablet       ARIPiprazole (ABILIFY) 10 MG tablet Take 10 mg by mouth daily      [DISCONTINUED] metoprolol tartrate (LOPRESSOR) 25 MG tablet Take 2/day 180 tablet 1     No current facility-administered medications on file prior to visit. HISTORY    Reviewed and no change from previous record. mouth daily  Dispense: 90 tablet; Refill: 1  - hydroCHLOROthiazide (HYDRODIURIL) 25 MG tablet; Take 1 tablet by mouth daily  Dispense: 90 tablet; Refill: 1    2. IGT (impaired glucose tolerance)  Labs pending    3. Fatty liver disease, nonalcoholic  Labs pending  Continue low fat diet and weight loss efforts    4. MIGEL on CPAP  Compliant     5. Class 2 severe obesity due to excess calories with serious comorbidity and body mass index (BMI) of 38.0 to 38.9 in adult [facility])  Continue lifestyle changes     6. Bipolar affective disorder, currently depressed, mild ([facility]  [facility]  Take SSRI in morning          FOLLOW UP AND INSTRUCTIONS:   Return in about 6 months (around 11/28/2021). 1. Depree received counseling on the following healthy behaviors: nutrition, exercise and medication adherence    2. Reviewed prior labs and health maintenance. 3. Discussed use, benefit, and side effects of prescribed medications. Barriers to medication compliance addressed. All patient questions answered. Pt voiced understanding.      4. Patient given educational materials - see patient instructions    Mary Corrigan  Attending Physician, [address] [place] [address], Internal Medicine Residency Program  [address] [place] [address]  5/28/2021, 10:23 AM

## 2021-05-28 NOTE — PATIENT INSTRUCTIONS
-Pt due for 6+ month f/u in November-- pt to call in October to set up an appt--reminder in [facility] to contact patient as well--AVS given to patient    -B. Merlinda Kirk

## 2021-06-01 RX ORDER — MELATONIN 10 MG
10 CAPSULE ORAL NIGHTLY
Qty: 30 CAPSULE | Refills: 0 | Status: SHIPPED | OUTPATIENT
Start: 2021-06-01

## 2021-06-09 DIAGNOSIS — M25.562 CHRONIC PAIN OF BOTH KNEES: ICD-10-CM

## 2021-06-09 DIAGNOSIS — M25.561 CHRONIC PAIN OF BOTH KNEES: ICD-10-CM

## 2021-06-09 DIAGNOSIS — G89.29 CHRONIC PAIN OF BOTH KNEES: ICD-10-CM

## 2021-06-09 RX ORDER — ACETAMINOPHEN 500 MG/1
TABLET, FILM COATED ORAL
Qty: 60 TABLET | Refills: 0 | Status: SHIPPED | OUTPATIENT
Start: 2021-06-09 | End: 2021-07-13

## 2021-06-09 NOTE — TELEPHONE ENCOUNTER
E-scribe request for Tylenol. Please review and e-scribe if applicable. Pt on wait list for f/u in August     Next Visit Date:  No future appointments.   Health Maintenance   Topic Date Due    Potassium monitoring  05/28/2022    Creatinine monitoring  05/28/2022    Lipid screen  05/28/2026    DTaP/Tdap/Td vaccine (3 - Td or Tdap) 04/27/2028    Pneumococcal 0-64 years Vaccine (2 of 2) 01/31/2043    Flu vaccine  Completed    COVID-19 Vaccine  Completed    Hepatitis C screen  Completed    HIV screen  Completed    Hepatitis A vaccine  Aged Out    Hepatitis B vaccine  Aged Out    Hib vaccine  Aged Out    Meningococcal (ACWY) vaccine  Aged Out               (applicable per patient's age: Cancer Screenings, Depression Screening, Fall Risk Screening, Immunizations)    Hemoglobin A1C (%)   Date Value   05/28/2021 5.6   02/22/2021 5.5   11/01/2019 5.4     LDL Cholesterol (mg/dL)   Date Value   05/28/2021 91     AST (U/L)   Date Value   05/28/2021 15     ALT (U/L)   Date Value   05/28/2021 13     BUN (mg/dL)   Date Value   05/28/2021 11      (goal A1C is < 7)   (goal LDL is <100) need 30-50% reduction from baseline     BP Readings from Last 3 Encounters:   05/28/21 135/82   05/12/21 115/66   12/27/20 (!) 156/92    (goal /80)      All Future Testing planned in [facility]:  Lab Frequency Next Occurrence   MRI ANKLE LEFT WO CONTRAST Once 09/22/2020   MRI ANKLE RIGHT WO CONTRAST Once 02/03/2021            Patient Active Problem List:     Essential hypertension     Fatty liver disease, nonalcoholic     MIGEL on CPAP     Morbid (severe) obesity due to excess calories (HCC)     Vitamin D deficiency     Body mass index (BMI) of 38.0-38.9 in adult     IGT (impaired glucose tolerance)     Mild intermittent asthma     Bipolar affective disorder, currently depressed, mild (HCC)     Cervical spondylosis     Chronic midline low back pain without sciatica     Degeneration of cervical intervertebral disc     Osteoarthritis of knee Scoliosis deformity of spine     Subacromial impingement

## 2021-06-23 ENCOUNTER — HOSPITAL ENCOUNTER (EMERGENCY)
Age: 41
Discharge: HOME | End: 2021-06-23
Attending: EMERGENCY MEDICINE
Payer: MEDICAID

## 2021-06-23 ENCOUNTER — APPOINTMENT (OUTPATIENT)
Dept: GENERAL RADIOLOGY | Age: 41
End: 2021-06-23
Payer: MEDICAID

## 2021-06-23 VITALS
OXYGEN SATURATION: 98 % | TEMPERATURE: 97 F | WEIGHT: 315 LBS | BODY MASS INDEX: 38.95 KG/M2 | HEART RATE: 90 BPM | RESPIRATION RATE: 17 BRPM | DIASTOLIC BLOOD PRESSURE: 83 MMHG | SYSTOLIC BLOOD PRESSURE: 158 MMHG

## 2021-06-23 DIAGNOSIS — R07.9 CHEST PAIN, UNSPECIFIED TYPE: Primary | ICD-10-CM

## 2021-06-23 LAB
ABSOLUTE EOS #: 0.19 K/UL (ref 0–0.44)
ABSOLUTE IMMATURE GRANULOCYTE: <0.03 K/UL (ref 0–0.3)
ABSOLUTE LYMPH #: 1.95 K/UL (ref 1.1–3.7)
ABSOLUTE MONO #: 0.36 K/UL (ref 0.1–1.2)
ANION GAP SERPL CALCULATED.3IONS-SCNC: 9 MMOL/L (ref 9–17)
BASOPHILS # BLD: 0 % (ref 0–2)
BASOPHILS ABSOLUTE: <0.03 K/UL (ref 0–0.2)
BUN BLDV-MCNC: 20 MG/DL (ref 6–20)
BUN/CREAT BLD: ABNORMAL (ref 9–20)
CALCIUM SERPL-MCNC: 8.9 MG/DL (ref 8.6–10.4)
CHLORIDE BLD-SCNC: 101 MMOL/L (ref 98–107)
CO2: 27 MMOL/L (ref 20–31)
CREAT SERPL-MCNC: 0.82 MG/DL (ref 0.7–1.2)
DIFFERENTIAL TYPE: ABNORMAL
EOSINOPHILS RELATIVE PERCENT: 3 % (ref 1–4)
GFR AFRICAN AMERICAN: >60 ML/MIN
GFR NON-AFRICAN AMERICAN: >60 ML/MIN
GFR SERPL CREATININE-BSD FRML MDRD: ABNORMAL ML/MIN/{1.73_M2}
GFR SERPL CREATININE-BSD FRML MDRD: ABNORMAL ML/MIN/{1.73_M2}
GLUCOSE BLD-MCNC: 82 MG/DL (ref 70–99)
HCT VFR BLD CALC: 39.2 % (ref 40.7–50.3)
HEMOGLOBIN: 12.7 G/DL (ref 13–17)
IMMATURE GRANULOCYTES: 0 %
LYMPHOCYTES # BLD: 32 % (ref 24–43)
MCH RBC QN AUTO: 30.1 PG (ref 25.2–33.5)
MCHC RBC AUTO-ENTMCNC: 32.4 G/DL (ref 28.4–34.8)
MCV RBC AUTO: 92.9 FL (ref 82.6–102.9)
MONOCYTES # BLD: 6 % (ref 3–12)
NRBC AUTOMATED: 0 PER 100 WBC
PDW BLD-RTO: 15.5 % (ref 11.8–14.4)
PLATELET # BLD: 268 K/UL (ref 138–453)
PLATELET ESTIMATE: ABNORMAL
PMV BLD AUTO: 9.2 FL (ref 8.1–13.5)
POTASSIUM SERPL-SCNC: 3.5 MMOL/L (ref 3.7–5.3)
RBC # BLD: 4.22 M/UL (ref 4.21–5.77)
RBC # BLD: ABNORMAL 10*6/UL
SEG NEUTROPHILS: 59 % (ref 36–65)
SEGMENTED NEUTROPHILS ABSOLUTE COUNT: 3.51 K/UL (ref 1.5–8.1)
SODIUM BLD-SCNC: 137 MMOL/L (ref 135–144)
TROPONIN INTERP: NORMAL
TROPONIN INTERP: NORMAL
TROPONIN T: NORMAL NG/ML
TROPONIN T: NORMAL NG/ML
TROPONIN, HIGH SENSITIVITY: <6 NG/L (ref 0–22)
TROPONIN, HIGH SENSITIVITY: <6 NG/L (ref 0–22)
WBC # BLD: 6 K/UL (ref 3.5–11.3)
WBC # BLD: ABNORMAL 10*3/UL

## 2021-06-23 PROCEDURE — 99282 EMERGENCY DEPT VISIT SF MDM: CPT

## 2021-06-23 PROCEDURE — 80048 BASIC METABOLIC PNL TOTAL CA: CPT

## 2021-06-23 PROCEDURE — 84484 ASSAY OF TROPONIN QUANT: CPT

## 2021-06-23 PROCEDURE — 93005 ELECTROCARDIOGRAM TRACING: CPT | Performed by: STUDENT IN AN ORGANIZED HEALTH CARE EDUCATION/TRAINING PROGRAM

## 2021-06-23 PROCEDURE — 85025 COMPLETE CBC W/AUTO DIFF WBC: CPT

## 2021-06-23 PROCEDURE — 71046 X-RAY EXAM CHEST 2 VIEWS: CPT

## 2021-06-23 ASSESSMENT — ENCOUNTER SYMPTOMS
ABDOMINAL PAIN: 0
NAUSEA: 1
SHORTNESS OF BREATH: 0
VOMITING: 1
BACK PAIN: 0

## 2021-06-23 ASSESSMENT — PAIN DESCRIPTION - LOCATION: LOCATION: CHEST

## 2021-06-23 ASSESSMENT — PAIN SCALES - GENERAL: PAINLEVEL_OUTOF10: 8

## 2021-06-24 ENCOUNTER — CARE COORDINATION (OUTPATIENT)
Dept: CARE COORDINATION | Age: 41
End: 2021-06-24

## 2021-06-24 NOTE — ED NOTES
Pt resting on stretcher. NAD noted. RR even and nonlabored. Call light within reach. Will continue to monitor.        Jamie Eric RN  06/23/21 9021

## 2021-06-24 NOTE — ED PROVIDER NOTES
[facility] ED                                      Emergency Department                                      Faculty Attestation                                         I performed a history and physical examination of the patient and discussed management with the resident. I reviewed the residents note and agree with the documented findings and plan of care. Any areas of disagreement are noted on the chart. I was personally present for the key portions of any procedures. I have documented in the chart those procedures where I was not present during the key portions. I agree with the chief complaint, past medical history, past surgical history, allergies, medications, social and family history as documented unless otherwise noted below. 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 I find the patient's history and physical exam are consistent with NP/PA documentation. I agree with the care provided, treatment rendered, disposition, & follow-up plan. Additional findings are as noted  Developed chest pain last night while at work. Did not radiate. No shortness of breath or diaphoresis. Has not has had the symptoms since. Patient's only risk for coronary disease is high blood pressure. Hypertensive, not requiring emergent treatment. Heart is regular rate and rhythm without rubs or clicks. Lungs are clear to station breath equal chest rise. Pushing over the sternum reproduces the pain he was feeling last night exactly. Abdomen is soft, nontender, no masses, guarding, rebound. No significant lower extremity edema or tenderness to palpation.      Kadie Angle, DO  06/23/21 2150

## 2021-06-24 NOTE — ED PROVIDER NOTES
[facility]  ED  Emergency Department Encounter  Emergency Medicine Resident     Pt Name: Jesus Collier  MRN: [id number]  [place] 1980  Date of evaluation: 6/23/21  PCP:  Tricia Amos MD    [address] [place] [address]       Chief Complaint   Patient presents with    Chest Pain       HISTORY [place]  (Location/Symptom, Timing/Onset, Context/Setting, Quality, Duration, Modifying Factors,Severity.)      Jesus Collier is a 35 year male who presents with right-sided chest pain. The patient reports that he has had intermittent chest pain for the past 2 weeks. States that the past chest pain is brought on by exertion. Denies any associated shortness of breath. Last night at work, the patient does report that he had lightheadedness and 2 episodes of emesis associated with the right-sided chest pain. He was lifting heavy boxes at work. Symptoms were relieved by rest.  Patient describes the chest pain as sharp in nature, intermittent. Patient denies any drug or alcohol use. Not a smoker. Does have a history of hypertension and reports compliance with his medications. No history of DVT/PE, recent travel or surgeries. PAST MEDICAL / SURGICAL / SOCIAL / FAMILY HISTORY      has a past medical history of Anxiety, Arthritis, CAD (coronary artery disease), Fatty liver, Fatty liver disease, nonalcoholic, Hypertension, Obesity, and Unspecified sleep apnea. has no past surgical history on file. Social History     Socioeconomic History    Marital status: Single     Spouse name: Allie Montelongo Number of children: 0    Years of education: 12    Highest education level:  Bachelor's degree (e.g., BA, AB, BS)   Occupational History    Not on file   Tobacco Use    Smoking status: Never Smoker    Smokeless tobacco: Never Used   Vaping Use    Vaping Use: Never used   Substance and Sexual Activity    Alcohol use: No    Drug use: No    Sexual activity: Yes     Partners: Female   Other Topics Concern    Not on file   Social History Narrative    ** Merged History Encounter **          Social Determinants of Health     Financial Resource Strain: Low Risk     Difficulty of Paying Living Expenses: Not very hard   Food Insecurity: No Food Insecurity    Worried About Running Out of Food in the Last Year: Never true    Yomaira of Food in the Last Year: Never true   Transportation Needs:     Lack of Transportation (Medical):  Lack of Transportation (Non-Medical):    Physical Activity:     Days of Exercise per Week:     Minutes of Exercise per Session:    Stress:     Feeling of Stress :    Social Connections:     Frequency of Communication with Friends and Family:     Frequency of Social Gatherings with Friends and Family:     Attends [employer] Services:     Active Member of Clubs or Organizations:     Attends Club or Organization Meetings:     Marital Status:    Intimate Partner Violence:     Fear of Current or Ex-Partner:     Emotionally Abused:     Physically Abused:     Sexually Abused:        Family History   Problem Relation Age of Onset    Arthritis Mother     Diabetes Father     Arthritis Father     Stroke Maternal Grandmother     Heart Disease Maternal Grandmother     Stroke Maternal Grandfather     Heart Disease Maternal Grandfather     Early Death Maternal Grandfather     Stroke Paternal Grandmother     Heart Disease Paternal Grandmother     Stroke Paternal Grandfather     Heart Disease Paternal Grandfather     Early Death Paternal Grandfather         Allergies:  Patient has no known allergies. Home Medications:  Prior to Admission medications    Medication Sig Start Date End Date Taking?  Authorizing Provider   PAIN RELIEF EXTRA STRENGTH 500 MG tablet take 1 tablet by mouth twice a day AS NEEDED FOR PAIN 6/9/21  Yes Kenn Panchal MD   melatonin 10 MG CAPS capsule Take 1 capsule by mouth nightly 6/1/21  Yes Minnie Buckner MD   FLUoxetine (PROZAC) 10 MG capsule Take 10 mg by mouth 2 times daily 5/19/21  Yes Historical Provider, MD   metoprolol tartrate (LOPRESSOR) 25 MG tablet take 1 tablet by mouth twice a day 5/28/21  Yes Cuong Alcaraz MD   lisinopril (PRINIVIL;ZESTRIL) 20 MG tablet Take 1 tablet by mouth daily 5/28/21  Yes Cuong Alcaraz MD   hydroCHLOROthiazide (HYDRODIURIL) 25 MG tablet Take 1 tablet by mouth daily 5/28/21  Yes Cuong Alcaraz MD   fluticasone [facility]) 50 MCG/ACT nasal spray 1 spray by Nasal route daily 2/12/21  Yes Cuong Alcaraz MD   albuterol sulfate HFA (VENTOLIN HFA) 108 (90 Base) MCG/ACT inhaler Inhale 2 puffs into the lungs every 6 hours as needed for Wheezing 2/12/21  Yes Cuong Alcaraz MD   predniSONE (DELTASONE) 10 MG tablet Take one PO TID X 3 days  Take one PO BID X 3 days  Take one PO qDaily X 3 days  Take 1/2 PO q daily  X 4 days 1/7/21  Yes Rajani Wang DPM   ibuprofen (IBU) 800 MG tablet Take 1 tablet by mouth every 8 hours as needed for Pain 12/27/20  Yes Missael Dela Cruz,    aspirin-acetaminophen-caffeine (EXCEDRIN EXTRA STRENGTH) 205-775-39 MG per tablet Take 1 tablet by mouth every 8 hours as needed for Headaches 11/29/20  Yes Bria Whitfield MD   valACYclovir (VALTREX) 1 g tablet  6/23/20  Yes Historical Provider, MD   ARIPiprazole (ABILIFY) 10 MG tablet Take 10 mg by mouth daily 5/24/20  Yes Historical Provider, MD   metoprolol tartrate (LOPRESSOR) 25 MG tablet Take 2/day 7/7/20   Cuong Alcaraz MD       REVIEW OFSYSTEMS    (2-9 systems for level 4, 10 or more for level 5)      Review of Systems   Constitutional: Negative for chills and fever. Respiratory: Negative for shortness of breath. Cardiovascular: Positive for chest pain. Negative for leg swelling. Gastrointestinal: Positive for nausea and vomiting. Negative for abdominal pain. Genitourinary: Negative for difficulty urinating. Musculoskeletal: Negative for back pain and neck pain. Skin: Negative for rash and wound. Neurological: Positive for light-headedness. PHYSICAL EXAM   (up to 7 for level 4, 8 or more forlevel 5)      INITIAL VITALS:   ED Triage Vitals [06/23/21 1758]   BP Temp Temp src Pulse Resp SpO2 Height Weight   (!) 154/89 97 °F (36.1 °C) -- 78 17 98 % -- (!) 320 lb (145.2 kg)       Physical Exam  Constitutional:       General: He is not in acute distress. Appearance: He is well-developed. He is not diaphoretic. HENT:      Head: Normocephalic and atraumatic. Eyes:      Conjunctiva/sclera: Conjunctivae normal.      Pupils: Pupils are equal, round, and reactive to light. Cardiovascular:      Rate and Rhythm: Normal rate and regular rhythm. Heart sounds: No murmur heard. No friction rub. No gallop. Comments: +2 radial pulses bilaterally  Pulmonary:      Effort: Pulmonary effort is normal. No respiratory distress. Breath sounds: Normal breath sounds. No wheezing or rales. Abdominal:      General: There is no distension. Palpations: Abdomen is soft. Tenderness: There is no abdominal tenderness. There is no guarding. Musculoskeletal:      Cervical back: Neck supple. Comments: No lower extremity edema    Skin:     General: Skin is warm. Neurological:      Mental Status: He is alert and oriented to person, place, and time. DIFFERENTIAL  DIAGNOSIS     PLAN (LABS / IMAGING / EKG):  Orders Placed This Encounter   Procedures    XR CHEST (2 VW)    CBC WITH AUTO DIFFERENTIAL    BASIC METABOLIC PANEL    Troponin    EKG 12 Lead       MEDICATIONS ORDERED:  No orders of the defined types were placed in this encounter. Initial MDM/Plan: 35 y.o. male who presents with chest pain, lightheadedness and nausea/vomiting. Patient has stable vital signs on arrival although hypertensive. On physical exam, he had no lower extremity edema. Intact distal pulses. Lungs were clear to auscultation bilaterally.   Differential diagnosis include musculoskeletal pain, pneumonia, ACS, PE, pneumothorax. Lower suspicion for PE, the patient is PERC negative. Plan for serial troponins and chest x-ray. Patient is currently asymptomatic. DIAGNOSTIC RESULTS / EMERGENCYDEPARTMENT COURSE / MDM     LABS:  Labs Reviewed   CBC WITH AUTO DIFFERENTIAL - Abnormal; Notable for the following components:       Result Value    Hemoglobin 12.7 (*)     Hematocrit 39.2 (*)     RDW 15.5 (*)     All other components within normal limits   BASIC METABOLIC PANEL - Abnormal; Notable for the following components:    Potassium 3.5 (*)     All other components within normal limits   TROPONIN   TROPONIN         RADIOLOGY:  XR CHEST (2 VW)    Result Date: 6/23/2021  EXAMINATION: TWO XRAY VIEWS OF THE CHEST 6/23/2021 4:02 pm COMPARISON: August 3, 2020 HISTORY: ORDERING SYSTEM PROVIDED HISTORY: chest pain, R sided TECHNOLOGIST PROVIDED HISTORY: chest pain, R sided Reason for Exam: upright, n/v increased BP, right sided chest pain The lungs are without acute focal process. There is no effusion or pneumothorax. The cardiomediastinal silhouette is without acute process. The osseous structures are without acute process. No evidence of acute cardiopulmonary disease. EKG  EKG Interpretation    Interpreted by emergency department physician    Rhythm: normal sinus   Rate: normal  Axis: normal  Ectopy: none  Conduction: normal  ST Segments: no acute change  T Waves: no acute change  Q Waves: none    Clinical Impression: no acute changes    Mick Gale MD      All EKG's are interpreted by the Emergency Department Physicianwho either signs or Co-signs this chart in the absence of a cardiologist.    Heart Score    History   Highly suspicious2            Moderately suspicious. ..1     = 1    Slightly or non suspicious. 0      ECG   Significant STD. ...2        Nonspecific repolarization. Olive Anthony 1      = 0    Normal (no change from previous). ..0      Age   >642 >45 - <65. 1      = 0   <46. ..0      Risk Factors  >2 risk factors. Lolly Sida 2     1 - 2 risk factors. .1      = 1  No risk factors.. Lolly Sida .. 0     Troponin   >3times normal limit. ..2      >1 time - <3 times normal limit. 1    = 0    Normal trop. Lolly Sida 0     -----------------------------------------------------------------------------------------  TOTAL RISK SCORE      =  2      Risk Factors: DM, smoker (current or recent), HTN, HLP, FHx CAD, obesity,   dsv add-ons SLE, CKDz, HIV, cocaine abuse    Score 0-3: 2.5% MACE over next 6 weeks = Discharge Home  Score 4-6: 20.3% MACE over next 6 weeks = Admit for Clinical Observation  Score 7-10: 72.7% MACE over next 6 weeks = Early Invasive Strategies   Chest Pain in the Emergency Room: A Multicenter Validation of the [address] Sarah BE, Six AJ, Amber NEHEMIAS, Amauri TP, [employer], Amauri EG, Ivanna SH, [place] [address] [address] Russ. Crit Pathw Cardiol. 2010 Sep; 9(3): 164-169. \"A prospective validation of the HEART score for chest pain patients at the emergency department. \" Int J Cardiol. 2013 Oct 3;168(3):2153-8. doi: 10.1016/j.ijcard. 2013.01.255. Epub 2013 Mar 7. EMERGENCY DEPARTMENT COURSE:      Serial troponins negative. Chest x-ray negative for any acute pathology. Heart score of 2. The patient meets criteria for discharge and follow-up with his primary care physician. The patient was given strict return precautions. PROCEDURES:  None    CONSULTS:  None    CRITICAL CARE:  Please see attending note    FINAL IMPRESSION      1.  Chest pain, unspecified type          DISPOSITION / PLAN     DISPOSITION Decision To Discharge 06/23/2021 11:10:34 PM      PATIENT REFERRED TO:  [facility] ED  [address] [facility] [address]  [phone]  Go to   If symptoms worsen    MD Charity Hassan [facility] [address] [facility] [address]  [phone]            [facility]

## 2021-06-24 NOTE — CARE COORDINATION
Attempted to reach patient for ED follow up and ACM enrollment. Message left on his voicemail with call back number. Will try to reach him next week, if no return call.

## 2021-06-25 LAB
EKG ATRIAL RATE: 72 BPM
EKG P AXIS: 49 DEGREES
EKG P-R INTERVAL: 152 MS
EKG Q-T INTERVAL: 390 MS
EKG QRS DURATION: 90 MS
EKG QTC CALCULATION (BAZETT): 427 MS
EKG R AXIS: 15 DEGREES
EKG T AXIS: 31 DEGREES
EKG VENTRICULAR RATE: 72 BPM

## 2021-06-25 PROCEDURE — 93010 ELECTROCARDIOGRAM REPORT: CPT | Performed by: INTERNAL MEDICINE

## 2021-07-13 DIAGNOSIS — M25.561 CHRONIC PAIN OF BOTH KNEES: ICD-10-CM

## 2021-07-13 DIAGNOSIS — G89.29 CHRONIC PAIN OF BOTH KNEES: ICD-10-CM

## 2021-07-13 DIAGNOSIS — M25.562 CHRONIC PAIN OF BOTH KNEES: ICD-10-CM

## 2021-07-13 RX ORDER — ACETAMINOPHEN 500 MG/1
TABLET, FILM COATED ORAL
Qty: 60 TABLET | Refills: 5 | Status: SHIPPED | OUTPATIENT
Start: 2021-07-13 | End: 2022-02-02

## 2021-07-13 NOTE — TELEPHONE ENCOUNTER
Request for Extra Strength pain Relief . Pt on wait list for 6 month f/u in November     Next Visit Date:  No future appointments.     Health Maintenance   Topic Date Due    Flu vaccine (1) 09/01/2021    Potassium monitoring  06/23/2022    Creatinine monitoring  06/23/2022    Lipid screen  05/28/2026    DTaP/Tdap/Td vaccine (3 - Td or Tdap) 04/27/2028    Pneumococcal 0-64 years Vaccine (2 of 2 - PPSV23) 01/31/2043    COVID-19 Vaccine  Completed    Hepatitis C screen  Completed    HIV screen  Completed    Hepatitis A vaccine  Aged Out    Hepatitis B vaccine  Aged Out    Hib vaccine  Aged Out    Meningococcal (ACWY) vaccine  Aged Out       Hemoglobin A1C (%)   Date Value   05/28/2021 5.6   02/22/2021 5.5   11/01/2019 5.4             ( goal A1C is < 7)   No results found for: LABMICR  LDL Cholesterol (mg/dL)   Date Value   05/28/2021 91       (goal LDL is <100)   AST (U/L)   Date Value   05/28/2021 15     ALT (U/L)   Date Value   05/28/2021 13     BUN (mg/dL)   Date Value   06/23/2021 20     BP Readings from Last 3 Encounters:   06/23/21 (!) 158/83   05/28/21 135/82   05/12/21 115/66          (goal 120/80)    All Future Testing planned in [facility]  Lab Frequency Next Occurrence   MRI ANKLE LEFT WO CONTRAST Once 09/22/2020   MRI ANKLE RIGHT WO CONTRAST Once 02/03/2021         Patient Active Problem List:     Essential hypertension     Fatty liver disease, nonalcoholic     MIGEL on CPAP     Morbid (severe) obesity due to excess calories (HCC)     Vitamin D deficiency     Body mass index (BMI) of 38.0-38.9 in adult     IGT (impaired glucose tolerance)     Mild intermittent asthma     Bipolar affective disorder, currently depressed, mild (HCC)     Cervical spondylosis     Chronic midline low back pain without sciatica     Degeneration of cervical intervertebral disc     Osteoarthritis of knee     Scoliosis deformity of spine     Subacromial impingement

## 2021-08-01 ENCOUNTER — APPOINTMENT (OUTPATIENT)
Dept: GENERAL RADIOLOGY | Age: 41
End: 2021-08-01
Payer: MEDICAID

## 2021-08-01 ENCOUNTER — HOSPITAL ENCOUNTER (EMERGENCY)
Age: 41
Discharge: HOME | End: 2021-08-01
Attending: EMERGENCY MEDICINE
Payer: MEDICAID

## 2021-08-01 VITALS
TEMPERATURE: 97 F | HEART RATE: 51 BPM | RESPIRATION RATE: 18 BRPM | BODY MASS INDEX: 39.93 KG/M2 | SYSTOLIC BLOOD PRESSURE: 165 MMHG | DIASTOLIC BLOOD PRESSURE: 98 MMHG | WEIGHT: 315 LBS | OXYGEN SATURATION: 99 %

## 2021-08-01 DIAGNOSIS — S63.502A SPRAIN OF LEFT WRIST, INITIAL ENCOUNTER: Primary | ICD-10-CM

## 2021-08-01 PROCEDURE — 99284 EMERGENCY DEPT VISIT MOD MDM: CPT

## 2021-08-01 PROCEDURE — 73110 X-RAY EXAM OF WRIST: CPT

## 2021-08-01 PROCEDURE — 6370000000 HC RX 637 (ALT 250 FOR IP): Performed by: STUDENT IN AN ORGANIZED HEALTH CARE EDUCATION/TRAINING PROGRAM

## 2021-08-01 RX ORDER — IBUPROFEN 600 MG/1
600 TABLET ORAL 4 TIMES DAILY PRN
Qty: 30 TABLET | Refills: 0 | Status: SHIPPED | OUTPATIENT
Start: 2021-08-01 | End: 2021-12-28 | Stop reason: SDUPTHER

## 2021-08-01 RX ORDER — IBUPROFEN 400 MG/1
600 TABLET ORAL ONCE
Status: COMPLETED | OUTPATIENT
Start: 2021-08-01 | End: 2021-08-01

## 2021-08-01 RX ADMIN — IBUPROFEN 600 MG: 400 TABLET, FILM COATED ORAL at 10:35

## 2021-08-01 ASSESSMENT — ENCOUNTER SYMPTOMS
COLOR CHANGE: 0
EYES NEGATIVE: 1
GASTROINTESTINAL NEGATIVE: 1
RESPIRATORY NEGATIVE: 1

## 2021-08-01 ASSESSMENT — PAIN SCALES - GENERAL
PAINLEVEL_OUTOF10: 8
PAINLEVEL_OUTOF10: 8

## 2021-08-01 NOTE — ED NOTES
Bed: 28  Expected date:   Expected time:   Means of arrival:   Comments:     Elie Seaman RN  08/01/21 [id number]

## 2021-08-01 NOTE — LETTER
[facility]  [address] [facility]  Phone: [phone]    No name on file. August 1, 2021     Patient: Corrie Thao   YOB: 1980   Date of Visit: 8/1/2021       To Whom It May Concern: It is my medical opinion that Walter Prater may return to work on 08/02/2021. If you have any questions or concerns, please don't hesitate to call.     Sincerely,        Elizabeth Mercado MD

## 2021-08-01 NOTE — ED NOTES
Pt c/o left wrist pain 8/10, States,\"I was moving some pallets at work\"  Pt states,\"pain started a couple days ago\"  Pt able to move fingers, skin warm to touch, cap refill <3 sec  Pt alert x4, no distress noted     Levi Rodrigues RN  08/01/21 1273

## 2021-08-01 NOTE — ED PROVIDER NOTES
[facility] ED  Emergency Department Encounter  Emergency Medicine Resident     Pt Name: Colton Craven  MRN: [id number]  [place] 1980  Date of evaluation: 8/1/21  PCP:  Renate Richter MD    CHIEF COMPLAINT       Chief Complaint   Patient presents with    Wrist Pain     hurt while lifting heavy objects at work       HISTORY [facility]  (Location/Symptom, Timing/Onset, Context/Setting, Quality, Duration, Modifying [facility].)      Colton Craven is a 35 y.o. male who presents with left wrist pain has been ongoing for 2 days. Patient states that he works at [employer]. Patient states that he tripped and fell approximately week ago, not associated with any trauma, head injury, loss of consciousness, dizziness or syncope. Patient states that he is concerned that he is overworking, causing an overuse injury of his wrist.  Pulses are present left wrist, neurovascular intact, denies any numbness or tingling. Tinel's test negative. Nonspecific tenderness over snuffbox region. Limited range of motion due to pain. PAST MEDICAL / SURGICAL / SOCIAL / FAMILY HISTORY      has a past medical history of Anxiety, Arthritis, CAD (coronary artery disease), Fatty liver, Fatty liver disease, nonalcoholic, Hypertension, Obesity, and Unspecified sleep apnea. has no past surgical history on file. Social History     Socioeconomic History    Marital status: Single     Spouse name: Queen Lisa Number of children: 0    Years of education: 12    Highest education level:  Bachelor's degree (e.g., BA, AB, BS)   Occupational History    Not on file   Tobacco Use    Smoking status: Never Smoker    Smokeless tobacco: Never Used   Vaping Use    Vaping Use: Never used   Substance and Sexual Activity    Alcohol use: No    Drug use: No    Sexual activity: Yes     Partners: Female   Other Topics Concern    Not on file   Social History Narrative ** Merged History Encounter **          Social Determinants of Health     Financial Resource Strain: Low Risk     Difficulty of Paying Living Expenses: Not very hard   Food Insecurity: No Food Insecurity    Worried About Running Out of Food in the Last Year: Never true    Yomaira of Food in the Last Year: Never true   Transportation Needs:     Lack of Transportation (Medical):  Lack of Transportation (Non-Medical):    Physical Activity:     Days of Exercise per Week:     Minutes of Exercise per Session:    Stress:     Feeling of Stress :    Social Connections:     Frequency of Communication with Friends and Family:     Frequency of Social Gatherings with Friends and Family:     Attends Restorationism Services:     Active Member of Clubs or Organizations:     Attends Club or Organization Meetings:     Marital Status:    Intimate Partner Violence:     Fear of Current or Ex-Partner:     Emotionally Abused:     Physically Abused:     Sexually Abused:        Family History   Problem Relation Age of Onset    Arthritis Mother     Diabetes Father     Arthritis Father     Stroke Maternal Grandmother     Heart Disease Maternal Grandmother     Stroke Maternal Grandfather     Heart Disease Maternal Grandfather     Early Death Maternal Grandfather     Stroke Paternal Grandmother     Heart Disease Paternal Grandmother     Stroke Paternal Grandfather     Heart Disease Paternal Grandfather     Early Death Paternal Grandfather        Allergies:  Patient has no known allergies. Home Medications:  Prior to Admission medications    Medication Sig Start Date End Date Taking?  Authorizing Provider   ibuprofen (ADVIL;MOTRIN) 600 MG tablet Take 1 tablet by mouth 4 times daily as needed for Pain 8/1/21  Yes Stacy Krishna MD   PAIN RELIEF EXTRA STRENGTH 500 MG tablet take 1 tablet by mouth twice a day AS NEEDED FOR PAIN 7/13/21   Eamon Laboy MD   melatonin 10 MG CAPS capsule Take 1 capsule by mouth nightly 6/1/21   Ajay Dela Cruz MD   FLUoxetine (PROZAC) 10 MG capsule Take 10 mg by mouth 2 times daily 5/19/21   Historical Provider, MD   metoprolol tartrate (LOPRESSOR) 25 MG tablet take 1 tablet by mouth twice a day 5/28/21   Ajay Dela Cruz MD   lisinopril (PRINIVIL;ZESTRIL) 20 MG tablet Take 1 tablet by mouth daily 5/28/21   Ajay Dela Cruz MD   hydroCHLOROthiazide (HYDRODIURIL) 25 MG tablet Take 1 tablet by mouth daily 5/28/21   Ajay Dela Cruz MD   fluticasone [facility]) 50 MCG/ACT nasal spray 1 spray by Nasal route daily 2/12/21   Ajay Dela Cruz MD   albuterol sulfate HFA (VENTOLIN HFA) 108 (90 Base) MCG/ACT inhaler Inhale 2 puffs into the lungs every 6 hours as needed for Wheezing 2/12/21   Ajay Dela Cruz MD   predniSONE (DELTASONE) 10 MG tablet Take one PO TID X 3 days  Take one PO BID X 3 days  Take one PO qDaily X 3 days  Take 1/2 PO q daily  X 4 days 1/7/21   Liv Cardenas DPM   aspirin-acetaminophen-caffeine (EXCEDRIN EXTRA STRENGTH) 063-190-35 MG per tablet Take 1 tablet by mouth every 8 hours as needed for Headaches 11/29/20   Pina Sheldon MD   valACYclovir (VALTREX) 1 g tablet  6/23/20   Historical Provider, MD   metoprolol tartrate (LOPRESSOR) 25 MG tablet Take 2/day 7/7/20   Ajay Dela Cruz MD   ARIPiprazole (ABILIFY) 10 MG tablet Take 10 mg by mouth daily 5/24/20   Historical Provider, MD       REVIEW OF SYSTEMS    (2-9 systems for level 4, 10 or more for level 5)      Review of Systems   Constitutional: Negative. HENT: Negative. Eyes: Negative. Respiratory: Negative. Cardiovascular: Negative. Gastrointestinal: Negative. Musculoskeletal: Negative for arthralgias, joint swelling and myalgias. Skin: Negative. Negative for color change, pallor, rash and wound. Neurological: Negative. PHYSICAL EXAM   (up to 7 for level 4, 8 or more for level 5)     INITIAL VITALS:    weight is 328 lb (148.8 kg) (abnormal). His oral temperature is 97 °F (36.1 °C). His blood pressure is 165/98 (abnormal) and his pulse is 51. His respiration is 18 and oxygen saturation is 99%. Physical Exam  GENERAL APPEARANCE:  AxOx4, generally well-appearing male, no acute distress. HEENT:  NC, AT. MMM. EOMI, clear conjunctiva, oropharynx clear. NECK:  Supple without lymphadenopathy. No stiffness or restricted ROM. HEART:  Normal rate and regular rhythm, normal S1/S1, no m/r/g  LUNGS:  CTAB, moving air well. No crackles or wheezes are heard. ABDOMEN:  Soft, nontender, nondistended with good bowel sounds heard. BACK: No CVAT, no obvious deformity. EXTREMITIES:  Without cyanosis, clubbing or edema. No swelling, erythema to left wrist, tenderness to palpation over anterior wrist and snuffbox region  NEUROLOGICAL:  Grossly nonfocal. Alert and oriented, moving all 4 extremities. CN not formally tested but appear grossly intact. Observed to ambulate with normal gait. Skin:  Warm and dry without any rash. DIFFERENTIAL  DIAGNOSIS     PLAN (LABS / IMAGING / EKG):  Orders Placed This Encounter   Procedures    XR WRIST LEFT (MIN 3 VIEWS)    SPLINT VELCRO WRIST       MEDICATIONS ORDERED:  Orders Placed This Encounter   Medications    ibuprofen (ADVIL;MOTRIN) tablet 600 mg    ibuprofen (ADVIL;MOTRIN) 600 MG tablet     Sig: Take 1 tablet by mouth 4 times daily as needed for Pain     Dispense:  30 tablet     Refill:  0       DDX: Carpal tunnel syndrome, wrist sprain, de Quervain's tendinitis, scaphoid fracture, radiocarpal arthritis, autoimmune related issue    Initial MDM/Plan: 35 y.o. male who presents with left wrist pain following lifting heavy objects at work. States he fell last week possibly initiating an injury. Neurovascularly intact. No numbness or tingling, not suspecting carpal tunnel syndrome. Monoarticular joint pain with identifiable cause, likely not autoimmune.  Xray left wrist shows no fracture, mild degenerative changes of radio-carpal junction, no evidence of soft tissue swelling. Discharge home with velcro splint. DIAGNOSTIC RESULTS / EMERGENCY DEPARTMENT COURSE / MDM     LABS:  Labs Reviewed - No data to display      RADIOLOGY:  XR WRIST LEFT (MIN 3 VIEWS)    Result Date: 8/1/2021  EXAMINATION: 4 XRAY VIEWS OF THE LEFT WRIST 8/1/2021 10:37 am COMPARISON: 10/03/2020 HISTORY: ORDERING SYSTEM PROVIDED HISTORY: left wrist pain TECHNOLOGIST PROVIDED HISTORY: Please do scaphoid view left wrist pain Reason for Exam: lifting pallets at work FINDINGS: There is no evidence of acute fracture or dislocation. There is no focal soft tissue abnormality. The joint spaces are maintained. There is mild distal radioulnar joint degenerative change. No acute abnormality. EKG      All EKG's are interpreted by the Emergency Department Physician who either signs or Co-signs this chart in the absence of a cardiologist.    EMERGENCY DEPARTMENT COURSE:         PROCEDURES:  None    CONSULTS:  None    CRITICAL CARE:  Please see attending note    FINAL IMPRESSION      1.  Sprain of left wrist, initial encounter        DISPOSITION / PLAN     DISPOSITION Decision To Discharge 08/01/2021 11:42:48 AM        PATIENTREFERRED TO:  Bk Oden MD  [facility] [address] [address] [phone]    Schedule an appointment as soon as possible for a visit today  As needed, to determine work release and limitations      DISCHARGE MEDICATIONS:  Discharge Medication List as of 8/1/2021 11:49 AM      START taking these medications    Details   ibuprofen (ADVIL;MOTRIN) 600 MG tablet Take 1 tablet by mouth 4 times daily as needed for Pain, Disp-30 tablet, R-0Print             Russ Qiu MD  EmergencyMedicine Resident    (Please note that portions of this note were completed with a voice recognition program.  Efforts were made to edit the dictations but occasionally words are mis-transcribed.)          Russ Qiu MD  Resident  08/01/21 [id number]

## 2021-08-01 NOTE — ED PROVIDER NOTES
[facility] ED     Emergency Department     Faculty Attestation    I performed a history and physical examination of the patient and discussed management with the resident. I reviewed the residents note and agree with the documented findings and plan of care. Any areas of disagreement are noted on the chart. I was personally present for the key portions of any procedures. I have documented in the chart those procedures where I was not present during the key portions. I have reviewed the emergency nurses triage note. I agree with the chief complaint, past medical history, past surgical history, allergies, medications, social and family history as documented unless otherwise noted below. For Physician Assistant/ Nurse Practitioner cases/documentation I have personally evaluated this patient and have completed at least one if not all key elements of the E/M (history, physical exam, and MDM). Additional findings are as noted. This patient was evaluated in the Emergency Department for symptoms described in the history of present illness. He/she was evaluated in the context of the global COVID-19 pandemic, which necessitated consideration that the patient might be at risk for infection with the SARS-CoV-2 virus that causes COVID-19. Institutional protocols and algorithms that pertain to the evaluation of patients at risk for COVID-19 are in a state of rapid change based on information released by regulatory bodies including the CDC and federal and state organizations. These policies and algorithms were followed during the patient's care in the ED. Patient here with left wrist pain 2 days but actually ongoing for several weeks. Did have a fall approximately week ago also lifts pallets at work. Is right-handed. On exam nonfocal tenderness over the left dorsal wrist no specific snuffbox tenderness no pain with axial loading. Full range of motion the wrist despite discomfort.  Intact to radial, median, ulnar nerves for motor and sensation in the affected hand. Will image.     Critical Care     none    Park Jo MD, Ashwin Fragoso  Attending Emergency  Physician             Park Jo MD  08/01/21 1106

## 2021-08-02 ENCOUNTER — CARE COORDINATION (OUTPATIENT)
Dept: CARE COORDINATION | Age: 41
End: 2021-08-02

## 2021-08-02 NOTE — CARE COORDINATION
Attempted to reach patient for ED follow up and ACM enrollment. Message left on his voicemail with call back number. Will attempt to reach him next week if no return call.

## 2021-08-03 ENCOUNTER — OFFICE VISIT (OUTPATIENT)
Dept: INTERNAL MEDICINE | Age: 41
End: 2021-08-03
Payer: MEDICAID

## 2021-08-03 VITALS
SYSTOLIC BLOOD PRESSURE: 127 MMHG | WEIGHT: 315 LBS | DIASTOLIC BLOOD PRESSURE: 82 MMHG | BODY MASS INDEX: 42.12 KG/M2 | HEART RATE: 60 BPM

## 2021-08-03 DIAGNOSIS — I10 ESSENTIAL HYPERTENSION: ICD-10-CM

## 2021-08-03 DIAGNOSIS — S63.502D SPRAIN OF LEFT WRIST, SUBSEQUENT ENCOUNTER: Primary | ICD-10-CM

## 2021-08-03 PROCEDURE — 99213 OFFICE O/P EST LOW 20 MIN: CPT | Performed by: INTERNAL MEDICINE

## 2021-08-03 PROCEDURE — G8427 DOCREV CUR MEDS BY ELIG CLIN: HCPCS | Performed by: INTERNAL MEDICINE

## 2021-08-03 PROCEDURE — 1036F TOBACCO NON-USER: CPT | Performed by: INTERNAL MEDICINE

## 2021-08-03 PROCEDURE — G8417 CALC BMI ABV UP PARAM F/U: HCPCS | Performed by: INTERNAL MEDICINE

## 2021-08-03 PROCEDURE — 99211 OFF/OP EST MAY X REQ PHY/QHP: CPT | Performed by: INTERNAL MEDICINE

## 2021-08-03 RX ORDER — FLUOXETINE HYDROCHLORIDE 20 MG/1
20 CAPSULE ORAL DAILY
COMMUNITY
Start: 2021-07-19 | End: 2022-02-08 | Stop reason: ALTCHOICE

## 2021-08-03 RX ORDER — ERGOCALCIFEROL 1.25 MG/1
1 CAPSULE ORAL WEEKLY
COMMUNITY
Start: 2021-07-19

## 2021-08-03 ASSESSMENT — ENCOUNTER SYMPTOMS
WHEEZING: 0
BACK PAIN: 0
SHORTNESS OF BREATH: 0
COUGH: 0

## 2021-08-03 NOTE — PROGRESS NOTES
[facility]    Progress Note    Date of patient's visit: 8/3/2021    Patient's Name:  Rafat Nicholson    YOB: 1980            Patient Care Team:  Radha Diez MD as PCP - General (Internal Medicine)  Radha Diez MD as PCP - [facility] Provider  Lizette Ruiz MD as Surgeon (Orthopedic Surgery)  Patrick Orlando DPM as Physician (Podiatry)  Marquis Nunez DPM as Physician (Podiatry)  Vick Valdes RN as Ambulatory Care Manager    REASON FOR VISIT: Routine outpatient follow     Chief Complaint   [facility] with   [facility]     Pt was seen at ED for left wrist pain x 3 weeks          HISTORY OF PRESENT ILLNESS:    History was obtained from the patient. Rafat Nicholson is a 35 y.o. is here for follow-up after ER visit. He was diagnosed with sprain of his left wrist.  Patient is wearing a brace. He has been icing his wrist and taking ibuprofen which is helping. He needs a letter for work. 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 He says he has to do it alone and they are extremely heavy and is getting no help. Recently while lifting he noticed a pop in his left wrist along with severe pain and had to go to the emergency room. X-rays were negative. He was discharged with a brace. He says he feels much better with the brace on and icing and with ibuprofen. He needs to light duty restriction. No numbness or tingling of his fingers. Swelling is improved.       XR wrist     FINDINGS:   There is no evidence of acute fracture or dislocation.  There is no focal   soft tissue abnormality.  The joint spaces are maintained.  There is mild   distal radioulnar joint degenerative change.           Impression   No acute abnormality.               Past Medical History:   Diagnosis Date    Anxiety     Arthritis     CAD (coronary artery disease)     Fatty liver     Fatty liver disease, nonalcoholic 2/46/6218    Hypertension     Obesity     Unspecified sleep apnea     cpap       No past surgical history on file. ALLERGIES    No Known Allergies    MEDICATIONS:      Current Outpatient Medications on File Prior to Visit   Medication Sig Dispense Refill    FLUoxetine (PROZAC) 20 MG capsule Take 20 mg by mouth daily      ibuprofen (ADVIL;MOTRIN) 600 MG tablet Take 1 tablet by mouth 4 times daily as needed for Pain 30 tablet 0    PAIN RELIEF EXTRA STRENGTH 500 MG tablet take 1 tablet by mouth twice a day AS NEEDED FOR PAIN 60 tablet 5    melatonin 10 MG CAPS capsule Take 1 capsule by mouth nightly 30 capsule 0    metoprolol tartrate (LOPRESSOR) 25 MG tablet take 1 tablet by mouth twice a day 180 tablet 1    lisinopril (PRINIVIL;ZESTRIL) 20 MG tablet Take 1 tablet by mouth daily 90 tablet 1    hydroCHLOROthiazide (HYDRODIURIL) 25 MG tablet Take 1 tablet by mouth daily 90 tablet 1    fluticasone (FLONASE) 50 MCG/ACT nasal spray 1 spray by Nasal route daily 16 g 3    albuterol sulfate HFA (VENTOLIN HFA) 108 (90 Base) MCG/ACT inhaler Inhale 2 puffs into the lungs every 6 hours as needed for Wheezing 18 g 3    predniSONE (DELTASONE) 10 MG tablet Take one PO TID X 3 days  Take one PO BID X 3 days  Take one PO qDaily X 3 days  Take 1/2 PO q daily  X 4 days 21 tablet 0    aspirin-acetaminophen-caffeine (EXCEDRIN EXTRA STRENGTH) 250-250-65 MG per tablet Take 1 tablet by mouth every 8 hours as needed for Headaches 30 tablet 0    valACYclovir (VALTREX) 1 g tablet       ARIPiprazole (ABILIFY) 10 MG tablet Take 10 mg by mouth daily      vitamin D (ERGOCALCIFEROL) 1.25 MG (43020 UT) CAPS capsule Take 1 capsule by mouth once a week      [DISCONTINUED] metoprolol tartrate (LOPRESSOR) 25 MG tablet Take 2/day 180 tablet 1     No current facility-administered medications on file prior to visit.        HISTORY    Reviewed and no change from previous record. Nadeen Rich  reports that he has never smoked. He has never used smokeless tobacco.    FAMILY HISTORY:    Reviewed and No change from previous visit    HEALTH MAINTENANCE DUE:    There are no preventive care reminders to display for this patient. REVIEW OF SYSTEMS:    12 point review of symptoms completed and found to be normal except noted in the HPI    Review of Systems   Constitutional: Negative for chills and fever. Respiratory: Negative for cough, shortness of breath and wheezing. Cardiovascular: Negative for chest pain, palpitations and leg swelling. Musculoskeletal: Positive for arthralgias. Negative for back pain and joint swelling. Skin: Negative for rash and wound. Neurological: Negative for dizziness, weakness and numbness. Hematological: Negative for adenopathy. Does not bruise/bleed easily. PHYSICAL EXAM:     Vitals:    08/03/21 1554   BP: 127/82   Site: Right Upper Arm   Position: Sitting   Cuff Size: Large Adult   Pulse: 60   Weight: (!) 346 lb (156.9 kg)     Body mass index is 42.12 kg/m². BP Readings from Last 3 Encounters:   08/03/21 127/82   08/01/21 (!) 165/98   06/23/21 (!) 158/83        Wt Readings from Last 3 Encounters:   08/03/21 (!) 346 lb (156.9 kg)   08/01/21 (!) 328 lb (148.8 kg)   06/23/21 (!) 320 lb (145.2 kg)       Physical Exam  Vitals and nursing note reviewed. Constitutional:       Appearance: Normal appearance. He is obese. HENT:      Head: Normocephalic and atraumatic. Eyes:      Extraocular Movements: Extraocular movements intact. Conjunctiva/sclera: Conjunctivae normal.      Pupils: Pupils are equal, round, and reactive to light. Cardiovascular:      Rate and Rhythm: Normal rate and regular rhythm. Pulmonary:      Effort: Pulmonary effort is normal.      Breath sounds: Normal breath sounds. Musculoskeletal:      Right wrist: Normal.      Left wrist: Swelling and tenderness present.  No deformity, effusion, lacerations, bony tenderness or snuff box tenderness. Normal range of motion. Right lower leg: No edema. Left lower leg: No edema. Skin:     General: Skin is warm and dry. Neurological:      General: No focal deficit present. Mental Status: He is alert and oriented to person, place, and time. Sensory: No sensory deficit. Motor: No weakness. LABORATORY FINDINGS:    CBC:  Lab Results   Component Value Date    WBC 6.0 06/23/2021    HGB 12.7 06/23/2021     06/23/2021     05/08/2012     BMP:    Lab Results   Component Value Date     06/23/2021    K 3.5 06/23/2021     06/23/2021    CO2 27 06/23/2021    BUN 20 06/23/2021    CREATININE 0.82 06/23/2021    GLUCOSE 82 06/23/2021    GLUCOSE 83 05/08/2012     HEMOGLOBIN A1C:   Lab Results   Component Value Date    LABA1C 5.6 05/28/2021     MICROALBUMIN URINE: No results found for: MICROALBUR  FASTING LIPID PANEL:  Lab Results   Component Value Date    CHOL 159 11/01/2019    HDL 39 (L) 05/28/2021    TRIG 73 11/01/2019     Lab Results   Component Value Date    LDLCHOLESTEROL 91 05/28/2021       LIVER PROFILE:  Lab Results   Component Value Date    ALT 13 05/28/2021    AST 15 05/28/2021    PROT 7.3 05/28/2021    BILITOT 0.41 05/28/2021    BILIDIR 0.09 04/04/2018    LABALBU 4.0 05/28/2021      THYROID FUNCTION:   Lab Results   Component Value Date    TSH 2.03 05/28/2021      URINEANALYSIS: No results found for: LABURIN  ASSESSMENT AND PLAN:    1. Sprain of left wrist, subsequent encounter  Note given for work. Advised to continue left wrist brace  Ice and ibuprofen as needed. 2. Essential hypertension  Controlled. Continue current meds. Labs reviewed. FOLLOW UP AND INSTRUCTIONS:   Return if symptoms worsen or fail to improve. 1. Ranjeet received counseling on the following healthy behaviors: nutrition, exercise and medication adherence    2. Reviewed prior labs and health maintenance.       3. Discussed use, benefit, and side effects of prescribed medications. Barriers to medication compliance addressed. All patient questions answered. Pt voiced understanding.        Kayla Arreola  Attending Physician, [address] [place] [address], Internal Medicine Residency Program  [address] [place] [address]  8/3/2021, 3:55 PM

## 2021-09-01 NOTE — PROGRESS NOTES
Denies any chest pain today  Continue aspirin 81 mg daily, metoprolol tartarate 50 mg daily  Continue Atorvastatin 10 mg daily Visit Information    Have you changed or started any medications since your last visit including any over-the-counter medicines, vitamins, or herbal medicines? no   Are you having any side effects from any of your medications? -  no  Have you stopped taking any of your medications? Is so, why? -  no    Have you seen any other physician or provider since your last visit? No  Have you had any other diagnostic tests since your last visit? No  Have you been seen in the emergency room and/or had an admission to a hospital since we last saw you? No  Have you had your routine dental cleaning in the past 6 months? no    Have you activated your [employer] account? If not, what are your barriers?  Yes     Patient Care Team:  Kelly Torres MD as PCP - General (Internal Medicine)  Kelly Torres MD as PCP - [facility]  Chitra Shaikh MD as Surgeon (Orthopedic Surgery)    Medical History Review  Past Medical, Family, and Social History reviewed and does contribute to the patient presenting condition    Health Maintenance   Topic Date Due    Potassium monitoring  11/01/2020    Creatinine monitoring  11/01/2020    Lipid screen  11/01/2024    DTaP/Tdap/Td vaccine (3 - Td) 04/27/2028    Flu vaccine  Completed    HIV screen  Completed    Hepatitis A vaccine  Aged Out    Hepatitis B vaccine  Aged Out    Hib vaccine  Aged Out    Meningococcal (ACWY) vaccine  Aged Out    Pneumococcal 0-64 years Vaccine  Aged Out

## 2021-10-13 ENCOUNTER — HOSPITAL ENCOUNTER (EMERGENCY)
Age: 41
Discharge: HOME | End: 2021-10-13
Attending: EMERGENCY MEDICINE
Payer: MEDICAID

## 2021-10-13 VITALS
WEIGHT: 315 LBS | OXYGEN SATURATION: 99 % | RESPIRATION RATE: 18 BRPM | DIASTOLIC BLOOD PRESSURE: 83 MMHG | TEMPERATURE: 98.5 F | HEIGHT: 75 IN | HEART RATE: 85 BPM | SYSTOLIC BLOOD PRESSURE: 144 MMHG | BODY MASS INDEX: 39.17 KG/M2

## 2021-10-13 DIAGNOSIS — M77.9 TENDINITIS: Primary | ICD-10-CM

## 2021-10-13 PROCEDURE — 6370000000 HC RX 637 (ALT 250 FOR IP): Performed by: EMERGENCY MEDICINE

## 2021-10-13 PROCEDURE — 99283 EMERGENCY DEPT VISIT LOW MDM: CPT

## 2021-10-13 RX ORDER — IBUPROFEN 800 MG/1
800 TABLET ORAL ONCE
Status: COMPLETED | OUTPATIENT
Start: 2021-10-13 | End: 2021-10-13

## 2021-10-13 RX ADMIN — IBUPROFEN 800 MG: 800 TABLET, FILM COATED ORAL at 09:23

## 2021-10-13 ASSESSMENT — ENCOUNTER SYMPTOMS
NAUSEA: 0
RHINORRHEA: 0
CONSTIPATION: 0
COUGH: 0
DIARRHEA: 0
SORE THROAT: 0
WHEEZING: 0
ABDOMINAL DISTENTION: 0
SHORTNESS OF BREATH: 0
VOMITING: 0

## 2021-10-13 ASSESSMENT — PAIN DESCRIPTION - ORIENTATION
ORIENTATION: LEFT
ORIENTATION: LEFT

## 2021-10-13 ASSESSMENT — PAIN SCALES - GENERAL
PAINLEVEL_OUTOF10: 10

## 2021-10-13 ASSESSMENT — PAIN DESCRIPTION - LOCATION
LOCATION: ARM
LOCATION: ARM

## 2021-10-13 ASSESSMENT — PAIN DESCRIPTION - DESCRIPTORS: DESCRIPTORS: ACHING

## 2021-10-13 ASSESSMENT — PAIN DESCRIPTION - FREQUENCY: FREQUENCY: CONTINUOUS

## 2021-10-13 ASSESSMENT — PAIN DESCRIPTION - ONSET: ONSET: ON-GOING

## 2021-10-13 ASSESSMENT — PAIN DESCRIPTION - PAIN TYPE: TYPE: ACUTE PAIN

## 2021-10-13 ASSESSMENT — PAIN - FUNCTIONAL ASSESSMENT: PAIN_FUNCTIONAL_ASSESSMENT: PREVENTS OR INTERFERES SOME ACTIVE ACTIVITIES AND ADLS

## 2021-10-13 NOTE — ED PROVIDER NOTES
[facility] ED  Emergency Department        Pt Name: Loren Joseph  MRN: [id number]  [place] 1980  Date of evaluation: 10/13/21    CHIEF COMPLAINT       Chief Complaint   Patient presents with    Arm Pain       HISTORY OF PRESENT ILLNESS  (Location/Symptom, Timing/Onset, Context/Setting, Quality, Duration, ModifyingFactors, Severity.)      Loren Joseph is a 35 y.o. male who presents with pain to his left antecubital fossa ongoing for the past 9 to 10 days. Patient states it started at work when he was lifting heavy boxes. Continues to feel pain when he lifts boxes and moves things. And was sent to ER from work. Patient denies any numbness tingling or weakness has not taken anything for pain. PAST MEDICAL / SURGICAL / SOCIAL / FAMILY HISTORY      has a past medical history of Anxiety, Arthritis, CAD (coronary artery disease), Fatty liver, Fatty liver disease, nonalcoholic, Hypertension, Obesity, and Unspecified sleep apnea. has no past surgical history on file. Social History     Socioeconomic History    Marital status: Single     Spouse name: Anice Courser Number of children: 0    Years of education: 12    Highest education level:  Bachelor's degree (e.g., BA, AB, BS)   Occupational History    Not on file   Tobacco Use    Smoking status: Never Smoker    Smokeless tobacco: Never Used   Vaping Use    Vaping Use: Never used   Substance and Sexual Activity    Alcohol use: No    Drug use: No    Sexual activity: Yes     Partners: Female   Other Topics Concern    Not on file   Social History Narrative    ** Merged History Encounter **          Social Determinants of Health     Financial Resource Strain: Low Risk     Difficulty of Paying Living Expenses: Not very hard   Food Insecurity: No Food Insecurity    Worried About Running Out of Food in the Last Year: Never true    Yomaira of Food in the Last Year: Never true   Transportation Needs:     Lack of Transportation (Medical):  Lack of Transportation (Non-Medical):    Physical Activity:     Days of Exercise per Week:     Minutes of Exercise per Session:    Stress:     Feeling of Stress :    Social Connections:     Frequency of Communication with Friends and Family:     Frequency of Social Gatherings with Friends and Family:     Attends Judaism Services:     Active Member of Clubs or Organizations:     Attends Club or Organization Meetings:     Marital Status:    Intimate Partner Violence:     Fear of Current or Ex-Partner:     Emotionally Abused:     Physically Abused:     Sexually Abused:        Family History   Problem Relation Age of Onset    Arthritis Mother     Diabetes Father     Arthritis Father     Stroke Maternal Grandmother     Heart Disease Maternal Grandmother     Stroke Maternal Grandfather     Heart Disease Maternal Grandfather     Early Death Maternal Grandfather     Stroke Paternal Grandmother     Heart Disease Paternal Grandmother     Stroke Paternal Grandfather     Heart Disease Paternal Grandfather     Early Death Paternal Grandfather        Allergies:  Patient has no known allergies. Home Medications:  Prior to Admission medications    Medication Sig Start Date End Date Taking?  Authorizing Provider   vitamin D (ERGOCALCIFEROL) 1.25 MG (55978 UT) CAPS capsule Take 1 capsule by mouth once a week 7/19/21   Historical Provider, MD   FLUoxetine (PROZAC) 20 MG capsule Take 20 mg by mouth daily 7/19/21   Historical Provider, MD   ibuprofen (ADVIL;MOTRIN) 600 MG tablet Take 1 tablet by mouth 4 times daily as needed for Pain 8/1/21   Ángela Mccollum MD   PAIN RELIEF EXTRA STRENGTH 500 MG tablet take 1 tablet by mouth twice a day AS NEEDED FOR PAIN 7/13/21   Haven Fisher MD   melatonin 10 MG CAPS capsule Take 1 capsule by mouth nightly 6/1/21   Haven Fisher MD   metoprolol tartrate (LOPRESSOR) 25 MG tablet take 1 tablet by mouth twice a day 5/28/21   [facility] Jed Gibson MD   lisinopril (PRINIVIL;ZESTRIL) 20 MG tablet Take 1 tablet by mouth daily 5/28/21   Betty Batres MD   hydroCHLOROthiazide (HYDRODIURIL) 25 MG tablet Take 1 tablet by mouth daily 5/28/21   Betty Batres MD   fluticasone [facility]) 50 MCG/ACT nasal spray 1 spray by Nasal route daily 2/12/21   Betty Batres MD   albuterol sulfate HFA (VENTOLIN HFA) 108 (90 Base) MCG/ACT inhaler Inhale 2 puffs into the lungs every 6 hours as needed for Wheezing 2/12/21   Betty Batres MD   predniSONE (DELTASONE) 10 MG tablet Take one PO TID X 3 days  Take one PO BID X 3 days  Take one PO qDaily X 3 days  Take 1/2 PO q daily  X 4 days 1/7/21   Christina Matos DPM   aspirin-acetaminophen-caffeine (EXCEDRIN EXTRA STRENGTH) 797-589-40 MG per tablet Take 1 tablet by mouth every 8 hours as needed for Headaches 11/29/20   Anna Fu MD   valACYclovir (VALTREX) 1 g tablet  6/23/20   Historical Provider, MD   metoprolol tartrate (LOPRESSOR) 25 MG tablet Take 2/day 7/7/20   Betty Batres MD   ARIPiprazole (ABILIFY) 10 MG tablet Take 10 mg by mouth daily 5/24/20   Historical Provider, MD       REVIEW OF SYSTEMS    (2-9 systems for level 4, 10 or more for level 5)      Review of Systems   Constitutional: Negative for activity change, appetite change, fatigue and fever. HENT: Negative for congestion, rhinorrhea and sore throat. Respiratory: Negative for cough, shortness of breath and wheezing. Cardiovascular: Negative for chest pain, palpitations and leg swelling. Gastrointestinal: Negative for abdominal distention, constipation, diarrhea, nausea and vomiting. Genitourinary: Negative for decreased urine volume and dysuria. Musculoskeletal: Positive for arthralgias and myalgias. Skin: Negative for rash. Neurological: Negative for dizziness, weakness, light-headedness, numbness and headaches.        PHYSICAL EXAM   (up to 7 for level 4, 8 or more for level 5)     INITIAL VITALS:   BP (!) 144/83 Pulse 85   Temp 98.5 °F (36.9 °C) (Oral)   Resp 18   Ht 6' 3\" (1.905 m)   Wt (!) 355 lb (161 kg)   SpO2 99%   BMI 44.37 kg/m²     Physical Exam  Constitutional:       General: He is not in acute distress. Appearance: Normal appearance. He is not ill-appearing. HENT:      Head: Normocephalic and atraumatic. Eyes:      General:         Right eye: No discharge. Left eye: No discharge. Extraocular Movements: Extraocular movements intact. Pupils: Pupils are equal, round, and reactive to light. Cardiovascular:      Rate and Rhythm: Normal rate. Pulmonary:      Effort: Pulmonary effort is normal. No respiratory distress. Musculoskeletal:      Right lower leg: No edema. Left lower leg: No edema. Comments: Patient with tenderness to palpation to the antecubital fossa no obvious deformity no erythema or edema or increased warmth. Patient with pain with flexion at the elbow joint and engagement of the biceps muscule no pain with flexion at the elbow. No tenderness to palpation over the actual biceps itself. More at the insertion site at the elbow joint. No pain to the olecranon medial or lateral epicondyle. 5 out of 5 motor strength and sensation to light touch intact   Neurological:      General: No focal deficit present. Mental Status: He is alert and oriented to person, place, and time. DIFFERENTIAL  DIAGNOSIS     With biceps tendinopathy, and/tendinitis. We will plan on NSAIDs, ice and sent to occupational health for further work restrictions and limitations. Low concern for fracture and no concern for tendon rupture.     PLAN (LABS / IMAGING / EKG):  Orders Placed This Encounter   Procedures    Apply ice to affected area       MEDICATIONS ORDERED:  Orders Placed This Encounter   Medications    ibuprofen (ADVIL;MOTRIN) tablet 800 mg       DIAGNOSTIC RESULTS / EMERGENCY DEPARTMENT COURSE / MDM     LABS:  No results found for this visit on 10/13/21. IMPRESSION: ciceps tenidinitis        FINAL IMPRESSION      1.  Tendinitis          DISPOSITION / PLAN     DISPOSITION Decision To Discharge 10/13/2021 09:14:02 AM      PATIENT REFERRED TO:  [facility]  [address] [facility] [address]  [phone]          DISCHARGE MEDICATIONS:  New Prescriptions    No medications on file       Kinjal Valadez DO  9:14 AM    Attending Emergency Physician  [facility] ED    (Please note that portions of this note were completed with a voice recognition program.  Amanda Ling made to edit the dictations but occasionally words are mis-transcribed.)              Linda Form, DO  10/13/21 0188

## 2021-10-14 ENCOUNTER — CARE COORDINATION (OUTPATIENT)
Dept: CARE COORDINATION | Age: 41
End: 2021-10-14

## 2021-10-14 NOTE — CARE COORDINATION
Ambulatory Care Coordination  ED Follow up Call    Reason for ED visit:  Left forearm pain  Status:     not changed    Did you call your PCP prior to going to the ED? No      Did you receive a discharge instructions from the Emergency Room? Yes  Review of Instructions:     Understands what to report/when to return?:  Yes   Understands discharge instructions?:  Yes   Following discharge instructions?:  Yes   If not why? Are there any new complaints of pain? No  New Pain Meds? No    Constipation prophylaxis needed? N/A    If you have a wound is the dressing clean, dry, and intact? N/A  Understands wound care regimen? N/A    Are there any other complaints/concerns that you wish to tell your provider? no    FU appts/Provider:    No future appointments. New Medications?:   No      Medication Reconciliation by phone - No  Understands Medications? Yes  Taking Medications? Yes  Can you swallow your pills? Yes    Any further needs in the home i.e. Equipment? No    Link to services in community?:  No   Which services:      Reviewed discharge instructions. Gave him the number for Occupational Health at [facility] for follow up and work restrictions. He said he will call them as soon as he hangs up with ACM. Instructed him to keep the ace wrap on; apply ice for 20-30 minutes several times/day; and take ibuprofen for the pain. He denies any needs for CM. He states he has his medications and is compliant. He has transportation. Declines CM at this time.

## 2021-10-30 DIAGNOSIS — J31.0 CHRONIC RHINITIS: ICD-10-CM

## 2021-11-01 NOTE — TELEPHONE ENCOUNTER
E-scribe request for A;buterol . Please review and e-scribe if applicable. Next Visit Date:  No future appointments.   Health Maintenance   Topic Date Due    Flu vaccine (1) 09/01/2021    Potassium monitoring  06/23/2022    Creatinine monitoring  06/23/2022    Lipid screen  05/28/2026    DTaP/Tdap/Td vaccine (3 - Td or Tdap) 04/27/2028    Pneumococcal 0-64 years Vaccine (2 of 2 - PPSV23) 01/31/2043    COVID-19 Vaccine  Completed    Hepatitis C screen  Completed    HIV screen  Completed    Hepatitis A vaccine  Aged Out    Hepatitis B vaccine  Aged Out    Hib vaccine  Aged Out    Meningococcal (ACWY) vaccine  Aged Out               (applicable per patient's age: Cancer Screenings, Depression Screening, Fall Risk Screening, Immunizations)    Hemoglobin A1C (%)   Date Value   05/28/2021 5.6   02/22/2021 5.5   11/01/2019 5.4     LDL Cholesterol (mg/dL)   Date Value   05/28/2021 91     AST (U/L)   Date Value   05/28/2021 15     ALT (U/L)   Date Value   05/28/2021 13     BUN (mg/dL)   Date Value   06/23/2021 20      (goal A1C is < 7)   (goal LDL is <100) need 30-50% reduction from baseline     BP Readings from Last 3 Encounters:   10/13/21 (!) 144/83   08/03/21 127/82   08/01/21 (!) 165/98    (goal /80)      All Future Testing planned in [facility]:  Lab Frequency Next Occurrence            Patient Active Problem List:     Essential hypertension     Fatty liver disease, nonalcoholic     MIGEL on CPAP     Morbid (severe) obesity due to excess calories (HCC)     Vitamin D deficiency     Body mass index (BMI) of 38.0-38.9 in adult     IGT (impaired glucose tolerance)     Mild intermittent asthma     Bipolar affective disorder, currently depressed, mild (HCC)     Cervical spondylosis     Chronic midline low back pain without sciatica     Degeneration of cervical intervertebral disc     Osteoarthritis of knee     Scoliosis deformity of spine     Subacromial impingement

## 2021-11-02 RX ORDER — ALBUTEROL SULFATE 90 UG/1
AEROSOL, METERED RESPIRATORY (INHALATION)
Qty: 18 G | Refills: 3 | Status: SHIPPED | OUTPATIENT
Start: 2021-11-02

## 2021-12-20 ENCOUNTER — OFFICE VISIT (OUTPATIENT)
Dept: PODIATRY | Age: 41
End: 2021-12-20
Payer: MEDICAID

## 2021-12-20 VITALS — HEIGHT: 76 IN | WEIGHT: 315 LBS | BODY MASS INDEX: 38.36 KG/M2

## 2021-12-20 DIAGNOSIS — M79.671 RIGHT FOOT PAIN: Primary | ICD-10-CM

## 2021-12-20 DIAGNOSIS — M72.2 PLANTAR FASCIITIS OF RIGHT FOOT: ICD-10-CM

## 2021-12-20 PROCEDURE — 20550 NJX 1 TENDON SHEATH/LIGAMENT: CPT | Performed by: PODIATRIST

## 2021-12-20 PROCEDURE — G8427 DOCREV CUR MEDS BY ELIG CLIN: HCPCS | Performed by: PODIATRIST

## 2021-12-20 PROCEDURE — G8417 CALC BMI ABV UP PARAM F/U: HCPCS | Performed by: PODIATRIST

## 2021-12-20 PROCEDURE — 1036F TOBACCO NON-USER: CPT | Performed by: PODIATRIST

## 2021-12-20 PROCEDURE — 99213 OFFICE O/P EST LOW 20 MIN: CPT | Performed by: PODIATRIST

## 2021-12-20 PROCEDURE — G8482 FLU IMMUNIZE ORDER/ADMIN: HCPCS | Performed by: PODIATRIST

## 2021-12-20 NOTE — PATIENT INSTRUCTIONS
Schedule a Vaccine  When you qualify to receive the vaccine, call the [facility]) COVID-19 Vaccination Hotline to schedule your appointment or to get additional information about the [facility]) locations which are offering the COVID-19 vaccine. To be 94% effective, it's important that you receive two doses of one of the COVID-19 vaccines. -If you are receiving the [place] vaccine, your second shot will be scheduled as close to 21 days after the first shot as possible. -If you are receiving the Moderna vaccine, your second shot will be scheduled as close to 28 days after the first shot as possible. [facility]) COVID-19 Vaccination Hotline: [phone]    Links to [facility]) website and [facility] website:    [email or web address]    [email or web address]

## 2021-12-20 NOTE — PROGRESS NOTES
[facility]  [facility]  [address] [place] [address]  Dept: [phone]  Dept Fax: [phone]    RETURN PATIENT PROGRESS NOTE  Date of patient's visit: 12/20/2021  Patient's Name:  Graham Rodgers YOB: 1980            Patient Care Team:  Javier Ortega MD as PCP - General (Internal Medicine)  Javier Ortega MD as PCP - REHABILITATION [facility]  Ruddy Metzger MD as Surgeon (Orthopedic Surgery)  Joe Bingham DPM as Physician (Podiatry)  Keri Montoya DPM as Physician (Podiatry)       Graham Both 35 y.o. male that presents for follow-up of   Chief Complaint   Patient presents with    Foot Pain     right foot    Injections     patient would like steroid injection today     Symptoms began 1 year(s) ago and are unchanged . Patient relates pain is Present to right heel. Pain is rated 10 out of 10 and is described as constant, severe, excruciating. Treatments prior to today's visit include: previous podiatry treatment, patient states has gotten worse recently. Currently denies F/C/N/V. No Known Allergies    Past Medical History:   Diagnosis Date    Anxiety     Arthritis     CAD (coronary artery disease)     Fatty liver     Fatty liver disease, nonalcoholic 1/13/5883    Hypertension     Obesity     Unspecified sleep apnea     cpap       Prior to Admission medications    Medication Sig Start Date End Date Taking?  Authorizing Provider   metoprolol tartrate (LOPRESSOR) 25 MG tablet take 1 tablet by mouth twice a day 11/30/21  Yes Javier Ortega MD   hydroCHLOROthiazide (HYDRODIURIL) 25 MG tablet take 1 tablet by mouth once daily 11/29/21  Yes Javier Ortega MD   lisinopril (PRINIVIL;ZESTRIL) 20 MG tablet take 1 tablet by mouth once daily 11/29/21  Yes Javier Ortega MD   albuterol sulfate  (90 Base) MCG/ACT inhaler inhale 2 puffs by mouth and INTO THE LUNGS every 6 hours if needed for wheezing 11/2/21  Yes Kj Gaitan Ky Lanes, MD   vitamin D (ERGOCALCIFEROL) 1.25 MG (91180 UT) CAPS capsule Take 1 capsule by mouth once a week 7/19/21  Yes Historical Provider, MD   FLUoxetine (PROZAC) 20 MG capsule Take 20 mg by mouth daily 7/19/21  Yes Historical Provider, MD   ibuprofen (ADVIL;MOTRIN) 600 MG tablet Take 1 tablet by mouth 4 times daily as needed for Pain 8/1/21  Yes Anya Krishnamurthy MD   PAIN RELIEF EXTRA STRENGTH 500 MG tablet take 1 tablet by mouth twice a day AS NEEDED FOR PAIN 7/13/21  Yes Shilpi Gonzalez MD   melatonin 10 MG CAPS capsule Take 1 capsule by mouth nightly 6/1/21  Yes Shilpi Gnozalez MD   fluticasone Romilda Matthew) 50 MCG/ACT nasal spray 1 spray by Nasal route daily 2/12/21  Yes Shilpi Gonzalez MD   predniSONE (DELTASONE) 10 MG tablet Take one PO TID X 3 days  Take one PO BID X 3 days  Take one PO qDaily X 3 days  Take 1/2 PO q daily  X 4 days 1/7/21  Yes Kian Donaldson DPM   aspirin-acetaminophen-caffeine (EXCEDRIN EXTRA STRENGTH) 729-266-33 MG per tablet Take 1 tablet by mouth every 8 hours as needed for Headaches 11/29/20  Yes Flakito Pollock MD   valACYclovir (VALTREX) 1 g tablet  6/23/20  Yes Historical Provider, MD   ARIPiprazole (ABILIFY) 10 MG tablet Take 10 mg by mouth daily 5/24/20  Yes Historical Provider, MD   metoprolol tartrate (LOPRESSOR) 25 MG tablet Take 2/day 7/7/20   Shilpi Gonzalez MD       Review of Systems    Review of Systems:  History obtained from chart review and the patient  General ROS: negative for - chills, fatigue, fever, night sweats or weight gain  Constitutional: Negative for chills, diaphoresis, fatigue, fever and unexpected weight change. Musculoskeletal: Positive for arthralgias, gait problem and joint swelling. Neurological ROS: negative for - behavioral changes, confusion, headaches or seizures. Negative for weakness and numbness. Dermatological ROS: negative for - mole changes, rash  Cardiovascular: Negative for leg swelling.    Gastrointestinal: Negative for constipation, diarrhea, nausea and vomiting. Lower Extremity Physical Examination:     Vitals: There were no vitals filed for this visit. General: AAO x 3 in NAD. Dermatologic Exam:  Skin lesion/ulceration Absent . Skin No rashes or nodules noted. .       Musculoskeletal:     1st MPJ ROM decreased, Bilateral.  Muscle strength 5/5, Bilateral. POP of the right plantar medial calcaneal tuberosity. Pain increased with Dorsiflexion of the right and left lesser toes. Negative pain on compression of the calcaneus bilaterally Medial longitudinal arch, Bilateral WNL. Ankle ROM WNL,Bilateral.    Dorsally contracted digits absent digits 1-5 Bilateral.     Vascular: DP and PT pulses palpable 2/4, Bilateral.  CFT <3 seconds, Bilateral.  Hair growth present to the level of the digits, Bilateral.  Edema absent, Bilateral.  Varicosities absent, Bilateral. Erythema absent, Bilateral    Neurological: Sensation intact to light touch to level of digits, Bilateral.  Protective sensation intact 10/10 sites via 5.07/10g [place] Monofilament, Bilateral.  negative Tinel's, Bilateral.  negative Valleix sign, Bilateral.      Integument: Warm, dry, supple, Bilateral.  Open lesion absent, Bilateral.  Interdigital maceration absent to web spaces 1-4, Bilateral.  Nails are normal in length, thickness and color 1-5 bilateral.  Fissures absent, Bilateral.           Asessment: Patient is a 35 y.o. male with:    Diagnosis Orders   1. Right foot pain  28691 - [place] INJECT TENDON SHEATH/LIGAMENT    betamethasone acetate-betamethasone sodium phosphate (CELESTONE) injection 6 mg   2. Plantar fasciitis of right foot  57253 - [place] INJECT TENDON SHEATH/LIGAMENT    betamethasone acetate-betamethasone sodium phosphate (CELESTONE) injection 6 mg         Plan: Patient examined and evaluated. Current condition and treatment options discussed in detail.   A steroid injection was performed at right plantar fascia using 1% plain Lidocaine and 6 mg of Celestone. This was well tolerated. Patient Instructions: Plantar Fasciitis    Plantar Fasciitis is inflammation of the long fibrous band on the bottom of the foot (plantar fascia), especially in the area of its attachment to the heel bone (calcaneus). The plantar fascia is also intimately related to the Achilles tendon and therefore stretching of the calf muscles is also important for treatment. 1. Stretching: Perform 3-4 times daily, 2-3 minutes per side      -Before getting out of bed, place a towel around the ball of your foot and       pull back, holding for 30 seconds. Repeat with other foot.      -Place a tennis ball on the floor. Roll the tennis ball under your foot for      10-15 minutes. Repeat with other foot. -Perform calf stretches: stand facing a wall with your hands on the wall,      place one leg a step behind the other. Keeping your back heel on the      floor, bend your front knee (lean into the wall), holding for 30 seconds. Repeat with other leg. 2. Icing: Perform 2-3 times daily      -Place a 12 oz plastic water bottle in the freezer. Once frozen, remove     and roll the bottle under your foot for 10-15 min. 3. Anti-inflammatory Medication      -Begin daily regimen of anti-inflammatory medication (Ibuprofen,       Naproxen, Naprosyn) as discussed during your visit. Verbal and written instructions given to patient. Contact office with any questions/problems/concerns. No orders of the defined types were placed in this encounter. No orders of the defined types were placed in this encounter. RTC in 1month(s).     12/20/2021      Electronically signed by Dr. Denis Gaston DPM on 12/20/2021 at 11:36 AM  12/20/2021

## 2021-12-22 ENCOUNTER — HOSPITAL ENCOUNTER (EMERGENCY)
Age: 41
Discharge: HOME | End: 2021-12-22
Attending: EMERGENCY MEDICINE
Payer: MEDICAID

## 2021-12-22 VITALS
HEIGHT: 75 IN | TEMPERATURE: 97.3 F | RESPIRATION RATE: 16 BRPM | HEART RATE: 73 BPM | WEIGHT: 168 LBS | BODY MASS INDEX: 20.89 KG/M2 | SYSTOLIC BLOOD PRESSURE: 165 MMHG | DIASTOLIC BLOOD PRESSURE: 101 MMHG | OXYGEN SATURATION: 98 %

## 2021-12-22 DIAGNOSIS — G89.29 CHRONIC PAIN IN RIGHT FOOT: Primary | ICD-10-CM

## 2021-12-22 DIAGNOSIS — M79.671 CHRONIC PAIN IN RIGHT FOOT: Primary | ICD-10-CM

## 2021-12-22 PROCEDURE — 6360000002 HC RX W HCPCS: Performed by: STUDENT IN AN ORGANIZED HEALTH CARE EDUCATION/TRAINING PROGRAM

## 2021-12-22 PROCEDURE — 96372 THER/PROPH/DIAG INJ SC/IM: CPT

## 2021-12-22 PROCEDURE — 99281 EMR DPT VST MAYX REQ PHY/QHP: CPT

## 2021-12-22 RX ORDER — KETOROLAC TROMETHAMINE 30 MG/ML
30 INJECTION, SOLUTION INTRAMUSCULAR; INTRAVENOUS ONCE
Status: COMPLETED | OUTPATIENT
Start: 2021-12-22 | End: 2021-12-22

## 2021-12-22 RX ADMIN — KETOROLAC TROMETHAMINE 30 MG: 30 INJECTION, SOLUTION INTRAMUSCULAR; INTRAVENOUS at 19:31

## 2021-12-22 ASSESSMENT — PAIN SCALES - GENERAL: PAINLEVEL_OUTOF10: 10

## 2021-12-22 ASSESSMENT — ENCOUNTER SYMPTOMS: SHORTNESS OF BREATH: 0

## 2021-12-22 NOTE — Clinical Note
Sergey Flash was seen and treated in our emergency department on 12/22/2021. He may return to work on 12/23/2021. If you have any questions or concerns, please don't hesitate to call.       Disha Koenig MD

## 2021-12-22 NOTE — ED PROVIDER NOTES
[address] [facility]     Emergency Department     Faculty Attestation    I performed a history and physical examination of the patient and discussed management with the resident. I reviewed the resident´s note and agree with the documented findings and plan of care. Any areas of disagreement are noted on the chart. I was personally present for the key portions of any procedures. I have documented in the chart those procedures where I was not present during the key portions. I have reviewed the emergency nurses triage note. I agree with the chief complaint, past medical history, past surgical history, allergies, medications, social and family history as documented unless otherwise noted below. For Physician Assistant/ Nurse Practitioner cases/documentation I have personally evaluated this patient and have completed at least one if not all key elements of the E/M (history, physical exam, and MDM). Additional findings are as noted. Chronic pain right foot, no signs of DVT, Achilles tendon intact, peripheral neurovascular intact, no swelling ecchymosis or signs of cellulitis.      Monica Michel MD  12/22/21 Georgina Savage

## 2021-12-22 NOTE — ED NOTES
This patient was assessed by the doctor only. Nurse processed and completed the orders from this doctor ie labs, meds, and/or EKG.         Kiarra Kuhn RN  12/22/21 8445

## 2021-12-23 NOTE — ED PROVIDER NOTES
[facility]  ED  Emergency Department Encounter  Emergency Medicine Resident     Pt Barbara Rockwell  MRN: [id number]  [place] 1980  Date of evaluation: 12/22/21  PCP:  Zion Muñiz MD    [address] [place] [address]       Chief Complaint   Patient presents with    Knee Pain    Foot Pain       HISTORY OF PRESENT ILLNESS  (Location/Symptom, Timing/Onset, Context/Setting, Quality, Duration, Modifying Factors, Severity.)      Yamileth Mederos is a 35 y.o. male who presents with chronic right knee and foot pain unchanged over the last year. Injection with podiatry done 2 days ago which patient notes did not help. Patient went back to work when he believes he was not supposed to and now is having persistent chronic pain. Patient tried ibuprofen 1 time earlier today without significant improvement. Nothing new or different about today's pain, no numbness tingling or weakness. No difficulties ambulating. No new trauma. PAST MEDICAL / SURGICAL / SOCIAL / FAMILY HISTORY      has a past medical history of Anxiety, Arthritis, CAD (coronary artery disease), Fatty liver, Fatty liver disease, nonalcoholic, Hypertension, Obesity, and Unspecified sleep apnea. has no past surgical history on file. Social History     Socioeconomic History    Marital status: Single     Spouse name: Philly Newman Number of children: 0    Years of education: 12    Highest education level:  Bachelor's degree (e.g., BA, AB, BS)   Occupational History    Not on file   Tobacco Use    Smoking status: Never Smoker    Smokeless tobacco: Never Used   Vaping Use    Vaping Use: Never used   Substance and Sexual Activity    Alcohol use: No    Drug use: No    Sexual activity: Yes     Partners: Female   Other Topics Concern    Not on file   Social History Narrative    ** Merged History Encounter **          Social Determinants of Health     Financial Resource Strain: Low Risk     Difficulty of Paying Living Expenses: Not very hard   Food Insecurity: No Food Insecurity    Worried About Running Out of Food in the Last Year: Never true    Ran Out of Food in the Last Year: Never true   Transportation Needs:     Lack of Transportation (Medical): Not on file    Lack of Transportation (Non-Medical): Not on file   Physical Activity:     Days of Exercise per Week: Not on file    Minutes of Exercise per Session: Not on file   Stress:     Feeling of Stress : Not on file   Social Connections:     Frequency of Communication with Friends and Family: Not on file    Frequency of Social Gatherings with Friends and Family: Not on file    Attends [employer] Services: Not on file    Active Member of [address] [place] [address] [employer] or Organizations: Not on file    Attends Club or Organization Meetings: Not on file    Marital Status: Not on file   Intimate Partner Violence:     Fear of Current or Ex-Partner: Not on file    Emotionally Abused: Not on file    Physically Abused: Not on file    Sexually Abused: Not on file   Housing Stability:     Unable to Pay for Housing in the Last Year: Not on file    Number of [place] in the Last Year: Not on file    Unstable Housing in the Last Year: Not on file       Family History   Problem Relation Age of Onset    Arthritis Mother     Diabetes Father     Arthritis Father     Stroke Maternal Grandmother     Heart Disease Maternal Grandmother     Stroke Maternal Grandfather     Heart Disease Maternal Grandfather     Early Death Maternal Grandfather     Stroke Paternal Grandmother     Heart Disease Paternal Grandmother     Stroke Paternal Grandfather     Heart Disease Paternal Grandfather     Early Death Paternal Grandfather        Allergies:  Patient has no known allergies. Home Medications:  Prior to Admission medications    Medication Sig Start Date End Date Taking?  Authorizing Provider   metoprolol tartrate (LOPRESSOR) 25 MG tablet take 1 tablet by mouth twice a day 11/30/21   Carlene Paz MD hydroCHLOROthiazide (HYDRODIURIL) 25 MG tablet take 1 tablet by mouth once daily 11/29/21   Shahla Duarte MD   lisinopril (PRINIVIL;ZESTRIL) 20 MG tablet take 1 tablet by mouth once daily 11/29/21   Shahla Duarte MD   albuterol sulfate  (90 Base) MCG/ACT inhaler inhale 2 puffs by mouth and INTO THE LUNGS every 6 hours if needed for wheezing 11/2/21   Shahla Duarte MD   vitamin D (ERGOCALCIFEROL) 1.25 MG (78527 UT) CAPS capsule Take 1 capsule by mouth once a week 7/19/21   Historical Provider, MD   FLUoxetine (PROZAC) 20 MG capsule Take 20 mg by mouth daily 7/19/21   Historical Provider, MD   ibuprofen (ADVIL;MOTRIN) 600 MG tablet Take 1 tablet by mouth 4 times daily as needed for Pain 8/1/21   Nora Mariano MD   PAIN RELIEF EXTRA STRENGTH 500 MG tablet take 1 tablet by mouth twice a day AS NEEDED FOR PAIN 7/13/21   Shahla Duarte MD   melatonin 10 MG CAPS capsule Take 1 capsule by mouth nightly 6/1/21   Shahla Duarte MD   fluticasone [facility]) 50 MCG/ACT nasal spray 1 spray by Nasal route daily 2/12/21   Shahla Duarte MD   predniSONE (DELTASONE) 10 MG tablet Take one PO TID X 3 days  Take one PO BID X 3 days  Take one PO qDaily X 3 days  Take 1/2 PO q daily  X 4 days 1/7/21   Carrington Patiño DPM   aspirin-acetaminophen-caffeine (EXCEDRIN EXTRA STRENGTH) 603-085-61 MG per tablet Take 1 tablet by mouth every 8 hours as needed for Headaches 11/29/20   Armaan Trinh MD   valACYclovir (VALTREX) 1 g tablet  6/23/20   Historical Provider, MD   metoprolol tartrate (LOPRESSOR) 25 MG tablet Take 2/day 7/7/20   Shahla Duarte MD   ARIPiprazole (ABILIFY) 10 MG tablet Take 10 mg by mouth daily 5/24/20   Historical Provider, MD       REVIEW OF SYSTEMS    (2-9 systems for level 4, 10 or more for level 5)      Review of Systems   Constitutional: Negative for fever. Respiratory: Negative for shortness of breath. Cardiovascular: Negative for chest pain.    Musculoskeletal: Positive for myalgias. Negative for arthralgias and gait problem. Skin: Negative for wound. Neurological: Negative for weakness and numbness. PHYSICAL EXAM   (up to 7 for level 4, 8 or more for level 5)      INITIAL VITALS:   BP (!) 165/101   Pulse 73   Temp 97.3 °F (36.3 °C)   Resp 16   Ht 6' 3\" (1.905 m)   Wt 168 lb (76.2 kg)   SpO2 98%   BMI 21.00 kg/m²     Physical Exam  Vitals and nursing note reviewed. Constitutional:       Appearance: Normal appearance. He is well-developed. HENT:      Head: Normocephalic and atraumatic. Right Ear: External ear normal.      Left Ear: External ear normal.      Nose: Nose normal.   Neck:      Trachea: Trachea normal. No tracheal deviation. Cardiovascular:      Rate and Rhythm: Normal rate. Pulmonary:      Effort: Pulmonary effort is normal. No respiratory distress. Musculoskeletal:         General: No deformity. Normal range of motion. Cervical back: Normal range of motion. Comments: Nonspecific pain without overlying skin changes on the plantar aspect of the heel and Achilles tendon of the right foot as well as right knee. No numbness tingling or weakness. Pulses motor strength sensation intact distal to pain. Ambulatory, weightbearing, intact range of motion. No signs of trauma. Skin:     General: Skin is warm and dry. Capillary Refill: Capillary refill takes less than 2 seconds. Neurological:      General: No focal deficit present. Mental Status: He is alert and oriented to person, place, and time. Psychiatric:         Mood and Affect: Mood normal.         Behavior: Behavior normal.         DIFFERENTIAL  DIAGNOSIS     PLAN (LABS / IMAGING / EKG):  No orders of the defined types were placed in this encounter.       MEDICATIONS ORDERED:  Orders Placed This Encounter   Medications    ketorolac (TORADOL) injection 30 mg       DDX: Acute on chronic pain    DIAGNOSTIC RESULTS / EMERGENCY DEPARTMENT COURSE / MDM     LABS:  No results found for this visit on 12/22/21. RADIOLOGY:  None    EKG  None    All EKG's are interpreted by the Emergency Department Physician who either signs or Co-signs this chart in the absence of a cardiologist.    EMERGENCY DEPARTMENT COURSE:  Patient found seated upright in bed, no acute distress, not ill or toxic appearing. Engaged in cooperative exam.  Physical exam notable for typical chronic pain. No overlying skin changes, no new trauma. In the absence of changes or new trauma as patient is already following with podiatry and PCP regarding this issue feel no imaging is warranted at this time. Distal pulses motor strength sensation intact, no indication for additional work-up. Will treat with Toradol and multimodal pain therapy at home. Patient has medications at home to use. Work note provided. Discharge plan discussed with patient who is in agreement. Educated on likely pathology, medications, return precautions, and follow-up. Patient understood all educated materials with all questions answered to their satisfaction. PROCEDURES:  None    CONSULTS:  None    CRITICAL CARE:  None    FINAL IMPRESSION      1.  Chronic pain in right foot          DISPOSITION / PLAN     DISPOSITION Decision To Discharge 12/22/2021 07:01:51 PM      PATIENT REFERRED TO:  Portia Moore MD  [facility] [address] [facility] [address]  [phone]    Schedule an appointment as soon as possible for a visit   Regarding this visit    [address]  [address]  [address] [facility] [address] [place] [address]  [phone]  Schedule an appointment as soon as possible for a visit in 1 week  Regarding this visit for wound check, As needed      DISCHARGE MEDICATIONS:  Discharge Medication List as of 12/22/2021  7:05 PM          Nessa Joseph MD  Emergency Medicine Resident    (Please note that portions of this note were completed with a voice recognition program.  Efforts were made to edit the dictations but occasionally words are mis-transcribed.)        Teressa Woo MD  Resident  12/22/21 6912

## 2021-12-23 NOTE — PROGRESS NOTES
I was assigned to this patient in error. I did not evaluate or treat this patient during this emergency department encounter.     Casi Brambila DO, PGY-3  Emergency Medicine Resident  12/22/2021     9:22 PM

## 2021-12-27 ENCOUNTER — HOSPITAL ENCOUNTER (EMERGENCY)
Age: 41
Discharge: HOME | End: 2021-12-27
Attending: EMERGENCY MEDICINE
Payer: MEDICAID

## 2021-12-27 ENCOUNTER — APPOINTMENT (OUTPATIENT)
Dept: GENERAL RADIOLOGY | Age: 41
End: 2021-12-27
Payer: MEDICAID

## 2021-12-27 VITALS
BODY MASS INDEX: 38.36 KG/M2 | DIASTOLIC BLOOD PRESSURE: 86 MMHG | WEIGHT: 315 LBS | HEIGHT: 76 IN | HEART RATE: 99 BPM | RESPIRATION RATE: 17 BRPM | SYSTOLIC BLOOD PRESSURE: 176 MMHG | TEMPERATURE: 97.9 F | OXYGEN SATURATION: 97 %

## 2021-12-27 DIAGNOSIS — G89.29 CHRONIC PAIN OF RIGHT KNEE: Primary | ICD-10-CM

## 2021-12-27 DIAGNOSIS — M25.561 CHRONIC PAIN OF RIGHT KNEE: Primary | ICD-10-CM

## 2021-12-27 PROCEDURE — 73564 X-RAY EXAM KNEE 4 OR MORE: CPT

## 2021-12-27 PROCEDURE — 96372 THER/PROPH/DIAG INJ SC/IM: CPT

## 2021-12-27 PROCEDURE — 6360000002 HC RX W HCPCS: Performed by: STUDENT IN AN ORGANIZED HEALTH CARE EDUCATION/TRAINING PROGRAM

## 2021-12-27 PROCEDURE — 99282 EMERGENCY DEPT VISIT SF MDM: CPT

## 2021-12-27 RX ORDER — PREDNISONE 20 MG/1
20 TABLET ORAL DAILY
Qty: 5 TABLET | Refills: 0 | Status: SHIPPED | OUTPATIENT
Start: 2021-12-27 | End: 2022-01-01

## 2021-12-27 RX ORDER — KETOROLAC TROMETHAMINE 30 MG/ML
30 INJECTION, SOLUTION INTRAMUSCULAR; INTRAVENOUS ONCE
Status: COMPLETED | OUTPATIENT
Start: 2021-12-27 | End: 2021-12-27

## 2021-12-27 RX ADMIN — KETOROLAC TROMETHAMINE 30 MG: 30 INJECTION, SOLUTION INTRAMUSCULAR; INTRAVENOUS at 13:42

## 2021-12-27 ASSESSMENT — ENCOUNTER SYMPTOMS
BACK PAIN: 0
VOMITING: 0
ABDOMINAL PAIN: 0
NAUSEA: 0
CONSTIPATION: 0
SHORTNESS OF BREATH: 0
DIARRHEA: 0

## 2021-12-27 ASSESSMENT — PAIN SCALES - GENERAL
PAINLEVEL_OUTOF10: 10
PAINLEVEL_OUTOF10: 10

## 2021-12-27 NOTE — Clinical Note
Lon Wood was seen and treated in our emergency department on 12/27/2021. He may return to work on 12/28/2021. Weight bearing as tolerated. If you have any questions or concerns, please don't hesitate to call.       Chacho Pino, DO

## 2021-12-27 NOTE — ED PROVIDER NOTES
for 5 days, return precautions. Lilliam Barth.  Dulce Roman MD, [address] [facility]  Attending Emergency  Physician               Yissel Chavarria MD  12/27/21 2203

## 2021-12-27 NOTE — ED NOTES
Bed: 38  Expected date:   Expected time:   Means of arrival:   Comments:     Rudy Pickett RN  12/27/21 3931

## 2021-12-27 NOTE — ED NOTES
This patient was assessed by the doctor only. Nurse processed and completed the orders from this doctor ie labs, meds, and/or EKG.       Cherry Lanier LPN  33/93/05 7866

## 2021-12-27 NOTE — ED PROVIDER NOTES
[facility]  ED  Emergency Department Encounter  EmergencyMedicine Resident     Pt Brinda Shirley  MRN: [id number]  [place] 1980  Date of evaluation: 12/27/21  PCP:  Micaela Ray MD    This patient was evaluated in the Emergency Department for symptoms described in the history of present illness. The patient was evaluated in the context of the global COVID-19 pandemic, which necessitated consideration that the patient might be at risk for infection with the SARS-CoV-2 virus that causes COVID-19. Institutional protocols and algorithms that pertain to the evaluation of patients at risk for COVID-19 are in a state of rapid change based on information released by regulatory bodies including the CDC and federal and state organizations. These policies and algorithms were followed during the patient's care in the ED. CHIEF COMPLAINT       Chief Complaint   Patient presents with    Knee Pain     right,chronic       HISTORY OF PRESENT ILLNESS  (Location/Symptom, Timing/Onset, Context/Setting, Quality, Duration, Modifying Factors, Severity.)      Durwin Sandhoff is a 35 y.o. male who presents with acute on chronic right knee pain. Patient states that he has chronic right knee pain but over the past week the pain is worsened. States that he does a lot of standing and walking for work. Wanted to be evaluated because it is difficult for him to bear weight on his right knee. States that he did gain a significant amount of weight over the past year which she knows may be contributing to his knee pain. Denies trauma. Denies fever/chills, cough, shortness of breath, abdominal pain, numbness or tingling, focal weakness, change in bowel or bladder function.     PAST MEDICAL / SURGICAL / SOCIAL / FAMILY HISTORY      has a past medical history of Anxiety, Arthritis, CAD (coronary artery disease), Fatty liver, Fatty liver disease, nonalcoholic, Hypertension, Obesity, and Unspecified sleep apnea. has no past surgical history on file. Social History     Socioeconomic History    Marital status: Single     Spouse name: Latanya Purdy Number of children: 0    Years of education: 12    Highest education level: Bachelor's degree (e.g., BA, AB, BS)   Occupational History    Not on file   Tobacco Use    Smoking status: Never Smoker    Smokeless tobacco: Never Used   Vaping Use    Vaping Use: Never used   Substance and Sexual Activity    Alcohol use: No    Drug use: No    Sexual activity: Yes     Partners: Female   Other Topics Concern    Not on file   Social History Narrative    ** Merged History Encounter **          Social Determinants of Health     Financial Resource Strain: Low Risk     Difficulty of Paying Living Expenses: Not very hard   Food Insecurity: No Food Insecurity    Worried About Running Out of Food in the Last Year: Never true    Yomaira of Food in the Last Year: Never true   Transportation Needs:     Lack of Transportation (Medical): Not on file    Lack of Transportation (Non-Medical):  Not on file   Physical Activity:     Days of Exercise per Week: Not on file    Minutes of Exercise per Session: Not on file   Stress:     Feeling of Stress : Not on file   Social Connections:     Frequency of Communication with Friends and Family: Not on file    Frequency of Social Gatherings with Friends and Family: Not on file    Attends Orthodox Services: Not on file    Active Member of [address] [place] [address] or Organizations: Not on file    Attends Club or Organization Meetings: Not on file    Marital Status: Not on file   Intimate Partner Violence:     Fear of Current or Ex-Partner: Not on file    Emotionally Abused: Not on file    Physically Abused: Not on file    Sexually Abused: Not on file   Housing Stability:     Unable to Pay for Housing in the Last Year: Not on file    Number of [place] in the Last Year: Not on file    Unstable Housing in the Last Year: Not on file Family History   Problem Relation Age of Onset    Arthritis Mother     Diabetes Father     Arthritis Father     Stroke Maternal Grandmother     Heart Disease Maternal Grandmother     Stroke Maternal Grandfather     Heart Disease Maternal Grandfather     Early Death Maternal Grandfather     Stroke Paternal Grandmother     Heart Disease Paternal Grandmother     Stroke Paternal Grandfather     Heart Disease Paternal Grandfather     Early Death Paternal Grandfather        Allergies:    Patient has no known allergies. Home Medications:  Prior to Admission medications    Medication Sig Start Date End Date Taking?  Authorizing Provider   predniSONE (DELTASONE) 20 MG tablet Take 1 tablet by mouth daily for 5 days 12/27/21 1/1/22 Yes [facility], DO   metoprolol tartrate (LOPRESSOR) 25 MG tablet take 1 tablet by mouth twice a day 11/30/21   Ysabel Chacon MD   hydroCHLOROthiazide (HYDRODIURIL) 25 MG tablet take 1 tablet by mouth once daily 11/29/21   Ysabel Chacon MD   lisinopril (PRINIVIL;ZESTRIL) 20 MG tablet take 1 tablet by mouth once daily 11/29/21   Ysabel Chacon MD   albuterol sulfate  (90 Base) MCG/ACT inhaler inhale 2 puffs by mouth and INTO THE LUNGS every 6 hours if needed for wheezing 11/2/21   Ysabel Chacon MD   vitamin D (ERGOCALCIFEROL) 1.25 MG (77755 UT) CAPS capsule Take 1 capsule by mouth once a week 7/19/21   Historical Provider, MD   FLUoxetine (PROZAC) 20 MG capsule Take 20 mg by mouth daily 7/19/21   Historical Provider, MD   ibuprofen (ADVIL;MOTRIN) 600 MG tablet Take 1 tablet by mouth 4 times daily as needed for Pain 8/1/21   Katharine Cottrell MD   PAIN RELIEF EXTRA STRENGTH 500 MG tablet take 1 tablet by mouth twice a day AS NEEDED FOR PAIN 7/13/21   Ysabel Chacon MD   melatonin 10 MG CAPS capsule Take 1 capsule by mouth nightly 6/1/21   Ysabel Chacon MD   fluticasone (FLONASE) 50 MCG/ACT nasal spray 1 spray by Nasal route daily 2/12/21   [facility] Jamilah Henriquez MD   aspirin-acetaminophen-caffeine (EXCEDRIN EXTRA STRENGTH) 463-687-97 MG per tablet Take 1 tablet by mouth every 8 hours as needed for Headaches 11/29/20   Kaed Mendes MD   valACYclovir (VALTREX) 1 g tablet  6/23/20   Historical Provider, MD   metoprolol tartrate (LOPRESSOR) 25 MG tablet Take 2/day 7/7/20   Javier Ortega MD   ARIPiprazole (ABILIFY) 10 MG tablet Take 10 mg by mouth daily 5/24/20   Historical Provider, MD       REVIEW OF SYSTEMS    (2-9 systems for level 4, 10 or more for level 5)    Review of Systems   Constitutional: Negative for chills and fever. HENT: Negative for congestion. Eyes: Negative for visual disturbance. Respiratory: Negative for shortness of breath. Cardiovascular: Negative for chest pain. Gastrointestinal: Negative for abdominal pain, constipation, diarrhea, nausea and vomiting. Genitourinary: Negative for difficulty urinating and dysuria. Musculoskeletal: Positive for arthralgias (Right knee pain). Negative for back pain. Skin: Negative for wound. Neurological: Negative for weakness, numbness and headaches. PHYSICAL EXAM   (up to 7 for level 4, 8 or more for level 5)    INITIAL VITALS:   BP (!) 176/86   Pulse 99   Temp 97.9 °F (36.6 °C)   Resp 17   Ht 6' 4\" (1.93 m)   Wt (!) 368 lb (166.9 kg)   SpO2 97%   BMI 44.79 kg/m²   I have reviewed the triage vital signs. Const: Well nourished, well developed, appears stated age  Eyes: PERRL, no conjunctival injection  HENT: NCAT, Neck supple without meningismus   CV: RRR, Warm, well-perfused extremities  RESP: CTAB, Unlabored respiratory effort  GI: soft, non-tender, non-distended, no masses  MSK: No gross deformities appreciated. Mild swelling over the anterior inferior aspect of the quadriceps muscle on the right lower extremity. Full range of motion passively and actively in the right knee. Neurovascularly intact distal to right knee. Walks with a steady gait. Skin: Warm, dry. No rashes  Neuro: Alert, CNs II-XII grossly intact. Sensation and motor function of extremities grossly intact. Psych: Appropriate mood and affect. DIFFERENTIAL  DIAGNOSIS   DDX:  Effusion, strain/sprain, fracture    Initial MDM:  HPI: 47-year-old male with chronic right knee pain coming in with acute on chronic right knee pain. Vitals: Within normal limits  PE: See above  Plan: Right knee x-ray. Toradol. PLAN (LABS / IMAGING / EKG):  Orders Placed This Encounter   Procedures    XR KNEE RIGHT (MIN 4 VIEWS)       MEDICATIONS ORDERED:  Orders Placed This Encounter   Medications    ketorolac (TORADOL) injection 30 mg    predniSONE (DELTASONE) 20 MG tablet     Sig: Take 1 tablet by mouth daily for 5 days     Dispense:  5 tablet     Refill:  0         DIAGNOSTIC RESULTS / EMERGENCY DEPARTMENT COURSE / MDM   LAB RESULTS:  No results found for this visit on 12/27/21. RADIOLOGY:  XR KNEE RIGHT (MIN 4 VIEWS)    Result Date: 12/27/2021  EXAMINATION: FOUR XRAY VIEWS OF THE RIGHT KNEE 12/27/2021 1:47 pm COMPARISON: 25 April 2018 HISTORY: ORDERING SYSTEM PROVIDED HISTORY: right knee pain TECHNOLOGIST PROVIDED HISTORY: With sunrise view right knee pain Reason for Exam: portable FINDINGS: Mild to moderate tricompartmental arthritic changes are noted with slight diffuse marginal spurring. No acute fracture, dislocation or effusion is noted. Mild to moderate tricompartmental arthritic changes without acute fracture, dislocation or effusion. PROCEDURES:  None indicated    CONSULTS:  None      MDM/EMERGENCY DEPARTMENT COURSE:  47-year-old male with chronic right knee pain coming in with acute on chronic right knee pain. Physical exam showed no gross deformities, mild swelling over the anterior, inferior aspect of the quadriceps muscle on the right lower extremity, full range of motion passively and actively in the right knee, neurovascularly intact distal to the right knee, walks with a steady gait.   Right knee x-ray showed mild to moderate tricompartmental arthritic changes without acute fracture, dislocation or effusion. Patient to be discharged home with prednisone 20 mg x 5 days. Work note given. Patient to follow-up outpatient with primary care physician. Given strict return precautions. Patient understands and agrees with the plan. CRITICAL CARE:  Please see Attending Note    FINAL IMPRESSION      1.  Chronic pain of right knee          DISPOSITION / PLAN     DISPOSITION Decision To Discharge 12/27/2021 02:25:42 PM    PATIENT REFERRED TO:  MD Charity Mckeon [facility] [address] [facility] [address] [facility] [address]  [phone]    Schedule an appointment as soon as possible for a visit   As needed    [facility]  [address] [facility] [address]  [phone]  Go to   If symptoms worsen      DISCHARGE MEDICATIONS:  Discharge Medication List as of 12/27/2021  2:27 PM          Cristina Reynoso DO  Emergency Medicine Resident    (Please note that portions of this note were completed with a voice recognition program.  Efforts were made to edit the dictations but occasionally words are mis-transcribed.)       Cristina Reynoso DO  Resident  12/27/21 7125

## 2021-12-28 ENCOUNTER — OFFICE VISIT (OUTPATIENT)
Dept: INTERNAL MEDICINE | Age: 41
End: 2021-12-28
Payer: MEDICAID

## 2021-12-28 VITALS
TEMPERATURE: 97.7 F | WEIGHT: 315 LBS | HEART RATE: 91 BPM | HEIGHT: 75 IN | OXYGEN SATURATION: 96 % | SYSTOLIC BLOOD PRESSURE: 157 MMHG | DIASTOLIC BLOOD PRESSURE: 94 MMHG | BODY MASS INDEX: 39.17 KG/M2

## 2021-12-28 DIAGNOSIS — E66.01 MORBID (SEVERE) OBESITY DUE TO EXCESS CALORIES (HCC): ICD-10-CM

## 2021-12-28 DIAGNOSIS — I10 ESSENTIAL HYPERTENSION: Primary | ICD-10-CM

## 2021-12-28 DIAGNOSIS — M17.11 PRIMARY OSTEOARTHRITIS OF RIGHT KNEE: ICD-10-CM

## 2021-12-28 DIAGNOSIS — K76.0 FATTY LIVER DISEASE, NONALCOHOLIC: ICD-10-CM

## 2021-12-28 DIAGNOSIS — R73.02 IGT (IMPAIRED GLUCOSE TOLERANCE): ICD-10-CM

## 2021-12-28 DIAGNOSIS — Z99.89 OSA ON CPAP: ICD-10-CM

## 2021-12-28 DIAGNOSIS — J31.0 CHRONIC RHINITIS: ICD-10-CM

## 2021-12-28 DIAGNOSIS — G47.33 OSA ON CPAP: ICD-10-CM

## 2021-12-28 PROCEDURE — 99214 OFFICE O/P EST MOD 30 MIN: CPT | Performed by: INTERNAL MEDICINE

## 2021-12-28 PROCEDURE — G8417 CALC BMI ABV UP PARAM F/U: HCPCS | Performed by: INTERNAL MEDICINE

## 2021-12-28 PROCEDURE — 99211 OFF/OP EST MAY X REQ PHY/QHP: CPT | Performed by: INTERNAL MEDICINE

## 2021-12-28 PROCEDURE — 1036F TOBACCO NON-USER: CPT | Performed by: INTERNAL MEDICINE

## 2021-12-28 PROCEDURE — G8427 DOCREV CUR MEDS BY ELIG CLIN: HCPCS | Performed by: INTERNAL MEDICINE

## 2021-12-28 PROCEDURE — G8482 FLU IMMUNIZE ORDER/ADMIN: HCPCS | Performed by: INTERNAL MEDICINE

## 2021-12-28 RX ORDER — HYDROCHLOROTHIAZIDE 25 MG/1
TABLET ORAL
Qty: 90 TABLET | Refills: 0 | Status: SHIPPED | OUTPATIENT
Start: 2021-12-28 | End: 2022-01-06 | Stop reason: SINTOL

## 2021-12-28 RX ORDER — FLUTICASONE PROPIONATE 50 MCG
1 SPRAY, SUSPENSION (ML) NASAL DAILY
Qty: 16 G | Refills: 3 | Status: SHIPPED | OUTPATIENT
Start: 2021-12-28 | End: 2022-04-26

## 2021-12-28 RX ORDER — LISINOPRIL 40 MG/1
TABLET ORAL
Qty: 90 TABLET | Refills: 1 | Status: SHIPPED | OUTPATIENT
Start: 2021-12-28 | End: 2022-01-06

## 2021-12-28 RX ORDER — IBUPROFEN 800 MG/1
800 TABLET ORAL EVERY 8 HOURS PRN
Qty: 30 TABLET | Refills: 1 | Status: SHIPPED | OUTPATIENT
Start: 2021-12-28 | End: 2022-08-23

## 2021-12-28 ASSESSMENT — ENCOUNTER SYMPTOMS
BACK PAIN: 0
COUGH: 0
WHEEZING: 0
BLOOD IN STOOL: 0
PHOTOPHOBIA: 0
ABDOMINAL PAIN: 0
SHORTNESS OF BREATH: 0

## 2021-12-28 NOTE — PATIENT INSTRUCTIONS
Medications e-scribe to pharmacy of pt's choice. Laboratory Instructions: Your doctor has ordered blood or urine testing. You can get this testing done at the Lab located on the first floor of the [facility], or at any other [facility]. Please stop at Main Registration, before going to the lab, as you must be registered first.   Please get this lab done before your next visit. You may eat or drink before this test.  Labs given to patient    Referral to Orthopedic and PT was placed, summary of care printed and faxed to office, phone numbers given to the patient, they will contact office for an appt    Return To Clinic 2/8/2022. Please bring all of your medications to this upcoming appointment. After Visit Summary  given and reviewed. It is very important for your care that you keep your appointment. If for some reason you are unable to keep your appointment it is equally important that you call our office at [phone] to cancel your appointment and reschedule. Failure to do so may result in your termination from our practice. ENEDELIA CARBONE called and spoke with Liliana at [facility] to discontinue Lisinopril 20 mg,  per .

## 2021-12-28 NOTE — LETTER
[address]  [address] [facility] [address]  Phone: [phone]  Fax: [phone]    Haven Fisher MD        December 28, 2021     Patient: Teena Terrell   YOB: 1980   Date of Visit: 12/28/2021       To Whom It May Concern: It is my medical opinion that Yovani Chaney may return to light duty immediately with the following restrictions: lifting/carrying not to exceed 10 lbs. , pushing/pulling not to exceed 10 lbs. , no ladders, and the patient needs 15 minute breaks every 2 hours to rest due to knee osteoarthritis. If you have any questions or concerns, please don't hesitate to call.     Sincerely,        Haven Fisher MD

## 2021-12-28 NOTE — PROGRESS NOTES
[facility]    Progress Note    Date of patient's visit: 12/28/2021    Patient's Name:  Evy Fatima    YOB: 1980            Patient Care Team:  Hannah Holden MD as PCP - General (Internal Medicine)  Hannah Holden MD as PCP - [facility] Empaneled Provider  Jessica Moralez MD as Surgeon (Orthopedic Surgery)  Su Ascencio DPM as Physician (Podiatry)  Chante Moncada DPM as Physician (Podiatry)    REASON FOR VISIT: Routine outpatient follow     Chief Complaint   Patient presents with    Knee Pain     right knee pain x3 months         HISTORY OF PRESENT ILLNESS:    History was obtained from the patient. Evy Fatima is a 35 y.o. is here for follow-up after recent ER visit for right knee pain. He says pain has been worsening for the last 3 months. He has been taking ibuprofen 600 mg 2 times a day. Yesterday he went to the emergency room and an x-ray was done. He was sent home on prednisone. He says he works at [employer] and has to stand all day and work with machinery. He has been having some swelling of the knee. He is limping because of pain. He is morbidly obese. In the last year he has gained more than 100 pounds. He was going to weight management and had lost a lot of weight 2 years ago but because of Covid restrictions he has not been following up. He is restarting back at pain management as he wants to lose weight. Blood pressure is also high which could be a combination of his pain and weight gain. No headaches or chest pain or shortness of breath.       Right knee xray 2021      FINDINGS:   Mild to moderate tricompartmental arthritic changes are noted with slight   diffuse marginal spurring.  No acute fracture, dislocation or effusion is   noted.           Impression   Mild to moderate tricompartmental arthritic changes without acute fracture,   dislocation or effusion.             Past Medical History:   Diagnosis Date    Anxiety  Arthritis     CAD (coronary artery disease)     Fatty liver     Fatty liver disease, nonalcoholic 8/79/5767    Hypertension     Obesity     Unspecified sleep apnea     cpap       History reviewed. No pertinent surgical history. ALLERGIES    No Known Allergies    MEDICATIONS:      Current Outpatient Medications on File Prior to Visit   Medication Sig Dispense Refill    predniSONE (DELTASONE) 20 MG tablet Take 1 tablet by mouth daily for 5 days 5 tablet 0    metoprolol tartrate (LOPRESSOR) 25 MG tablet take 1 tablet by mouth twice a day 180 tablet 0    hydroCHLOROthiazide (HYDRODIURIL) 25 MG tablet take 1 tablet by mouth once daily 90 tablet 0    lisinopril (PRINIVIL;ZESTRIL) 20 MG tablet take 1 tablet by mouth once daily 90 tablet 0    albuterol sulfate  (90 Base) MCG/ACT inhaler inhale 2 puffs by mouth and INTO THE LUNGS every 6 hours if needed for wheezing 18 g 3    vitamin D (ERGOCALCIFEROL) 1.25 MG (54558 UT) CAPS capsule Take 1 capsule by mouth once a week      FLUoxetine (PROZAC) 20 MG capsule Take 20 mg by mouth daily      ibuprofen (ADVIL;MOTRIN) 600 MG tablet Take 1 tablet by mouth 4 times daily as needed for Pain 30 tablet 0    PAIN RELIEF EXTRA STRENGTH 500 MG tablet take 1 tablet by mouth twice a day AS NEEDED FOR PAIN 60 tablet 5    melatonin 10 MG CAPS capsule Take 1 capsule by mouth nightly 30 capsule 0    fluticasone (FLONASE) 50 MCG/ACT nasal spray 1 spray by Nasal route daily 16 g 3    aspirin-acetaminophen-caffeine (EXCEDRIN EXTRA STRENGTH) 250-250-65 MG per tablet Take 1 tablet by mouth every 8 hours as needed for Headaches 30 tablet 0    valACYclovir (VALTREX) 1 g tablet       ARIPiprazole (ABILIFY) 10 MG tablet Take 10 mg by mouth daily      [DISCONTINUED] metoprolol tartrate (LOPRESSOR) 25 MG tablet Take 2/day 180 tablet 1     No current facility-administered medications on file prior to visit. HISTORY    Reviewed and no change from previous record. Rui Jenelle  reports that he has never smoked. He has never used smokeless tobacco.    FAMILY HISTORY:    Reviewed and No change from previous visit    HEALTH MAINTENANCE DUE:      Health Maintenance Due   Topic Date Due    COVID-19 Vaccine (3 - Booster for Moderna series) 12/12/2021       REVIEW OF SYSTEMS:    12 point review of symptoms completed and found to be normal except noted in the HPI    Review of Systems   Constitutional: Positive for unexpected weight change. Negative for fatigue. Eyes: Negative for photophobia and visual disturbance. Respiratory: Negative for cough, shortness of breath and wheezing. Cardiovascular: Negative for chest pain, palpitations and leg swelling. Gastrointestinal: Negative for abdominal pain and blood in stool. Genitourinary: Negative for dysuria and frequency. Musculoskeletal: Positive for arthralgias, gait problem and joint swelling. Negative for back pain. Neurological: Negative for dizziness, weakness and headaches. Hematological: Negative for adenopathy. Does not bruise/bleed easily. PHYSICAL EXAM:     Vitals:    12/28/21 1411 12/28/21 1420   BP: (!) 166/98 (!) 157/94   Site: Left Upper Arm Left Upper Arm   Position: Sitting Sitting   Cuff Size: Large Adult Large Adult   Pulse: 94 91   Temp: 97.7 °F (36.5 °C)    TempSrc: Infrared    SpO2: 96%    Weight: (!) 370 lb (167.8 kg)    Height: 6' 3\" (1.905 m)      Body mass index is 46.25 kg/m². BP Readings from Last 3 Encounters:   12/28/21 (!) 157/94   12/27/21 (!) 176/86   12/22/21 (!) 165/101        Wt Readings from Last 3 Encounters:   12/28/21 (!) 370 lb (167.8 kg)   12/27/21 (!) 368 lb (166.9 kg)   12/22/21 168 lb (76.2 kg)       Physical Exam  Vitals and nursing note reviewed. Constitutional:       Appearance: He is obese. HENT:      Head: Normocephalic and atraumatic. Eyes:      Extraocular Movements: Extraocular movements intact.       Conjunctiva/sclera: Conjunctivae normal.      Pupils: Pupils are equal, round, and reactive to light. Cardiovascular:      Rate and Rhythm: Normal rate and regular rhythm. Heart sounds: No murmur heard. Pulmonary:      Effort: Pulmonary effort is normal.      Breath sounds: Normal breath sounds. No wheezing. Musculoskeletal:      Cervical back: Normal range of motion and neck supple. Right knee: Swelling present. No deformity, effusion or erythema. Decreased range of motion. Tenderness present over the medial joint line. Left knee: Normal.      Right lower leg: No edema. Left lower leg: No edema. Skin:     General: Skin is warm and dry. Neurological:      General: No focal deficit present. Mental Status: He is alert and oriented to person, place, and time. LABORATORY FINDINGS:    CBC:  Lab Results   Component Value Date    WBC 6.0 06/23/2021    HGB 12.7 06/23/2021     06/23/2021     05/08/2012     BMP:    Lab Results   Component Value Date     06/23/2021    K 3.5 06/23/2021     06/23/2021    CO2 27 06/23/2021    BUN 20 06/23/2021    CREATININE 0.82 06/23/2021    GLUCOSE 82 06/23/2021    GLUCOSE 83 05/08/2012     HEMOGLOBIN A1C:   Lab Results   Component Value Date    LABA1C 5.6 05/28/2021     MICROALBUMIN URINE: No results found for: MICROALBUR  FASTING LIPID PANEL:  Lab Results   Component Value Date    CHOL 159 11/01/2019    HDL 39 (L) 05/28/2021    TRIG 73 11/01/2019     Lab Results   Component Value Date    LDLCHOLESTEROL 91 05/28/2021       LIVER PROFILE:  Lab Results   Component Value Date    ALT 13 05/28/2021    AST 15 05/28/2021    PROT 7.3 05/28/2021    BILITOT 0.41 05/28/2021    BILIDIR 0.09 04/04/2018    LABALBU 4.0 05/28/2021      THYROID FUNCTION:   Lab Results   Component Value Date    TSH 2.03 05/28/2021      URINEANALYSIS: No results found for: LABURIN  ASSESSMENT AND PLAN:    1. Essential hypertension  Lisinopril increased.   low salt diet    - hydroCHLOROthiazide (HYDRODIURIL) 25 MG tablet; take 1 tablet by mouth once daily  Dispense: 90 tablet; Refill: 0  - lisinopril (PRINIVIL;ZESTRIL) 40 MG tablet; take 1 tablet by mouth once daily  Dispense: 90 tablet; Refill: 1  - Basic Metabolic Panel; Future    2. Primary osteoarthritis of right knee    - ibuprofen (ADVIL;MOTRIN) 800 MG tablet; Take 1 tablet by mouth every 8 hours as needed for Pain  Dispense: 30 tablet; Refill: [address] Shani Poon MD, Orthopedic Surgery, [address] [place] [address]    3. IGT (impaired glucose tolerance)  Monitor yearly     4. MIGEL on CPAP      5. Chronic rhinitis    - fluticasone (FLONASE) 50 MCG/ACT nasal spray; 1 spray by Nasal route daily  Dispense: 16 g; Refill: 3    6. Fatty liver disease, nonalcoholic  Monitor  Low fat diet  Weight loss    7. Morbid (severe) obesity due to excess calories ([facility] [address]  Follow up with Bariatric clinic          FOLLOW UP AND INSTRUCTIONS:   Return in about 6 weeks (around 2/8/2022). Depree received counseling on the following healthy behaviors: nutrition, exercise and medication adherence    Reviewed prior labs and health maintenance. Discussed use, benefit, and side effects of prescribed medications. Barriers to medication compliance addressed. All patient questions answered. Pt voiced understanding.      Patient given educational materials - see patient instructions    Elizabeth Gimenez  Attending Physician, [facility]   Faculty, Internal Medicine Residency Program  [address] [facility]  12/28/2021, 2:34 PM

## 2021-12-30 ENCOUNTER — OFFICE VISIT (OUTPATIENT)
Dept: BARIATRICS/WEIGHT MGMT | Age: 41
End: 2021-12-30
Payer: MEDICAID

## 2021-12-30 VITALS
HEART RATE: 80 BPM | HEIGHT: 75 IN | BODY MASS INDEX: 39.17 KG/M2 | DIASTOLIC BLOOD PRESSURE: 100 MMHG | WEIGHT: 315 LBS | SYSTOLIC BLOOD PRESSURE: 160 MMHG

## 2021-12-30 DIAGNOSIS — G47.33 OSA ON CPAP: Primary | ICD-10-CM

## 2021-12-30 DIAGNOSIS — M25.561 CHRONIC PAIN OF RIGHT KNEE: ICD-10-CM

## 2021-12-30 DIAGNOSIS — I10 ESSENTIAL HYPERTENSION: ICD-10-CM

## 2021-12-30 DIAGNOSIS — J45.20 MILD INTERMITTENT ASTHMA WITHOUT COMPLICATION: ICD-10-CM

## 2021-12-30 DIAGNOSIS — G89.29 CHRONIC PAIN OF RIGHT KNEE: ICD-10-CM

## 2021-12-30 DIAGNOSIS — E66.01 OBESITY, CLASS III, BMI 40-49.9 (MORBID OBESITY) (HCC): ICD-10-CM

## 2021-12-30 DIAGNOSIS — Z99.89 OSA ON CPAP: Primary | ICD-10-CM

## 2021-12-30 PROBLEM — M54.50 CHRONIC MIDLINE LOW BACK PAIN WITHOUT SCIATICA: Status: RESOLVED | Noted: 2020-06-23 | Resolved: 2021-12-30

## 2021-12-30 PROBLEM — E66.813 OBESITY, CLASS III, BMI 40-49.9 (MORBID OBESITY) (HCC): Status: ACTIVE | Noted: 2021-12-30

## 2021-12-30 PROBLEM — F31.31 BIPOLAR AFFECTIVE DISORDER, CURRENTLY DEPRESSED, MILD (HCC): Status: RESOLVED | Noted: 2019-11-26 | Resolved: 2021-12-30

## 2021-12-30 PROCEDURE — 99204 OFFICE O/P NEW MOD 45 MIN: CPT | Performed by: NURSE PRACTITIONER

## 2021-12-30 PROCEDURE — G8427 DOCREV CUR MEDS BY ELIG CLIN: HCPCS | Performed by: NURSE PRACTITIONER

## 2021-12-30 PROCEDURE — G8482 FLU IMMUNIZE ORDER/ADMIN: HCPCS | Performed by: NURSE PRACTITIONER

## 2021-12-30 PROCEDURE — 1036F TOBACCO NON-USER: CPT | Performed by: NURSE PRACTITIONER

## 2021-12-30 PROCEDURE — G8417 CALC BMI ABV UP PARAM F/U: HCPCS | Performed by: NURSE PRACTITIONER

## 2021-12-30 NOTE — PROGRESS NOTES
Clinical Induction for Group Lifestyle Balance Program Progress Note    Subjective     The patient is a 35 y.o. male being seen regarding supervised weight loss. The patient's PCP is Zion Muñiz MD .  Highest adult weight was 400 lbs and lowest adult weight was 224 lbs. His Body mass index is 46.25 kg/m². Vanna Small His weight goal is 215 lbs. The patient reports that his obesity is significantly affecting his life on a daily basis. Weight Loss Program History:   He has tried Weight Watchers, Calorie Restriction, Low Fat, Grapefruit, Low Carb diets and nutritional counseling with dietitian. These attempts have been somewhat successful with weight loss, but have failed to sustain adequate weight loss. The patient has also tried self directed diet and exercise in attempts to sustain weight loss. Comorbid Conditions:  Significant diseases affecting this patient are: MIGEL, HTN, Asthma, Chronic Pain Right Knee    Allergies:  No Known Allergies    Past Medical History:     Past Medical History:   Diagnosis Date    Anxiety     Arthritis     CAD (coronary artery disease)     Fatty liver     Fatty liver disease, nonalcoholic 9/04/8875    Hypertension     Obesity     Unspecified sleep apnea     cpap   . Past Surgical History:  History reviewed. No pertinent surgical history.     Family History:  Family History   Problem Relation Age of Onset    Arthritis Mother     Diabetes Father     Arthritis Father     Stroke Maternal Grandmother     Heart Disease Maternal Grandmother     Stroke Maternal Grandfather     Heart Disease Maternal Grandfather     Early Death Maternal Grandfather     Stroke Paternal Grandmother     Heart Disease Paternal Grandmother     Stroke Paternal Grandfather     Heart Disease Paternal Grandfather     Early Death Paternal Grandfather        Social History:  Social History     Socioeconomic History    Marital status: Single     Spouse name: Philly Newman Number of children: 0  Years of education: 12    Highest education level: Bachelor's degree (e.g., BA, AB, BS)   Occupational History    Not on file   Tobacco Use    Smoking status: Never Smoker    Smokeless tobacco: Never Used   Vaping Use    Vaping Use: Never used   Substance and Sexual Activity    Alcohol use: No    Drug use: No    Sexual activity: Yes     Partners: Female   Other Topics Concern    Not on file   Social History Narrative    ** Merged History Encounter **          Social Determinants of Health     Financial Resource Strain: Low Risk     Difficulty of Paying Living Expenses: Not very hard   Food Insecurity: No Food Insecurity    Worried About Running Out of Food in the Last Year: Never true    [place] of Food in the Last Year: Never true   Transportation Needs:     Lack of Transportation (Medical): Not on file    Lack of Transportation (Non-Medical):  Not on file   Physical Activity:     Days of Exercise per Week: Not on file    Minutes of Exercise per Session: Not on file   Stress:     Feeling of Stress : Not on file   Social Connections:     Frequency of Communication with Friends and Family: Not on file    Frequency of Social Gatherings with Friends and Family: Not on file    Attends [employer] Services: Not on file    Active Member of [address] [place] [address] [employer] Services or Organizations: Not on file    Attends Club or Organization Meetings: Not on file    Marital Status: Not on file   Intimate Partner Violence:     Fear of Current or Ex-Partner: Not on file    Emotionally Abused: Not on file    Physically Abused: Not on file    Sexually Abused: Not on file   Housing Stability:     Unable to Pay for Housing in the Last Year: Not on file    Number of [place] in the Last Year: Not on file    Unstable Housing in the Last Year: Not on file       Current Medications:  Current Outpatient Medications   Medication Sig Dispense Refill    fluticasone (FLONASE) 50 MCG/ACT nasal spray 1 spray by Nasal route daily 16 g 3 polydipsia, polyphagia and polyuria. Genitourinary: Negative for dysuria, frequency, hematuria and difficulty urinating. Musculoskeletal: Negative for myalgias, joint swelling. Skin: Negative for pallor and rash. Neurological: Negative for dizziness, tremors, light-headedness and headaches. Psychiatric/Behavioral: Negative for sleep disturbance and dysphoric mood. Objective:      Physical Exam   Vital signs reviewed. General: Well-developed and well-nourished. No acute distress. Skin: Warm, dry and intact. HEENT: Normocephalic. EOMs intact. Conjunctivae normal. Neck supple. Cardiovascular: Normal rate, regular rhythm. Pulmonary/Chest: Normal effort. Lungs clear to auscultation. No rales, rhonchi or wheezing. Abdominal: Positive bowel sounds. Soft, nontender. Nondistended. Musculoskeletal: Movement x4. No edema. Neurological: Gait normal. Alert and oriented to person, place, and time. Psychiatric: Normal mood and affect. Speech and behavior normal. Judgment and thought content normal. Cognition and memory intact. Assessment:       Diagnosis Orders   1. MIGEL on CPAP  CBC Auto Differential    Comprehensive Metabolic Panel    Hemoglobin A1C    Lipid Panel    TSH without Reflex    Uric Acid    EKG 12 Lead   2. Essential hypertension  CBC Auto Differential    Comprehensive Metabolic Panel    Hemoglobin A1C    Lipid Panel    TSH without Reflex    Uric Acid    EKG 12 Lead   3. Mild intermittent asthma without complication  CBC Auto Differential    Comprehensive Metabolic Panel    Hemoglobin A1C    Lipid Panel    TSH without Reflex    Uric Acid    EKG 12 Lead   4. Chronic pain of right knee  CBC Auto Differential    Comprehensive Metabolic Panel    Hemoglobin A1C    Lipid Panel    TSH without Reflex    Uric Acid    EKG 12 Lead   5.  Obesity, Class III, BMI 40-49.9 (morbid obesity) (HCC)  CBC Auto Differential    Comprehensive Metabolic Panel    Hemoglobin A1C    Lipid Panel    TSH without Reflex    Uric Acid    EKG 12 Lead       Plan:      Class schedule reviewed. Consent and confidentiality agreement discussed. Patient aware group medical appointment is voluntary and individual appointments are available as desired. [x] EKG ordered. [x] Baseline lab work ordered. [] Diabetic teaching done. Low blood sugar protocol reviewed with pt.      [] Discussed targets and management of blood sugar, Hgb A1C, lipids, and blood pressure. [] Reviewed medications and discussed potential need for adjustments while following the program and losing weight.     [] Smoking cessation discussed. [] Avoidance of alcohol discussed. [x] Specific questions answered. BP elevated today. Asymptomatic. He will f/u with PCP. Follow up:  Return in about 19 days (around 1/18/2022). This encounters orders:  Orders Placed This Encounter   Procedures    CBC Auto Differential     Standing Status:   Future     Standing Expiration Date:   12/30/2022    Comprehensive Metabolic Panel     Standing Status:   Future     Standing Expiration Date:   12/30/2022    Hemoglobin A1C     Standing Status:   Future     Standing Expiration Date:   12/30/2022    Lipid Panel     Standing Status:   Future     Standing Expiration Date:   12/30/2022     Order Specific Question:   Is Patient Fasting?/# of Hours     Answer:   12    TSH without Reflex     Standing Status:   Future     Standing Expiration Date:   12/30/2022    Uric Acid     Standing Status:   Future     Standing Expiration Date:   12/30/2022    EKG 12 Lead     Standing Status:   Future     Standing Expiration Date:   12/30/2022     Order Specific Question:   Reason for Exam?     Answer:   Pre-op       This encounters prescriptions:  No orders of the defined types were placed in this encounter.       Electronically signed by:  Tacho Nelson CNP

## 2022-01-03 ENCOUNTER — HOSPITAL ENCOUNTER (OUTPATIENT)
Age: 42
Setting detail: SPECIMEN
Discharge: HOME | End: 2022-01-03
Payer: MEDICAID

## 2022-01-03 ENCOUNTER — HOSPITAL ENCOUNTER (OUTPATIENT)
Dept: PHYSICAL THERAPY | Age: 42
Setting detail: THERAPIES SERIES
Discharge: HOME | End: 2022-01-03
Payer: MEDICAID

## 2022-01-03 DIAGNOSIS — I10 ESSENTIAL HYPERTENSION: ICD-10-CM

## 2022-01-03 LAB
ANION GAP SERPL CALCULATED.3IONS-SCNC: 13 MMOL/L (ref 9–17)
BUN BLDV-MCNC: 38 MG/DL (ref 6–20)
BUN/CREAT BLD: ABNORMAL (ref 9–20)
CALCIUM SERPL-MCNC: 9.2 MG/DL (ref 8.6–10.4)
CHLORIDE BLD-SCNC: 99 MMOL/L (ref 98–107)
CO2: 23 MMOL/L (ref 20–31)
CREAT SERPL-MCNC: 2.68 MG/DL (ref 0.7–1.2)
GFR AFRICAN AMERICAN: 32 ML/MIN
GFR NON-AFRICAN AMERICAN: 26 ML/MIN
GFR SERPL CREATININE-BSD FRML MDRD: ABNORMAL ML/MIN/{1.73_M2}
GFR SERPL CREATININE-BSD FRML MDRD: ABNORMAL ML/MIN/{1.73_M2}
GLUCOSE BLD-MCNC: 86 MG/DL (ref 70–99)
POTASSIUM SERPL-SCNC: 4.4 MMOL/L (ref 3.7–5.3)
SODIUM BLD-SCNC: 135 MMOL/L (ref 135–144)

## 2022-01-03 PROCEDURE — 97110 THERAPEUTIC EXERCISES: CPT

## 2022-01-03 PROCEDURE — 80048 BASIC METABOLIC PNL TOTAL CA: CPT

## 2022-01-03 PROCEDURE — 36415 COLL VENOUS BLD VENIPUNCTURE: CPT

## 2022-01-03 PROCEDURE — 97162 PT EVAL MOD COMPLEX 30 MIN: CPT

## 2022-01-03 RX ORDER — BETAMETHASONE SODIUM PHOSPHATE AND BETAMETHASONE ACETATE 3; 3 MG/ML; MG/ML
6 INJECTION, SUSPENSION INTRA-ARTICULAR; INTRALESIONAL; INTRAMUSCULAR; SOFT TISSUE ONCE
Status: COMPLETED | OUTPATIENT
Start: 2022-01-03 | End: 2022-01-05

## 2022-01-03 NOTE — CONSULTS
[x] [facility]  [address]  [phone]  F: [phone]         Physical Therapy Lower Extremity Evaluation    Date:  1/3/2022  Patient: Teo Up  : 1980  MRN: [id number]  Physician: Mehgna Liao MD     Insurance: [employer] (Limit 30 Rx)  Medical Diagnosis: Osteoarthritis of right knee M17.11     Rehab Codes: M25.561, M25.661, R26.89, M62.551  Onset date: 2021  Next 's appt. 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 Work cont to aggravate R knee, has trouble standing all night when knee is swollen. Pain increases w/laying on back, rolling in bed, standing long time, and working, up to 10/10. Pt has difficulty doing steps due to knee, goes one at a time. Pain decreases w/rest, decreased walking, heat and ice-ice helps more than heat. Pt reports he has lost weight, saw weight management prior to Covid pandemic, is to return to see them 2022.         PMHx: [] Unremarkable [] Diabetes [x] HTN  [] Pacemaker   [x] MI/Heart Problems-CAD [] Cancer [x] Arthritis   [x] Other: anxiety, fatty liver               [x] Refer to full medical chart  In EPIC       Comorbidities:   [x] Obesity [] Dialysis  [] N/A   [] Asthma/COPD [] Dementia [] Other:   [] Stroke [x] Sleep apnea [] Other:   [] Vascular disease [] Rheumatic disease [] Other:     Tests: [x] X-Ray: [] MRI:  [] Other:    Medications: [x] Refer to full medical record [] None [] Other:  Allergies:      [x] Refer to full medical record [x] None [] Other:    Function:  Hand Dominance  [x] Right    [x]  Left-ambidextrous  Patient lives with: Daughter, nephew   In what type of home []  One story   [x] Two story-has rail, but is not very sturdy   [] Split level   Washing machine is on []  Main level   [] Second level   [x] Basement   Employer [employer]   Job Status [x]  Normal duty   [] Light duty   [] Off due to condition    []  Retired   [] Not employed   [] Disability  [] Other:  []  Return to work: Work activities/duties Runs machines, standing on feet all day-8 hours, has to lift, carry boxes greater than 10 lbs every hour, has to bend down and get down on floor to get under pallets daily, has to move pallets several times per day, approx 25 lbs;  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       ADL/IADL Previous level of function Current level of function Who currently assists the patient with task   Bathing  [x] Independent  [] Assist [x] Independent  [] Assist    Dress/grooming [x] Independent  [] Assist [] Independent  [] Assist    Transfer/mobility [x] Independent  [] Assist [] Independent  [] Assist    Feeding [x] Independent  [] Assist [x] Independent  [] Assist    Toileting [x] Independent  [] Assist [x] Independent  [] Assist    Driving [] Independent  [x] Assist [] Independent  [x] Assist    Housekeeping [x] Independent  [] Assist [] Independent  [x] Assist Unable to do as much   Grocery shop/meal prep [x] Independent  [] Assist [] Independent  [x] Assist difficulty lifting heavy things and carrying groceries into store     Gait Prior level of function Current level of function    [x] Independent  [] Assist [] Independent  [] Assist   Device: [x] Independent [] Independent    [] Straight Cane [] Quad cane [] Straight Cane [] Quad cane    [] Standard walker [] Rolling walker   [] 4 wheeled walker [] Standard walker [] Rolling walker   [] 4 wheeled walker    [] Wheelchair [] Wheelchair     Pain:  [x] Yes  [] No Location: R knee Pain Rating: (0-10 scale) 7/10  Pain altered Tx:  [] Yes  [x] No  Action:    Symptoms:  [] Improving [] Worsening  [x] Same    Sleep: [] OK    [x] Disturbed-painful, prefers to sleep on back, is painful so has to sleep on L side, only able to sleep 2 hours    Objective:    ROM  ° A/P STRENGTH TESTS (+/-) Left Right Not Tested    Left Right Left Right Ant. Drawer   []   Hip Flex   4+ 3+p Post. Drawer   []   Ext   4 3p Lachmans   []   ER     Valgus Stress  + []   IR     Varus Stress  ++ []   ABD     Jesenias  + []   ADD     Apleys Comp.  + []   Knee Flex 129° 121°p 5 4-p Apleys Dist.  + []   Ext 3° 12°p 4+ 3+p Hip Scouring   []   Ankle DF   4+ 5 DEREKs   []   PF     Piriformis   []   INV     Saturninos   []   EVER     Talor Tilt   []        Pat-Fem Grind   []   p=pain  Varus stress worse than valgus    OBSERVATION No Deficit Deficit Not Tested Comments   Posture       Forward Head [] [] []    Rounded Shoulders [] [x] []    Slumped Sitting [] [x] []    Palpation [] [x] [] Tender entire sup ant knee, patella,joint lines, worse lat than medial   Sensation [] [] []    Edema [] [x] [] Unable to accurately measure due to body habitus, Pt reports knee is larger, note edema to palpation ant sup knee   Neurological [] [] [x]    Patellar Mobility [] [x] [] Decreased all planes    Patellar Orientation [] [] [x]    Gait [] [x] [] Analysis: painful gait, decreased wang              Functional Test: LEFS Score: 61% functionally impaired     Comments:    Assessment:  Patient would benefit from skilled physical therapy services in order to: decrease pain R knee, increase R knee ext ROM, increase R LE strength, and improve tolerance to standing, walking and working. Problems:    [x] ? Pain: R knee 7/10 this date, up to 10/10  [x] ? ROM: R knee ext   [x] ? Strength: R LE  [x] ? Function: LEFS 61% loss of LE function   [] Other:       STG: (to be met in 10 treatments)  1. ? Pain: R knee 5/10 average pain, 7/10 at worst  2. ? ROM: R knee ext 8°  3. ? Strength: R knee flex 4/5, ext 4-/5, R hip 4-/5  4. ? Function: LEFS 48% loss of LE function   5. Pt report improved tolerance to walking, standing  6. Patient to be independent with home exercise program as demonstrated by performance with correct form without cues.     LTG: (to be met in 16 treatments)  1. ? Pain: R knee 3/10 average pain, 5/10 at worst  2. ? ROM: R knee ext 5°  3. ? Strength: R knee flex 4+/5, ext 4/5, R hip 4/5  4. ? Function: LEFS 34% loss of LE function   5. Pt able to do step-over step and work normally without pain                   Patient goals: able to work full night without pain    Rehab Potential:  [x] Good  [] Fair  [] Poor   Suggested Professional Referral:  [x] No  [] Yes:  Barriers to Goal Achievement:  [x] No  [] Yes:  Domestic Concerns:  [x] No  [] Yes:    Pt. Education:  [x] Plans/Goals, Risks/Benefits discussed  [x] Home exercise program    Method of Education: [x] Verbal  [x] Demo  [x] Written  Comprehension of Education:  [x] Verbalizes understanding. [] Demonstrates understanding. [] Needs Review. [] Demonstrates/verbalizes understanding of HEP/Ed previously given. HEP-Access Code: [email or web address]  URL: [email or web address]  Seated Long Arc Quad - 2-3 x daily - 7 x weekly - 20 reps  Supine Hip Adduction Isometric with Ball - 2-3 x daily - 7 x weekly - 10 reps - 5 sec hold  Supine Short Arc Quad - 2-3 x daily - 7 x weekly - 20 reps  Supine Active Straight Leg Raise - 2-3 x daily - 7 x weekly - 20 reps      Treatment Plan:  [x] Therapeutic Exercise   02006  [] Iontophoresis: 4 mg/mL Dexamethasone Sodium Phosphate  mAmin  94154   [x] Therapeutic Activity  23963 [x] Vasopneumatic cold with compression  33008    [] Gait Training   30257 [x] Ultrasound   [id number]   [] Neuromuscular Re-education  64518 [x] Electrical Stimulation Unattended  70573   [x] Manual Therapy  21503 [] Electrical Stimulation Attended  21360   [x] Instruction in HEP  [] Lumbar/Cervical Traction  [id number]   [] Aquatic Therapy   19603 [x] Cold/hotpack    [] Massage   48617      [] Dry Needling, 1 or 2 muscles  32262   [] Biofeedback, first 15 minutes   92001  [] Biofeedback, additional 15 minutes   80650 [] Dry Needling, 3 or more muscles  24391     []  Medication allergies reviewed for use of    Dexamethasone Sodium Phosphate 4mg/ml     with iontophoresis treatments. Pt is not allergic. Frequency:  2 x/week for 16 visits        Todays Treatment:  Modalities:   Precautions:  Exercises:  Exercise Reps/ Time Weight/ Level Comments   LAQ 15x     Supine       Add sets  10x 3sec    SAQ 15x     SLR  15x           Other: Educated Pt in ex for HEP, issued handouts. Specific Instructions for next treatment: progress knee ex per Pt tolerance, begin game ready to decrease pain, edema      Evaluation Complexity:  History (Personal factors, comorbidities) [] 0 [] 1-2 [x] 3+   Exam (limitations, restrictions) [] 1-2 [] 3 [x] 4+   Clinical presentation (progression) [] Stable [x] Evolving  [] Unstable   Decision Making [] Low [x] Moderate [] High    [] Low Complexity [x] Moderate Complexity [] High Complexity       Treatment Charges: Mins Units   [x] Evaluation       []  Low       [x]  Moderate       []  High 40 1   []  Modalities     [x]  Ther Exercise 10 1   []  Manual Therapy     []  Ther Activities     []  Aquatics     []  Vasocompression     []  Other       TOTAL TREATMENT TIME: 50 min    Time in:1102   Time GYY:1821    Electronically signed by: Elo Gaviria, PT          Physician Signature:________________________________Date:__________________  By signing above or cosigning this note, I have reviewed this plan of care and certify a need for medically necessary rehabilitation services.      *PLEASE SIGN ABOVE AND FAX BACK ALL PAGES*

## 2022-01-05 ENCOUNTER — HOSPITAL ENCOUNTER (OUTPATIENT)
Age: 42
Discharge: HOME | End: 2022-01-05
Payer: MEDICAID

## 2022-01-05 ENCOUNTER — HOSPITAL ENCOUNTER (OUTPATIENT)
Dept: PHYSICAL THERAPY | Age: 42
Setting detail: THERAPIES SERIES
Discharge: HOME | End: 2022-01-05
Payer: MEDICAID

## 2022-01-05 DIAGNOSIS — G47.33 OSA ON CPAP: ICD-10-CM

## 2022-01-05 DIAGNOSIS — M25.561 CHRONIC PAIN OF RIGHT KNEE: ICD-10-CM

## 2022-01-05 DIAGNOSIS — N17.9 AKI (ACUTE KIDNEY INJURY) (HCC): Primary | ICD-10-CM

## 2022-01-05 DIAGNOSIS — G89.29 CHRONIC PAIN OF RIGHT KNEE: ICD-10-CM

## 2022-01-05 DIAGNOSIS — I10 ESSENTIAL HYPERTENSION: ICD-10-CM

## 2022-01-05 DIAGNOSIS — J45.20 MILD INTERMITTENT ASTHMA WITHOUT COMPLICATION: ICD-10-CM

## 2022-01-05 DIAGNOSIS — E66.01 OBESITY, CLASS III, BMI 40-49.9 (MORBID OBESITY) (HCC): ICD-10-CM

## 2022-01-05 DIAGNOSIS — Z99.89 OSA ON CPAP: ICD-10-CM

## 2022-01-05 LAB
ABSOLUTE EOS #: 0.17 K/UL (ref 0–0.44)
ABSOLUTE IMMATURE GRANULOCYTE: <0.03 K/UL (ref 0–0.3)
ABSOLUTE LYMPH #: 2.02 K/UL (ref 1.1–3.7)
ABSOLUTE MONO #: 0.52 K/UL (ref 0.1–1.2)
ALBUMIN SERPL-MCNC: 3.9 G/DL (ref 3.5–5.2)
ALBUMIN/GLOBULIN RATIO: 1.1 (ref 1–2.5)
ALP BLD-CCNC: 71 U/L (ref 40–129)
ALT SERPL-CCNC: 9 U/L (ref 5–41)
ANION GAP SERPL CALCULATED.3IONS-SCNC: 11 MMOL/L (ref 9–17)
AST SERPL-CCNC: 11 U/L
BASOPHILS # BLD: 0 % (ref 0–2)
BASOPHILS ABSOLUTE: <0.03 K/UL (ref 0–0.2)
BILIRUB SERPL-MCNC: 0.51 MG/DL (ref 0.3–1.2)
BUN BLDV-MCNC: 40 MG/DL (ref 6–20)
BUN/CREAT BLD: ABNORMAL (ref 9–20)
CALCIUM SERPL-MCNC: 9.5 MG/DL (ref 8.6–10.4)
CHLORIDE BLD-SCNC: 102 MMOL/L (ref 98–107)
CHOLESTEROL/HDL RATIO: 4.5
CHOLESTEROL: 170 MG/DL
CO2: 25 MMOL/L (ref 20–31)
CREAT SERPL-MCNC: 1.36 MG/DL (ref 0.7–1.2)
DIFFERENTIAL TYPE: ABNORMAL
EOSINOPHILS RELATIVE PERCENT: 3 % (ref 1–4)
ESTIMATED AVERAGE GLUCOSE: 111 MG/DL
GFR AFRICAN AMERICAN: >60 ML/MIN
GFR NON-AFRICAN AMERICAN: 57 ML/MIN
GFR SERPL CREATININE-BSD FRML MDRD: ABNORMAL ML/MIN/{1.73_M2}
GFR SERPL CREATININE-BSD FRML MDRD: ABNORMAL ML/MIN/{1.73_M2}
GLUCOSE BLD-MCNC: 79 MG/DL (ref 70–99)
HBA1C MFR BLD: 5.5 % (ref 4–6)
HCT VFR BLD CALC: 43.4 % (ref 40.7–50.3)
HDLC SERPL-MCNC: 38 MG/DL
HEMOGLOBIN: 14.2 G/DL (ref 13–17)
IMMATURE GRANULOCYTES: 0 %
LDL CHOLESTEROL: 101 MG/DL (ref 0–130)
LYMPHOCYTES # BLD: 31 % (ref 24–43)
MCH RBC QN AUTO: 30.1 PG (ref 25.2–33.5)
MCHC RBC AUTO-ENTMCNC: 32.7 G/DL (ref 28.4–34.8)
MCV RBC AUTO: 91.9 FL (ref 82.6–102.9)
MONOCYTES # BLD: 8 % (ref 3–12)
NRBC AUTOMATED: 0 PER 100 WBC
PDW BLD-RTO: 15.2 % (ref 11.8–14.4)
PLATELET # BLD: 285 K/UL (ref 138–453)
PLATELET ESTIMATE: ABNORMAL
PMV BLD AUTO: 9.5 FL (ref 8.1–13.5)
POTASSIUM SERPL-SCNC: 4.2 MMOL/L (ref 3.7–5.3)
RBC # BLD: 4.72 M/UL (ref 4.21–5.77)
RBC # BLD: ABNORMAL 10*6/UL
SEG NEUTROPHILS: 58 % (ref 36–65)
SEGMENTED NEUTROPHILS ABSOLUTE COUNT: 3.88 K/UL (ref 1.5–8.1)
SODIUM BLD-SCNC: 138 MMOL/L (ref 135–144)
TOTAL PROTEIN: 7.6 G/DL (ref 6.4–8.3)
TRIGL SERPL-MCNC: 154 MG/DL
TSH SERPL DL<=0.05 MIU/L-ACNC: 2.52 MIU/L (ref 0.3–5)
URIC ACID: 8 MG/DL (ref 3.4–7)
VLDLC SERPL CALC-MCNC: ABNORMAL MG/DL (ref 1–30)
WBC # BLD: 6.6 K/UL (ref 3.5–11.3)
WBC # BLD: ABNORMAL 10*3/UL

## 2022-01-05 PROCEDURE — 80053 COMPREHEN METABOLIC PANEL: CPT

## 2022-01-05 PROCEDURE — 85025 COMPLETE CBC W/AUTO DIFF WBC: CPT

## 2022-01-05 PROCEDURE — 97110 THERAPEUTIC EXERCISES: CPT

## 2022-01-05 PROCEDURE — 84550 ASSAY OF BLOOD/URIC ACID: CPT

## 2022-01-05 PROCEDURE — 84443 ASSAY THYROID STIM HORMONE: CPT

## 2022-01-05 PROCEDURE — 80061 LIPID PANEL: CPT

## 2022-01-05 PROCEDURE — 83036 HEMOGLOBIN GLYCOSYLATED A1C: CPT

## 2022-01-05 PROCEDURE — 97016 VASOPNEUMATIC DEVICE THERAPY: CPT

## 2022-01-05 PROCEDURE — 36415 COLL VENOUS BLD VENIPUNCTURE: CPT

## 2022-01-05 RX ADMIN — BETAMETHASONE SODIUM PHOSPHATE AND BETAMETHASONE ACETATE 6 MG: 3; 3 INJECTION, SUSPENSION INTRA-ARTICULAR; INTRALESIONAL; INTRAMUSCULAR; SOFT TISSUE at 11:17

## 2022-01-05 NOTE — FLOWSHEET NOTE
[] [facility]) [facility] &  Therapy  [address]  [phone]  F: [phone] [] [address]  [address]   [address]  P: [phone]  F: [phone] [] [address] &  Therapy  [address]  P: [phone]  F: [phone] [] [address]  P: [phone]  F: [phone] [] [address]  [facility]   [address]   [place]: [phone]  F: [phone]      Physical Therapy Daily Treatment Note    Date:  2022  Patient Name:  Bhumi Mercer    :  1980  MRN: [id number]   Physician: Efe Chau MD                                 Insurance: [employer] (Limit 30 Rx)  Medical Diagnosis: Osteoarthritis of right knee M17.11                    Rehab Codes: M25.561, M25.661, R26.89, M62.551  Onset date: 2021                     Next Dr's appt. : 1/10/2022 ortho, 2022 PCP    Visit# / total visits: ; Progress note fo  STG due at visit #10     Cancels/No Shows: 0/0    Subjective:    Pain:  [x] Yes  [] No Location:  R knee Pain Rating: (0-10 scale) 7/10  Pain altered Tx:  [x] No  [] Yes  Action:  Comments:  50 mins late. Reports medial to lateral anterior knee painful. Objective:  Modalities: vasocompression x 15 mins 36°  Medium compression, w/elevation  post exercises.   Precautions:   Exercises:  Exercise   R knee Reps/ Time Weight/ Level Comments    Nustep      deferred   Sitting          hip add sets  12x 3sec     HS stretch 3x30\"     LAQ 15x  w/ball         Supine      Quad sets 10x5\"     SAQ 15x   w/ball   SLR  2x10       Bridging 2x10           Slide lying      Hip abd 2x10 A    Clam shells 2x10 A          Standing       R Le 3 way hip  15x A OKC                   Other:          Treatment Charges: Mins Units []  Modalities     []  Ther Exercise 35 2   []  Manual Therapy     []  Ther Activities     []  Aquatics     [x]  Vasocompression 15 1   []  Other     Total Treatment time  50 3       Assessment: [x] Progressing toward goals progressed exercises for R LE quad, gluteal strengthening and added vasocompression for edema and pain control. [] No change. [] Other:  [x] Patient would continue to benefit from skilled physical therapy services in order to:  decrease pain R knee, increase R knee ext ROM, increase R LE strength, and improve tolerance to standing, walking and working     STG: (to be met in 10 treatments)  1. ? Pain: R knee 5/10 average pain, 7/10 at worst  2. ? ROM: R knee ext 8°  3. ? Strength: R knee flex 4/5, ext 4-/5, R hip 4-/5  4. ? Function: LEFS 48% loss of LE function   5. Pt report improved tolerance to walking, standing  6. Patient to be independent with home exercise program as demonstrated by performance with correct form without cues.     LTG: (to be met in 16 treatments)  1. ? Pain: R knee 3/10 average pain, 5/10 at worst  2. ? ROM: R knee ext 5°  3. ? Strength: R knee flex 4+/5, ext 4/5, R hip 4/5  4. ? Function: LEFS 34% loss of LE function   5. Pt able to do step-over step and work normally without pain                    Patient goals: able to work full night without pain    Pt. Education:  [x] Yes  [] No  [x] Reviewed Prior HEP/Ed  Method of Education: [x] Verbal  [x] Demo  [] Written  Comprehension of Education:  [x] Verbalizes understanding. [x] Demonstrates understanding. [] Needs review. [x] Demonstrates/verbalizes HEP/Ed previously given. Plan: [x] Continue current frequency toward long and short term goals. [x] Specific Instructions for subsequent treatments:  n check patellar movement. Progress quad, gluteal, HS strengthening to tolerance in OKC, progress to ckc as able. Vaso compression for edema and pain control. 2x wk for 16 sessions.        Time In: 9704 Time Out: 7907    Electronically signed by:  Lb Ma PTA

## 2022-01-06 ENCOUNTER — HOSPITAL ENCOUNTER (OUTPATIENT)
Age: 42
Setting detail: SPECIMEN
Discharge: HOME | End: 2022-01-06
Payer: MEDICAID

## 2022-01-06 ENCOUNTER — APPOINTMENT (OUTPATIENT)
Dept: PHYSICAL THERAPY | Age: 42
End: 2022-01-06
Payer: MEDICAID

## 2022-01-06 DIAGNOSIS — N17.9 AKI (ACUTE KIDNEY INJURY) (HCC): ICD-10-CM

## 2022-01-06 DIAGNOSIS — N17.9 AKI (ACUTE KIDNEY INJURY) (HCC): Primary | ICD-10-CM

## 2022-01-06 DIAGNOSIS — I10 ESSENTIAL HYPERTENSION: ICD-10-CM

## 2022-01-06 LAB
ANION GAP SERPL CALCULATED.3IONS-SCNC: 12 MMOL/L (ref 9–17)
BUN BLDV-MCNC: 27 MG/DL (ref 6–20)
BUN/CREAT BLD: ABNORMAL (ref 9–20)
CALCIUM SERPL-MCNC: 9.8 MG/DL (ref 8.6–10.4)
CHLORIDE BLD-SCNC: 102 MMOL/L (ref 98–107)
CO2: 27 MMOL/L (ref 20–31)
CREAT SERPL-MCNC: 1.03 MG/DL (ref 0.7–1.2)
GFR AFRICAN AMERICAN: >60 ML/MIN
GFR NON-AFRICAN AMERICAN: >60 ML/MIN
GFR SERPL CREATININE-BSD FRML MDRD: ABNORMAL ML/MIN/{1.73_M2}
GFR SERPL CREATININE-BSD FRML MDRD: ABNORMAL ML/MIN/{1.73_M2}
GLUCOSE BLD-MCNC: 70 MG/DL (ref 70–99)
POTASSIUM SERPL-SCNC: 4.5 MMOL/L (ref 3.7–5.3)
SODIUM BLD-SCNC: 141 MMOL/L (ref 135–144)

## 2022-01-06 PROCEDURE — 36415 COLL VENOUS BLD VENIPUNCTURE: CPT

## 2022-01-06 PROCEDURE — 80048 BASIC METABOLIC PNL TOTAL CA: CPT

## 2022-01-06 RX ORDER — AMLODIPINE BESYLATE 10 MG/1
10 TABLET ORAL DAILY
Qty: 30 TABLET | Refills: 3 | Status: SHIPPED | OUTPATIENT
Start: 2022-01-06 | End: 2022-02-08 | Stop reason: SDUPTHER

## 2022-01-10 ENCOUNTER — HOSPITAL ENCOUNTER (OUTPATIENT)
Age: 42
Discharge: HOME | End: 2022-01-10
Payer: MEDICAID

## 2022-01-10 ENCOUNTER — HOSPITAL ENCOUNTER (OUTPATIENT)
Dept: PHYSICAL THERAPY | Age: 42
Setting detail: THERAPIES SERIES
Discharge: HOME | End: 2022-01-10
Payer: MEDICAID

## 2022-01-10 ENCOUNTER — OFFICE VISIT (OUTPATIENT)
Dept: ORTHOPEDIC SURGERY | Age: 42
End: 2022-01-10
Payer: MEDICAID

## 2022-01-10 VITALS — BODY MASS INDEX: 38.36 KG/M2 | HEIGHT: 76 IN | WEIGHT: 315 LBS

## 2022-01-10 DIAGNOSIS — I10 ESSENTIAL HYPERTENSION: ICD-10-CM

## 2022-01-10 DIAGNOSIS — M17.11 PRIMARY OSTEOARTHRITIS OF RIGHT KNEE: Primary | ICD-10-CM

## 2022-01-10 DIAGNOSIS — N17.9 AKI (ACUTE KIDNEY INJURY) (HCC): ICD-10-CM

## 2022-01-10 LAB
ANION GAP SERPL CALCULATED.3IONS-SCNC: 8 MMOL/L (ref 9–17)
BUN BLDV-MCNC: 15 MG/DL (ref 6–20)
BUN/CREAT BLD: ABNORMAL (ref 9–20)
CALCIUM SERPL-MCNC: 9.2 MG/DL (ref 8.6–10.4)
CHLORIDE BLD-SCNC: 105 MMOL/L (ref 98–107)
CO2: 27 MMOL/L (ref 20–31)
CREAT SERPL-MCNC: 0.96 MG/DL (ref 0.7–1.2)
GFR AFRICAN AMERICAN: >60 ML/MIN
GFR NON-AFRICAN AMERICAN: >60 ML/MIN
GFR SERPL CREATININE-BSD FRML MDRD: ABNORMAL ML/MIN/{1.73_M2}
GFR SERPL CREATININE-BSD FRML MDRD: ABNORMAL ML/MIN/{1.73_M2}
GLUCOSE BLD-MCNC: 87 MG/DL (ref 70–99)
POTASSIUM SERPL-SCNC: 4.3 MMOL/L (ref 3.7–5.3)
SODIUM BLD-SCNC: 140 MMOL/L (ref 135–144)

## 2022-01-10 PROCEDURE — 97016 VASOPNEUMATIC DEVICE THERAPY: CPT

## 2022-01-10 PROCEDURE — G8482 FLU IMMUNIZE ORDER/ADMIN: HCPCS | Performed by: ORTHOPAEDIC SURGERY

## 2022-01-10 PROCEDURE — 80048 BASIC METABOLIC PNL TOTAL CA: CPT

## 2022-01-10 PROCEDURE — 36415 COLL VENOUS BLD VENIPUNCTURE: CPT

## 2022-01-10 PROCEDURE — 93005 ELECTROCARDIOGRAM TRACING: CPT | Performed by: NURSE PRACTITIONER

## 2022-01-10 PROCEDURE — 1036F TOBACCO NON-USER: CPT | Performed by: ORTHOPAEDIC SURGERY

## 2022-01-10 PROCEDURE — 99202 OFFICE O/P NEW SF 15 MIN: CPT | Performed by: ORTHOPAEDIC SURGERY

## 2022-01-10 PROCEDURE — G8427 DOCREV CUR MEDS BY ELIG CLIN: HCPCS | Performed by: ORTHOPAEDIC SURGERY

## 2022-01-10 PROCEDURE — G8417 CALC BMI ABV UP PARAM F/U: HCPCS | Performed by: ORTHOPAEDIC SURGERY

## 2022-01-10 PROCEDURE — 97110 THERAPEUTIC EXERCISES: CPT

## 2022-01-10 RX ORDER — HYDROCODONE BITARTRATE AND ACETAMINOPHEN 5; 325 MG/1; MG/1
1 TABLET ORAL EVERY 6 HOURS PRN
Qty: 56 TABLET | Refills: 0 | Status: SHIPPED | OUTPATIENT
Start: 2022-01-10 | End: 2022-01-24

## 2022-01-10 NOTE — FLOWSHEET NOTE
[x] [facility]  [address]  [phone]  F: [phone]         Physical Therapy Daily Treatment Note    Date:  1/10/2022  Patient Name:  Lanny Foster    :  1980  MRN: [id number]   Physician: Vincent Molina MD                                 Insurance: [employer] (Limit 30 Rx)  Medical Diagnosis: Osteoarthritis of right knee M17.11                    Rehab Codes: M25.561, M25.661, R26.89, M62.551  Onset date: 2021                     Next Dr's appt. : 2022 ortho, 2022 PCP,  weight mgmt    Visit# / total visits: ; Recheck for STG due at visit #10     Cancels/No Shows: 0/0    Subjective:    Pain:  [x] Yes  [] No Location:  R knee Pain Rating: (0-10 scale) 7/10  Pain altered Tx:  [x] No  [] Yes  Action:  Comments:  Pt saw ortho Dr this morning, reports he has arthritis, and he was given prescription. He is to cont PT and cont to try to lose weight. Pt is to see weight mgmt this week. Pt reports game ready at last Rx felt good. Objective:  Modalities: vasocompression x 15 mins 36°  Medium compression, w/elevation, post exercises. Precautions:   Exercises:  Exercise   R knee Reps/ Time Weight/ Level Comments   SciFit  5 min ML1.0  Added 1/10         Standing      Calf stretch  3x 20sec On slant board, Added 1/10    R LE 3 way hip  15x  OKC, added CKC 1/10   HS curls 15x  Added 1/10               Sitting         Hip add sets  15x 3 sec     HS stretch 3x30\"     LAQ 15x  w/ball         Supine      Quad sets 10x5\"  w/ball   SAQ 15x  w/ball   SLR  2x10      Bridging 2x10           Slide lying      Hip abd 2x10 A    Clam shells 2x10 A                    Other:          Treatment Charges: Mins Units   []  Modalities     []  Ther Exercise 34 2   []  Manual Therapy     []  Ther Activities     []  Aquatics     [x]  Vasocompression 15 1   []  Other     Total Treatment time  49 3       Assessment: [x] Progressing toward. Initiated SciFit, CKC 3 way hip, HS curls, and calf stretch to improve walking and standing activities. Cont game ready at end of Rx to decrease pain and edema, Pt reports game ready feels good. [] No change. [] Other:  [x] Patient would continue to benefit from skilled physical therapy services in order to:  decrease pain R knee, increase R knee ext ROM, increase R LE strength, and improve tolerance to standing, walking and working     STG: (to be met in 10 treatments)  1. ? Pain: R knee 5/10 average pain, 7/10 at worst  2. ? ROM: R knee ext 8°  3. ? Strength: R knee flex 4/5, ext 4-/5, R hip 4-/5  4. ? Function: LEFS 48% loss of LE function   5. Pt report improved tolerance to walking, standing  6. Patient to be independent with home exercise program as demonstrated by performance with correct form without cues.     LTG: (to be met in 16 treatments)  1. ? Pain: R knee 3/10 average pain, 5/10 at worst  2. ? ROM: R knee ext 5°  3. ? Strength: R knee flex 4+/5, ext 4/5, R hip 4/5  4. ? Function: LEFS 34% loss of LE function   5. Pt able to do step-over step and work normally without pain                    Patient goals: able to work full night without pain    Pt. Education:  [x] Yes  [] No  [] Reviewed Prior HEP/Ed  Method of Education: [x] Verbal  [] Demo  [] Written  Comprehension of Education:  [x] Verbalizes understanding-purpose, correct technique new ex  [x] Demonstrates understanding. [] Needs review. [] Demonstrates/verbalizes HEP/Ed previously given. Plan: [x] Continue current frequency toward long and short term goals. [x] Specific Instructions for subsequent treatments:  check patellar movement. Progress quad, gluteal, HS strengthening as able. Vaso compression for edema and pain control. 2x wk for 16 sessions.        Time In: 0930          Time Out: 1024    Electronically signed by:  Lisa Serra, PT

## 2022-01-10 NOTE — PROGRESS NOTES
Chief Complaint   Patient presents with    New Patient     R knee pain and swelling      This 35year-old patient is seen here today complaining of pain in the right knee. The pain is felt across the knee joint anteriorly. The knee has given out on him on occasion. He describes more pseudolocking than true locking. The patient says that he has seen other physicians before this. He had an MRI of the knee in 2015. The patient used to weigh over 600 pounds. Apparently it was his stepfather who helped him lose a lot of weight. He now weighs 368 pounds. Examination: He stands with the leg slightly externally rotated slight valgus deformity. He walks with a slight limp and external rotation right leg. In supine position hip examination showed excellent range of motion though his external rotation was very little more than internal rotation compared to the other side. This accounts for his gait and stance. As for the knee he has a full extension and flexes to about 100 degrees. I could not detect any instability or effusion in the joint. X-rays: There is moderate to mild tricompartmental osteoarthritis affecting mainly the medial compartment. There is subchondral sclerosis and narrowing of the joint space but no cystic formation. Diagnosis: Primary osteoarthritis right knee. Treatment discussed with him that he requires to lose a lot of weight. I have also prescribed Norco for pain control while he is working at [employer]. He says he works as a  but has had to do a lot of lifting. We will see him again in 4 weeks time.

## 2022-01-11 LAB
EKG ATRIAL RATE: 66 BPM
EKG P AXIS: 71 DEGREES
EKG P-R INTERVAL: 120 MS
EKG Q-T INTERVAL: 390 MS
EKG QRS DURATION: 92 MS
EKG QTC CALCULATION (BAZETT): 408 MS
EKG R AXIS: 47 DEGREES
EKG T AXIS: 23 DEGREES
EKG VENTRICULAR RATE: 66 BPM

## 2022-01-12 ENCOUNTER — HOSPITAL ENCOUNTER (OUTPATIENT)
Dept: PHYSICAL THERAPY | Age: 42
Setting detail: THERAPIES SERIES
Discharge: HOME | End: 2022-01-12
Payer: MEDICAID

## 2022-01-12 NOTE — FLOWSHEET NOTE
[x] [facility] &  Therapy  [address]    [phone]  F: [phone]          Physical Therapy Cancel/No Show note    Date: 2022  Patient: Marco Sherman  : 1980  MRN: [id number]    Cancels/No Shows to date:     For today's appointment patient:    [x]  Cancelled    [] Rescheduled appointment    [] No-show     Reason given by patient:    []  Patient ill    []  Conflicting appointment    [x] No transportation      [] Conflict with work    [] No reason given    [] Weather related    [] COVID-19    [x] Other:      Comments:  Pt called 12 min after appt, his cab did never showed up. Pt confirmed next appt time.        [x] Next appointment was confirmed    Electronically signed by: Monalisa Duncan PT

## 2022-01-17 ENCOUNTER — HOSPITAL ENCOUNTER (OUTPATIENT)
Dept: PHYSICAL THERAPY | Age: 42
Setting detail: THERAPIES SERIES
Discharge: HOME | End: 2022-01-17
Payer: MEDICAID

## 2022-01-17 PROCEDURE — 97110 THERAPEUTIC EXERCISES: CPT

## 2022-01-17 PROCEDURE — 97016 VASOPNEUMATIC DEVICE THERAPY: CPT

## 2022-01-17 NOTE — FLOWSHEET NOTE
[x] [facility] [facility]  [address]  [phone]  F: [phone]         Physical Therapy Daily Treatment Note    Date:  2022  Patient Name:  Tamar Ashley    :  1980  MRN: [id number]   Physician: Eliseo Mcallister MD                                 Insurance: [employer]jit [employer] (Limit 30 Rx)  Medical Diagnosis: Osteoarthritis of right knee M17.11                    Rehab Codes: M25.561, M25.661, R26.89, M62.551  Onset date: 2021                     Next Dr's appt. : 2022 ortho, 2022 PCP,  weight mgmt    Visit# / total visits: ; Recheck for STG due at visit #10     Cancels/No Shows:     Subjective:    Pain:  [x] Yes  [] No Location:  R knee Pain Rating: (0-10 scale) 8/10  Pain altered Tx:  [x] No  [] Yes  Action:  Comments:  Pt reports knee is paining him today, admits has been doing a lot, up and down a lot. Pt reports game ready cont to help. Objective:  Modalities: vasocompression x 15 mins 36°  Medium compression, w/elevation, post exercises. Precautions:   Exercises:  Exercise   R knee Reps/ Time Weight/ Level Comments   SciFit  5 min ML2.0  Progressed resistance          Standing      Calf stretch  3x 20sec On slant board   R LE 3 way hip  15x  OKC, CKC    HS curls 15x  OKC, CKC                Sitting         Hip add sets  15x 5 sec Progressed hold time     HS stretch 3x30\"     LAQ 15x  w/ball         Supine      Quad sets 10x5\"  w/ball   SAQ 2x10  w/ball   SLR  2x10      Bridging 2x10  w/ball         Slide lying      Hip abd 2x10 A    Clam shells 2x10 A                    Other:          Treatment Charges: Mins Units   []  Modalities     []  Ther Exercise 39 3   []  Manual Therapy     []  Ther Activities     []  Aquatics     [x]  Vasocompression 15 1   []  Other     Total Treatment time  54 3       Assessment: [x] Progressing toward.   Progressed resistance SciFit to increase knee strength, no new ex this

## 2022-01-18 ENCOUNTER — OFFICE VISIT (OUTPATIENT)
Dept: BARIATRICS/WEIGHT MGMT | Age: 42
End: 2022-01-18
Payer: MEDICAID

## 2022-01-18 VITALS
HEART RATE: 83 BPM | SYSTOLIC BLOOD PRESSURE: 120 MMHG | DIASTOLIC BLOOD PRESSURE: 76 MMHG | BODY MASS INDEX: 44.19 KG/M2 | WEIGHT: 315 LBS

## 2022-01-18 DIAGNOSIS — J45.20 MILD INTERMITTENT ASTHMA WITHOUT COMPLICATION: ICD-10-CM

## 2022-01-18 DIAGNOSIS — G89.29 CHRONIC PAIN OF RIGHT KNEE: ICD-10-CM

## 2022-01-18 DIAGNOSIS — G47.33 OSA ON CPAP: ICD-10-CM

## 2022-01-18 DIAGNOSIS — Z99.89 OSA ON CPAP: ICD-10-CM

## 2022-01-18 DIAGNOSIS — M25.561 CHRONIC PAIN OF RIGHT KNEE: ICD-10-CM

## 2022-01-18 DIAGNOSIS — E66.01 OBESITY, CLASS III, BMI 40-49.9 (MORBID OBESITY) (HCC): ICD-10-CM

## 2022-01-18 DIAGNOSIS — I10 ESSENTIAL HYPERTENSION: Primary | ICD-10-CM

## 2022-01-18 PROCEDURE — G8482 FLU IMMUNIZE ORDER/ADMIN: HCPCS | Performed by: NURSE PRACTITIONER

## 2022-01-18 PROCEDURE — 99213 OFFICE O/P EST LOW 20 MIN: CPT | Performed by: NURSE PRACTITIONER

## 2022-01-18 PROCEDURE — 1036F TOBACCO NON-USER: CPT | Performed by: NURSE PRACTITIONER

## 2022-01-18 PROCEDURE — G8427 DOCREV CUR MEDS BY ELIG CLIN: HCPCS | Performed by: NURSE PRACTITIONER

## 2022-01-18 PROCEDURE — G8417 CALC BMI ABV UP PARAM F/U: HCPCS | Performed by: NURSE PRACTITIONER

## 2022-01-19 ENCOUNTER — HOSPITAL ENCOUNTER (OUTPATIENT)
Dept: PHYSICAL THERAPY | Age: 42
Setting detail: THERAPIES SERIES
Discharge: HOME | End: 2022-01-19
Payer: MEDICAID

## 2022-01-19 PROCEDURE — 97110 THERAPEUTIC EXERCISES: CPT

## 2022-01-19 PROCEDURE — 97016 VASOPNEUMATIC DEVICE THERAPY: CPT

## 2022-01-19 NOTE — FLOWSHEET NOTE
[x] [facility]  [address]  [phone]  F: [phone]         Physical Therapy Daily Treatment Note    Date:  2022  Patient Name:  Bart Claudio    :  1980  MRN: [id number]   Physician: Ysabel Chacon MD                                 Insurance: [employer] (Limit 30 Rx)  Medical Diagnosis: Osteoarthritis of right knee M17.11                    Rehab Codes: M25.561, M25.661, R26.89, M62.551  Onset date: 2021                     Next Dr's appt. : 2022 ortho, 2022 PCP,  weight mgmt    Visit# / total visits: ; Recheck for STG due at visit #10     Cancels/No Shows:     Subjective:    Pain:  [x] Yes  [] No Location:  R knee Pain Rating: (0-10 scale) 6/10  Pain altered Tx:  [x] No  [] Yes  Action:  Comments:  Pt reports when going down stairs knee kind of gave out with increased pain. He used ice afterwards which helped decrease pain. Pt saw weight mgmt yesterday, lost 8 lbs. Objective:  Modalities: vasocompression x 15 mins 36°  Medium compression, w/elevation, post exercises.   Precautions:   Exercises:  Exercise   R knee Reps/ Time Weight/ Level Comments   SciFit  6 min ML2.0  Progressed time          Standing      Calf stretch  3x 20sec On slant board   HR  15x  Added    R LE 3 way hip  15x  OKC, CKC    HS curls 15x  OKC, CKC    March  15x  Added    Lunges  *  Add soon         Sitting         Hip add sets  15x 5 sec Yellow ball   HS stretch 3x20\"     LAQ 15x  w/ball         Supine      Quad sets 10x5\"  w/ball   SAQ 2x10  w/ball   SLR  2x10      Bridging 2x10  w/ball         Patellar Mobs 5x ea  All planes, added          Slide lying      Hip abd 2x10 A    Clam shells 2x10 A Add resistance next Rx                   Other:          Treatment Charges: Mins Units   []  Modalities     []  Ther Exercise 40 3   [x]  Manual Therapy 3 --   []  Ther Activities     []  Aquatics     [x] Vasocompression 15 1   []  Other     Total Treatment time  58 4       Assessment: [x] Progressing toward. Note decreased R patellar mobility all planes, initiated patellar mobs to improve this. Initiated standing HR and marches to improve standing activities and walking. Cont game ready at end of Rx to decrease pain and edema. [] No change. [] Other:  [x] Patient would continue to benefit from skilled physical therapy services in order to:  decrease pain R knee, increase R knee ext ROM, increase R LE strength, and improve tolerance to standing, walking and working     STG: (to be met in 10 treatments)  1. ? Pain: R knee 5/10 average pain, 7/10 at worst  2. ? ROM: R knee ext 8°  3. ? Strength: R knee flex 4/5, ext 4-/5, R hip 4-/5  4. ? Function: LEFS 48% loss of LE function   5. Pt report improved tolerance to walking, standing  6. Patient to be independent with home exercise program as demonstrated by performance with correct form without cues.     LTG: (to be met in 16 treatments)  1. ? Pain: R knee 3/10 average pain, 5/10 at worst  2. ? ROM: R knee ext 5°  3. ? Strength: R knee flex 4+/5, ext 4/5, R hip 4/5  4. ? Function: LEFS 34% loss of LE function   5. Pt able to do step-over step and work normally without pain                    Patient goals: able to work full night without pain    Pt. Education:  [x] Yes  [] No  [] Reviewed Prior HEP/Ed  Method of Education: [x] Verbal  [] Demo  [] Written  Comprehension of Education:  [x] Verbalizes understanding-purpose, correct technique new ex  [x] Demonstrates understanding. [] Needs review. [] Demonstrates/verbalizes HEP/Ed previously given. Plan: [x] Continue current frequency toward long and short term goals. [x] Specific Instructions for subsequent treatments:  Progress quad, gluteal, HS strengthening as able; work up to steps per Pt tolerance, add resistance clamshells, increase reps patellar mobs, add lunges soon   2x wk for 16 sessions. Time In: 1213          Time Out: 1032    Electronically signed by:  Sandy Jenkins, PT

## 2022-01-19 NOTE — PROGRESS NOTES
Group Lifestyle Balance Follow-up Progress Note      Subjective     Patient is here for group medical appointment for Group Lifestyle Balance weight management program follow-up for the chronic conditions of MIGEL, HTN, Asthma, Chronic Pain Right Knee. Patient continues on the program and tolerating well. Total weight loss to date is 7 lbs. No current issues. Allergies:  No Known Allergies    Past Medical History:     Past Medical History:   Diagnosis Date    Anxiety     Arthritis     CAD (coronary artery disease)     Fatty liver     Fatty liver disease, nonalcoholic 4/12/2295    Hypertension     Obesity     Unspecified sleep apnea     cpap   . Past Surgical History:  History reviewed. No pertinent surgical history. Family History:  Family History   Problem Relation Age of Onset    Arthritis Mother     Diabetes Father     Arthritis Father     Stroke Maternal Grandmother     Heart Disease Maternal Grandmother     Stroke Maternal Grandfather     Heart Disease Maternal Grandfather     Early Death Maternal Grandfather     Stroke Paternal Grandmother     Heart Disease Paternal Grandmother     Stroke Paternal Grandfather     Heart Disease Paternal Grandfather     Early Death Paternal Grandfather        Social History:  Social History     Socioeconomic History    Marital status: Single     Spouse name: Buffy Puentes Number of children: 0    Years of education: 12    Highest education level:  Bachelor's degree (e.g., BA, AB, BS)   Occupational History    Not on file   Tobacco Use    Smoking status: Never Smoker    Smokeless tobacco: Never Used   Vaping Use    Vaping Use: Never used   Substance and Sexual Activity    Alcohol use: No    Drug use: No    Sexual activity: Yes     Partners: Female   Other Topics Concern    Not on file   Social History Narrative    ** Merged History Encounter **          Social Determinants of Health     Financial Resource Strain: Low Risk     Difficulty of Paying Living Expenses: Not very hard   Food Insecurity: No Food Insecurity    Worried About Running Out of Food in the Last Year: Never true    Ran Out of Food in the Last Year: Never true   Transportation Needs:     Lack of Transportation (Medical): Not on file    Lack of Transportation (Non-Medical): Not on file   Physical Activity:     Days of Exercise per Week: Not on file    Minutes of Exercise per Session: Not on file   Stress:     Feeling of Stress : Not on file   Social Connections:     Frequency of Communication with Friends and Family: Not on file    Frequency of Social Gatherings with Friends and Family: Not on file    Attends Scientologist Services: Not on file    Active Member of [address] [place] [address] or Organizations: Not on file    Attends Club or Organization Meetings: Not on file    Marital Status: Not on file   Intimate Partner Violence:     Fear of Current or Ex-Partner: Not on file    Emotionally Abused: Not on file    Physically Abused: Not on file    Sexually Abused: Not on file   Housing Stability:     Unable to Pay for Housing in the Last Year: Not on file    Number of [place] in the Last Year: Not on file    Unstable Housing in the Last Year: Not on file       Current Medications:  Current Outpatient Medications   Medication Sig Dispense Refill    HYDROcodone-acetaminophen (NORCO) 5-325 MG per tablet Take 1 tablet by mouth every 6 hours as needed for Pain for up to 14 days.  56 tablet 0    amLODIPine (NORVASC) 10 MG tablet Take 1 tablet by mouth daily 30 tablet 3    metoprolol tartrate (LOPRESSOR) 25 MG tablet 1 tablet 2/day 180 tablet 0    fluticasone (FLONASE) 50 MCG/ACT nasal spray 1 spray by Nasal route daily 16 g 3    ibuprofen (ADVIL;MOTRIN) 800 MG tablet Take 1 tablet by mouth every 8 hours as needed for Pain 30 tablet 1    albuterol sulfate  (90 Base) MCG/ACT inhaler inhale 2 puffs by mouth and INTO THE LUNGS every 6 hours if needed for wheezing 18 g 3    vitamin D (ERGOCALCIFEROL) 1.25 MG (16917 UT) CAPS capsule Take 1 capsule by mouth once a week      FLUoxetine (PROZAC) 20 MG capsule Take 20 mg by mouth daily      PAIN RELIEF EXTRA STRENGTH 500 MG tablet take 1 tablet by mouth twice a day AS NEEDED FOR PAIN 60 tablet 5    melatonin 10 MG CAPS capsule Take 1 capsule by mouth nightly 30 capsule 0    valACYclovir (VALTREX) 1 g tablet       ARIPiprazole (ABILIFY) 10 MG tablet Take 10 mg by mouth daily       No current facility-administered medications for this visit. Vital Signs:  /76 (Site: Right Upper Arm, Position: Sitting, Cuff Size: Large Adult)   Pulse 83   Wt (!) 363 lb (164.7 kg)   BMI 44.19 kg/m²     BMI/Height/Weight:  Body mass index is 44.19 kg/m². Review of Systems  Constitutional: Weight loss      Objective:      Physical Exam   Vital signs reviewed. General Appearance: Well-developed and well-nourished. No acute distress. Skin: Warm, dry. Head: Normocephalic. Pulmonary/Chest: Normal respiratory effort. Musculoskeletal: Movement x4. Neurological:  Alert and oriented. Individual goal for this encounter:      X ? Vital signs reviewed and discussed with patient. X ? Labs/EKG reviewed and discussed with patient. ? Blood sugar log reviewed and discussed with patient. ? Physical activity discussed. ? Medication changes recommended. ? Smoking cessation discussed. X ? Specific questions/concerns addressed. Specific group medical question(s) addressed in this encounter:  Discussion about metabolic syndrome. Group discussion topic for this encounter:    X ? Welcome Jumpstart   ? Be a Fat & Calorie    ? Healthy Eating   ? Move Those Muscles   ? Tip the Calorie Balance   ? Take charge of What's Around You   ? Problem Solving   ? Step Up Your Physical Activity Plan   ? Manage Slips and Self-Defeating Thoughts   ? [address]   ? Make Social Cues Work for Emily Sherron   ?  Ways to Stay Motivated   ? Preparing for Long Term Self-Management   ? Take Charge of Your Lifestyle   ? Mindful Eating, Mindful Movement   ? Manage Your Stress   ? Sit Less for Health   ? More Volume, Fewer Calories   ? Stay Active   ? Balance Your Thoughts   ? Heart Health    ? Looking Back and Looking Forward    Total time:  90 minutes, greater than 50% of visit spent in group counseling/education. Assessment:       Diagnosis Orders   1. Essential hypertension     2. MIGEL on CPAP     3. Obesity, Class III, BMI 40-49.9 (morbid obesity) ([facility] [address]     4. Mild intermittent asthma without complication     5. Chronic pain of right knee         Plan:      Track food and weight. Return to clinic as per group medical appointment schedule. Follow-up:  Return in about 1 week (around 1/25/2022). Orders:  No orders of the defined types were placed in this encounter. Prescriptions:  No orders of the defined types were placed in this encounter.       Electronically signed by:  Lynda Castle CNP

## 2022-01-24 ENCOUNTER — HOSPITAL ENCOUNTER (OUTPATIENT)
Dept: PHYSICAL THERAPY | Age: 42
Setting detail: THERAPIES SERIES
Discharge: HOME | End: 2022-01-24
Payer: MEDICAID

## 2022-01-24 NOTE — FLOWSHEET NOTE
[x] [facility]  [address]    [phone]  F: [phone]          Physical Therapy Cancel/No Show note    Date: 2022  Patient: Seymour Cotton  : 1980  MRN: [id number]    Cancels/No Shows to date:     For today's appointment patient:    [x]  Cancelled    [] Rescheduled appointment    [] No-show     Reason given by patient:    []  Patient ill    [x]  Conflicting appointment    [] No transportation      [] Conflict with work    [] No reason given    [] Weather related    [] COVID-19    [x] Other:      Comments:  Pt taking grandmother to appt. Pt confirmed appt for .         [x] Next appointment was confirmed    Electronically signed by: Imtiaz Jefferson, PT

## 2022-01-25 ENCOUNTER — OFFICE VISIT (OUTPATIENT)
Dept: BARIATRICS/WEIGHT MGMT | Age: 42
End: 2022-01-25
Payer: MEDICAID

## 2022-01-25 VITALS
HEART RATE: 80 BPM | BODY MASS INDEX: 38.36 KG/M2 | DIASTOLIC BLOOD PRESSURE: 74 MMHG | SYSTOLIC BLOOD PRESSURE: 126 MMHG | WEIGHT: 315 LBS | HEIGHT: 76 IN

## 2022-01-25 DIAGNOSIS — J45.20 MILD INTERMITTENT ASTHMA, UNSPECIFIED WHETHER COMPLICATED: ICD-10-CM

## 2022-01-25 DIAGNOSIS — M25.561 CHRONIC PAIN OF RIGHT KNEE: ICD-10-CM

## 2022-01-25 DIAGNOSIS — I10 ESSENTIAL HYPERTENSION: Primary | ICD-10-CM

## 2022-01-25 DIAGNOSIS — E66.01 OBESITY, CLASS III, BMI 40-49.9 (MORBID OBESITY) (HCC): ICD-10-CM

## 2022-01-25 DIAGNOSIS — G89.29 CHRONIC PAIN OF RIGHT KNEE: ICD-10-CM

## 2022-01-25 DIAGNOSIS — G47.33 OSA ON CPAP: ICD-10-CM

## 2022-01-25 DIAGNOSIS — Z99.89 OSA ON CPAP: ICD-10-CM

## 2022-01-25 PROCEDURE — 1036F TOBACCO NON-USER: CPT | Performed by: NURSE PRACTITIONER

## 2022-01-25 PROCEDURE — G8417 CALC BMI ABV UP PARAM F/U: HCPCS | Performed by: NURSE PRACTITIONER

## 2022-01-25 PROCEDURE — G8427 DOCREV CUR MEDS BY ELIG CLIN: HCPCS | Performed by: NURSE PRACTITIONER

## 2022-01-25 PROCEDURE — G8482 FLU IMMUNIZE ORDER/ADMIN: HCPCS | Performed by: NURSE PRACTITIONER

## 2022-01-25 PROCEDURE — 99213 OFFICE O/P EST LOW 20 MIN: CPT | Performed by: NURSE PRACTITIONER

## 2022-01-25 NOTE — PROGRESS NOTES
Group Lifestyle Balance Follow-up Progress Note      Subjective     Patient is here for group medical appointment for Group Lifestyle Balance weight management program follow-up for the chronic conditions of MIGEL, HTN, Asthma, Chronic Pain Right Knee. Patient continues on the program and tolerating well. Total weight loss to date is 10 lbs. No current issues. Allergies:  No Known Allergies    Past Medical History:     Past Medical History:   Diagnosis Date    Anxiety     Arthritis     CAD (coronary artery disease)     Fatty liver     Fatty liver disease, nonalcoholic 5/03/2777    Hypertension     Obesity     Unspecified sleep apnea     cpap   . Past Surgical History:  History reviewed. No pertinent surgical history. Family History:  Family History   Problem Relation Age of Onset    Arthritis Mother     Diabetes Father     Arthritis Father     Stroke Maternal Grandmother     Heart Disease Maternal Grandmother     Stroke Maternal Grandfather     Heart Disease Maternal Grandfather     Early Death Maternal Grandfather     Stroke Paternal Grandmother     Heart Disease Paternal Grandmother     Stroke Paternal Grandfather     Heart Disease Paternal Grandfather     Early Death Paternal Grandfather        Social History:  Social History     Socioeconomic History    Marital status: Single     Spouse name: Emogene Riser Number of children: 0    Years of education: 12    Highest education level:  Bachelor's degree (e.g., BA, AB, BS)   Occupational History    Not on file   Tobacco Use    Smoking status: Never Smoker    Smokeless tobacco: Never Used   Vaping Use    Vaping Use: Never used   Substance and Sexual Activity    Alcohol use: No    Drug use: No    Sexual activity: Yes     Partners: Female   Other Topics Concern    Not on file   Social History Narrative    ** Merged History Encounter **          Social Determinants of Health     Financial Resource Strain: Low Risk     Difficulty of Paying Living Expenses: Not very hard   Food Insecurity: No Food Insecurity    Worried About Running Out of Food in the Last Year: Never true    Ran Out of Food in the Last Year: Never true   Transportation Needs:     Lack of Transportation (Medical): Not on file    Lack of Transportation (Non-Medical):  Not on file   Physical Activity:     Days of Exercise per Week: Not on file    Minutes of Exercise per Session: Not on file   Stress:     Feeling of Stress : Not on file   Social Connections:     Frequency of Communication with Friends and Family: Not on file    Frequency of Social Gatherings with Friends and Family: Not on file    Attends Confucianism Services: Not on file    Active Member of [address] [place] [address] or Organizations: Not on file    Attends Club or Organization Meetings: Not on file    Marital Status: Not on file   Intimate Partner Violence:     Fear of Current or Ex-Partner: Not on file    Emotionally Abused: Not on file    Physically Abused: Not on file    Sexually Abused: Not on file   Housing Stability:     Unable to Pay for Housing in the Last Year: Not on file    Number of [place] in the Last Year: Not on file    Unstable Housing in the Last Year: Not on file       Current Medications:  Current Outpatient Medications   Medication Sig Dispense Refill    amLODIPine (NORVASC) 10 MG tablet Take 1 tablet by mouth daily 30 tablet 3    metoprolol tartrate (LOPRESSOR) 25 MG tablet 1 tablet 2/day 180 tablet 0    fluticasone (FLONASE) 50 MCG/ACT nasal spray 1 spray by Nasal route daily 16 g 3    ibuprofen (ADVIL;MOTRIN) 800 MG tablet Take 1 tablet by mouth every 8 hours as needed for Pain 30 tablet 1    albuterol sulfate  (90 Base) MCG/ACT inhaler inhale 2 puffs by mouth and INTO THE LUNGS every 6 hours if needed for wheezing 18 g 3    vitamin D (ERGOCALCIFEROL) 1.25 MG (13897 UT) CAPS capsule Take 1 capsule by mouth once a week      FLUoxetine (PROZAC) 20 MG capsule Take 20 mg by mouth daily      PAIN RELIEF EXTRA STRENGTH 500 MG tablet take 1 tablet by mouth twice a day AS NEEDED FOR PAIN 60 tablet 5    melatonin 10 MG CAPS capsule Take 1 capsule by mouth nightly 30 capsule 0    valACYclovir (VALTREX) 1 g tablet       ARIPiprazole (ABILIFY) 10 MG tablet Take 10 mg by mouth daily       No current facility-administered medications for this visit. Vital Signs:  /74 (Site: Right Upper Arm, Position: Sitting, Cuff Size: Large Adult)   Pulse 80   Ht 6' 4\" (1.93 m)   Wt (!) 360 lb (163.3 kg)   BMI 43.82 kg/m²     BMI/Height/Weight:  Body mass index is 43.82 kg/m². Review of Systems  Constitutional: Weight loss      Objective:      Physical Exam   Vital signs reviewed. General Appearance: Well-developed and well-nourished. No acute distress. Skin: Warm, dry. Head: Normocephalic. Pulmonary/Chest: Normal respiratory effort. Musculoskeletal: Movement x4. Neurological:  Alert and oriented. Individual goal for this encounter:      X ? Vital signs reviewed and discussed with patient. X ? Labs/EKG reviewed and discussed with patient. ? Blood sugar log reviewed and discussed with patient. ? Physical activity discussed. ? Medication changes recommended. ? Smoking cessation discussed. X ? Specific questions/concerns addressed. Specific group medical question(s) addressed in this encounter:  Discussion about fatty liver. Group discussion topic for this encounter:     ? Welcome Jumpstart  X ? Be a Fat & Calorie    ? Healthy Eating   ? Move Those Muscles   ? Tip the Calorie Balance   ? Take charge of What's Around You   ? Problem Solving   ? Step Up Your Physical Activity Plan   ? Manage Slips and Self-Defeating Thoughts   ? [address]   ? Make Social Cues Work for Latoya Shires   ? Ways to Stay Motivated   ? Preparing for Long Term Self-Management   ? Take Charge of Your Lifestyle   ? Mindful Eating, Mindful Movement   ?  Manage Your Stress   ? Sit Less for Health   ? More Volume, Fewer Calories   ? Stay Active   ? Balance Your Thoughts   ? Heart Health    ? Looking Back and Looking Forward    Total time:  90 minutes, greater than 50% of visit spent in group counseling/education. Assessment:       Diagnosis Orders   1. Essential hypertension     2. MIGEL on CPAP     3. Obesity, Class III, BMI 40-49.9 (morbid obesity) ([address]     4. Mild intermittent asthma, unspecified whether complicated     5. Chronic pain of right knee         Plan:      Track food and weight. Return to clinic as per group medical appointment schedule. Follow-up:  Return in about 1 week (around 2/1/2022). Orders:  No orders of the defined types were placed in this encounter. Prescriptions:  No orders of the defined types were placed in this encounter.       Electronically signed by:  Mick Vega CNP

## 2022-01-26 ENCOUNTER — HOSPITAL ENCOUNTER (OUTPATIENT)
Dept: PHYSICAL THERAPY | Age: 42
Setting detail: THERAPIES SERIES
Discharge: HOME | End: 2022-01-26
Payer: MEDICAID

## 2022-01-26 PROCEDURE — 97016 VASOPNEUMATIC DEVICE THERAPY: CPT

## 2022-01-26 PROCEDURE — 97110 THERAPEUTIC EXERCISES: CPT

## 2022-01-26 NOTE — FLOWSHEET NOTE
[x] [facility]  [address]  [phone]  F: [phone]         Physical Therapy Daily Treatment Note    Date:  2022  Patient Name:  Emil Mcnair    :  1980  MRN: [id number]   Physician: Diane Torres MD                                 Insurance: [employer] (Limit 30 Rx)  Medical Diagnosis: Osteoarthritis of right knee M17.11                    Rehab Codes: M25.561, M25.661, R26.89, M62.551  Onset date: 2021                     Next Dr's appt. : 2022 ortho, 2022 PCP,  weight mgmt    Visit# / total visits: ; Recheck for STG due at visit #10     Cancels/No Shows: 2/0    Subjective:    Pain:  [x] Yes  [] No Location:  R knee Pain Rating: (0-10 scale) 8/10 inferior patella   Pain altered Tx:  [x] No  [] Yes  Action:  Comments: Report he has taken a leave of absence from work due to knee pain. Pain without activity and with. Addressing HEP. Objective:  Modalities: vasocompression x 15 mins 36°  Medium compression, w/elevation, post exercises. Precautions:   Exercises:  Exercise   R knee Reps/ Time Weight/ Level Comments   SciFit  6 min ML2.0            Standing      Calf stretch  3x 20sec On slant board   HR  15x     R LE 3 way hip  3x5 reps ea 2 lbs OKC, CKC  Added  Wt     HS curls 15x  OKC, CKC    March  15x 2 lbs Added wt    Lunges  *  Add soon         Sitting         Hip add sets  15x 5 sec Yellow ball   HS stretch 3x20\"     LAQ 15x  w/ball         Supine      Quad sets 15x5\"  W/ball, incr. Reps    SAQ 2x10  w/ball   SLR  2x10      Bridging 2x10  w/ball         Patellar Mobs 10x ea  All planes,  Good ROM, tender with touching inferior patella at times.           Slide lying      Hip abd 2x10 A    Clam shells 2x10  blue Added resistance                    Other:          Treatment Charges: Mins Units   []  Modalities     [x]  Ther Exercise 29 2   [x]  Manual Therapy  4 -   []  Ther Activities []  Aquatics     [x]  Vasocompression 15 1   []  Other     Total Treatment time  48 3       Assessment: [x] Progressing toward  Added resistance with several standing LE exercises and clamshell exercise for gluteal and quad strengthening. Increased reps with patellar mobs with good movement noted but tender to touching inferior patella. VC for quad set with several exercises and for SL hip abduction technique, pt able to correct intermittently. [] No change. [] Other:  [x] Patient would continue to benefit from skilled physical therapy services in order to:  decrease pain R knee, increase R knee ext ROM, increase R LE strength, and improve tolerance to standing, walking and working     STG: (to be met in 10 treatments)  1. ? Pain: R knee 5/10 average pain, 7/10 at worst  2. ? ROM: R knee ext 8°  3. ? Strength: R knee flex 4/5, ext 4-/5, R hip 4-/5  4. ? Function: LEFS 48% loss of LE function   5. Pt report improved tolerance to walking, standing  6. Patient to be independent with home exercise program as demonstrated by performance with correct form without cues.     LTG: (to be met in 16 treatments)  1. ? Pain: R knee 3/10 average pain, 5/10 at worst  2. ? ROM: R knee ext 5°  3. ? Strength: R knee flex 4+/5, ext 4/5, R hip 4/5  4. ? Function: LEFS 34% loss of LE function   5. Pt able to do step-over step and work normally without pain                    Patient goals: able to work full night without pain    Pt. Education:  [x] Yes  [] No  [] Reviewed Prior HEP/Ed  Method of Education: [x] Verbal  [] Demo  [] Written  Comprehension of Education:  [x] Verbalizes understanding-purpose, correct technique new ex  [x] Demonstrates understanding. [] Needs review. [x] Demonstrates/verbalizes HEP/Ed previously given. Plan: [x] Continue current frequency toward long and short term goals.     [x] Specific Instructions for subsequent treatments:  Progress quad, gluteal, HS strengthening as able; work up to steps per Pt tolerance, add resistance clamshells, increase reps patellar mobs, add lunges soon   2x wk for 16 sessions.        Time In: 2040          Time Out: 1010    Electronically signed by:  Fátima Vital PTA

## 2022-01-31 ENCOUNTER — HOSPITAL ENCOUNTER (OUTPATIENT)
Dept: PHYSICAL THERAPY | Age: 42
Setting detail: THERAPIES SERIES
Discharge: HOME | End: 2022-01-31
Payer: MEDICAID

## 2022-01-31 PROCEDURE — 97110 THERAPEUTIC EXERCISES: CPT

## 2022-01-31 PROCEDURE — 97016 VASOPNEUMATIC DEVICE THERAPY: CPT

## 2022-01-31 NOTE — FLOWSHEET NOTE
[x] [facility]  [address]  [phone]  F: [phone]         Physical Therapy Daily Treatment Note    Date:  2022  Patient Name:  Bart Claudio    :  1980  MRN: [id number]   Physician: Ysabel Chacon MD                                 Insurance: [employer] (Limit 30 Rx)  Medical Diagnosis: Osteoarthritis of right knee M17.11                    Rehab Codes: M25.561, M25.661, R26.89, M62.551  Onset date: 2021                       Next Dr's appt. : 2022 ortho, 2022 PCP    Visit# / total visits: ; Recheck for STG due at visit #10     Cancels/No Shows: 2    Subjective:    Pain:  [x] Yes  [] No Location:  R knee Pain Rating: (0-10 scale) 6/10 inferior patella   Pain altered Tx:  [x] No  [] Yes  Action:  Comments: Pt reports is staying on track and doing well with his weight loss and weight management appts. Objective:  Modalities: Vasocompression x 15 mins 36°  Medium compression, w/elevation, post exercises.   Precautions:   Exercises:  Exercise   R knee Reps/ Time Weight/ Level Comments   SciFit  6 min ML3.0            Standing      Calf stretch  3x 20sec On slant board   HR  20x  Progressed reps    R LE 3 way hip  15x ea 2 lbs OKC, CKC    HS curls 15x 2 lbs  OKC, CKC; added weight   15x  2 lbs    Lunges  10x  Ant, Added          Sitting         Hip add sets  15x 5 sec Yellow ball   HS stretch 3x20\"     LAQ 15x 2 lbs W/ball, added weight          Supine      Quad sets 15x5\"  W/ball   Glut sets       SAQ 2x10 2 lbs W/ball, added weight    SLR  2x10      Bridging 2x10  w/ball         Patellar Mobs 10x ea  All planes,  Mild-mod deficits ROM all planes         Slide lying      Hip abd 2x10 A    Clam shells 2x10  blue          Quadriped         hip ext      Add soon   Other:          Treatment Charges: Mins Units   []  Modalities     [x]  Ther Exercise 41 3   [x]  Manual Therapy 5 - []  Ther Activities     []  Aquatics     [x]  Vasocompression 15 1   []  Other     Total Treatment time  61 4       Assessment: [x] Progressing toward goals-Initiated lunges to increase knee strength and stability and improve walking. Pt able to perform HS curls, LAQ, and SAQ w/weight this date. Cont game ready at end of Rx to decrease pain. [] No change. [] Other:  [x] Patient would continue to benefit from skilled physical therapy services in order to:  decrease pain R knee, increase R knee ext ROM, increase R LE strength, and improve tolerance to standing, walking and working     STG: (to be met in 10 treatments)  1. ? Pain: R knee 5/10 average pain, 7/10 at worst  2. ? ROM: R knee ext 8°  3. ? Strength: R knee flex 4/5, ext 4-/5, R hip 4-/5  4. ? Function: LEFS 48% loss of LE function   5. Pt report improved tolerance to walking, standing  6. Patient to be independent with home exercise program as demonstrated by performance with correct form without cues.     LTG: (to be met in 16 treatments)  1. ? Pain: R knee 3/10 average pain, 5/10 at worst  2. ? ROM: R knee ext 5°  3. ? Strength: R knee flex 4+/5, ext 4/5, R hip 4/5  4. ? Function: LEFS 34% loss of LE function   5. Pt able to do step-over step and work normally without pain                    Patient goals: able to work full night without pain    Pt. Education:  [x] Yes  [] No  [] Reviewed Prior HEP/Ed  Method of Education: [x] Verbal  [] Demo  [] Written  Comprehension of Education:  [x] Verbalizes understanding-purpose, correct technique new ex  [x] Demonstrates understanding. [] Needs review. [x] Demonstrates/verbalizes HEP/Ed previously given. Plan: [x] Continue current frequency toward long and short term goals. [x] Specific Instructions for subsequent treatments:  Progress quad, gluteal, HS strengthening as able; work up to steps per Pt tolerance, add lat lunges soon  2x wk for 16 sessions.        Time In: 6035        Time Out: 1056    Electronically signed by:  Michelle Zamarripa, PT

## 2022-02-02 ENCOUNTER — HOSPITAL ENCOUNTER (OUTPATIENT)
Dept: PHYSICAL THERAPY | Age: 42
Setting detail: THERAPIES SERIES
Discharge: HOME | End: 2022-02-02
Payer: MEDICAID

## 2022-02-02 DIAGNOSIS — G89.29 CHRONIC PAIN OF BOTH KNEES: ICD-10-CM

## 2022-02-02 DIAGNOSIS — M25.562 CHRONIC PAIN OF BOTH KNEES: ICD-10-CM

## 2022-02-02 DIAGNOSIS — M25.561 CHRONIC PAIN OF BOTH KNEES: ICD-10-CM

## 2022-02-02 PROCEDURE — 97110 THERAPEUTIC EXERCISES: CPT

## 2022-02-02 PROCEDURE — 97016 VASOPNEUMATIC DEVICE THERAPY: CPT

## 2022-02-02 RX ORDER — PSEUDOEPHED/ACETAMINOPH/DIPHEN 30MG-500MG
TABLET ORAL
Qty: 60 TABLET | Refills: 5 | Status: SHIPPED | OUTPATIENT
Start: 2022-02-02 | End: 2022-08-03

## 2022-02-02 NOTE — TELEPHONE ENCOUNTER
E-scribe request for Acetaminophen. Please review and e-scribe if applicable.        Next Visit Date:  Future Appointments   Date Time Provider Kathi Crowderi   2/3/2022  5:30 PM Judye Barbone, APRN - CNP Weight Mgmt [facility]   2/7/2022 10:00 AM Theo Piper, PT [facility]   2/8/2022  9:10 AM Parvez Meza MD ORTHO SPECIA [facility]   2/8/2022  3:45 PM Meghna Liao MD [facility]   2/9/2022 10:00 AM Theo Piper, PT [facility]   2/10/2022  5:30 PM Judye Barbone, APRN - CNP Weight Mgmt [facility]   2/17/2022  5:30 PM Judye Barbone, APRN - CNP Weight Mgmt [facility]   2/24/2022  5:30 PM Judye Barbone, APRN - CNP Weight Mgmt [facility]   3/3/2022  5:30 PM Judye Barbone, APRN - CNP Weight Mgmt [facility]   3/10/2022  5:30 PM Judye Barbone, APRN - CNP Weight Mgmt [facility]   3/14/2022 11:30 AM Osmar Russell DPM [facility]   3/17/2022  5:30 PM Judye Barbone, APRN - CNP Weight Mgmt [facility]   3/24/2022  5:30 PM Judye Barbone, APRN - CNP Weight Mgmt [facility]   4/7/2022  5:30 PM Judye Barbone, APRN - CNP Weight Mgmt [facility]   4/14/2022  5:30 PM Judye Barbone, APRN - CNP Weight Mgmt [facility]   Topic Date Due    COVID-19 Vaccine (3 - Booster for Moderna series) 11/12/2021    Depression Monitoring  02/04/2022    Potassium monitoring  01/10/2023    Creatinine monitoring  01/10/2023    Lipid screen  01/05/2027    DTaP/Tdap/Td vaccine (3 - Td or Tdap) 04/27/2028    Pneumococcal 0-64 years Vaccine (2 of 2 - PPSV23) 01/31/2043    Flu vaccine  Completed    Hepatitis C screen  Completed    HIV screen  Completed    Hepatitis A vaccine  Aged Out    Hepatitis B vaccine  Aged Out    Hib vaccine  Aged Out    Meningococcal (ACWY) vaccine  Aged Out               (applicable per patient's age: Cancer Screenings, Depression Screening, Fall Risk Screening, Immunizations)    Hemoglobin A1C (%)   Date Value   01/05/2022 5.5   05/28/2021 5.6   02/22/2021 5.5     LDL Cholesterol (mg/dL)   Date Value   01/05/2022 101     AST (U/L)   Date Value   01/05/2022 11     ALT (U/L)   Date Value   01/05/2022 9     BUN (mg/dL)   Date Value   01/10/2022 15      (goal A1C is < 7)   (goal LDL is <100) need 30-50% reduction from baseline     BP Readings from Last 3 Encounters:   01/25/22 126/74   01/18/22 120/76   12/30/21 (!) 160/100    (goal /80)      All Future Testing planned in [facility]:  Lab Frequency Next Occurrence   EKG 12 Lead Once 02/04/2022            Patient Active Problem List:     Essential hypertension     MIGEL on CPAP     Vitamin D deficiency     Body mass index (BMI) of 38.0-38.9 in adult     IGT (impaired glucose tolerance)     Mild intermittent asthma     Cervical spondylosis     Degeneration of cervical intervertebral disc     Osteoarthritis of knee     Scoliosis deformity of spine     Subacromial impingement     Chronic pain of right knee     Obesity, Class III, BMI 40-49.9 (morbid obesity) ([address]

## 2022-02-02 NOTE — FLOWSHEET NOTE
[x] [facility]  [address]  [phone]  F: [phone]         Physical Therapy Daily Treatment Note    Date:  2022  Patient Name:  Kris Balderas    :  1980  MRN: [id number]   Physician: Sotero Hess MD                                 Insurance: [employer] (Limit 30 Rx)  Medical Diagnosis: Osteoarthritis of right knee M17.11                    Rehab Codes: M25.561, M25.661, R26.89, M62.551  Onset date: 2021                       Next Dr's appt. : 2022 ortho, 2022 PCP    Visit# / total visits: ; Recheck for STG due at visit #10     Cancels/No Shows: 2/0    Subjective:    Pain:  [x] Yes  [] No Location:  R knee Pain Rating: (0-10 scale) 7/10 inferior patella   Pain altered Tx:  [x] No  [] Yes  Action:  Comments:  No complaints from prior session. Reports knee pain is up due to assisting family with household chores. Objective:  Modalities: Vasocompression x 15 mins 36°  Medium compression, w/elevation, post exercises. Precautions:   Exercises:  Exercise   R knee Reps/ Time Weight/ Level Comments   SciFit  6 min ML3.0            Standing      Calf stretch  3x 20sec On slant board   HR  20x 2 lbs  added wt /   R LE 3 way hip  15x ea 2 lbs OKC, CKC  3x5 reps /   HS curls 15x 2 lbs  OKC, CKC; 3x5 reps /  15x  2 lbs    Lunges  12x  Ant, vc for tech. Sitting         Hip add sets  15x 5 sec Yellow ball   HS stretch 3x20\"     LAQ 15x 2 lbs W/ball          Supine      Quad sets 15x5\"  W/ball   Glut sets  1x10\"     SAQ 2x10 2 lbs W/ball,    SLR  2x10 A     Bridging 2x10  w/ball         Patellar Mobs 10x ea  All planes,  mod deficits ROM  W/supeior/inferior           Slide lying      Hip abd 2x10 A    Clam shells 2x10  blue          Quadriped         hip ext      Add soon   Other:          Treatment Charges: Gweneth Latch    Mins Units   []  Modalities     [x]  Ther Exercise 42  3   [x]  Manual Therapy   5 - []  Ther Activities     []  Aquatics     [x]  Vasocompression 15 1   []  Other     Total Treatment time  62 4       Assessment: [x] Progressing toward goals  Continue gluteal, quad, and HS strengthening as charted. Pt had to switch every 5 reps with ckc due to pain/tolerance. Muscle guarding with superior/inferior patellar mobs and tenderness at lateral knee, difficulty with these mobs. Ended with vasocompression for edema and pain control. Verbal education of HEP. [] No change. [] Other:  [x] Patient would continue to benefit from skilled physical therapy services in order to:  decrease pain R knee, increase R knee ext ROM, increase R LE strength, and improve tolerance to standing, walking and working     STG: (to be met in 10 treatments)  1. ? Pain: R knee 5/10 average pain, 7/10 at worst  2. ? ROM: R knee ext 8°  3. ? Strength: R knee flex 4/5, ext 4-/5, R hip 4-/5  4. ? Function: LEFS 48% loss of LE function   5. Pt report improved tolerance to walking, standing  6. Patient to be independent with home exercise program as demonstrated by performance with correct form without cues.     LTG: (to be met in 16 treatments)  1. ? Pain: R knee 3/10 average pain, 5/10 at worst  2. ? ROM: R knee ext 5°  3. ? Strength: R knee flex 4+/5, ext 4/5, R hip 4/5  4. ? Function: LEFS 34% loss of LE function   5. Pt able to do step-over step and work normally without pain                    Patient goals: able to work full night without pain    Pt. Education:  [x] Yes  [] No  [] Reviewed Prior HEP/Ed  Method of Education: [x] Verbal  [] Demo  [] Written  Comprehension of Education:  [x] Verbalizes understanding-purpose, correct technique new ex  [x] Demonstrates understanding. [] Needs review. [x] Demonstrates/verbalizes HEP/Ed previously given. Plan: [x] Continue current frequency toward long and short term goals.     [x] Specific Instructions for subsequent treatments:  Progress quad, gluteal, HS strengthening as able; work up to steps per Pt tolerance, add lat lunges soon  2x wk for 16 sessions.        Time In:   6663      Time Out: 1012  Electronically signed by:  Anastasiia Yanez PTA

## 2022-02-08 ENCOUNTER — OFFICE VISIT (OUTPATIENT)
Dept: INTERNAL MEDICINE | Age: 42
End: 2022-02-08
Payer: MEDICAID

## 2022-02-08 VITALS
SYSTOLIC BLOOD PRESSURE: 123 MMHG | BODY MASS INDEX: 38.36 KG/M2 | HEIGHT: 76 IN | TEMPERATURE: 97.2 F | DIASTOLIC BLOOD PRESSURE: 86 MMHG | HEART RATE: 71 BPM | WEIGHT: 315 LBS

## 2022-02-08 DIAGNOSIS — Z13.31 POSITIVE DEPRESSION SCREENING: ICD-10-CM

## 2022-02-08 DIAGNOSIS — M17.11 PRIMARY OSTEOARTHRITIS OF RIGHT KNEE: ICD-10-CM

## 2022-02-08 DIAGNOSIS — I10 ESSENTIAL HYPERTENSION: Primary | ICD-10-CM

## 2022-02-08 PROCEDURE — 99211 OFF/OP EST MAY X REQ PHY/QHP: CPT | Performed by: INTERNAL MEDICINE

## 2022-02-08 PROCEDURE — 99213 OFFICE O/P EST LOW 20 MIN: CPT | Performed by: INTERNAL MEDICINE

## 2022-02-08 PROCEDURE — G8427 DOCREV CUR MEDS BY ELIG CLIN: HCPCS | Performed by: INTERNAL MEDICINE

## 2022-02-08 PROCEDURE — G8482 FLU IMMUNIZE ORDER/ADMIN: HCPCS | Performed by: INTERNAL MEDICINE

## 2022-02-08 PROCEDURE — G8417 CALC BMI ABV UP PARAM F/U: HCPCS | Performed by: INTERNAL MEDICINE

## 2022-02-08 PROCEDURE — 1036F TOBACCO NON-USER: CPT | Performed by: INTERNAL MEDICINE

## 2022-02-08 RX ORDER — LISINOPRIL 40 MG/1
40 TABLET ORAL DAILY
Qty: 90 TABLET | Refills: 1 | Status: SHIPPED | OUTPATIENT
Start: 2022-02-08 | End: 2022-08-23 | Stop reason: SDUPTHER

## 2022-02-08 RX ORDER — AMLODIPINE BESYLATE 10 MG/1
10 TABLET ORAL DAILY
Qty: 90 TABLET | Refills: 1 | Status: SHIPPED | OUTPATIENT
Start: 2022-02-08 | End: 2022-08-23 | Stop reason: SDUPTHER

## 2022-02-08 RX ORDER — LISINOPRIL 40 MG/1
40 TABLET ORAL DAILY
COMMUNITY
Start: 2022-01-23 | End: 2022-02-08 | Stop reason: SDUPTHER

## 2022-02-08 ASSESSMENT — PATIENT HEALTH QUESTIONNAIRE - PHQ9
2. FEELING DOWN, DEPRESSED OR HOPELESS: 0
SUM OF ALL RESPONSES TO PHQ9 QUESTIONS 1 & 2: 0
6. FEELING BAD ABOUT YOURSELF - OR THAT YOU ARE A FAILURE OR HAVE LET YOURSELF OR YOUR FAMILY DOWN: 0
5. POOR APPETITE OR OVEREATING: 0
8. MOVING OR SPEAKING SO SLOWLY THAT OTHER PEOPLE COULD HAVE NOTICED. OR THE OPPOSITE, BEING SO FIGETY OR RESTLESS THAT YOU HAVE BEEN MOVING AROUND A LOT MORE THAN USUAL: 0
4. FEELING TIRED OR HAVING LITTLE ENERGY: 0
SUM OF ALL RESPONSES TO PHQ QUESTIONS 1-9: 2
1. LITTLE INTEREST OR PLEASURE IN DOING THINGS: 0
7. TROUBLE CONCENTRATING ON THINGS, SUCH AS READING THE NEWSPAPER OR WATCHING TELEVISION: 0
9. THOUGHTS THAT YOU WOULD BE BETTER OFF DEAD, OR OF HURTING YOURSELF: 1
SUM OF ALL RESPONSES TO PHQ QUESTIONS 1-9: 1
3. TROUBLE FALLING OR STAYING ASLEEP: 1
SUM OF ALL RESPONSES TO PHQ QUESTIONS 1-9: 2
SUM OF ALL RESPONSES TO PHQ QUESTIONS 1-9: 2
10. IF YOU CHECKED OFF ANY PROBLEMS, HOW DIFFICULT HAVE THESE PROBLEMS MADE IT FOR YOU TO DO YOUR WORK, TAKE CARE OF THINGS AT HOME, OR GET ALONG WITH OTHER PEOPLE: 0

## 2022-02-08 ASSESSMENT — ENCOUNTER SYMPTOMS
BACK PAIN: 0
WHEEZING: 0
SHORTNESS OF BREATH: 0
COUGH: 0

## 2022-02-08 ASSESSMENT — COLUMBIA-SUICIDE SEVERITY RATING SCALE - C-SSRS
6. HAVE YOU EVER DONE ANYTHING, STARTED TO DO ANYTHING, OR PREPARED TO DO ANYTHING TO END YOUR LIFE?: NO
1. WITHIN THE PAST MONTH, HAVE YOU WISHED YOU WERE DEAD OR WISHED YOU COULD GO TO SLEEP AND NOT WAKE UP?: NO
2. HAVE YOU ACTUALLY HAD ANY THOUGHTS OF KILLING YOURSELF?: NO

## 2022-02-08 NOTE — PROGRESS NOTES
PHQ-9 score today: (PHQ-9 Total Score: 2), additional evaluation and assessment performed, follow-up plan includes but not limited to: Medication management and Referral to /Specialist  for evaluation and management.

## 2022-02-08 NOTE — PROGRESS NOTES
[facility]    Progress Note    Date of patient's visit: 2/8/2022    Patient's Name:  Billie Mcfadden    YOB: 1980            Patient Care Team:  Shilpi Gonzalez MD as PCP - General (Internal Medicine)  Shilpi Gonzalez MD as PCP - [facility]  Ana James MD as Surgeon (Orthopedic Surgery)  Kian Donaldson DPM as Physician (Podiatry)  Majo Ruiz DPM as Physician (Podiatry)    REASON FOR VISIT: Routine outpatient follow     Chief Complaint   Patient presents with    Follow-up    Hypertension     medications taken today    Knee Pain     right knee pain -- has been going to therapy and feels it is helping   [address]     covid booster getting on 2/12         HISTORY OF PRESENT ILLNESS:    History was obtained from the patient. Billie Mcfadden is a 38 y.o. is here for follow-up. Continues to struggle with his knee pain. He has seen orthopedics. He was provided some Norco to take as needed. He does not take too much ibuprofen as he is worried about overdosing. Same thing with Jason Brush.  He says he takes his medications maybe once a week. Advised he can take his ibuprofen once a day with food and to avoid taking it if he has GERD symptoms. And take Norco also as needed once a day for severe pain. Continue with physical therapy and weight loss. Continue icing the knee. Also will add some Voltaren gel to apply topically for his knee pain. Blood pressure is better controlled since lisinopril was increased. We will send refills for all his medications. Labs were reviewed with patient. He has a long history of depression. He follows up with a psychiatrist and a counselor at [facility]. He says he has another appointment tomorrow. His depression screen score was low but his CSS R was positive.   His did say he sometimes feel he would be better off dead but has no active suicidal plans or thoughts of suicide. He just feels stressed out due to family demands on his time. He says he has a good counselor that he follows up with. He is following up with bariatric clinic. He has lost 10 pounds. Currently has quit his job so he can focus on his diet and exercise and weight loss. Right knee xray     FINDINGS:   Mild to moderate tricompartmental arthritic changes are noted with slight   diffuse marginal spurring.  No acute fracture, dislocation or effusion is   noted.           Impression   Mild to moderate tricompartmental arthritic changes without acute fracture,   dislocation or effusion.             Past Medical History:   Diagnosis Date    Anxiety     Arthritis     CAD (coronary artery disease)     Fatty liver     Fatty liver disease, nonalcoholic 5/64/8823    Hypertension     Obesity     Unspecified sleep apnea     cpap       History reviewed. No pertinent surgical history.       ALLERGIES    No Known Allergies    MEDICATIONS:      Current Outpatient Medications on File Prior to Visit   Medication Sig Dispense Refill    lisinopril (PRINIVIL;ZESTRIL) 40 MG tablet Take 40 mg by mouth daily      ACETAMINOPHEN EXTRA STRENGTH 500 MG tablet take 1 tablet by mouth twice a day AS NEEDED FOR PAIN 60 tablet 5    amLODIPine (NORVASC) 10 MG tablet Take 1 tablet by mouth daily 30 tablet 3    metoprolol tartrate (LOPRESSOR) 25 MG tablet 1 tablet 2/day 180 tablet 0    fluticasone (FLONASE) 50 MCG/ACT nasal spray 1 spray by Nasal route daily 16 g 3    ibuprofen (ADVIL;MOTRIN) 800 MG tablet Take 1 tablet by mouth every 8 hours as needed for Pain 30 tablet 1    albuterol sulfate  (90 Base) MCG/ACT inhaler inhale 2 puffs by mouth and INTO THE LUNGS every 6 hours if needed for wheezing 18 g 3    vitamin D (ERGOCALCIFEROL) 1.25 MG (13341 UT) CAPS capsule Take 1 capsule by mouth once a week      FLUoxetine (PROZAC) 20 MG capsule Take 20 mg by mouth daily      melatonin 10 MG CAPS capsule Take 1 capsule by mouth nightly 30 capsule 0    valACYclovir (VALTREX) 1 g tablet       ARIPiprazole (ABILIFY) 10 MG tablet Take 10 mg by mouth daily      [DISCONTINUED] metoprolol tartrate (LOPRESSOR) 25 MG tablet Take 2/day 180 tablet 1     No current facility-administered medications on file prior to visit. HISTORY    Reviewed and no change from previous record. Osiel Bai  reports that he has never smoked. He has never used smokeless tobacco.    FAMILY HISTORY:    Reviewed and No change from previous visit    HEALTH MAINTENANCE DUE:      Health Maintenance Due   Topic Date Due    Depression Monitoring  Never done    COVID-19 Vaccine (3 - Booster for Moderna series) 11/12/2021       REVIEW OF SYSTEMS:    12 point review of symptoms completed and found to be normal except noted in the HPI    Review of Systems   Constitutional: Negative for fatigue and unexpected weight change. Respiratory: Negative for cough, shortness of breath and wheezing. Cardiovascular: Negative for chest pain, palpitations and leg swelling. Musculoskeletal: Positive for arthralgias and gait problem. Negative for back pain. Neurological: Negative for dizziness, weakness and headaches. Psychiatric/Behavioral: Positive for dysphoric mood and sleep disturbance. Negative for confusion, decreased concentration, self-injury and suicidal ideas. The patient is not nervous/anxious. PHYSICAL EXAM:     Vitals:    02/08/22 1400   BP: 123/86   Pulse: 71   Temp: 97.2 °F (36.2 °C)   TempSrc: Temporal   Weight: (!) 358 lb (162.4 kg)   Height: 6' 4\" (1.93 m)     Body mass index is 43.58 kg/m². BP Readings from Last 3 Encounters:   02/08/22 123/86   01/25/22 126/74   01/18/22 120/76        Wt Readings from Last 3 Encounters:   02/08/22 (!) 358 lb (162.4 kg)   01/25/22 (!) 360 lb (163.3 kg)   01/18/22 (!) 363 lb (164.7 kg)       Physical Exam  Vitals and nursing note reviewed. Constitutional:       Appearance: Normal appearance. He is obese. HENT:      Head: Normocephalic and atraumatic. Eyes:      Extraocular Movements: Extraocular movements intact. Conjunctiva/sclera: Conjunctivae normal.      Pupils: Pupils are equal, round, and reactive to light. Cardiovascular:      Rate and Rhythm: Normal rate and regular rhythm. Heart sounds: No murmur heard. Pulmonary:      Effort: Pulmonary effort is normal.      Breath sounds: Normal breath sounds. No wheezing. Musculoskeletal:      Cervical back: Normal range of motion and neck supple. Right lower leg: No edema. Left lower leg: No edema. Skin:     General: Skin is warm and dry. Neurological:      General: No focal deficit present. Mental Status: He is alert and oriented to person, place, and time. LABORATORY FINDINGS:    CBC:  Lab Results   Component Value Date    WBC 6.6 01/05/2022    HGB 14.2 01/05/2022     01/05/2022     05/08/2012     BMP:    Lab Results   Component Value Date     01/10/2022    K 4.3 01/10/2022     01/10/2022    CO2 27 01/10/2022    BUN 15 01/10/2022    CREATININE 0.96 01/10/2022    GLUCOSE 87 01/10/2022    GLUCOSE 83 05/08/2012     HEMOGLOBIN A1C:   Lab Results   Component Value Date    LABA1C 5.5 01/05/2022     MICROALBUMIN URINE: No results found for: MICROALBUR  FASTING LIPID PANEL:  Lab Results   Component Value Date    CHOL 170 01/05/2022    HDL 38 (L) 01/05/2022    TRIG 154 (H) 01/05/2022     Lab Results   Component Value Date    LDLCHOLESTEROL 101 01/05/2022       LIVER PROFILE:  Lab Results   Component Value Date    ALT 9 01/05/2022    AST 11 01/05/2022    PROT 7.6 01/05/2022    BILITOT 0.51 01/05/2022    BILIDIR 0.09 04/04/2018    LABALBU 3.9 01/05/2022      THYROID FUNCTION:   Lab Results   Component Value Date    TSH 2.52 01/05/2022      URINEANALYSIS: No results found for: LABURIN  ASSESSMENT AND PLAN:    1. Essential hypertension    - lisinopril (PRINIVIL;ZESTRIL) 40 MG tablet;  Take 1 tablet by mouth daily  Dispense: 90 tablet; Refill: 1  - amLODIPine (NORVASC) 10 MG tablet; Take 1 tablet by mouth daily  Dispense: 90 tablet; Refill: 1  - metoprolol tartrate (LOPRESSOR) 25 MG tablet; 1 tablet 2/day  Dispense: 180 tablet; Refill: 0    2. Primary osteoarthritis of right knee    - diclofenac sodium (VOLTAREN) 1 % GEL; Apply 4 g topically 4 times daily  Dispense: 50 g; Refill: 2    3. Positive depression screening  Continue follow up with Psychiatry and counselor          FOLLOW UP AND INSTRUCTIONS:   Return in about 3 months (around 5/8/2022). Depree received counseling on the following healthy behaviors: nutrition, exercise and medication adherence    Reviewed prior labs and health maintenance. Discussed use, benefit, and side effects of prescribed medications. Barriers to medication compliance addressed. All patient questions answered. Pt voiced understanding.      Patient given educational materials - see patient instructions    Jaja Cespedes  Attending Physician, [address] [place] [address], Internal Medicine Residency Program  [address] [place] [address]  2/8/2022, 2:29 PM

## 2022-02-08 NOTE — PATIENT INSTRUCTIONS
Return To Clinic in May for 3 month follow up. Patient was added to wait list. Patient will be contacted by office to schedule upcoming appointment with the physician. After Visit Summary given and reviewed. The medication list included in this document is our record of what you are currently taking, including any changes that were made at today's visit. If you find any differences when compared to your medications at home, or have any questions that were not answered at your visit, please contact the office. It is very important for your care that you keep your appointment. If for some reason you are unable to keep your appointment, it is equally important that you call our office at [phone] to cancel your appointment and reschedule. Failure to do so may result in your termination from our practice. Medications have been e-scribed to pharmacy of patients choice.      -COLT Aaron

## 2022-02-09 ENCOUNTER — HOSPITAL ENCOUNTER (OUTPATIENT)
Dept: PHYSICAL THERAPY | Age: 42
Setting detail: THERAPIES SERIES
Discharge: HOME | End: 2022-02-09
Payer: MEDICAID

## 2022-02-09 PROCEDURE — 97110 THERAPEUTIC EXERCISES: CPT

## 2022-02-09 PROCEDURE — 97016 VASOPNEUMATIC DEVICE THERAPY: CPT

## 2022-02-09 NOTE — FLOWSHEET NOTE
[x] [facility]  [address]  [phone]  F: [phone]         Physical Therapy Daily Treatment Note    Date:  2022  Patient Name:  Graham Rodgers    :  1980  MRN: [id number]   Physician: Javier Ortega MD                                 Insurance: [employer] (Limit 30 Rx)  Medical Diagnosis: Osteoarthritis of right knee M17.11                    Rehab Codes: M25.561, M25.661, R26.89, M62.551  Onset date: 2021                       Next Dr's appt. : 2022 ortho, 2022 PCP    Visit# / total visits: ; Recheck for STG due at visit #10     Cancels/No Shows:     Subjective:    Pain:  [x] Yes  [] No Location:  R knee Pain Rating: (0-10 scale) 8/10 inferior patella   Pain altered Tx:  [x] No  [] Yes  Action:  Comments:  Pt reports knee is sore, has been sore last several days, has had to shovel a lot of snow for family members. Pt reports he missed last appt due to cab not coming. Pt reports feels knee is improving overall. Pt reports job called wants him to come back. Dr ordered gel to help w/pain to put on knee at night, Pt plans to  tomorrow. Objective:  Modalities: Vasocompression x 15 mins 36°  Medium compression, w/elevation, post exercises.   Precautions:   Exercises:  Exercise   R knee Reps/ Time Weight/ Level Comments   SciFit  6 min ML3.0            Standing      Calf stretch  5x 20sec On slant board, progressed reps    HR  20x 2 lbs    R LE 3 way hip  15x ea 2 lbs OKC, CKC    HS curls 15x 2 lbs  OKC, CKC   March  15x  2 lbs    Lunges  12x  Ant, vc for tech; add lat soon         Sitting         Hip add sets  15x 5 sec Yellow ball   HS stretch 5x20\"  Progressed reps    LAQ 15x 2 lbs W/ball          Supine      Quad sets 15x5\"  W/ball   Glut sets  10x 5sec    SAQ 2x10 2 lbs W/ball   SLR  2x10 A     Bridging 2x10  w/ball         Patellar Mobs 15x ea  All planes, 1st inf mob painful, mod deficits all planes, med/lat improve w/mobs, inf/sup cont to be limited            Slide lying      Hip abd 2x10 A    Clam shells 2x10  blue          Quadriped         hip ext      Add soon   Other:          Treatment Charges: Facundo Lopez Units   []  Modalities     [x]  Ther Exercise 39 3   [x]  Manual Therapy 3 -   []  Ther Activities     []  Aquatics     [x]  Vasocompression 15 1   []  Other     Total Treatment time  56 min        Assessment: [x] Progressing toward goals. Pt reports he likes calf stretches and HS stretches, progressed reps with good tolerance. Note crepitus w/first few reps inf/sup patellar mobs, Pt w/pain first inf mob, decreases for remaining reps. Cont game ready at end of Rx to decrease pain, edema. [x] No change. Held addition of lat lunges and quadriped ex due to increased pain. [] Other:  [x] Patient would continue to benefit from skilled physical therapy services in order to:  decrease pain R knee, increase R knee ext ROM, increase R LE strength, and improve tolerance to standing, walking and working     STG: (to be met in 10 treatments)  1. ? Pain: R knee 5/10 average pain, 7/10 at worst-Progress Toward   2. ? ROM: R knee ext 8°  3. ? Strength: R knee flex 4/5, ext 4-/5, R hip 4-/5  4. ? Function: LEFS 48% loss of LE function   5. Pt report improved tolerance to walking, standing  6. Patient to be independent with home exercise program as demonstrated by performance with correct form without cues.     LTG: (to be met in 16 treatments)  1. ? Pain: R knee 3/10 average pain, 5/10 at worst  2. ? ROM: R knee ext 5°  3. ? Strength: R knee flex 4+/5, ext 4/5, R hip 4/5  4. ? Function: LEFS 34% loss of LE function   5. Pt able to do step-over step and work normally without pain                    Patient goals: able to work full night without pain    Pt.  Education:  [x] Yes  [] No  [] Reviewed Prior HEP/Ed  Method of Education: [x] Verbal  [] Demo  [] Written  Comprehension of Education:  [x] Verbalizes understanding-purpose, correct technique new ex  [x] Demonstrates understanding. [] Needs review. [x] Demonstrates/verbalizes HEP/Ed previously given. Plan: [x] Continue current frequency toward long and short term goals. [x] Specific Instructions for subsequent treatments:  Progress quad, gluteal, HS strengthening as able; work up to steps per Pt tolerance, add lat lunges soon recheck goals, Pt complete LEFS  2x wk for 16 sessions.        Time In:   6137      Time Out: 2192    Electronically signed by:  Vanessa Griffiths, PT

## 2022-02-10 ENCOUNTER — OFFICE VISIT (OUTPATIENT)
Dept: BARIATRICS/WEIGHT MGMT | Age: 42
End: 2022-02-10
Payer: MEDICAID

## 2022-02-10 VITALS
WEIGHT: 315 LBS | DIASTOLIC BLOOD PRESSURE: 88 MMHG | BODY MASS INDEX: 38.36 KG/M2 | HEART RATE: 65 BPM | SYSTOLIC BLOOD PRESSURE: 144 MMHG | HEIGHT: 76 IN

## 2022-02-10 DIAGNOSIS — J45.20 MILD INTERMITTENT ASTHMA WITHOUT COMPLICATION: ICD-10-CM

## 2022-02-10 DIAGNOSIS — E66.01 OBESITY, CLASS III, BMI 40-49.9 (MORBID OBESITY) (HCC): ICD-10-CM

## 2022-02-10 DIAGNOSIS — G47.33 OSA ON CPAP: ICD-10-CM

## 2022-02-10 DIAGNOSIS — I10 ESSENTIAL HYPERTENSION: Primary | ICD-10-CM

## 2022-02-10 DIAGNOSIS — G89.29 CHRONIC PAIN OF RIGHT KNEE: ICD-10-CM

## 2022-02-10 DIAGNOSIS — Z99.89 OSA ON CPAP: ICD-10-CM

## 2022-02-10 DIAGNOSIS — M25.561 CHRONIC PAIN OF RIGHT KNEE: ICD-10-CM

## 2022-02-10 PROCEDURE — G8482 FLU IMMUNIZE ORDER/ADMIN: HCPCS | Performed by: NURSE PRACTITIONER

## 2022-02-10 PROCEDURE — G8417 CALC BMI ABV UP PARAM F/U: HCPCS | Performed by: NURSE PRACTITIONER

## 2022-02-10 PROCEDURE — 1036F TOBACCO NON-USER: CPT | Performed by: NURSE PRACTITIONER

## 2022-02-10 PROCEDURE — 99213 OFFICE O/P EST LOW 20 MIN: CPT | Performed by: NURSE PRACTITIONER

## 2022-02-10 PROCEDURE — G8427 DOCREV CUR MEDS BY ELIG CLIN: HCPCS | Performed by: NURSE PRACTITIONER

## 2022-02-10 NOTE — PROGRESS NOTES
Group Lifestyle Balance Follow-up Progress Note      Subjective     Patient is here for group medical appointment for Group Lifestyle Balance weight management program follow-up for the chronic conditions of MIGEL, HTN, Asthma, Chronic Pain Right Knee. Patient continues on the program and tolerating well. Total weight loss to date is 16 lbs. No current issues. Allergies:  No Known Allergies    Past Medical History:     Past Medical History:   Diagnosis Date    Anxiety     Arthritis     CAD (coronary artery disease)     Fatty liver     Fatty liver disease, nonalcoholic 0/35/4158    Hypertension     Obesity     Unspecified sleep apnea     cpap   . Past Surgical History:  History reviewed. No pertinent surgical history. Family History:  Family History   Problem Relation Age of Onset    Arthritis Mother     Diabetes Father     Arthritis Father     Stroke Maternal Grandmother     Heart Disease Maternal Grandmother     Stroke Maternal Grandfather     Heart Disease Maternal Grandfather     Early Death Maternal Grandfather     Stroke Paternal Grandmother     Heart Disease Paternal Grandmother     Stroke Paternal Grandfather     Heart Disease Paternal Grandfather     Early Death Paternal Grandfather        Social History:  Social History     Socioeconomic History    Marital status: Single     Spouse name: Latanya Purdy Number of children: 0    Years of education: 12    Highest education level:  Bachelor's degree (e.g., BA, AB, BS)   Occupational History    Not on file   Tobacco Use    Smoking status: Never Smoker    Smokeless tobacco: Never Used   Vaping Use    Vaping Use: Never used   Substance and Sexual Activity    Alcohol use: No    Drug use: No    Sexual activity: Yes     Partners: Female   Other Topics Concern    Not on file   Social History Narrative    ** Merged History Encounter **          Social Determinants of Health     Financial Resource Strain: Low Risk     Difficulty of Paying Living Expenses: Not very hard   Food Insecurity: No Food Insecurity    Worried About Running Out of Food in the Last Year: Never true    Ran Out of Food in the Last Year: Never true   Transportation Needs:     Lack of Transportation (Medical): Not on file    Lack of Transportation (Non-Medical):  Not on file   Physical Activity:     Days of Exercise per Week: Not on file    Minutes of Exercise per Session: Not on file   Stress:     Feeling of Stress : Not on file   Social Connections:     Frequency of Communication with Friends and Family: Not on file    Frequency of Social Gatherings with Friends and Family: Not on file    Attends Scientologist Services: Not on file    Active Member of [address] [place] [address] [place] or Organizations: Not on file    Attends Club or Organization Meetings: Not on file    Marital Status: Not on file   Intimate Partner Violence:     Fear of Current or Ex-Partner: Not on file    Emotionally Abused: Not on file    Physically Abused: Not on file    Sexually Abused: Not on file   Housing Stability:     Unable to Pay for Housing in the Last Year: Not on file    Number of [place] in the Last Year: Not on file    Unstable Housing in the Last Year: Not on file       Current Medications:  Current Outpatient Medications   Medication Sig Dispense Refill    diclofenac sodium (VOLTAREN) 1 % GEL Apply 4 g topically 4 times daily 50 g 2    lisinopril (PRINIVIL;ZESTRIL) 40 MG tablet Take 1 tablet by mouth daily 90 tablet 1    amLODIPine (NORVASC) 10 MG tablet Take 1 tablet by mouth daily 90 tablet 1    metoprolol tartrate (LOPRESSOR) 25 MG tablet 1 tablet 2/day 180 tablet 0    ACETAMINOPHEN EXTRA STRENGTH 500 MG tablet take 1 tablet by mouth twice a day AS NEEDED FOR PAIN 60 tablet 5    fluticasone (FLONASE) 50 MCG/ACT nasal spray 1 spray by Nasal route daily 16 g 3    ibuprofen (ADVIL;MOTRIN) 800 MG tablet Take 1 tablet by mouth every 8 hours as needed for Pain 30 tablet 1    albuterol sulfate  (90 Base) MCG/ACT inhaler inhale 2 puffs by mouth and INTO THE LUNGS every 6 hours if needed for wheezing 18 g 3    vitamin D (ERGOCALCIFEROL) 1.25 MG (85040 UT) CAPS capsule Take 1 capsule by mouth once a week      melatonin 10 MG CAPS capsule Take 1 capsule by mouth nightly 30 capsule 0    valACYclovir (VALTREX) 1 g tablet        No current facility-administered medications for this visit. Vital Signs:  BP (!) 144/88 (Site: Right Upper Arm, Position: Sitting, Cuff Size: Large Adult)   Pulse 65   Ht 6' 4\" (1.93 m)   Wt (!) 354 lb (160.6 kg)   BMI 43.09 kg/m²     BMI/Height/Weight:  Body mass index is 43.09 kg/m². Review of Systems  Constitutional: Weight loss      Objective:      Physical Exam   Vital signs reviewed. General Appearance: Well-developed and well-nourished. No acute distress. Skin: Warm, dry. Head: Normocephalic. Pulmonary/Chest: Normal respiratory effort. Musculoskeletal: Movement x4. Neurological:  Alert and oriented. Individual goal for this encounter:      X ? Vital signs reviewed and discussed with patient. ? Labs/EKG reviewed and discussed with patient. ? Blood sugar log reviewed and discussed with patient. ? Physical activity discussed. ? Medication changes recommended. ? Smoking cessation discussed. X ? Specific questions/concerns addressed. Specific group medical question(s) addressed in this encounter:  Discussion about stress reduction. Group discussion topic for this encounter:     ? Zunilda Gonsalez   ? Be a Fat & Calorie    ? Healthy Eating   ? Move Those Muscles   ? Tip the Calorie Balance   ? Take charge of What's Around You   ? Problem Solving  X ? Step Up Your Physical Activity Plan   ? Manage Slips and Self-Defeating Thoughts   ? [address]   ? Make Social Cues Work for Fiona Camarillo   ? Ways to Stay Motivated   ? Preparing for Long Term Self-Management   ?  Take Charge of Your Lifestyle ? Mindful Eating, Mindful Movement   ? Manage Your Stress   ? Sit Less for Health   ? More Volume, Fewer Calories   ? Stay Active   ? Balance Your Thoughts   ? Heart Health    ? Looking Back and Looking Forward    Total time:  90 minutes, greater than 50% of visit spent in group counseling/education. Assessment:       Diagnosis Orders   1. Essential hypertension     2. MIGEL on CPAP     3. Obesity, Class III, BMI 40-49.9 (morbid obesity) ([facility] [address]     4. Mild intermittent asthma without complication     5. Chronic pain of right knee         Plan:      Track food and weight. Return to clinic as per group medical appointment schedule. Follow-up:  Return in about 1 week (around 2/17/2022). Orders:  No orders of the defined types were placed in this encounter. Prescriptions:  No orders of the defined types were placed in this encounter.       Electronically signed by:  Joselito Nichole CNP

## 2022-02-16 ENCOUNTER — HOSPITAL ENCOUNTER (OUTPATIENT)
Dept: PHYSICAL THERAPY | Age: 42
Setting detail: THERAPIES SERIES
Discharge: HOME | End: 2022-02-16
Payer: MEDICAID

## 2022-02-16 PROCEDURE — 97016 VASOPNEUMATIC DEVICE THERAPY: CPT

## 2022-02-16 PROCEDURE — 97110 THERAPEUTIC EXERCISES: CPT

## 2022-02-16 NOTE — FLOWSHEET NOTE
[x] [facility]  [address]  [phone]  F: [phone]         Physical Therapy Daily Treatment Note    Date:  2022  Patient Name:  Thony Dietrich    :  1980  MRN: [id number]   Physician: Maxim Rivera MD                                 Insurance: [facility] (Limit 30 Rx)  Medical Diagnosis: Osteoarthritis of right knee M17.11                    Rehab Codes: M25.561, M25.661, R26.89, M62.551  Onset date: 2021                       Next Dr's appt. : 2022 ortho, 2022 PCP    Visit# / total visits: 10/16; Recheck for STG due at visit #10      Cancels/No Shows:     Subjective:    Pain:  [x] Yes  [] No Location:  R knee Pain Rating: (0-10 scale) 8/10  proximal and inferior knee  Pain altered Tx:  [x] No  [] Yes  Action:  Comments:    Reports he fell on ice , knee pain has been worse ever since. Icing and addressing ROM. Has medicated lotion as well but reports this is not helpful. Objective:  Modalities: Vasocompression x 15 mins 36°  Medium compression, w/elevation, post exercises. Precautions:   Exercises:  Exercise   R knee Reps/ Time Weight/ Level Comments   SciFit  6 min ML3.0            Standing      Calf stretch  5x 20sec On slant board,     HR  20x 2 lbs    R LE 3 way hip  15x ea 2 lbs OKC, CKC    HS curls 15x 2 lbs  OKC, CKC   March    2 lbs Restart next session   Lunges    Ant, vc for tech; add lat soon, restart ant next session.    TG squats   add   Step downs   add         Sitting         Hip add sets  15x 5 sec Yellow ball   HS stretch 5x20\"  Progressed reps    LAQ 15x 2 lbs W/ball          Supine      Quad sets 15x5\"  W/ball   Glut sets  10x 5sec    SAQ 2x10 2 lbs W/ball, manual assist for patellar tracking correction due to pain and lateral tracking    SLR  2x10 A     SLR w/ ER 2x10  Added                           Bridging 2x10  w/ball         Patellar Mobs 15x ea  All planes, 1st inf mob painful, mod deficits all planes, med/lat improve w/mobs, inf/sup cont to be limited            Slide lying      Hip abd 2x10 A    Clam shells 2x10  blue    Hip adduction 2x10  Added 2/16   Quadriped         hip ext      Add soon   Other:   Held some exercises due to pain and also due to time needed for reassessement  2/16/22:  R hip fleixon/abd: 5/5, knee flexion,ext 4+/5        Treatment Charges: Nolan Spangler Mins Units   []  Modalities     [x]  Ther Exercise 38  3   [x]  Manual Therapy   3 -   []  Ther Activities     []  Aquatics     [x]  Vasocompression 15 1   []  Other     Total Treatment time   56 4       Assessment: [x] Progressing toward goals Added hip adduction, External hip rotation with SLR for VMO focus. Slowly progressing with patellar superior/inferior gliding with manual mobs. [] No change. [x] Other: Manual assist for patellar tracking correction during SAQ due to pain due to lateral tracking. Held addition  of step ups/downs and lateral lunges due to pain level/pt complaints will trial addition next session. [x] Patient would continue to benefit from skilled physical therapy services in order to:  decrease pain R knee, increase R knee ext ROM, increase R LE strength, and improve tolerance to standing, walking and working     STG: (to be met in 10 treatments)  1. ? Pain: R knee 5/10 average pain, 7/10 at worst-Progress Toward   2. ? ROM: R knee ext 8° 2/16/22: 4°  3. ? Strength: R knee flex 4/5, ext 4-/5, R hip 4-/5: 2/16/22: MET  4. ? Function: LEFS 48% loss of LE function 2/16/22: 46% loss of function  5. Pt report improved tolerance to walking, standing 2/16/22; progressing (prior to 2/13/22 fall)  6. Patient to be independent with home exercise program as demonstrated by performance with correct form without cues.  2/16/22: MET     LTG: (to be met in 16 treatments)  1. ? Pain: R knee 3/10 average pain, 5/10 at worst  2. ? ROM: R knee ext 5°  3. ? Strength: R knee flex 4+/5, ext 4/5, R hip 4/5  4. ? Function: LEFS 34% loss of LE function   5. Pt able to do step-over step and work normally without pain                    Patient goals: able to work full night without pain    Pt. Education:  [x] Yes  [] No  [x] Reviewed Prior HEP/Ed  Method of Education: [x] Verbal  [] Demo  [x] Written  Comprehension of Education:  [x] Verbalizes understanding-purpose, correct technique new ex  [x] Demonstrates understanding. [] Needs review. [x] Demonstrates/verbalizes HEP/Ed previously given. 2/16/22: FALL PREVENTION HANDOUT AND WINTER FALL PREVENTION HO. Plan: [x] Continue current frequency toward long and short term goals. [x] Specific Instructions for subsequent treatments:  Progress quad, gluteal, HS strengthening as able; work up to steps per Pt tolerance, add lat lunges   2x wk for 16 sessions.        Time In:   1000      Time Out: 1108    Electronically signed by:  Yee Arita PTA

## 2022-02-17 ENCOUNTER — OFFICE VISIT (OUTPATIENT)
Dept: BARIATRICS/WEIGHT MGMT | Age: 42
End: 2022-02-17
Payer: MEDICAID

## 2022-02-17 VITALS
BODY MASS INDEX: 38.36 KG/M2 | DIASTOLIC BLOOD PRESSURE: 76 MMHG | SYSTOLIC BLOOD PRESSURE: 138 MMHG | HEART RATE: 84 BPM | HEIGHT: 76 IN | WEIGHT: 315 LBS

## 2022-02-17 DIAGNOSIS — I10 ESSENTIAL HYPERTENSION: Primary | ICD-10-CM

## 2022-02-17 DIAGNOSIS — E66.01 OBESITY, CLASS III, BMI 40-49.9 (MORBID OBESITY) (HCC): ICD-10-CM

## 2022-02-17 DIAGNOSIS — M25.561 CHRONIC PAIN OF RIGHT KNEE: ICD-10-CM

## 2022-02-17 DIAGNOSIS — G47.33 OSA ON CPAP: ICD-10-CM

## 2022-02-17 DIAGNOSIS — G89.29 CHRONIC PAIN OF RIGHT KNEE: ICD-10-CM

## 2022-02-17 DIAGNOSIS — Z99.89 OSA ON CPAP: ICD-10-CM

## 2022-02-17 PROCEDURE — G8482 FLU IMMUNIZE ORDER/ADMIN: HCPCS | Performed by: NURSE PRACTITIONER

## 2022-02-17 PROCEDURE — 1036F TOBACCO NON-USER: CPT | Performed by: NURSE PRACTITIONER

## 2022-02-17 PROCEDURE — G8417 CALC BMI ABV UP PARAM F/U: HCPCS | Performed by: NURSE PRACTITIONER

## 2022-02-17 PROCEDURE — G8427 DOCREV CUR MEDS BY ELIG CLIN: HCPCS | Performed by: NURSE PRACTITIONER

## 2022-02-17 PROCEDURE — 99213 OFFICE O/P EST LOW 20 MIN: CPT | Performed by: NURSE PRACTITIONER

## 2022-02-17 NOTE — PROGRESS NOTES
Group Lifestyle Balance Follow-up Progress Note      Subjective     Patient is here for group medical appointment for Group Lifestyle Balance weight management program follow-up for the chronic conditions of MIGEL, HTN, Asthma, Chronic Pain Right Knee. Patient continues on the program and tolerating well. Total weight loss to date is 19 lbs. No current issues. Allergies:  No Known Allergies    Past Medical History:     Past Medical History:   Diagnosis Date    Anxiety     Arthritis     CAD (coronary artery disease)     Fatty liver     Fatty liver disease, nonalcoholic 8/00/8697    Hypertension     Obesity     Unspecified sleep apnea     cpap   . Past Surgical History:  History reviewed. No pertinent surgical history. Family History:  Family History   Problem Relation Age of Onset    Arthritis Mother     Diabetes Father     Arthritis Father     Stroke Maternal Grandmother     Heart Disease Maternal Grandmother     Stroke Maternal Grandfather     Heart Disease Maternal Grandfather     Early Death Maternal Grandfather     Stroke Paternal Grandmother     Heart Disease Paternal Grandmother     Stroke Paternal Grandfather     Heart Disease Paternal Grandfather     Early Death Paternal Grandfather        Social History:  Social History     Socioeconomic History    Marital status: Single     Spouse name: Lynette Gloss Number of children: 0    Years of education: 12    Highest education level:  Bachelor's degree (e.g., BA, AB, BS)   Occupational History    Not on file   Tobacco Use    Smoking status: Never Smoker    Smokeless tobacco: Never Used   Vaping Use    Vaping Use: Never used   Substance and Sexual Activity    Alcohol use: No    Drug use: No    Sexual activity: Yes     Partners: Female   Other Topics Concern    Not on file   Social History Narrative    ** Merged History Encounter **          Social Determinants of Health     Financial Resource Strain: Low Risk     Difficulty of Paying Living Expenses: Not very hard   Food Insecurity: No Food Insecurity    Worried About Running Out of Food in the Last Year: Never true    Ran Out of Food in the Last Year: Never true   Transportation Needs:     Lack of Transportation (Medical): Not on file    Lack of Transportation (Non-Medical):  Not on file   Physical Activity:     Days of Exercise per Week: Not on file    Minutes of Exercise per Session: Not on file   Stress:     Feeling of Stress : Not on file   Social Connections:     Frequency of Communication with Friends and Family: Not on file    Frequency of Social Gatherings with Friends and Family: Not on file    Attends Tenriism Services: Not on file    Active Member of [address] [place] [address] [employer] or Organizations: Not on file    Attends Club or Organization Meetings: Not on file    Marital Status: Not on file   Intimate Partner Violence:     Fear of Current or Ex-Partner: Not on file    Emotionally Abused: Not on file    Physically Abused: Not on file    Sexually Abused: Not on file   Housing Stability:     Unable to Pay for Housing in the Last Year: Not on file    Number of [place] in the Last Year: Not on file    Unstable Housing in the Last Year: Not on file       Current Medications:  Current Outpatient Medications   Medication Sig Dispense Refill    diclofenac sodium (VOLTAREN) 1 % GEL Apply 4 g topically 4 times daily 50 g 2    lisinopril (PRINIVIL;ZESTRIL) 40 MG tablet Take 1 tablet by mouth daily 90 tablet 1    amLODIPine (NORVASC) 10 MG tablet Take 1 tablet by mouth daily 90 tablet 1    metoprolol tartrate (LOPRESSOR) 25 MG tablet 1 tablet 2/day 180 tablet 0    ACETAMINOPHEN EXTRA STRENGTH 500 MG tablet take 1 tablet by mouth twice a day AS NEEDED FOR PAIN 60 tablet 5    fluticasone (FLONASE) 50 MCG/ACT nasal spray 1 spray by Nasal route daily 16 g 3    ibuprofen (ADVIL;MOTRIN) 800 MG tablet Take 1 tablet by mouth every 8 hours as needed for Pain 30 tablet 1    albuterol sulfate  (90 Base) MCG/ACT inhaler inhale 2 puffs by mouth and INTO THE LUNGS every 6 hours if needed for wheezing 18 g 3    vitamin D (ERGOCALCIFEROL) 1.25 MG (86664 UT) CAPS capsule Take 1 capsule by mouth once a week      melatonin 10 MG CAPS capsule Take 1 capsule by mouth nightly 30 capsule 0    valACYclovir (VALTREX) 1 g tablet        No current facility-administered medications for this visit. Vital Signs:  /76 (Site: Right Upper Arm, Position: Sitting, Cuff Size: Large Adult)   Pulse 84   Ht 6' 4\" (1.93 m)   Wt (!) 351 lb (159.2 kg)   BMI 42.73 kg/m²     BMI/Height/Weight:  Body mass index is 42.73 kg/m². Review of Systems  Constitutional: Weight loss      Objective:      Physical Exam   Vital signs reviewed. General Appearance: Well-developed and well-nourished. No acute distress. Skin: Warm, dry. Head: Normocephalic. Pulmonary/Chest: Normal respiratory effort. Musculoskeletal: Movement x4. Neurological:  Alert and oriented. Individual goal for this encounter:      X ? Vital signs reviewed and discussed with patient. ? Labs/EKG reviewed and discussed with patient. ? Blood sugar log reviewed and discussed with patient. ? Physical activity discussed. ? Medication changes recommended. ? Smoking cessation discussed. X ? Specific questions/concerns addressed. Specific group medical question(s) addressed in this encounter:  Discussion about sleep apnea. Group discussion topic for this encounter:     ? Catarino Hamilton   ? Be a Fat & Calorie    ? Healthy Eating   ? Move Those Muscles   ? Tip the Calorie Balance   ? Take charge of What's Around You   ? Problem Solving   ? Step Up Your Physical Activity Plan  X ? Manage Slips and Self-Defeating Thoughts   ? [address]   ? Make Social Cues Work for Hola Shane   ? Ways to Stay Motivated   ? Preparing for Long Term Self-Management   ? Take Charge of Your Lifestyle   ?  Mindful Eating, Mindful Movement   ? Manage Your Stress   ? Sit Less for Health   ? More Volume, Fewer Calories   ? Stay Active   ? Balance Your Thoughts   ? Heart Health    ? Looking Back and Looking Forward    Total time:  90 minutes, greater than 50% of visit spent in group counseling/education. Assessment:       Diagnosis Orders   1. Essential hypertension     2. MIGEL on CPAP     3. Obesity, Class III, BMI 40-49.9 (morbid obesity) ([address]     4. Chronic pain of right knee         Plan:      Track food and weight. Return to clinic as per group medical appointment schedule. Follow-up:  Return in about 1 week (around 2/24/2022). Orders:  No orders of the defined types were placed in this encounter. Prescriptions:  No orders of the defined types were placed in this encounter.       Electronically signed by:  Farrukh Santizo CNP

## 2022-02-21 ENCOUNTER — HOSPITAL ENCOUNTER (OUTPATIENT)
Dept: PHYSICAL THERAPY | Age: 42
Setting detail: THERAPIES SERIES
Discharge: HOME | End: 2022-02-21
Payer: MEDICAID

## 2022-02-21 PROCEDURE — 97110 THERAPEUTIC EXERCISES: CPT

## 2022-02-21 PROCEDURE — 97140 MANUAL THERAPY 1/> REGIONS: CPT

## 2022-02-21 PROCEDURE — 97016 VASOPNEUMATIC DEVICE THERAPY: CPT

## 2022-02-21 NOTE — FLOWSHEET NOTE
[x] [facility]  [address]  [phone]  F: [phone]         Physical Therapy Daily Treatment Note    Date:  2022  Patient Name:  Teo Up    :  1980  MRN: [id number]   Physician: Meghna Liao MD                                 Insurance: [employer] (Limit 30 Rx)  Medical Diagnosis: Osteoarthritis of right knee M17.11                    Rehab Codes: M25.561, M25.661, R26.89, M62.551  Onset date: 2021                       Next Dr's appt. : 2022 ortho, 2022 PCP    Visit# / total visits: ; Recheck for STG due at visit #10      Cancels/No Shows:     Subjective:    Pain:  [x] Yes  [] No Location:  R knee Pain Rating: (0-10 scale) 6/10  proximal and inferior knee  Pain altered Tx:  [x] No  [] Yes  Action:  Comments:  Pt reports still has to be careful since slipped and fell on ice last week. Pt reports he can feel bone rubbing on outside of knee. Objective:  Modalities: Vasocompression R knee x 15 mins 36°  Medium compression, w/elevation, post exercises.   Precautions:   Exercises:  Exercise   R knee Reps/ Time Weight/ Level Comments   SciFit  6 min ML3.0            Standing      Calf stretch  5x 20sec On slant board,     HR  20x 2 lbs    R LE 3 way hip  15x ea 2 lbs OKC, CKC    HS curls 20x 2 lbs  OKC, CKC, progressed reps    15x 2 lbs Resumed    Lunges  10x  Ant, vc for tech; add lat soon, resumed ant     TG squats   add   Step downs   add         Sitting         Hip add sets  15x 5 sec Yellow ball   HS stretch 5x20\"     LAQ 20x 2 lbs W/ball, progressed reps          Supine      Quad sets 15x5\"  W/ball   Glut sets  10x 5sec    SAQ 2x10 2 lbs W/ball, manual assist for patellar tracking correction due to pain and lateral tracking 1st set , improved by 2nd set   SLR  2x10  A     SLR w/ ER 2x10                          Bridging 2x10  w/ball         Patellar Mobs 15x ea  All planes           Slide lying      Hip abd  A Held 2/21   Clam shells 2x10  blue    Hip adduction 2x10           Quadriped         hip ext      Add soon     2/16/22:  R hip fleixon/abd: 5/5, knee flexion,ext 4+/5      Kinesiotape: R knee 2 i-strips, 100% tension, lateral-medial, superior and inferior patella, V shape to prevent lateral glide from patella-added 2/21    Treatment Charges: Gwenepage Latch Mins Units   []  Modalities     [x]  Ther Exercise 39 2   [x]  Manual Therapy   9 1   []  Ther Activities     []  Aquatics     [x]  Vasocompression 15 1   []  Other     Total Treatment time   63 min 4       Assessment: [x] Progressing toward goals. Resumed prior ex. Cont manual assist for patellar tracking for 1st set of SAQ. Educated Pt add set during knee ex and hip add in sidelying strengthen medial knee and prevent lateral patellar glide. Trial kinesiotape R knee to decrease patellar glide, educated Pt in purpose, effects, precautions and removal of tape-Pt to monitor symptoms w/tape on. Educated Pt in option of brace w/lateral patellar support, instructed could use brace or taping as needed. Cont game ready at end of Rx to decrease pain and edema. [] No change. [x] Other: Cont Manual assist for patellar tracking correction during 1st set SAQ due to pain due to lateral tracking. Held addition  of step ups/downs and lateral lunges due to pain level/pt complaints will trial addition next session. [x] Patient would continue to benefit from skilled physical therapy services in order to:  decrease pain R knee, increase R knee ext ROM, increase R LE strength, and improve tolerance to standing, walking and working     STG: (to be met in 10 treatments)  1. ? Pain: R knee 5/10 average pain, 7/10 at worst-Progress Toward   2. ? ROM: R knee ext 8° 2/16/22: 4° Met  3. ? Strength: R knee flex 4/5, ext 4-/5, R hip 4-/5: 2/16/22: MET  4. ? Function: LEFS 48% loss of LE function 2/16/22: 46% loss of function  5.  Pt report improved tolerance to walking, standing 2/16/22; progressing (prior to 2/13/22 fall)  6. Patient to be independent with home exercise program as demonstrated by performance with correct form without cues. 2/16/22: MET     LTG: (to be met in 16 treatments)  1. ? Pain: R knee 3/10 average pain, 5/10 at worst  2. ? ROM: R knee ext 5°  3. ? Strength: R knee flex 4+/5, ext 4/5, R hip 4/5  4. ? Function: LEFS 34% loss of LE function   5. Pt able to do step-over step and work normally without pain                    Patient goals: able to work full night without pain    Pt. Education:  [x] Yes  [] No  [x] Reviewed Prior HEP/Ed  Method of Education: [x] Verbal  [] Demo  [x] Written  Comprehension of Education:  [x] Verbalizes understanding-purpose, correct technique new ex  [x] Demonstrates understanding. [] Needs review. [x] Demonstrates/verbalizes HEP/Ed previously given. 2/16/22: FALL PREVENTION HANDOUT AND WINTER FALL PREVENTION HO.   2/21 Kinesiotape to R knee to prevent lateral glide-purpose of, effects of , precautions w/and removal of. Plan: [x] Continue current frequency toward long and short term goals. [x] Specific Instructions for subsequent treatments:  Progress quad, gluteal, HS strengthening as able; work up to steps per Pt tolerance, add lat lunges   2x wk for 16 sessions.        Time In:  2119      Time Out: 1107    Electronically signed by:  Viry Craig, PT

## 2022-02-28 ENCOUNTER — HOSPITAL ENCOUNTER (OUTPATIENT)
Dept: PHYSICAL THERAPY | Age: 42
Setting detail: THERAPIES SERIES
Discharge: HOME | End: 2022-02-28
Payer: MEDICAID

## 2022-02-28 PROCEDURE — 97016 VASOPNEUMATIC DEVICE THERAPY: CPT

## 2022-02-28 PROCEDURE — 97110 THERAPEUTIC EXERCISES: CPT

## 2022-02-28 NOTE — FLOWSHEET NOTE
[x] [facility]  [address]  [phone]  F: [phone]         Physical Therapy Daily Treatment Note    Date:  2022  Patient Name:  Radha Medina    :  1980  MRN: [id number]   Physician: Cameron Tierney MD                                 Insurance: [employer] (Limit 30 Rx)  Medical Diagnosis: Osteoarthritis of right knee M17.11                    Rehab Codes: M25.561, M25.661, R26.89, M62.551  Onset date: 2021                       Next Dr's appt. : 2022 ortho, 2022 PCP    Visit# / total visits: ; Recheck for STG due at visit #10      Cancels/No Shows: 2/3    Subjective:  Pt arrives noting he is tired this date and most pain present at inferior patella of R knee. Reports no changes in sx's with application of k-tape. Pain:  [x] Yes  [] No Location:  R knee Pain Rating: (0-10 scale) 6/10  proximal and inferior knee  Pain altered Tx:  [x] No  [] Yes  Action:  Comments:     Objective:  Modalities: Vasocompression R knee x 15 mins 36°  Medium compression, w/elevation, post exercises.   Precautions:   Exercises:  Exercise   R knee Reps/ Time Weight/ Level Comments   SciFit  6 min ML3.0            Standing      Calf stretch  5x 20sec On slant board,     HR  20x 2 lbs    R LE 3 way hip  15x ea 2 lbs OKC, CKC    HS curls 20x 2 lbs  OKC, CKC, progressed reps   20x 2 lbs Resumed    Lunges bilat  10x ea  Ant, lat added    TG squats 10x2  Added    Step downs 10x2 4\" Added          Sitting         Hip add sets  15x 5 sec Yellow ball   HS stretch 5x20\"     LAQ 20x 2 lbs W/ball, progressed reps          Supine      Quad sets 15x5\"  W/ball   Glut sets  10x 5sec    SAQ 2x10 2 lbs W/ball, manual assist for patellar tracking correction due to pain and lateral tracking 1st set , improved by 2nd set - manual assist not needed this date d/t no pain 2/28   SLR  2x10  A     SLR w/ ER 2x10 Bridging 2x10  w/ball         Patellar Mobs 15x ea  All planes           Slde lying      Hip abd  A Held 2/21   Clam shells 2x10  blue    Hip adduction 2x10           Quadriped         hip ext      Add soon     2/16/22:  R hip flexion/abd: 5/5, knee flexion,ext 4+/5      Kinesiotape: R knee 2 i-strips, 100% tension, lateral-medial, superior and inferior patella, V shape to prevent lateral glide from patella-added 2/21 -Held 2/28    Treatment Charges: Alicia Martinez Mins Units   []  Modalities     [x]  Ther Exercise 41 3   [x]  Manual Therapy       []  Ther Activities     []  Aquatics     [x]  Vasocompression 15 1   []  Other     Total Treatment time 56 min 4       Assessment: [x] Progressing toward goals. Continued exercise program with addition of lateral lunges this date requiring cues and demo's to ensure proper positioning. Implemented step downs off 4\" step with cues to focus on controlling motion to improve quad strength. Added total gym squats this date with good tolerance. Able to complete SAQ's without pain, therefore, held manual assist for patellar tracking. Pt with ms soreness and fatigue post exercises. Ended with vaso to reduce pain and ms soreness. [] No change. [] Other:    [x] Patient would continue to benefit from skilled physical therapy services in order to:  decrease pain R knee, increase R knee ext ROM, increase R LE strength, and improve tolerance to standing, walking and working     STG: (to be met in 10 treatments)  1. ? Pain: R knee 5/10 average pain, 7/10 at worst-Progress Toward   2. ? ROM: R knee ext 8° 2/16/22: 4° Met  3. ? Strength: R knee flex 4/5, ext 4-/5, R hip 4-/5: 2/16/22: MET  4. ? Function: LEFS 48% loss of LE function 2/16/22: 46% loss of function  5. Pt report improved tolerance to walking, standing 2/16/22; progressing (prior to 2/13/22 fall)  6. Patient to be independent with home exercise program as demonstrated by performance with correct form without cues.  2/16/22:

## 2022-03-02 ENCOUNTER — HOSPITAL ENCOUNTER (OUTPATIENT)
Dept: PHYSICAL THERAPY | Age: 42
Setting detail: THERAPIES SERIES
Discharge: HOME | End: 2022-03-02
Payer: MEDICAID

## 2022-03-02 PROCEDURE — 97016 VASOPNEUMATIC DEVICE THERAPY: CPT

## 2022-03-02 PROCEDURE — 97110 THERAPEUTIC EXERCISES: CPT

## 2022-03-02 NOTE — FLOWSHEET NOTE
[x] [facility]  [address]  [phone]  F: [phone]         Physical Therapy Daily Treatment Note    Date:  3/2/2022  Patient Name:  Fiona Zurita    :  1980  MRN: [id number]   Physician: Karl Rodriguez MD                                 Insurance: [employer] (Limit 30 Rx)  Medical Diagnosis: Osteoarthritis of right knee M17.11                    Rehab Codes: M25.561, M25.661, R26.89, M62.551  Onset date: 2021                       Next Dr's appt. : 2022 ortho, 2022 PCP    Visit# / total visits: /; Recheck goals due at visit #16  Cancels/No Shows: 2/3    Subjective:   Pain:  [x] Yes  [] No Location:  R knee Pain Rating: (0-10 scale) 6/10  proximal and inferior knee  Pain altered Tx:  [x] No  [] Yes  Action:  Comments: Pt reports healing his grandma out the last few days, but no major activities that would have aggravated knee. He declined helping family move as was afraid would aggravate knee. Pt reports taping did seem to help. Objective:  Modalities: Vasocompression R knee x 15 mins 36°  Medium compression, w/elevation, post exercises.   Precautions:   Exercises:  Exercise   R knee Reps/ Time Weight/ Level Comments   SciFit  6 min ML3.0            Standing      Calf stretch  5x 20sec On slant board   HR  20x 2 lbs    R LE 3 way hip  20x ea 2 lbs OKC, CKC, progressed reps 3/2   HS curls 20x 2 lbs  OKC, CKC   March  20x 2 lbs    Lunges bilat  15x ea  Ant, lat, progressed reps 3/2   TG squats 10x2 28° Added Ball b/t knees 3/2   Lat Step Ups 10x 6\" B, Added 3/, add ant soon   Step downs 10x2 6\" Progressed step height 3/2         Sitting         Hip add sets  15x 5 sec Yellow ball   HS stretch 5x20\"     LAQ 20x 2 lbs W/ball, progressed reps          Supine      Quad sets 15x5\" x W/ball   Glut sets   5sec    SAQ 2x10 2 lbs W/ball, manual assist for patellar tracking correction due to pain and lateral tracking- held manual assist this date d/t taping 3/2   SLR  2x10  A     SLR w/ ER 2x10                          Bridging 2x10  w/ball         Patellar Mobs   All planes           Slde lying      Hip abd -- A Hold   Clam shells 2x10  blue    Hip adduction 2x10           Quadriped         hip ext      Add soon     2/16/22:  R hip flexion/abd: 5/5, knee flexion,ext 4+/5      Kinesiotape: R knee 2 i-strips, 100% tension, lateral-medial, superior and inferior patella, V shape to prevent lateral glide from patella-applied after SciFit, prior to all other ex 3/2    Treatment Charges: Sharmila Marquez Mins Units   []  Modalities     [x]  Ther Exercise 44 3   [x]  Manual Therapy       []  Ther Activities     []  Aquatics     [x]  Vasocompression 15 1   []  Other     Total Treatment time 59 min 4       Assessment: [x] Progressing toward goals. Cont taping after SciFit. Initiated lat step ups to improve standing activities and walking on uneven surfaces. Cont game ready at end of Rx to decrease pain and edema. [] No change. [] Other:    [x] Patient would continue to benefit from skilled physical therapy services in order to:  decrease pain R knee, increase R knee ext ROM, increase R LE strength, and improve tolerance to standing, walking and working     STG: (to be met in 10 treatments)  1. ? Pain: R knee 5/10 average pain, 7/10 at worst-Progress Toward   2. ? ROM: R knee ext 8° 2/16/22: 4° Met  3. ? Strength: R knee flex 4/5, ext 4-/5, R hip 4-/5: 2/16/22: MET  4. ? Function: LEFS 48% loss of LE function 2/16/22: 46% loss of function  5. Pt report improved tolerance to walking, standing 2/16/22; progressing (prior to 2/13/22 fall)  6. Patient to be independent with home exercise program as demonstrated by performance with correct form without cues.  2/16/22: MET     LTG: (to be met in 16 treatments)  1. ? Pain: R knee 3/10 average pain, 5/10 at worst  2. ? ROM: R knee ext 5°  3. ? Strength: R knee flex 4+/5, ext 4/5, R hip 4/5  4. ? Function: LEFS 34% loss of LE function   5. Pt able to do step-over step and work normally without pain                    Patient goals: able to work full night without pain    Pt. Education:  [] Yes  [] No  [x] Reviewed Prior HEP/Ed  Method of Education: [x] Verbal  [x] Demo  [] Written  Comprehension of Education:  [] Verbalizes understanding  [] Demonstrates understanding. [] Needs review. [x] Demonstrates/verbalizes HEP/Ed previously given. 2/16/22: FALL PREVENTION HANDOUT AND WINTER FALL PREVENTION HO.   2/21 Kinesiotape to R knee to prevent lateral glide-purpose of, effects of , precautions w/and removal of. Plan: [x] Continue current frequency toward long and short term goals. [x] Specific Instructions for subsequent treatments:  Progress quad, gluteal, HS strengthening as able; add ant step ups, update HEP-issue standing ex  2x wk for 16 sessions.        Time In:  0915      Time Out: 1026    Electronically signed by:  Becky Chapman PT

## 2022-03-03 ENCOUNTER — OFFICE VISIT (OUTPATIENT)
Dept: BARIATRICS/WEIGHT MGMT | Age: 42
End: 2022-03-03
Payer: MEDICAID

## 2022-03-03 VITALS
WEIGHT: 315 LBS | SYSTOLIC BLOOD PRESSURE: 119 MMHG | BODY MASS INDEX: 38.36 KG/M2 | DIASTOLIC BLOOD PRESSURE: 84 MMHG | HEIGHT: 76 IN | HEART RATE: 69 BPM

## 2022-03-03 DIAGNOSIS — J45.20 MILD INTERMITTENT ASTHMA WITHOUT COMPLICATION: ICD-10-CM

## 2022-03-03 DIAGNOSIS — G47.33 OSA ON CPAP: ICD-10-CM

## 2022-03-03 DIAGNOSIS — M25.561 CHRONIC PAIN OF RIGHT KNEE: ICD-10-CM

## 2022-03-03 DIAGNOSIS — Z99.89 OSA ON CPAP: ICD-10-CM

## 2022-03-03 DIAGNOSIS — G89.29 CHRONIC PAIN OF RIGHT KNEE: ICD-10-CM

## 2022-03-03 DIAGNOSIS — I10 ESSENTIAL HYPERTENSION: Primary | ICD-10-CM

## 2022-03-03 DIAGNOSIS — E66.01 OBESITY, CLASS III, BMI 40-49.9 (MORBID OBESITY) (HCC): ICD-10-CM

## 2022-03-03 PROCEDURE — G8427 DOCREV CUR MEDS BY ELIG CLIN: HCPCS | Performed by: NURSE PRACTITIONER

## 2022-03-03 PROCEDURE — G8482 FLU IMMUNIZE ORDER/ADMIN: HCPCS | Performed by: NURSE PRACTITIONER

## 2022-03-03 PROCEDURE — G8417 CALC BMI ABV UP PARAM F/U: HCPCS | Performed by: NURSE PRACTITIONER

## 2022-03-03 PROCEDURE — 1036F TOBACCO NON-USER: CPT | Performed by: NURSE PRACTITIONER

## 2022-03-03 PROCEDURE — 99213 OFFICE O/P EST LOW 20 MIN: CPT | Performed by: NURSE PRACTITIONER

## 2022-03-03 NOTE — PROGRESS NOTES
Group Lifestyle Balance Follow-up Progress Note      Subjective     Patient is here for group medical appointment for Group Lifestyle Balance weight management program follow-up for the chronic conditions of MIGEL, HTN, Asthma, Chronic Pain Right Knee. Patient continues on the program and tolerating well. Total weight loss to date is 24 lbs. No current issues. Allergies:  No Known Allergies    Past Medical History:     Past Medical History:   Diagnosis Date    Anxiety     Arthritis     CAD (coronary artery disease)     Fatty liver     Fatty liver disease, nonalcoholic 4/53/1853    Hypertension     Obesity     Unspecified sleep apnea     cpap   . Past Surgical History:  History reviewed. No pertinent surgical history. Family History:  Family History   Problem Relation Age of Onset    Arthritis Mother     Diabetes Father     Arthritis Father     Stroke Maternal Grandmother     Heart Disease Maternal Grandmother     Stroke Maternal Grandfather     Heart Disease Maternal Grandfather     Early Death Maternal Grandfather     Stroke Paternal Grandmother     Heart Disease Paternal Grandmother     Stroke Paternal Grandfather     Heart Disease Paternal Grandfather     Early Death Paternal Grandfather        Social History:  Social History     Socioeconomic History    Marital status: Single     Spouse name: Kitty Allen Number of children: 0    Years of education: 12    Highest education level:  Bachelor's degree (e.g., BA, AB, BS)   Occupational History    Not on file   Tobacco Use    Smoking status: Never Smoker    Smokeless tobacco: Never Used   Vaping Use    Vaping Use: Never used   Substance and Sexual Activity    Alcohol use: No    Drug use: No    Sexual activity: Yes     Partners: Female   Other Topics Concern    Not on file   Social History Narrative    ** Merged History Encounter **          Social Determinants of Health     Financial Resource Strain: Low Risk     Difficulty of Paying Living Expenses: Not very hard   Food Insecurity: No Food Insecurity    Worried About Running Out of Food in the Last Year: Never true    Ran Out of Food in the Last Year: Never true   Transportation Needs:     Lack of Transportation (Medical): Not on file    Lack of Transportation (Non-Medical):  Not on file   Physical Activity:     Days of Exercise per Week: Not on file    Minutes of Exercise per Session: Not on file   Stress:     Feeling of Stress : Not on file   Social Connections:     Frequency of Communication with Friends and Family: Not on file    Frequency of Social Gatherings with Friends and Family: Not on file    Attends Sabianism Services: Not on file    Active Member of [address] [place] [address] [employer] or Organizations: Not on file    Attends Club or Organization Meetings: Not on file    Marital Status: Not on file   Intimate Partner Violence:     Fear of Current or Ex-Partner: Not on file    Emotionally Abused: Not on file    Physically Abused: Not on file    Sexually Abused: Not on file   Housing Stability:     Unable to Pay for Housing in the Last Year: Not on file    Number of [place] in the Last Year: Not on file    Unstable Housing in the Last Year: Not on file       Current Medications:  Current Outpatient Medications   Medication Sig Dispense Refill    diclofenac sodium (VOLTAREN) 1 % GEL Apply 4 g topically 4 times daily 50 g 2    lisinopril (PRINIVIL;ZESTRIL) 40 MG tablet Take 1 tablet by mouth daily 90 tablet 1    amLODIPine (NORVASC) 10 MG tablet Take 1 tablet by mouth daily 90 tablet 1    metoprolol tartrate (LOPRESSOR) 25 MG tablet 1 tablet 2/day 180 tablet 0    ACETAMINOPHEN EXTRA STRENGTH 500 MG tablet take 1 tablet by mouth twice a day AS NEEDED FOR PAIN 60 tablet 5    fluticasone (FLONASE) 50 MCG/ACT nasal spray 1 spray by Nasal route daily 16 g 3    ibuprofen (ADVIL;MOTRIN) 800 MG tablet Take 1 tablet by mouth every 8 hours as needed for Pain 30 tablet 1    albuterol sulfate  (90 Base) MCG/ACT inhaler inhale 2 puffs by mouth and INTO THE LUNGS every 6 hours if needed for wheezing 18 g 3    vitamin D (ERGOCALCIFEROL) 1.25 MG (64066 UT) CAPS capsule Take 1 capsule by mouth once a week      melatonin 10 MG CAPS capsule Take 1 capsule by mouth nightly 30 capsule 0    valACYclovir (VALTREX) 1 g tablet        No current facility-administered medications for this visit. Vital Signs:  /84 (Site: Right Upper Arm, Position: Sitting, Cuff Size: Large Adult)   Pulse 69   Ht 6' 4\" (1.93 m)   Wt (!) 346 lb (156.9 kg)   BMI 42.12 kg/m²     BMI/Height/Weight:  Body mass index is 42.12 kg/m². Review of Systems  Constitutional: Weight loss      Objective:      Physical Exam   Vital signs reviewed. General Appearance: Well-developed and well-nourished. No acute distress. Skin: Warm, dry. Head: Normocephalic. Pulmonary/Chest: Normal respiratory effort. Musculoskeletal: Movement x4. Neurological:  Alert and oriented. Individual goal for this encounter:      X ? Vital signs reviewed and discussed with patient. ? Labs/EKG reviewed and discussed with patient. ? Blood sugar log reviewed and discussed with patient. ? Physical activity discussed. ? Medication changes recommended. ? Smoking cessation discussed. X ? Specific questions/concerns addressed. Specific group medical question(s) addressed in this encounter:  Discussion about osteoarthritis. Group discussion topic for this encounter:     ? Alysha Rain   ? Be a Fat & Calorie    ? Healthy Eating   ? Move Those Muscles   ? Tip the Calorie Balance   ? Take charge of What's Around You   ? Problem Solving   ? Step Up Your Physical Activity Plan   ? Manage Slips and Self-Defeating Thoughts   ? [address]  X ? Make Social Cues Work for Lucien Seymourbryn   ? Ways to Stay Motivated   ? Preparing for Long Term Self-Management   ? Take Charge of Your Lifestyle   ? Mindful Eating, Mindful Movement   ? Manage Your Stress   ? Sit Less for Health   ? More Volume, Fewer Calories   ? Stay Active   ? Balance Your Thoughts   ? Heart Health    ? Looking Back and Looking Forward    Total time:  90 minutes, greater than 50% of visit spent in group counseling/education. Assessment:       Diagnosis Orders   1. Essential hypertension     2. MIGEL on CPAP     3. Obesity, Class III, BMI 40-49.9 (morbid obesity) ([address]     4. Mild intermittent asthma without complication     5. Chronic pain of right knee         Plan:      Track food and weight. Return to clinic as per group medical appointment schedule. Follow-up:  Return in about 1 week (around 3/10/2022). Orders:  No orders of the defined types were placed in this encounter. Prescriptions:  No orders of the defined types were placed in this encounter.       Electronically signed by:  Paul Bridges CNP

## 2022-03-07 ENCOUNTER — HOSPITAL ENCOUNTER (OUTPATIENT)
Dept: PHYSICAL THERAPY | Age: 42
Setting detail: THERAPIES SERIES
Discharge: HOME | End: 2022-03-07
Payer: MEDICAID

## 2022-03-07 NOTE — FLOWSHEET NOTE
[x] [facility]  [address]    [phone]  F: [phone]   [] [address] [facility] [address]   [address]  P: [phone]  F: [phone]  [] [address]  [address]  P: [phone]  F: [phone] [] [address]  P: [phone]  F: [phone]  [] [address]  [address] [address] [facility] [address]   [address]   [place]: [phone]  F: [phone]   [] [place] [address] [facility] [address]  [place]: [phone]   F: [phone]     Physical Therapy Cancel/No Show note    Date: 3/7/2022  Patient: Samanta Voss  : 1980  MRN: [id number]    Cancels/No Shows to date: 3/3    For today's appointment patient:    [x]  Cancelled    [] Rescheduled appointment    [] No-show     Reason given by patient:    []  Patient ill    [x]  Conflicting appointment    [] No transportation      [] Conflict with work    [] No reason given    [] Weather related    [] [id number]    [] Other:      Comments:        [x] Next appointment was confirmed    Electronically signed by: Winsome Ware PTA

## 2022-03-09 ENCOUNTER — HOSPITAL ENCOUNTER (OUTPATIENT)
Dept: PHYSICAL THERAPY | Age: 42
Setting detail: THERAPIES SERIES
Discharge: HOME | End: 2022-03-09
Payer: MEDICAID

## 2022-03-09 PROCEDURE — 97016 VASOPNEUMATIC DEVICE THERAPY: CPT

## 2022-03-09 PROCEDURE — 97110 THERAPEUTIC EXERCISES: CPT

## 2022-03-09 NOTE — FLOWSHEET NOTE
[x] [facility]  [address]  [phone]  F: [phone]         Physical Therapy Daily Treatment Note    Date:  3/9/2022  Patient Name:  Thony Dietrich    :  1980  MRN: [id number]   Physician: Maxim Rivera MD                                 Insurance: [facility] (Limit 30 Rx)  Medical Diagnosis: Osteoarthritis of right knee M17.11                    Rehab Codes: M25.561, M25.661, R26.89, M62.551  Onset date: 2021                       Next Dr's appt. : 2022 ortho, 2022 PCP    Visit# / total visits: 15/16; Recheck goals due at visit #16  Cancels/No Shows: 3/3    Subjective:   Pain:  [x] Yes  [] No Location:  R knee Pain Rating: (0-10 scale) 7/10  proximal and inferior knee  Pain altered Tx:  [x] No  [] Yes  Action:  Comments: Pt reports doing his exercises at home, and went for walk, did 2 laps around the park [place] his [place]. Objective:  Modalities: Vasocompression R knee x 15 mins 36°  Medium compression, w/elevation, post exercises.   Precautions:   Exercises:  Exercise   R knee Reps/ Time Weight/ Level Comments   SciFit  6 min ML3.0            Standing      Calf stretch  3x 20sec On slant board   Gastroc stretch  2x 20sec At wall, added 3/9, educated Pt in for HEP   HR  20x 2 lbs    R LE 3 way hip  20x ea 2 lbs OKC, CKC   HS curls 20x 2 lbs  OKC, CKC   March  20x 2 lbs    Lunges bilat  15x ea  Ant, lat   TG squats 20x 32° Ball b/t knees   Ant, Lat Step Ups 15x 6\" B, added ant 3/9   Step downs 20x 6\"          Sitting         Hip add sets  15x 5 sec Yellow ball   HS stretch 5x20\"     LAQ 20x 2 lbs W/ball         Supine      Quad sets   W/ball   Glut sets   5sec    SAQ 20x 2 lbs W/ball, manual assist for patellar tracking correction- held manual assist d/t taping 3/9, no pain   SLR  20x  A     SLR w/ ER 20x                          Bridging 20x  w/ball         Patellar Mobs   All planes           Slde lying      Hip abd -- A Hold   Clam shells 20x  blue    Hip adduction 20x           Quadriped         hip ext      Add soon     2/16/22:  R hip flexion/abd: 5/5, knee flexion,ext 4+/5      Kinesiotape: R knee 2 i-strips, 100% tension, lateral-medial, superior and inferior patella, V shape to prevent lateral glide from patella-applied after SciFit, prior to all other ex    Treatment Charges: Bhavesh Cole Mins Units   []  Modalities     [x]  Ther Exercise 41 3   [x]  Manual Therapy       []  Ther Activities     []  Aquatics     [x]  Vasocompression 15 1   []  Other     Total Treatment time 56 min 4       Assessment: [x] Progressing toward goals. Cont taping after SciFit. Initiated ant step ups to improve steps and sit to stands. Educated Pt in standing ex for HEP, issued handout. Pt winded and fatigued w/ex, but reports no increased pain w/ex. Cont game ready at end of Rx to decrease pain and edema. [] No change. [] Other:    [x] Patient would continue to benefit from skilled physical therapy services in order to:  decrease pain R knee, increase R knee ext ROM, increase R LE strength, and improve tolerance to standing, walking and working     STG: (to be met in 10 treatments)  1. ? Pain: R knee 5/10 average pain, 7/10 at worst-Progress Toward   2. ? ROM: R knee ext 8° 2/16/22: 4° Met  3. ? Strength: R knee flex 4/5, ext 4-/5, R hip 4-/5: 2/16/22: MET  4. ? Function: LEFS 48% loss of LE function 2/16/22: 46% loss of function  5. Pt report improved tolerance to walking, standing 2/16/22; progressing (prior to 2/13/22 fall)  6. Patient to be independent with home exercise program as demonstrated by performance with correct form without cues. 2/16/22: MET     LTG: (to be met in 16 treatments)  1. ? Pain: R knee 3/10 average pain, 5/10 at worst  2. ? ROM: R knee ext 5°  3. ? Strength: R knee flex 4+/5, ext 4/5, R hip 4/5  4. ? Function: LEFS 34% loss of LE function   5.  Pt able to do step-over step and work normally without pain    Patient goals: able to work full night without pain    Pt. Education:  [] Yes  [] No  [x] Reviewed Prior HEP/Ed  Method of Education: [x] Verbal  [x] Demo  [x] Written  Comprehension of Education:  [x] Verbalizes understanding  [] Demonstrates understanding. [x] Needs review. [x] Demonstrates/verbalizes HEP/Ed previously given. 2/16/22: FALL PREVENTION HANDOUT AND WINTER FALL PREVENTION HO.   2/21 Kinesiotape to R knee to prevent lateral glide-purpose of, effects of , precautions w/and removal of.      3/09 HEP-Access Code: [email or web address]  URL: [email or web address]  Forward Lunge with Back Leg Straight and Counter Support - 1-2 x daily - 7 x weekly - 20 reps  Side Lunge with Counter Support - 1-2 x daily - 7 x weekly - 20 reps  Standing Hip Abduction - 1-2 x daily - 7 x weekly - 20 reps  Standing Hip Extension with Counter Support - 1-2 x daily - 7 x weekly - 20 reps  Standing Hip Flexion with Counter Support - 1-2 x daily - 7 x weekly - 20 reps  Standing March with Counter Support - 1-2 x daily - 7 x weekly - 20 reps  Standing Knee Flexion - 1-2 x daily - 7 x weekly - 20 reps  Gastroc Stretch on Wall - 1-2 x daily - 7 x weekly - 5 reps - 20 sec hold       Plan: [x] Continue current frequency toward long and short term goals. [x] Specific Instructions for subsequent treatments:  Progress quad, gluteal, HS strengthening as able; recheck goals for discharge   2x wk for 16 sessions.        Time In:  0916      Time Out: 1022    Electronically signed by:  Lossie Cockayne, PT

## 2022-03-10 ENCOUNTER — OFFICE VISIT (OUTPATIENT)
Dept: BARIATRICS/WEIGHT MGMT | Age: 42
End: 2022-03-10
Payer: MEDICAID

## 2022-03-10 VITALS
SYSTOLIC BLOOD PRESSURE: 134 MMHG | DIASTOLIC BLOOD PRESSURE: 82 MMHG | WEIGHT: 315 LBS | HEART RATE: 105 BPM | BODY MASS INDEX: 38.36 KG/M2 | HEIGHT: 76 IN

## 2022-03-10 DIAGNOSIS — G47.33 OSA ON CPAP: ICD-10-CM

## 2022-03-10 DIAGNOSIS — M25.561 CHRONIC PAIN OF RIGHT KNEE: ICD-10-CM

## 2022-03-10 DIAGNOSIS — G89.29 CHRONIC PAIN OF RIGHT KNEE: ICD-10-CM

## 2022-03-10 DIAGNOSIS — Z99.89 OSA ON CPAP: ICD-10-CM

## 2022-03-10 DIAGNOSIS — E66.01 OBESITY, CLASS III, BMI 40-49.9 (MORBID OBESITY) (HCC): ICD-10-CM

## 2022-03-10 DIAGNOSIS — I10 ESSENTIAL HYPERTENSION: Primary | ICD-10-CM

## 2022-03-10 DIAGNOSIS — J45.20 MILD INTERMITTENT ASTHMA WITHOUT COMPLICATION: ICD-10-CM

## 2022-03-10 PROCEDURE — 1036F TOBACCO NON-USER: CPT | Performed by: NURSE PRACTITIONER

## 2022-03-10 PROCEDURE — G8417 CALC BMI ABV UP PARAM F/U: HCPCS | Performed by: NURSE PRACTITIONER

## 2022-03-10 PROCEDURE — 99213 OFFICE O/P EST LOW 20 MIN: CPT | Performed by: NURSE PRACTITIONER

## 2022-03-10 PROCEDURE — G8482 FLU IMMUNIZE ORDER/ADMIN: HCPCS | Performed by: NURSE PRACTITIONER

## 2022-03-10 PROCEDURE — G8427 DOCREV CUR MEDS BY ELIG CLIN: HCPCS | Performed by: NURSE PRACTITIONER

## 2022-03-10 NOTE — PROGRESS NOTES
Group Lifestyle Balance Follow-up Progress Note      Subjective     Patient is here for group medical appointment for Group Lifestyle Balance weight management program follow-up for the chronic conditions of MIGEL, HTN, Asthma, Chronic Pain Right Knee. Patient continues on the program and tolerating well. Total weight loss to date is 29 lbs. No current issues. Allergies:  No Known Allergies    Past Medical History:     Past Medical History:   Diagnosis Date    Anxiety     Arthritis     CAD (coronary artery disease)     Fatty liver     Fatty liver disease, nonalcoholic 5/34/6543    Hypertension     Obesity     Unspecified sleep apnea     cpap   . Past Surgical History:  History reviewed. No pertinent surgical history. Family History:  Family History   Problem Relation Age of Onset    Arthritis Mother     Diabetes Father     Arthritis Father     Stroke Maternal Grandmother     Heart Disease Maternal Grandmother     Stroke Maternal Grandfather     Heart Disease Maternal Grandfather     Early Death Maternal Grandfather     Stroke Paternal Grandmother     Heart Disease Paternal Grandmother     Stroke Paternal Grandfather     Heart Disease Paternal Grandfather     Early Death Paternal Grandfather        Social History:  Social History     Socioeconomic History    Marital status: Single     Spouse name: Nafisa Rocha Number of children: 0    Years of education: 12    Highest education level:  Bachelor's degree (e.g., BA, AB, BS)   Occupational History    Not on file   Tobacco Use    Smoking status: Never Smoker    Smokeless tobacco: Never Used   Vaping Use    Vaping Use: Never used   Substance and Sexual Activity    Alcohol use: No    Drug use: No    Sexual activity: Yes     Partners: Female   Other Topics Concern    Not on file   Social History Narrative    ** Merged History Encounter **          Social Determinants of Health     Financial Resource Strain: Low Risk     Difficulty of Paying Living Expenses: Not very hard   Food Insecurity: No Food Insecurity    Worried About Running Out of Food in the Last Year: Never true    Ran Out of Food in the Last Year: Never true   Transportation Needs:     Lack of Transportation (Medical): Not on file    Lack of Transportation (Non-Medical):  Not on file   Physical Activity:     Days of Exercise per Week: Not on file    Minutes of Exercise per Session: Not on file   Stress:     Feeling of Stress : Not on file   Social Connections:     Frequency of Communication with Friends and Family: Not on file    Frequency of Social Gatherings with Friends and Family: Not on file    Attends Anabaptism Services: Not on file    Active Member of [address] [place] [address] [employer] or Organizations: Not on file    Attends Club or Organization Meetings: Not on file    Marital Status: Not on file   Intimate Partner Violence:     Fear of Current or Ex-Partner: Not on file    Emotionally Abused: Not on file    Physically Abused: Not on file    Sexually Abused: Not on file   Housing Stability:     Unable to Pay for Housing in the Last Year: Not on file    Number of [place] in the Last Year: Not on file    Unstable Housing in the Last Year: Not on file       Current Medications:  Current Outpatient Medications   Medication Sig Dispense Refill    diclofenac sodium (VOLTAREN) 1 % GEL Apply 4 g topically 4 times daily 50 g 2    lisinopril (PRINIVIL;ZESTRIL) 40 MG tablet Take 1 tablet by mouth daily 90 tablet 1    amLODIPine (NORVASC) 10 MG tablet Take 1 tablet by mouth daily 90 tablet 1    metoprolol tartrate (LOPRESSOR) 25 MG tablet 1 tablet 2/day 180 tablet 0    ACETAMINOPHEN EXTRA STRENGTH 500 MG tablet take 1 tablet by mouth twice a day AS NEEDED FOR PAIN 60 tablet 5    fluticasone (FLONASE) 50 MCG/ACT nasal spray 1 spray by Nasal route daily 16 g 3    ibuprofen (ADVIL;MOTRIN) 800 MG tablet Take 1 tablet by mouth every 8 hours as needed for Pain 30 tablet 1    albuterol sulfate  (90 Base) MCG/ACT inhaler inhale 2 puffs by mouth and INTO THE LUNGS every 6 hours if needed for wheezing 18 g 3    vitamin D (ERGOCALCIFEROL) 1.25 MG (28981 UT) CAPS capsule Take 1 capsule by mouth once a week      melatonin 10 MG CAPS capsule Take 1 capsule by mouth nightly 30 capsule 0    valACYclovir (VALTREX) 1 g tablet        No current facility-administered medications for this visit. Vital Signs:  /82 (Site: Right Upper Arm, Position: Sitting, Cuff Size: Large Adult)   Pulse 105   Ht 6' 4\" (1.93 m)   Wt (!) 341 lb (154.7 kg)   BMI 41.51 kg/m²     BMI/Height/Weight:  Body mass index is 41.51 kg/m². Review of Systems  Constitutional: Weight loss      Objective:      Physical Exam   Vital signs reviewed. General Appearance: Well-developed and well-nourished. No acute distress. Skin: Warm, dry. Head: Normocephalic. Pulmonary/Chest: Normal respiratory effort. Musculoskeletal: Movement x4. Neurological:  Alert and oriented. Individual goal for this encounter:  Lose weight to feel better and keep my knee better. My goal to be 210-215 lbs. X ? Vital signs reviewed and discussed with patient. ? Labs/EKG reviewed and discussed with patient. ? Blood sugar log reviewed and discussed with patient. ? Physical activity discussed. ? Medication changes recommended. ? Smoking cessation discussed. X ? Specific questions/concerns addressed. Specific group medical question(s) addressed in this encounter:  Discussion about back pain. Group discussion topic for this encounter:     ? Leslie Vickers   ? Be a Fat & Calorie    ? Healthy Eating   ? Move Those Muscles   ? Tip the Calorie Balance   ? Take charge of What's Around You   ? Problem Solving   ? Step Up Your Physical Activity Plan   ? Manage Slips and Self-Defeating Thoughts   ? [address]   ? Make Social Cues Work for Hola Shane  X ? Ways to Stay Motivated   ? Preparing for Long Term Self-Management   ? Take Charge of Your Lifestyle   ? Mindful Eating, Mindful Movement   ? Manage Your Stress   ? Sit Less for Health   ? More Volume, Fewer Calories   ? Stay Active   ? Balance Your Thoughts   ? Heart Health    ? Looking Back and Looking Forward    Total time:  90 minutes, greater than 50% of visit spent in group counseling/education. Assessment:       Diagnosis Orders   1. Essential hypertension     2. MIGEL on CPAP     3. Obesity, Class III, BMI 40-49.9 (morbid obesity) ([address]     4. Mild intermittent asthma without complication     5. Chronic pain of right knee         Plan:      Track food and weight. Return to clinic as per group medical appointment schedule. Follow-up:  Return in about 1 week (around 3/17/2022). Orders:  No orders of the defined types were placed in this encounter. Prescriptions:  No orders of the defined types were placed in this encounter.       Electronically signed by:  Joseph Wheeler CNP

## 2022-03-14 ENCOUNTER — OFFICE VISIT (OUTPATIENT)
Dept: PODIATRY | Age: 42
End: 2022-03-14
Payer: MEDICAID

## 2022-03-14 VITALS — HEIGHT: 76 IN | RESPIRATION RATE: 16 BRPM | BODY MASS INDEX: 38.36 KG/M2 | WEIGHT: 315 LBS

## 2022-03-14 DIAGNOSIS — M79.671 FOOT PAIN, BILATERAL: Primary | ICD-10-CM

## 2022-03-14 DIAGNOSIS — M79.672 FOOT PAIN, BILATERAL: Primary | ICD-10-CM

## 2022-03-14 DIAGNOSIS — L60.0 INGROWN NAIL: ICD-10-CM

## 2022-03-14 DIAGNOSIS — B35.1 DERMATOPHYTOSIS OF NAIL: ICD-10-CM

## 2022-03-14 PROCEDURE — G8482 FLU IMMUNIZE ORDER/ADMIN: HCPCS | Performed by: PODIATRIST

## 2022-03-14 PROCEDURE — G8417 CALC BMI ABV UP PARAM F/U: HCPCS | Performed by: PODIATRIST

## 2022-03-14 PROCEDURE — 99213 OFFICE O/P EST LOW 20 MIN: CPT | Performed by: PODIATRIST

## 2022-03-14 PROCEDURE — G8427 DOCREV CUR MEDS BY ELIG CLIN: HCPCS | Performed by: PODIATRIST

## 2022-03-14 PROCEDURE — 11721 DEBRIDE NAIL 6 OR MORE: CPT | Performed by: PODIATRIST

## 2022-03-14 PROCEDURE — 1036F TOBACCO NON-USER: CPT | Performed by: PODIATRIST

## 2022-03-14 RX ORDER — ARIPIPRAZOLE 10 MG/1
TABLET ORAL
COMMUNITY
Start: 2022-03-07

## 2022-03-14 NOTE — PROGRESS NOTES
[address] [facility] PODIATRY [facility]  [address] [place] [address]  Dept: [phone]  Dept Fax: [phone]    RETURN PATIENT PROGRESS NOTE  Date of patient's visit: 3/14/2022  Patient's Name:  Antwan Martinez YOB: 1980            Patient Care Team:  Vivi Regalado MD as PCP - General (Internal Medicine)  Vivi Regalado MD as PCP [place] [facility] [facility]  Branden Ocampo MD as Surgeon (Orthopedic Surgery)  Cecy Kirkland DPM as Physician (Podiatry)  Madison Martinez DPM as Physician (Podiatry)       Antwan Martinez 38 y.o. male that presents for follow-up of   Chief Complaint   Patient presents with    Foot Pain     Pt's primary care physician is Vivi Regalado MD last seen 02/08/2022  Symptoms began 2 week(s) ago and are unchanged . Patient relates pain is Present to ingrown toenails. states his achilles pain is improved. Relates that his nails are thick and hard to trim. Pain is rated 1 out of 10 and is described as intermittent, mild. Treatments prior to today's visit include: none. Currently denies F/C/N/V. No Known Allergies    Past Medical History:   Diagnosis Date    Anxiety     Arthritis     CAD (coronary artery disease)     Fatty liver     Fatty liver disease, nonalcoholic 6/64/9737    Hypertension     Obesity     Unspecified sleep apnea     cpap       Prior to Admission medications    Medication Sig Start Date End Date Taking?  Authorizing Provider   ARIPiprazole (ABILIFY) 10 MG tablet  3/7/22  Yes Historical Provider, MD   diclofenac sodium (VOLTAREN) 1 % GEL Apply 4 g topically 4 times daily 2/8/22  Yes Vivi Regalado MD   lisinopril (PRINIVIL;ZESTRIL) 40 MG tablet Take 1 tablet by mouth daily 2/8/22  Yes Vivi Regalado MD   amLODIPine (NORVASC) 10 MG tablet Take 1 tablet by mouth daily 2/8/22  Yes Vivi Regalado MD   metoprolol tartrate (LOPRESSOR) 25 MG tablet 1 tablet 2/day 2/8/22 Yes Eliseo Mcallister MD   ACETAMINOPHEN EXTRA STRENGTH 500 MG tablet take 1 tablet by mouth twice a day AS NEEDED FOR PAIN 2/2/22  Yes Eliseo Mcallister MD   fluticasone [facility]) 50 MCG/ACT nasal spray 1 spray by Nasal route daily 12/28/21  Yes Eliseo Mcallister MD   ibuprofen (ADVIL;MOTRIN) 800 MG tablet Take 1 tablet by mouth every 8 hours as needed for Pain 12/28/21  Yes Eliseo Mcallister MD   albuterol sulfate  (90 Base) MCG/ACT inhaler inhale 2 puffs by mouth and INTO THE LUNGS every 6 hours if needed for wheezing 11/2/21  Yes Eliseo Mcallister MD   vitamin D (ERGOCALCIFEROL) 1.25 MG (94011 UT) CAPS capsule Take 1 capsule by mouth once a week 7/19/21  Yes Historical Provider, MD   melatonin 10 MG CAPS capsule Take 1 capsule by mouth nightly 6/1/21  Yes Eliseo Mcallister MD   valACYclovir (VALTREX) 1 g tablet  6/23/20  Yes Historical Provider, MD   metoprolol tartrate (LOPRESSOR) 25 MG tablet Take 2/day 7/7/20   Eliseo Mcallister MD       Review of Systems    Review of Systems:  History obtained from chart review and the patient  General ROS: negative for - chills, fatigue, fever, night sweats or weight gain  Constitutional: Negative for chills, diaphoresis, fatigue, fever and unexpected weight change. Musculoskeletal: Positive for arthralgias, gait problem and joint swelling. Neurological ROS: negative for - behavioral changes, confusion, headaches or seizures. Negative for weakness and numbness. Dermatological ROS: negative for - mole changes, rash  Cardiovascular: Negative for leg swelling. Gastrointestinal: Negative for constipation, diarrhea, nausea and vomiting. Lower Extremity Physical Examination:     Vitals:   Vitals:    03/14/22 1129   Resp: 16     General: AAO x 3 in NAD. Dermatologic Exam:  Skin lesion/ulceration Absent . Skin No rashes or nodules noted. .       Musculoskeletal:     1st MPJ ROM decreased, Bilateral.  Muscle strength 5/5, Bilateral.  Pain present upon palpation of digits 1-5 tomy. Medial longitudinal arch, Bilateral WNL. Ankle ROM WNL,Bilateral.    Dorsally contracted digits absent digits 1-5 Bilateral.     Vascular: DP and PT pulses palpable 2/4, Bilateral.  CFT <3 seconds, Bilateral.  Hair growth present to the level of the digits, Bilateral.  Edema absent, Bilateral.  Varicosities absent, Bilateral. Erythema absent, Bilateral    Neurological: Sensation intact to light touch to level of digits, Bilateral.  Protective sensation intact 10/10 sites via 5.07/10g Hawi-Jamaal Monofilament, Bilateral.  negative Tinel's, Bilateral.  negative Valleix sign, Bilateral.      Integument: Warm, dry, supple, Bilateral.  Open lesion absent, Bilateral.  Interdigital maceration absent to web spaces 1-4, Bilateral.  Nails elongated thickened dystrophic 1-5 bilateral.  Fissures absent, Bilateral.       Asessment: Patient is a 38 y.o. male with:    Diagnosis Orders   1. Foot pain, bilateral  60190 - SC DEBRIDEMENT OF NAILS, 6 OR MORE   2. Dermatophytosis of nail  78540 - SC DEBRIDEMENT OF NAILS, 6 OR MORE   3. Ingrown nail  48953 - SC DEBRIDEMENT OF NAILS, 6 OR MORE         Plan: Patient examined and evaluated. Current condition and treatment options discussed in detail. Nails 1,2,3,4,5 Right and 1,2,3,4,5 Left were debrided and ground smooth with a dremmel. The patient tolerated the procedure well without apparent complications. Advised pt to avoid walking barefoot. Continue wearing powersteps daily. All labs were reviewed and all imagining including the above findings were reviewed PRIOR to the patients arrival and with the patient today. Previous patient encounter was reviewed. Encounters from the patients other medical providers were reviewed and noted. Time was spent educating the patient and their families/caregivers on proper care of the feet and ankles. All the above diagnosis were addressed at todays visit and all questions were answered.   A total of 20 minutes was spent with this patients encounter which included charting after the patients visit    . Verbal and written instructions given to patient. Contact office with any questions/problems/concerns. No orders of the defined types were placed in this encounter. No orders of the defined types were placed in this encounter. RTC in 2month(s).     3/14/2022      Electronically signed by Majo Ruiz DPM on 3/14/2022 at 11:30 AM  3/14/2022

## 2022-03-16 ENCOUNTER — HOSPITAL ENCOUNTER (OUTPATIENT)
Dept: PHYSICAL THERAPY | Age: 42
Setting detail: THERAPIES SERIES
Discharge: HOME | End: 2022-03-16
Payer: MEDICAID

## 2022-03-16 PROCEDURE — 97016 VASOPNEUMATIC DEVICE THERAPY: CPT

## 2022-03-16 PROCEDURE — 97110 THERAPEUTIC EXERCISES: CPT

## 2022-03-16 NOTE — FLOWSHEET NOTE
[x] [facility] [facility]  [address]  [phone]  F: [phone]     Physical Therapy Daily Treatment Note    Date:  3/16/2022  Patient Name:  Kris Balderas    :  1980  MRN: [id number]   Physician: Sotero Hess MD                                 Insurance: [employer] (Limit 30 Rx)  Medical Diagnosis: Osteoarthritis of right knee M17.11                    Rehab Codes: M25.561, M25.661, R26.89, M62.551  Onset date: 2021                       Next Dr's appt.: TBD    Visit# / total visits: /; Recheck goals due at visit #16  Cancels/No Shows: 3/3    Subjective:   Pain:  [x] Yes  [] No Location:  R knee   Pain Rating: (0-10 scale) 7/10  proximal and inferior knee  Pain altered Tx:  [x] No  [] Yes  Action:  Comments: patient reports knee is doing \"alright\" today. Continued pain, but has improved overall. Objective:  Modalities: Vasocompression R knee x 15 mins 36°  Medium compression, w/elevation, post exercises.   Precautions:   Exercises:  Exercise   R knee Reps/ Time Weight/ Level Comments   SciFit  6 min ML3.0            Standing      Calf stretch  3x 20sec On slant board   Gastroc stretch  2x 20sec At wall, added 3/9, educated Pt in for HEP   HR  20x 2 lbs    R LE 3 way hip  20x ea 2 lbs OKC, CKC   HS curls 20x 2 lbs  OKC, CKC   March  20x 2 lbs    Lunges bilat  15x ea  Ant, lat   TG squats 20x 32° Ball b/t knees   Ant, Lat Step Ups 15x 6\" B, added ant 3/9   Step downs 20x 6\"          Sitting         Hip add sets  15x 5 sec Yellow ball   HS stretch 5x20\"     LAQ 20x 2 lbs W/ball         Supine      Quad sets   W/ball   Glut sets   5sec    SAQ 20x 2 lbs W/ball, manual assist for patellar tracking correction- held manual assist d/t taping 3/9, no pain   SLR  20x  A     SLR w/ ER 20x                          Bridging 20x  w/ball         Patellar Mobs   All planes           Slde lying      Hip abd -- A Hold   Clam shells 20x  blue    Hip adduction 20x           Quadriped         hip ext      Add soon         Treatment Charges: Edi Lopes Mins Units   []  Modalities     [x]  Ther Exercise 39 3   [x]  Manual Therapy     []  Ther Activities     []  Aquatics     [x]  Vasocompression 15 3   []  Other     Total Treatment time 54 4       Assessment: [x] Progressing toward goals: Overall, patient has made improvements with function, mobility and strength. Patient does note pain is still moderately present, but states it has improved since starting PT. Reports that if he continues to lose some weight - he feels his knee will continue to show improvements. Plan to [facility] to [facility] at this time. [] No change. [] Other:    [x] Patient would continue to benefit from skilled physical therapy services in order to:  decrease pain R knee, increase R knee ext ROM, increase R LE strength, and improve tolerance to standing, walking and working     STG: (to be met in 10 treatments)  1. ? Pain: R knee 5/10 average pain, 7/10 at worst-Progress Toward   2. ? ROM: R knee ext 8° 2/16/22: 4° Met  3. ? Strength: R knee flex 4/5, ext 4-/5, R hip 4-/5: 2/16/22: MET  4. ? Function: LEFS 48% loss of LE function 2/16/22: 46% loss of function  5. Pt report improved tolerance to walking, standing 2/16/22; progressing (prior to 2/13/22 fall)  6. Patient to be independent with home exercise program as demonstrated by performance with correct form without cues. 2/16/22: MET     LTG: (to be met in 16 treatments)  ADDRESSED 3/16/22  1. ? Pain: R knee 3/10 average pain, 5/10 at worst NOT MET - pain still averages 7/10  2. ? ROM: R knee ext 5°  MET 4-128° AROM  3. ? Strength: R knee flex 4+/5, ext 4/5, R hip 4/5 MET 4+/5 grossly  4. ? Function: LEFS 34% loss of LE function  MET 15% loss of function  5. Pt able to do step-over step and work normally without pain MET - pain still present but doesn't increase                     Patient goals: able to work full night without pain    Pt. Education:  [] Yes  [] No  [x] Reviewed Prior HEP/Ed  Method of Education: [x] Verbal  [x] Demo  [x] Written  Comprehension of Education:  [x] Verbalizes understanding  [] Demonstrates understanding. [x] Needs review. [x] Demonstrates/verbalizes HEP/Ed previously given. 2/16/22: FALL PREVENTION HANDOUT AND WINTER FALL PREVENTION HO.   2/21 Kinesiotape to R knee to prevent lateral glide-purpose of, effects of , precautions w/and removal of.      3/09 HEP-Access Code: [email or web address]  URL: [employer]/  Forward Lunge with Back Leg Straight and Counter Support - 1-2 x daily - 7 x weekly - 20 reps  Side Lunge with Counter Support - 1-2 x daily - 7 x weekly - 20 reps  Standing Hip Abduction - 1-2 x daily - 7 x weekly - 20 reps  Standing Hip Extension with Counter Support - 1-2 x daily - 7 x weekly - 20 reps  Standing Hip Flexion with Counter Support - 1-2 x daily - 7 x weekly - 20 reps  Standing March with Counter Support - 1-2 x daily - 7 x weekly - 20 reps  Standing Knee Flexion - 1-2 x daily - 7 x weekly - 20 reps  Gastroc Stretch on Wall - 1-2 x daily - 7 x weekly - 5 reps - 20 sec hold       Plan: [x] Discharge to [facility]        Time In:  0915      Time Out: 0014      Electronically signed by:  Joshua Mcqueen PTA

## 2022-03-17 ENCOUNTER — OFFICE VISIT (OUTPATIENT)
Dept: BARIATRICS/WEIGHT MGMT | Age: 42
End: 2022-03-17
Payer: MEDICAID

## 2022-03-17 VITALS
BODY MASS INDEX: 38.36 KG/M2 | DIASTOLIC BLOOD PRESSURE: 70 MMHG | SYSTOLIC BLOOD PRESSURE: 110 MMHG | WEIGHT: 315 LBS | HEIGHT: 76 IN | HEART RATE: 76 BPM

## 2022-03-17 DIAGNOSIS — I10 ESSENTIAL HYPERTENSION: Primary | ICD-10-CM

## 2022-03-17 DIAGNOSIS — G89.29 CHRONIC PAIN OF RIGHT KNEE: ICD-10-CM

## 2022-03-17 DIAGNOSIS — M25.561 CHRONIC PAIN OF RIGHT KNEE: ICD-10-CM

## 2022-03-17 DIAGNOSIS — J45.20 MILD INTERMITTENT ASTHMA WITHOUT COMPLICATION: ICD-10-CM

## 2022-03-17 DIAGNOSIS — Z99.89 OSA ON CPAP: ICD-10-CM

## 2022-03-17 DIAGNOSIS — G47.33 OSA ON CPAP: ICD-10-CM

## 2022-03-17 DIAGNOSIS — E66.01 OBESITY, CLASS III, BMI 40-49.9 (MORBID OBESITY) (HCC): ICD-10-CM

## 2022-03-17 PROCEDURE — G8482 FLU IMMUNIZE ORDER/ADMIN: HCPCS | Performed by: NURSE PRACTITIONER

## 2022-03-17 PROCEDURE — G8427 DOCREV CUR MEDS BY ELIG CLIN: HCPCS | Performed by: NURSE PRACTITIONER

## 2022-03-17 PROCEDURE — G8417 CALC BMI ABV UP PARAM F/U: HCPCS | Performed by: NURSE PRACTITIONER

## 2022-03-17 PROCEDURE — 99213 OFFICE O/P EST LOW 20 MIN: CPT | Performed by: NURSE PRACTITIONER

## 2022-03-17 PROCEDURE — 1036F TOBACCO NON-USER: CPT | Performed by: NURSE PRACTITIONER

## 2022-03-18 NOTE — DISCHARGE SUMMARY
[x] [facility]  [address]  [phone]  F: [phone]         Physical Therapy Discharge Note    Date: 3/18/2022      Patient: Fco Gross  : 1980  MRN: [id number]    DYLAN Mcdonald                                 Insurance: Nutricate (Limit 30 Rx)  Medical Diagnosis: Osteoarthritis of right knee M17.11                    Rehab Codes: M25.561, M25.661, R26.89, M62.551  Onset date: 2021                       Next Dr's appt.: TBD      Total visits attended: 12  Cancels/No shows: 3/3  Date of initial visit: 2022                Date of final visit: 2022      Subjective:  Pain:  [x]? Yes  []? No   Location:  R knee         Pain Rating: (0-10 scale) 7/10  proximal and inferior knee  Pain altered Tx:  [x]? No  []? Yes  Action:  Comments: At final Rx patient reports knee continues w/pain, but has improved overall. Pain averages 7/10. Objective:  Test Measurements: R knee AROM 4-128°; Strength Grossly 4+/5  Function: LEFS 15% loss of LE function. Pt able to do step-over-step, has pain but doesn't increase      Assessment:  STG: (to be met in 10 treatments)  1. ? Pain: R knee 5/10 average pain, 7/10 at worst-Progress Toward   2. ? ROM: R knee ext 8°- Met  3. ? Strength: R knee flex 4/5, ext 4-/5, R hip 4-/5- MET  4. ? Function: LEFS 48% loss of LE function -MET  5. Pt report improved tolerance to walking, standing-MET  6. Patient to be independent with home exercise program as demonstrated by performance with correct form without cues- MET    LTG: (to be met in 16 treatments)    1. ? Pain: R knee 3/10 average pain, 5/10 at worst -Progress Toward- pain still averages 7/10  2. ? ROM: R knee ext 5°  MET   3. ? Strength: R knee flex 4+/5, ext 4/5, R hip 4/5 MET   4. ? Function: LEFS 34% loss of LE function  MET  5.  Pt able to do step-over step and work normally without pain MET - pain still present but doesn't increase       Treatment to Date:  [x] Therapeutic Exercise    [] Modalities:  [] Therapeutic Activity    [] Ultrasound  [] Electrical Stimulation  [] Gait Training     [] Massage       [] Lumbar/Cervical Traction  [] Neuromuscular Re-education [x] Cold/hotpack [] Iontophoresis: 4 mg/mL  [x] Instruction in Home Exercise Program                     Dexamethasone Sodium  [x] Manual Therapy             Phosphate 40-80 mAmin  [] Aquatic Therapy                   [x] Vasocompression/    [] Other:             Game Ready    Discharge Status:     [] Pt recovered from conditions. Treatment goals were met. [x] Pt received maximum benefit. No further therapy indicated at this time. [x] Pt to continue exercise/home instructions independently. [] Therapy interrupted due to:    [] Pt has 2 or more no shows/cancels, is discontinued per our policy. [x] Pt has completed prescribed number of treatment sessions. [] Other:       Electronically signed by Roseanne Ingram PT on 3/18/2022 at 9:21 AM        If you have any questions or concerns, please don't hesitate to call.   Thank you for your referral.

## 2022-04-22 DIAGNOSIS — M17.11 PRIMARY OSTEOARTHRITIS OF RIGHT KNEE: Primary | ICD-10-CM

## 2022-04-22 DIAGNOSIS — M25.561 PAIN IN BOTH KNEES, UNSPECIFIED CHRONICITY: ICD-10-CM

## 2022-04-22 DIAGNOSIS — M25.562 PAIN IN BOTH KNEES, UNSPECIFIED CHRONICITY: ICD-10-CM

## 2022-04-23 DIAGNOSIS — J31.0 CHRONIC RHINITIS: ICD-10-CM

## 2022-04-25 ENCOUNTER — OFFICE VISIT (OUTPATIENT)
Dept: ORTHOPEDIC SURGERY | Age: 42
End: 2022-04-25
Payer: MEDICAID

## 2022-04-25 VITALS — BODY MASS INDEX: 38.36 KG/M2 | WEIGHT: 315 LBS | HEIGHT: 76 IN

## 2022-04-25 DIAGNOSIS — M17.0 PRIMARY OSTEOARTHRITIS OF BOTH KNEES: Primary | ICD-10-CM

## 2022-04-25 DIAGNOSIS — M17.10 PATELLOFEMORAL ARTHRITIS: ICD-10-CM

## 2022-04-25 PROCEDURE — G8417 CALC BMI ABV UP PARAM F/U: HCPCS | Performed by: ORTHOPAEDIC SURGERY

## 2022-04-25 PROCEDURE — 1036F TOBACCO NON-USER: CPT | Performed by: ORTHOPAEDIC SURGERY

## 2022-04-25 PROCEDURE — 99212 OFFICE O/P EST SF 10 MIN: CPT | Performed by: ORTHOPAEDIC SURGERY

## 2022-04-25 PROCEDURE — G8427 DOCREV CUR MEDS BY ELIG CLIN: HCPCS | Performed by: ORTHOPAEDIC SURGERY

## 2022-04-25 RX ORDER — NAPROXEN 500 MG/1
500 TABLET ORAL 2 TIMES DAILY WITH MEALS
Qty: 180 TABLET | Refills: 1 | Status: SHIPPED | OUTPATIENT
Start: 2022-04-25

## 2022-04-25 NOTE — TELEPHONE ENCOUNTER
Request for Fluticasone .       Next Visit Date:  Future Appointments   Date Time Provider Kathi Crowderi   4/25/2022  2:50 PM Coco Nair MD [facility] SPECIA [facility]   5/18/2022 11:30 AM Briseyda Pelaez DPM [facility] [address] Maintenance   Topic Date Due    Pneumococcal 0-64 years Vaccine (2 - PCV) 01/13/2017    COVID-19 Vaccine (3 - Booster for Moderna series) 11/12/2021    Potassium  01/10/2023    Creatinine  01/10/2023    Depression Monitoring  02/08/2023    Lipids  01/05/2027    DTaP/Tdap/Td vaccine (3 - Td or Tdap) 04/27/2028    Flu vaccine  Completed    Hepatitis C screen  Completed    HIV screen  Completed    Hepatitis A vaccine  Aged Out    Hepatitis B vaccine  Aged Out    Hib vaccine  Aged Out    Meningococcal (ACWY) vaccine  Aged Out       Hemoglobin A1C (%)   Date Value   01/05/2022 5.5   05/28/2021 5.6   02/22/2021 5.5             ( goal A1C is < 7)   No results found for: LABMICR  LDL Cholesterol (mg/dL)   Date Value   01/05/2022 101       (goal LDL is <100)   AST (U/L)   Date Value   01/05/2022 11     ALT (U/L)   Date Value   01/05/2022 9     BUN (mg/dL)   Date Value   01/10/2022 15     BP Readings from Last 3 Encounters:   03/17/22 110/70   03/10/22 134/82   03/03/22 119/84          (goal 120/80)    All Future Testing planned in [facility]  Lab Frequency Next Occurrence   XR KNEE LEFT (MIN 4 VIEWS) Once 04/22/2022         Patient Active Problem List:     Essential hypertension     MIGEL on CPAP     Vitamin D deficiency     Body mass index (BMI) of 38.0-38.9 in adult     IGT (impaired glucose tolerance)     Mild intermittent asthma     Cervical spondylosis     Degeneration of cervical intervertebral disc     Osteoarthritis of knee     Scoliosis deformity of spine     Subacromial impingement     Chronic pain of right knee     Obesity, Class III, BMI 40-49.9 (morbid obesity) ([address]

## 2022-04-25 NOTE — PROGRESS NOTES
Chief Complaint   Patient presents with    Follow-up     BILATERAL KNEE PAIN    Patient is seen here today complaining of pain in his left knee. Previously had seen him for pain in the right knee and had corticosteroid injection which had helped him. The patient says that the pain is felt in the morning right in front of the knee joint. However as the day progresses his feet is felt over the medial and lateral side of the joint. He says that work he has to do everything. Even at the house he has to do everything. Examination: Left knee examination shows mild clunking of the patella. Otherwise has excellent full range of motion. Mild tenderness of the medial joint line. Right knee examination shows full range of motion and severe crunching of the patella. X-rays: He did have x-ray of the right knee done in November and had x-rays done of the left knee today and the joint spaces are well-maintained with mild degenerative changes the patellofemoral compartment. There is no malalignment. Diagnosis: Bilateral patellofemoral arthritis. Mild tibiofemoral arthritis. Treatment: I put him on Naprosyn 500 mg twice a day and he can be off work for 2 weeks. He is going to give us a call in 2 weeks time to see how he responded to medication.     Treatment:

## 2022-04-26 RX ORDER — FLUTICASONE PROPIONATE 50 MCG
SPRAY, SUSPENSION (ML) NASAL
Qty: 16 G | Refills: 3 | Status: SHIPPED | OUTPATIENT
Start: 2022-04-26 | End: 2022-09-21

## 2022-04-27 ENCOUNTER — TELEPHONE (OUTPATIENT)
Dept: INTERNAL MEDICINE | Age: 42
End: 2022-04-27

## 2022-04-27 DIAGNOSIS — M17.0 PRIMARY OSTEOARTHRITIS OF BOTH KNEES: Primary | ICD-10-CM

## 2022-04-27 NOTE — TELEPHONE ENCOUNTER
Pt came into office with Basic Medical Form that he would like completed at his appt on 6/14. Would you like patient to get functional capacity prior to having forms completed and appt in June

## 2022-04-29 ENCOUNTER — TELEPHONE (OUTPATIENT)
Dept: ORTHOPEDIC SURGERY | Age: 42
End: 2022-04-29

## 2022-05-03 ENCOUNTER — OFFICE VISIT (OUTPATIENT)
Dept: BARIATRICS/WEIGHT MGMT | Age: 42
End: 2022-05-03
Payer: MEDICAID

## 2022-05-03 VITALS
HEIGHT: 76 IN | BODY MASS INDEX: 38.36 KG/M2 | SYSTOLIC BLOOD PRESSURE: 126 MMHG | HEART RATE: 74 BPM | DIASTOLIC BLOOD PRESSURE: 84 MMHG | WEIGHT: 315 LBS

## 2022-05-03 DIAGNOSIS — I10 ESSENTIAL HYPERTENSION: Primary | ICD-10-CM

## 2022-05-03 DIAGNOSIS — G89.29 CHRONIC PAIN OF RIGHT KNEE: ICD-10-CM

## 2022-05-03 DIAGNOSIS — Z99.89 OSA ON CPAP: ICD-10-CM

## 2022-05-03 DIAGNOSIS — J45.20 MILD INTERMITTENT ASTHMA, UNSPECIFIED WHETHER COMPLICATED: ICD-10-CM

## 2022-05-03 DIAGNOSIS — G47.33 OSA ON CPAP: ICD-10-CM

## 2022-05-03 DIAGNOSIS — M25.561 CHRONIC PAIN OF RIGHT KNEE: ICD-10-CM

## 2022-05-03 DIAGNOSIS — E66.01 OBESITY, CLASS III, BMI 40-49.9 (MORBID OBESITY) (HCC): ICD-10-CM

## 2022-05-03 PROCEDURE — G8417 CALC BMI ABV UP PARAM F/U: HCPCS | Performed by: NURSE PRACTITIONER

## 2022-05-03 PROCEDURE — 1036F TOBACCO NON-USER: CPT | Performed by: NURSE PRACTITIONER

## 2022-05-03 PROCEDURE — G8427 DOCREV CUR MEDS BY ELIG CLIN: HCPCS | Performed by: NURSE PRACTITIONER

## 2022-05-03 PROCEDURE — 99213 OFFICE O/P EST LOW 20 MIN: CPT | Performed by: NURSE PRACTITIONER

## 2022-05-03 NOTE — PROGRESS NOTES
Group Lifestyle Balance Follow-up Progress Note      Subjective     Patient is here for group medical appointment for Group Lifestyle Balance weight management program follow-up for the chronic conditions of MIGEL, HTN, Asthma, Chronic Pain Right Knee. Patient continues on the program and tolerating well. Total weight loss to date is 39 lbs. No current issues. Allergies:  No Known Allergies    Past Medical History:     Past Medical History:   Diagnosis Date    Anxiety     Arthritis     CAD (coronary artery disease)     Fatty liver     Fatty liver disease, nonalcoholic 6/50/7105    Hypertension     Obesity     Unspecified sleep apnea     cpap   . Past Surgical History:  History reviewed. No pertinent surgical history. Family History:  Family History   Problem Relation Age of Onset    Arthritis Mother     Diabetes Father     Arthritis Father     Stroke Maternal Grandmother     Heart Disease Maternal Grandmother     Stroke Maternal Grandfather     Heart Disease Maternal Grandfather     Early Death Maternal Grandfather     Stroke Paternal Grandmother     Heart Disease Paternal Grandmother     Stroke Paternal Grandfather     Heart Disease Paternal Grandfather     Early Death Paternal Grandfather        Social History:  Social History     Socioeconomic History    Marital status: Single     Spouse name: James Delgado Number of children: 0    Years of education: 12    Highest education level:  Bachelor's degree (e.g., BA, AB, BS)   Occupational History    Not on file   Tobacco Use    Smoking status: Never Smoker    Smokeless tobacco: Never Used   Vaping Use    Vaping Use: Never used   Substance and Sexual Activity    Alcohol use: No    Drug use: No    Sexual activity: Yes     Partners: Female   Other Topics Concern    Not on file   Social History Narrative    ** Merged History Encounter **          Social Determinants of Health     Financial Resource Strain: Low Risk     Difficulty of Paying Living Expenses: Not very hard   Food Insecurity: No Food Insecurity    Worried About Running Out of Food in the Last Year: Never true    Ran Out of Food in the Last Year: Never true   Transportation Needs:     Lack of Transportation (Medical): Not on file    Lack of Transportation (Non-Medical):  Not on file   Physical Activity:     Days of Exercise per Week: Not on file    Minutes of Exercise per Session: Not on file   Stress:     Feeling of Stress : Not on file   Social Connections:     Frequency of Communication with Friends and Family: Not on file    Frequency of Social Gatherings with Friends and Family: Not on file    Attends Judaism Services: Not on file    Active Member of [address] [place] [address] or Organizations: Not on file    Attends Club or Organization Meetings: Not on file    Marital Status: Not on file   Intimate Partner Violence:     Fear of Current or Ex-Partner: Not on file    Emotionally Abused: Not on file    Physically Abused: Not on file    Sexually Abused: Not on file   Housing Stability:     Unable to Pay for Housing in the Last Year: Not on file    Number of [place] in the Last Year: Not on file    Unstable Housing in the Last Year: Not on file       Current Medications:  Current Outpatient Medications   Medication Sig Dispense Refill    fluticasone (FLONASE) 50 MCG/ACT nasal spray instill 1 spray into each nostril once daily 16 g 3    naproxen (NAPROSYN) 500 MG tablet Take 1 tablet by mouth 2 times daily (with meals) 180 tablet 1    ARIPiprazole (ABILIFY) 10 MG tablet       diclofenac sodium (VOLTAREN) 1 % GEL Apply 4 g topically 4 times daily 50 g 2    lisinopril (PRINIVIL;ZESTRIL) 40 MG tablet Take 1 tablet by mouth daily 90 tablet 1    amLODIPine (NORVASC) 10 MG tablet Take 1 tablet by mouth daily 90 tablet 1    metoprolol tartrate (LOPRESSOR) 25 MG tablet 1 tablet 2/day 180 tablet 0    ACETAMINOPHEN EXTRA STRENGTH 500 MG tablet take 1 tablet by mouth twice a day AS NEEDED FOR PAIN 60 tablet 5    ibuprofen (ADVIL;MOTRIN) 800 MG tablet Take 1 tablet by mouth every 8 hours as needed for Pain 30 tablet 1    albuterol sulfate  (90 Base) MCG/ACT inhaler inhale 2 puffs by mouth and INTO THE LUNGS every 6 hours if needed for wheezing 18 g 3    vitamin D (ERGOCALCIFEROL) 1.25 MG (16183 UT) CAPS capsule Take 1 capsule by mouth once a week      melatonin 10 MG CAPS capsule Take 1 capsule by mouth nightly 30 capsule 0    valACYclovir (VALTREX) 1 g tablet        No current facility-administered medications for this visit. Vital Signs:  /84 (Site: Right Upper Arm, Position: Sitting, Cuff Size: Large Adult)   Pulse 74   Ht 6' 4\" (1.93 m)   Wt (!) 331 lb (150.1 kg)   BMI 40.29 kg/m²     BMI/Height/Weight:  Body mass index is 40.29 kg/m². Review of Systems  Constitutional: Weight loss      Objective:      Physical Exam   Vital signs reviewed. General Appearance: Well-developed and well-nourished. No acute distress. Skin: Warm, dry. Head: Normocephalic. Pulmonary/Chest: Normal respiratory effort. Musculoskeletal: Movement x4. Neurological:  Alert and oriented. Individual goal for this encounter:  Lose weight to feel better and keep my knee better. My goal to be 210-215 lbs. X ? Vital signs reviewed and discussed with patient. ? Labs/EKG reviewed and discussed with patient. ? Blood sugar log reviewed and discussed with patient. ? Physical activity discussed. ? Medication changes recommended. ? Smoking cessation discussed. X ? Specific questions/concerns addressed. Specific group medical question(s) addressed in this encounter:  Discussion about cancer. Group discussion topic for this encounter:     ? [place] [employer]   ? Be a Fat & Calorie    ? Healthy Eating   ? Move Those Muscles   ? Tip the Calorie Balance   ? Take charge of What's Around You   ? Problem Solving   ?  Step Up Your Physical Activity Plan   ? Manage Slips and Self-Defeating Thoughts   ? [address]   ? Make Social Cues Work for [place]   ? Ways to Stay Motivated   ? Preparing for Long Term Self-Management   ? Take Charge of Your Lifestyle   ? Mindful Eating, Mindful Movement   ? Manage Your Stress  X ? Sit Less for Health   ? More Volume, Fewer Calories   ? Stay Active   ? Balance Your Thoughts   ? Heart Health    ? Looking Back and Looking Forward    Total time:  90 minutes, greater than 50% of visit spent in group counseling/education. 20 minutes were spent in reviewing chart, vital signs, labs, and answering the patient's individual questions. Assessment:       Diagnosis Orders   1. Essential hypertension     2. MIGEL on CPAP     3. Obesity, Class III, BMI 40-49.9 (morbid obesity) ([facility] [address]     4. Mild intermittent asthma, unspecified whether complicated     5. Chronic pain of right knee         Plan:      Track food and weight. Return to clinic as per group medical appointment schedule. Follow-up:  Return in about 1 month (around 6/3/2022). Orders:  No orders of the defined types were placed in this encounter. Prescriptions:  No orders of the defined types were placed in this encounter.       Electronically signed by:  Yu Pretty CNP

## 2022-05-18 ENCOUNTER — OFFICE VISIT (OUTPATIENT)
Dept: PODIATRY | Age: 42
End: 2022-05-18
Payer: MEDICAID

## 2022-05-18 VITALS — WEIGHT: 315 LBS | HEIGHT: 76 IN | BODY MASS INDEX: 38.36 KG/M2

## 2022-05-18 DIAGNOSIS — M79.672 FOOT PAIN, BILATERAL: ICD-10-CM

## 2022-05-18 DIAGNOSIS — M79.671 FOOT PAIN, BILATERAL: ICD-10-CM

## 2022-05-18 DIAGNOSIS — M21.42 PES PLANUS OF BOTH FEET: ICD-10-CM

## 2022-05-18 DIAGNOSIS — S86.112A POSTERIOR TIBIAL TENDON TEAR, TRAUMATIC, LEFT, INITIAL ENCOUNTER: ICD-10-CM

## 2022-05-18 DIAGNOSIS — M21.41 PES PLANUS OF BOTH FEET: ICD-10-CM

## 2022-05-18 DIAGNOSIS — L60.0 INGROWN NAIL: ICD-10-CM

## 2022-05-18 DIAGNOSIS — B35.1 DERMATOPHYTOSIS OF NAIL: Primary | ICD-10-CM

## 2022-05-18 PROCEDURE — G8417 CALC BMI ABV UP PARAM F/U: HCPCS | Performed by: PODIATRIST

## 2022-05-18 PROCEDURE — G8427 DOCREV CUR MEDS BY ELIG CLIN: HCPCS | Performed by: PODIATRIST

## 2022-05-18 PROCEDURE — 1036F TOBACCO NON-USER: CPT | Performed by: PODIATRIST

## 2022-05-18 PROCEDURE — 11721 DEBRIDE NAIL 6 OR MORE: CPT | Performed by: PODIATRIST

## 2022-05-18 PROCEDURE — 99213 OFFICE O/P EST LOW 20 MIN: CPT | Performed by: PODIATRIST

## 2022-05-18 RX ORDER — LURASIDONE HYDROCHLORIDE 20 MG/1
TABLET, FILM COATED ORAL
COMMUNITY
Start: 2022-04-28 | End: 2022-08-23 | Stop reason: ALTCHOICE

## 2022-05-18 NOTE — PROGRESS NOTES
90 tablet 1    amLODIPine (NORVASC) 10 MG tablet Take 1 tablet by mouth daily 90 tablet 1    metoprolol tartrate (LOPRESSOR) 25 MG tablet 1 tablet 2/day 180 tablet 0    ACETAMINOPHEN EXTRA STRENGTH 500 MG tablet take 1 tablet by mouth twice a day AS NEEDED FOR PAIN 60 tablet 5    ibuprofen (ADVIL;MOTRIN) 800 MG tablet Take 1 tablet by mouth every 8 hours as needed for Pain 30 tablet 1    albuterol sulfate  (90 Base) MCG/ACT inhaler inhale 2 puffs by mouth and INTO THE LUNGS every 6 hours if needed for wheezing 18 g 3    vitamin D (ERGOCALCIFEROL) 1.25 MG (92807 UT) CAPS capsule Take 1 capsule by mouth once a week      melatonin 10 MG CAPS capsule Take 1 capsule by mouth nightly 30 capsule 0    valACYclovir (VALTREX) 1 g tablet       [DISCONTINUED] metoprolol tartrate (LOPRESSOR) 25 MG tablet Take 2/day 180 tablet 1     No current facility-administered medications on file prior to visit. Review of Systems. Review of Systems:   History obtained from chart review and the patient  General ROS: negative for - chills, fatigue, fever, night sweats or weight gain  Constitutional: Negative for chills, diaphoresis, fatigue, fever and unexpected weight change. Musculoskeletal: Positive for arthralgias, gait problem and joint swelling. Neurological ROS: negative for - behavioral changes, confusion, headaches or seizures. Negative for weakness and numbness. Dermatological ROS: negative for - mole changes, rash  Cardiovascular: Negative for leg swelling. Gastrointestinal: Negative for constipation, diarrhea, nausea and vomiting. Objective:  Dermatologic Exam:  Skin lesion/ulceration Absent . Skin No rashes or nodules noted. .   Skin is thin, with flaky sloughing skin as well as decreased hair growth to the lower leg  Small red hemosiderin deposits seen dorsal foot   Musculoskeletal:     1st MPJ ROM decreased, Bilateral. +POP along plantar arch, tomy.   Muscle strength 5/5, Bilateral.  Pain present upon palpation of toenails 1-5, Bilateral. decreased medial longitudinal arch, Bilateral.  Ankle ROM decreased,Bilateral.    Dorsally contracted digits present digits 2, Bilateral.     Vascular: DP pulses 1/4 bilateral.  PT pulses 0/4 bilateral.   CFT <5 seconds, Bilateral.  Hair growth absent to the level of the digits, Bilateral.  Edema present, Bilateral.  Varicosities absent, Bilateral. Erythema absent, Bilateral    Neurological: Sensation diminshed to light touch to level of digits, Bilateral.  Protective sensation intact 6/10 sites via 5.07/10g [place] Monofilament, Bilateral.  negative Tinel's, Bilateral.  negative Valleix sign, Bilateral.      Integument: Warm, dry, supple, Bilateral.  Open lesion absent, Bilateral.  Interdigital maceration absent to web spaces 4, Bilateral.  Nails 1-5 left and 1-5 right thickened > 3.0 mm, dystrophic and crumbly, discolored with subungual debris. Fissures absent, Bilateral.   General: AAO x 3 in NAD.     Derm  Toenail Description  Sites of Onychomycosis Involvement (Check affected area)  [x] [x] [x] [x] [x] [x] [x] [x] [x] [x]  5 4 3 2 1 1 2 3 4 5                          Right                                        Left    Thickness  [x] [x] [x] [x] [x] [x] [x] [x] [x] [x]  5 4 3 2 1 1 2 3 4 5                         Right                                        Left    Dystrophic Changes   [x] [x] [x] [x] [x] [x] [x] [x] [x] [x]  5 4 3 2 1 1 2 3 4 5                         Right                                        Left    Color  [x] [x] [x] [x] [x] [x] [x] [x] [x] [x]  5 4 3 2 1 1 2 3 4 5                          Right                                        Left    Incurvation/Ingrowin   [] [] [] [] [] [] [] [] [] []  5 4 3 2 1 1 2 3 4 5                         Right                                        Left    Inflammation/Pain   [x] [x] [x] [x] [x] [x] [x] [x] [x] [x]  5 4 3  2 1 1 2 3 4 5                         Right Left       Nails are painful 1-10 with direct palpation. Q7   []Yes  []No                Q8   [x]Yes  []No                     Q9   []Yes    []No  Assessment:  38 y.o. male with:    Diagnosis Orders   1. Dermatophytosis of nail  54064 - [place] DEBRIDEMENT OF NAILS, 6 OR MORE   2. Foot pain, bilateral  33528 - [place] DEBRIDEMENT OF NAILS, 6 OR MORE   3. Ingrown nail  63420 - [place] DEBRIDEMENT OF NAILS, 6 OR MORE   4. Posterior tibial tendon tear, traumatic, left, initial encounter  48302 - [place] DEBRIDEMENT OF NAILS, 6 OR MORE   5. Pes planus of both feet  71295 - [place] DEBRIDEMENT OF NAILS, 6 OR MORE           Plan:   Pt was evaluated and examined. Patient was given personalized discharge instructions. Pt to wear orthotics to support arches. Discussed otc vs custom orthotics. All labs were reviewed and all imagining including the above findings were reviewed PRIOR to the patients arrival and with the patient today. Previous patient encounter was reviewed. Encounters from the patients other medical providers were reviewed and noted. Time was spent educating the patient on proper care of the feet and ankles. All the above diagnosis were addressed at todays visit and all questions were answered. A total of 20 minutes was spent with this patients encounter which included charting after the patients visit    Nails 1-10 were debrided in length and thickness sharply with a nail nipper and  without incident. Pt will follow up in 9 weeks or sooner if any problems arise. Diagnosis was discussed with the pt and all of their questions were answered in detail. Proper foot hygiene and care was discussed with the pt. Patient to check feet daily and contact the office with any questions/problems/concerns. Other comorbidity noted and will be managed by PCP. Pain waiver discussed with patient and confirmed.    5/18/2022      Electronically signed by Lico Dill DPM on 5/18/2022 at 11:11 AM  5/18/2022

## 2022-08-02 DIAGNOSIS — M25.562 CHRONIC PAIN OF BOTH KNEES: ICD-10-CM

## 2022-08-02 DIAGNOSIS — G89.29 CHRONIC PAIN OF BOTH KNEES: ICD-10-CM

## 2022-08-02 DIAGNOSIS — M25.561 CHRONIC PAIN OF BOTH KNEES: ICD-10-CM

## 2022-08-03 RX ORDER — PSEUDOEPHED/ACETAMINOPH/DIPHEN 30MG-500MG
TABLET ORAL
Qty: 60 TABLET | Refills: 0 | Status: SHIPPED | OUTPATIENT
Start: 2022-08-03 | End: 2022-09-06

## 2022-08-03 NOTE — TELEPHONE ENCOUNTER
E-scribe request for Acetaminophen . Please review and e-scribe if applicable.        Next Visit Date:  Future Appointments   Date Time Provider Kathi Renetta   8/9/2022  5:00 PM JABARI Muse - CNP Weight Mgmt [facility]   8/23/2022  1:30 PM Partha Walters MD [facility] Maintenance   Topic Date Due    COVID-19 Vaccine (3 - Booster for Moderna series) 11/12/2021    Flu vaccine (1) 09/01/2022    Depression Monitoring  02/08/2023    Lipids  01/05/2027    DTaP/Tdap/Td vaccine (3 - Td or Tdap) 04/27/2028    Pneumococcal 0-64 years Vaccine  Completed    Hepatitis C screen  Completed    HIV screen  Completed    Hepatitis A vaccine  Aged Out    Hepatitis B vaccine  Aged Out    Hib vaccine  Aged Out    Meningococcal (ACWY) vaccine  Aged Out               (applicable per patient's age: Cancer Screenings, Depression Screening, Fall Risk Screening, Immunizations)    Hemoglobin A1C (%)   Date Value   01/05/2022 5.5   05/28/2021 5.6   02/22/2021 5.5     LDL Cholesterol (mg/dL)   Date Value   01/05/2022 101     AST (U/L)   Date Value   01/05/2022 11     ALT (U/L)   Date Value   01/05/2022 9     BUN (mg/dL)   Date Value   01/10/2022 15      (goal A1C is < 7)   (goal LDL is <100) need 30-50% reduction from baseline     BP Readings from Last 3 Encounters:   05/03/22 126/84   03/17/22 110/70   03/10/22 134/82    (goal /80)      All Future Testing planned in [facility]:  Lab Frequency Next Occurrence   PT functional capacity evaluation (FCE) Once 04/29/2022            Patient Active Problem List:     Essential hypertension     MIGEL on CPAP     Vitamin D deficiency     Body mass index (BMI) of 38.0-38.9 in adult     IGT (impaired glucose tolerance)     Mild intermittent asthma     Cervical spondylosis     Degeneration of cervical intervertebral disc     Osteoarthritis of knee     Scoliosis deformity of spine     Subacromial impingement     Chronic pain of right knee     Obesity, Class III, BMI 40-49.9 (morbid obesity) ([facility]

## 2022-08-09 ENCOUNTER — OFFICE VISIT (OUTPATIENT)
Dept: BARIATRICS/WEIGHT MGMT | Age: 42
End: 2022-08-09
Payer: MEDICAID

## 2022-08-09 VITALS
BODY MASS INDEX: 38.36 KG/M2 | SYSTOLIC BLOOD PRESSURE: 144 MMHG | WEIGHT: 315 LBS | HEIGHT: 76 IN | DIASTOLIC BLOOD PRESSURE: 77 MMHG | HEART RATE: 66 BPM

## 2022-08-09 DIAGNOSIS — M25.561 CHRONIC PAIN OF RIGHT KNEE: ICD-10-CM

## 2022-08-09 DIAGNOSIS — Z99.89 OSA ON CPAP: ICD-10-CM

## 2022-08-09 DIAGNOSIS — I10 ESSENTIAL HYPERTENSION: Primary | ICD-10-CM

## 2022-08-09 DIAGNOSIS — E66.9 OBESITY (BMI 30-39.9): ICD-10-CM

## 2022-08-09 DIAGNOSIS — J45.20 MILD INTERMITTENT ASTHMA WITHOUT COMPLICATION: ICD-10-CM

## 2022-08-09 DIAGNOSIS — G47.33 OSA ON CPAP: ICD-10-CM

## 2022-08-09 DIAGNOSIS — G89.29 CHRONIC PAIN OF RIGHT KNEE: ICD-10-CM

## 2022-08-09 PROCEDURE — G8417 CALC BMI ABV UP PARAM F/U: HCPCS | Performed by: NURSE PRACTITIONER

## 2022-08-09 PROCEDURE — G8427 DOCREV CUR MEDS BY ELIG CLIN: HCPCS | Performed by: NURSE PRACTITIONER

## 2022-08-09 PROCEDURE — 1036F TOBACCO NON-USER: CPT | Performed by: NURSE PRACTITIONER

## 2022-08-09 PROCEDURE — 99213 OFFICE O/P EST LOW 20 MIN: CPT | Performed by: NURSE PRACTITIONER

## 2022-08-09 NOTE — PROGRESS NOTES
Group Lifestyle Balance Follow-up Progress Note      Subjective     Patient is here for group medical appointment for Group Lifestyle Balance weight management program follow-up for the chronic conditions of MIGEL, HTN, Asthma, Chronic Pain Right Knee. Patient continues on the program and tolerating well. Total weight loss to date is 44 lbs. No current issues. Allergies:  No Known Allergies    Past Medical History:     Past Medical History:   Diagnosis Date    Anxiety     Arthritis     CAD (coronary artery disease)     Fatty liver     Fatty liver disease, nonalcoholic 3/42/0501    Hypertension     Obesity     Unspecified sleep apnea     cpap   . Past Surgical History:  History reviewed. No pertinent surgical history. Family History:  Family History   Problem Relation Age of Onset    Arthritis Mother     Diabetes Father     Arthritis Father     Stroke Maternal Grandmother     Heart Disease Maternal Grandmother     Stroke Maternal Grandfather     Heart Disease Maternal Grandfather     Early Death Maternal Grandfather     Stroke Paternal Grandmother     Heart Disease Paternal Grandmother     Stroke Paternal Grandfather     Heart Disease Paternal Grandfather     Early Death Paternal Grandfather        Social History:  Social History     Socioeconomic History    Marital status: Single     Spouse name: Agnes Sawant    Number of children: 0    Years of education: 16    Highest education level:  Bachelor's degree (e.g., BA, AB, BS)   Occupational History    Not on file   Tobacco Use    Smoking status: Never    Smokeless tobacco: Never   Vaping Use    Vaping Use: Never used   Substance and Sexual Activity    Alcohol use: No    Drug use: No    Sexual activity: Yes     Partners: Female   Other Topics Concern    Not on file   Social History Narrative    ** Merged History Encounter **          Social Determinants of Health     Financial Resource Strain: Not on file   Food Insecurity: Not on file   Transportation Needs: Not on file   Physical Activity: Not on file   Stress: Not on file   Social Connections: Not on file   Intimate Partner Violence: Not on file   Housing Stability: Not on file       Current Medications:  Current Outpatient Medications   Medication Sig Dispense Refill    ACETAMINOPHEN EXTRA STRENGTH 500 MG tablet take 1 tablet by mouth twice a day AS NEEDED FOR PAIN 60 tablet 0    LATUDA 20 MG TABS tablet take 1 tablet by mouth once daily WITH A MEAL (OF AT LEAST 350 CALORIES)      fluticasone (FLONASE) 50 MCG/ACT nasal spray instill 1 spray into each nostril once daily 16 g 3    naproxen (NAPROSYN) 500 MG tablet Take 1 tablet by mouth 2 times daily (with meals) 180 tablet 1    ARIPiprazole (ABILIFY) 10 MG tablet       diclofenac sodium (VOLTAREN) 1 % GEL Apply 4 g topically 4 times daily 50 g 2    lisinopril (PRINIVIL;ZESTRIL) 40 MG tablet Take 1 tablet by mouth daily 90 tablet 1    amLODIPine (NORVASC) 10 MG tablet Take 1 tablet by mouth daily 90 tablet 1    metoprolol tartrate (LOPRESSOR) 25 MG tablet 1 tablet 2/day 180 tablet 0    ibuprofen (ADVIL;MOTRIN) 800 MG tablet Take 1 tablet by mouth every 8 hours as needed for Pain 30 tablet 1    albuterol sulfate  (90 Base) MCG/ACT inhaler inhale 2 puffs by mouth and INTO THE LUNGS every 6 hours if needed for wheezing 18 g 3    vitamin D (ERGOCALCIFEROL) 1.25 MG (31242 UT) CAPS capsule Take 1 capsule by mouth once a week      melatonin 10 MG CAPS capsule Take 1 capsule by mouth nightly 30 capsule 0    valACYclovir (VALTREX) 1 g tablet        No current facility-administered medications for this visit. Vital Signs:  BP (!) 144/77 (Site: Right Upper Arm, Position: Sitting, Cuff Size: Large Adult)   Pulse 66   Ht 6' 4\" (1.93 m)   Wt (!) 326 lb (147.9 kg)   BMI 39.68 kg/m²     BMI/Height/Weight:  Body mass index is 39.68 kg/m². Review of Systems  Constitutional: Weight loss      Objective:      Physical Exam   Vital signs reviewed.   General Appearance: Well-developed and well-nourished. No acute distress. Skin: Warm, dry. Head: Normocephalic. Pulmonary/Chest: Normal respiratory effort. Musculoskeletal: Movement x4. Neurological:  Alert and oriented. Individual goal for this encounter:  Lose weight to feel better and keep my knee better. My goal to be 210-215 lbs. [x] Vital signs reviewed and discussed with patient.   [] Labs/EKG reviewed and discussed with patient. [] Blood sugar log reviewed and discussed with patient. [] Physical activity discussed. [] Medication changes recommended. [] Smoking cessation discussed. [x] Specific questions/concerns addressed. Specific group medical question(s) addressed in this encounter:  Discussion about Vitamin D.      Group discussion topic for this encounter:     [] Welcome Vania Daniel   [] Be a Fat & Calorie    [] Healthy Eating   [] Move Those Muscles   [] Tip the Calorie Balance   [] Take charge of What's Around You   [] Problem Solving   [] Step Up Your Physical Activity Plan   [] Manage Slips and Self-Defeating Thoughts   [] [place] To Healthy Eating Out   [] Make Social Cues Work for [place]   [] Ways to Stay Motivated   [] Preparing for Long Term Self-Management   [x] Take Charge of Your Lifestyle   [] Mindful Eating, Mindful Movement   [] Manage Your Stress   [] Sit Less for Health   [] More Volume, Fewer Calories   [] Stay Active   [] Balance Your Thoughts   [] Heart Health    [] Looking Back and Looking Forward    Total time:  90 minutes, greater than 50% of visit spent in group counseling/education. 20 minutes were spent in reviewing chart, vital signs, labs, and answering the patient's individual questions. Assessment:       Diagnosis Orders   1. Essential hypertension        2. MIGEL on CPAP        3. Mild intermittent asthma without complication        4. Chronic pain of right knee        5. Obesity (BMI 30-39. 9)            Plan:      Track food and weight.     Return to clinic as per group medical appointment schedule. Follow-up:  Return in about 1 month (around 9/9/2022). Orders:  No orders of the defined types were placed in this encounter. Prescriptions:  No orders of the defined types were placed in this encounter.       Electronically signed by:  Molly Joynre CNP

## 2022-08-23 ENCOUNTER — OFFICE VISIT (OUTPATIENT)
Dept: INTERNAL MEDICINE | Age: 42
End: 2022-08-23
Payer: MEDICAID

## 2022-08-23 VITALS
TEMPERATURE: 98.1 F | WEIGHT: 315 LBS | SYSTOLIC BLOOD PRESSURE: 131 MMHG | OXYGEN SATURATION: 96 % | HEART RATE: 77 BPM | BODY MASS INDEX: 39.93 KG/M2 | DIASTOLIC BLOOD PRESSURE: 83 MMHG

## 2022-08-23 DIAGNOSIS — I10 ESSENTIAL HYPERTENSION: Primary | ICD-10-CM

## 2022-08-23 DIAGNOSIS — E66.9 OBESITY (BMI 30-39.9): ICD-10-CM

## 2022-08-23 DIAGNOSIS — M17.0 PRIMARY OSTEOARTHRITIS OF BOTH KNEES: ICD-10-CM

## 2022-08-23 DIAGNOSIS — G47.33 OSA (OBSTRUCTIVE SLEEP APNEA): ICD-10-CM

## 2022-08-23 DIAGNOSIS — R73.02 IGT (IMPAIRED GLUCOSE TOLERANCE): ICD-10-CM

## 2022-08-23 PROCEDURE — G8417 CALC BMI ABV UP PARAM F/U: HCPCS | Performed by: INTERNAL MEDICINE

## 2022-08-23 PROCEDURE — 1036F TOBACCO NON-USER: CPT | Performed by: INTERNAL MEDICINE

## 2022-08-23 PROCEDURE — 99213 OFFICE O/P EST LOW 20 MIN: CPT | Performed by: INTERNAL MEDICINE

## 2022-08-23 PROCEDURE — G8427 DOCREV CUR MEDS BY ELIG CLIN: HCPCS | Performed by: INTERNAL MEDICINE

## 2022-08-23 RX ORDER — PSYLLIUM HUSK 3.4 G/7G
5 POWDER ORAL NIGHTLY
COMMUNITY
Start: 2022-07-26 | End: 2022-08-23 | Stop reason: DRUGHIGH

## 2022-08-23 RX ORDER — AMLODIPINE BESYLATE 10 MG/1
10 TABLET ORAL DAILY
Qty: 90 TABLET | Refills: 1 | Status: SHIPPED | OUTPATIENT
Start: 2022-08-23

## 2022-08-23 RX ORDER — LISINOPRIL 40 MG/1
40 TABLET ORAL DAILY
Qty: 90 TABLET | Refills: 1 | Status: SHIPPED | OUTPATIENT
Start: 2022-08-23

## 2022-08-23 RX ORDER — TRAZODONE HYDROCHLORIDE 50 MG/1
50 TABLET ORAL NIGHTLY
COMMUNITY
Start: 2022-08-22

## 2022-08-23 SDOH — ECONOMIC STABILITY: FOOD INSECURITY: WITHIN THE PAST 12 MONTHS, THE FOOD YOU BOUGHT JUST DIDN'T LAST AND YOU DIDN'T HAVE MONEY TO GET MORE.: NEVER TRUE

## 2022-08-23 SDOH — ECONOMIC STABILITY: FOOD INSECURITY: WITHIN THE PAST 12 MONTHS, YOU WORRIED THAT YOUR FOOD WOULD RUN OUT BEFORE YOU GOT MONEY TO BUY MORE.: NEVER TRUE

## 2022-08-23 ASSESSMENT — ENCOUNTER SYMPTOMS
ABDOMINAL PAIN: 0
COUGH: 0
BLOOD IN STOOL: 0
BACK PAIN: 0
CONSTIPATION: 0
WHEEZING: 0
PHOTOPHOBIA: 0
SHORTNESS OF BREATH: 0

## 2022-08-23 ASSESSMENT — SOCIAL DETERMINANTS OF HEALTH (SDOH): HOW HARD IS IT FOR YOU TO PAY FOR THE VERY BASICS LIKE FOOD, HOUSING, MEDICAL CARE, AND HEATING?: NOT HARD AT ALL

## 2022-09-03 DIAGNOSIS — M25.562 CHRONIC PAIN OF BOTH KNEES: ICD-10-CM

## 2022-09-03 DIAGNOSIS — G89.29 CHRONIC PAIN OF BOTH KNEES: ICD-10-CM

## 2022-09-03 DIAGNOSIS — M25.561 CHRONIC PAIN OF BOTH KNEES: ICD-10-CM

## 2022-09-06 RX ORDER — PSEUDOEPHED/ACETAMINOPH/DIPHEN 30MG-500MG
TABLET ORAL
Qty: 60 TABLET | Refills: 0 | Status: SHIPPED | OUTPATIENT
Start: 2022-09-06 | End: 2022-10-07

## 2022-09-06 NOTE — TELEPHONE ENCOUNTER
E-scribe request for Tylenol . Please review and e-scribe if applicable.        Next Visit Date:  Future Appointments   Date Time Provider Kathi Jackson   9/13/2022  5:00 PM JABARI Vasquez - CNP Weight Mgmt [facility]   9/14/2022  7:30 PM [facility] RM 3 [facility]     Health Maintenance   Topic Date Due    COVID-19 Vaccine (3 - Booster for Moderna series) 11/12/2021    Flu vaccine (1) 09/01/2022    Depression Monitoring  02/08/2023    Lipids  01/05/2027    DTaP/Tdap/Td vaccine (3 - Td or Tdap) 04/27/2028    Pneumococcal 0-64 years Vaccine  Completed    Hepatitis C screen  Completed    HIV screen  Completed    Hepatitis A vaccine  Aged Out    Hepatitis B vaccine  Aged Out    Hib vaccine  Aged Out    Meningococcal (ACWY) vaccine  Aged Out               (applicable per patient's age: Cancer Screenings, Depression Screening, Fall Risk Screening, Immunizations)    Hemoglobin A1C (%)   Date Value   01/05/2022 5.5   05/28/2021 5.6   02/22/2021 5.5     LDL Cholesterol (mg/dL)   Date Value   01/05/2022 101     AST (U/L)   Date Value   01/05/2022 11     ALT (U/L)   Date Value   01/05/2022 9     BUN (mg/dL)   Date Value   01/10/2022 15      (goal A1C is < 7)   (goal LDL is <100) need 30-50% reduction from baseline     BP Readings from Last 3 Encounters:   08/23/22 131/83   08/09/22 (!) 144/77   05/03/22 126/84    (goal /80)      All Future Testing planned in [facility]:  Lab Frequency Next Occurrence   PT functional capacity evaluation (FCE) Once 04/29/2022   Sleep Study with PAP Titration Once 08/30/2022            Patient Active Problem List:     Essential hypertension     MIGEL on CPAP     Vitamin D deficiency     Obesity (BMI 30-39.9)     IGT (impaired glucose tolerance)     Mild intermittent asthma     Cervical spondylosis     Degeneration of cervical intervertebral disc     Osteoarthritis of knee     Scoliosis deformity of spine     Subacromial impingement     Chronic pain of right knee     Obesity,

## 2022-09-13 ENCOUNTER — OFFICE VISIT (OUTPATIENT)
Dept: BARIATRICS/WEIGHT MGMT | Age: 42
End: 2022-09-13
Payer: MEDICAID

## 2022-09-13 VITALS
WEIGHT: 315 LBS | DIASTOLIC BLOOD PRESSURE: 88 MMHG | HEART RATE: 90 BPM | BODY MASS INDEX: 38.36 KG/M2 | HEIGHT: 76 IN | SYSTOLIC BLOOD PRESSURE: 139 MMHG

## 2022-09-13 DIAGNOSIS — G89.29 CHRONIC PAIN OF RIGHT KNEE: ICD-10-CM

## 2022-09-13 DIAGNOSIS — I10 ESSENTIAL HYPERTENSION: Primary | ICD-10-CM

## 2022-09-13 DIAGNOSIS — M25.561 CHRONIC PAIN OF RIGHT KNEE: ICD-10-CM

## 2022-09-13 DIAGNOSIS — J45.20 MILD INTERMITTENT ASTHMA WITHOUT COMPLICATION: ICD-10-CM

## 2022-09-13 DIAGNOSIS — G47.33 OSA ON CPAP: ICD-10-CM

## 2022-09-13 DIAGNOSIS — Z99.89 OSA ON CPAP: ICD-10-CM

## 2022-09-13 DIAGNOSIS — E66.9 OBESITY (BMI 30-39.9): ICD-10-CM

## 2022-09-13 PROCEDURE — 99213 OFFICE O/P EST LOW 20 MIN: CPT | Performed by: NURSE PRACTITIONER

## 2022-09-13 PROCEDURE — G8427 DOCREV CUR MEDS BY ELIG CLIN: HCPCS | Performed by: NURSE PRACTITIONER

## 2022-09-13 PROCEDURE — 1036F TOBACCO NON-USER: CPT | Performed by: NURSE PRACTITIONER

## 2022-09-13 PROCEDURE — G8417 CALC BMI ABV UP PARAM F/U: HCPCS | Performed by: NURSE PRACTITIONER

## 2022-09-13 NOTE — PROGRESS NOTES
Group Lifestyle Balance Follow-up Progress Note      Subjective     Patient is here for group medical appointment for Group Lifestyle Balance weight management program follow-up for the chronic conditions of MIGEL, HTN, Asthma, Chronic Pain Right Knee. Patient continues on the program and tolerating well. Total weight loss to date is 47 lbs. No current issues. Allergies:  No Known Allergies    Past Medical History:     Past Medical History:   Diagnosis Date    Anxiety     Arthritis     CAD (coronary artery disease)     Fatty liver     Fatty liver disease, nonalcoholic 4/46/3481    Hypertension     Obesity     Unspecified sleep apnea     cpap   . Past Surgical History:  History reviewed. No pertinent surgical history. Family History:  Family History   Problem Relation Age of Onset    Arthritis Mother     Diabetes Father     Arthritis Father     Stroke Maternal Grandmother     Heart Disease Maternal Grandmother     Stroke Maternal Grandfather     Heart Disease Maternal Grandfather     Early Death Maternal Grandfather     Stroke Paternal Grandmother     Heart Disease Paternal Grandmother     Stroke Paternal Grandfather     Heart Disease Paternal Grandfather     Early Death Paternal Grandfather        Social History:  Social History     Socioeconomic History    Marital status: Single     Spouse name: Del Ayala    Number of children: 0    Years of education: 16    Highest education level:  Bachelor's degree (e.g., BA, AB, BS)   Occupational History    Not on file   Tobacco Use    Smoking status: Never    Smokeless tobacco: Never   Vaping Use    Vaping Use: Never used   Substance and Sexual Activity    Alcohol use: No    Drug use: No    Sexual activity: Yes     Partners: Female   Other Topics Concern    Not on file   Social History Narrative    ** Merged History Encounter **          Social Determinants of Health     Financial Resource Strain: Low Risk     Difficulty of Paying Living Expenses: Not hard at all Food Insecurity: No Food Insecurity    Worried About Running Out of Food in the Last Year: Never true    Ran Out of Food in the Last Year: Never true   Transportation Needs: Not on file   Physical Activity: Not on file   Stress: Not on file   Social Connections: Not on file   Intimate Partner Violence: Not on file   Housing Stability: Not on file       Current Medications:  Current Outpatient Medications   Medication Sig Dispense Refill    ACETAMINOPHEN EXTRA STRENGTH 500 MG tablet take 1 tablet by mouth twice a day AS NEEDED FOR PAIN 60 tablet 0    traZODone (DESYREL) 50 MG tablet Take 50 mg by mouth at bedtime      lisinopril (PRINIVIL;ZESTRIL) 40 MG tablet Take 1 tablet by mouth daily 90 tablet 1    amLODIPine (NORVASC) 10 MG tablet Take 1 tablet by mouth daily 90 tablet 1    metoprolol tartrate (LOPRESSOR) 25 MG tablet 1 tablet 2/day 180 tablet 1    fluticasone (FLONASE) 50 MCG/ACT nasal spray instill 1 spray into each nostril once daily 16 g 3    naproxen (NAPROSYN) 500 MG tablet Take 1 tablet by mouth 2 times daily (with meals) 180 tablet 1    ARIPiprazole (ABILIFY) 10 MG tablet       diclofenac sodium (VOLTAREN) 1 % GEL Apply 4 g topically 4 times daily 50 g 2    albuterol sulfate  (90 Base) MCG/ACT inhaler inhale 2 puffs by mouth and INTO THE LUNGS every 6 hours if needed for wheezing 18 g 3    vitamin D (ERGOCALCIFEROL) 1.25 MG (59918 UT) CAPS capsule Take 1 capsule by mouth once a week      melatonin 10 MG CAPS capsule Take 1 capsule by mouth nightly 30 capsule 0    valACYclovir (VALTREX) 1 g tablet        No current facility-administered medications for this visit. Vital Signs:  /88 (Site: Right Upper Arm, Position: Sitting, Cuff Size: Large Adult)   Pulse 90   Ht 6' 4\" (1.93 m)   Wt (!) 323 lb (146.5 kg)   BMI 39.32 kg/m²     BMI/Height/Weight:  Body mass index is 39.32 kg/m².       Review of Systems  Constitutional: Weight loss      Objective:      Physical Exam   Vital signs reviewed. General Appearance: Well-developed and well-nourished. No acute distress. Skin: Warm, dry. Head: Normocephalic. Pulmonary/Chest: Normal respiratory effort. Musculoskeletal: Movement x4. Neurological:  Alert and oriented. Individual goal for this encounter:  Lose weight to feel better and keep my knee better. My goal to be 210-215 lbs. [x] Vital signs reviewed and discussed with patient.   [] Labs/EKG reviewed and discussed with patient. [] Blood sugar log reviewed and discussed with patient. [] Physical activity discussed. [] Medication changes recommended. [] Smoking cessation discussed. [x] Specific questions/concerns addressed. Specific group medical question(s) addressed in this encounter:  Discussion about blood clots and strokes. Group discussion topic for this encounter:     [] Welcome Jumpstart   [] Be a Fat & Calorie    [] Healthy Eating   [] Move Those Muscles   [] Tip the Calorie Balance   [] Take charge of What's Around You   [] Problem Solving   [] Step Up Your Physical Activity Plan   [] Manage Slips and Self-Defeating Thoughts   [] [place] To Healthy Eating Out   [] Make Social Cues Work for [place]   [] Ways to Stay Motivated   [] Preparing for Long Term Self-Management   [] Take Charge of Your Lifestyle   [x] Mindful Eating, Mindful Movement   [] Manage Your Stress   [] Sit Less for Health   [] More Volume, Fewer Calories   [] Stay Active   [] Balance Your Thoughts   [] Heart Health    [] Looking Back and Looking Forward    Total time:  90 minutes, greater than 50% of visit spent in group counseling/education. 20 minutes were spent in reviewing chart, vital signs, labs, and answering the patient's individual questions. Assessment:       Diagnosis Orders   1. Essential hypertension        2. MIGEL on CPAP        3. Obesity (BMI 30-39.9)        4. Mild intermittent asthma without complication        5.  Chronic pain of right knee Plan:      Track food and weight. Return to clinic as per group medical appointment schedule. Follow-up:  Return in about 1 month (around 10/13/2022). Orders:  No orders of the defined types were placed in this encounter. Prescriptions:  No orders of the defined types were placed in this encounter.       Electronically signed by:  Brigette Avilez CNP

## 2022-09-20 DIAGNOSIS — J31.0 CHRONIC RHINITIS: ICD-10-CM

## 2022-09-21 RX ORDER — FLUTICASONE PROPIONATE 50 MCG
SPRAY, SUSPENSION (ML) NASAL
Qty: 16 G | Refills: 3 | Status: SHIPPED | OUTPATIENT
Start: 2022-09-21

## 2022-10-06 DIAGNOSIS — M25.561 CHRONIC PAIN OF BOTH KNEES: ICD-10-CM

## 2022-10-06 DIAGNOSIS — G89.29 CHRONIC PAIN OF BOTH KNEES: ICD-10-CM

## 2022-10-06 DIAGNOSIS — M25.562 CHRONIC PAIN OF BOTH KNEES: ICD-10-CM

## 2022-10-06 NOTE — TELEPHONE ENCOUNTER
E-scribe request for Acetaminophen . Please review and e-scribe if applicable.        Next Visit Date:  Future Appointments   Date Time Provider Kathi Renetta   10/11/2022  5:00 PM JABARI Almaraz CNP Weight Mgmt [place] Maintenance   Topic Date Due    COVID-19 Vaccine (3 - Booster for Moderna series) 11/12/2021    Flu vaccine (1) 08/01/2022    Depression Monitoring  02/08/2023    Lipids  01/05/2027    DTaP/Tdap/Td vaccine (3 - Td or Tdap) 04/27/2028    Pneumococcal 0-64 years Vaccine  Completed    Hepatitis C screen  Completed    HIV screen  Completed    Hepatitis A vaccine  Aged Out    Hepatitis B vaccine  Aged Out    Hib vaccine  Aged Out    Meningococcal (ACWY) vaccine  Aged Out               (applicable per patient's age: Cancer Screenings, Depression Screening, Fall Risk Screening, Immunizations)    Hemoglobin A1C (%)   Date Value   01/05/2022 5.5   05/28/2021 5.6   02/22/2021 5.5     LDL Cholesterol (mg/dL)   Date Value   01/05/2022 101     AST (U/L)   Date Value   01/05/2022 11     ALT (U/L)   Date Value   01/05/2022 9     BUN (mg/dL)   Date Value   01/10/2022 15      (goal A1C is < 7)   (goal LDL is <100) need 30-50% reduction from baseline     BP Readings from Last 3 Encounters:   09/13/22 139/88   08/23/22 131/83   08/09/22 (!) 144/77    (goal /80)      All Future Testing planned in [facility]:  Lab Frequency Next Occurrence   PT functional capacity evaluation (FCE) Once 04/29/2022   Sleep Study with PAP Titration Once 08/30/2022            Patient Active Problem List:     Essential hypertension     MIGEL on CPAP     Vitamin D deficiency     Obesity (BMI 30-39.9)     IGT (impaired glucose tolerance)     Mild intermittent asthma     Cervical spondylosis     Degeneration of cervical intervertebral disc     Osteoarthritis of knee     Scoliosis deformity of spine     Subacromial impingement     Chronic pain of right knee     Obesity, Class III, BMI 40-49.9 (morbid obesity) ([facility]

## 2022-10-07 RX ORDER — PSEUDOEPHED/ACETAMINOPH/DIPHEN 30MG-500MG
TABLET ORAL
Qty: 60 TABLET | Refills: 0 | Status: SHIPPED | OUTPATIENT
Start: 2022-10-07

## 2022-11-08 DIAGNOSIS — M25.562 CHRONIC PAIN OF BOTH KNEES: ICD-10-CM

## 2022-11-08 DIAGNOSIS — M25.561 CHRONIC PAIN OF BOTH KNEES: ICD-10-CM

## 2022-11-08 DIAGNOSIS — G89.29 CHRONIC PAIN OF BOTH KNEES: ICD-10-CM

## 2022-11-08 RX ORDER — PSEUDOEPHED/ACETAMINOPH/DIPHEN 30MG-500MG
TABLET ORAL
Qty: 60 TABLET | Refills: 0 | Status: SHIPPED | OUTPATIENT
Start: 2022-11-08

## 2022-11-08 NOTE — TELEPHONE ENCOUNTER
E-scribe request for Tyelnol . Please review and e-scribe if applicable.        Next Visit Date:  Future Appointments   Date Time Provider Kathi Jackson   11/8/2022  5:00 PM SCHEDULE, P [facility] MGMT Weight Mgmt [facility] Maintenance   Topic Date Due    COVID-19 Vaccine (3 - Booster for Moderna series) 08/07/2021    Flu vaccine (1) 08/01/2022    Depression Monitoring  02/08/2023    Lipids  01/05/2027    DTaP/Tdap/Td vaccine (3 - Td or Tdap) 04/27/2028    Pneumococcal 0-64 years Vaccine  Completed    Hepatitis C screen  Completed    HIV screen  Completed    Hepatitis A vaccine  Aged Out    Hib vaccine  Aged Out    Meningococcal (ACWY) vaccine  Aged Out               (applicable per patient's age: Cancer Screenings, Depression Screening, Fall Risk Screening, Immunizations)    Hemoglobin A1C (%)   Date Value   01/05/2022 5.5   05/28/2021 5.6   02/22/2021 5.5     LDL Cholesterol (mg/dL)   Date Value   01/05/2022 101     AST (U/L)   Date Value   01/05/2022 11     ALT (U/L)   Date Value   01/05/2022 9     BUN (mg/dL)   Date Value   01/10/2022 15      (goal A1C is < 7)   (goal LDL is <100) need 30-50% reduction from baseline     BP Readings from Last 3 Encounters:   09/13/22 139/88   08/23/22 131/83   08/09/22 (!) 144/77    (goal /80)      All Future Testing planned in [facility]:  Lab Frequency Next Occurrence   PT functional capacity evaluation (FCE) Once 04/29/2022   Sleep Study with PAP Titration Once 08/30/2022            Patient Active Problem List:     Essential hypertension     MIGEL on CPAP     Vitamin D deficiency     Obesity (BMI 30-39.9)     IGT (impaired glucose tolerance)     Mild intermittent asthma     Cervical spondylosis     Degeneration of cervical intervertebral disc     Osteoarthritis of knee     Scoliosis deformity of spine     Subacromial impingement     Chronic pain of right knee     Obesity, Class III, BMI 40-49.9 (morbid obesity) ([address]

## 2022-12-10 DIAGNOSIS — M25.561 CHRONIC PAIN OF BOTH KNEES: ICD-10-CM

## 2022-12-10 DIAGNOSIS — G89.29 CHRONIC PAIN OF BOTH KNEES: ICD-10-CM

## 2022-12-10 DIAGNOSIS — M25.562 CHRONIC PAIN OF BOTH KNEES: ICD-10-CM

## 2022-12-12 NOTE — TELEPHONE ENCOUNTER
E-scribe request for Acetaminphen. Please review and e-scribe if applicable.        Next Visit Date:  Future Appointments   Date Time Provider Kathi Jackson   12/13/2022  5:00 PM JABARI Concepcion - CNP Weight Mgmt [place] Maintenance   Topic Date Due    COVID-19 Vaccine (3 - Booster for Moderna series) 08/07/2021    Flu vaccine (1) 08/01/2022    Depression Monitoring  02/08/2023    Lipids  01/05/2027    DTaP/Tdap/Td vaccine (3 - Td or Tdap) 04/27/2028    Pneumococcal 0-64 years Vaccine  Completed    Hepatitis C screen  Completed    HIV screen  Completed    Hepatitis A vaccine  Aged Out    Hib vaccine  Aged Out    Meningococcal (ACWY) vaccine  Aged Out               (applicable per patient's age: Cancer Screenings, Depression Screening, Fall Risk Screening, Immunizations)    Hemoglobin A1C (%)   Date Value   01/05/2022 5.5   05/28/2021 5.6   02/22/2021 5.5     LDL Cholesterol (mg/dL)   Date Value   01/05/2022 101     AST (U/L)   Date Value   01/05/2022 11     ALT (U/L)   Date Value   01/05/2022 9     BUN (mg/dL)   Date Value   01/10/2022 15      (goal A1C is < 7)   (goal LDL is <100) need 30-50% reduction from baseline     BP Readings from Last 3 Encounters:   09/13/22 139/88   08/23/22 131/83   08/09/22 (!) 144/77    (goal /80)      All Future Testing planned in [facility]:  Lab Frequency Next Occurrence   PT functional capacity evaluation (FCE) Once 04/29/2022   Sleep Study with PAP Titration Once 08/30/2022            Patient Active Problem List:     Essential hypertension     MIGEL on CPAP     Vitamin D deficiency     Obesity (BMI 30-39.9)     IGT (impaired glucose tolerance)     Mild intermittent asthma     Cervical spondylosis     Degeneration of cervical intervertebral disc     Osteoarthritis of knee     Scoliosis deformity of spine     Subacromial impingement     Chronic pain of right knee     Obesity, Class III, BMI 40-49.9 (morbid obesity) ([address]

## 2022-12-13 RX ORDER — PSEUDOEPHED/ACETAMINOPH/DIPHEN 30MG-500MG
TABLET ORAL
Qty: 60 TABLET | Refills: 0 | Status: SHIPPED | OUTPATIENT
Start: 2022-12-13

## 2023-01-12 DIAGNOSIS — G89.29 CHRONIC PAIN OF BOTH KNEES: ICD-10-CM

## 2023-01-12 DIAGNOSIS — M25.561 CHRONIC PAIN OF BOTH KNEES: ICD-10-CM

## 2023-01-12 DIAGNOSIS — M25.562 CHRONIC PAIN OF BOTH KNEES: ICD-10-CM

## 2023-01-16 NOTE — TELEPHONE ENCOUNTER
Request for acetaminophen      Next Visit Date:pt on wait list til 2/23/23  Future Appointments   Date Time Provider Kathi Jackson   2/8/2023  8:30 AM Jacqui Guo DPM [facility] [address] Maintenance   Topic Date Due    COVID-19 Vaccine (3 - Booster for Moderna series) 08/07/2021    Flu vaccine (1) 08/01/2022    Depression Monitoring  02/08/2023    Lipids  01/05/2027    DTaP/Tdap/Td vaccine (3 - Td or Tdap) 04/27/2028    Pneumococcal 0-64 years Vaccine  Completed    Hepatitis C screen  Completed    HIV screen  Completed    Hepatitis A vaccine  Aged Out    Hib vaccine  Aged Out    Meningococcal (ACWY) vaccine  Aged Out       Hemoglobin A1C (%)   Date Value   01/05/2022 5.5   05/28/2021 5.6   02/22/2021 5.5             ( goal A1C is < 7)   No results found for: LABMICR  LDL Cholesterol (mg/dL)   Date Value   01/05/2022 101       (goal LDL is <100)   AST (U/L)   Date Value   01/05/2022 11     ALT (U/L)   Date Value   01/05/2022 9     BUN (mg/dL)   Date Value   01/10/2022 15     BP Readings from Last 3 Encounters:   09/13/22 139/88   08/23/22 131/83   08/09/22 (!) 144/77          (goal 120/80)    All Future Testing planned in [facility]  Lab Frequency Next Occurrence   PT functional capacity evaluation (FCE) Once 04/29/2022   Sleep Study with PAP Titration Once 08/30/2022         Patient Active Problem List:     Essential hypertension     MIGEL on CPAP     Vitamin D deficiency     Obesity (BMI 30-39.9)     IGT (impaired glucose tolerance)     Mild intermittent asthma     Cervical spondylosis     Degeneration of cervical intervertebral disc     Osteoarthritis of knee     Scoliosis deformity of spine     Subacromial impingement     Chronic pain of right knee     Obesity, Class III, BMI 40-49.9 (morbid obesity) ([address]

## 2023-01-17 RX ORDER — PSEUDOEPHED/ACETAMINOPH/DIPHEN 30MG-500MG
TABLET ORAL
Qty: 60 TABLET | Refills: 0 | Status: SHIPPED | OUTPATIENT
Start: 2023-01-17

## 2023-01-31 ENCOUNTER — APPOINTMENT (OUTPATIENT)
Dept: GENERAL RADIOLOGY | Age: 43
End: 2023-01-31
Payer: MEDICAID

## 2023-01-31 ENCOUNTER — HOSPITAL ENCOUNTER (EMERGENCY)
Age: 43
Discharge: HOME | End: 2023-01-31
Attending: EMERGENCY MEDICINE
Payer: MEDICAID

## 2023-01-31 ENCOUNTER — APPOINTMENT (OUTPATIENT)
Dept: CT IMAGING | Age: 43
End: 2023-01-31
Payer: MEDICAID

## 2023-01-31 VITALS
HEART RATE: 66 BPM | RESPIRATION RATE: 13 BRPM | BODY MASS INDEX: 39.17 KG/M2 | TEMPERATURE: 97.9 F | OXYGEN SATURATION: 99 % | SYSTOLIC BLOOD PRESSURE: 112 MMHG | HEIGHT: 75 IN | DIASTOLIC BLOOD PRESSURE: 98 MMHG | WEIGHT: 315 LBS

## 2023-01-31 DIAGNOSIS — R11.2 NAUSEA AND VOMITING, UNSPECIFIED VOMITING TYPE: Primary | ICD-10-CM

## 2023-01-31 LAB
ABSOLUTE EOS #: 0.2 K/UL (ref 0–0.44)
ABSOLUTE IMMATURE GRANULOCYTE: <0.03 K/UL (ref 0–0.3)
ABSOLUTE LYMPH #: 1.59 K/UL (ref 1.1–3.7)
ABSOLUTE MONO #: 0.39 K/UL (ref 0.1–1.2)
ALBUMIN SERPL-MCNC: 4 G/DL (ref 3.5–5.2)
ALBUMIN/GLOBULIN RATIO: 1.3 (ref 1–2.5)
ALP BLD-CCNC: 68 U/L (ref 40–129)
ALT SERPL-CCNC: 12 U/L (ref 5–41)
ANION GAP SERPL CALCULATED.3IONS-SCNC: 10 MMOL/L (ref 9–17)
AST SERPL-CCNC: 17 U/L
BASOPHILS # BLD: 1 % (ref 0–2)
BASOPHILS ABSOLUTE: 0.03 K/UL (ref 0–0.2)
BILIRUB SERPL-MCNC: 0.4 MG/DL (ref 0.3–1.2)
BUN BLDV-MCNC: 13 MG/DL (ref 6–20)
CALCIUM SERPL-MCNC: 8.9 MG/DL (ref 8.6–10.4)
CHLORIDE BLD-SCNC: 105 MMOL/L (ref 98–107)
CO2: 26 MMOL/L (ref 20–31)
CREAT SERPL-MCNC: 0.77 MG/DL (ref 0.7–1.2)
EOSINOPHILS RELATIVE PERCENT: 5 % (ref 1–4)
FLU A ANTIGEN: NEGATIVE
FLU B ANTIGEN: NEGATIVE
GFR SERPL CREATININE-BSD FRML MDRD: >60 ML/MIN/1.73M2
GLUCOSE BLD-MCNC: 86 MG/DL (ref 70–99)
HCT VFR BLD CALC: 42.5 % (ref 40.7–50.3)
HEMOGLOBIN: 13.8 G/DL (ref 13–17)
IMMATURE GRANULOCYTES: 0 %
LACTIC ACID, WHOLE BLOOD: 1.1 MMOL/L (ref 0.7–2.1)
LIPASE: 21 U/L (ref 13–60)
LYMPHOCYTES # BLD: 36 % (ref 24–43)
MCH RBC QN AUTO: 30.3 PG (ref 25.2–33.5)
MCHC RBC AUTO-ENTMCNC: 32.5 G/DL (ref 28.4–34.8)
MCV RBC AUTO: 93.2 FL (ref 82.6–102.9)
MONOCYTES # BLD: 9 % (ref 3–12)
NRBC AUTOMATED: 0 PER 100 WBC
PDW BLD-RTO: 14.6 % (ref 11.8–14.4)
PLATELET # BLD: 228 K/UL (ref 138–453)
PMV BLD AUTO: 10 FL (ref 8.1–13.5)
POTASSIUM SERPL-SCNC: 3.9 MMOL/L (ref 3.7–5.3)
RBC # BLD: 4.56 M/UL (ref 4.21–5.77)
RBC # BLD: ABNORMAL 10*6/UL
SARS-COV-2, RAPID: NOT DETECTED
SEG NEUTROPHILS: 49 % (ref 36–65)
SEGMENTED NEUTROPHILS ABSOLUTE COUNT: 2.26 K/UL (ref 1.5–8.1)
SODIUM BLD-SCNC: 141 MMOL/L (ref 135–144)
SPECIMEN DESCRIPTION: NORMAL
TOTAL PROTEIN: 7.1 G/DL (ref 6.4–8.3)
TROPONIN, HIGH SENSITIVITY: <6 NG/L (ref 0–22)
WBC # BLD: 4.5 K/UL (ref 3.5–11.3)

## 2023-01-31 PROCEDURE — 6360000004 HC RX CONTRAST MEDICATION: Performed by: STUDENT IN AN ORGANIZED HEALTH CARE EDUCATION/TRAINING PROGRAM

## 2023-01-31 PROCEDURE — 6360000002 HC RX W HCPCS: Performed by: STUDENT IN AN ORGANIZED HEALTH CARE EDUCATION/TRAINING PROGRAM

## 2023-01-31 PROCEDURE — 87635 SARS-COV-2 COVID-19 AMP PRB: CPT

## 2023-01-31 PROCEDURE — 96361 HYDRATE IV INFUSION ADD-ON: CPT

## 2023-01-31 PROCEDURE — 96374 THER/PROPH/DIAG INJ IV PUSH: CPT

## 2023-01-31 PROCEDURE — 74177 CT ABD & PELVIS W/CONTRAST: CPT

## 2023-01-31 PROCEDURE — 83605 ASSAY OF LACTIC ACID: CPT

## 2023-01-31 PROCEDURE — 2580000003 HC RX 258: Performed by: STUDENT IN AN ORGANIZED HEALTH CARE EDUCATION/TRAINING PROGRAM

## 2023-01-31 PROCEDURE — 84484 ASSAY OF TROPONIN QUANT: CPT

## 2023-01-31 PROCEDURE — 87804 INFLUENZA ASSAY W/OPTIC: CPT

## 2023-01-31 PROCEDURE — 85025 COMPLETE CBC W/AUTO DIFF WBC: CPT

## 2023-01-31 PROCEDURE — 99285 EMERGENCY DEPT VISIT HI MDM: CPT

## 2023-01-31 PROCEDURE — 96376 TX/PRO/DX INJ SAME DRUG ADON: CPT

## 2023-01-31 PROCEDURE — 71046 X-RAY EXAM CHEST 2 VIEWS: CPT

## 2023-01-31 PROCEDURE — 96375 TX/PRO/DX INJ NEW DRUG ADDON: CPT

## 2023-01-31 PROCEDURE — 83690 ASSAY OF LIPASE: CPT

## 2023-01-31 PROCEDURE — 2500000003 HC RX 250 WO HCPCS

## 2023-01-31 PROCEDURE — 80053 COMPREHEN METABOLIC PANEL: CPT

## 2023-01-31 RX ORDER — FAMOTIDINE 10 MG/ML
INJECTION, SOLUTION INTRAVENOUS
Status: COMPLETED
Start: 2023-01-31 | End: 2023-01-31

## 2023-01-31 RX ORDER — ONDANSETRON 2 MG/ML
4 INJECTION INTRAMUSCULAR; INTRAVENOUS ONCE
Status: COMPLETED | OUTPATIENT
Start: 2023-01-31 | End: 2023-01-31

## 2023-01-31 RX ORDER — FENTANYL CITRATE 50 UG/ML
50 INJECTION, SOLUTION INTRAMUSCULAR; INTRAVENOUS ONCE
Status: COMPLETED | OUTPATIENT
Start: 2023-01-31 | End: 2023-01-31

## 2023-01-31 RX ORDER — SODIUM CHLORIDE, SODIUM LACTATE, POTASSIUM CHLORIDE, AND CALCIUM CHLORIDE .6; .31; .03; .02 G/100ML; G/100ML; G/100ML; G/100ML
1000 INJECTION, SOLUTION INTRAVENOUS ONCE
Status: COMPLETED | OUTPATIENT
Start: 2023-01-31 | End: 2023-01-31

## 2023-01-31 RX ORDER — ONDANSETRON 4 MG/1
4 TABLET, FILM COATED ORAL EVERY 8 HOURS PRN
Qty: 8 TABLET | Refills: 0 | Status: SHIPPED | OUTPATIENT
Start: 2023-01-31

## 2023-01-31 RX ORDER — FAMOTIDINE 10 MG/ML
20 INJECTION, SOLUTION INTRAVENOUS ONCE
Status: COMPLETED | OUTPATIENT
Start: 2023-01-31 | End: 2023-01-31

## 2023-01-31 RX ADMIN — IOPAMIDOL 75 ML: 755 INJECTION, SOLUTION INTRAVENOUS at 13:21

## 2023-01-31 RX ADMIN — ONDANSETRON 4 MG: 2 INJECTION INTRAMUSCULAR; INTRAVENOUS at 14:43

## 2023-01-31 RX ADMIN — FAMOTIDINE 20 MG: 10 INJECTION, SOLUTION INTRAVENOUS at 10:47

## 2023-01-31 RX ADMIN — ONDANSETRON 4 MG: 2 INJECTION INTRAMUSCULAR; INTRAVENOUS at 10:46

## 2023-01-31 RX ADMIN — SODIUM CHLORIDE, POTASSIUM CHLORIDE, SODIUM LACTATE AND CALCIUM CHLORIDE 1000 ML: 600; 310; 30; 20 INJECTION, SOLUTION INTRAVENOUS at 10:50

## 2023-01-31 RX ADMIN — FENTANYL CITRATE 50 MCG: 50 INJECTION, SOLUTION INTRAMUSCULAR; INTRAVENOUS at 12:49

## 2023-01-31 RX ADMIN — FENTANYL CITRATE 50 MCG: 50 INJECTION, SOLUTION INTRAMUSCULAR; INTRAVENOUS at 10:48

## 2023-01-31 ASSESSMENT — ENCOUNTER SYMPTOMS
SHORTNESS OF BREATH: 0
ALLERGIC/IMMUNOLOGIC NEGATIVE: 1
BACK PAIN: 0
ABDOMINAL PAIN: 1
NAUSEA: 1
DIARRHEA: 0
VOMITING: 1
CONSTIPATION: 0

## 2023-01-31 ASSESSMENT — PAIN SCALES - GENERAL
PAINLEVEL_OUTOF10: 8
PAINLEVEL_OUTOF10: 4
PAINLEVEL_OUTOF10: 8
PAINLEVEL_OUTOF10: 7
PAINLEVEL_OUTOF10: 7

## 2023-01-31 ASSESSMENT — PAIN DESCRIPTION - LOCATION
LOCATION: ABDOMEN;CHEST

## 2023-01-31 ASSESSMENT — PAIN - FUNCTIONAL ASSESSMENT: PAIN_FUNCTIONAL_ASSESSMENT: 0-10

## 2023-01-31 NOTE — ED PROVIDER NOTES
[address] [facility]     Emergency Department     Faculty Attestation    I performed a history and physical examination of the patient and discussed management with the resident. I reviewed the resident´s note and agree with the documented findings and plan of care. Any areas of disagreement are noted on the chart. I was personally present for the key portions of any procedures. I have documented in the chart those procedures where I was not present during the key portions. I have reviewed the emergency nurses triage note. I agree with the chief complaint, past medical history, past surgical history, allergies, medications, social and family history as documented unless otherwise noted below. For Physician Assistant/ Nurse Practitioner cases/documentation I have personally evaluated this patient and have completed at least one if not all key elements of the E/M (history, physical exam, and MDM). Additional findings are as noted. Epigastric pain and vomited small amount of blood yesterday, denies blood in the stools or dark stools, patient appears comfortable, chest clear, heart exam normal, abdomen mildly tender left upper quadrant, no pulsatile mass or bruits, no guarding or rebounding, no pain at McBurney's point. Lower extremities are nontender nonswollen. Patient denies any pain in his back, patient denies any testicular pain.        EKG Interpretation    Interpreted by emergency department physician    Rhythm: normal sinus with sinus arrhythmia  Rate: normal/67  Axis: normal 15  Ectopy: none  Conduction: normal  ST Segments: no acute change  T Waves: no acute change  Q Waves: none    Clinical Impression: Normal EKG    Roxana Michaels, SABINO Michaels MD  01/31/23 0120

## 2023-01-31 NOTE — ED NOTES
Pt presents to the ED c/o left side CP and upper gastric pain w/ vomiting x 3 days. Pt denies any SOB, dizziness or diarrhea. Pt has a significant medical hx of HTN. Pt A&O x 4, does not appear in acute distress, RR even and unlabored, resting comfortably on stretcher with eyes open and call light in reach. Pt placed in a gown, on continuous monitor with alarms set, vitals and EKG obtained, medical hx and allergies reviewed with pt.  Initial assessment performed by physician, Tr Cuadra will carry out initial orders/tasks and reassess pt.         Sixto Shirley, JOSE R  17/22/33 4763

## 2023-01-31 NOTE — ED NOTES
Pt returned to room from CT on stretcher via [employer]. Pt A&O x 4, on continuous monitor, does not appear in acute distress, RR even and unlabored, resting comfortably on stretcher with eyes open and call light in reach.         Amanda Hernandez LPN  23/98/31 1397

## 2023-01-31 NOTE — ED NOTES
LR hung higher and now is going in faster. Pt respirations are even and unlabored, pt is alert and oriented X 4, speaking in complete sentences, bed is in the lowest position, call light is within reach.         Marquis Grey RN  01/31/23 0626

## 2023-01-31 NOTE — DISCHARGE INSTRUCTIONS
Thank you for coming to [facility] [place] emergency department! You were seen today for nausea and vomiting, this could be due to stomach inflammation. Your CT scan did show possible early signs of pancreatitis. If your symptoms continue or get worse despite medications, return to the emergency department to re-check your pancreas labs. You were prescribed zofran, please pick these medications up at the pharmacy. Follow up with your primary care doctor. If you have any worsening of symptoms or any other concerns, please return to the emergency department. We are always available!

## 2023-01-31 NOTE — Clinical Note
Jazmin Jackson was seen and treated in our emergency department on 1/31/2023. He may return to work on 02/01/2023. If you have any questions or concerns, please don't hesitate to call.       Rosemary Pino MD

## 2023-01-31 NOTE — ED NOTES
Pt ambulates back to room unassisted with steady gate. Pt A&O x 4, on continuous monitor, does not appear in acute distress, RR even and unlabored, resting comfortably on stretcher with eyes open and call light in reach.         Tavnir Watson LPN  37/38/96 5106

## 2023-01-31 NOTE — ED PROVIDER NOTES
[facility]  ED  Emergency Department Encounter  Emergency Medicine Resident     Pt Abena Massey  MRN: [id number]  [place] 1980  Date of evaluation: 1/31/23  PCP:  Sherley Campoverde MD  Note Started: 10:16 AM EST      CHIEF COMPLAINT       Chief Complaint   Patient presents with    Abdominal Pain    Chest Pain    Emesis       HISTORY OF PRESENT ILLNESS  (Location/Symptom, Timing/Onset, Context/Setting, Quality, Duration, Modifying Factors, Severity.)      Amaury Santana is a 37 y.o. male who presents with left upper quadrant abdominal pain radiating to his chest ongoing for the last few days. Patient reports that this is been associated with a significant amount of vomiting and has noticed some blood-tinged emesis as the vomiting is getting worse. He has been unable to tolerate any p.o. Reports that his bowel movements have been normal.  Denies any dysuria. Has not taken any medications prior to arrival.  Currently rating his pain as an 8 out of 10. PAST MEDICAL / SURGICAL / SOCIAL / FAMILY HISTORY      has a past medical history of Anxiety, Arthritis, CAD (coronary artery disease), Fatty liver, Fatty liver disease, nonalcoholic, Hypertension, Obesity, and Unspecified sleep apnea. No PSH    Social History     Socioeconomic History    Marital status: Single     Spouse name: Esequiel Arnold    Number of children: 0    Years of education: 16    Highest education level:  Bachelor's degree (e.g., BA, AB, BS)   Occupational History    Not on file   Tobacco Use    Smoking status: Never    Smokeless tobacco: Never   Vaping Use    Vaping Use: Never used   Substance and Sexual Activity    Alcohol use: No    Drug use: No    Sexual activity: Yes     Partners: Female   Other Topics Concern    Not on file   Social History Narrative    ** Merged History Encounter **          Social Determinants of Health     Financial Resource Strain: Low Risk     Difficulty of Paying Living Expenses: Not hard at all   Food Insecurity: No Food Insecurity    Worried About Running Out of Food in the Last Year: Never true    Ran Out of Food in the Last Year: Never true   Transportation Needs: Not on file   Physical Activity: Not on file   Stress: Not on file   Social Connections: Not on file   Intimate Partner Violence: Not on file   Housing Stability: Not on file       Family History   Problem Relation Age of Onset    Arthritis Mother     Diabetes Father     Arthritis Father     Stroke Maternal Grandmother     Heart Disease Maternal Grandmother     Stroke Maternal Grandfather     Heart Disease Maternal Grandfather     Early Death Maternal Grandfather     Stroke Paternal Grandmother     Heart Disease Paternal Grandmother     Stroke Paternal Grandfather     Heart Disease Paternal Grandfather     Early Death Paternal Grandfather        Allergies:  Patient has no known allergies. Home Medications:  Prior to Admission medications    Medication Sig Start Date End Date Taking?  Authorizing Provider   ondansetron (ZOFRAN) 4 MG tablet Take 1 tablet by mouth every 8 hours as needed for Nausea 1/31/23  Yes Candice Sanders,    ACETAMINOPHEN EXTRA STRENGTH 500 MG tablet take 1 tablet by mouth twice a day AS NEEDED FOR PAIN 1/17/23   Marj Mares MD   fluticasone Claudell Hurst) 50 MCG/ACT nasal spray instill 1 spray into each nostril once daily 9/21/22   Marj Mares MD   traZODone (DESYREL) 50 MG tablet Take 50 mg by mouth at bedtime 8/22/22   Historical Provider, MD   lisinopril (PRINIVIL;ZESTRIL) 40 MG tablet Take 1 tablet by mouth daily 8/23/22   Marj Mares MD   amLODIPine (NORVASC) 10 MG tablet Take 1 tablet by mouth daily 8/23/22   Marj Mares MD   metoprolol tartrate (LOPRESSOR) 25 MG tablet 1 tablet 2/day 8/23/22   Marj Mares MD   naproxen (NAPROSYN) 500 MG tablet Take 1 tablet by mouth 2 times daily (with meals) 4/25/22   Frida Will MD   ARIPiprazole (ABILIFY) 10 MG tablet  3/7/22   Historical Provider, MD diclofenac sodium (VOLTAREN) 1 % GEL Apply 4 g topically 4 times daily 2/8/22   Marimar De La Rosa MD   albuterol sulfate  (90 Base) MCG/ACT inhaler inhale 2 puffs by mouth and INTO THE LUNGS every 6 hours if needed for wheezing 11/2/21   Marimar De La Rosa MD   vitamin D (ERGOCALCIFEROL) 1.25 MG (73451 UT) CAPS capsule Take 1 capsule by mouth once a week 7/19/21   Historical Provider, MD   melatonin 10 MG CAPS capsule Take 1 capsule by mouth nightly 6/1/21   Marimar De La Rosa MD   valACYclovir (VALTREX) 1 g tablet  6/23/20   Historical Provider, MD   metoprolol tartrate (LOPRESSOR) 25 MG tablet Take 2/day 7/7/20   Marimar De La Rosa MD          REVIEW OF SYSTEMS       Review of Systems   Constitutional:  Positive for appetite change and fever. HENT:  Negative for congestion. Respiratory:  Negative for shortness of breath. Cardiovascular:  Positive for chest pain. Gastrointestinal:  Positive for abdominal pain, nausea and vomiting. Negative for constipation and diarrhea. Genitourinary:  Negative for dysuria. Musculoskeletal:  Negative for back pain. Skin:  Negative for rash. Allergic/Immunologic: Negative. Neurological:  Negative for headaches. Hematological: Negative. Psychiatric/Behavioral:  Negative for confusion. PHYSICAL EXAM      INITIAL VITALS:   BP (!) 112/98   Pulse 66   Temp 97.9 °F (36.6 °C) (Oral)   Resp 13   Ht 6' 3\" (1.905 m)   Wt (!) 330 lb (149.7 kg)   SpO2 99%   BMI 41.25 kg/m²     Physical Exam  Constitutional:       General: He is not in acute distress. HENT:      Head: Normocephalic and atraumatic. Cardiovascular:      Rate and Rhythm: Regular rhythm. Bradycardia present. Heart sounds: Normal heart sounds. Pulmonary:      Effort: Pulmonary effort is normal.      Breath sounds: Normal breath sounds. Abdominal:      General: Bowel sounds are normal. There is no distension. Palpations: Abdomen is soft. There is no mass. Tenderness:  There is abdominal tenderness in the left upper quadrant. There is no guarding or rebound. Negative signs include Mehta's sign and McBurney's sign. Skin:     Capillary Refill: Capillary refill takes 2 to 3 seconds. Neurological:      General: No focal deficit present. Mental Status: He is alert. Psychiatric:         Mood and Affect: Mood normal.         DDX/DIAGNOSTIC RESULTS / EMERGENCY DEPARTMENT COURSE / MDM     Medical Decision Making  Differential including gastritis, enteritis, colitis, cholecystitis, pancreatitis, GERD, hiatal hernia. Will obtain labs and imaging. As patient has a history of CAD, will also be obtaining troponin. Amount and/or Complexity of Data Reviewed  Labs: ordered. Decision-making details documented in ED Course. Radiology: ordered. ECG/medicine tests: ordered. Risk  Prescription drug management. EKG  Regular rate, regular rhythm, no ST segment elevation or T wave inversions    All EKG's are interpreted by the Emergency Department Physician who either signs or Co-signs this chart in the absence of a cardiologist.    EMERGENCY DEPARTMENT COURSE:       ED Course as of 01/31/23 1805 Tue Jan 31, 2023   1106 Flu A Antigen: NEGATIVE [ML]   1106 Flu B Antigen: NEGATIVE [ML]   1106 WBC: 4.5 [ML]   1106 Hemoglobin Quant: 13.8 [ML]   1106 Temp: 97.9 °F (36.6 °C) [ML]   1111 Lactic Acid, Whole Blood: 1.1 [ML]   1136 Troponin, High Sensitivity: <6 [ML]   1136 SARS-CoV-2, Rapid: Not Detected [ML]   1147 Lipase: 21 [ML]   1148 Creatinine: 0.77 [ML]   1438 CT results discussed with patient, he does not drink alcohol, lipase low. Pancreatitis unlikely. Will give zofran and trial PO. [ML]      ED Course User Index  [ML] [place] Passe, DO     Patient tolerated p.o., recommended he return if his symptoms worsen. Given a prescription for Zofran. FINAL IMPRESSION      1.  Nausea and vomiting, unspecified vomiting type          DISPOSITION / PLAN     DISPOSITION Decision To Discharge 01/31/2023 03:34:02 PM      PATIENT REFERRED TO:  MD Charity [address]oto [address] [facility] [address] [facility] [address]  [phone]    Schedule an appointment as soon as possible for a visit in 3 days      DISCHARGE MEDICATIONS:  Discharge Medication List as of 1/31/2023  3:36 PM        START taking these medications    Details   ondansetron (ZOFRAN) 4 MG tablet Take 1 tablet by mouth every 8 hours as needed for Nausea, Disp-8 tablet, R-0Normal             Tavares Hanson DO  Emergency Medicine Resident    (Please note that portions of thisnote were completed with a voice recognition program.  Efforts were made to edit the dictations but occasionally words are mis-transcribed.)        Georgina Walter DO  Resident  01/31/23 7738

## 2023-01-31 NOTE — ED NOTES
Pt returned to room from Xray on stretcher via tech. Pt A&O x 4, on continuous monitor, does not appear in acute distress, RR even and unlabored, resting comfortably on stretcher with eyes closed and call light in reach.              Lakeisha Nelson LPN  65/51/62 0889

## 2023-02-03 ENCOUNTER — OFFICE VISIT (OUTPATIENT)
Dept: INTERNAL MEDICINE | Age: 43
End: 2023-02-03

## 2023-02-03 VITALS
BODY MASS INDEX: 39.17 KG/M2 | OXYGEN SATURATION: 97 % | TEMPERATURE: 97.1 F | HEART RATE: 69 BPM | DIASTOLIC BLOOD PRESSURE: 74 MMHG | WEIGHT: 315 LBS | SYSTOLIC BLOOD PRESSURE: 130 MMHG | HEIGHT: 75 IN

## 2023-02-03 DIAGNOSIS — B34.9 VIRAL ILLNESS: ICD-10-CM

## 2023-02-03 DIAGNOSIS — R04.2 HEMOPTYSIS: ICD-10-CM

## 2023-02-03 DIAGNOSIS — R09.81 NASAL CONGESTION: Primary | ICD-10-CM

## 2023-02-03 DIAGNOSIS — K29.00 OTHER ACUTE GASTRITIS WITHOUT HEMORRHAGE: ICD-10-CM

## 2023-02-03 DIAGNOSIS — R05.1 ACUTE COUGH: ICD-10-CM

## 2023-02-03 RX ORDER — LORATADINE 10 MG/1
10 TABLET ORAL DAILY
Qty: 30 TABLET | Refills: 0 | Status: SHIPPED | OUTPATIENT
Start: 2023-02-03

## 2023-02-03 RX ORDER — GUAIFENESIN/DEXTROMETHORPHAN 100-10MG/5
5 SYRUP ORAL 3 TIMES DAILY PRN
Qty: 120 ML | Refills: 0 | Status: SHIPPED | OUTPATIENT
Start: 2023-02-03 | End: 2023-02-13

## 2023-02-03 RX ORDER — FLUTICASONE PROPIONATE 50 MCG
1 SPRAY, SUSPENSION (ML) NASAL DAILY
Qty: 32 G | Refills: 1 | Status: SHIPPED | OUTPATIENT
Start: 2023-02-03

## 2023-02-03 RX ORDER — PANTOPRAZOLE SODIUM 40 MG/1
40 TABLET, DELAYED RELEASE ORAL
Qty: 30 TABLET | Refills: 0 | Status: SHIPPED | OUTPATIENT
Start: 2023-02-03

## 2023-02-03 SDOH — ECONOMIC STABILITY: FOOD INSECURITY: WITHIN THE PAST 12 MONTHS, YOU WORRIED THAT YOUR FOOD WOULD RUN OUT BEFORE YOU GOT MONEY TO BUY MORE.: NEVER TRUE

## 2023-02-03 SDOH — ECONOMIC STABILITY: HOUSING INSECURITY
IN THE LAST 12 MONTHS, WAS THERE A TIME WHEN YOU DID NOT HAVE A STEADY PLACE TO SLEEP OR SLEPT IN A SHELTER (INCLUDING NOW)?: NO

## 2023-02-03 SDOH — ECONOMIC STABILITY: FOOD INSECURITY: WITHIN THE PAST 12 MONTHS, THE FOOD YOU BOUGHT JUST DIDN'T LAST AND YOU DIDN'T HAVE MONEY TO GET MORE.: NEVER TRUE

## 2023-02-03 SDOH — ECONOMIC STABILITY: INCOME INSECURITY: HOW HARD IS IT FOR YOU TO PAY FOR THE VERY BASICS LIKE FOOD, HOUSING, MEDICAL CARE, AND HEATING?: SOMEWHAT HARD

## 2023-02-03 ASSESSMENT — PATIENT HEALTH QUESTIONNAIRE - PHQ9
10. IF YOU CHECKED OFF ANY PROBLEMS, HOW DIFFICULT HAVE THESE PROBLEMS MADE IT FOR YOU TO DO YOUR WORK, TAKE CARE OF THINGS AT HOME, OR GET ALONG WITH OTHER PEOPLE: 0
4. FEELING TIRED OR HAVING LITTLE ENERGY: 3
3. TROUBLE FALLING OR STAYING ASLEEP: 3
6. FEELING BAD ABOUT YOURSELF - OR THAT YOU ARE A FAILURE OR HAVE LET YOURSELF OR YOUR FAMILY DOWN: 3
SUM OF ALL RESPONSES TO PHQ QUESTIONS 1-9: 15
5. POOR APPETITE OR OVEREATING: 3
9. THOUGHTS THAT YOU WOULD BE BETTER OFF DEAD, OR OF HURTING YOURSELF: 0
2. FEELING DOWN, DEPRESSED OR HOPELESS: 1
SUM OF ALL RESPONSES TO PHQ QUESTIONS 1-9: 15
SUM OF ALL RESPONSES TO PHQ QUESTIONS 1-9: 15
1. LITTLE INTEREST OR PLEASURE IN DOING THINGS: 1
7. TROUBLE CONCENTRATING ON THINGS, SUCH AS READING THE NEWSPAPER OR WATCHING TELEVISION: 0
8. MOVING OR SPEAKING SO SLOWLY THAT OTHER PEOPLE COULD HAVE NOTICED. OR THE OPPOSITE, BEING SO FIGETY OR RESTLESS THAT YOU HAVE BEEN MOVING AROUND A LOT MORE THAN USUAL: 1
SUM OF ALL RESPONSES TO PHQ QUESTIONS 1-9: 15
SUM OF ALL RESPONSES TO PHQ9 QUESTIONS 1 & 2: 2

## 2023-02-03 ASSESSMENT — ENCOUNTER SYMPTOMS
SORE THROAT: 0
CHEST TIGHTNESS: 0
VOMITING: 1
TROUBLE SWALLOWING: 0
WHEEZING: 0
DIARRHEA: 0
CONSTIPATION: 0
COUGH: 1
ABDOMINAL PAIN: 1
NAUSEA: 1
BACK PAIN: 0
SHORTNESS OF BREATH: 0
RHINORRHEA: 0
BLOOD IN STOOL: 0

## 2023-02-03 NOTE — LETTER
[address] [facility] [address]  Phone: [phone]  Fax: [phone]    Srinivasan Merida MD        February 3, 2023     Patient: Jaylon Ellison   YOB: 1980   Date of Visit: 2/3/2023       To Whom It May Concern: It is my medical opinion that Lacho Monreal may return to work on 2/6/23. If you have any questions or concerns, please don't hesitate to call.     Sincerely,        Srinivasan Merida MD

## 2023-02-03 NOTE — PROGRESS NOTES
[facility]  HPI:  Juan Luis Ortega is a.37 y.o. male coming into clinic regarding ER follow up    Patient went to the hospital with left upper quadrant abdominal pain that radiated to the chest for few days associated with nausea vomiting. Patient underwent CT scan of  abdominal pelvis that showed  1. Subtle findings of mild asymmetric prominence of the pancreatic tail along   with even more subtle fat stranding around this area. Possibility of very   early/mild acute pancreatitis is raised. Please correlate clinically. 2. No bowel obstruction. 3. No evidence for appendicitis. 4. 11 mm left renal cyst.   Blood work were unremarkable including COVID and influenza test was negative    Per patient his left upper quadrant abdominal pain is better today is 5 out of 10  Mostly associated with cough  Per patient he is noticing significant amount of blood with cough mostly in the morning Also complaining of nasal congestion and postnasal drip  Still not able to tolerate p.o. But patient vomits whenever he eats  Denies any use of cocaine/marijuana/alcohol drinking  Denies any diarrhea constipation or urinary complaints, fever/chills. Per patient he is not a smoker  Takes ibuprofen twice daily for arthritis      Chief Complaint   Patient presents with    ED Follow-up     Seen on 1/31 for abdominal and chest pain. Pt also had vomiting. Pt reports he is still sick since leaving the ER       History:  Past Medical History:   Diagnosis Date    Anxiety     Arthritis     CAD (coronary artery disease)     Fatty liver     Fatty liver disease, nonalcoholic 7/00/0266    Hypertension     Obesity     Unspecified sleep apnea     cpap     No past surgical history on file.   Family History   Problem Relation Age of Onset    Arthritis Mother     Diabetes Father     Arthritis Father     Stroke Maternal Grandmother     Heart Disease Maternal Grandmother     Stroke Maternal Grandfather     Heart Disease Maternal Grandfather     Early Death Maternal Grandfather     Stroke Paternal Grandmother     Heart Disease Paternal Grandmother     Stroke Paternal Grandfather     Heart Disease Paternal Grandfather     Early Death Paternal Grandfather       Social History     Socioeconomic History    Marital status: Single     Spouse name: Janell Bella    Number of children: 0    Years of education: 16    Highest education level: Bachelor's degree (e.g., BA, AB, BS)   Tobacco Use    Smoking status: Never    Smokeless tobacco: Never   Vaping Use    Vaping Use: Never used   Substance and Sexual Activity    Alcohol use: No    Drug use: No    Sexual activity: Yes     Partners: Female   Social History Narrative    ** Merged History Encounter **          Social Determinants of Health     Financial Resource Strain: Medium Risk    Difficulty of Paying Living Expenses: Somewhat hard   Food Insecurity: No Food Insecurity    Worried About Running Out of Food in the Last Year: Never true    Ran Out of Food in the Last Year: Never true   Transportation Needs: Unknown    Lack of Transportation (Non-Medical): No   Housing Stability: Unknown    Unstable Housing in the Last Year: No        ROS:  Review of Systems   Constitutional:  Negative for chills, fatigue and fever. HENT:  Positive for congestion and postnasal drip. Negative for rhinorrhea, sore throat and trouble swallowing. Respiratory:  Positive for cough. Negative for chest tightness, shortness of breath and wheezing. Hemoptysis   Cardiovascular:  Negative for chest pain, palpitations and leg swelling. Gastrointestinal:  Positive for abdominal pain, nausea and vomiting. Negative for blood in stool, constipation and diarrhea. Genitourinary:  Negative for difficulty urinating, dysuria, frequency, hematuria and urgency. Musculoskeletal:  Negative for back pain and neck pain. Skin:  Negative for pallor and rash. Neurological:  Negative for dizziness, tremors, weakness, numbness and headaches. Psychiatric/Behavioral:  Negative for confusion, decreased concentration and hallucinations. The patient is not nervous/anxious. PHYSICAL EXAM:      Vitals:    02/03/23 1352   BP: 130/74   Pulse: 69   Temp: 97.1 °F (36.2 °C)   SpO2: 97%     BP Readings from Last 3 Encounters:   02/03/23 130/74   01/31/23 (!) 112/98   09/13/22 139/88       Physical Exam  Constitutional:       Appearance: Normal appearance. He is obese. HENT:      Head: Normocephalic and atraumatic. Nose: Congestion present. Eyes:      Extraocular Movements: Extraocular movements intact. Conjunctiva/sclera: Conjunctivae normal.      Pupils: Pupils are equal, round, and reactive to light. Cardiovascular:      Rate and Rhythm: Normal rate and regular rhythm. Heart sounds: Normal heart sounds. Pulmonary:      Effort: Pulmonary effort is normal.      Comments: Decreased breath sounds because of obesity  Abdominal:      General: Bowel sounds are normal.      Palpations: Abdomen is soft. Tenderness: There is no abdominal tenderness. Musculoskeletal:         General: Normal range of motion. Skin:     General: Skin is warm and dry. Neurological:      General: No focal deficit present. Mental Status: He is alert and oriented to person, place, and time. Mental status is at baseline. Psychiatric:         Mood and Affect: Mood normal.         Behavior: Behavior normal.         Thought Content:  Thought content normal.         Judgment: Judgment normal.     LABORATORY FINDINGS:    CBC:   Lab Results   Component Value Date/Time    WBC 4.5 01/31/2023 10:42 AM    HGB 13.8 01/31/2023 10:42 AM     01/31/2023 10:42 AM     05/08/2012 10:27 PM     BMP:    Lab Results   Component Value Date/Time     01/31/2023 10:42 AM    K 3.9 01/31/2023 10:42 AM     01/31/2023 10:42 AM    CO2 26 01/31/2023 10:42 AM    BUN 13 01/31/2023 10:42 AM    CREATININE 0.77 01/31/2023 10:42 AM    GLUCOSE 86 01/31/2023 10:42 AM GLUCOSE 83 05/08/2012 10:27 PM     Hemoglobin A1C:   Lab Results   Component Value Date/Time    LABA1C 5.5 01/05/2022 10:37 AM     Microalbumin Urine: No results found for: MICROALBUR  Lipid profile:   Lab Results   Component Value Date/Time    CHOL 170 01/05/2022 10:37 AM    TRIG 154 01/05/2022 10:37 AM    HDL 38 01/05/2022 10:37 AM     Thyroid functions:   Lab Results   Component Value Date/Time    TSH 2.52 01/05/2022 10:37 AM      Hepatic functions:   Lab Results   Component Value Date/Time    ALT 12 01/31/2023 10:42 AM    AST 17 01/31/2023 10:42 AM    PROT 7.1 01/31/2023 10:42 AM    BILITOT 0.4 01/31/2023 10:42 AM    BILIDIR 0.09 04/04/2018 06:16 PM    LABALBU 4.0 01/31/2023 10:42 AM     Urine Analysis: No results found for: [address] Jossue:      Health Maintenance Due   Topic Date Due    COVID-19 Vaccine (5 - Booster for [place] series) 04/22/2022    Colorectal Cancer Screen  01/31/2023    Depression Monitoring  02/08/2023       ASSESSMENT AND PLAN:       Diagnosis Orders   1. Nasal congestion  fluticasone (FLONASE) 50 MCG/ACT nasal spray    loratadine (CLARITIN) 10 MG tablet      2. Hemoptysis  CT CHEST WO CONTRAST      3. Other acute gastritis without hemorrhage  pantoprazole (PROTONIX) 40 MG tablet      4. Viral illness        5. Acute cough  guaiFENesin-dextromethorphan (ROBITUSSIN DM) 100-10 MG/5ML syrup          Patient symptoms nasal congestion, postnasal drip and nausea /vomiting and cough is likely 2/2  to viral infection   Recent blood work didnt show any abnormality  Advised patient to take Claritin and Flonase as needed  Patient is very concerned about hemoptysis, patient does have weight loss but he is trying to lose weight and following with bariatric surgery.   Will get CT scan chest for hemoptysis  Recent chest x-ray did not show any abnormality  Advised patient to start taking PPI for concern of gastritis  Also advised to take Zofran as needed for nausea vomiting  Advise him to stop taking ibuprofen    1. Depree received counseling on the following healthy behaviors: nutrition and exercise    2. Reviewed prior labs and health maintenance. 3.  Discussed use, benefit, and side effects of prescribed medications. Barriers to medication compliance addressed. All patient questions answered. Pt voiced understanding. 4.  Continue current medications, diet and exercise. Completed Refills               Requested Prescriptions     Signed Prescriptions Disp Refills    fluticasone (FLONASE) 50 MCG/ACT nasal spray 32 g 1     Si spray by Each Nostril route daily    loratadine (CLARITIN) 10 MG tablet 30 tablet 0     Sig: Take 1 tablet by mouth daily    pantoprazole (PROTONIX) 40 MG tablet 30 tablet 0     Sig: Take 1 tablet by mouth every morning (before breakfast)    guaiFENesin-dextromethorphan (ROBITUSSIN DM) 100-10 MG/5ML syrup 120 mL 0     Sig: Take 5 mLs by mouth 3 times daily as needed for Cough       5. Patient given educational materials - see patient instructions    6. Was a self-tracking handout given in paper form or via VCE[employer]? Yes  If yes, see orders or list here. Patient voiced understanding and agreed to treatment plan. This note is created with the assistance of a speech-recognition program. While intending to generate a document that actually reflects the content of the visit, the document can still have some mistakes which may not have been identified and corrected by editing.       Electronically signed by:  Mallika Veliz MD  2/3/2023

## 2023-02-05 LAB
EKG ATRIAL RATE: 67 BPM
EKG P AXIS: 62 DEGREES
EKG P-R INTERVAL: 164 MS
EKG Q-T INTERVAL: 384 MS
EKG QRS DURATION: 90 MS
EKG QTC CALCULATION (BAZETT): 405 MS
EKG R AXIS: 15 DEGREES
EKG T AXIS: 28 DEGREES
EKG VENTRICULAR RATE: 67 BPM

## 2023-02-13 DIAGNOSIS — M25.561 CHRONIC PAIN OF BOTH KNEES: ICD-10-CM

## 2023-02-13 DIAGNOSIS — G89.29 CHRONIC PAIN OF BOTH KNEES: ICD-10-CM

## 2023-02-13 DIAGNOSIS — M25.562 CHRONIC PAIN OF BOTH KNEES: ICD-10-CM

## 2023-02-13 NOTE — TELEPHONE ENCOUNTER
Last visit: 2/3/23  Last Med refill:   Does patient have enough medication for 72 hours: No:     Next Visit Date:  Future Appointments   Date Time Provider Kathi Jackson   4/5/2023  2:00 PM Sheri Lagunas DPM [facility]   5/30/2023  8:40 AM Pau Elizabeth MD [facility] [address] Maintenance   Topic Date Due    COVID-19 Vaccine (5 - Booster for [employer] series) 04/22/2022    Colorectal Cancer Screen  01/31/2023    Depression Monitoring  02/03/2024    Lipids  01/05/2027    DTaP/Tdap/Td vaccine (3 - Td or Tdap) 04/27/2028    Flu vaccine  Completed    Pneumococcal 0-64 years Vaccine  Completed    Hepatitis C screen  Completed    HIV screen  Completed    Hepatitis A vaccine  Aged Out    Hib vaccine  Aged Out    Meningococcal (ACWY) vaccine  Aged Out       Hemoglobin A1C (%)   Date Value   01/05/2022 5.5   05/28/2021 5.6   02/22/2021 5.5             ( goal A1C is < 7)   No results found for: LABMICR  LDL Cholesterol (mg/dL)   Date Value   01/05/2022 101   05/28/2021 91       (goal LDL is <100)   AST (U/L)   Date Value   01/31/2023 17     ALT (U/L)   Date Value   01/31/2023 12     BUN (mg/dL)   Date Value   01/31/2023 13     BP Readings from Last 3 Encounters:   02/03/23 130/74   01/31/23 (!) 112/98   09/13/22 139/88          (goal 120/80)    All Future Testing planned in [facility]  Lab Frequency Next Occurrence   PT functional capacity evaluation (FCE) Once 04/29/2022   Sleep Study with PAP Titration Once 08/30/2022   CT CHEST WO CONTRAST Once 02/03/2023               Patient Active Problem List:     Essential hypertension     MIGEL on CPAP     Vitamin D deficiency     Obesity (BMI 30-39.9)     IGT (impaired glucose tolerance)     Mild intermittent asthma     Cervical spondylosis     Degeneration of cervical intervertebral disc     Osteoarthritis of knee     Scoliosis deformity of spine     Subacromial impingement     Chronic pain of right knee     Obesity, Class III, BMI 40-49.9 (morbid obesity) ([address]

## 2023-02-14 RX ORDER — ACETAMINOPHEN 500 MG
TABLET ORAL
Qty: 60 TABLET | Refills: 3 | Status: SHIPPED | OUTPATIENT
Start: 2023-02-14

## 2023-03-01 DIAGNOSIS — I10 ESSENTIAL HYPERTENSION: ICD-10-CM

## 2023-03-19 ENCOUNTER — APPOINTMENT (OUTPATIENT)
Dept: GENERAL RADIOLOGY | Age: 43
End: 2023-03-19
Payer: MEDICAID

## 2023-03-19 ENCOUNTER — HOSPITAL ENCOUNTER (EMERGENCY)
Age: 43
Discharge: HOME | End: 2023-03-19
Attending: EMERGENCY MEDICINE
Payer: MEDICAID

## 2023-03-19 VITALS
RESPIRATION RATE: 19 BRPM | OXYGEN SATURATION: 100 % | SYSTOLIC BLOOD PRESSURE: 168 MMHG | DIASTOLIC BLOOD PRESSURE: 100 MMHG | WEIGHT: 315 LBS | TEMPERATURE: 97.8 F | HEART RATE: 98 BPM | BODY MASS INDEX: 43.75 KG/M2

## 2023-03-19 DIAGNOSIS — M79.672 LEFT FOOT PAIN: Primary | ICD-10-CM

## 2023-03-19 PROCEDURE — 73630 X-RAY EXAM OF FOOT: CPT

## 2023-03-19 PROCEDURE — 6370000000 HC RX 637 (ALT 250 FOR IP): Performed by: EMERGENCY MEDICINE

## 2023-03-19 PROCEDURE — 99283 EMERGENCY DEPT VISIT LOW MDM: CPT

## 2023-03-19 RX ORDER — IBUPROFEN 800 MG/1
800 TABLET ORAL ONCE
Status: COMPLETED | OUTPATIENT
Start: 2023-03-19 | End: 2023-03-19

## 2023-03-19 RX ORDER — ACETAMINOPHEN 500 MG
1000 TABLET ORAL EVERY 6 HOURS PRN
Qty: 24 TABLET | Refills: 0 | Status: SHIPPED | OUTPATIENT
Start: 2023-03-19 | End: 2023-03-22

## 2023-03-19 RX ORDER — IBUPROFEN 800 MG/1
800 TABLET ORAL EVERY 6 HOURS PRN
Qty: 12 TABLET | Refills: 0 | Status: SHIPPED | OUTPATIENT
Start: 2023-03-19 | End: 2023-03-22

## 2023-03-19 RX ORDER — ACETAMINOPHEN 500 MG
1000 TABLET ORAL ONCE
Status: COMPLETED | OUTPATIENT
Start: 2023-03-19 | End: 2023-03-19

## 2023-03-19 RX ADMIN — IBUPROFEN 800 MG: 800 TABLET, FILM COATED ORAL at 10:27

## 2023-03-19 RX ADMIN — ACETAMINOPHEN 1000 MG: 500 TABLET ORAL at 10:26

## 2023-03-19 ASSESSMENT — PAIN - FUNCTIONAL ASSESSMENT
PAIN_FUNCTIONAL_ASSESSMENT: 0-10
PAIN_FUNCTIONAL_ASSESSMENT: NONE - DENIES PAIN

## 2023-03-19 ASSESSMENT — ENCOUNTER SYMPTOMS
BACK PAIN: 0
NAUSEA: 0
COLOR CHANGE: 0
SHORTNESS OF BREATH: 0
VOMITING: 0
ABDOMINAL PAIN: 0

## 2023-03-19 ASSESSMENT — PAIN SCALES - GENERAL: PAINLEVEL_OUTOF10: 7

## 2023-03-19 NOTE — Clinical Note
Chris Lara was seen and treated in our emergency department on 3/19/2023. He may return to work on 03/20/2023. If you have any questions or concerns, please don't hesitate to call.       Daphney Babinski, MD Group Topic: BH MARGARETTE Process Group    Date: 11/17/2021  Start Time: 10:15 AM  End Time: 11:00 AM  Facilitators: SHAWNEE Stevens    Focus: Relapse Stabilization/Prevention (Relapse Warning Signs)  Number in attendance: 6      Process group is structured time for Patients to engage in healthy self disclosure to increase problem solving skills while being open to constructive feedback from community. Topics and themes commonly discussed are relapse, coping skills, conflict, stress, gratitude, spirituality and addiction education oriented. While in process, the group facilitators ensure group safety and cohesion.     Goals: To understand commonly reported signs of relapse and to reflect on one’s emotional, mental, and physical state of being in order to identify coping strategies for personal risk factors for relapse.   Handouts: Relapse Warning Signs Check  Method: Group  Attendance: Present; patient arrived at 10:55 AM.  Mood/Affect: Appropriate  Behavior/Socialization: Appropriate to group  Participation: Active  Overall Patient Response to Group: Appropriate to topic  Individual Response to Group: Patient was attentive and engaged in the group discussion as demonstrated by her willingness to work with the group to identify healthy coping strategies for relapse warning signs.  Ability to Apply Content to Group: 5    JUNIOR Jimenez APSW

## 2023-03-19 NOTE — ED TRIAGE NOTES
Patient ambulated to room 06 from triage with c/o of having left foot pain going on since wednesday. Patient states he slipped and fell down some stairs to hurt it. Patient did not hit his head or neck, no blood thinner use, no LOC. 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 Pt respirations are even and unlabored, pt is alert and oriented X 4, speaking in complete sentences, bed is in the lowest position, call light is within reach.

## 2023-03-19 NOTE — ED PROVIDER NOTES
[facility]     Emergency Department     Faculty Attestation    I performed a history and physical examination of the patient and discussed management with the resident. I reviewed the residents note and agree with the documented findings and plan of care. Any areas of disagreement are noted on the chart. I was personally present for the key portions of any procedures. I have documented in the chart those procedures where I was not present during the key portions. I have reviewed the emergency nurses triage note. I agree with the chief complaint, past medical history, past surgical history, allergies, medications, social and family history as documented unless otherwise noted below. For Physician Assistant/ Nurse Practitioner cases/documentation I have personally evaluated this patient and have completed at least one if not all key elements of the E/M (history, physical exam, and MDM). Additional findings are as noted. Slip and fall on the stairs at home 4 days ago persistent foot pain since then. Is ambulatory is still going to work but worsening pain wanted to be checked out. No other injuries from fall. On exam does have nonfocal left midfoot tenderness no deformities distally intact strong pulses no open injury. No knee leg or ankle tenderness.   Will image plan discharge      Critical Care     none    Cuba Miramontes MD, Jazmine Santiago  Attending Emergency  Physician           Cuba Miramontes MD  03/19/23 1370
PROCEDURES:  None    CONSULTS:  None        FINAL IMPRESSION      1.  Left foot pain          DISPOSITION / PLAN     DISPOSITION Decision To Discharge 03/19/2023 11:36:20 AM      PATIENT REFERRED TO:  MD Charity Noble \[facility] [place] [facility]\"" [address] [address]  [phone]    Schedule an appointment as soon as possible for a visit       [facility] ED  [address] [place] [address]  [phone]    As needed    DISCHARGE MEDICATIONS:  New Prescriptions    ACETAMINOPHEN (TYLENOL) 500 MG TABLET    Take 2 tablets by mouth every 6 hours as needed for Pain    IBUPROFEN (IBU) 800 MG TABLET    Take 1 tablet by mouth every 6 hours as needed for Pain       Kristofer Crespo MD  Emergency Medicine Resident    (Please note that portions of thisnote were completed with a voice recognition program.  Efforts were made to edit the dictations but occasionally words are mis-transcribed.)      Kristofer Crespo MD  Resident  03/19/23 Mandeep Cortés MD  Resident  03/19/23 [id number]

## 2023-03-19 NOTE — DISCHARGE INSTRUCTIONS
You were seen here for left foot pain. X-rays were obtained and did not show any fracture. You were given a prescription for Tylenol and Motrin. You can take this as needed for pain. You are also given crutches and a postop shoe, you can use these as needed for pain and ambulation. Call and schedule a follow-up appointment with your primary care provider for soon as possible. Return to the emergency department immediately if you experience worsening symptoms including swelling of the foot, discoloration of the skin, sensory changes to the foot, or any other concerns.

## 2023-03-24 ENCOUNTER — TELEPHONE (OUTPATIENT)
Dept: INTERNAL MEDICINE | Age: 43
End: 2023-03-24

## 2023-03-24 NOTE — TELEPHONE ENCOUNTER
Received triage call from [facility], pt c/o foot pain. Pt was seen in ED for same complaint, pt states the pain is unbearable. Imaging showed no abnormality to left foot. PCP not available, pt is seen by podiatry office for foot care. Pt has f/u appt with them scheduled for 5/1, pt states he can't get in any sooner. Pt states he used to wear inserts/orthotics but got new shoes. Pt ok with writer contacting podiatry office. Spoke with staff, they can squeeze pt in at [address] [facility] location Tuesday at 1:45. PC back to pt to give him appt and location information.

## 2023-04-27 ENCOUNTER — TELEPHONE (OUTPATIENT)
Dept: INTERNAL MEDICINE | Age: 43
End: 2023-04-27

## 2023-04-27 NOTE — TELEPHONE ENCOUNTER
LVM for pt, stating that sleep study order was still good and provided number to sleep center to reschedule appt.

## 2023-04-27 NOTE — TELEPHONE ENCOUNTER
----- Message from Dandy Urias sent at 4/27/2023  2:04 PM EDT -----  Subject: Referral Request    Reason for referral request? Sleep Study order  Provider patient wants to be referred to(if known):     Provider Phone Number(if known): Additional Information for Provider? Patient was scheduled for a sleep   study in the past and was not able to make this appointment due to work   and is requesting a new order for a sleep study.  Please advise.   ---------------------------------------------------------------------------  --------------  Bryson Cohn [facility]    [phone]; OK to leave message on voicemail  ---------------------------------------------------------------------------  --------------

## 2023-05-19 DIAGNOSIS — R09.81 NASAL CONGESTION: ICD-10-CM

## 2023-05-19 DIAGNOSIS — J31.0 CHRONIC RHINITIS: ICD-10-CM

## 2023-05-19 DIAGNOSIS — M17.11 PRIMARY OSTEOARTHRITIS OF RIGHT KNEE: ICD-10-CM

## 2023-05-20 DIAGNOSIS — I10 ESSENTIAL HYPERTENSION: ICD-10-CM

## 2023-05-22 NOTE — TELEPHONE ENCOUNTER
Medication refill for Proventil and Voltaren    Last visit: 2/3/23  Last Med refill: multiple meds  Does patient have enough medication for 72 hours: No:     Next Visit Date:  Future Appointments   Date Time Provider Kathi Jackson   5/30/2023  8:40 AM William Madera MD [facility] IM [address] Maintenance   Topic Date Due    COVID-19 Vaccine (5 - Booster for Moderna series) 04/22/2022    Colorectal Cancer Screen  Never done    Depression Monitoring  02/03/2024    Lipids  01/05/2027    DTaP/Tdap/Td vaccine (3 - Td or Tdap) 04/27/2028    Flu vaccine  Completed    Pneumococcal 0-64 years Vaccine  Completed    Hepatitis C screen  Completed    HIV screen  Completed    Hepatitis A vaccine  Aged Out    Hib vaccine  Aged Out    Meningococcal (ACWY) vaccine  Aged Out       Hemoglobin A1C (%)   Date Value   01/05/2022 5.5   05/28/2021 5.6   02/22/2021 5.5             ( goal A1C is < 7)   No results found for: LABMICR  LDL Cholesterol (mg/dL)   Date Value   01/05/2022 101   05/28/2021 91       (goal LDL is <100)   AST (U/L)   Date Value   01/31/2023 17     ALT (U/L)   Date Value   01/31/2023 12     BUN (mg/dL)   Date Value   01/31/2023 13     BP Readings from Last 3 Encounters:   03/19/23 (!) 168/100   02/03/23 130/74   01/31/23 (!) 112/98          (goal 120/80)    All Future Testing planned in [facility]  Lab Frequency Next Occurrence   Sleep Study with PAP Titration Once 08/30/2022   CT CHEST WO CONTRAST Once 02/03/2023               Patient Active Problem List:     Essential hypertension     MIGEL on CPAP     Vitamin D deficiency     Obesity (BMI 30-39.9)     IGT (impaired glucose tolerance)     Mild intermittent asthma     Cervical spondylosis     Degeneration of cervical intervertebral disc     Osteoarthritis of knee     Scoliosis deformity of spine     Subacromial impingement     Chronic pain of right knee     Obesity, Class III, BMI 40-49.9 (morbid obesity) ([facility] [address]

## 2023-05-22 NOTE — TELEPHONE ENCOUNTER
Request for flonase      Next Visit Date:5/30/23  Future Appointments   Date Time Provider Kathi Jackson   5/30/2023  8:40 AM William Madera MD [facility]   Topic Date Due    COVID-19 Vaccine (5 - Booster for Moderna series) 04/22/2022    Colorectal Cancer Screen  Never done    Depression Monitoring  02/03/2024    Lipids  01/05/2027    DTaP/Tdap/Td vaccine (3 - Td or Tdap) 04/27/2028    Flu vaccine  Completed    Pneumococcal 0-64 years Vaccine  Completed    Hepatitis C screen  Completed    HIV screen  Completed    Hepatitis A vaccine  Aged Out    Hib vaccine  Aged Out    Meningococcal (ACWY) vaccine  Aged Out       Hemoglobin A1C (%)   Date Value   01/05/2022 5.5   05/28/2021 5.6   02/22/2021 5.5             ( goal A1C is < 7)   No results found for: LABMICR  LDL Cholesterol (mg/dL)   Date Value   01/05/2022 101       (goal LDL is <100)   AST (U/L)   Date Value   01/31/2023 17     ALT (U/L)   Date Value   01/31/2023 12     BUN (mg/dL)   Date Value   01/31/2023 13     BP Readings from Last 3 Encounters:   03/19/23 (!) 168/100   02/03/23 130/74   01/31/23 (!) 112/98          (goal 120/80)    All Future Testing planned in [facility]  Lab Frequency Next Occurrence   Sleep Study with PAP Titration Once 08/30/2022   CT CHEST WO CONTRAST Once 02/03/2023         Patient Active Problem List:     Essential hypertension     MIGEL on CPAP     Vitamin D deficiency     Obesity (BMI 30-39.9)     IGT (impaired glucose tolerance)     Mild intermittent asthma     Cervical spondylosis     Degeneration of cervical intervertebral disc     Osteoarthritis of knee     Scoliosis deformity of spine     Subacromial impingement     Chronic pain of right knee     Obesity, Class III, BMI 40-49.9 (morbid obesity) ([address]

## 2023-05-22 NOTE — TELEPHONE ENCOUNTER
Request for melatonin      Next Visit Date:5/30/23  Future Appointments   Date Time Provider Kathi Renetta   5/30/2023  8:40 AM Gaudencio Ashley MD [address] [facility]   Topic Date Due    COVID-19 Vaccine (5 - Booster for Moderna series) 04/22/2022    Colorectal Cancer Screen  Never done    Depression Monitoring  02/03/2024    Lipids  01/05/2027    DTaP/Tdap/Td vaccine (3 - Td or Tdap) 04/27/2028    Flu vaccine  Completed    Pneumococcal 0-64 years Vaccine  Completed    Hepatitis C screen  Completed    HIV screen  Completed    Hepatitis A vaccine  Aged Out    Hib vaccine  Aged Out    Meningococcal (ACWY) vaccine  Aged Out       Hemoglobin A1C (%)   Date Value   01/05/2022 5.5   05/28/2021 5.6   02/22/2021 5.5             ( goal A1C is < 7)   No results found for: LABMICR  LDL Cholesterol (mg/dL)   Date Value   01/05/2022 101       (goal LDL is <100)   AST (U/L)   Date Value   01/31/2023 17     ALT (U/L)   Date Value   01/31/2023 12     BUN (mg/dL)   Date Value   01/31/2023 13     BP Readings from Last 3 Encounters:   03/19/23 (!) 168/100   02/03/23 130/74   01/31/23 (!) 112/98          (goal 120/80)    All Future Testing planned in [facility]  Lab Frequency Next Occurrence   Sleep Study with PAP Titration Once 08/30/2022   CT CHEST WO CONTRAST Once 02/03/2023         Patient Active Problem List:     Essential hypertension     MIGEL on CPAP     Vitamin D deficiency     Obesity (BMI 30-39.9)     IGT (impaired glucose tolerance)     Mild intermittent asthma     Cervical spondylosis     Degeneration of cervical intervertebral disc     Osteoarthritis of knee     Scoliosis deformity of spine     Subacromial impingement     Chronic pain of right knee     Obesity, Class III, BMI 40-49.9 (morbid obesity) ([address]

## 2023-05-23 RX ORDER — FLUTICASONE PROPIONATE 50 MCG
1 SPRAY, SUSPENSION (ML) NASAL DAILY
Qty: 32 G | Refills: 1 | Status: SHIPPED | OUTPATIENT
Start: 2023-05-23

## 2023-05-23 RX ORDER — ALBUTEROL SULFATE 90 UG/1
2 AEROSOL, METERED RESPIRATORY (INHALATION) EVERY 6 HOURS PRN
Qty: 18 G | Refills: 3 | Status: SHIPPED | OUTPATIENT
Start: 2023-05-23

## 2023-05-23 RX ORDER — AMLODIPINE BESYLATE 10 MG/1
TABLET ORAL
Qty: 90 TABLET | Refills: 0 | Status: SHIPPED | OUTPATIENT
Start: 2023-05-23 | End: 2023-05-30 | Stop reason: SDUPTHER

## 2023-05-23 RX ORDER — MELATONIN 10 MG
10 CAPSULE ORAL NIGHTLY
Qty: 30 CAPSULE | Refills: 0 | Status: SHIPPED | OUTPATIENT
Start: 2023-05-23

## 2023-05-29 SDOH — ECONOMIC STABILITY: FOOD INSECURITY: WITHIN THE PAST 12 MONTHS, YOU WORRIED THAT YOUR FOOD WOULD RUN OUT BEFORE YOU GOT MONEY TO BUY MORE.: OFTEN TRUE

## 2023-05-29 SDOH — ECONOMIC STABILITY: INCOME INSECURITY: HOW HARD IS IT FOR YOU TO PAY FOR THE VERY BASICS LIKE FOOD, HOUSING, MEDICAL CARE, AND HEATING?: NOT HARD AT ALL

## 2023-05-29 SDOH — ECONOMIC STABILITY: FOOD INSECURITY: WITHIN THE PAST 12 MONTHS, THE FOOD YOU BOUGHT JUST DIDN'T LAST AND YOU DIDN'T HAVE MONEY TO GET MORE.: OFTEN TRUE

## 2023-05-29 SDOH — ECONOMIC STABILITY: TRANSPORTATION INSECURITY
IN THE PAST 12 MONTHS, HAS LACK OF TRANSPORTATION KEPT YOU FROM MEETINGS, WORK, OR FROM GETTING THINGS NEEDED FOR DAILY LIVING?: YES

## 2023-05-30 ENCOUNTER — OFFICE VISIT (OUTPATIENT)
Dept: INTERNAL MEDICINE | Age: 43
End: 2023-05-30
Payer: MEDICAID

## 2023-05-30 ENCOUNTER — APPOINTMENT (OUTPATIENT)
Dept: GENERAL RADIOLOGY | Age: 43
End: 2023-05-30
Payer: MEDICAID

## 2023-05-30 ENCOUNTER — PATIENT MESSAGE (OUTPATIENT)
Dept: PODIATRY | Age: 43
End: 2023-05-30

## 2023-05-30 ENCOUNTER — HOSPITAL ENCOUNTER (EMERGENCY)
Age: 43
Discharge: HOME | End: 2023-05-30
Attending: EMERGENCY MEDICINE
Payer: MEDICAID

## 2023-05-30 VITALS
BODY MASS INDEX: 39.17 KG/M2 | HEART RATE: 74 BPM | HEIGHT: 75 IN | DIASTOLIC BLOOD PRESSURE: 90 MMHG | WEIGHT: 315 LBS | SYSTOLIC BLOOD PRESSURE: 142 MMHG | TEMPERATURE: 98.1 F | OXYGEN SATURATION: 98 %

## 2023-05-30 VITALS
BODY MASS INDEX: 38.36 KG/M2 | TEMPERATURE: 98.1 F | RESPIRATION RATE: 18 BRPM | DIASTOLIC BLOOD PRESSURE: 110 MMHG | OXYGEN SATURATION: 100 % | SYSTOLIC BLOOD PRESSURE: 174 MMHG | HEIGHT: 76 IN | WEIGHT: 315 LBS | HEART RATE: 70 BPM

## 2023-05-30 DIAGNOSIS — R73.02 IGT (IMPAIRED GLUCOSE TOLERANCE): ICD-10-CM

## 2023-05-30 DIAGNOSIS — J45.20 MILD INTERMITTENT ASTHMA WITHOUT COMPLICATION: ICD-10-CM

## 2023-05-30 DIAGNOSIS — M72.2 PLANTAR FASCIITIS: Primary | ICD-10-CM

## 2023-05-30 DIAGNOSIS — E66.01 OBESITY, CLASS III, BMI 40-49.9 (MORBID OBESITY) (HCC): ICD-10-CM

## 2023-05-30 DIAGNOSIS — M17.11 PRIMARY OSTEOARTHRITIS OF RIGHT KNEE: ICD-10-CM

## 2023-05-30 DIAGNOSIS — I10 ESSENTIAL HYPERTENSION: Primary | ICD-10-CM

## 2023-05-30 DIAGNOSIS — K21.9 GASTROESOPHAGEAL REFLUX DISEASE, UNSPECIFIED WHETHER ESOPHAGITIS PRESENT: ICD-10-CM

## 2023-05-30 LAB — HBA1C MFR BLD: 5.4 %

## 2023-05-30 PROCEDURE — 3077F SYST BP >= 140 MM HG: CPT | Performed by: INTERNAL MEDICINE

## 2023-05-30 PROCEDURE — 3080F DIAST BP >= 90 MM HG: CPT | Performed by: INTERNAL MEDICINE

## 2023-05-30 PROCEDURE — 1036F TOBACCO NON-USER: CPT | Performed by: INTERNAL MEDICINE

## 2023-05-30 PROCEDURE — 83036 HEMOGLOBIN GLYCOSYLATED A1C: CPT | Performed by: INTERNAL MEDICINE

## 2023-05-30 PROCEDURE — 99213 OFFICE O/P EST LOW 20 MIN: CPT | Performed by: INTERNAL MEDICINE

## 2023-05-30 PROCEDURE — 99283 EMERGENCY DEPT VISIT LOW MDM: CPT

## 2023-05-30 PROCEDURE — G8427 DOCREV CUR MEDS BY ELIG CLIN: HCPCS | Performed by: INTERNAL MEDICINE

## 2023-05-30 PROCEDURE — G8417 CALC BMI ABV UP PARAM F/U: HCPCS | Performed by: INTERNAL MEDICINE

## 2023-05-30 PROCEDURE — 73630 X-RAY EXAM OF FOOT: CPT

## 2023-05-30 RX ORDER — LORATADINE 10 MG/1
10 TABLET ORAL DAILY
Qty: 30 TABLET | Refills: 0 | Status: SHIPPED | OUTPATIENT
Start: 2023-05-30

## 2023-05-30 RX ORDER — DARIDOREXANT 25 MG/1
1 TABLET, FILM COATED ORAL NIGHTLY
COMMUNITY
Start: 2023-04-19

## 2023-05-30 RX ORDER — CARVEDILOL 12.5 MG/1
12.5 TABLET ORAL 2 TIMES DAILY
Qty: 60 TABLET | Refills: 3 | Status: SHIPPED | OUTPATIENT
Start: 2023-05-30

## 2023-05-30 RX ORDER — CHOLECALCIFEROL TAB 125 MCG (5000 UNIT) 125 MCG (5000 UT)
1 TAB DAILY
COMMUNITY
Start: 2023-04-20

## 2023-05-30 RX ORDER — PANTOPRAZOLE SODIUM 40 MG/1
40 TABLET, DELAYED RELEASE ORAL
Qty: 30 TABLET | Refills: 0 | Status: SHIPPED | OUTPATIENT
Start: 2023-05-30

## 2023-05-30 RX ORDER — AMLODIPINE BESYLATE 10 MG/1
10 TABLET ORAL DAILY
Qty: 90 TABLET | Refills: 1 | Status: SHIPPED | OUTPATIENT
Start: 2023-05-30

## 2023-05-30 RX ORDER — LISINOPRIL 40 MG/1
40 TABLET ORAL DAILY
Qty: 90 TABLET | Refills: 1 | Status: SHIPPED | OUTPATIENT
Start: 2023-05-30

## 2023-05-30 ASSESSMENT — PAIN DESCRIPTION - ORIENTATION: ORIENTATION: LEFT

## 2023-05-30 ASSESSMENT — ENCOUNTER SYMPTOMS
ABDOMINAL PAIN: 0
SHORTNESS OF BREATH: 0
RHINORRHEA: 0
CONSTIPATION: 0
ABDOMINAL PAIN: 0
RHINORRHEA: 0
BACK PAIN: 0
SHORTNESS OF BREATH: 0
BACK PAIN: 0
WHEEZING: 0
COUGH: 0

## 2023-05-30 ASSESSMENT — PAIN DESCRIPTION - DESCRIPTORS: DESCRIPTORS: DISCOMFORT

## 2023-05-30 ASSESSMENT — PAIN DESCRIPTION - LOCATION: LOCATION: FOOT

## 2023-05-30 ASSESSMENT — PAIN SCALES - GENERAL: PAINLEVEL_OUTOF10: 9

## 2023-05-30 NOTE — ED PROVIDER NOTES
[address]     Emergency Department     Faculty Note/ Attestation      Pt Name: Jose Hodge                                       MRN: [id number]  [place] 1980  Date of evaluation: 5/30/2023    Patients PCP:    Hawa Sanders MD      Attestation  I performed a history and physical examination of the patient and discussed management with the resident. I reviewed the residents note and agree with the documented findings and plan of care. Any areas of disagreement are noted on the chart. I was personally present for the key portions of any procedures. I have documented in the chart those procedures where I was not present during the key portions. I have reviewed the emergency nurses triage note. I agree with the chief complaint, past medical history, past surgical history, allergies, medications, social and family history as documented unless otherwise noted below. For Physician Assistant/ Nurse Practitioner cases/documentation I have personally evaluated this patient and have completed at least one if not all key elements of the E/M (history, physical exam, and MDM). Additional findings are as noted.       Initial Screens:        Howie Coma Scale  Eye Opening: Spontaneous  Best Verbal Response: Oriented  Best Motor Response: Obeys commands  Ola Coma Scale Score: 15    Vitals:    Vitals:    05/30/23 1506 05/30/23 1508   BP: (!) 174/110    Pulse: 70    Resp: 18    Temp: 98.1 °F (36.7 °C)    SpO2: 100%    Weight:  (!) 378 lb 1.4 oz (171.5 kg)   Height: 6' 3.5\" (1.918 m)        [facility]       Chief Complaint   Patient presents with    Foot Pain     Lt foot pain for the past month, pt ambulates without difficulty             DIAGNOSTIC RESULTS             RADIOLOGY:   XR FOOT LEFT (MIN 3 VIEWS)    (Results Pending)         LABS:  Labs Reviewed - No data to display      EMERGENCY DEPARTMENT COURSE:     -------------------------  BP: (!) 174/110, Temp: 98.1 °F (36.7

## 2023-05-30 NOTE — ED PROVIDER NOTES
[facility]  ED  Emergency Department Encounter  Emergency Medicine Resident     Pt Cristian Zimmerman  MRN: [id number]  [place] 1980  Date of evaluation: 5/30/23  PCP:  Lilian Sims MD  Note Started: 3:36 PM EDT      CHIEF COMPLAINT       Chief Complaint   Patient presents with    Foot Pain     Lt foot pain for the past month, pt ambulates without difficulty       HISTORY OF PRESENT ILLNESS  (Location/Symptom, Timing/Onset, Context/Setting, Quality, Duration, Modifying Factors, Severity.)      Evangelina Hatch is a 37 y.o. male who presents with left foot pain for the last month. Patient states this is a daily problem and is become severe. He is on his feet a lot for work and this makes it much worse. No injury. He states he is always had issues with his feet, especially in childhood. He has been told he has flatfeet in the past.  He states pain is worse in the morning, it hurts to walk. It hurts on the lateral side of his foot and on the bottom. He has not taken anything for it. He has not been evaluated for it but does have a podiatrist.  No weakness or numbness. No other complaints. PAST MEDICAL / SURGICAL / SOCIAL / FAMILY HISTORY      has a past medical history of Anxiety, Arthritis, CAD (coronary artery disease), Fatty liver, Fatty liver disease, nonalcoholic, Hypertension, Obesity, and Unspecified sleep apnea. has no past surgical history on file. Social History     Socioeconomic History    Marital status: Single     Spouse name: Lori Tamayo    Number of children: 0    Years of education: 16    Highest education level:  Bachelor's degree (e.g., BA, AB, BS)   Occupational History    Not on file   Tobacco Use    Smoking status: Never    Smokeless tobacco: Never   Vaping Use    Vaping Use: Never used   Substance and Sexual Activity    Alcohol use: No    Drug use: No    Sexual activity: Yes     Partners: Female   Other Topics Concern    Not on file   Social History

## 2023-05-30 NOTE — DISCHARGE INSTRUCTIONS
You were seen in the emergency department today for foot pain. This is likely related to planter fasciitis and degeneration over time. Please follow-up with your podiatrist.  Work on the exercises provided. As we discussed, you can use a frozen water bottle for comfort, roll your foot over this if you are sitting. Use Tylenol and ibuprofen. Return with any new or worsening symptoms, otherwise I highly recommend you follow-up with podiatry. Thank you for visiting [facility] Emergency Department. You need to call Joanna Alan MD to make an appointment as directed for follow up. Should you have any questions regarding your care or further treatment, please call [facility] Emergency Department at [phone]. Take any medications as prescribed, if given any, otherwise for pain Use ibuprofen or Tylenol (unless prescribed medications that have Tylenol in it). You can take over the counter Ibuprofen (advil) tablets (4 tablets every 8 hours or 3 tablets every 6 hours or 2 tablets every 4 hours)    If given narcotics during this ED visit, please do not drive or operate heavy machinery for at least 4-6 hours. PLEASE RETURN TO THE ED IMMEDIATELY for worsening symptoms, or if you develop any concerning symptoms such as: high fever not relieved by tylenol and/or motrin, chills, shortness of breath, chest pain, persistent nausea and/or vomiting, numbness, weakness or tingling in the arms or legs or change in color of the extremities, changes in mental status, persistent headache, blurry vision, inability to urinate, unable to follow up with your physician, or other any other  Care or concern.

## 2023-05-30 NOTE — PROGRESS NOTES
74    Temp: 98.1 °F (36.7 °C)    TempSrc: Infrared    SpO2: 98%    Weight: (!) 376 lb (170.6 kg)    Height: 6' 3\" (1.905 m)      Body mass index is 47 kg/m². BP Readings from Last 3 Encounters:   05/30/23 (!) 142/90   03/19/23 (!) 168/100   02/03/23 130/74        Wt Readings from Last 3 Encounters:   05/30/23 (!) 376 lb (170.6 kg)   03/28/23 (!) 350 lb (158.8 kg)   03/19/23 (!) 350 lb (158.8 kg)       Physical Exam  Vitals and nursing note reviewed. Constitutional:       Appearance: He is obese. HENT:      Head: Normocephalic and atraumatic. Eyes:      Extraocular Movements: Extraocular movements intact. Conjunctiva/sclera: Conjunctivae normal.      Pupils: Pupils are equal, round, and reactive to light. Cardiovascular:      Rate and Rhythm: Normal rate and regular rhythm. Heart sounds: No murmur heard. Pulmonary:      Effort: Pulmonary effort is normal.      Breath sounds: Normal breath sounds. No wheezing. Musculoskeletal:      Right lower leg: No edema. Left lower leg: No edema. Skin:     General: Skin is warm and dry. Neurological:      General: No focal deficit present. Mental Status: He is alert and oriented to person, place, and time.              LABORATORY FINDINGS:    CBC:  Lab Results   Component Value Date/Time    WBC 4.5 01/31/2023 10:42 AM    HGB 13.8 01/31/2023 10:42 AM     01/31/2023 10:42 AM     05/08/2012 10:27 PM     BMP:    Lab Results   Component Value Date/Time     01/31/2023 10:42 AM    K 3.9 01/31/2023 10:42 AM     01/31/2023 10:42 AM    CO2 26 01/31/2023 10:42 AM    BUN 13 01/31/2023 10:42 AM    CREATININE 0.77 01/31/2023 10:42 AM    GLUCOSE 86 01/31/2023 10:42 AM    GLUCOSE 83 05/08/2012 10:27 PM     HEMOGLOBIN A1C:   Lab Results   Component Value Date/Time    LABA1C 5.5 01/05/2022 10:37 AM     MICROALBUMIN URINE: No results found for: MICROALBUR  FASTING LIPID PANEL:  Lab Results   Component Value Date    CHOL 170 01/05/2022

## 2023-06-01 DIAGNOSIS — I10 ESSENTIAL HYPERTENSION: ICD-10-CM

## 2023-06-06 ENCOUNTER — NURSE ONLY (OUTPATIENT)
Dept: INTERNAL MEDICINE | Age: 43
End: 2023-06-06
Payer: MEDICAID

## 2023-06-06 VITALS — DIASTOLIC BLOOD PRESSURE: 100 MMHG | HEART RATE: 72 BPM | SYSTOLIC BLOOD PRESSURE: 136 MMHG

## 2023-06-06 DIAGNOSIS — I10 RESISTANT HYPERTENSION: Primary | ICD-10-CM

## 2023-06-06 DIAGNOSIS — I10 ESSENTIAL HYPERTENSION: ICD-10-CM

## 2023-06-06 PROCEDURE — 3075F SYST BP GE 130 - 139MM HG: CPT | Performed by: INTERNAL MEDICINE

## 2023-06-06 PROCEDURE — 99211 OFF/OP EST MAY X REQ PHY/QHP: CPT | Performed by: INTERNAL MEDICINE

## 2023-06-06 PROCEDURE — 3080F DIAST BP >= 90 MM HG: CPT | Performed by: INTERNAL MEDICINE

## 2023-06-06 RX ORDER — HYDROCHLOROTHIAZIDE 25 MG/1
25 TABLET ORAL EVERY MORNING
Qty: 90 TABLET | Refills: 1 | Status: SHIPPED | OUTPATIENT
Start: 2023-06-06

## 2023-06-06 NOTE — PROGRESS NOTES
Patient here today for a blood pressure check since last appointment with PCP. Patient denies any complaints of symptoms such as headache, blurred vision, nausea, lightheadedness. BP1--138/102 HR-72 in left arm  BP2--136/100 in left arm    Does patient take BP medication? Yes  Was medication taken this morning? Yes about 0700  Carvedilol 12.5 mg BID  Amlodipine 10 mg  Lisinopril 40 mg    Dr. Shelbie Canales consulted and notified of blood pressure and negative symptoms. Dr. Shelbie Canales advised adding diuretic. Discussed with pt to take medication in morning. Writer discussed attendings plan with patient. Patient verbalized understanding. Patient to follow up with Dr. Shelbie Canales on 6/29/2023.

## 2023-06-07 ENCOUNTER — OFFICE VISIT (OUTPATIENT)
Dept: PODIATRY | Age: 43
End: 2023-06-07
Payer: MEDICAID

## 2023-06-07 VITALS — WEIGHT: 315 LBS | HEIGHT: 75 IN | BODY MASS INDEX: 39.17 KG/M2

## 2023-06-07 DIAGNOSIS — M79.672 FOOT PAIN, BILATERAL: Primary | ICD-10-CM

## 2023-06-07 DIAGNOSIS — M21.42 PES PLANUS OF BOTH FEET: ICD-10-CM

## 2023-06-07 DIAGNOSIS — S86.112A POSTERIOR TIBIAL TENDON TEAR, TRAUMATIC, LEFT, INITIAL ENCOUNTER: ICD-10-CM

## 2023-06-07 DIAGNOSIS — M21.41 PES PLANUS OF BOTH FEET: ICD-10-CM

## 2023-06-07 DIAGNOSIS — S93.602A FOOT SPRAIN, LEFT, INITIAL ENCOUNTER: ICD-10-CM

## 2023-06-07 DIAGNOSIS — M79.671 FOOT PAIN, BILATERAL: Primary | ICD-10-CM

## 2023-06-07 PROCEDURE — G8427 DOCREV CUR MEDS BY ELIG CLIN: HCPCS | Performed by: PODIATRIST

## 2023-06-07 PROCEDURE — 1036F TOBACCO NON-USER: CPT | Performed by: PODIATRIST

## 2023-06-07 PROCEDURE — 99213 OFFICE O/P EST LOW 20 MIN: CPT | Performed by: PODIATRIST

## 2023-06-07 PROCEDURE — G8417 CALC BMI ABV UP PARAM F/U: HCPCS | Performed by: PODIATRIST

## 2023-06-12 ENCOUNTER — PATIENT MESSAGE (OUTPATIENT)
Dept: PODIATRY | Age: 43
End: 2023-06-12

## 2023-06-14 PROBLEM — M79.671 PAIN IN BOTH FEET: Status: ACTIVE | Noted: 2023-06-14

## 2023-06-14 PROBLEM — M79.672 PAIN IN BOTH FEET: Status: ACTIVE | Noted: 2023-06-14

## 2023-06-19 ENCOUNTER — PATIENT MESSAGE (OUTPATIENT)
Dept: BARIATRICS/WEIGHT MGMT | Age: 43
End: 2023-06-19

## 2023-06-19 ENCOUNTER — HOSPITAL ENCOUNTER (OUTPATIENT)
Age: 43
Discharge: HOME | End: 2023-06-19
Payer: MEDICAID

## 2023-06-19 DIAGNOSIS — I10 ESSENTIAL HYPERTENSION: ICD-10-CM

## 2023-06-19 DIAGNOSIS — M25.561 CHRONIC PAIN OF RIGHT KNEE: ICD-10-CM

## 2023-06-19 DIAGNOSIS — G89.29 CHRONIC PAIN OF RIGHT KNEE: ICD-10-CM

## 2023-06-19 DIAGNOSIS — J45.20 MILD INTERMITTENT ASTHMA WITHOUT COMPLICATION: ICD-10-CM

## 2023-06-19 DIAGNOSIS — M79.672 PAIN IN BOTH FEET: ICD-10-CM

## 2023-06-19 DIAGNOSIS — M79.671 PAIN IN BOTH FEET: ICD-10-CM

## 2023-06-19 DIAGNOSIS — E66.01 OBESITY, CLASS III, BMI 40-49.9 (MORBID OBESITY) (HCC): ICD-10-CM

## 2023-06-19 DIAGNOSIS — G47.33 OSA ON CPAP: ICD-10-CM

## 2023-06-19 DIAGNOSIS — Z99.89 OSA ON CPAP: ICD-10-CM

## 2023-06-19 LAB
ALBUMIN SERPL-MCNC: 3.9 G/DL (ref 3.5–5.2)
ALBUMIN/GLOB SERPL: 1.1 {RATIO} (ref 1–2.5)
ALP SERPL-CCNC: 81 U/L (ref 40–129)
ALT SERPL-CCNC: 16 U/L (ref 5–41)
ANION GAP SERPL CALCULATED.3IONS-SCNC: 9 MMOL/L (ref 9–17)
AST SERPL-CCNC: 20 U/L
BASOPHILS # BLD: 0.04 K/UL (ref 0–0.2)
BASOPHILS NFR BLD: 1 % (ref 0–2)
BILIRUB SERPL-MCNC: 0.4 MG/DL (ref 0.3–1.2)
BUN SERPL-MCNC: 16 MG/DL (ref 6–20)
CALCIUM SERPL-MCNC: 9.2 MG/DL (ref 8.6–10.4)
CHLORIDE SERPL-SCNC: 104 MMOL/L (ref 98–107)
CHOLEST SERPL-MCNC: 158 MG/DL
CHOLESTEROL/HDL RATIO: 4.2
CO2 SERPL-SCNC: 26 MMOL/L (ref 20–31)
CREAT SERPL-MCNC: 0.9 MG/DL (ref 0.7–1.2)
EOSINOPHIL # BLD: 0.29 K/UL (ref 0–0.44)
EOSINOPHILS RELATIVE PERCENT: 5 % (ref 1–4)
ERYTHROCYTE [DISTWIDTH] IN BLOOD BY AUTOMATED COUNT: 13.9 % (ref 11.8–14.4)
GFR SERPL CREATININE-BSD FRML MDRD: >60 ML/MIN/1.73M2
GLUCOSE SERPL-MCNC: 81 MG/DL (ref 70–99)
HCT VFR BLD AUTO: 40.3 % (ref 40.7–50.3)
HDLC SERPL-MCNC: 38 MG/DL
HGB BLD-MCNC: 13.5 G/DL (ref 13–17)
IMM GRANULOCYTES # BLD AUTO: <0.03 K/UL (ref 0–0.3)
IMM GRANULOCYTES NFR BLD: 0 %
LDLC SERPL CALC-MCNC: 94 MG/DL (ref 0–130)
LYMPHOCYTES # BLD: 31 % (ref 24–43)
LYMPHOCYTES NFR BLD: 1.69 K/UL (ref 1.1–3.7)
MCH RBC QN AUTO: 30 PG (ref 25.2–33.5)
MCHC RBC AUTO-ENTMCNC: 33.5 G/DL (ref 28.4–34.8)
MCV RBC AUTO: 89.6 FL (ref 82.6–102.9)
MONOCYTES NFR BLD: 0.36 K/UL (ref 0.1–1.2)
MONOCYTES NFR BLD: 7 % (ref 3–12)
NEUTROPHILS NFR BLD: 56 % (ref 36–65)
NEUTS SEG NFR BLD: 3.15 K/UL (ref 1.5–8.1)
NRBC AUTOMATED: 0 PER 100 WBC
PLATELET # BLD AUTO: 233 K/UL (ref 138–453)
PMV BLD AUTO: 9.6 FL (ref 8.1–13.5)
POTASSIUM SERPL-SCNC: 3.9 MMOL/L (ref 3.7–5.3)
PROT SERPL-MCNC: 7.3 G/DL (ref 6.4–8.3)
RBC # BLD AUTO: 4.5 M/UL (ref 4.21–5.77)
SODIUM SERPL-SCNC: 139 MMOL/L (ref 135–144)
TRIGL SERPL-MCNC: 129 MG/DL
TSH SERPL-MCNC: 1.98 UIU/ML (ref 0.3–5)
URATE SERPL-MCNC: 5.3 MG/DL (ref 3.4–7)
WBC OTHER # BLD: 5.5 K/UL (ref 3.5–11.3)

## 2023-06-19 PROCEDURE — 80053 COMPREHEN METABOLIC PANEL: CPT

## 2023-06-19 PROCEDURE — 84550 ASSAY OF BLOOD/URIC ACID: CPT

## 2023-06-19 PROCEDURE — 80061 LIPID PANEL: CPT

## 2023-06-19 PROCEDURE — 85027 COMPLETE CBC AUTOMATED: CPT

## 2023-06-19 PROCEDURE — 84443 ASSAY THYROID STIM HORMONE: CPT

## 2023-06-19 PROCEDURE — 36415 COLL VENOUS BLD VENIPUNCTURE: CPT

## 2023-06-19 NOTE — TELEPHONE ENCOUNTER
Merly chung for using [facility]. This message has been forwarded to your [facility] ([facility]) provider. Please allow your provider some time to review the message and act on it as necessary. You should hear something within 24 business hours. If you don't, please respond to this message or call the office.

## 2023-06-19 NOTE — TELEPHONE ENCOUNTER
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 For Transportation to get there.  Here the number [phone]

## 2023-06-24 DIAGNOSIS — M25.562 CHRONIC PAIN OF BOTH KNEES: ICD-10-CM

## 2023-06-24 DIAGNOSIS — M25.561 CHRONIC PAIN OF BOTH KNEES: ICD-10-CM

## 2023-06-24 DIAGNOSIS — G89.29 CHRONIC PAIN OF BOTH KNEES: ICD-10-CM

## 2023-06-26 ENCOUNTER — TELEPHONE (OUTPATIENT)
Dept: PODIATRY | Age: 43
End: 2023-06-26

## 2023-06-27 RX ORDER — PSEUDOEPHED/ACETAMINOPH/DIPHEN 30MG-500MG
TABLET ORAL
Qty: 60 TABLET | Refills: 3 | Status: SHIPPED | OUTPATIENT
Start: 2023-06-27

## 2023-06-29 ENCOUNTER — OFFICE VISIT (OUTPATIENT)
Dept: INTERNAL MEDICINE | Age: 43
End: 2023-06-29
Payer: MEDICAID

## 2023-06-29 VITALS
DIASTOLIC BLOOD PRESSURE: 86 MMHG | HEART RATE: 86 BPM | SYSTOLIC BLOOD PRESSURE: 130 MMHG | OXYGEN SATURATION: 98 % | TEMPERATURE: 97.7 F | WEIGHT: 315 LBS | HEIGHT: 75 IN | BODY MASS INDEX: 39.17 KG/M2

## 2023-06-29 DIAGNOSIS — I10 RESISTANT HYPERTENSION: Primary | ICD-10-CM

## 2023-06-29 DIAGNOSIS — E66.01 OBESITY, CLASS III, BMI 40-49.9 (MORBID OBESITY) (HCC): ICD-10-CM

## 2023-06-29 DIAGNOSIS — G47.33 OSA (OBSTRUCTIVE SLEEP APNEA): ICD-10-CM

## 2023-06-29 PROCEDURE — 3075F SYST BP GE 130 - 139MM HG: CPT | Performed by: INTERNAL MEDICINE

## 2023-06-29 PROCEDURE — 3079F DIAST BP 80-89 MM HG: CPT | Performed by: INTERNAL MEDICINE

## 2023-06-29 PROCEDURE — 1036F TOBACCO NON-USER: CPT | Performed by: INTERNAL MEDICINE

## 2023-06-29 PROCEDURE — G8427 DOCREV CUR MEDS BY ELIG CLIN: HCPCS | Performed by: INTERNAL MEDICINE

## 2023-06-29 PROCEDURE — 99213 OFFICE O/P EST LOW 20 MIN: CPT | Performed by: INTERNAL MEDICINE

## 2023-06-29 PROCEDURE — G8417 CALC BMI ABV UP PARAM F/U: HCPCS | Performed by: INTERNAL MEDICINE

## 2023-07-02 DIAGNOSIS — I10 ESSENTIAL HYPERTENSION: ICD-10-CM

## 2023-07-03 RX ORDER — LISINOPRIL 40 MG/1
TABLET ORAL
Qty: 90 TABLET | Refills: 1 | OUTPATIENT
Start: 2023-07-03

## 2023-07-11 DIAGNOSIS — M25.561 CHRONIC PAIN OF BOTH KNEES: ICD-10-CM

## 2023-07-11 DIAGNOSIS — M25.562 CHRONIC PAIN OF BOTH KNEES: ICD-10-CM

## 2023-07-11 DIAGNOSIS — G89.29 CHRONIC PAIN OF BOTH KNEES: ICD-10-CM

## 2023-07-11 DIAGNOSIS — I10 RESISTANT HYPERTENSION: ICD-10-CM

## 2023-07-11 DIAGNOSIS — J31.0 CHRONIC RHINITIS: ICD-10-CM

## 2023-07-11 DIAGNOSIS — I10 ESSENTIAL HYPERTENSION: ICD-10-CM

## 2023-07-11 DIAGNOSIS — K21.9 GASTROESOPHAGEAL REFLUX DISEASE, UNSPECIFIED WHETHER ESOPHAGITIS PRESENT: ICD-10-CM

## 2023-07-11 DIAGNOSIS — R09.81 NASAL CONGESTION: ICD-10-CM

## 2023-07-11 DIAGNOSIS — M17.11 PRIMARY OSTEOARTHRITIS OF RIGHT KNEE: ICD-10-CM

## 2023-07-12 RX ORDER — ACETAMINOPHEN 500 MG
500 TABLET ORAL 2 TIMES DAILY
Qty: 60 TABLET | Refills: 3 | OUTPATIENT
Start: 2023-07-12

## 2023-07-12 RX ORDER — LISINOPRIL 40 MG/1
40 TABLET ORAL DAILY
Qty: 90 TABLET | Refills: 1 | OUTPATIENT
Start: 2023-07-12

## 2023-07-12 RX ORDER — HYDROCHLOROTHIAZIDE 25 MG/1
25 TABLET ORAL EVERY MORNING
Qty: 90 TABLET | Refills: 1 | OUTPATIENT
Start: 2023-07-12

## 2023-07-12 RX ORDER — CARVEDILOL 12.5 MG/1
12.5 TABLET ORAL 2 TIMES DAILY
Qty: 60 TABLET | Refills: 3 | OUTPATIENT
Start: 2023-07-12

## 2023-07-12 RX ORDER — AMLODIPINE BESYLATE 10 MG/1
10 TABLET ORAL DAILY
Qty: 90 TABLET | Refills: 1 | OUTPATIENT
Start: 2023-07-12

## 2023-07-12 NOTE — TELEPHONE ENCOUNTER
Last visit: 6/29/23  Last Med refill:   Does patient have enough medication for 72 hours: No:     Next Visit Date:  Future Appointments   Date Time Provider [address]   7/18/2023  5:00 PM [facility]   7/25/2023  7:30 PM [facility] RM [address] [facility]   8/16/2023  9:00 AM Latoya Nunes DPM [facility]   10/17/2023  9:00 AM Eugenie Baeza MD [address] Maintenance   Topic Date Due    COVID-19 Vaccine (5 - Booster for Moderna series) 04/22/2022    Colorectal Cancer Screen  Never done    Flu vaccine (1) 08/01/2023    Depression Monitoring  02/03/2024    DTaP/Tdap/Td vaccine (3 - Td or Tdap) 04/27/2028    Lipids  06/19/2028    Pneumococcal 0-64 years Vaccine  Completed    Hepatitis C screen  Completed    HIV screen  Completed    Hepatitis A vaccine  Aged Out    Hib vaccine  Aged Out    Meningococcal (ACWY) vaccine  Aged Out       Hemoglobin A1C (%)   Date Value   05/30/2023 5.4   01/05/2022 5.5   05/28/2021 5.6             ( goal A1C is < 7)   No results found for: LABMICR  LDL Cholesterol (mg/dL)   Date Value   06/19/2023 94   01/05/2022 101       (goal LDL is <100)   AST (U/L)   Date Value   06/19/2023 20     ALT (U/L)   Date Value   06/19/2023 16     BUN (mg/dL)   Date Value   06/19/2023 16     BP Readings from Last 3 Encounters:   06/29/23 130/86   06/14/23 138/88   06/06/23 (!) 136/100          (goal 120/80)    All Future Testing planned in [facility]  Lab Frequency Next Occurrence   Sleep Study with PAP Titration Once 08/30/2022   CT CHEST WO CONTRAST Once 02/03/2023   Sleep Study with PAP Titration Once 06/29/2023               Patient Active Problem List:     Essential hypertension     MIGEL on CPAP     Vitamin D deficiency     Obesity (BMI 30-39.9)     IGT (impaired glucose tolerance)     Mild intermittent asthma     Cervical spondylosis     Degeneration of cervical intervertebral disc     Osteoarthritis of knee     Scoliosis deformity of

## 2023-07-13 RX ORDER — FLUTICASONE PROPIONATE 50 MCG
1 SPRAY, SUSPENSION (ML) NASAL DAILY
Qty: 32 G | Refills: 5 | Status: SHIPPED | OUTPATIENT
Start: 2023-07-13

## 2023-07-13 RX ORDER — MELATONIN 10 MG
10 CAPSULE ORAL NIGHTLY
Qty: 30 CAPSULE | Refills: 5 | Status: SHIPPED | OUTPATIENT
Start: 2023-07-13

## 2023-07-13 RX ORDER — PANTOPRAZOLE SODIUM 40 MG/1
40 TABLET, DELAYED RELEASE ORAL
Qty: 30 TABLET | Refills: 5 | Status: SHIPPED | OUTPATIENT
Start: 2023-07-13

## 2023-07-13 RX ORDER — ALBUTEROL SULFATE 90 UG/1
2 AEROSOL, METERED RESPIRATORY (INHALATION) EVERY 6 HOURS PRN
Qty: 18 G | Refills: 5 | Status: SHIPPED | OUTPATIENT
Start: 2023-07-13

## 2023-07-13 RX ORDER — LORATADINE 10 MG/1
10 TABLET ORAL DAILY
Qty: 30 TABLET | Refills: 5 | Status: SHIPPED | OUTPATIENT
Start: 2023-07-13

## 2023-07-18 ENCOUNTER — OFFICE VISIT (OUTPATIENT)
Dept: BARIATRICS/WEIGHT MGMT | Age: 43
End: 2023-07-18
Payer: MEDICAID

## 2023-07-18 VITALS
DIASTOLIC BLOOD PRESSURE: 70 MMHG | HEART RATE: 74 BPM | HEIGHT: 75 IN | SYSTOLIC BLOOD PRESSURE: 126 MMHG | BODY MASS INDEX: 39.17 KG/M2 | WEIGHT: 315 LBS

## 2023-07-18 DIAGNOSIS — M25.561 CHRONIC PAIN OF RIGHT KNEE: ICD-10-CM

## 2023-07-18 DIAGNOSIS — I10 ESSENTIAL HYPERTENSION: Primary | ICD-10-CM

## 2023-07-18 DIAGNOSIS — G47.33 OSA ON CPAP: ICD-10-CM

## 2023-07-18 DIAGNOSIS — J45.20 MILD INTERMITTENT ASTHMA WITHOUT COMPLICATION: ICD-10-CM

## 2023-07-18 DIAGNOSIS — E66.01 OBESITY, CLASS III, BMI 40-49.9 (MORBID OBESITY) (HCC): ICD-10-CM

## 2023-07-18 DIAGNOSIS — Z99.89 OSA ON CPAP: ICD-10-CM

## 2023-07-18 DIAGNOSIS — G89.29 CHRONIC PAIN OF RIGHT KNEE: ICD-10-CM

## 2023-07-18 PROCEDURE — G8417 CALC BMI ABV UP PARAM F/U: HCPCS | Performed by: NURSE PRACTITIONER

## 2023-07-18 PROCEDURE — G8427 DOCREV CUR MEDS BY ELIG CLIN: HCPCS | Performed by: NURSE PRACTITIONER

## 2023-07-18 PROCEDURE — 99213 OFFICE O/P EST LOW 20 MIN: CPT | Performed by: NURSE PRACTITIONER

## 2023-07-18 PROCEDURE — 3074F SYST BP LT 130 MM HG: CPT | Performed by: NURSE PRACTITIONER

## 2023-07-18 PROCEDURE — 3078F DIAST BP <80 MM HG: CPT | Performed by: NURSE PRACTITIONER

## 2023-07-18 PROCEDURE — 1036F TOBACCO NON-USER: CPT | Performed by: NURSE PRACTITIONER

## 2023-07-18 NOTE — PROGRESS NOTES
Group Lifestyle Balance Follow-up Progress Note      Subjective     Patient is here for group medical appointment for Group Lifestyle Balance weight management program follow-up for the chronic conditions of MIGEL, HTN, Asthma, Chronic Pain Right Knee, Bilateral Foot Pain. Patient continues on the program and tolerating well. Total weight gain of 1 lb. No current issues. Allergies: Allergies   Allergen Reactions    Melatonin        Past Medical History:     Past Medical History:   Diagnosis Date    Anxiety     Arthritis     CAD (coronary artery disease)     Fatty liver     Fatty liver disease, nonalcoholic 8/98/2290    Hypertension     Obesity     Unspecified sleep apnea     cpap   . Past Surgical History:  History reviewed. No pertinent surgical history. Family History:  Family History   Problem Relation Age of Onset    Arthritis Mother     Diabetes Father     Arthritis Father     Stroke Maternal Grandmother     Heart Disease Maternal Grandmother     Stroke Maternal Grandfather     Heart Disease Maternal Grandfather     Early Death Maternal Grandfather     Stroke Paternal Grandmother     Heart Disease Paternal Grandmother     Stroke Paternal Grandfather     Heart Disease Paternal Grandfather     Early Death Paternal Grandfather        Social History:  Social History     Socioeconomic History    Marital status: Single     Spouse name: Greg Lebron    Number of children: 0    Years of education: 16    Highest education level:  Bachelor's degree (e.g., BA, AB, BS)   Occupational History    Not on file   Tobacco Use    Smoking status: Never    Smokeless tobacco: Never   Vaping Use    Vaping Use: Never used   Substance and Sexual Activity    Alcohol use: No    Drug use: No    Sexual activity: Yes     Partners: Female   Other Topics Concern    Not on file   Social History Narrative    ** Merged History Encounter **          Social Determinants of Health     Financial Resource Strain: Medium Risk    Difficulty of

## 2023-07-25 ENCOUNTER — OFFICE VISIT (OUTPATIENT)
Dept: BARIATRICS/WEIGHT MGMT | Age: 43
End: 2023-07-25
Payer: MEDICAID

## 2023-07-25 VITALS
HEART RATE: 88 BPM | BODY MASS INDEX: 39.17 KG/M2 | HEIGHT: 75 IN | WEIGHT: 315 LBS | DIASTOLIC BLOOD PRESSURE: 88 MMHG | SYSTOLIC BLOOD PRESSURE: 138 MMHG

## 2023-07-25 DIAGNOSIS — Z99.89 OSA ON CPAP: Primary | ICD-10-CM

## 2023-07-25 DIAGNOSIS — E66.01 OBESITY, CLASS III, BMI 40-49.9 (MORBID OBESITY) (HCC): ICD-10-CM

## 2023-07-25 DIAGNOSIS — J45.20 MILD INTERMITTENT ASTHMA WITHOUT COMPLICATION: ICD-10-CM

## 2023-07-25 DIAGNOSIS — I10 ESSENTIAL HYPERTENSION: ICD-10-CM

## 2023-07-25 DIAGNOSIS — M79.672 PAIN IN BOTH FEET: ICD-10-CM

## 2023-07-25 DIAGNOSIS — G89.29 CHRONIC PAIN OF RIGHT KNEE: ICD-10-CM

## 2023-07-25 DIAGNOSIS — M79.671 PAIN IN BOTH FEET: ICD-10-CM

## 2023-07-25 DIAGNOSIS — M25.561 CHRONIC PAIN OF RIGHT KNEE: ICD-10-CM

## 2023-07-25 DIAGNOSIS — G47.33 OSA ON CPAP: Primary | ICD-10-CM

## 2023-07-25 PROCEDURE — 99213 OFFICE O/P EST LOW 20 MIN: CPT | Performed by: NURSE PRACTITIONER

## 2023-07-25 PROCEDURE — 1036F TOBACCO NON-USER: CPT | Performed by: NURSE PRACTITIONER

## 2023-07-25 PROCEDURE — G8417 CALC BMI ABV UP PARAM F/U: HCPCS | Performed by: NURSE PRACTITIONER

## 2023-07-25 PROCEDURE — 3079F DIAST BP 80-89 MM HG: CPT | Performed by: NURSE PRACTITIONER

## 2023-07-25 PROCEDURE — 3075F SYST BP GE 130 - 139MM HG: CPT | Performed by: NURSE PRACTITIONER

## 2023-07-25 PROCEDURE — G8427 DOCREV CUR MEDS BY ELIG CLIN: HCPCS | Performed by: NURSE PRACTITIONER

## 2023-07-25 NOTE — PROGRESS NOTES
Group Lifestyle Balance Follow-up Progress Note      Subjective     Patient is here for group medical appointment for Group Lifestyle Balance weight management program follow-up for the chronic conditions of MIGEL, HTN, Asthma, Chronic Pain Right Knee, Bilateral Foot Pain. Patient continues on the program and tolerating well. Total weight loss of 10 lbs. No current issues. Allergies: Allergies   Allergen Reactions    Melatonin        Past Medical History:     Past Medical History:   Diagnosis Date    Anxiety     Arthritis     CAD (coronary artery disease)     Fatty liver     Fatty liver disease, nonalcoholic 1/15/8916    Hypertension     Obesity     Unspecified sleep apnea     cpap   . Past Surgical History:  History reviewed. No pertinent surgical history. Family History:  Family History   Problem Relation Age of Onset    Arthritis Mother     Diabetes Father     Arthritis Father     Stroke Maternal Grandmother     Heart Disease Maternal Grandmother     Stroke Maternal Grandfather     Heart Disease Maternal Grandfather     Early Death Maternal Grandfather     Stroke Paternal Grandmother     Heart Disease Paternal Grandmother     Stroke Paternal Grandfather     Heart Disease Paternal Grandfather     Early Death Paternal Grandfather        Social History:  Social History     Socioeconomic History    Marital status: Single     Spouse name: Sarah Kimbrough    Number of children: 0    Years of education: 16    Highest education level:  Bachelor's degree (e.g., BA, AB, BS)   Occupational History    Not on file   Tobacco Use    Smoking status: Never    Smokeless tobacco: Never   Vaping Use    Vaping Use: Never used   Substance and Sexual Activity    Alcohol use: No    Drug use: No    Sexual activity: Yes     Partners: Female   Other Topics Concern    Not on file   Social History Narrative    ** Merged History Encounter **          Social Determinants of Health     Financial Resource Strain: Medium Risk    Difficulty

## 2023-07-26 ENCOUNTER — HOSPITAL ENCOUNTER (OUTPATIENT)
Dept: SLEEP CENTER | Age: 43
Discharge: HOME | End: 2023-07-28
Payer: MEDICAID

## 2023-07-26 DIAGNOSIS — E66.01 OBESITY, CLASS III, BMI 40-49.9 (MORBID OBESITY) (HCC): ICD-10-CM

## 2023-07-26 DIAGNOSIS — G47.33 OSA (OBSTRUCTIVE SLEEP APNEA): ICD-10-CM

## 2023-07-26 PROCEDURE — 95811 POLYSOM 6/>YRS CPAP 4/> PARM: CPT

## 2023-08-02 ENCOUNTER — HOSPITAL ENCOUNTER (EMERGENCY)
Age: 43
Discharge: HOME | End: 2023-08-02
Attending: EMERGENCY MEDICINE
Payer: MEDICAID

## 2023-08-02 ENCOUNTER — APPOINTMENT (OUTPATIENT)
Dept: GENERAL RADIOLOGY | Age: 43
End: 2023-08-02
Payer: MEDICAID

## 2023-08-02 VITALS
OXYGEN SATURATION: 95 % | DIASTOLIC BLOOD PRESSURE: 88 MMHG | TEMPERATURE: 98 F | RESPIRATION RATE: 16 BRPM | SYSTOLIC BLOOD PRESSURE: 142 MMHG | HEART RATE: 94 BPM

## 2023-08-02 DIAGNOSIS — M79.671 RIGHT FOOT PAIN: Primary | ICD-10-CM

## 2023-08-02 PROCEDURE — 99284 EMERGENCY DEPT VISIT MOD MDM: CPT

## 2023-08-02 PROCEDURE — 6360000002 HC RX W HCPCS: Performed by: STUDENT IN AN ORGANIZED HEALTH CARE EDUCATION/TRAINING PROGRAM

## 2023-08-02 PROCEDURE — 73630 X-RAY EXAM OF FOOT: CPT

## 2023-08-02 PROCEDURE — 96372 THER/PROPH/DIAG INJ SC/IM: CPT

## 2023-08-02 RX ORDER — KETOROLAC TROMETHAMINE 15 MG/ML
15 INJECTION, SOLUTION INTRAMUSCULAR; INTRAVENOUS ONCE
Status: COMPLETED | OUTPATIENT
Start: 2023-08-02 | End: 2023-08-02

## 2023-08-02 RX ORDER — IBUPROFEN 600 MG/1
600 TABLET ORAL EVERY 8 HOURS PRN
Qty: 12 TABLET | Refills: 0 | Status: SHIPPED | OUTPATIENT
Start: 2023-08-02

## 2023-08-02 RX ORDER — ACETAMINOPHEN 500 MG
500 TABLET ORAL EVERY 6 HOURS PRN
Qty: 12 TABLET | Refills: 0 | Status: SHIPPED | OUTPATIENT
Start: 2023-08-02

## 2023-08-02 RX ADMIN — KETOROLAC TROMETHAMINE 15 MG: 15 INJECTION, SOLUTION INTRAMUSCULAR; INTRAVENOUS at 21:04

## 2023-08-02 ASSESSMENT — ENCOUNTER SYMPTOMS
SHORTNESS OF BREATH: 0
NAUSEA: 0
SINUS PAIN: 0
RHINORRHEA: 0
FACIAL SWELLING: 0
SORE THROAT: 0
PHOTOPHOBIA: 0
COUGH: 0
WHEEZING: 0
COLOR CHANGE: 0
ABDOMINAL PAIN: 0
BACK PAIN: 0
STRIDOR: 0
DIARRHEA: 0
VOMITING: 0

## 2023-08-02 ASSESSMENT — PAIN DESCRIPTION - LOCATION: LOCATION: FOOT

## 2023-08-02 ASSESSMENT — PAIN SCALES - GENERAL: PAINLEVEL_OUTOF10: 10

## 2023-08-03 NOTE — DISCHARGE INSTRUCTIONS
Seen in the emergency department for right heel pain. Image did show  \"IMPRESSION:  Osteophyte formation at the tarsal joints. Small plantar calcaneal spurring. No acute findings. \"    We will provide right foot boot. Please follow-up with podiatry. Their contact information is in the paperwork. Please follow-up with your primary care as needed. Please return to the emergency department if you experience worsened heel pain, inability to ambulate, or any other concerning symptoms.

## 2023-08-06 LAB — STATUS: NORMAL

## 2023-08-10 ENCOUNTER — OFFICE VISIT (OUTPATIENT)
Dept: BARIATRICS/WEIGHT MGMT | Age: 43
End: 2023-08-10
Payer: MEDICAID

## 2023-08-10 DIAGNOSIS — E66.01 OBESITY, CLASS III, BMI 40-49.9 (MORBID OBESITY) (HCC): ICD-10-CM

## 2023-08-10 DIAGNOSIS — Z99.89 OSA ON CPAP: Primary | ICD-10-CM

## 2023-08-10 DIAGNOSIS — J45.20 MILD INTERMITTENT ASTHMA WITHOUT COMPLICATION: ICD-10-CM

## 2023-08-10 DIAGNOSIS — G47.33 OSA ON CPAP: Primary | ICD-10-CM

## 2023-08-10 DIAGNOSIS — G89.29 CHRONIC PAIN OF RIGHT KNEE: ICD-10-CM

## 2023-08-10 DIAGNOSIS — I10 ESSENTIAL HYPERTENSION: ICD-10-CM

## 2023-08-10 DIAGNOSIS — M25.561 CHRONIC PAIN OF RIGHT KNEE: ICD-10-CM

## 2023-08-10 PROCEDURE — 3079F DIAST BP 80-89 MM HG: CPT | Performed by: NURSE PRACTITIONER

## 2023-08-10 PROCEDURE — G8417 CALC BMI ABV UP PARAM F/U: HCPCS | Performed by: NURSE PRACTITIONER

## 2023-08-10 PROCEDURE — 1036F TOBACCO NON-USER: CPT | Performed by: NURSE PRACTITIONER

## 2023-08-10 PROCEDURE — G8427 DOCREV CUR MEDS BY ELIG CLIN: HCPCS | Performed by: NURSE PRACTITIONER

## 2023-08-10 PROCEDURE — 3075F SYST BP GE 130 - 139MM HG: CPT | Performed by: NURSE PRACTITIONER

## 2023-08-10 PROCEDURE — 99213 OFFICE O/P EST LOW 20 MIN: CPT | Performed by: NURSE PRACTITIONER

## 2023-08-11 VITALS
WEIGHT: 315 LBS | HEIGHT: 75 IN | BODY MASS INDEX: 39.17 KG/M2 | DIASTOLIC BLOOD PRESSURE: 84 MMHG | HEART RATE: 82 BPM | SYSTOLIC BLOOD PRESSURE: 130 MMHG

## 2023-08-14 ENCOUNTER — HOSPITAL ENCOUNTER (EMERGENCY)
Age: 43
Discharge: HOME | End: 2023-08-14
Attending: EMERGENCY MEDICINE
Payer: MEDICAID

## 2023-08-14 VITALS
RESPIRATION RATE: 16 BRPM | TEMPERATURE: 97.2 F | BODY MASS INDEX: 44.9 KG/M2 | DIASTOLIC BLOOD PRESSURE: 108 MMHG | SYSTOLIC BLOOD PRESSURE: 146 MMHG | HEART RATE: 78 BPM | HEIGHT: 76 IN | OXYGEN SATURATION: 98 %

## 2023-08-14 DIAGNOSIS — M76.61 ACHILLES TENDINITIS OF RIGHT LOWER EXTREMITY: Primary | ICD-10-CM

## 2023-08-14 PROCEDURE — 6370000000 HC RX 637 (ALT 250 FOR IP): Performed by: STUDENT IN AN ORGANIZED HEALTH CARE EDUCATION/TRAINING PROGRAM

## 2023-08-14 PROCEDURE — 99283 EMERGENCY DEPT VISIT LOW MDM: CPT

## 2023-08-14 RX ORDER — IBUPROFEN 800 MG/1
800 TABLET ORAL ONCE
Status: COMPLETED | OUTPATIENT
Start: 2023-08-14 | End: 2023-08-14

## 2023-08-14 RX ADMIN — IBUPROFEN 800 MG: 800 TABLET, FILM COATED ORAL at 09:30

## 2023-08-14 ASSESSMENT — ENCOUNTER SYMPTOMS
COLOR CHANGE: 0
DIARRHEA: 0
NAUSEA: 0
VOMITING: 0
BACK PAIN: 0
ABDOMINAL PAIN: 0

## 2023-08-14 ASSESSMENT — PAIN - FUNCTIONAL ASSESSMENT: PAIN_FUNCTIONAL_ASSESSMENT: 0-10

## 2023-08-14 ASSESSMENT — PAIN DESCRIPTION - LOCATION: LOCATION: FOOT

## 2023-08-14 ASSESSMENT — PAIN DESCRIPTION - FREQUENCY: FREQUENCY: CONTINUOUS

## 2023-08-14 ASSESSMENT — PAIN SCALES - GENERAL: PAINLEVEL_OUTOF10: 10

## 2023-08-14 ASSESSMENT — PAIN DESCRIPTION - ORIENTATION: ORIENTATION: RIGHT

## 2023-08-14 ASSESSMENT — PAIN DESCRIPTION - DESCRIPTORS: DESCRIPTORS: BURNING

## 2023-08-14 ASSESSMENT — PAIN DESCRIPTION - PAIN TYPE: TYPE: ACUTE PAIN

## 2023-08-14 NOTE — DISCHARGE INSTRUCTIONS
Please follow-up with her [facility] provider. Please go to your podiatry point. Take your ibuprofen as prescribed. This is most helpful for tendinitis. You need to wear your walking boot while you are at work.   Return emergency department develop any severe worsening of your symptoms, chest pain or shortness of breath, one-sided weakness slurred speech difficulty swallowing or any other general concerns

## 2023-08-14 NOTE — ED NOTES
Pt to ed via ambulatory to room with complaints of right foot pain x 1 week  Pt states its on the posterior side of his foot near his achillis tendon  Pt states pain 10/10 burning sensation  Pt states hes been trying motrin with no relief  Pt alert and oriented x4, talking in complete sentences, respirations even and unlabored. Pt acting age appropriate.  White board updated, will continue to plan of care      Annia Delgado RN  08/14/23 [id number]

## 2023-08-16 ENCOUNTER — OFFICE VISIT (OUTPATIENT)
Dept: PODIATRY | Age: 43
End: 2023-08-16
Payer: MEDICAID

## 2023-08-16 VITALS — HEIGHT: 76 IN | BODY MASS INDEX: 38.36 KG/M2 | WEIGHT: 315 LBS

## 2023-08-16 DIAGNOSIS — L60.0 INGROWN NAIL: ICD-10-CM

## 2023-08-16 DIAGNOSIS — M21.42 PES PLANUS OF BOTH FEET: ICD-10-CM

## 2023-08-16 DIAGNOSIS — M79.671 FOOT PAIN, BILATERAL: Primary | ICD-10-CM

## 2023-08-16 DIAGNOSIS — M76.61 TENDONITIS, ACHILLES, RIGHT: ICD-10-CM

## 2023-08-16 DIAGNOSIS — B35.1 DERMATOPHYTOSIS OF NAIL: ICD-10-CM

## 2023-08-16 DIAGNOSIS — M21.41 PES PLANUS OF BOTH FEET: ICD-10-CM

## 2023-08-16 DIAGNOSIS — M79.672 FOOT PAIN, BILATERAL: Primary | ICD-10-CM

## 2023-08-16 PROCEDURE — 11721 DEBRIDE NAIL 6 OR MORE: CPT | Performed by: PODIATRIST

## 2023-08-16 PROCEDURE — 99213 OFFICE O/P EST LOW 20 MIN: CPT | Performed by: PODIATRIST

## 2023-08-16 PROCEDURE — 1036F TOBACCO NON-USER: CPT | Performed by: PODIATRIST

## 2023-08-16 PROCEDURE — G8427 DOCREV CUR MEDS BY ELIG CLIN: HCPCS | Performed by: PODIATRIST

## 2023-08-16 PROCEDURE — G8417 CALC BMI ABV UP PARAM F/U: HCPCS | Performed by: PODIATRIST

## 2023-08-16 RX ORDER — ONDANSETRON HYDROCHLORIDE 8 MG/1
1 TABLET, FILM COATED ORAL 3 TIMES DAILY PRN
COMMUNITY

## 2023-08-16 NOTE — PROGRESS NOTES
[address] [facility],[address] PODIATRY [address] [place] [address]  Dept: [phone]  Dept Fax: [phone]     PAIN PROGRESS NOTE  Date of patient's visit: 8/16/2023  Patient's Name:  Serena Logan YOB: 1980            Patient Care Team:  Marely Rubio MD as PCP - General (Internal Medicine)  Marely Rubio MD as PCP - Empaneled Provider  Celena Liang MD as Surgeon (Orthopedic Surgery)  Zandra Camp DPM as Physician (Podiatry)  Georgina Herbert DPM as Physician (Podiatry)      Chief Complaint   Patient presents with    Foot Pain     Bilateral feet and right foot is having burning sensation x 1 week     Nail Problem     Toenail trim        Subjective: This Serena Base comes to clinic for foot and nail care. Pt currently has complaint of thickened, painful, elongated nails that he/she cannot manage by themselves. Pt. Relates pain to nails with shoe gear. Pt's primary care physician is Marely Rubio MD last seen 6/29/23. Pt has a new complaint of right foot and ankle pain and burning, onset x 1 week to the right foot.   Past Medical History:   Diagnosis Date    Anxiety     Arthritis     CAD (coronary artery disease)     Fatty liver     Fatty liver disease, nonalcoholic 9/40/4451    Hypertension     Obesity     Unspecified sleep apnea     cpap       Allergies   Allergen Reactions    Melatonin      Current Outpatient Medications on File Prior to Visit   Medication Sig Dispense Refill    Cholecalciferol (VITAMIN D3) 125 MCG (5000 UT) TABS Take 1 tablet by mouth daily      ondansetron (ZOFRAN) 8 MG tablet Take 1 tablet by mouth 3 times daily as needed      ibuprofen (ADVIL;MOTRIN) 600 MG tablet Take 1 tablet by mouth every 8 hours as needed for Pain 12 tablet 0    acetaminophen (TYLENOL) 500 MG tablet Take 1 tablet by mouth every 6 hours as needed for Pain 12 tablet 0    fluticasone (FLONASE) 50 MCG/ACT

## 2023-08-25 DIAGNOSIS — M25.572 BILATERAL ANKLE PAIN, UNSPECIFIED CHRONICITY: ICD-10-CM

## 2023-08-25 DIAGNOSIS — M79.671 BILATERAL FOOT PAIN: Primary | ICD-10-CM

## 2023-08-25 DIAGNOSIS — M25.571 BILATERAL ANKLE PAIN, UNSPECIFIED CHRONICITY: ICD-10-CM

## 2023-08-25 DIAGNOSIS — M79.672 BILATERAL FOOT PAIN: Primary | ICD-10-CM

## 2023-08-28 ENCOUNTER — OFFICE VISIT (OUTPATIENT)
Dept: ORTHOPEDIC SURGERY | Age: 43
End: 2023-08-28
Payer: MEDICAID

## 2023-08-28 VITALS — BODY MASS INDEX: 39.17 KG/M2 | HEIGHT: 75 IN | WEIGHT: 315 LBS

## 2023-08-28 DIAGNOSIS — M92.60 ACQUIRED HAGLUND'S DEFORMITY, UNSPECIFIED LATERALITY: ICD-10-CM

## 2023-08-28 DIAGNOSIS — M76.61 ACHILLES TENDINITIS OF BOTH LOWER EXTREMITIES: Primary | ICD-10-CM

## 2023-08-28 DIAGNOSIS — M76.62 ACHILLES TENDINITIS OF BOTH LOWER EXTREMITIES: Primary | ICD-10-CM

## 2023-08-28 PROCEDURE — G8427 DOCREV CUR MEDS BY ELIG CLIN: HCPCS | Performed by: ORTHOPAEDIC SURGERY

## 2023-08-28 PROCEDURE — 1036F TOBACCO NON-USER: CPT | Performed by: ORTHOPAEDIC SURGERY

## 2023-08-28 PROCEDURE — 99204 OFFICE O/P NEW MOD 45 MIN: CPT | Performed by: ORTHOPAEDIC SURGERY

## 2023-08-28 PROCEDURE — G8417 CALC BMI ABV UP PARAM F/U: HCPCS | Performed by: ORTHOPAEDIC SURGERY

## 2023-08-28 NOTE — PROGRESS NOTES
1000 [facility]  8 [facility] [place]  [address] [address]  Dept: [phone]    Ambulatory Orthopedic Consult      CHIEF COMPLAINT:    Chief Complaint   Patient presents with    Foot Pain     bilateral       HISTORY OF PRESENT ILLNESS:      The patient is a 37 y.o. male who is being seen for evaluation of the above, which began around 5/28/2023 atraumatically, but reports that his pain became worse on 7/28/2023 secondary to a direct blow. At today's visit, he is using no brace/assistive device. History is obtained today from:   [x]  the patient     [x]  EMR     []  one family member/friend    []  multiple family members/friends    []  other: At today's visit, the patient localizes pain to the right greater than left posterior distal leg/heel. REVIEW OF SYSTEMS:  Musculoskeletal: See HPI for pertinent positives     Past Medical History:    He  has a past medical history of Anxiety, Arthritis, CAD (coronary artery disease), Fatty liver, Fatty liver disease, nonalcoholic (5/57/2199), Hypertension, Obesity, and Unspecified sleep apnea. Past Surgical History:    He  has no past surgical history on file.      Current Medications:     Current Outpatient Medications:     Cholecalciferol (VITAMIN D3) 125 MCG (5000 UT) TABS, Take 1 tablet by mouth daily, Disp: , Rfl:     ondansetron (ZOFRAN) 8 MG tablet, Take 1 tablet by mouth 3 times daily as needed, Disp: , Rfl:     ibuprofen (ADVIL;MOTRIN) 600 MG tablet, Take 1 tablet by mouth every 8 hours as needed for Pain, Disp: 12 tablet, Rfl: 0    acetaminophen (TYLENOL) 500 MG tablet, Take 1 tablet by mouth every 6 hours as needed for Pain, Disp: 12 tablet, Rfl: 0    fluticasone (FLONASE) 50 MCG/ACT nasal spray, 1 spray by Each Nostril route daily, Disp: 32 g, Rfl: 5    albuterol sulfate HFA (PROVENTIL;VENTOLIN;PROAIR) 108 (90 Base) MCG/ACT inhaler, Inhale 2 puffs

## 2023-09-06 ENCOUNTER — HOSPITAL ENCOUNTER (OUTPATIENT)
Dept: PHYSICAL THERAPY | Facility: CLINIC | Age: 43
Setting detail: THERAPIES SERIES
Discharge: HOME | End: 2023-09-06
Payer: MEDICAID

## 2023-09-06 PROCEDURE — 97110 THERAPEUTIC EXERCISES: CPT

## 2023-09-06 PROCEDURE — 97161 PT EVAL LOW COMPLEX 20 MIN: CPT
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
## 2023-09-07 ENCOUNTER — OFFICE VISIT (OUTPATIENT)
Dept: BARIATRICS/WEIGHT MGMT | Age: 43
End: 2023-09-07
Payer: MEDICAID

## 2023-09-07 VITALS
BODY MASS INDEX: 39.17 KG/M2 | HEART RATE: 80 BPM | SYSTOLIC BLOOD PRESSURE: 142 MMHG | WEIGHT: 315 LBS | HEIGHT: 75 IN | DIASTOLIC BLOOD PRESSURE: 98 MMHG

## 2023-09-07 DIAGNOSIS — I10 ESSENTIAL HYPERTENSION: Primary | ICD-10-CM

## 2023-09-07 DIAGNOSIS — Z99.89 OSA ON CPAP: ICD-10-CM

## 2023-09-07 DIAGNOSIS — G47.33 OSA ON CPAP: ICD-10-CM

## 2023-09-07 DIAGNOSIS — J45.20 MILD INTERMITTENT ASTHMA WITHOUT COMPLICATION: ICD-10-CM

## 2023-09-07 DIAGNOSIS — E66.01 OBESITY, CLASS III, BMI 40-49.9 (MORBID OBESITY) (HCC): ICD-10-CM

## 2023-09-07 PROCEDURE — 3077F SYST BP >= 140 MM HG: CPT | Performed by: NURSE PRACTITIONER

## 2023-09-07 PROCEDURE — 1036F TOBACCO NON-USER: CPT | Performed by: NURSE PRACTITIONER

## 2023-09-07 PROCEDURE — 3080F DIAST BP >= 90 MM HG: CPT | Performed by: NURSE PRACTITIONER

## 2023-09-07 PROCEDURE — G8427 DOCREV CUR MEDS BY ELIG CLIN: HCPCS | Performed by: NURSE PRACTITIONER

## 2023-09-07 PROCEDURE — 99213 OFFICE O/P EST LOW 20 MIN: CPT | Performed by: NURSE PRACTITIONER

## 2023-09-07 PROCEDURE — G8417 CALC BMI ABV UP PARAM F/U: HCPCS | Performed by: NURSE PRACTITIONER

## 2023-09-07 NOTE — PROGRESS NOTES
Difficulty of Paying Living Expenses: Somewhat hard   Food Insecurity: No Food Insecurity    Worried About Running Out of Food in the Last Year: Never true    Ran Out of Food in the Last Year: Never true   Transportation Needs: Unknown    Lack of Transportation (Medical): Not on file    Lack of Transportation (Non-Medical):  No   Physical Activity: Not on file   Stress: Not on file   Social Connections: Not on file   Intimate Partner Violence: Not on file   Housing Stability: Unknown    Unable to Pay for Housing in the Last Year: Not on file    Number of Places Lived in the Last Year: Not on file    Unstable Housing in the Last Year: No       Current Medications:  Current Outpatient Medications   Medication Sig Dispense Refill    diclofenac sodium (VOLTAREN) 1 % GEL Apply 4 g topically 4 times daily as needed for Pain 200 g 0    Cholecalciferol (VITAMIN D3) 125 MCG (5000 UT) TABS Take 1 tablet by mouth daily      ondansetron (ZOFRAN) 8 MG tablet Take 1 tablet by mouth 3 times daily as needed      ibuprofen (ADVIL;MOTRIN) 600 MG tablet Take 1 tablet by mouth every 8 hours as needed for Pain 12 tablet 0    acetaminophen (TYLENOL) 500 MG tablet Take 1 tablet by mouth every 6 hours as needed for Pain 12 tablet 0    fluticasone (FLONASE) 50 MCG/ACT nasal spray 1 spray by Each Nostril route daily 32 g 5    albuterol sulfate HFA (PROVENTIL;VENTOLIN;PROAIR) 108 (90 Base) MCG/ACT inhaler Inhale 2 puffs into the lungs every 6 hours as needed for Wheezing 18 g 5    diclofenac sodium (VOLTAREN) 1 % GEL Apply 4 g topically 4 times daily 50 g 5    melatonin 10 MG CAPS capsule Take 1 capsule by mouth nightly 30 capsule 5    pantoprazole (PROTONIX) 40 MG tablet Take 1 tablet by mouth every morning (before breakfast) 30 tablet 5    loratadine (CLARITIN) 10 MG tablet Take 1 tablet by mouth daily 30 tablet 5    hydroCHLOROthiazide (HYDRODIURIL) 25 MG tablet Take 1 tablet by mouth every morning 90 tablet 1    QUVIVIQ 25 MG TABS Take 1

## 2023-09-08 ENCOUNTER — TELEPHONE (OUTPATIENT)
Dept: INTERNAL MEDICINE | Age: 43
End: 2023-09-08

## 2023-09-14 ENCOUNTER — OFFICE VISIT (OUTPATIENT)
Dept: INTERNAL MEDICINE | Age: 43
End: 2023-09-14
Payer: MEDICAID

## 2023-09-14 ENCOUNTER — OFFICE VISIT (OUTPATIENT)
Dept: BARIATRICS/WEIGHT MGMT | Age: 43
End: 2023-09-14
Payer: MEDICAID

## 2023-09-14 ENCOUNTER — HOSPITAL ENCOUNTER (OUTPATIENT)
Dept: PHYSICAL THERAPY | Facility: CLINIC | Age: 43
Setting detail: THERAPIES SERIES
Discharge: HOME | End: 2023-09-14
Payer: MEDICAID

## 2023-09-14 VITALS
DIASTOLIC BLOOD PRESSURE: 74 MMHG | SYSTOLIC BLOOD PRESSURE: 120 MMHG | BODY MASS INDEX: 39.17 KG/M2 | WEIGHT: 315 LBS | HEIGHT: 75 IN | HEART RATE: 72 BPM

## 2023-09-14 VITALS
HEIGHT: 75 IN | HEART RATE: 87 BPM | BODY MASS INDEX: 39.17 KG/M2 | DIASTOLIC BLOOD PRESSURE: 90 MMHG | OXYGEN SATURATION: 99 % | WEIGHT: 315 LBS | TEMPERATURE: 98.2 F | SYSTOLIC BLOOD PRESSURE: 130 MMHG

## 2023-09-14 DIAGNOSIS — I10 ESSENTIAL HYPERTENSION: Primary | ICD-10-CM

## 2023-09-14 DIAGNOSIS — J45.20 MILD INTERMITTENT ASTHMA WITHOUT COMPLICATION: ICD-10-CM

## 2023-09-14 DIAGNOSIS — G47.33 OSA ON CPAP: ICD-10-CM

## 2023-09-14 DIAGNOSIS — I10 RESISTANT HYPERTENSION: Primary | ICD-10-CM

## 2023-09-14 DIAGNOSIS — M79.672 PAIN IN BOTH FEET: ICD-10-CM

## 2023-09-14 DIAGNOSIS — Z23 NEED FOR INFLUENZA VACCINATION: ICD-10-CM

## 2023-09-14 DIAGNOSIS — Z99.89 OSA ON CPAP: ICD-10-CM

## 2023-09-14 DIAGNOSIS — M76.61 ACHILLES TENDINITIS, RIGHT LEG: ICD-10-CM

## 2023-09-14 DIAGNOSIS — M79.671 PAIN IN BOTH FEET: ICD-10-CM

## 2023-09-14 DIAGNOSIS — E66.01 OBESITY, CLASS III, BMI 40-49.9 (MORBID OBESITY) (HCC): ICD-10-CM

## 2023-09-14 PROCEDURE — 3080F DIAST BP >= 90 MM HG: CPT | Performed by: INTERNAL MEDICINE

## 2023-09-14 PROCEDURE — 1036F TOBACCO NON-USER: CPT | Performed by: NURSE PRACTITIONER

## 2023-09-14 PROCEDURE — 3074F SYST BP LT 130 MM HG: CPT | Performed by: NURSE PRACTITIONER

## 2023-09-14 PROCEDURE — G8427 DOCREV CUR MEDS BY ELIG CLIN: HCPCS | Performed by: NURSE PRACTITIONER

## 2023-09-14 PROCEDURE — G8427 DOCREV CUR MEDS BY ELIG CLIN: HCPCS | Performed by: INTERNAL MEDICINE

## 2023-09-14 PROCEDURE — 1036F TOBACCO NON-USER: CPT | Performed by: INTERNAL MEDICINE

## 2023-09-14 PROCEDURE — 99213 OFFICE O/P EST LOW 20 MIN: CPT | Performed by: INTERNAL MEDICINE

## 2023-09-14 PROCEDURE — G8417 CALC BMI ABV UP PARAM F/U: HCPCS | Performed by: INTERNAL MEDICINE

## 2023-09-14 PROCEDURE — 3078F DIAST BP <80 MM HG: CPT | Performed by: NURSE PRACTITIONER

## 2023-09-14 PROCEDURE — 97110 THERAPEUTIC EXERCISES: CPT

## 2023-09-14 PROCEDURE — 3075F SYST BP GE 130 - 139MM HG: CPT | Performed by: INTERNAL MEDICINE

## 2023-09-14 PROCEDURE — 90686 IIV4 VACC NO PRSV 0.5 ML IM: CPT | Performed by: INTERNAL MEDICINE

## 2023-09-14 PROCEDURE — G8417 CALC BMI ABV UP PARAM F/U: HCPCS | Performed by: NURSE PRACTITIONER

## 2023-09-14 PROCEDURE — 99213 OFFICE O/P EST LOW 20 MIN: CPT | Performed by: NURSE PRACTITIONER

## 2023-09-14 RX ORDER — HYDROCHLOROTHIAZIDE 25 MG/1
25 TABLET ORAL EVERY MORNING
Qty: 90 TABLET | Refills: 1 | Status: SHIPPED | OUTPATIENT
Start: 2023-09-14

## 2023-09-14 RX ORDER — CARVEDILOL 12.5 MG/1
12.5 TABLET ORAL 2 TIMES DAILY
Qty: 60 TABLET | Refills: 3 | Status: SHIPPED | OUTPATIENT
Start: 2023-09-14

## 2023-09-14 RX ORDER — AMLODIPINE BESYLATE 10 MG/1
10 TABLET ORAL DAILY
Qty: 90 TABLET | Refills: 1 | Status: SHIPPED | OUTPATIENT
Start: 2023-09-14

## 2023-09-14 RX ORDER — LISINOPRIL 40 MG/1
40 TABLET ORAL DAILY
Qty: 90 TABLET | Refills: 1 | Status: SHIPPED | OUTPATIENT
Start: 2023-09-14

## 2023-09-14 ASSESSMENT — ENCOUNTER SYMPTOMS
WHEEZING: 0
SHORTNESS OF BREATH: 0
BACK PAIN: 1
COUGH: 0

## 2023-09-14 NOTE — PATIENT INSTRUCTIONS
Patient was put on a wait list and will be contacted to schedule their next follow up appointment once the schedule is available. If the patient is in need of an appointment before their next visit please call the office at [phone].  After Visit Summary  given and reviewed.    -SG, medical assistant

## 2023-09-14 NOTE — FLOWSHEET NOTE
[] [address]  46[address] [facility].  [phone]  F: [phone] [x] 204 [facility]  [address]   [address]  P: [phone]  F: [phone] [] [address]  151 [facility]  P: [phone]  F: [phone] [] [place]: [phone]  F: [phone] [] 224 [facility]  One [facility]   [address]   P: [phone]  F: [phone]  [] 7[address] [facility].   P: [phone]  F: [phone] [] 205 [facility]  [address]  P: [phone]  F: [phone] [] 224 [facility]  7[address] [facility]  [place]: [phone]  F: [phone] [] [address] [facility] [place] [facility] [address]  [place]: [phone]  F: [phone]      Physical Therapy Daily Treatment Note    Date:  2023  Patient Name:  Ernique Lira    :  1980  MRN: [id number]    Physician: Dr. Jabari Garcia MD                              Insurance: [employer] after 30 Vs)  Medical Diagnosis: M76.61, M76.62 - Achilles tendinitis of both lower extremities                         Rehab Codes: M76.61, M76.62, M25.571, M25.572, M62.81, R26.89, R26.2  Onset date: 3/5/23                 Next 's appt.: 10/10/23    Visit# / total visits: ; Progress note for Medicare patient due at visit 6     Cancels/No Shows: 0/0    Subjective:    Pain:  [x] Yes  [] No Location: right foot/left foot Pain Rating: (0-10 scale) 9/10 (right) & 6/10 (left)  Pain altered Tx:  [x] No  [] Yes  Action:  Comments: Pt

## 2023-09-14 NOTE — PROGRESS NOTES
Normocephalic and atraumatic. Eyes:      Extraocular Movements: Extraocular movements intact. Conjunctiva/sclera: Conjunctivae normal.      Pupils: Pupils are equal, round, and reactive to light. Cardiovascular:      Rate and Rhythm: Normal rate and regular rhythm. Pulmonary:      Effort: Pulmonary effort is normal.      Breath sounds: Normal breath sounds. Musculoskeletal:      Right lower leg: No edema. Left lower leg: No edema. Neurological:      General: No focal deficit present. Mental Status: He is alert and oriented to person, place, and time. Motor: No weakness.       Gait: Gait normal.             LABORATORY FINDINGS:    CBC:  Lab Results   Component Value Date/Time    WBC 5.5 06/19/2023 09:18 AM    HGB 13.5 06/19/2023 09:18 AM     06/19/2023 09:18 AM     05/08/2012 10:27 PM     BMP:    Lab Results   Component Value Date/Time     06/19/2023 09:18 AM    K 3.9 06/19/2023 09:18 AM     06/19/2023 09:18 AM    CO2 26 06/19/2023 09:18 AM    BUN 16 06/19/2023 09:18 AM    CREATININE 0.90 06/19/2023 09:18 AM    GLUCOSE 81 06/19/2023 09:18 AM    GLUCOSE 83 05/08/2012 10:27 PM     HEMOGLOBIN A1C:   Lab Results   Component Value Date/Time    LABA1C 5.4 05/30/2023 09:16 AM     MICROALBUMIN URINE: No results found for: \"MICROALBUR\"  FASTING LIPID PANEL:  Lab Results   Component Value Date    CHOL 158 06/19/2023    HDL 38 (L) 06/19/2023    TRIG 129 06/19/2023     Lab Results   Component Value Date    LDLCHOLESTEROL 94 06/19/2023       LIVER PROFILE:  Lab Results   Component Value Date/Time    ALT 16 06/19/2023 09:18 AM    AST 20 06/19/2023 09:18 AM    PROT 7.3 06/19/2023 09:18 AM    BILITOT 0.4 06/19/2023 09:18 AM    BILIDIR 0.09 04/04/2018 06:16 PM    LABALBU 3.9 06/19/2023 09:18 AM      THYROID FUNCTION:   Lab Results   Component Value Date/Time    TSH 1.98 06/19/2023 09:18 AM      URINEANALYSIS: No results found for: \"LABURIN\"  ASSESSMENT AND PLAN:      - amLODIPine

## 2023-09-14 NOTE — PROGRESS NOTES
Group Lifestyle Balance Follow-up Progress Note      Subjective     Patient is here for group medical appointment for Group Lifestyle Balance weight management program follow-up for the chronic conditions of MIGEL, HTN, Asthma, Chronic Pain Right Knee, Bilateral Foot Pain. Patient continues on the program and tolerating well. Total weight loss of 25 lbs. No current issues. Allergies: Allergies   Allergen Reactions    Melatonin        Past Medical History:     Past Medical History:   Diagnosis Date    Anxiety     Arthritis     CAD (coronary artery disease)     Fatty liver     Fatty liver disease, nonalcoholic 5/73/4041    Hypertension     Obesity     Unspecified sleep apnea     cpap   . Past Surgical History:  History reviewed. No pertinent surgical history. Family History:  Family History   Problem Relation Age of Onset    Arthritis Mother     Diabetes Father     Arthritis Father     Stroke Maternal Grandmother     Heart Disease Maternal Grandmother     Stroke Maternal Grandfather     Heart Disease Maternal Grandfather     Early Death Maternal Grandfather     Stroke Paternal Grandmother     Heart Disease Paternal Grandmother     Stroke Paternal Grandfather     Heart Disease Paternal Grandfather     Early Death Paternal Grandfather        Social History:  Social History     Socioeconomic History    Marital status: Single     Spouse name: Patricia Roland    Number of children: 0    Years of education: 16    Highest education level:  Bachelor's degree (e.g., BA, AB, BS)   Occupational History    Not on file   Tobacco Use    Smoking status: Never    Smokeless tobacco: Never   Vaping Use    Vaping Use: Never used   Substance and Sexual Activity    Alcohol use: Never    Drug use: Never    Sexual activity: Yes     Partners: Female   Other Topics Concern    Not on file   Social History Narrative    ** Merged History Encounter **          Social Determinants of Health     Financial Resource Strain: Medium Risk

## 2023-09-21 ENCOUNTER — HOSPITAL ENCOUNTER (OUTPATIENT)
Dept: PHYSICAL THERAPY | Facility: CLINIC | Age: 43
Setting detail: THERAPIES SERIES
Discharge: HOME | End: 2023-09-21
Payer: MEDICAID

## 2023-09-21 PROCEDURE — 97110 THERAPEUTIC EXERCISES: CPT

## 2023-09-21 NOTE — FLOWSHEET NOTE
noting sx's are improving. Compliant with HEP. Objective:  Modalities:   Precautions: standard  Exercises:  Exercise Reps/ Time Weight/ Level Comments   Long sitting gastroc and soleus stretch  2x 30\" each bilaterally   Long Sitting Soleus Stretch on Bolster with Strap 2x 30\" each Bilaterally; not today    Long sitting R AROM 1x each  ABCs, bilaterally; not today    Long sitting ankle circles  20xea                       Seated heel raises 20x Seated Eccentric control    Seated toe raises 20x Seated  bilaterally   Seated MOBO board  10xea  Pegs at 2/4 and 1/3   Seated toe scrunches 20x     Seated heel raise with ball b/w ankles 10x Lacrosse ball          T-band 4-way ankle 20x orange bilaterally         Toe yoga 10x  bilaterally         Doming 10x 5\" bilaterally         Standing    Add as able                Other:      Treatment Charges: Mins Units   []  Modalities     [x]  Ther Exercise 45 3   []  Manual Therapy     []  Ther Activities     []  Neuro Re-ed     []  Vasocompression     [] Gait     []  Other     Total Treatment time 45 3     Assessment:   [x] Progressing toward goals. Continued with stretching and intrinsic exercises. Added ankle circles and 240 Blanch board to further improve ankle mobility. Instructed to avoid compensation at knee with resisted 4 way ankle. Min cueing for proper form during exercises. Tolerated session well. Will continue to progress in upcoming sessions. [] No change. [] Other:  [x] Patient would continue to benefit from skilled physical therapy services in order to:  improve bilateral ankle and foot strength, ROM and control for improved tolerance to prolonged standing and walking required for work. STG: (to be met in 6 treatments)  ? Pain: Pt will report decreased maximal pain levels to <8/10 following prolonged standing and walking at work in order to ease tolerance. ? ROM: Pt will increase R ankle AROM in order to improve WB tolerance for gait.   DF > 5 degrees (knee

## 2023-09-26 ENCOUNTER — HOSPITAL ENCOUNTER (OUTPATIENT)
Dept: PHYSICAL THERAPY | Facility: CLINIC | Age: 43
Setting detail: THERAPIES SERIES
Discharge: HOME | End: 2023-09-26
Payer: MEDICAID

## 2023-09-26 PROCEDURE — 97110 THERAPEUTIC EXERCISES: CPT

## 2023-09-26 NOTE — FLOWSHEET NOTE
[] [address]  4600 [facility].  [phone]  F: [phone] [x] 204 [facility]  [address] [facility] [address]   [address]  P: [phone]  F: [phone] [] [address]  [address] [facility]  P: [phone]  F: [phone] [] [place]: [phone]  F: [phone] [] 224 [facility]  [address] [facility]   [address]   P: [phone]  F: [phone]  [] [address] [facility].   P: [phone]  F: [phone] [] 205 [facility]  [address] [place]    [address]  P: [phone]  F: [phone] [] 224 [facility]  [address] [facility]  [place]: [phone]  F: [phone] [] 1 [facility] [place] [facility] [address]  [place]: [phone]  F: [phone]      Physical Therapy Daily Treatment Note    Date:  2023  Patient Name:  Teresa Dhillon    :  1980  MRN: [id number]  Physician: Dr. Radha Modi MD                              Insurance: [facility] after 30 Vs)  Medical Diagnosis: M76.61, M76.62 - Achilles tendinitis of both lower extremities                         Rehab Codes: M76.61, M76.62, M25.571, M25.572, M62.81, R26.89, R26.2  Onset date: 3/5/23                 Next 's appt.: 10/10/23    Visit# / total visits: ; Progress note for Medicare patient due at visit 6     Cancels/No Shows: 0/0    Subjective:    Pain:  [x] Yes  [] No Location: right foot/left foot Pain Rating: (0-10 scale) 7/10 (right) & 4/10 (left)  Pain altered Tx:  [x] No  [] Yes  Action:    Comments: Pt

## 2023-09-28 ENCOUNTER — HOSPITAL ENCOUNTER (OUTPATIENT)
Dept: PHYSICAL THERAPY | Facility: CLINIC | Age: 43
Setting detail: THERAPIES SERIES
Discharge: HOME | End: 2023-09-28
Payer: MEDICAID

## 2023-09-28 PROCEDURE — 97140 MANUAL THERAPY 1/> REGIONS: CPT

## 2023-09-28 PROCEDURE — 97110 THERAPEUTIC EXERCISES: CPT

## 2023-09-28 NOTE — FLOWSHEET NOTE
[] [address]  4600 [facility].  [phone]  F: [phone] [x] 204 [facility]  [address]   [address]  P: [phone]  F: [phone] [] [address]  151 [facility]  P: [phone]  F: [phone] [] [place]: [phone]  F: [phone] [] 224 [facility]  One [facility]   [address]   P: [phone]  F: [phone]  [] 1829 [facility].   P: [phone]  F: [phone] [] 205 [facility]  [facility]   [address]  P: [phone]  F: [phone] [] 224 [facility]  7[address] [facility]  [place]: [phone]  F: [phone] [] 1 [facility] [place] [facility] [address]  [place]: [phone]  F: [phone]      Physical Therapy Daily Treatment Note    Date:  2023  Patient Name:  Sujata Jasso    :  1980  MRN: [id number]  Physician: Dr. Andra Sandra MD                              Insurance: [facility] [facility] after 30 Vs)  Medical Diagnosis: M76.61, M76.62 - Achilles tendinitis of both lower extremities                         Rehab Codes: M76.61, M76.62, M25.571, M25.572, M62.81, R26.89, R26.2  Onset date: 3/5/23                 Next Dr's appt.: 10/10/23    Visit# / total visits: ; Progress note for Medicare patient due at visit 6     Cancels/No Shows: 0/0    Subjective:    Pain:  [x] Yes  [] No Location: right foot/left foot Pain Rating: (0-10 scale) 7/10 (right) & 0/10 (left)  Pain altered Tx:  [x] No  [] Yes  Action:    Comments: Arrives

## 2023-10-03 ENCOUNTER — HOSPITAL ENCOUNTER (OUTPATIENT)
Dept: PHYSICAL THERAPY | Facility: CLINIC | Age: 43
Setting detail: THERAPIES SERIES
Discharge: HOME | End: 2023-10-03
Payer: MEDICAID

## 2023-10-03 PROCEDURE — 97110 THERAPEUTIC EXERCISES: CPT

## 2023-10-03 PROCEDURE — 97140 MANUAL THERAPY 1/> REGIONS: CPT

## 2023-10-03 NOTE — FLOWSHEET NOTE
Lesser Toes Extension  - 1 x daily - 7 x weekly - 2 sets - 10 reps  - Arch Lifting  - 1 x daily - 7 x weekly - 10 reps - 5 seconds hold  - Towel Scrunches  - 1 x daily - 7 x weekly - 20 reps  Comprehension of Education:  [x] Verbalizes understanding. [x] Demonstrates understanding. [x] Needs review. [] Demonstrates/verbalizes HEP/Ed previously given. Plan: [x] Continue current frequency toward long and short term goals.     [x] Specific Instructions for subsequent treatments: ankle ROM and strength, intrinsic foot control, progress WB tolerance and eccentric loading as able to tolerate, modalities prn for pain control      Time In: 11:00 am           Time Out: 11:59 am    Electronically signed by:  Kennedy Buenrostro PT

## 2023-10-05 ENCOUNTER — APPOINTMENT (OUTPATIENT)
Dept: PHYSICAL THERAPY | Facility: CLINIC | Age: 43
End: 2023-10-05
Payer: MEDICAID

## 2023-10-06 ENCOUNTER — HOSPITAL ENCOUNTER (OUTPATIENT)
Dept: PHYSICAL THERAPY | Facility: CLINIC | Age: 43
Setting detail: THERAPIES SERIES
Discharge: HOME | End: 2023-10-06
Payer: MEDICAID

## 2023-10-06 PROCEDURE — 97110 THERAPEUTIC EXERCISES: CPT

## 2023-10-06 PROCEDURE — 97140 MANUAL THERAPY 1/> REGIONS: CPT

## 2023-10-10 ENCOUNTER — HOSPITAL ENCOUNTER (OUTPATIENT)
Dept: PHYSICAL THERAPY | Facility: CLINIC | Age: 43
Setting detail: THERAPIES SERIES
Discharge: HOME | End: 2023-10-10
Payer: MEDICAID

## 2023-10-10 ENCOUNTER — OFFICE VISIT (OUTPATIENT)
Dept: ORTHOPEDIC SURGERY | Age: 43
End: 2023-10-10
Payer: MEDICAID

## 2023-10-10 VITALS — RESPIRATION RATE: 16 BRPM | WEIGHT: 315 LBS | BODY MASS INDEX: 39.17 KG/M2 | HEIGHT: 75 IN

## 2023-10-10 DIAGNOSIS — M76.61 ACHILLES TENDINITIS OF BOTH LOWER EXTREMITIES: Primary | ICD-10-CM

## 2023-10-10 DIAGNOSIS — M79.671 BILATERAL FOOT PAIN: ICD-10-CM

## 2023-10-10 DIAGNOSIS — M76.62 ACHILLES TENDINITIS OF BOTH LOWER EXTREMITIES: Primary | ICD-10-CM

## 2023-10-10 DIAGNOSIS — M92.60 ACQUIRED HAGLUND'S DEFORMITY, UNSPECIFIED LATERALITY: ICD-10-CM

## 2023-10-10 DIAGNOSIS — M79.672 BILATERAL FOOT PAIN: ICD-10-CM

## 2023-10-10 PROCEDURE — 97110 THERAPEUTIC EXERCISES: CPT

## 2023-10-10 PROCEDURE — G8417 CALC BMI ABV UP PARAM F/U: HCPCS | Performed by: ORTHOPAEDIC SURGERY

## 2023-10-10 PROCEDURE — G8427 DOCREV CUR MEDS BY ELIG CLIN: HCPCS | Performed by: ORTHOPAEDIC SURGERY

## 2023-10-10 PROCEDURE — G8482 FLU IMMUNIZE ORDER/ADMIN: HCPCS | Performed by: ORTHOPAEDIC SURGERY

## 2023-10-10 PROCEDURE — 99214 OFFICE O/P EST MOD 30 MIN: CPT | Performed by: ORTHOPAEDIC SURGERY

## 2023-10-10 PROCEDURE — 1036F TOBACCO NON-USER: CPT | Performed by: ORTHOPAEDIC SURGERY

## 2023-10-10 NOTE — FLOWSHEET NOTE
[] [address]  4600 [facility].  [phone]  F: [phone] [x] 204 [facility]  [address]   [address]  P: [phone]  F: [phone] [] [address]  151 [facility]  P: [phone]  F: [phone] [] [facility]: [phone]  F: [phone] [] 224 [facility]  One [facility]   [address]   P: [phone]  F: [phone]  [] [address] [place].   P: [phone]  F: [phone] [] 205 [facility]  [address] [facility]   [address]  P: [phone]  F: [phone] [] 224 [facility]  795 [facility]  [place]: [phone]  F: [phone] [] [address] [place]  [address]  [place]: [phone]  F: [phone]      Physical Therapy Daily Treatment Note    Date:  10/10/2023  Patient Name:  Rhea Baker    :  1980  MRN: [id number]  Physician: Dr. Darlene Carey MD                              Insurance: [facility] Patient after 30 Vs)  Medical Diagnosis: M76.61, M76.62 - Achilles tendinitis of both lower extremities                         Rehab Codes: M76.61, M76.62, M25.571, M25.572, M62.81, R26.89, R26.2  Onset date: 3/5/23                 Next 's appt.: 10/10/23  Visit# / total visits: ; Progress note for Medicare patient due at visit 612    Cancels/No Shows: 0/0    Subjective:    Pain:  [x] Yes  [] No Location: right foot/left foot Pain Rating: (0-10 scale) 6/10 (right)   Pain altered Tx:  [x] No  [] Yes  Action:    Comments Presents with reduced

## 2023-10-12 ENCOUNTER — HOSPITAL ENCOUNTER (OUTPATIENT)
Dept: PHYSICAL THERAPY | Facility: CLINIC | Age: 43
Setting detail: THERAPIES SERIES
Discharge: HOME | End: 2023-10-12
Payer: MEDICAID

## 2023-10-12 PROCEDURE — 97110 THERAPEUTIC EXERCISES: CPT

## 2023-10-12 NOTE — FLOWSHEET NOTE
[] [address]  4600 [facility].  [phone]  F: [phone] [x] 204 [facility]  [address] [facility] [address]   [address]  P: [phone]  F: [phone] [] [address]  151 [facility]  P: [phone]  F: [phone] [] [place]: [phone]  F: [phone] [] 224 [facility]  One [facility]   [address]   P: [phone]  F: [phone]  [] 7170 [facility].   P: [phone]  F: [phone] [] 205 [facility]  [address] [place]    [address]  P: [phone]  F: [phone] [] 224 [facility]  795 [facility]  [place]: [phone]  F: [phone] [] 1 [address] [place] [facility] [address]  [place]: [phone]  F: [phone]      Physical Therapy Daily Treatment Note    Date:  10/12/2023  Patient Name:  Roxanne Walsh    :  1980  MRN: [id number]  Physician: Dr. Nelson Sanon MD                              [facility]: [facility] after 30 Vs)  Medical Diagnosis: M76.61, M76.62 - Achilles tendinitis of both lower extremities                         Rehab Codes: M76.61, M76.62, M25.571, M25.572, M62.81, R26.89, R26.2  Onset date: 3/5/23                 Next Dr's appt.: 10/10/23  Visit# / total visits: ; Progress note for Medicare patient due at visit 12    Cancels/No Shows: 0/0    Subjective:    Pain:  [x] Yes  [] No Location: right foot/left foot Pain Rating: (0-10 scale) 6/10 (right)   Pain altered Tx:  [x] No  [] Yes  Action:    Comments Saw ortho after last

## 2023-10-13 ENCOUNTER — OFFICE VISIT (OUTPATIENT)
Dept: BARIATRICS/WEIGHT MGMT | Age: 43
End: 2023-10-13
Payer: MEDICAID

## 2023-10-13 VITALS
WEIGHT: 315 LBS | HEIGHT: 75 IN | SYSTOLIC BLOOD PRESSURE: 134 MMHG | HEART RATE: 76 BPM | DIASTOLIC BLOOD PRESSURE: 88 MMHG | BODY MASS INDEX: 39.17 KG/M2

## 2023-10-13 DIAGNOSIS — G89.29 CHRONIC PAIN OF RIGHT KNEE: ICD-10-CM

## 2023-10-13 DIAGNOSIS — G47.33 OSA ON CPAP: ICD-10-CM

## 2023-10-13 DIAGNOSIS — E66.01 OBESITY, CLASS III, BMI 40-49.9 (MORBID OBESITY) (HCC): ICD-10-CM

## 2023-10-13 DIAGNOSIS — M25.561 CHRONIC PAIN OF RIGHT KNEE: ICD-10-CM

## 2023-10-13 DIAGNOSIS — J45.20 MILD INTERMITTENT ASTHMA WITHOUT COMPLICATION: ICD-10-CM

## 2023-10-13 DIAGNOSIS — I10 ESSENTIAL HYPERTENSION: Primary | ICD-10-CM

## 2023-10-13 PROCEDURE — 99213 OFFICE O/P EST LOW 20 MIN: CPT | Performed by: NURSE PRACTITIONER

## 2023-10-13 PROCEDURE — G8417 CALC BMI ABV UP PARAM F/U: HCPCS | Performed by: NURSE PRACTITIONER

## 2023-10-13 PROCEDURE — 3075F SYST BP GE 130 - 139MM HG: CPT | Performed by: NURSE PRACTITIONER

## 2023-10-13 PROCEDURE — 3079F DIAST BP 80-89 MM HG: CPT | Performed by: NURSE PRACTITIONER

## 2023-10-13 PROCEDURE — G8427 DOCREV CUR MEDS BY ELIG CLIN: HCPCS | Performed by: NURSE PRACTITIONER

## 2023-10-13 PROCEDURE — 1036F TOBACCO NON-USER: CPT | Performed by: NURSE PRACTITIONER

## 2023-10-13 PROCEDURE — G8482 FLU IMMUNIZE ORDER/ADMIN: HCPCS | Performed by: NURSE PRACTITIONER

## 2023-10-13 NOTE — PROGRESS NOTES
Group Lifestyle Balance Follow-up Progress Note      Subjective     Patient is here for group medical appointment for Group Lifestyle Balance weight management program follow-up for the chronic conditions of MIGEL, HTN, Asthma, Chronic Pain Right Knee, Bilateral Foot Pain. Patient continues on the program and tolerating well. Total weight loss of 29 lbs. No current issues. Allergies: Allergies   Allergen Reactions    Melatonin        Past Medical History:     Past Medical History:   Diagnosis Date    Anxiety     Arthritis     CAD (coronary artery disease)     Fatty liver     Fatty liver disease, nonalcoholic 1/68/6577    Hypertension     Obesity     Unspecified sleep apnea     cpap   . Past Surgical History:  History reviewed. No pertinent surgical history. Family History:  Family History   Problem Relation Age of Onset    Arthritis Mother     Diabetes Father     Arthritis Father     Stroke Maternal Grandmother     Heart Disease Maternal Grandmother     Stroke Maternal Grandfather     Heart Disease Maternal Grandfather     Early Death Maternal Grandfather     Stroke Paternal Grandmother     Heart Disease Paternal Grandmother     Stroke Paternal Grandfather     Heart Disease Paternal Grandfather     Early Death Paternal Grandfather        Social History:  Social History     Socioeconomic History    Marital status: Single     Spouse name: Yoanna Koenig    Number of children: 0    Years of education: 16    Highest education level:  Bachelor's degree (e.g., BA, AB, BS)   Occupational History    Not on file   Tobacco Use    Smoking status: Never    Smokeless tobacco: Never   Vaping Use    Vaping Use: Never used   Substance and Sexual Activity    Alcohol use: Never    Drug use: Never    Sexual activity: Yes     Partners: Female   Other Topics Concern    Not on file   Social History Narrative    ** Merged History Encounter **          Social Determinants of Health     Financial Resource Strain: Medium Risk

## 2023-10-17 ENCOUNTER — HOSPITAL ENCOUNTER (OUTPATIENT)
Dept: PHYSICAL THERAPY | Facility: CLINIC | Age: 43
Setting detail: THERAPIES SERIES
Discharge: HOME | End: 2023-10-17
Payer: MEDICAID

## 2023-10-17 ENCOUNTER — OFFICE VISIT (OUTPATIENT)
Dept: INTERNAL MEDICINE | Age: 43
End: 2023-10-17
Payer: MEDICAID

## 2023-10-17 VITALS
BODY MASS INDEX: 39.17 KG/M2 | SYSTOLIC BLOOD PRESSURE: 119 MMHG | DIASTOLIC BLOOD PRESSURE: 67 MMHG | HEIGHT: 75 IN | TEMPERATURE: 98.2 F | HEART RATE: 60 BPM | WEIGHT: 315 LBS | OXYGEN SATURATION: 96 %

## 2023-10-17 DIAGNOSIS — Z23 FLU VACCINE NEED: ICD-10-CM

## 2023-10-17 DIAGNOSIS — M76.61 ACHILLES TENDINITIS, RIGHT LEG: ICD-10-CM

## 2023-10-17 DIAGNOSIS — I1A.0 RESISTANT HYPERTENSION: Primary | ICD-10-CM

## 2023-10-17 PROCEDURE — 97112 NEUROMUSCULAR REEDUCATION: CPT

## 2023-10-17 PROCEDURE — 90686 IIV4 VACC NO PRSV 0.5 ML IM: CPT | Performed by: INTERNAL MEDICINE

## 2023-10-17 PROCEDURE — 3078F DIAST BP <80 MM HG: CPT | Performed by: INTERNAL MEDICINE

## 2023-10-17 PROCEDURE — 97110 THERAPEUTIC EXERCISES: CPT

## 2023-10-17 PROCEDURE — 99213 OFFICE O/P EST LOW 20 MIN: CPT | Performed by: INTERNAL MEDICINE

## 2023-10-17 PROCEDURE — 1036F TOBACCO NON-USER: CPT | Performed by: INTERNAL MEDICINE

## 2023-10-17 PROCEDURE — G8417 CALC BMI ABV UP PARAM F/U: HCPCS | Performed by: INTERNAL MEDICINE

## 2023-10-17 PROCEDURE — G8482 FLU IMMUNIZE ORDER/ADMIN: HCPCS | Performed by: INTERNAL MEDICINE

## 2023-10-17 PROCEDURE — 3074F SYST BP LT 130 MM HG: CPT | Performed by: INTERNAL MEDICINE

## 2023-10-17 PROCEDURE — G8427 DOCREV CUR MEDS BY ELIG CLIN: HCPCS | Performed by: INTERNAL MEDICINE

## 2023-10-17 RX ORDER — OXCARBAZEPINE 150 MG/1
150 TABLET, FILM COATED ORAL DAILY
COMMUNITY
Start: 2023-09-26

## 2023-10-17 ASSESSMENT — ENCOUNTER SYMPTOMS
SHORTNESS OF BREATH: 0
BACK PAIN: 0
COUGH: 0
WHEEZING: 0
PHOTOPHOBIA: 0

## 2023-10-17 ASSESSMENT — PATIENT HEALTH QUESTIONNAIRE - PHQ9
8. MOVING OR SPEAKING SO SLOWLY THAT OTHER PEOPLE COULD HAVE NOTICED. OR THE OPPOSITE, BEING SO FIGETY OR RESTLESS THAT YOU HAVE BEEN MOVING AROUND A LOT MORE THAN USUAL: 0
3. TROUBLE FALLING OR STAYING ASLEEP: 0
SUM OF ALL RESPONSES TO PHQ QUESTIONS 1-9: 0
2. FEELING DOWN, DEPRESSED OR HOPELESS: 0
SUM OF ALL RESPONSES TO PHQ9 QUESTIONS 1 & 2: 0
SUM OF ALL RESPONSES TO PHQ QUESTIONS 1-9: 0
4. FEELING TIRED OR HAVING LITTLE ENERGY: 0
SUM OF ALL RESPONSES TO PHQ QUESTIONS 1-9: 0
1. LITTLE INTEREST OR PLEASURE IN DOING THINGS: 0
SUM OF ALL RESPONSES TO PHQ QUESTIONS 1-9: 0
5. POOR APPETITE OR OVEREATING: 0
7. TROUBLE CONCENTRATING ON THINGS, SUCH AS READING THE NEWSPAPER OR WATCHING TELEVISION: 0
9. THOUGHTS THAT YOU WOULD BE BETTER OFF DEAD, OR OF HURTING YOURSELF: 0
10. IF YOU CHECKED OFF ANY PROBLEMS, HOW DIFFICULT HAVE THESE PROBLEMS MADE IT FOR YOU TO DO YOUR WORK, TAKE CARE OF THINGS AT HOME, OR GET ALONG WITH OTHER PEOPLE: 0

## 2023-10-17 NOTE — FLOWSHEET NOTE
[] [address]  4600 [facility] [place] [facility].  [phone]  F: [phone] [x] 204 [facility]  [address] [facility] [address]   [address]  P: [phone]  F: [phone] [] [address]  151 [facility]  P: [phone]  F: [phone] [] [facility]: [phone]  F: [phone] [] [address] [place] [employer]  One [facility]   [address]   P: [phone]  F: [phone]  [] 7[address] [facility].   P: [phone]  F: [phone] [] 205 [facility]  [address] [facility]   [address]  P: [phone]  F: [phone] [] 224 [facility]  795 [facility]  [place]: [phone]  F: [phone] [] [address] [place] [facility] [address]  [place]: [phone]  F: [phone]      Physical Therapy Daily Treatment Note    Date:  10/17/2023  Patient Name:  An Velazquez    :  1980  MRN: [id number]  Physician: Dr. Leopoldo Birks, MD                              Insurance: [employer] Faster Bonnee Dew after 30 Vs)  Medical Diagnosis: M76.61, M76.62 - Achilles tendinitis of both lower extremities                         Rehab Codes: M76.61, M76.62, M25.571, M25.572, M62.81, R26.89, R26.2  Onset date: 3/5/23                 Next 's appt.: 10/10/23  Visit# / total visits: 10/12; Progress note for Medicare patient due at visit 12    Cancels/No Shows: 0/0    Subjective:    Pain:  [x] Yes  [] No Location: right foot/left foot Pain Rating: (0-10 scale) 6/10 (right)   Pain altered Tx:  [x] No  [] Yes  Action:    Comments Pt arrives without

## 2023-10-19 ENCOUNTER — HOSPITAL ENCOUNTER (OUTPATIENT)
Dept: PHYSICAL THERAPY | Facility: CLINIC | Age: 43
Setting detail: THERAPIES SERIES
Discharge: HOME | End: 2023-10-19
Payer: MEDICAID

## 2023-10-19 PROCEDURE — 97112 NEUROMUSCULAR REEDUCATION: CPT

## 2023-10-19 PROCEDURE — 97110 THERAPEUTIC EXERCISES: CPT

## 2023-10-25 ENCOUNTER — OFFICE VISIT (OUTPATIENT)
Dept: ORTHOPEDIC SURGERY | Age: 43
End: 2023-10-25
Payer: MEDICAID

## 2023-10-25 VITALS — HEIGHT: 75 IN | RESPIRATION RATE: 16 BRPM | BODY MASS INDEX: 39.17 KG/M2 | OXYGEN SATURATION: 100 % | WEIGHT: 315 LBS

## 2023-10-25 DIAGNOSIS — M25.562 PAIN IN BOTH KNEES, UNSPECIFIED CHRONICITY: Primary | ICD-10-CM

## 2023-10-25 DIAGNOSIS — M17.10 PATELLOFEMORAL ARTHRITIS: Primary | ICD-10-CM

## 2023-10-25 DIAGNOSIS — M25.561 PAIN IN BOTH KNEES, UNSPECIFIED CHRONICITY: Primary | ICD-10-CM

## 2023-10-25 PROCEDURE — G8417 CALC BMI ABV UP PARAM F/U: HCPCS | Performed by: ORTHOPAEDIC SURGERY

## 2023-10-25 PROCEDURE — G8427 DOCREV CUR MEDS BY ELIG CLIN: HCPCS | Performed by: ORTHOPAEDIC SURGERY

## 2023-10-25 PROCEDURE — 99213 OFFICE O/P EST LOW 20 MIN: CPT | Performed by: ORTHOPAEDIC SURGERY

## 2023-10-25 PROCEDURE — G8482 FLU IMMUNIZE ORDER/ADMIN: HCPCS | Performed by: ORTHOPAEDIC SURGERY

## 2023-10-25 PROCEDURE — 1036F TOBACCO NON-USER: CPT | Performed by: ORTHOPAEDIC SURGERY

## 2023-10-25 RX ORDER — NAPROXEN 500 MG/1
500 TABLET ORAL 2 TIMES DAILY WITH MEALS
Qty: 60 TABLET | Refills: 5 | Status: SHIPPED | OUTPATIENT
Start: 2023-10-25

## 2023-10-25 NOTE — PROGRESS NOTES
This patient with known primary osteoarthritis of the patellofemoral joint bilaterally is seen here because of recurrence of pain mainly affecting the left knee. The patient has also been seen by Dr. Linsey Lopes for bilateral Achilles tendinopathy with Francisco J's deformity. He has been sent for physical therapy for this. As for the left knee the pain he complains of is felt in the prepatellar area. He does not describe this at the joint levels. Examination: Patient weighs about 330 pounds. Used to weigh 480 pounds before. His gait and stance are normal.    Examination: The left knee examination shows a 6 full range of motion but marked patellofemoral grating. No instability. No effusion was detected. X-rays: Reviewed the standing AP x-ray of both the knees and supine lateral and sunrise views and they show marked patellofemoral arthritis affecting the left knee. There is a small marginal osteophyte on the lateral side of the left knee. On the right side there is a small osteophyte affecting the patella but no major osteophyte over the femur. Diagnosis: Bilateral primary osteoarthritis affecting the patellofemoral joint but this  Is symptomatic. Treatment: Patient used to get corticosteroid injection in his left knee from Dr. Luis Song. I asked if the injections helped him and he was not quite sure. Discussed with him that the patellofemoral arthritis usually does not respond to corticosteroid injection and I have put him back on Naprosyn 500 mg twice a day. If this does not respond to the medication then I be happy to see him again.

## 2023-11-07 ENCOUNTER — OFFICE VISIT (OUTPATIENT)
Dept: ORTHOPEDIC SURGERY | Age: 43
End: 2023-11-07

## 2023-11-07 VITALS — HEIGHT: 75 IN | BODY MASS INDEX: 39.17 KG/M2 | WEIGHT: 315 LBS

## 2023-11-07 DIAGNOSIS — M17.10 PATELLOFEMORAL ARTHRITIS: Primary | ICD-10-CM

## 2023-11-07 NOTE — PROGRESS NOTES
Chief Complaint   Patient presents with    Follow-up     Patellofem arthritis    This patient who has previously been seen for patellofemoral arthritis bilaterally is seen here again today because of continued pain in the left knee. The right knee is not symptomatic at this stage. The patient works as a  at [employer]. He says working 4 days in a row is very painful for him. Examination: His gait was normal.  He has full range of motion in the knee but marked patellofemoral grating. Diagnosis: Patellofemoral arthritis symptomatic left side only. Treatment: He has had previous corticosteroid injection given to him by  but he does not remember if it helped him or not. Under sterile condition I injected 40 mg Depo-Medrol and 5 cc of 1% plain lidocaine through anterolateral portal without any complications. He felt the knee was better. I explained to him how the injection works and I will see him again as necessary. Additionally the patient has continued losing weight.

## 2023-12-07 ENCOUNTER — HOSPITAL ENCOUNTER (EMERGENCY)
Age: 43
Discharge: HOME | End: 2023-12-07
Attending: EMERGENCY MEDICINE
Payer: MEDICAID

## 2023-12-07 ENCOUNTER — APPOINTMENT (OUTPATIENT)
Dept: GENERAL RADIOLOGY | Age: 43
End: 2023-12-07
Payer: MEDICAID

## 2023-12-07 VITALS
HEART RATE: 87 BPM | HEIGHT: 74 IN | RESPIRATION RATE: 16 BRPM | SYSTOLIC BLOOD PRESSURE: 135 MMHG | WEIGHT: 315 LBS | OXYGEN SATURATION: 99 % | DIASTOLIC BLOOD PRESSURE: 91 MMHG | BODY MASS INDEX: 40.43 KG/M2 | TEMPERATURE: 98.1 F

## 2023-12-07 DIAGNOSIS — M76.62 ACHILLES TENDINITIS OF LEFT LOWER EXTREMITY: Primary | ICD-10-CM

## 2023-12-07 PROCEDURE — 96372 THER/PROPH/DIAG INJ SC/IM: CPT | Performed by: EMERGENCY MEDICINE

## 2023-12-07 PROCEDURE — 6360000002 HC RX W HCPCS

## 2023-12-07 PROCEDURE — 73610 X-RAY EXAM OF ANKLE: CPT

## 2023-12-07 PROCEDURE — 99284 EMERGENCY DEPT VISIT MOD MDM: CPT | Performed by: EMERGENCY MEDICINE

## 2023-12-07 RX ORDER — KETOROLAC TROMETHAMINE 30 MG/ML
30 INJECTION, SOLUTION INTRAMUSCULAR; INTRAVENOUS ONCE
Status: COMPLETED | OUTPATIENT
Start: 2023-12-07 | End: 2023-12-07

## 2023-12-07 RX ADMIN — KETOROLAC TROMETHAMINE 30 MG: 30 INJECTION, SOLUTION INTRAMUSCULAR at 10:55

## 2023-12-07 ASSESSMENT — ENCOUNTER SYMPTOMS
EYES NEGATIVE: 1
RESPIRATORY NEGATIVE: 1
ALLERGIC/IMMUNOLOGIC NEGATIVE: 1
GASTROINTESTINAL NEGATIVE: 1

## 2023-12-07 ASSESSMENT — PAIN DESCRIPTION - LOCATION: LOCATION: FOOT

## 2023-12-07 ASSESSMENT — PAIN - FUNCTIONAL ASSESSMENT: PAIN_FUNCTIONAL_ASSESSMENT: 0-10

## 2023-12-07 ASSESSMENT — PAIN SCALES - GENERAL: PAINLEVEL_OUTOF10: 9

## 2023-12-07 ASSESSMENT — PAIN DESCRIPTION - ORIENTATION: ORIENTATION: LEFT

## 2023-12-07 ASSESSMENT — PAIN DESCRIPTION - DESCRIPTORS: DESCRIPTORS: SHARP

## 2023-12-07 NOTE — ED TRIAGE NOTES
Pt arriving to ED 21 via triage  CO of left foot pain in the sole of the foot that radiates to achilles, deformity noticed over left achilles. Pt still able to move foot freely  Pain worsens with movement  Pt stats they are a greater khalil and is on their feet a lot  No previous injury to foot  Pt is resting on stretcher with call light within reach. Breathing is non labored and no acute distress is noted.    Will continue to follow plan of care

## 2023-12-07 NOTE — DISCHARGE INSTRUCTIONS
You were seen and evaluated for left tendon pain and foot arch pain. Please wear the boot to the left foot to limit mobility of Achilles tendon and rest as needed. Please follow-up with podiatry as placed per referral section of the discharge paperwork.

## 2023-12-07 NOTE — ED PROVIDER NOTES
[facility] ED  Emergency Department Encounter  Emergency Medicine Resident     Pt Julisa Beyer  MRN: [id number]  [address] [facility] [place] 1980  Date of evaluation: 12/7/23  PCP:  Micha Webster MD  Note Started: 10:51 AM EST      CHIEF COMPLAINT       Chief Complaint   Patient presents with    Foot Pain     Left foot pain in sole that radiate to achilles        HISTORY OF PRESENT ILLNESS  (Location/Symptom, Timing/Onset, Context/Setting, Quality, Duration, Modifying Factors, Severity.)      Anastacia Wayne is a 37 y.o. male with PMH of MIGEL, HTN, obesity, osteoarthritis of the right knee who presents with left Achilles tendon pain going on for the last 6 months. And left foot arch pain going on for last 3 months. Patient states that he is a  and left ankle and foot pain is exacerbated by prolonged periods of standing. Alleviating factors include rest.  Patient has tried new insoles for his shoes to no avail or relief of symptoms. On physical exam patient has limited left foot eversion inversion and limits left foot dorsiflexion plantarflexion due to left Achilles tendon pain. Noticeable lump to left Achilles but tendon intact without laxity. PAST MEDICAL / SURGICAL / SOCIAL / FAMILY HISTORY      has a past medical history of Anxiety, Arthritis, CAD (coronary artery disease), Fatty liver, Fatty liver disease, nonalcoholic, Hypertension, Obesity, and Unspecified sleep apnea. has no past surgical history on file. Social History     Socioeconomic History    Marital status: Single     Spouse name: Thi Su    Number of children: 0    Years of education: 16    Highest education level:  Bachelor's degree (e.g., BA, AB, BS)   Occupational History    Not on file   Tobacco Use    Smoking status: Never    Smokeless tobacco: Never   Vaping Use    Vaping Use: Never used   Substance and Sexual Activity    Alcohol use: Never    Drug use: Never    Sexual activity: Yes     Partners:

## 2024-01-02 DIAGNOSIS — K21.9 GASTROESOPHAGEAL REFLUX DISEASE, UNSPECIFIED WHETHER ESOPHAGITIS PRESENT: ICD-10-CM

## 2024-01-02 DIAGNOSIS — I1A.0 RESISTANT HYPERTENSION: ICD-10-CM

## 2024-01-02 NOTE — TELEPHONE ENCOUNTER
A Refill Has Been Requested for Ranjeet ROCA Gloria    Medication Requested  Requested Prescriptions     Pending Prescriptions Disp Refills    pantoprazole (PROTONIX) 40 MG tablet [Pharmacy Med Name: PANTOPRAZOLE SOD DR 40 MG TAB] 30 tablet 5     Sig: take 1 tablet by mouth EVERY MORNING BEFORE BREAKFAST    carvedilol (COREG) 12.5 MG tablet [Pharmacy Med Name: CARVEDILOL 12.5 MG TABLET] 60 tablet 3     Sig: take 1 tablet by mouth twice a day       Last Visit Date (If Applicable)  10/17/2023    Next Visit Date (If Applicable)  Visit date not found   
Wounds

## 2024-01-03 RX ORDER — PANTOPRAZOLE SODIUM 40 MG/1
40 TABLET, DELAYED RELEASE ORAL
Qty: 30 TABLET | Refills: 5 | Status: SHIPPED | OUTPATIENT
Start: 2024-01-03

## 2024-01-03 RX ORDER — CARVEDILOL 12.5 MG/1
12.5 TABLET ORAL 2 TIMES DAILY
Qty: 60 TABLET | Refills: 3 | Status: SHIPPED | OUTPATIENT
Start: 2024-01-03

## 2024-01-09 ENCOUNTER — OFFICE VISIT (OUTPATIENT)
Dept: BARIATRICS/WEIGHT MGMT | Age: 44
End: 2024-01-09
Payer: MEDICAID

## 2024-01-09 VITALS
BODY MASS INDEX: 40.43 KG/M2 | SYSTOLIC BLOOD PRESSURE: 132 MMHG | DIASTOLIC BLOOD PRESSURE: 84 MMHG | WEIGHT: 315 LBS | HEIGHT: 74 IN | HEART RATE: 76 BPM

## 2024-01-09 DIAGNOSIS — R73.02 IGT (IMPAIRED GLUCOSE TOLERANCE): ICD-10-CM

## 2024-01-09 DIAGNOSIS — G47.33 OSA ON CPAP: ICD-10-CM

## 2024-01-09 DIAGNOSIS — E66.01 OBESITY, CLASS III, BMI 40-49.9 (MORBID OBESITY) (HCC): ICD-10-CM

## 2024-01-09 DIAGNOSIS — G89.29 CHRONIC PAIN OF RIGHT KNEE: ICD-10-CM

## 2024-01-09 DIAGNOSIS — M25.561 CHRONIC PAIN OF RIGHT KNEE: ICD-10-CM

## 2024-01-09 DIAGNOSIS — M79.671 PAIN IN BOTH FEET: ICD-10-CM

## 2024-01-09 DIAGNOSIS — J45.20 MILD INTERMITTENT ASTHMA WITHOUT COMPLICATION: ICD-10-CM

## 2024-01-09 DIAGNOSIS — M79.672 PAIN IN BOTH FEET: ICD-10-CM

## 2024-01-09 DIAGNOSIS — I10 ESSENTIAL HYPERTENSION: Primary | ICD-10-CM

## 2024-01-09 PROCEDURE — G8482 FLU IMMUNIZE ORDER/ADMIN: HCPCS | Performed by: NURSE PRACTITIONER

## 2024-01-09 PROCEDURE — G8427 DOCREV CUR MEDS BY ELIG CLIN: HCPCS | Performed by: NURSE PRACTITIONER

## 2024-01-09 PROCEDURE — 3075F SYST BP GE 130 - 139MM HG: CPT | Performed by: NURSE PRACTITIONER

## 2024-01-09 PROCEDURE — G8417 CALC BMI ABV UP PARAM F/U: HCPCS | Performed by: NURSE PRACTITIONER

## 2024-01-09 PROCEDURE — 3079F DIAST BP 80-89 MM HG: CPT | Performed by: NURSE PRACTITIONER

## 2024-01-09 PROCEDURE — 99213 OFFICE O/P EST LOW 20 MIN: CPT | Performed by: NURSE PRACTITIONER

## 2024-01-09 PROCEDURE — 1036F TOBACCO NON-USER: CPT | Performed by: NURSE PRACTITIONER

## 2024-01-09 NOTE — PROGRESS NOTES
(2/3/2023)    Overall Financial Resource Strain (CARDIA)     Difficulty of Paying Living Expenses: Somewhat hard   Food Insecurity: Not on file (5/29/2023)   Transportation Needs: Unknown (2/3/2023)    PRAPARE - Transportation     Lack of Transportation (Medical): Not on file     Lack of Transportation (Non-Medical): No   Physical Activity: Sufficiently Active (11/1/2019)    Exercise Vital Sign     Days of Exercise per Week: 4 days     Minutes of Exercise per Session: 50 min   Recent Concern: Physical Activity - Insufficiently Active (8/17/2019)    Exercise Vital Sign     Days of Exercise per Week: 2 days     Minutes of Exercise per Session: 30 min   Stress: No Stress Concern Present (11/1/2019)    [employer] - Occupational Stress Questionnaire     Feeling of Stress : Only a little   Recent Concern: Stress - Stress Concern Present (8/17/2019)    [employer] - Occupational Stress Questionnaire     Feeling of Stress : To some extent   Social Connections: Somewhat Isolated (8/17/2019)    Social Connection and Isolation Panel [NHANES]     Frequency of Communication with Friends and Family: Three times a week     Frequency of Social Gatherings with Friends and Family: Twice a week     Attends Alevism Services: 1 to 4 times per year     Active Member of Clubs or Organizations: No     Attends Club or Organization Meetings: Never     Marital Status: Never    Intimate Partner Violence: Not At Risk (8/17/2019)    Humiliation, Afraid, Rape, and Kick questionnaire     Fear of Current or Ex-Partner: No     Emotionally Abused: No     Physically Abused: No     Sexually Abused: No   Housing Stability: Unknown (2/3/2023)    Housing Stability Vital Sign     Unable to Pay for Housing in the Last Year: Not on file     Number of Places Lived in the Last Year: Not on file     Unstable Housing in the Last Year: No       Current Medications:  Current Outpatient Medications

## 2024-02-01 ENCOUNTER — OFFICE VISIT (OUTPATIENT)
Dept: PODIATRY | Age: 44
End: 2024-02-01
Payer: MEDICAID

## 2024-02-01 VITALS — HEIGHT: 74 IN | BODY MASS INDEX: 30.8 KG/M2 | WEIGHT: 240 LBS

## 2024-02-01 DIAGNOSIS — M79.671 FOOT PAIN, BILATERAL: Primary | ICD-10-CM

## 2024-02-01 DIAGNOSIS — M21.42 PES PLANUS OF BOTH FEET: ICD-10-CM

## 2024-02-01 DIAGNOSIS — B35.1 DERMATOPHYTOSIS OF NAIL: ICD-10-CM

## 2024-02-01 DIAGNOSIS — M21.41 PES PLANUS OF BOTH FEET: ICD-10-CM

## 2024-02-01 DIAGNOSIS — M79.672 FOOT PAIN, BILATERAL: Primary | ICD-10-CM

## 2024-02-01 PROCEDURE — G8417 CALC BMI ABV UP PARAM F/U: HCPCS | Performed by: PODIATRIST

## 2024-02-01 PROCEDURE — 99213 OFFICE O/P EST LOW 20 MIN: CPT | Performed by: PODIATRIST

## 2024-02-01 PROCEDURE — 11721 DEBRIDE NAIL 6 OR MORE: CPT | Performed by: PODIATRIST

## 2024-02-01 PROCEDURE — G8482 FLU IMMUNIZE ORDER/ADMIN: HCPCS | Performed by: PODIATRIST

## 2024-02-01 PROCEDURE — 1036F TOBACCO NON-USER: CPT | Performed by: PODIATRIST

## 2024-02-01 PROCEDURE — G8427 DOCREV CUR MEDS BY ELIG CLIN: HCPCS | Performed by: PODIATRIST

## 2024-02-01 NOTE — PROGRESS NOTES
90 tablet 1    lisinopril (PRINIVIL;ZESTRIL) 40 MG tablet Take 1 tablet by mouth daily 90 tablet 1    hydroCHLOROthiazide (HYDRODIURIL) 25 MG tablet Take 1 tablet by mouth every morning 90 tablet 1    Cholecalciferol (VITAMIN D3) 125 MCG (5000 UT) TABS Take 1 tablet by mouth daily      ondansetron (ZOFRAN) 8 MG tablet Take 1 tablet by mouth 3 times daily as needed      ibuprofen (ADVIL;MOTRIN) 600 MG tablet Take 1 tablet by mouth every 8 hours as needed for Pain 12 tablet 0    acetaminophen (TYLENOL) 500 MG tablet Take 1 tablet by mouth every 6 hours as needed for Pain 12 tablet 0    fluticasone (FLONASE) 50 MCG/ACT nasal spray 1 spray by Each Nostril route daily 32 g 5    albuterol sulfate HFA (PROVENTIL;VENTOLIN;PROAIR) 108 (90 Base) MCG/ACT inhaler Inhale 2 puffs into the lungs every 6 hours as needed for Wheezing 18 g 5    diclofenac sodium (VOLTAREN) 1 % GEL Apply 4 g topically 4 times daily 50 g 5    melatonin 10 MG CAPS capsule Take 1 capsule by mouth nightly 30 capsule 5    loratadine (CLARITIN) 10 MG tablet Take 1 tablet by mouth daily 30 tablet 5    QUVIVIQ 25 MG TABS Take 1 tablet by mouth nightly at bedtime. Max Daily Amount: 1 tablet      lurasidone (LATUDA) 20 MG TABS tablet Latuda 20 mg tablet      traZODone (DESYREL) 50 MG tablet Take 1 tablet by mouth at bedtime      ARIPiprazole (ABILIFY) 10 MG tablet       vitamin D (ERGOCALCIFEROL) 1.25 MG (70885 UT) CAPS capsule Take 1 capsule by mouth once a week      diclofenac sodium (VOLTAREN) 1 % GEL Apply 4 g topically 4 times daily as needed for Pain (Patient not taking: Reported on 10/17/2023) 200 g 0    diclofenac sodium (VOLTAREN) 1 % GEL Apply 4 g topically 4 times daily as needed for Pain (Patient not taking: Reported on 10/17/2023) 200 g 0     No current facility-administered medications on file prior to visit.     Review of Systems.    Review of Systems:   History obtained from chart review and the patient  General ROS: negative for - chills,

## 2024-02-06 ENCOUNTER — OFFICE VISIT (OUTPATIENT)
Dept: BARIATRICS/WEIGHT MGMT | Age: 44
End: 2024-02-06
Payer: MEDICAID

## 2024-02-06 VITALS
WEIGHT: 315 LBS | HEIGHT: 74 IN | HEART RATE: 84 BPM | BODY MASS INDEX: 40.43 KG/M2 | SYSTOLIC BLOOD PRESSURE: 122 MMHG | DIASTOLIC BLOOD PRESSURE: 88 MMHG

## 2024-02-06 DIAGNOSIS — G47.33 OSA ON CPAP: ICD-10-CM

## 2024-02-06 DIAGNOSIS — J45.20 MILD INTERMITTENT ASTHMA WITHOUT COMPLICATION: ICD-10-CM

## 2024-02-06 DIAGNOSIS — M25.561 CHRONIC PAIN OF RIGHT KNEE: ICD-10-CM

## 2024-02-06 DIAGNOSIS — E66.01 OBESITY, CLASS III, BMI 40-49.9 (MORBID OBESITY) (HCC): ICD-10-CM

## 2024-02-06 DIAGNOSIS — G89.29 CHRONIC PAIN OF RIGHT KNEE: ICD-10-CM

## 2024-02-06 DIAGNOSIS — R73.02 IGT (IMPAIRED GLUCOSE TOLERANCE): ICD-10-CM

## 2024-02-06 DIAGNOSIS — I10 ESSENTIAL HYPERTENSION: Primary | ICD-10-CM

## 2024-02-06 PROCEDURE — G8482 FLU IMMUNIZE ORDER/ADMIN: HCPCS | Performed by: NURSE PRACTITIONER

## 2024-02-06 PROCEDURE — G8417 CALC BMI ABV UP PARAM F/U: HCPCS | Performed by: NURSE PRACTITIONER

## 2024-02-06 PROCEDURE — 3074F SYST BP LT 130 MM HG: CPT | Performed by: NURSE PRACTITIONER

## 2024-02-06 PROCEDURE — 3079F DIAST BP 80-89 MM HG: CPT | Performed by: NURSE PRACTITIONER

## 2024-02-06 PROCEDURE — 1036F TOBACCO NON-USER: CPT | Performed by: NURSE PRACTITIONER

## 2024-02-06 PROCEDURE — G8427 DOCREV CUR MEDS BY ELIG CLIN: HCPCS | Performed by: NURSE PRACTITIONER

## 2024-02-06 PROCEDURE — 99213 OFFICE O/P EST LOW 20 MIN: CPT | Performed by: NURSE PRACTITIONER

## 2024-02-06 NOTE — PROGRESS NOTES
Group Lifestyle Balance Follow-up Progress Note      Subjective     Patient is here for group medical appointment for Group Lifestyle Balance weight management program follow-up for the chronic conditions of MIGEL, HTN, Asthma, Chronic Pain Right Knee, Bilateral Foot Pain.  Patient continues on the program and tolerating well.   Total weight loss of 36 lbs.  No current issues.    Allergies:  Allergies   Allergen Reactions    Melatonin        Past Medical History:     Past Medical History:   Diagnosis Date    Anxiety     Arthritis     CAD (coronary artery disease)     Fatty liver     Fatty liver disease, nonalcoholic 7/10/2012    Hypertension     Obesity     Unspecified sleep apnea     cpap   .    Past Surgical History:  History reviewed. No pertinent surgical history.    Family History:  Family History   Problem Relation Age of Onset    Arthritis Mother     Diabetes Father     Arthritis Father     Stroke Maternal Grandmother     Heart Disease Maternal Grandmother     Stroke Maternal Grandfather     Heart Disease Maternal Grandfather     Early Death Maternal Grandfather     Stroke Paternal Grandmother     Heart Disease Paternal Grandmother     Stroke Paternal Grandfather     Heart Disease Paternal Grandfather     Early Death Paternal Grandfather        Social History:  Social History     Socioeconomic History    Marital status: Single     Spouse name: Stephen    Number of children: 0    Years of education: 16    Highest education level: Bachelor's degree (e.g., BA, AB, BS)   Occupational History    Not on file   Tobacco Use    Smoking status: Never    Smokeless tobacco: Never   Vaping Use    Vaping Use: Never used   Substance and Sexual Activity    Alcohol use: Never    Drug use: Never    Sexual activity: Yes     Partners: Female   Other Topics Concern    Not on file   Social History Narrative    ** Merged History Encounter **          Social Determinants of Health     Financial Resource Strain: Medium Risk

## 2024-03-31 ENCOUNTER — HOSPITAL ENCOUNTER (EMERGENCY)
Age: 44
Discharge: HOME | End: 2024-03-31
Attending: EMERGENCY MEDICINE
Payer: MEDICAID

## 2024-03-31 ENCOUNTER — APPOINTMENT (OUTPATIENT)
Dept: GENERAL RADIOLOGY | Age: 44
End: 2024-03-31
Payer: MEDICAID

## 2024-03-31 VITALS
TEMPERATURE: 97.8 F | BODY MASS INDEX: 40.43 KG/M2 | HEART RATE: 60 BPM | HEIGHT: 74 IN | SYSTOLIC BLOOD PRESSURE: 135 MMHG | OXYGEN SATURATION: 98 % | WEIGHT: 315 LBS | DIASTOLIC BLOOD PRESSURE: 64 MMHG | RESPIRATION RATE: 16 BRPM

## 2024-03-31 DIAGNOSIS — R07.9 CHEST PAIN, UNSPECIFIED TYPE: Primary | ICD-10-CM

## 2024-03-31 LAB
ALBUMIN SERPL-MCNC: 3.8 G/DL (ref 3.5–5.2)
ALP SERPL-CCNC: 85 U/L (ref 40–129)
ALT SERPL-CCNC: 11 U/L (ref 5–41)
ANION GAP SERPL CALCULATED.3IONS-SCNC: 8 MMOL/L (ref 9–17)
AST SERPL-CCNC: 15 U/L
BASOPHILS # BLD: 0 K/UL (ref 0–0.2)
BASOPHILS NFR BLD: 1 % (ref 0–2)
BILIRUB SERPL-MCNC: 0.3 MG/DL (ref 0.3–1.2)
BUN SERPL-MCNC: 19 MG/DL (ref 6–20)
CALCIUM SERPL-MCNC: 8.8 MG/DL (ref 8.6–10.4)
CHLORIDE SERPL-SCNC: 105 MMOL/L (ref 98–107)
CO2 SERPL-SCNC: 27 MMOL/L (ref 20–31)
CREAT SERPL-MCNC: 1 MG/DL (ref 0.7–1.2)
EOSINOPHIL # BLD: 0.4 K/UL (ref 0–0.4)
EOSINOPHILS RELATIVE PERCENT: 6 % (ref 0–4)
ERYTHROCYTE [DISTWIDTH] IN BLOOD BY AUTOMATED COUNT: 16.1 % (ref 11.5–14.9)
GFR SERPL CREATININE-BSD FRML MDRD: >90 ML/MIN/1.73M2
GLUCOSE SERPL-MCNC: 77 MG/DL (ref 70–99)
HCT VFR BLD AUTO: 39.9 % (ref 41–53)
HGB BLD-MCNC: 13 G/DL (ref 13.5–17.5)
LYMPHOCYTES NFR BLD: 1.4 K/UL (ref 1–4.8)
LYMPHOCYTES RELATIVE PERCENT: 23 % (ref 24–44)
MAGNESIUM SERPL-MCNC: 2 MG/DL (ref 1.6–2.6)
MCH RBC QN AUTO: 29.7 PG (ref 26–34)
MCHC RBC AUTO-ENTMCNC: 32.7 G/DL (ref 31–37)
MCV RBC AUTO: 90.9 FL (ref 80–100)
MONOCYTES NFR BLD: 0.4 K/UL (ref 0.1–1.3)
MONOCYTES NFR BLD: 6 % (ref 1–7)
NEUTROPHILS NFR BLD: 64 % (ref 36–66)
NEUTS SEG NFR BLD: 3.9 K/UL (ref 1.3–9.1)
PLATELET # BLD AUTO: 243 K/UL (ref 150–450)
PMV BLD AUTO: 7.9 FL (ref 6–12)
POTASSIUM SERPL-SCNC: 4.2 MMOL/L (ref 3.7–5.3)
PROT SERPL-MCNC: 7.4 G/DL (ref 6.4–8.3)
RBC # BLD AUTO: 4.38 M/UL (ref 4.5–5.9)
SODIUM SERPL-SCNC: 140 MMOL/L (ref 135–144)
TROPONIN I SERPL HS-MCNC: <6 NG/L (ref 0–22)
TROPONIN I SERPL HS-MCNC: <6 NG/L (ref 0–22)
WBC OTHER # BLD: 6 K/UL (ref 3.5–11)

## 2024-03-31 PROCEDURE — 36415 COLL VENOUS BLD VENIPUNCTURE: CPT

## 2024-03-31 PROCEDURE — 93005 ELECTROCARDIOGRAM TRACING: CPT | Performed by: EMERGENCY MEDICINE

## 2024-03-31 PROCEDURE — 83735 ASSAY OF MAGNESIUM: CPT

## 2024-03-31 PROCEDURE — 85025 COMPLETE CBC W/AUTO DIFF WBC: CPT

## 2024-03-31 PROCEDURE — 80053 COMPREHEN METABOLIC PANEL: CPT

## 2024-03-31 PROCEDURE — 84484 ASSAY OF TROPONIN QUANT: CPT

## 2024-03-31 PROCEDURE — 99285 EMERGENCY DEPT VISIT HI MDM: CPT

## 2024-03-31 PROCEDURE — 71045 X-RAY EXAM CHEST 1 VIEW: CPT

## 2024-03-31 ASSESSMENT — LIFESTYLE VARIABLES
HOW MANY STANDARD DRINKS CONTAINING ALCOHOL DO YOU HAVE ON A TYPICAL DAY: PATIENT DOES NOT DRINK
HOW OFTEN DO YOU HAVE A DRINK CONTAINING ALCOHOL: NEVER
HOW OFTEN DO YOU HAVE A DRINK CONTAINING ALCOHOL: NEVER
HOW MANY STANDARD DRINKS CONTAINING ALCOHOL DO YOU HAVE ON A TYPICAL DAY: PATIENT DOES NOT DRINK

## 2024-03-31 ASSESSMENT — PAIN DESCRIPTION - LOCATION: LOCATION: CHEST

## 2024-03-31 ASSESSMENT — PAIN SCALES - GENERAL: PAINLEVEL_OUTOF10: 7

## 2024-03-31 ASSESSMENT — PAIN DESCRIPTION - DESCRIPTORS: DESCRIPTORS: SHARP

## 2024-03-31 ASSESSMENT — PAIN - FUNCTIONAL ASSESSMENT: PAIN_FUNCTIONAL_ASSESSMENT: 0-10

## 2024-03-31 ASSESSMENT — PAIN DESCRIPTION - ORIENTATION: ORIENTATION: MID

## 2024-03-31 ASSESSMENT — HEART SCORE: ECG: NORMAL

## 2024-03-31 NOTE — ED TRIAGE NOTES
Mode of arrival (squad #, walk in, police, etc) : EMS        Chief complaint(s): chest pain        Arrival Note (brief scenario, treatment PTA, etc).: Patient brought in by EMS c/o sharp midsternal chest pain which woke him up this morning. Patient took tylenol around 2PM which did not help. EMS gave 4 baby aspirin enroute.         C= \"Have you ever felt that you should Cut down on your drinking?\"  No  A= \"Have people Annoyed you by criticizing your drinking?\"  No  G= \"Have you ever felt bad or Guilty about your drinking?\"  No  E= \"Have you ever had a drink as an Eye-opener first thing in the morning to steady your nerves or to help a hangover?\"  No      Deferred []      Reason for deferring: N/A    *If yes to two or more: probable alcohol abuse.*

## 2024-03-31 NOTE — ED PROVIDER NOTES
EMERGENCY DEPARTMENT ENCOUNTER    Pt Name: Ranjeet Castro  MRN: [id number]  Birthdate 1980  Date of evaluation: 3/31/24  CHIEF COMPLAINT       Chief Complaint   Patient presents with    Chest Pain     HISTORY OF PRESENT ILLNESS   Presenting with chief complaint of chest pain.  He has had the pain since he woke up today.  He describes it as a pressure sensation in the middle of his chest without radiation.  He had an episode of nausea earlier today but no vomiting.  He said that he was working today and he was feeling warm and sweaty.  Patient said that he is going through a lot of stress at home.  Risk factors he has for ACS include hypertension, hyperlipidemia, obesity.    The history is provided by the patient.           REVIEW OF SYSTEMS     Review of Systems   Constitutional:  Positive for diaphoresis. Negative for chills and fever.   HENT:  Negative for congestion.    Eyes:  Negative for visual disturbance.   Respiratory:  Positive for shortness of breath. Negative for cough.    Cardiovascular:  Positive for chest pain.   Gastrointestinal:  Negative for abdominal pain, nausea and vomiting.   Genitourinary:  Negative for flank pain.   Musculoskeletal:  Negative for myalgias.   Neurological:  Negative for dizziness, light-headedness and headaches.   Psychiatric/Behavioral:  Negative for behavioral problems.      PASTMEDICAL HISTORY     Past Medical History:   Diagnosis Date    Anxiety     Arthritis     CAD (coronary artery disease)     Fatty liver     Fatty liver disease, nonalcoholic 7/10/2012    Hypertension     Obesity     Unspecified sleep apnea     cpap     Past Problem List  Patient Active Problem List   Diagnosis Code    Essential hypertension I10    MIGEL on CPAP G47.33    Vitamin D deficiency E55.9    Obesity (BMI 30-39.9) E66.9    IGT (impaired glucose tolerance) R73.02    Mild intermittent asthma J45.20    Cervical spondylosis M47.812    Degeneration of cervical intervertebral disc M50.30

## 2024-04-02 LAB
EKG ATRIAL RATE: 61 BPM
EKG P AXIS: 60 DEGREES
EKG P-R INTERVAL: 162 MS
EKG Q-T INTERVAL: 420 MS
EKG QRS DURATION: 90 MS
EKG QTC CALCULATION (BAZETT): 422 MS
EKG R AXIS: 29 DEGREES
EKG T AXIS: 29 DEGREES
EKG VENTRICULAR RATE: 61 BPM

## 2024-04-02 PROCEDURE — 93010 ELECTROCARDIOGRAM REPORT: CPT | Performed by: INTERNAL MEDICINE

## 2024-04-12 ENCOUNTER — OFFICE VISIT (OUTPATIENT)
Dept: INTERNAL MEDICINE | Age: 44
End: 2024-04-12
Payer: MEDICAID

## 2024-04-12 VITALS
WEIGHT: 315 LBS | SYSTOLIC BLOOD PRESSURE: 128 MMHG | BODY MASS INDEX: 40.43 KG/M2 | DIASTOLIC BLOOD PRESSURE: 88 MMHG | OXYGEN SATURATION: 94 % | HEART RATE: 74 BPM | HEIGHT: 74 IN | TEMPERATURE: 97.9 F

## 2024-04-12 DIAGNOSIS — G47.33 OSA ON CPAP: ICD-10-CM

## 2024-04-12 DIAGNOSIS — Z12.11 COLON CANCER SCREENING: ICD-10-CM

## 2024-04-12 DIAGNOSIS — I1A.0 RESISTANT HYPERTENSION: Primary | ICD-10-CM

## 2024-04-12 PROCEDURE — 1036F TOBACCO NON-USER: CPT | Performed by: INTERNAL MEDICINE

## 2024-04-12 PROCEDURE — G8427 DOCREV CUR MEDS BY ELIG CLIN: HCPCS | Performed by: INTERNAL MEDICINE

## 2024-04-12 PROCEDURE — 3074F SYST BP LT 130 MM HG: CPT | Performed by: INTERNAL MEDICINE

## 2024-04-12 PROCEDURE — 99213 OFFICE O/P EST LOW 20 MIN: CPT | Performed by: INTERNAL MEDICINE

## 2024-04-12 PROCEDURE — G8417 CALC BMI ABV UP PARAM F/U: HCPCS | Performed by: INTERNAL MEDICINE

## 2024-04-12 PROCEDURE — 3079F DIAST BP 80-89 MM HG: CPT | Performed by: INTERNAL MEDICINE

## 2024-04-12 RX ORDER — CARVEDILOL 12.5 MG/1
12.5 TABLET ORAL 2 TIMES DAILY
Qty: 60 TABLET | Refills: 3 | Status: SHIPPED | OUTPATIENT
Start: 2024-04-12

## 2024-04-12 RX ORDER — LISINOPRIL 40 MG/1
40 TABLET ORAL DAILY
Qty: 90 TABLET | Refills: 1 | Status: SHIPPED | OUTPATIENT
Start: 2024-04-12

## 2024-04-12 RX ORDER — AMLODIPINE BESYLATE 10 MG/1
10 TABLET ORAL DAILY
Qty: 90 TABLET | Refills: 1 | Status: SHIPPED | OUTPATIENT
Start: 2024-04-12

## 2024-04-12 RX ORDER — HYDROCHLOROTHIAZIDE 25 MG/1
25 TABLET ORAL EVERY MORNING
Qty: 90 TABLET | Refills: 1 | Status: SHIPPED | OUTPATIENT
Start: 2024-04-12

## 2024-04-12 ASSESSMENT — ENCOUNTER SYMPTOMS
CONSTIPATION: 0
BACK PAIN: 1
COUGH: 0
SHORTNESS OF BREATH: 0
ABDOMINAL PAIN: 0
BLOOD IN STOOL: 0
WHEEZING: 0

## 2024-04-12 NOTE — PROGRESS NOTES
[facility]    Progress Note    Date of patient's visit: 4/12/2024    Patient's Name:  Ranjeet Castro    YOB: 1980            Patient Care Team:  Sammi Vega MD as PCP - General (Internal Medicine)  Sammi Vega MD as PCP - Empaneled Provider  Dale Macias MD as Surgeon (Orthopedic Surgery)  Nikolas Wang DPM as Physician (Podiatry)  Kathia Wang DPM as Physician (Podiatry)    REASON FOR VISIT: Routine outpatient follow     Chief Complaint   Patient presents with    Hypertension     Follow up     Orders     Pt wanting orders to check for cancer    Referral - General     Pt has dx of bipolar disorder and wants referral to get dx checked in him    Discuss Medications     Pt gets dizzy after taking pantoprazole         HISTORY OF PRESENT ILLNESS:    History was obtained from the patient. Ranjeet Castro is a 44 y.o. is here for follow-up.  He is very anxious.  He states his mother is hospitalized for UTI and she has had some confusion.  He thinks she has bipolar and he wants to be tested for it.  He does see a psychiatrist regularly.  He is on Latuda and may be Abilify but he is not sure.  He sees a psychiatrist again on Monday.  Advised him to check with his psychiatrist as to his diagnosis and clarify why he is on these medications.  Patient does not seem to have a bipolar disorder.  He continues to have problems with his sleep.  He states he has been using his CPAP daily.  He was in the ER recently as he was feeling dizzy.  He was unsure if it was his medication or something else.  Lab workup was unremarkable.  He was discharged home.  Blood pressures at the ER and then the bariatric clinic recently were normal.  He is compliant with meds.  He is denying or headaches or dizziness since then.  No leg edema.  No shortness of breath.        Past Medical History:   Diagnosis Date    Anxiety     Arthritis     CAD (coronary artery disease)

## 2024-05-02 ENCOUNTER — OFFICE VISIT (OUTPATIENT)
Dept: PODIATRY | Age: 44
End: 2024-05-02
Payer: MEDICAID

## 2024-05-02 VITALS — HEIGHT: 74 IN | BODY MASS INDEX: 40.43 KG/M2 | WEIGHT: 315 LBS

## 2024-05-02 DIAGNOSIS — M79.671 FOOT PAIN, BILATERAL: ICD-10-CM

## 2024-05-02 DIAGNOSIS — B35.1 DERMATOPHYTOSIS OF NAIL: Primary | ICD-10-CM

## 2024-05-02 DIAGNOSIS — M21.42 PES PLANUS OF BOTH FEET: ICD-10-CM

## 2024-05-02 DIAGNOSIS — M79.672 FOOT PAIN, BILATERAL: ICD-10-CM

## 2024-05-02 DIAGNOSIS — M21.41 PES PLANUS OF BOTH FEET: ICD-10-CM

## 2024-05-02 DIAGNOSIS — L60.0 INGROWN NAIL: ICD-10-CM

## 2024-05-02 PROCEDURE — 11721 DEBRIDE NAIL 6 OR MORE: CPT | Performed by: PODIATRIST

## 2024-05-02 RX ORDER — ZOLPIDEM TARTRATE 5 MG/1
5 TABLET ORAL NIGHTLY PRN
COMMUNITY
Start: 2024-04-23

## 2024-05-02 NOTE — PROGRESS NOTES
MG tablet Take 1 tablet by mouth 2 times daily (with meals) 60 tablet 5    OXcarbazepine (TRILEPTAL) 150 MG tablet Take 1 tablet by mouth Daily      diclofenac sodium (VOLTAREN) 1 % GEL Apply 4 g topically 4 times daily as needed for Pain 200 g 0    diclofenac sodium (VOLTAREN) 1 % GEL Apply 4 g topically 4 times daily as needed for Pain 200 g 0    Cholecalciferol (VITAMIN D3) 125 MCG (5000 UT) TABS Take 1 tablet by mouth daily      ondansetron (ZOFRAN) 8 MG tablet Take 1 tablet by mouth 3 times daily as needed      ibuprofen (ADVIL;MOTRIN) 600 MG tablet Take 1 tablet by mouth every 8 hours as needed for Pain 12 tablet 0    acetaminophen (TYLENOL) 500 MG tablet Take 1 tablet by mouth every 6 hours as needed for Pain 12 tablet 0    fluticasone (FLONASE) 50 MCG/ACT nasal spray 1 spray by Each Nostril route daily 32 g 5    albuterol sulfate HFA (PROVENTIL;VENTOLIN;PROAIR) 108 (90 Base) MCG/ACT inhaler Inhale 2 puffs into the lungs every 6 hours as needed for Wheezing 18 g 5    diclofenac sodium (VOLTAREN) 1 % GEL Apply 4 g topically 4 times daily 50 g 5    melatonin 10 MG CAPS capsule Take 1 capsule by mouth nightly 30 capsule 5    loratadine (CLARITIN) 10 MG tablet Take 1 tablet by mouth daily 30 tablet 5    QUVIVIQ 25 MG TABS Take 1 tablet by mouth nightly at bedtime. Max Daily Amount: 1 tablet      lurasidone (LATUDA) 20 MG TABS tablet Latuda 20 mg tablet      traZODone (DESYREL) 50 MG tablet Take 1 tablet by mouth at bedtime      ARIPiprazole (ABILIFY) 10 MG tablet       vitamin D (ERGOCALCIFEROL) 1.25 MG (44547 UT) CAPS capsule Take 1 capsule by mouth once a week       No current facility-administered medications on file prior to visit.     Review of Systems.    Review of Systems:   History obtained from chart review and the patient  General ROS: negative for - chills, fatigue, fever, night sweats or weight gain  Constitutional: Negative for chills, diaphoresis, fatigue, fever and unexpected weight

## 2024-05-08 RX ORDER — POLYETHYLENE GLYCOL 3350 17 G/17G
POWDER, FOR SOLUTION ORAL
Qty: 289 G | Refills: 0 | Status: SHIPPED | OUTPATIENT
Start: 2024-05-08

## 2024-05-08 RX ORDER — POLYETHYLENE GLYCOL 3350, SODIUM SULFATE ANHYDROUS, SODIUM BICARBONATE, SODIUM CHLORIDE, POTASSIUM CHLORIDE 236; 22.74; 6.74; 5.86; 2.97 G/4L; G/4L; G/4L; G/4L; G/4L
4 POWDER, FOR SOLUTION ORAL ONCE
Qty: 4000 ML | Refills: 0 | Status: SHIPPED | OUTPATIENT
Start: 2024-05-08 | End: 2024-05-08

## 2024-05-08 NOTE — TELEPHONE ENCOUNTER
Procedure scheduled/Dr Krueger  Procedure:colonoscopy (wq)  Dx: colon screening  Date:8/29/24  Time:7:30 am/ arrival 5:30 am  Hospital: [facility] phone call: JIMBO  Bowel Prep instructions given: Jana/Miralax  In office/via phone:   Clearance needed: ZHANG

## 2024-05-21 DIAGNOSIS — M79.672 BILATERAL FOOT PAIN: Primary | ICD-10-CM

## 2024-05-21 DIAGNOSIS — M79.671 BILATERAL FOOT PAIN: Primary | ICD-10-CM

## 2024-05-21 DIAGNOSIS — M25.562 PAIN IN BOTH KNEES, UNSPECIFIED CHRONICITY: ICD-10-CM

## 2024-05-21 DIAGNOSIS — R09.81 NASAL CONGESTION: ICD-10-CM

## 2024-05-21 DIAGNOSIS — M25.561 PAIN IN BOTH KNEES, UNSPECIFIED CHRONICITY: ICD-10-CM

## 2024-05-21 DIAGNOSIS — M17.11 PRIMARY OSTEOARTHRITIS OF RIGHT KNEE: ICD-10-CM

## 2024-05-21 RX ORDER — FLUTICASONE PROPIONATE 50 MCG
1 SPRAY, SUSPENSION (ML) NASAL DAILY
Qty: 32 G | Refills: 5 | Status: SHIPPED | OUTPATIENT
Start: 2024-05-21

## 2024-05-21 RX ORDER — NAPROXEN 500 MG/1
500 TABLET ORAL 2 TIMES DAILY WITH MEALS
Qty: 60 TABLET | Refills: 5 | Status: CANCELLED | OUTPATIENT
Start: 2024-05-21

## 2024-05-21 RX ORDER — NAPROXEN 500 MG/1
500 TABLET ORAL 2 TIMES DAILY WITH MEALS
Qty: 60 TABLET | Refills: 5 | Status: SHIPPED | OUTPATIENT
Start: 2024-05-21

## 2024-06-11 ENCOUNTER — TELEPHONE (OUTPATIENT)
Dept: BARIATRICS/WEIGHT MGMT | Age: 44
End: 2024-06-11

## 2024-06-11 NOTE — TELEPHONE ENCOUNTER
----- Message from JABARI Carroll CNP sent at 6/6/2024 12:17 PM EDT -----  Regarding: RE: GLB Restart  Since it has been a year since the last labs, please schedule him for a brief medical eval with labs.  ----- Message -----  From: Joe Che RD  Sent: 6/6/2024  12:03 PM EDT  To: JABARI Carroll CNP  Subject: GLB Restart                                      This patient last saw you in June of 2023, the last time he was in class was Feb of 2024.   In March of 2024 he completed a CBC, CMP and Magnesium. TSH, Uric Acid and Lipid Panel last completed June 2023 and A1C last completed May 2023.     Would you like him to be scheduled with you before he restarts?    Thank you,    Joe

## 2024-06-18 ENCOUNTER — OFFICE VISIT (OUTPATIENT)
Dept: BARIATRICS/WEIGHT MGMT | Age: 44
End: 2024-06-18
Payer: MEDICAID

## 2024-06-18 VITALS
DIASTOLIC BLOOD PRESSURE: 110 MMHG | BODY MASS INDEX: 42.66 KG/M2 | SYSTOLIC BLOOD PRESSURE: 138 MMHG | HEIGHT: 72 IN | WEIGHT: 315 LBS | HEART RATE: 68 BPM | OXYGEN SATURATION: 97 %

## 2024-06-18 DIAGNOSIS — J45.20 MILD INTERMITTENT ASTHMA WITHOUT COMPLICATION: ICD-10-CM

## 2024-06-18 DIAGNOSIS — R73.02 IGT (IMPAIRED GLUCOSE TOLERANCE): ICD-10-CM

## 2024-06-18 DIAGNOSIS — I10 ESSENTIAL HYPERTENSION: Primary | ICD-10-CM

## 2024-06-18 DIAGNOSIS — E66.01 OBESITY, CLASS III, BMI 40-49.9 (MORBID OBESITY) (HCC): ICD-10-CM

## 2024-06-18 DIAGNOSIS — K21.9 GASTROESOPHAGEAL REFLUX DISEASE WITHOUT ESOPHAGITIS: ICD-10-CM

## 2024-06-18 DIAGNOSIS — G47.33 OSA ON CPAP: ICD-10-CM

## 2024-06-18 PROCEDURE — G8417 CALC BMI ABV UP PARAM F/U: HCPCS | Performed by: NURSE PRACTITIONER

## 2024-06-18 PROCEDURE — G8427 DOCREV CUR MEDS BY ELIG CLIN: HCPCS | Performed by: NURSE PRACTITIONER

## 2024-06-18 PROCEDURE — 3075F SYST BP GE 130 - 139MM HG: CPT | Performed by: NURSE PRACTITIONER

## 2024-06-18 PROCEDURE — 1036F TOBACCO NON-USER: CPT | Performed by: NURSE PRACTITIONER

## 2024-06-18 PROCEDURE — 3080F DIAST BP >= 90 MM HG: CPT | Performed by: NURSE PRACTITIONER

## 2024-06-18 PROCEDURE — 99204 OFFICE O/P NEW MOD 45 MIN: CPT | Performed by: NURSE PRACTITIONER

## 2024-06-18 RX ORDER — POLYETHYLENE GLYCOL 3350, SODIUM SULFATE ANHYDROUS, SODIUM BICARBONATE, SODIUM CHLORIDE, POTASSIUM CHLORIDE 236; 22.74; 6.74; 5.86; 2.97 G/4L; G/4L; G/4L; G/4L; G/4L
4000 POWDER, FOR SOLUTION ORAL ONCE
COMMUNITY
Start: 2024-05-08

## 2024-06-18 NOTE — PROGRESS NOTES
Clinical Induction for Group Lifestyle Balance Program Progress Note    Subjective     The patient is a 44 y.o. male being seen regarding supervised weight loss.  The patient's PCP is Sammi Vega MD .  Highest adult weight was 697 lbs and lowest adult weight was 300 lbs.  His Body mass index is 47.6 kg/m².  His weight goal is 335 lbs.  The patient reports that his obesity is significantly affecting his life on a daily basis.      Weight Loss Program History:   He has tried grapefruit and low carb diets.  These attempts have been somewhat successful with weight loss, but have failed to sustain adequate weight loss. The patient has also tried self directed diet and exercise in attempts to sustain weight loss.     Comorbid Conditions:  Significant diseases affecting this patient are: HTN, MIGEL, IGT, Asthma, GERD      Allergies:  No Known Allergies    Past Medical History:     Past Medical History:   Diagnosis Date    Anxiety     Arthritis     CAD (coronary artery disease)     Fatty liver     Fatty liver disease, nonalcoholic 7/10/2012    Hypertension     Obesity     Unspecified sleep apnea     cpap   .    Past Surgical History:  History reviewed. No pertinent surgical history.    Family History:  Family History   Problem Relation Age of Onset    Arthritis Mother     Diabetes Father     Arthritis Father     Stroke Maternal Grandmother     Heart Disease Maternal Grandmother     Stroke Maternal Grandfather     Heart Disease Maternal Grandfather     Early Death Maternal Grandfather     Stroke Paternal Grandmother     Heart Disease Paternal Grandmother     Stroke Paternal Grandfather     Heart Disease Paternal Grandfather     Early Death Paternal Grandfather        Social History:  Social History     Socioeconomic History    Marital status: Single     Spouse name: Stephen    Number of children: 0    Years of education: 16    Highest education level: Bachelor's degree (e.g., BA, AB, BS)   Occupational History    Not

## 2024-07-03 DIAGNOSIS — K21.9 GASTROESOPHAGEAL REFLUX DISEASE, UNSPECIFIED WHETHER ESOPHAGITIS PRESENT: ICD-10-CM

## 2024-07-03 RX ORDER — PANTOPRAZOLE SODIUM 40 MG/1
40 TABLET, DELAYED RELEASE ORAL
Qty: 30 TABLET | Refills: 5 | Status: SHIPPED | OUTPATIENT
Start: 2024-07-03

## 2024-07-03 NOTE — TELEPHONE ENCOUNTER
Ranjeet Castro is calling to request a refill on the following medication(s):    Medication Request:  Requested Prescriptions     Pending Prescriptions Disp Refills    pantoprazole (PROTONIX) 40 MG tablet [Pharmacy Med Name: PANTOPRAZOLE SOD DR 40 MG TAB] 30 tablet 5     Sig: take 1 tablet by mouth EVERY MORNING BEFORE BREAKFAST       Last Visit Date (If Applicable):  4/12/2024    Next Visit Date:    Visit date not found

## 2024-07-16 ENCOUNTER — HOSPITAL ENCOUNTER (OUTPATIENT)
Age: 44
Discharge: HOME | End: 2024-07-16
Payer: MEDICAID

## 2024-07-16 DIAGNOSIS — E66.01 OBESITY, CLASS III, BMI 40-49.9 (MORBID OBESITY) (HCC): ICD-10-CM

## 2024-07-16 DIAGNOSIS — I10 ESSENTIAL HYPERTENSION: ICD-10-CM

## 2024-07-16 DIAGNOSIS — G47.33 OSA ON CPAP: ICD-10-CM

## 2024-07-16 DIAGNOSIS — R73.02 IGT (IMPAIRED GLUCOSE TOLERANCE): ICD-10-CM

## 2024-07-16 DIAGNOSIS — J45.20 MILD INTERMITTENT ASTHMA WITHOUT COMPLICATION: ICD-10-CM

## 2024-07-16 LAB
ALBUMIN SERPL-MCNC: 4 G/DL (ref 3.5–5.2)
ALBUMIN/GLOB SERPL: 1 {RATIO} (ref 1–2.5)
ALP SERPL-CCNC: 92 U/L (ref 40–129)
ALT SERPL-CCNC: 13 U/L (ref 10–50)
ANION GAP SERPL CALCULATED.3IONS-SCNC: 8 MMOL/L (ref 9–16)
AST SERPL-CCNC: 24 U/L (ref 10–50)
BASOPHILS # BLD: <0.03 K/UL (ref 0–0.2)
BASOPHILS NFR BLD: 0 % (ref 0–2)
BILIRUB SERPL-MCNC: 0.2 MG/DL (ref 0–1.2)
BUN SERPL-MCNC: 17 MG/DL (ref 6–20)
CALCIUM SERPL-MCNC: 9.3 MG/DL (ref 8.6–10.4)
CHLORIDE SERPL-SCNC: 106 MMOL/L (ref 98–107)
CHOLEST SERPL-MCNC: 146 MG/DL (ref 0–199)
CHOLESTEROL/HDL RATIO: 4
CO2 SERPL-SCNC: 27 MMOL/L (ref 20–31)
CREAT SERPL-MCNC: 1.2 MG/DL (ref 0.7–1.2)
EOSINOPHIL # BLD: 0.31 K/UL (ref 0–0.44)
EOSINOPHILS RELATIVE PERCENT: 6 % (ref 1–4)
ERYTHROCYTE [DISTWIDTH] IN BLOOD BY AUTOMATED COUNT: 14.5 % (ref 11.8–14.4)
EST. AVERAGE GLUCOSE BLD GHB EST-MCNC: 117 MG/DL
GFR, ESTIMATED: 73 ML/MIN/1.73M2
GLUCOSE SERPL-MCNC: 99 MG/DL (ref 74–99)
HBA1C MFR BLD: 5.7 % (ref 4–6)
HCT VFR BLD AUTO: 40 % (ref 40.7–50.3)
HDLC SERPL-MCNC: 37 MG/DL
HGB BLD-MCNC: 13.2 G/DL (ref 13–17)
IMM GRANULOCYTES # BLD AUTO: <0.03 K/UL (ref 0–0.3)
IMM GRANULOCYTES NFR BLD: 0 %
LDLC SERPL CALC-MCNC: 85 MG/DL (ref 0–100)
LYMPHOCYTES NFR BLD: 1.63 K/UL (ref 1.1–3.7)
LYMPHOCYTES RELATIVE PERCENT: 31 % (ref 24–43)
MCH RBC QN AUTO: 29.7 PG (ref 25.2–33.5)
MCHC RBC AUTO-ENTMCNC: 33 G/DL (ref 28.4–34.8)
MCV RBC AUTO: 90.1 FL (ref 82.6–102.9)
MONOCYTES NFR BLD: 0.39 K/UL (ref 0.1–1.2)
MONOCYTES NFR BLD: 8 % (ref 3–12)
NEUTROPHILS NFR BLD: 55 % (ref 36–65)
NEUTS SEG NFR BLD: 2.86 K/UL (ref 1.5–8.1)
NRBC BLD-RTO: 0 PER 100 WBC
PLATELET # BLD AUTO: 227 K/UL (ref 138–453)
PMV BLD AUTO: 9.6 FL (ref 8.1–13.5)
POTASSIUM SERPL-SCNC: 3.7 MMOL/L (ref 3.7–5.3)
PROT SERPL-MCNC: 7.3 G/DL (ref 6.6–8.7)
RBC # BLD AUTO: 4.44 M/UL (ref 4.21–5.77)
RBC # BLD: ABNORMAL 10*6/UL
SODIUM SERPL-SCNC: 141 MMOL/L (ref 136–145)
TRIGL SERPL-MCNC: 117 MG/DL
TSH SERPL DL<=0.05 MIU/L-ACNC: 1.83 UIU/ML (ref 0.27–4.2)
URATE SERPL-MCNC: 6.1 MG/DL (ref 3.4–7)
VLDLC SERPL CALC-MCNC: 23 MG/DL
WBC OTHER # BLD: 5.2 K/UL (ref 3.5–11.3)

## 2024-07-16 PROCEDURE — 84443 ASSAY THYROID STIM HORMONE: CPT

## 2024-07-16 PROCEDURE — 85025 COMPLETE CBC W/AUTO DIFF WBC: CPT

## 2024-07-16 PROCEDURE — 84550 ASSAY OF BLOOD/URIC ACID: CPT

## 2024-07-16 PROCEDURE — 80061 LIPID PANEL: CPT

## 2024-07-16 PROCEDURE — 83036 HEMOGLOBIN GLYCOSYLATED A1C: CPT

## 2024-07-16 PROCEDURE — 36415 COLL VENOUS BLD VENIPUNCTURE: CPT

## 2024-07-16 PROCEDURE — 80053 COMPREHEN METABOLIC PANEL: CPT

## 2024-07-18 NOTE — PROGRESS NOTES
Management per Uro.  Continue Flomax 0.4mg QHS.     reviewed and discussed with patient. [] Physical activity discussed. [] Medication changes recommended. [] Smoking cessation discussed. [x] Specific questions/concerns addressed. Specific group medical question(s) addressed in this encounter:   Discussion about cholesterol and triglycerides. Group discussion topic for this encounter:     [] Welcome Jumpstart   [] Be a Fat & Calorie    [] Healthy Eating   [] Move Those Muscles   [] Tip the Calorie Balance   [] Take charge of What's Around You   [] Problem Solving   [] Four Keys to Healthy Eating Out   [] Slippery [place]att of Lifestyle Change   [] Jumpstart your Activity Plan   [] Make Social Cues Work for Karin Aldrich   [] Ways to Stay Motivated   [] Preparing for Long Term Self-Management   [] More Volume, Fewer Calories   [] Balance Your Thoughts   [x] Strengthen your Exercise Program   [] Mindful Eating   [] Stress and Time Managment   [] Standing Up for Your Health   [] Heart Health   [] Stretching:  The Truth About Flexibility   [] Looking Back and Looking Forward    Total time:  90 minutes, greater than 50% of visit spent in group counseling/education. Assessment:      1. Essential hypertension     2. Fatty liver disease, nonalcoholic     3. MIGEL on CPAP     4. Prediabetes     5. Obesity (BMI 30-39. 9)         Plan:      Track food and weight. Return to clinic as per group medical appointment schedule. Follow-up:  Return in about 1 month (around 3/6/2018). Orders:  No orders of the defined types were placed in this encounter. Prescriptions:  No orders of the defined types were placed in this encounter.       Electronically signed by:  Sivan Srivastava CNP

## 2024-07-23 ENCOUNTER — OFFICE VISIT (OUTPATIENT)
Dept: BARIATRICS/WEIGHT MGMT | Age: 44
End: 2024-07-23
Payer: MEDICAID

## 2024-07-23 VITALS
HEART RATE: 62 BPM | HEIGHT: 72 IN | SYSTOLIC BLOOD PRESSURE: 140 MMHG | BODY MASS INDEX: 42.66 KG/M2 | DIASTOLIC BLOOD PRESSURE: 110 MMHG | WEIGHT: 315 LBS

## 2024-07-23 DIAGNOSIS — J45.20 MILD INTERMITTENT ASTHMA WITHOUT COMPLICATION: ICD-10-CM

## 2024-07-23 DIAGNOSIS — I10 ESSENTIAL HYPERTENSION: Primary | ICD-10-CM

## 2024-07-23 DIAGNOSIS — E66.01 OBESITY, CLASS III, BMI 40-49.9 (MORBID OBESITY) (HCC): ICD-10-CM

## 2024-07-23 DIAGNOSIS — K21.9 GASTROESOPHAGEAL REFLUX DISEASE WITHOUT ESOPHAGITIS: ICD-10-CM

## 2024-07-23 DIAGNOSIS — G47.33 OSA ON CPAP: ICD-10-CM

## 2024-07-23 PROCEDURE — 1036F TOBACCO NON-USER: CPT | Performed by: NURSE PRACTITIONER

## 2024-07-23 PROCEDURE — G8417 CALC BMI ABV UP PARAM F/U: HCPCS | Performed by: NURSE PRACTITIONER

## 2024-07-23 PROCEDURE — 3077F SYST BP >= 140 MM HG: CPT | Performed by: NURSE PRACTITIONER

## 2024-07-23 PROCEDURE — 99213 OFFICE O/P EST LOW 20 MIN: CPT | Performed by: NURSE PRACTITIONER

## 2024-07-23 PROCEDURE — G8427 DOCREV CUR MEDS BY ELIG CLIN: HCPCS | Performed by: NURSE PRACTITIONER

## 2024-07-23 PROCEDURE — 3080F DIAST BP >= 90 MM HG: CPT | Performed by: NURSE PRACTITIONER

## 2024-07-23 NOTE — PROGRESS NOTES
Group Lifestyle Balance Follow-up Progress Note      Subjective     Patient is here for group medical appointment for Group Lifestyle Balance weight management program follow-up for the chronic conditions of HTN, MIGEL, GERD, Asthma.  Patient continues on the program and tolerating well.   Total weight gain of 4 lbs to date.  No current issues.    Allergies:  No Known Allergies    Past Medical History:     Past Medical History:   Diagnosis Date    Anxiety     Arthritis     CAD (coronary artery disease)     Fatty liver     Fatty liver disease, nonalcoholic 7/10/2012    Hypertension     Obesity     Unspecified sleep apnea     cpap   .    Past Surgical History:  History reviewed. No pertinent surgical history.    Family History:  Family History   Problem Relation Age of Onset    Arthritis Mother     Diabetes Father     Arthritis Father     Stroke Maternal Grandmother     Heart Disease Maternal Grandmother     Stroke Maternal Grandfather     Heart Disease Maternal Grandfather     Early Death Maternal Grandfather     Stroke Paternal Grandmother     Heart Disease Paternal Grandmother     Stroke Paternal Grandfather     Heart Disease Paternal Grandfather     Early Death Paternal Grandfather        Social History:  Social History     Socioeconomic History    Marital status: Single     Spouse name: Stephen    Number of children: 0    Years of education: 16    Highest education level: Bachelor's degree (e.g., BA, AB, BS)   Occupational History    Not on file   Tobacco Use    Smoking status: Never    Smokeless tobacco: Never   Vaping Use    Vaping Use: Never used   Substance and Sexual Activity    Alcohol use: Never    Drug use: Never    Sexual activity: Yes     Partners: Female   Other Topics Concern    Not on file   Social History Narrative    ** Merged History Encounter **          Social Determinants of Health     Financial Resource Strain: Medium Risk (2/3/2023)    Overall Financial Resource Strain (CARDIA)     Difficulty  all other ROS negative except as per HPI

## 2024-07-24 ENCOUNTER — HOSPITAL ENCOUNTER (EMERGENCY)
Age: 44
Discharge: HOME | End: 2024-07-24
Attending: EMERGENCY MEDICINE
Payer: MEDICAID

## 2024-07-24 ENCOUNTER — APPOINTMENT (OUTPATIENT)
Dept: CT IMAGING | Age: 44
End: 2024-07-24
Payer: MEDICAID

## 2024-07-24 VITALS
TEMPERATURE: 97 F | RESPIRATION RATE: 20 BRPM | SYSTOLIC BLOOD PRESSURE: 154 MMHG | OXYGEN SATURATION: 99 % | HEART RATE: 78 BPM | DIASTOLIC BLOOD PRESSURE: 99 MMHG

## 2024-07-24 DIAGNOSIS — M72.2 PLANTAR FASCIITIS OF RIGHT FOOT: ICD-10-CM

## 2024-07-24 DIAGNOSIS — M54.32 SCIATICA OF LEFT SIDE: Primary | ICD-10-CM

## 2024-07-24 PROCEDURE — 6370000000 HC RX 637 (ALT 250 FOR IP): Performed by: EMERGENCY MEDICINE

## 2024-07-24 PROCEDURE — 6360000002 HC RX W HCPCS: Performed by: EMERGENCY MEDICINE

## 2024-07-24 PROCEDURE — 96372 THER/PROPH/DIAG INJ SC/IM: CPT

## 2024-07-24 PROCEDURE — 99284 EMERGENCY DEPT VISIT MOD MDM: CPT

## 2024-07-24 PROCEDURE — 72131 CT LUMBAR SPINE W/O DYE: CPT

## 2024-07-24 PROCEDURE — 72128 CT CHEST SPINE W/O DYE: CPT

## 2024-07-24 RX ORDER — LIDOCAINE 50 MG/G
1 PATCH TOPICAL DAILY
Qty: 10 PATCH | Refills: 0 | Status: SHIPPED | OUTPATIENT
Start: 2024-07-24 | End: 2024-08-03

## 2024-07-24 RX ORDER — PREDNISONE 10 MG/1
TABLET ORAL
Qty: 20 TABLET | Refills: 0 | Status: SHIPPED | OUTPATIENT
Start: 2024-07-24 | End: 2024-08-03

## 2024-07-24 RX ORDER — NAPROXEN 500 MG/1
500 TABLET ORAL 2 TIMES DAILY PRN
Qty: 60 TABLET | Refills: 0 | Status: SHIPPED | OUTPATIENT
Start: 2024-07-24

## 2024-07-24 RX ORDER — ORPHENADRINE CITRATE 30 MG/ML
60 INJECTION INTRAMUSCULAR; INTRAVENOUS ONCE
Status: DISCONTINUED | OUTPATIENT
Start: 2024-07-24 | End: 2024-07-24

## 2024-07-24 RX ORDER — PREDNISONE 20 MG/1
60 TABLET ORAL ONCE
Status: COMPLETED | OUTPATIENT
Start: 2024-07-24 | End: 2024-07-24

## 2024-07-24 RX ORDER — METHOCARBAMOL 500 MG/1
500 TABLET, FILM COATED ORAL 3 TIMES DAILY PRN
Qty: 15 TABLET | Refills: 0 | Status: SHIPPED | OUTPATIENT
Start: 2024-07-24 | End: 2024-07-29

## 2024-07-24 RX ORDER — ACETAMINOPHEN 500 MG
1000 TABLET ORAL ONCE
Status: COMPLETED | OUTPATIENT
Start: 2024-07-24 | End: 2024-07-24

## 2024-07-24 RX ORDER — CYCLOBENZAPRINE HCL 10 MG
10 TABLET ORAL ONCE
Status: COMPLETED | OUTPATIENT
Start: 2024-07-24 | End: 2024-07-24

## 2024-07-24 RX ORDER — ACETAMINOPHEN 500 MG
1000 TABLET ORAL EVERY 8 HOURS PRN
Qty: 30 TABLET | Refills: 0 | Status: SHIPPED | OUTPATIENT
Start: 2024-07-24

## 2024-07-24 RX ORDER — KETOROLAC TROMETHAMINE 30 MG/ML
30 INJECTION, SOLUTION INTRAMUSCULAR; INTRAVENOUS ONCE
Status: COMPLETED | OUTPATIENT
Start: 2024-07-24 | End: 2024-07-24

## 2024-07-24 RX ADMIN — ACETAMINOPHEN 1000 MG: 500 TABLET ORAL at 08:45

## 2024-07-24 RX ADMIN — PREDNISONE 60 MG: 20 TABLET ORAL at 08:44

## 2024-07-24 RX ADMIN — KETOROLAC TROMETHAMINE 30 MG: 30 INJECTION INTRAMUSCULAR; INTRAVENOUS at 08:45

## 2024-07-24 RX ADMIN — CYCLOBENZAPRINE 10 MG: 10 TABLET, FILM COATED ORAL at 08:44

## 2024-07-24 ASSESSMENT — ENCOUNTER SYMPTOMS
ABDOMINAL PAIN: 0
BACK PAIN: 1

## 2024-07-24 NOTE — ED PROVIDER NOTES
[facility] ED  [address] [address]  Phone: [phone]       Pt Name: Ranjeet Castro  MRN: [id number]  Birthdate 1980  Date of evaluation: 7/24/24  PCP:  Sammi Vega MD    CHIEF COMPLAINT       Chief Complaint   Patient presents with    Foot Pain    Leg Pain       HISTORY OF PRESENT ILLNESS  (Location/Symptom, Timing/Onset, Context/Setting, Quality, Duration, Modifying Factors, Severity.)    Ranjeet Castro is a 44 y.o. male who presents with low back pain that radiates down his left leg, bilateral foot pain but right worse than left.  He works as a  at [employer] and does stand on his feet for prolonged period of time.  He denies any blunt trauma or falls.  Denies any bowel or bladder incontinence, urine retention, saddle anesthesias.  Denies any history of cancer or IV drug abuse.  He does know that he has flatfeet, has never had this evaluated before.  Wears basketball shoes at work.    Although to me denies any falls or blunt trauma initially apparently when I left the room I had checked his memory and he did admit to the nurse that he fell off a friend's roof approximately 2 to 3 weeks ago while helping him paint.  He does have a history of high blood pressure, states that he did not take his medications prior to come to the emergency department today when inquired by the nurse.    PAST MEDICAL / SURGICAL / SOCIAL / FAMILY HISTORY    has a past medical history of Anxiety, Arthritis, CAD (coronary artery disease), Fatty liver, Fatty liver disease, nonalcoholic, Hypertension, Obesity, and Unspecified sleep apnea.     has no past surgical history on file.    Social History     Socioeconomic History    Marital status: Single     Spouse name: Stephen    Number of children: 0    Years of education: 16    Highest education level: Bachelor's degree (e.g., BA, AB, BS)   Occupational History    Not on file   Tobacco Use    Smoking status: Never    Smokeless tobacco:

## 2024-07-24 NOTE — ED NOTES
Patient presents to the ED with c/o left leg pain and right foot pain. Patient states the pain has been ongoing for five days, no known recent injury. Patient states he did fall off a roof a month ago and states that the pain started after that. Patient denies any other complaints. Patient is alert and oriented x4, answering questions appropriately. Patient is changed into a gown.

## 2024-07-25 RX ORDER — PSEUDOEPHED/ACETAMINOPH/DIPHEN 30MG-500MG
TABLET ORAL
Qty: 30 TABLET | Refills: 0 | OUTPATIENT
Start: 2024-07-25

## 2024-07-30 ENCOUNTER — OFFICE VISIT (OUTPATIENT)
Dept: BARIATRICS/WEIGHT MGMT | Age: 44
End: 2024-07-30
Payer: MEDICAID

## 2024-07-30 VITALS
SYSTOLIC BLOOD PRESSURE: 138 MMHG | BODY MASS INDEX: 42.66 KG/M2 | HEART RATE: 64 BPM | DIASTOLIC BLOOD PRESSURE: 84 MMHG | HEIGHT: 72 IN | WEIGHT: 315 LBS

## 2024-07-30 DIAGNOSIS — E66.01 OBESITY, CLASS III, BMI 40-49.9 (MORBID OBESITY) (HCC): ICD-10-CM

## 2024-07-30 DIAGNOSIS — I10 ESSENTIAL HYPERTENSION: Primary | ICD-10-CM

## 2024-07-30 DIAGNOSIS — M25.561 CHRONIC PAIN OF RIGHT KNEE: ICD-10-CM

## 2024-07-30 DIAGNOSIS — K21.9 GASTROESOPHAGEAL REFLUX DISEASE WITHOUT ESOPHAGITIS: ICD-10-CM

## 2024-07-30 DIAGNOSIS — M17.0 PRIMARY OSTEOARTHRITIS OF BOTH KNEES: ICD-10-CM

## 2024-07-30 DIAGNOSIS — G89.29 CHRONIC PAIN OF RIGHT KNEE: ICD-10-CM

## 2024-07-30 DIAGNOSIS — G47.33 OSA ON CPAP: ICD-10-CM

## 2024-07-30 DIAGNOSIS — J45.20 MILD INTERMITTENT ASTHMA WITHOUT COMPLICATION: ICD-10-CM

## 2024-07-30 PROCEDURE — G8417 CALC BMI ABV UP PARAM F/U: HCPCS | Performed by: NURSE PRACTITIONER

## 2024-07-30 PROCEDURE — 1036F TOBACCO NON-USER: CPT | Performed by: NURSE PRACTITIONER

## 2024-07-30 PROCEDURE — 99213 OFFICE O/P EST LOW 20 MIN: CPT | Performed by: NURSE PRACTITIONER

## 2024-07-30 PROCEDURE — 3075F SYST BP GE 130 - 139MM HG: CPT | Performed by: NURSE PRACTITIONER

## 2024-07-30 PROCEDURE — 3079F DIAST BP 80-89 MM HG: CPT | Performed by: NURSE PRACTITIONER

## 2024-07-30 PROCEDURE — G8427 DOCREV CUR MEDS BY ELIG CLIN: HCPCS | Performed by: NURSE PRACTITIONER

## 2024-07-30 NOTE — PROGRESS NOTES
Around You   [] Problem Solving   [] Step Up Your Physical Activity Plan   [] Manage Slips and Self-Defeating Thoughts   [] Four Jaconita To Healthy Eating Out   [] Make Social Cues Work for You   [] Ways to Stay Motivated   [] Preparing for Long Term Self-Management   [] Take Charge of Your Lifestyle   [] Mindful Eating, Mindful Movement   [] Manage Your Stress   [] Sit Less for Health   [] More Volume, Fewer Calories   [] Stay Active   [] Balance Your Thoughts   [] Heart Health    [] Looking Back and Looking Forward    Total time:  90 minutes, greater than 50% of visit spent in group counseling/education.  20 minutes were spent in reviewing chart, vital signs, labs, and answering the patient's individual questions.    Assessment:       Diagnosis Orders   1. Essential hypertension        2. MIGEL on CPAP        3. Gastroesophageal reflux disease without esophagitis        4. Obesity, Class III, BMI 40-49.9 (morbid obesity) (HCC)        5. Mild intermittent asthma without complication        6. Chronic pain of right knee        7. Primary osteoarthritis of both knees            Plan:      Track food and weight.    Return to clinic as per group medical appointment schedule.       Follow-up:  Return in about 1 week (around 8/6/2024).    Orders:  No orders of the defined types were placed in this encounter.      Prescriptions:  No orders of the defined types were placed in this encounter.      Electronically signed by:  Polly Francois CNP

## 2024-08-01 ENCOUNTER — OFFICE VISIT (OUTPATIENT)
Dept: PODIATRY | Age: 44
End: 2024-08-01
Payer: MEDICAID

## 2024-08-01 VITALS — WEIGHT: 315 LBS | BODY MASS INDEX: 42.66 KG/M2 | HEIGHT: 72 IN

## 2024-08-01 DIAGNOSIS — M79.672 FOOT PAIN, BILATERAL: ICD-10-CM

## 2024-08-01 DIAGNOSIS — M79.671 RIGHT FOOT PAIN: ICD-10-CM

## 2024-08-01 DIAGNOSIS — M54.31 RIGHT SIDED SCIATICA: ICD-10-CM

## 2024-08-01 DIAGNOSIS — M21.41 PES PLANUS OF BOTH FEET: ICD-10-CM

## 2024-08-01 DIAGNOSIS — B35.1 DERMATOPHYTOSIS OF NAIL: Primary | ICD-10-CM

## 2024-08-01 DIAGNOSIS — M21.42 PES PLANUS OF BOTH FEET: ICD-10-CM

## 2024-08-01 DIAGNOSIS — M79.671 FOOT PAIN, BILATERAL: ICD-10-CM

## 2024-08-01 PROCEDURE — 99213 OFFICE O/P EST LOW 20 MIN: CPT | Performed by: PODIATRIST

## 2024-08-01 PROCEDURE — 11721 DEBRIDE NAIL 6 OR MORE: CPT | Performed by: PODIATRIST

## 2024-08-01 PROCEDURE — 1036F TOBACCO NON-USER: CPT | Performed by: PODIATRIST

## 2024-08-01 PROCEDURE — G8417 CALC BMI ABV UP PARAM F/U: HCPCS | Performed by: PODIATRIST

## 2024-08-01 PROCEDURE — G8427 DOCREV CUR MEDS BY ELIG CLIN: HCPCS | Performed by: PODIATRIST

## 2024-08-01 NOTE — PROGRESS NOTES
[facility] PODIATRY [address]  [address]  [place] [address]  Dept: [phone]  Dept Fax: [phone]     PAIN PROGRESS NOTE  Date of patient's visit: 8/1/2024  Patient's Name:  Ranjeet Castro YOB: 1980            Patient Care Team:  Sammi Vega MD as PCP - General (Internal Medicine)  Sammi Vega MD as PCP - Empaneled Provider  Dale Macias MD as Surgeon (Orthopedic Surgery)  Nikolas Wang DPM as Physician (Podiatry)  Kathia Wang DPM as Physician (Podiatry)      Chief Complaint   Patient presents with    Nail Problem     Toenail trim    Foot Pain     Right foot pain-[facility] ER visit on 7/24/24       Subjective:   This Ranjeet Castro comes to clinic for foot and nail care.  Pt currently has complaint of thickened, painful, elongated nails that he/she cannot manage by themselves.  Pt. Relates pain to nails with shoe gear.  Pt's primary care physician is Sammi Vega MD last seen 04/12/2024.  Pt has a new complaint of right foot pain that radiates down his leg .  Patient went to the ED on 7/24/2020 for right foot pain that radiated up and down his leg.  Patient was diagnosed with sciatica  Past Medical History:   Diagnosis Date    Anxiety     Arthritis     CAD (coronary artery disease)     Fatty liver     Fatty liver disease, nonalcoholic 7/10/2012    Hypertension     Obesity     Unspecified sleep apnea     cpap       No Known Allergies  Current Outpatient Medications on File Prior to Visit   Medication Sig Dispense Refill    naproxen (NAPROSYN) 500 MG tablet Take 1 tablet by mouth 2 times daily as needed for Pain 60 tablet 0    predniSONE (DELTASONE) 10 MG tablet Take 4 tablets by mouth once daily for 5 days 20 tablet 0    acetaminophen (TYLENOL) 500 MG tablet Take 2 tablets by mouth every 8 hours as needed for Pain or Fever 30 tablet 0    lidocaine (LIDODERM) 5 % Place 1 patch onto the

## 2024-08-08 ENCOUNTER — OFFICE VISIT (OUTPATIENT)
Dept: BARIATRICS/WEIGHT MGMT | Age: 44
End: 2024-08-08
Payer: MEDICAID

## 2024-08-08 VITALS
BODY MASS INDEX: 42.66 KG/M2 | SYSTOLIC BLOOD PRESSURE: 138 MMHG | WEIGHT: 315 LBS | HEIGHT: 72 IN | DIASTOLIC BLOOD PRESSURE: 86 MMHG | HEART RATE: 68 BPM

## 2024-08-08 DIAGNOSIS — E66.01 OBESITY, CLASS III, BMI 40-49.9 (MORBID OBESITY) (HCC): ICD-10-CM

## 2024-08-08 DIAGNOSIS — G47.33 OSA ON CPAP: ICD-10-CM

## 2024-08-08 DIAGNOSIS — I10 ESSENTIAL HYPERTENSION: Primary | ICD-10-CM

## 2024-08-08 DIAGNOSIS — K21.9 GASTROESOPHAGEAL REFLUX DISEASE WITHOUT ESOPHAGITIS: ICD-10-CM

## 2024-08-08 DIAGNOSIS — J45.20 MILD INTERMITTENT ASTHMA WITHOUT COMPLICATION: ICD-10-CM

## 2024-08-08 PROCEDURE — 3075F SYST BP GE 130 - 139MM HG: CPT | Performed by: NURSE PRACTITIONER

## 2024-08-08 PROCEDURE — G8417 CALC BMI ABV UP PARAM F/U: HCPCS | Performed by: NURSE PRACTITIONER

## 2024-08-08 PROCEDURE — 3079F DIAST BP 80-89 MM HG: CPT | Performed by: NURSE PRACTITIONER

## 2024-08-08 PROCEDURE — G8427 DOCREV CUR MEDS BY ELIG CLIN: HCPCS | Performed by: NURSE PRACTITIONER

## 2024-08-08 PROCEDURE — 99213 OFFICE O/P EST LOW 20 MIN: CPT | Performed by: NURSE PRACTITIONER

## 2024-08-08 PROCEDURE — 1036F TOBACCO NON-USER: CPT | Performed by: NURSE PRACTITIONER

## 2024-08-09 NOTE — PROGRESS NOTES
to Stay Motivated   [] Preparing for Long Term Self-Management   [] Take Charge of Your Lifestyle   [] Mindful Eating, Mindful Movement   [] Manage Your Stress   [] Sit Less for Health   [] More Volume, Fewer Calories   [] Stay Active   [] Balance Your Thoughts   [] Heart Health    [] Looking Back and Looking Forward    Total time:  90 minutes, greater than 50% of visit spent in group counseling/education.  20 minutes were spent in reviewing chart, vital signs, labs, and answering the patient's individual questions.    Assessment:       Diagnosis Orders   1. Essential hypertension        2. MIGEL on CPAP        3. Obesity, Class III, BMI 40-49.9 (morbid obesity) ([facility])        4. Gastroesophageal reflux disease without esophagitis        5. Mild intermittent asthma without complication            Plan:      Track food and weight.    Return to clinic as per group medical appointment schedule.       Follow-up:  Return in about 1 week (around 8/15/2024).    Orders:  No orders of the defined types were placed in this encounter.      Prescriptions:  No orders of the defined types were placed in this encounter.      Electronically signed by:  Polly Francois CNP

## 2024-08-16 ENCOUNTER — OFFICE VISIT (OUTPATIENT)
Dept: BARIATRICS/WEIGHT MGMT | Age: 44
End: 2024-08-16
Payer: MEDICAID

## 2024-08-16 VITALS
HEART RATE: 72 BPM | BODY MASS INDEX: 42.66 KG/M2 | WEIGHT: 315 LBS | DIASTOLIC BLOOD PRESSURE: 88 MMHG | HEIGHT: 72 IN | SYSTOLIC BLOOD PRESSURE: 138 MMHG

## 2024-08-16 DIAGNOSIS — E66.01 OBESITY, CLASS III, BMI 40-49.9 (MORBID OBESITY) (HCC): ICD-10-CM

## 2024-08-16 DIAGNOSIS — I10 ESSENTIAL HYPERTENSION: Primary | ICD-10-CM

## 2024-08-16 DIAGNOSIS — G47.33 OSA ON CPAP: ICD-10-CM

## 2024-08-16 DIAGNOSIS — J45.20 MILD INTERMITTENT ASTHMA WITHOUT COMPLICATION: ICD-10-CM

## 2024-08-16 DIAGNOSIS — K21.9 GASTROESOPHAGEAL REFLUX DISEASE WITHOUT ESOPHAGITIS: ICD-10-CM

## 2024-08-16 PROCEDURE — 3079F DIAST BP 80-89 MM HG: CPT | Performed by: NURSE PRACTITIONER

## 2024-08-16 PROCEDURE — 3075F SYST BP GE 130 - 139MM HG: CPT | Performed by: NURSE PRACTITIONER

## 2024-08-16 PROCEDURE — 1036F TOBACCO NON-USER: CPT | Performed by: NURSE PRACTITIONER

## 2024-08-16 PROCEDURE — G8427 DOCREV CUR MEDS BY ELIG CLIN: HCPCS | Performed by: NURSE PRACTITIONER

## 2024-08-16 PROCEDURE — G8417 CALC BMI ABV UP PARAM F/U: HCPCS | Performed by: NURSE PRACTITIONER

## 2024-08-16 PROCEDURE — 99213 OFFICE O/P EST LOW 20 MIN: CPT | Performed by: NURSE PRACTITIONER

## 2024-08-16 NOTE — PROGRESS NOTES
Group Lifestyle Balance Follow-up Progress Note      Subjective     Patient is here for group medical appointment for Group Lifestyle Balance weight management program follow-up for the chronic conditions of HTN, MIGEL, GERD, Asthma.  Patient continues on the program and tolerating well.   Total weight loss of 12 lbs to date.  No current issues.    Allergies:  No Known Allergies    Past Medical History:     Past Medical History:   Diagnosis Date    Anxiety     Arthritis     CAD (coronary artery disease)     Fatty liver     Fatty liver disease, nonalcoholic 7/10/2012    Hypertension     Obesity     Unspecified sleep apnea     cpap   .    Past Surgical History:  History reviewed. No pertinent surgical history.    Family History:  Family History   Problem Relation Age of Onset    Arthritis Mother     Diabetes Father     Arthritis Father     Stroke Maternal Grandmother     Heart Disease Maternal Grandmother     Stroke Maternal Grandfather     Heart Disease Maternal Grandfather     Early Death Maternal Grandfather     Stroke Paternal Grandmother     Heart Disease Paternal Grandmother     Stroke Paternal Grandfather     Heart Disease Paternal Grandfather     Early Death Paternal Grandfather        Social History:  Social History     Socioeconomic History    Marital status: Single     Spouse name: Stephen    Number of children: 0    Years of education: 16    Highest education level: Bachelor's degree (e.g., BA, AB, BS)   Occupational History    Not on file   Tobacco Use    Smoking status: Never    Smokeless tobacco: Never   Vaping Use    Vaping status: Never Used   Substance and Sexual Activity    Alcohol use: Never    Drug use: Never    Sexual activity: Yes     Partners: Female   Other Topics Concern    Not on file   Social History Narrative    ** Merged History Encounter **          Social Determinants of Health     Financial Resource Strain: Medium Risk (2/3/2023)    Overall Financial Resource Strain (CARDIA)

## 2024-08-19 ENCOUNTER — HOSPITAL ENCOUNTER (OUTPATIENT)
Dept: PREADMISSION TESTING | Age: 44
Discharge: HOME | End: 2024-08-23

## 2024-08-19 VITALS — BODY MASS INDEX: 42.66 KG/M2 | HEIGHT: 72 IN | WEIGHT: 315 LBS

## 2024-08-19 NOTE — PROGRESS NOTES
Pre-op Instructions For Out-Patient Endoscopy Surgery    Medication Instructions:  Please stop herbs and any supplements now (includes vitamins and minerals).    Please contact your surgeon and prescribing physician for pre-op instructions for any blood thinners. STOP VOLTAREN AND NAPROXEN AS DIRECTED    If you have inhalers/aerosol treatments at home, please use them the morning of your surgery and bring the inhalers with you to the hospital.    Please take the following medications the morning of your surgery with a sip of water:    Amlodipine, Carvedilol, and Oxycarbazepine    Surgery Instructions:  After midnight before surgery:  Do not eat or drink anything, including water, mints, gum, and hard candy.  You may brush your teeth without swallowing.  No smoking, chewing tobacco, or street drugs.    Please shower or bathe before surgery.       Please do not wear any cologne, lotion, powder, jewelry, piercings, perfume, makeup, nail polish, hair accessories, or hair spray on the day of surgery.  Wear loose comfortable clothing.    Leave your valuables at home but bring a payment source for any after-surgery prescriptions you plan to fill at [facility].  Bring a storage case for any glasses/contacts.    An adult who is responsible for you MUST drive you home and should be with you for the first 24 hours after surgery.     The Day of Surgery:  Arrive at [facility] Surgery Entrance at the time directed by your surgeon and check in at the desk.     If you have a living will or healthcare power of , please bring a copy.    You will be taken to the pre-op holding area where you will be prepared for surgery.  A physical assessment will be performed by a nurse practitioner or house officer.  Your IV will be started and you will meet your anesthesiologist.    When you go to surgery, your family will be directed to the surgical waiting room, where the doctor should speak with them after your

## 2024-08-22 ENCOUNTER — OFFICE VISIT (OUTPATIENT)
Dept: BARIATRICS/WEIGHT MGMT | Age: 44
End: 2024-08-22
Payer: MEDICAID

## 2024-08-22 VITALS
WEIGHT: 315 LBS | BODY MASS INDEX: 42.66 KG/M2 | HEIGHT: 72 IN | HEART RATE: 92 BPM | DIASTOLIC BLOOD PRESSURE: 82 MMHG | SYSTOLIC BLOOD PRESSURE: 136 MMHG

## 2024-08-22 DIAGNOSIS — G47.33 OSA ON CPAP: ICD-10-CM

## 2024-08-22 DIAGNOSIS — K21.9 GASTROESOPHAGEAL REFLUX DISEASE WITHOUT ESOPHAGITIS: ICD-10-CM

## 2024-08-22 DIAGNOSIS — J45.20 MILD INTERMITTENT ASTHMA WITHOUT COMPLICATION: ICD-10-CM

## 2024-08-22 DIAGNOSIS — E66.01 OBESITY, CLASS III, BMI 40-49.9 (MORBID OBESITY) (HCC): ICD-10-CM

## 2024-08-22 DIAGNOSIS — I10 ESSENTIAL HYPERTENSION: Primary | ICD-10-CM

## 2024-08-22 PROCEDURE — 99213 OFFICE O/P EST LOW 20 MIN: CPT | Performed by: NURSE PRACTITIONER

## 2024-08-22 PROCEDURE — 3075F SYST BP GE 130 - 139MM HG: CPT | Performed by: NURSE PRACTITIONER

## 2024-08-22 PROCEDURE — 3079F DIAST BP 80-89 MM HG: CPT | Performed by: NURSE PRACTITIONER

## 2024-08-22 PROCEDURE — G8427 DOCREV CUR MEDS BY ELIG CLIN: HCPCS | Performed by: NURSE PRACTITIONER

## 2024-08-22 PROCEDURE — 1036F TOBACCO NON-USER: CPT | Performed by: NURSE PRACTITIONER

## 2024-08-22 PROCEDURE — G8417 CALC BMI ABV UP PARAM F/U: HCPCS | Performed by: NURSE PRACTITIONER

## 2024-08-23 NOTE — PROGRESS NOTES
mouth daily 30 tablet 5    QUVIVIQ 25 MG TABS Take 1 tablet by mouth nightly at bedtime. Max Daily Amount: 1 tablet      lurasidone (LATUDA) 20 MG TABS tablet Latuda 20 mg tablet      traZODone (DESYREL) 50 MG tablet Take 1 tablet by mouth at bedtime      ARIPiprazole (ABILIFY) 10 MG tablet       vitamin D (ERGOCALCIFEROL) 1.25 MG (62543 UT) CAPS capsule Take 1 capsule by mouth once a week       No current facility-administered medications for this visit.       Vital Signs:  /82 (Site: Left Upper Arm, Position: Sitting, Cuff Size: Large Adult)   Pulse 92   Ht 1.829 m (6')   Wt (!) 153.3 kg (338 lb)   BMI 45.84 kg/m²     BMI/Height/Weight:  Body mass index is 45.84 kg/m².      Review of Systems  Constitutional: Weight loss      Objective:      Physical Exam   Vital signs reviewed.  General Appearance: Well-developed and well-nourished. No acute distress.   Skin: Warm, dry.   Head: Normocephalic.   Pulmonary/Chest: Normal respiratory effort.   Musculoskeletal: Movement x4.   Neurological:  Alert and oriented.         Individual goal for this encounter:  I want to be healthy to gt back on track with my weight loss.     [x] Vital signs reviewed and discussed with patient.   [] Labs/EKG reviewed and discussed with patient.   [] Blood sugar log reviewed and discussed with patient.   [] Physical activity discussed.   [] Medication changes recommended.   [] Smoking cessation discussed.   [x] Specific questions/concerns addressed.      Specific group medical question(s) addressed in this encounter:  MIGEL    Group discussion topic for this encounter:     [] Welcome Jumpstart   [] Be a Fat & Calorie    [] Healthy Eating   [] Move Those Muscles   [] Tip the Calorie Balance   [] Take charge of What's Around You   [] Problem Solving   [] Step Up Your Physical Activity Plan   [x] Manage Slips and Self-Defeating Thoughts   [] Four [place] To Healthy Eating Out   [] Make Social Cues Work for You   [] Ways to Stay

## 2024-08-27 NOTE — TELEPHONE ENCOUNTER
No care due was identified.  [facility] Embedded Care Due Messages. Reference number: [id number].   8/27/2024 9:25:25 AM CDT   Script clarification for melatonin, note to prescribe will cover tablets not capsules.     Scanned clarification form to pt chart

## 2024-08-29 ENCOUNTER — OFFICE VISIT (OUTPATIENT)
Dept: BARIATRICS/WEIGHT MGMT | Age: 44
End: 2024-08-29
Payer: MEDICAID

## 2024-08-29 ENCOUNTER — HOSPITAL ENCOUNTER (EMERGENCY)
Age: 44
Discharge: HOME | End: 2024-08-29
Attending: EMERGENCY MEDICINE
Payer: MEDICAID

## 2024-08-29 VITALS
OXYGEN SATURATION: 99 % | HEART RATE: 98 BPM | HEIGHT: 72 IN | RESPIRATION RATE: 16 BRPM | WEIGHT: 315 LBS | DIASTOLIC BLOOD PRESSURE: 101 MMHG | TEMPERATURE: 97.9 F | BODY MASS INDEX: 42.66 KG/M2 | SYSTOLIC BLOOD PRESSURE: 169 MMHG

## 2024-08-29 VITALS
DIASTOLIC BLOOD PRESSURE: 74 MMHG | WEIGHT: 315 LBS | HEART RATE: 83 BPM | SYSTOLIC BLOOD PRESSURE: 122 MMHG | BODY MASS INDEX: 42.66 KG/M2 | HEIGHT: 72 IN

## 2024-08-29 DIAGNOSIS — G47.33 OSA ON CPAP: ICD-10-CM

## 2024-08-29 DIAGNOSIS — K21.9 GASTROESOPHAGEAL REFLUX DISEASE WITHOUT ESOPHAGITIS: ICD-10-CM

## 2024-08-29 DIAGNOSIS — E66.01 OBESITY, CLASS III, BMI 40-49.9 (MORBID OBESITY) (HCC): ICD-10-CM

## 2024-08-29 DIAGNOSIS — J45.20 MILD INTERMITTENT ASTHMA WITHOUT COMPLICATION: ICD-10-CM

## 2024-08-29 DIAGNOSIS — I10 ESSENTIAL HYPERTENSION: Primary | ICD-10-CM

## 2024-08-29 DIAGNOSIS — M25.561 CHRONIC PAIN OF RIGHT KNEE: ICD-10-CM

## 2024-08-29 DIAGNOSIS — M79.605 LEFT LEG PAIN: Primary | ICD-10-CM

## 2024-08-29 DIAGNOSIS — G89.29 CHRONIC PAIN OF RIGHT KNEE: ICD-10-CM

## 2024-08-29 LAB — D DIMER PPP FEU-MCNC: <0.27 UG/ML FEU (ref 0–0.57)

## 2024-08-29 PROCEDURE — 93005 ELECTROCARDIOGRAM TRACING: CPT

## 2024-08-29 PROCEDURE — 1036F TOBACCO NON-USER: CPT | Performed by: NURSE PRACTITIONER

## 2024-08-29 PROCEDURE — 85379 FIBRIN DEGRADATION QUANT: CPT

## 2024-08-29 PROCEDURE — G8427 DOCREV CUR MEDS BY ELIG CLIN: HCPCS | Performed by: NURSE PRACTITIONER

## 2024-08-29 PROCEDURE — G8417 CALC BMI ABV UP PARAM F/U: HCPCS | Performed by: NURSE PRACTITIONER

## 2024-08-29 PROCEDURE — 99213 OFFICE O/P EST LOW 20 MIN: CPT | Performed by: NURSE PRACTITIONER

## 2024-08-29 PROCEDURE — 99284 EMERGENCY DEPT VISIT MOD MDM: CPT

## 2024-08-29 PROCEDURE — 3078F DIAST BP <80 MM HG: CPT | Performed by: NURSE PRACTITIONER

## 2024-08-29 PROCEDURE — 3074F SYST BP LT 130 MM HG: CPT | Performed by: NURSE PRACTITIONER

## 2024-08-29 RX ORDER — CYCLOBENZAPRINE HCL 10 MG
10 TABLET ORAL NIGHTLY PRN
Qty: 10 TABLET | Refills: 0 | Status: SHIPPED | OUTPATIENT
Start: 2024-08-29 | End: 2024-09-08

## 2024-08-29 RX ORDER — MICONAZOLE NITRATE 20 MG/G
POWDER TOPICAL
Qty: 45 G | Refills: 0 | Status: SHIPPED | OUTPATIENT
Start: 2024-08-29

## 2024-08-29 ASSESSMENT — ENCOUNTER SYMPTOMS
EYES NEGATIVE: 1
RESPIRATORY NEGATIVE: 1
GASTROINTESTINAL NEGATIVE: 1

## 2024-08-29 ASSESSMENT — PAIN - FUNCTIONAL ASSESSMENT: PAIN_FUNCTIONAL_ASSESSMENT: 0-10

## 2024-08-29 ASSESSMENT — PAIN SCALES - GENERAL: PAINLEVEL_OUTOF10: 9

## 2024-08-29 NOTE — PROGRESS NOTES
Group Lifestyle Balance Follow-up Progress Note      Subjective     Patient is here for group medical appointment for Group Lifestyle Balance weight management program follow-up for the chronic conditions of HTN, MIGEL, GERD, Asthma.  Patient continues on the program and tolerating well.   Total weight loss of 15 lbs to date.  No current issues.    Allergies:  No Known Allergies    Past Medical History:     Past Medical History:   Diagnosis Date    Anxiety     Arthritis     CAD (coronary artery disease)     Fatty liver     Fatty liver disease, nonalcoholic 7/10/2012    Hypertension     Obesity     Unspecified sleep apnea     cpap   .    Past Surgical History:  History reviewed. No pertinent surgical history.    Family History:  Family History   Problem Relation Age of Onset    Arthritis Mother     Diabetes Father     Arthritis Father     Stroke Maternal Grandmother     Heart Disease Maternal Grandmother     Stroke Maternal Grandfather     Heart Disease Maternal Grandfather     Early Death Maternal Grandfather     Stroke Paternal Grandmother     Heart Disease Paternal Grandmother     Stroke Paternal Grandfather     Heart Disease Paternal Grandfather     Early Death Paternal Grandfather        Social History:  Social History     Socioeconomic History    Marital status: Single     Spouse name: Stephen    Number of children: 0    Years of education: 16    Highest education level: Bachelor's degree (e.g., BA, AB, BS)   Occupational History    Not on file   Tobacco Use    Smoking status: Never    Smokeless tobacco: Never   Vaping Use    Vaping status: Never Used   Substance and Sexual Activity    Alcohol use: Never    Drug use: Never    Sexual activity: Yes     Partners: Female   Other Topics Concern    Not on file   Social History Narrative    ** Merged History Encounter **          Social Determinants of Health     Financial Resource Strain: Medium Risk (2/3/2023)    Overall Financial Resource Strain (CARDIA)      to Stay Motivated   [] Preparing for Long Term Self-Management   [] Take Charge of Your Lifestyle   [] Mindful Eating, Mindful Movement   [] Manage Your Stress   [] Sit Less for Health   [] More Volume, Fewer Calories   [] Stay Active   [] Balance Your Thoughts   [] Heart Health    [] Looking Back and Looking Forward    Total time:  90 minutes, greater than 50% of visit spent in group counseling/education.  20 minutes were spent in reviewing chart, vital signs, labs, and answering the patient's individual questions.    Assessment:       Diagnosis Orders   1. Essential hypertension        2. MIGEL on CPAP        3. Mild intermittent asthma without complication        4. Chronic pain of right knee        5. Obesity, Class III, BMI 40-49.9 (morbid obesity) ([facility])        6. Gastroesophageal reflux disease without esophagitis            Plan:      Track food and weight.    Return to clinic as per group medical appointment schedule.       Follow-up:  Return in about 1 week (around 9/5/2024).    Orders:  No orders of the defined types were placed in this encounter.      Prescriptions:  No orders of the defined types were placed in this encounter.      Electronically signed by:  Polly Francois CNP

## 2024-08-30 LAB
EKG ATRIAL RATE: 80 BPM
EKG P AXIS: 49 DEGREES
EKG P-R INTERVAL: 142 MS
EKG Q-T INTERVAL: 388 MS
EKG QRS DURATION: 96 MS
EKG QTC CALCULATION (BAZETT): 447 MS
EKG R AXIS: 22 DEGREES
EKG T AXIS: 36 DEGREES
EKG VENTRICULAR RATE: 80 BPM

## 2024-08-30 NOTE — ED PROVIDER NOTES
Note Started: 9:44 PM EDT         [facility]     Emergency Department     Faculty Attestation    I performed a history and physical examination of the patient and discussed management with the resident. I reviewed the resident’s note and agree with the documented findings and plan of care. Any areas of disagreement are noted on the chart. I was personally present for the key portions of any procedures. I have documented in the chart those procedures where I was not present during the key portions. I have reviewed the emergency nurses triage note. I agree with the chief complaint, past medical history, past surgical history, allergies, medications, social and family history as documented unless otherwise noted below.        For Physician Assistant/ Nurse Practitioner cases/documentation I have personally evaluated this patient and have completed at least one if not all key elements of the E/M (history, physical exam, and MDM). Additional findings are as noted.  I have personally seen and evaluated the patient.  I find the patient's history and physical exam are consistent with the NP/PA documentation.  I agree with the care provided, treatment rendered, disposition and follow-up plan.    44-year-old male presenting with left lower extremity pain.  Mostly in the lateral side of his left shin.  States that it worsens after walking around at work.  He does know that he needs to get new shoes soon, that his are wearing out.  No history of DVT.  No family history of DVT.  No immobilization or malignancy history.  Has not had any swelling of the leg.  Also has cracking of the skin in between his fourth and fifth toes.  Has had pruritus of the feet.    Exam:  General : Laying on the bed, awake, alert, and in no acute distress  CV : normal rate and regular rhythm  Lungs : Breathing comfortably on room air with no tachypnea, hypoxia, or increased work of breathing  Extremities: No significant edema or

## 2024-08-30 NOTE — ED PROVIDER NOTES
[facility] ED  Emergency Department Encounter  Emergency Medicine Resident     Pt Name:Melisa Castro  MRN: [id number]  Birthdate 1980  Date of evaluation: 8/29/24  PCP:  Sammi Vega MD  Note Started: 8:52 PM EDT      CHIEF COMPLAINT       Chief Complaint   Patient presents with    Leg Pain    Foreign Body in Skin       HISTORY OF PRESENT ILLNESS  (Location/Symptom, Timing/Onset, Context/Setting, Quality, Duration, Modifying Factors, Severity.)      Melisa Castro is a 44 y.o. male who presents with left calf tenderness by 4 days.  Patient is known hypertensive, MIGEL, impaired glucose tolerance, CAD, obesity.  Pain is gradually increasing intensity and now rates the pain as 9 out of 10.  Patient works at [employer] 12-hour shifts and is finding it difficult to stand for long hours.  Patient denies any chest pain shortness of breath hemoptysis fever cough abdominal pain nausea vomiting trauma fall.  Patient denies any recent surgeries, fractures, MVAs, hospitalization, cancer, period of immobilization.  Patient also complains of a wound to the fourth and fifth interdigit space of the left foot, patient says there may be a foreign body.  Patient is able to ambulate on the leg.  Patient is not currently on oral anticoagulants.    PAST MEDICAL / SURGICAL / SOCIAL / FAMILY HISTORY      has a past medical history of Anxiety, Arthritis, CAD (coronary artery disease), Fatty liver, Fatty liver disease, nonalcoholic, Hypertension, Obesity, and Unspecified sleep apnea.       has no past surgical history on file.      Social History     Socioeconomic History    Marital status: Single     Spouse name: Stephen    Number of children: 0    Years of education: 16    Highest education level: Bachelor's degree (e.g., BA, AB, BS)   Occupational History    Not on file   Tobacco Use    Smoking status: Never    Smokeless tobacco: Never   Vaping Use    Vaping status: Never Used   Substance and Sexual  10 MG tablet  3/7/22   ProviderGilbert MD   vitamin D (ERGOCALCIFEROL) 1.25 MG (10523 UT) CAPS capsule Take 1 capsule by mouth once a week 7/19/21   ProviderGilbert MD         REVIEW OF SYSTEMS       Review of Systems   Constitutional: Negative.    HENT: Negative.     Eyes: Negative.    Respiratory: Negative.     Cardiovascular: Negative.    Gastrointestinal: Negative.    Genitourinary: Negative.    Musculoskeletal:         Pain to right calf   Skin: Negative.    Neurological: Negative.        PHYSICAL EXAM      INITIAL VITALS:   BP (!) 169/101   Pulse 98   Temp 97.9 °F (36.6 °C) (Oral)   Resp 16   Ht 1.829 m (6')   Wt (!) 152.4 kg (336 lb)   SpO2 99%   BMI 45.57 kg/m²     Physical Exam  Constitutional:       Appearance: Normal appearance. He is obese.   Eyes:      Conjunctiva/sclera: Conjunctivae normal.   Cardiovascular:      Rate and Rhythm: Normal rate and regular rhythm.      Pulses: Normal pulses.      Heart sounds: Normal heart sounds.   Pulmonary:      Effort: Pulmonary effort is normal.      Breath sounds: Normal breath sounds. No wheezing, rhonchi or rales.   Chest:      Chest wall: No tenderness.   Abdominal:      General: Bowel sounds are normal.      Palpations: Abdomen is soft.   Musculoskeletal:         General: Normal range of motion.      Cervical back: Normal range of motion and neck supple.      Right lower leg: Normal.      Left lower leg: Tenderness present. No swelling or deformity. No edema.        Legs:       Comments: Right calf mildly tender   Skin:     Capillary Refill: Capillary refill takes less than 2 seconds.   Neurological:      General: No focal deficit present.      Mental Status: He is alert and oriented to person, place, and time. Mental status is at baseline.           DDX/DIAGNOSTIC RESULTS / EMERGENCY DEPARTMENT COURSE / MDM     Medical Decision Making  44-year-old male presents with left calf tenderness by 4 days, increasing in intensity currently rates it

## 2024-08-30 NOTE — ED NOTES
Patient arrived to the ED with c/o left calf pain for 4 days and states he thinks he has a splinter between his toes on the left foot. Patient is ambulatory upon arrival to the ED. Vitals stable. Patient alert and oriented x4. GCS 15. Patient resting comfortably in bed with call light in reach.

## 2024-08-30 NOTE — DISCHARGE INSTRUCTIONS
You had blood test for clots which was negative.  Will be prescribed pain medications and PT as outpatient as well as an antifungal powder for you to toes    Thank you for visiting [facility] Emergency Department.    You need to call Sammi Vega MD to make an appointment as directed for follow up.    Should you have any questions regarding your care or further treatment, please call [facility] Emergency Department at [phone].

## 2024-09-05 ENCOUNTER — OFFICE VISIT (OUTPATIENT)
Dept: BARIATRICS/WEIGHT MGMT | Age: 44
End: 2024-09-05
Payer: MEDICAID

## 2024-09-05 VITALS
HEIGHT: 72 IN | BODY MASS INDEX: 42.66 KG/M2 | SYSTOLIC BLOOD PRESSURE: 130 MMHG | HEART RATE: 70 BPM | WEIGHT: 315 LBS | DIASTOLIC BLOOD PRESSURE: 84 MMHG

## 2024-09-05 DIAGNOSIS — I10 ESSENTIAL HYPERTENSION: Primary | ICD-10-CM

## 2024-09-05 DIAGNOSIS — G89.29 CHRONIC PAIN OF RIGHT KNEE: ICD-10-CM

## 2024-09-05 DIAGNOSIS — M25.561 CHRONIC PAIN OF RIGHT KNEE: ICD-10-CM

## 2024-09-05 DIAGNOSIS — E66.01 OBESITY, CLASS III, BMI 40-49.9 (MORBID OBESITY) (HCC): ICD-10-CM

## 2024-09-05 DIAGNOSIS — J45.20 MILD INTERMITTENT ASTHMA WITHOUT COMPLICATION: ICD-10-CM

## 2024-09-05 DIAGNOSIS — G47.33 OSA ON CPAP: ICD-10-CM

## 2024-09-05 DIAGNOSIS — K21.9 GASTROESOPHAGEAL REFLUX DISEASE WITHOUT ESOPHAGITIS: ICD-10-CM

## 2024-09-05 PROCEDURE — 99213 OFFICE O/P EST LOW 20 MIN: CPT | Performed by: NURSE PRACTITIONER

## 2024-09-05 PROCEDURE — G8417 CALC BMI ABV UP PARAM F/U: HCPCS | Performed by: NURSE PRACTITIONER

## 2024-09-05 PROCEDURE — 3075F SYST BP GE 130 - 139MM HG: CPT | Performed by: NURSE PRACTITIONER

## 2024-09-05 PROCEDURE — 3079F DIAST BP 80-89 MM HG: CPT | Performed by: NURSE PRACTITIONER

## 2024-09-05 PROCEDURE — 1036F TOBACCO NON-USER: CPT | Performed by: NURSE PRACTITIONER

## 2024-09-05 PROCEDURE — G8427 DOCREV CUR MEDS BY ELIG CLIN: HCPCS | Performed by: NURSE PRACTITIONER

## 2024-09-05 NOTE — PROGRESS NOTES
Group Lifestyle Balance Follow-up Progress Note      Subjective     Patient is here for group medical appointment for Group Lifestyle Balance weight management program follow-up for the chronic conditions of HTN, MIGEL, GERD, Asthma.  Patient continues on the program and tolerating well.   Total weight loss of 16 lbs to date.  No current issues.    Allergies:  No Known Allergies    Past Medical History:     Past Medical History:   Diagnosis Date    Anxiety     Arthritis     CAD (coronary artery disease)     Fatty liver     Fatty liver disease, nonalcoholic 7/10/2012    Hypertension     Obesity     Unspecified sleep apnea     cpap   .    Past Surgical History:  History reviewed. No pertinent surgical history.    Family History:  Family History   Problem Relation Age of Onset    Arthritis Mother     Diabetes Father     Arthritis Father     Stroke Maternal Grandmother     Heart Disease Maternal Grandmother     Stroke Maternal Grandfather     Heart Disease Maternal Grandfather     Early Death Maternal Grandfather     Stroke Paternal Grandmother     Heart Disease Paternal Grandmother     Stroke Paternal Grandfather     Heart Disease Paternal Grandfather     Early Death Paternal Grandfather        Social History:  Social History     Socioeconomic History    Marital status: Single     Spouse name: Stephen    Number of children: 0    Years of education: 16    Highest education level: Bachelor's degree (e.g., BA, AB, BS)   Occupational History    Not on file   Tobacco Use    Smoking status: Never    Smokeless tobacco: Never   Vaping Use    Vaping status: Never Used   Substance and Sexual Activity    Alcohol use: Never    Drug use: Never    Sexual activity: Yes     Partners: Female   Other Topics Concern    Not on file   Social History Narrative    ** Merged History Encounter **          Social Determinants of Health     Financial Resource Strain: Medium Risk (2/3/2023)    Overall Financial Resource Strain (CARDIA)

## 2024-09-09 ENCOUNTER — TELEPHONE (OUTPATIENT)
Dept: INTERNAL MEDICINE | Age: 44
End: 2024-09-09

## 2024-09-09 ENCOUNTER — HOSPITAL ENCOUNTER (EMERGENCY)
Age: 44
Discharge: HOME | End: 2024-09-09
Attending: EMERGENCY MEDICINE
Payer: MEDICAID

## 2024-09-09 VITALS
HEART RATE: 65 BPM | TEMPERATURE: 97.2 F | WEIGHT: 315 LBS | OXYGEN SATURATION: 99 % | RESPIRATION RATE: 18 BRPM | DIASTOLIC BLOOD PRESSURE: 85 MMHG | SYSTOLIC BLOOD PRESSURE: 130 MMHG | HEIGHT: 73 IN | BODY MASS INDEX: 41.75 KG/M2

## 2024-09-09 DIAGNOSIS — M54.32 SCIATICA OF LEFT SIDE: ICD-10-CM

## 2024-09-09 DIAGNOSIS — M79.605 LEFT LEG PAIN: Primary | ICD-10-CM

## 2024-09-09 PROCEDURE — 99284 EMERGENCY DEPT VISIT MOD MDM: CPT

## 2024-09-09 PROCEDURE — 6360000002 HC RX W HCPCS

## 2024-09-09 PROCEDURE — 6370000000 HC RX 637 (ALT 250 FOR IP)

## 2024-09-09 PROCEDURE — 96372 THER/PROPH/DIAG INJ SC/IM: CPT

## 2024-09-09 RX ORDER — CYCLOBENZAPRINE HCL 10 MG
10 TABLET ORAL ONCE
Status: COMPLETED | OUTPATIENT
Start: 2024-09-09 | End: 2024-09-09

## 2024-09-09 RX ORDER — DEXAMETHASONE SODIUM PHOSPHATE 10 MG/ML
10 INJECTION, SOLUTION INTRAMUSCULAR; INTRAVENOUS ONCE
Status: DISCONTINUED | OUTPATIENT
Start: 2024-09-09 | End: 2024-09-09

## 2024-09-09 RX ORDER — KETOROLAC TROMETHAMINE 15 MG/ML
30 INJECTION, SOLUTION INTRAMUSCULAR; INTRAVENOUS ONCE
Status: COMPLETED | OUTPATIENT
Start: 2024-09-09 | End: 2024-09-09

## 2024-09-09 RX ORDER — PREDNISONE 10 MG/1
10 TABLET ORAL DAILY
Qty: 5 TABLET | Refills: 0 | Status: SHIPPED | OUTPATIENT
Start: 2024-09-09 | End: 2024-09-14

## 2024-09-09 RX ORDER — DEXAMETHASONE SODIUM PHOSPHATE 10 MG/ML
10 INJECTION, SOLUTION INTRAMUSCULAR; INTRAVENOUS ONCE
Status: COMPLETED | OUTPATIENT
Start: 2024-09-09 | End: 2024-09-09

## 2024-09-09 RX ADMIN — KETOROLAC TROMETHAMINE 30 MG: 15 INJECTION, SOLUTION INTRAMUSCULAR; INTRAVENOUS at 11:14

## 2024-09-09 RX ADMIN — CYCLOBENZAPRINE 10 MG: 10 TABLET, FILM COATED ORAL at 11:14

## 2024-09-09 RX ADMIN — DEXAMETHASONE SODIUM PHOSPHATE 10 MG: 10 INJECTION INTRAMUSCULAR; INTRAVENOUS at 11:14

## 2024-09-09 ASSESSMENT — PAIN DESCRIPTION - LOCATION: LOCATION: LEG

## 2024-09-09 ASSESSMENT — PAIN - FUNCTIONAL ASSESSMENT: PAIN_FUNCTIONAL_ASSESSMENT: 0-10

## 2024-09-09 ASSESSMENT — PAIN DESCRIPTION - DESCRIPTORS: DESCRIPTORS: ACHING

## 2024-09-09 ASSESSMENT — ENCOUNTER SYMPTOMS
RESPIRATORY NEGATIVE: 1
GASTROINTESTINAL NEGATIVE: 1
BACK PAIN: 1

## 2024-09-09 ASSESSMENT — PAIN DESCRIPTION - ORIENTATION: ORIENTATION: LEFT

## 2024-09-09 ASSESSMENT — PAIN SCALES - GENERAL: PAINLEVEL_OUTOF10: 10

## 2024-09-12 DIAGNOSIS — M17.11 PRIMARY OSTEOARTHRITIS OF RIGHT KNEE: ICD-10-CM

## 2024-09-12 DIAGNOSIS — M76.61 ACHILLES TENDINITIS OF BOTH LOWER EXTREMITIES: ICD-10-CM

## 2024-09-12 DIAGNOSIS — R09.81 NASAL CONGESTION: ICD-10-CM

## 2024-09-12 DIAGNOSIS — M76.62 ACHILLES TENDINITIS OF BOTH LOWER EXTREMITIES: ICD-10-CM

## 2024-09-12 DIAGNOSIS — I1A.0 RESISTANT HYPERTENSION: ICD-10-CM

## 2024-09-12 DIAGNOSIS — J31.0 CHRONIC RHINITIS: ICD-10-CM

## 2024-09-13 RX ORDER — ALBUTEROL SULFATE 90 UG/1
2 AEROSOL, METERED RESPIRATORY (INHALATION) EVERY 6 HOURS PRN
Qty: 18 G | Refills: 5 | Status: SHIPPED | OUTPATIENT
Start: 2024-09-13

## 2024-09-13 RX ORDER — FLUTICASONE PROPIONATE 50 MCG
1 SPRAY, SUSPENSION (ML) NASAL DAILY
Qty: 32 G | Refills: 5 | Status: SHIPPED | OUTPATIENT
Start: 2024-09-13

## 2024-09-13 RX ORDER — MELATONIN 10 MG
10 CAPSULE ORAL NIGHTLY
Qty: 30 CAPSULE | Refills: 5 | Status: SHIPPED | OUTPATIENT
Start: 2024-09-13

## 2024-09-13 RX ORDER — HYDROCHLOROTHIAZIDE 25 MG/1
25 TABLET ORAL EVERY MORNING
Qty: 90 TABLET | Refills: 1 | Status: SHIPPED | OUTPATIENT
Start: 2024-09-13

## 2024-09-25 DIAGNOSIS — I1A.0 RESISTANT HYPERTENSION: ICD-10-CM

## 2024-09-25 RX ORDER — CARVEDILOL 12.5 MG/1
12.5 TABLET ORAL 2 TIMES DAILY
Qty: 60 TABLET | Refills: 1 | Status: SHIPPED | OUTPATIENT
Start: 2024-09-25

## 2024-09-26 ENCOUNTER — OFFICE VISIT (OUTPATIENT)
Dept: BARIATRICS/WEIGHT MGMT | Age: 44
End: 2024-09-26
Payer: MEDICAID

## 2024-09-26 VITALS
SYSTOLIC BLOOD PRESSURE: 139 MMHG | DIASTOLIC BLOOD PRESSURE: 88 MMHG | HEART RATE: 74 BPM | HEIGHT: 73 IN | BODY MASS INDEX: 41.75 KG/M2 | WEIGHT: 315 LBS

## 2024-09-26 DIAGNOSIS — G47.33 OSA ON CPAP: ICD-10-CM

## 2024-09-26 DIAGNOSIS — I10 ESSENTIAL HYPERTENSION: Primary | ICD-10-CM

## 2024-09-26 DIAGNOSIS — K21.9 GASTROESOPHAGEAL REFLUX DISEASE WITHOUT ESOPHAGITIS: ICD-10-CM

## 2024-09-26 DIAGNOSIS — E66.01 OBESITY, CLASS III, BMI 40-49.9 (MORBID OBESITY): ICD-10-CM

## 2024-09-26 DIAGNOSIS — J45.20 MILD INTERMITTENT ASTHMA WITHOUT COMPLICATION: ICD-10-CM

## 2024-09-26 PROCEDURE — 99213 OFFICE O/P EST LOW 20 MIN: CPT | Performed by: NURSE PRACTITIONER

## 2024-09-26 PROCEDURE — G8417 CALC BMI ABV UP PARAM F/U: HCPCS | Performed by: NURSE PRACTITIONER

## 2024-09-26 PROCEDURE — 3079F DIAST BP 80-89 MM HG: CPT | Performed by: NURSE PRACTITIONER

## 2024-09-26 PROCEDURE — G8427 DOCREV CUR MEDS BY ELIG CLIN: HCPCS | Performed by: NURSE PRACTITIONER

## 2024-09-26 PROCEDURE — 1036F TOBACCO NON-USER: CPT | Performed by: NURSE PRACTITIONER

## 2024-09-26 PROCEDURE — 3075F SYST BP GE 130 - 139MM HG: CPT | Performed by: NURSE PRACTITIONER

## 2024-11-05 ENCOUNTER — OFFICE VISIT (OUTPATIENT)
Dept: PODIATRY | Age: 44
End: 2024-11-05
Payer: MEDICAID

## 2024-11-05 VITALS — HEIGHT: 73 IN | BODY MASS INDEX: 41.75 KG/M2 | WEIGHT: 315 LBS

## 2024-11-05 DIAGNOSIS — I89.0 LYMPHATIC EDEMA: ICD-10-CM

## 2024-11-05 DIAGNOSIS — M21.42 PES PLANUS OF BOTH FEET: ICD-10-CM

## 2024-11-05 DIAGNOSIS — M79.672 FOOT PAIN, BILATERAL: ICD-10-CM

## 2024-11-05 DIAGNOSIS — R26.9 GAIT ABNORMALITY: ICD-10-CM

## 2024-11-05 DIAGNOSIS — L85.3 XEROSIS OF SKIN: ICD-10-CM

## 2024-11-05 DIAGNOSIS — M79.671 PAIN IN BOTH FEET: ICD-10-CM

## 2024-11-05 DIAGNOSIS — M21.41 PES PLANUS OF BOTH FEET: ICD-10-CM

## 2024-11-05 DIAGNOSIS — M79.672 PAIN IN BOTH FEET: ICD-10-CM

## 2024-11-05 DIAGNOSIS — M79.671 FOOT PAIN, BILATERAL: ICD-10-CM

## 2024-11-05 DIAGNOSIS — B35.1 DERMATOPHYTOSIS OF NAIL: Primary | ICD-10-CM

## 2024-11-05 PROCEDURE — 11721 DEBRIDE NAIL 6 OR MORE: CPT | Performed by: PODIATRIST

## 2024-11-05 RX ORDER — LIDOCAINE 50 MG/G
PATCH TOPICAL
COMMUNITY
Start: 2024-10-17

## 2024-11-05 NOTE — PROGRESS NOTES
further collapse of his arch which would require need for surgery     Nails 1-10 were debrided in length and thickness sharply with a nail nipper and  without incident. Pt will follow up in 9 weeks or sooner if any problems arise. Diagnosis was discussed with the pt and all of their questions were answered in detail. Proper foot hygiene and care was discussed with the pt. Patient to check feet daily and contact the office with any questions/problems/concerns.  Other comorbidity noted and will be managed by PCP.  Pain waiver discussed with patient and confirmed.   11/5/2024      Electronically signed by Nikolas Wang DPM on 11/5/2024 at 8:50 AM  11/5/2024

## 2024-11-06 DIAGNOSIS — M17.11 PRIMARY OSTEOARTHRITIS OF RIGHT KNEE: ICD-10-CM

## 2024-11-06 NOTE — TELEPHONE ENCOUNTER
Melisa Castro is calling to request a refill on the following medication(s):    Medication Request:  Requested Prescriptions     Pending Prescriptions Disp Refills    diclofenac sodium (VOLTAREN) 1 % GEL [Pharmacy Med Name: diclofenac 1 % topical gel] 50 g 5     Sig: APPLY 4 GRAMS TOPICALLY FOUR TIMES DAILY       Last Visit Date (If Applicable):  4/12/2024    Next Visit Date:    Visit date not found

## 2024-11-14 ENCOUNTER — OFFICE VISIT (OUTPATIENT)
Dept: FAMILY MEDICINE CLINIC | Age: 44
End: 2024-11-14

## 2024-11-14 VITALS
TEMPERATURE: 98.2 F | DIASTOLIC BLOOD PRESSURE: 104 MMHG | HEIGHT: 73 IN | RESPIRATION RATE: 16 BRPM | SYSTOLIC BLOOD PRESSURE: 164 MMHG | BODY MASS INDEX: 41.75 KG/M2 | WEIGHT: 315 LBS | OXYGEN SATURATION: 98 % | HEART RATE: 70 BPM

## 2024-11-14 DIAGNOSIS — M79.672 LEFT FOOT PAIN: ICD-10-CM

## 2024-11-14 DIAGNOSIS — J45.20 MILD INTERMITTENT ASTHMA WITHOUT COMPLICATION: ICD-10-CM

## 2024-11-14 DIAGNOSIS — F33.1 MODERATE EPISODE OF RECURRENT MAJOR DEPRESSIVE DISORDER (HCC): ICD-10-CM

## 2024-11-14 DIAGNOSIS — Z12.11 SCREEN FOR COLON CANCER: ICD-10-CM

## 2024-11-14 DIAGNOSIS — I10 ESSENTIAL HYPERTENSION: Primary | ICD-10-CM

## 2024-11-14 DIAGNOSIS — G47.33 OSA ON CPAP: ICD-10-CM

## 2024-11-14 RX ORDER — WATER
LIQUID (ML) MISCELLANEOUS
Qty: 3 EACH | Refills: 2 | Status: SHIPPED | OUTPATIENT
Start: 2024-11-14

## 2024-11-14 RX ORDER — WATER
LIQUID (ML) MISCELLANEOUS
Qty: 3 EACH | Refills: 2 | Status: SHIPPED | OUTPATIENT
Start: 2024-11-14 | End: 2024-11-14

## 2024-11-14 SDOH — ECONOMIC STABILITY: FOOD INSECURITY: WITHIN THE PAST 12 MONTHS, YOU WORRIED THAT YOUR FOOD WOULD RUN OUT BEFORE YOU GOT MONEY TO BUY MORE.: NEVER TRUE

## 2024-11-14 SDOH — ECONOMIC STABILITY: FOOD INSECURITY: WITHIN THE PAST 12 MONTHS, THE FOOD YOU BOUGHT JUST DIDN'T LAST AND YOU DIDN'T HAVE MONEY TO GET MORE.: NEVER TRUE

## 2024-11-14 SDOH — ECONOMIC STABILITY: INCOME INSECURITY: HOW HARD IS IT FOR YOU TO PAY FOR THE VERY BASICS LIKE FOOD, HOUSING, MEDICAL CARE, AND HEATING?: NOT HARD AT ALL

## 2024-11-14 ASSESSMENT — PATIENT HEALTH QUESTIONNAIRE - PHQ9
1. LITTLE INTEREST OR PLEASURE IN DOING THINGS: SEVERAL DAYS
SUM OF ALL RESPONSES TO PHQ QUESTIONS 1-9: 13
2. FEELING DOWN, DEPRESSED OR HOPELESS: NEARLY EVERY DAY
7. TROUBLE CONCENTRATING ON THINGS, SUCH AS READING THE NEWSPAPER OR WATCHING TELEVISION: NOT AT ALL
SUM OF ALL RESPONSES TO PHQ9 QUESTIONS 1 & 2: 4
SUM OF ALL RESPONSES TO PHQ QUESTIONS 1-9: 13
3. TROUBLE FALLING OR STAYING ASLEEP: MORE THAN HALF THE DAYS
10. IF YOU CHECKED OFF ANY PROBLEMS, HOW DIFFICULT HAVE THESE PROBLEMS MADE IT FOR YOU TO DO YOUR WORK, TAKE CARE OF THINGS AT HOME, OR GET ALONG WITH OTHER PEOPLE: VERY DIFFICULT
6. FEELING BAD ABOUT YOURSELF - OR THAT YOU ARE A FAILURE OR HAVE LET YOURSELF OR YOUR FAMILY DOWN: NEARLY EVERY DAY
8. MOVING OR SPEAKING SO SLOWLY THAT OTHER PEOPLE COULD HAVE NOTICED. OR THE OPPOSITE, BEING SO FIGETY OR RESTLESS THAT YOU HAVE BEEN MOVING AROUND A LOT MORE THAN USUAL: NOT AT ALL
4. FEELING TIRED OR HAVING LITTLE ENERGY: NOT AT ALL
SUM OF ALL RESPONSES TO PHQ QUESTIONS 1-9: 12
SUM OF ALL RESPONSES TO PHQ QUESTIONS 1-9: 13
5. POOR APPETITE OR OVEREATING: NEARLY EVERY DAY
9. THOUGHTS THAT YOU WOULD BE BETTER OFF DEAD, OR OF HURTING YOURSELF: SEVERAL DAYS

## 2024-11-14 ASSESSMENT — ENCOUNTER SYMPTOMS
RHINORRHEA: 0
CHEST TIGHTNESS: 0
CONSTIPATION: 0
ABDOMINAL PAIN: 0
ABDOMINAL DISTENTION: 0
NAUSEA: 0
SORE THROAT: 0
COUGH: 0
COLOR CHANGE: 0
DIARRHEA: 0
SHORTNESS OF BREATH: 0
BACK PAIN: 0

## 2024-11-14 ASSESSMENT — COLUMBIA-SUICIDE SEVERITY RATING SCALE - C-SSRS
1. WITHIN THE PAST MONTH, HAVE YOU WISHED YOU WERE DEAD OR WISHED YOU COULD GO TO SLEEP AND NOT WAKE UP?: YES
6. HAVE YOU EVER DONE ANYTHING, STARTED TO DO ANYTHING, OR PREPARED TO DO ANYTHING TO END YOUR LIFE?: NO
2. HAVE YOU ACTUALLY HAD ANY THOUGHTS OF KILLING YOURSELF?: NO

## 2024-11-14 NOTE — PATIENT INSTRUCTIONS
and help for any health or human need.  Phone and Address: 2-1-1 or 4-722-947-ZTYQ (9757)     [employer] [place] ([facility])  What they offer: Hot meal first, third, and fourth Saturday 6pm - 8pm  Phone Number: [phone]  [facility] on Aging, District [place]:   [place], [place], [place], [place], [place], [place], [place], [place], [facility]:  What they offer: Referral to transportation and other resources for seniors.  Phone Number: 106.233.4880   [employer]  What they offer: After School Meals Program providing meals to children.  Phone Number: 776.227.9698  [employer] [facility]:  What they offer: Food pantry for [facility] residents  Phone Number: [phone]  [facility]:  What they offer: Food pantry for [facility] Residents  Phone Number: [phone]  [place] [employer]  Phone number: [phone]              [facility] and [facility]  Phone number: [phone]

## 2024-11-14 NOTE — PROGRESS NOTES
face to face with the patient discussing the diagnosis and importance of compliance with the treatment plan as well as documenting on the day of the visit.    JABARI Epps-CNP

## 2024-11-20 RX ORDER — POLYETHYLENE GLYCOL 3350 17 G/17G
POWDER, FOR SOLUTION ORAL
Qty: 289 G | Refills: 0 | Status: SHIPPED | OUTPATIENT
Start: 2024-11-20

## 2024-11-22 ENCOUNTER — OFFICE VISIT (OUTPATIENT)
Dept: FAMILY MEDICINE CLINIC | Age: 44
End: 2024-11-22
Payer: MEDICAID

## 2024-11-22 VITALS
BODY MASS INDEX: 44.86 KG/M2 | WEIGHT: 315 LBS | HEART RATE: 62 BPM | RESPIRATION RATE: 16 BRPM | OXYGEN SATURATION: 97 % | SYSTOLIC BLOOD PRESSURE: 138 MMHG | DIASTOLIC BLOOD PRESSURE: 88 MMHG | TEMPERATURE: 97 F

## 2024-11-22 DIAGNOSIS — J45.20 MILD INTERMITTENT ASTHMA WITHOUT COMPLICATION: ICD-10-CM

## 2024-11-22 DIAGNOSIS — M79.671 PAIN IN BOTH FEET: ICD-10-CM

## 2024-11-22 DIAGNOSIS — I10 ESSENTIAL HYPERTENSION: Primary | ICD-10-CM

## 2024-11-22 DIAGNOSIS — M79.672 PAIN IN BOTH FEET: ICD-10-CM

## 2024-11-22 DIAGNOSIS — F33.1 MODERATE EPISODE OF RECURRENT MAJOR DEPRESSIVE DISORDER (HCC): ICD-10-CM

## 2024-11-22 DIAGNOSIS — G47.33 OSA ON CPAP: ICD-10-CM

## 2024-11-22 PROBLEM — R73.02 IGT (IMPAIRED GLUCOSE TOLERANCE): Status: RESOLVED | Noted: 2018-04-10 | Resolved: 2024-11-22

## 2024-11-22 PROBLEM — M25.561 CHRONIC PAIN OF RIGHT KNEE: Status: RESOLVED | Noted: 2021-12-30 | Resolved: 2024-11-22

## 2024-11-22 PROBLEM — G89.29 CHRONIC PAIN OF RIGHT KNEE: Status: RESOLVED | Noted: 2021-12-30 | Resolved: 2024-11-22

## 2024-11-22 PROBLEM — E66.9 OBESITY (BMI 30-39.9): Status: RESOLVED | Noted: 2017-12-12 | Resolved: 2024-11-22

## 2024-11-22 PROBLEM — M75.40 SUBACROMIAL IMPINGEMENT: Status: RESOLVED | Noted: 2019-01-22 | Resolved: 2024-11-22

## 2024-11-22 PROBLEM — K21.9 GASTROESOPHAGEAL REFLUX DISEASE WITHOUT ESOPHAGITIS: Status: RESOLVED | Noted: 2024-06-18 | Resolved: 2024-11-22

## 2024-11-22 PROCEDURE — G8427 DOCREV CUR MEDS BY ELIG CLIN: HCPCS | Performed by: NURSE PRACTITIONER

## 2024-11-22 PROCEDURE — 1036F TOBACCO NON-USER: CPT | Performed by: NURSE PRACTITIONER

## 2024-11-22 PROCEDURE — G8484 FLU IMMUNIZE NO ADMIN: HCPCS | Performed by: NURSE PRACTITIONER

## 2024-11-22 PROCEDURE — 3079F DIAST BP 80-89 MM HG: CPT | Performed by: NURSE PRACTITIONER

## 2024-11-22 PROCEDURE — G8417 CALC BMI ABV UP PARAM F/U: HCPCS | Performed by: NURSE PRACTITIONER

## 2024-11-22 PROCEDURE — 99213 OFFICE O/P EST LOW 20 MIN: CPT | Performed by: NURSE PRACTITIONER

## 2024-11-22 PROCEDURE — 3075F SYST BP GE 130 - 139MM HG: CPT | Performed by: NURSE PRACTITIONER

## 2024-11-22 ASSESSMENT — ENCOUNTER SYMPTOMS
CONSTIPATION: 0
COLOR CHANGE: 0
BACK PAIN: 0
SHORTNESS OF BREATH: 0
SORE THROAT: 0
NAUSEA: 0
DIARRHEA: 0
ABDOMINAL PAIN: 0
RHINORRHEA: 0
COUGH: 0
CHEST TIGHTNESS: 0
ABDOMINAL DISTENTION: 0

## 2024-11-22 NOTE — PROGRESS NOTES
Sandra Franco, APRN-CNP  [facility]  [address]  [facility] [address]  Dept: [phone]  Dept Fax: [phone]       Patient ID: Melisa Castro is a 44 y.o. male.    HPI  Pt here today for f/u on HTN,go over labs and/or diagnostic studies, and medication refills. Pt denies any fever or chills.  Pt today denies any HA, chest pain, or SOB.  Pt denies any N/V/D/C or abdominal pain.    He is walking and has lost 9 lbs since last visit. He is stressed with his mom being sick and thinks that is making him anxious.     Otherwise pt doing well on current tx and no other concerns today.     Previous office notes, labs, imaging and hospital records were reviewed prior to and during encounter.    The patient's past medical, surgical, social, and family history as well as his current medications and allergies were reviewed as documented in today's encounter by COLT Curran.      Current Outpatient Medications on File Prior to Visit   Medication Sig Dispense Refill    polyethylene glycol (GLYCOLAX) 17 GM/SCOOP powder Take as directed by physician office for colonoscopy prep 289 g 0    Distilled Water LIQD Use with cpap machine 3 each 2    carvedilol (COREG) 12.5 MG tablet TAKE ONE TABLET BY MOUTH TWICE DAILY 60 tablet 1    fluticasone (FLONASE) 50 MCG/ACT nasal spray 1 spray by Each Nostril route daily 32 g 5    albuterol sulfate HFA (PROVENTIL;VENTOLIN;PROAIR) 108 (90 Base) MCG/ACT inhaler Inhale 2 puffs into the lungs every 6 hours as needed for Wheezing 18 g 5    melatonin 10 MG CAPS capsule Take 1 capsule by mouth nightly 30 capsule 5    hydroCHLOROthiazide (HYDRODIURIL) 25 MG tablet Take 1 tablet by mouth every morning 90 tablet 1    acetaminophen (TYLENOL) 500 MG tablet Take 2 tablets by mouth every 8 hours as needed for Pain or Fever 30 tablet 0    zolpidem (AMBIEN) 5 MG tablet Take 1 tablet by mouth nightly as needed.      amLODIPine (NORVASC) 10 MG

## 2024-11-26 DIAGNOSIS — I1A.0 RESISTANT HYPERTENSION: ICD-10-CM

## 2024-11-26 RX ORDER — CARVEDILOL 12.5 MG/1
12.5 TABLET ORAL 2 TIMES DAILY
Qty: 60 TABLET | Refills: 1 | Status: SHIPPED | OUTPATIENT
Start: 2024-11-26

## 2024-11-26 NOTE — TELEPHONE ENCOUNTER
Melisa Castro is calling to request a refill on the following medication(s):    Medication Request:  Requested Prescriptions     Pending Prescriptions Disp Refills    carvedilol (COREG) 12.5 MG tablet [Pharmacy Med Name: carvedilol 12.5 mg tablet] 60 tablet 1     Sig: TAKE ONE TABLET BY MOUTH TWICE DAILY       Last Visit Date (If Applicable):  4/12/2024    Next Visit Date:    Visit date not found

## 2024-12-10 ENCOUNTER — OFFICE VISIT (OUTPATIENT)
Dept: BARIATRICS/WEIGHT MGMT | Age: 44
End: 2024-12-10
Payer: MEDICAID

## 2024-12-10 VITALS
OXYGEN SATURATION: 98 % | HEIGHT: 73 IN | WEIGHT: 315 LBS | BODY MASS INDEX: 41.75 KG/M2 | DIASTOLIC BLOOD PRESSURE: 92 MMHG | SYSTOLIC BLOOD PRESSURE: 143 MMHG | HEART RATE: 65 BPM

## 2024-12-10 DIAGNOSIS — G47.33 OSA ON CPAP: ICD-10-CM

## 2024-12-10 DIAGNOSIS — J45.20 MILD INTERMITTENT ASTHMA WITHOUT COMPLICATION: ICD-10-CM

## 2024-12-10 DIAGNOSIS — E66.01 OBESITY, CLASS III, BMI 40-49.9 (MORBID OBESITY): ICD-10-CM

## 2024-12-10 DIAGNOSIS — I10 ESSENTIAL HYPERTENSION: Primary | ICD-10-CM

## 2024-12-10 PROCEDURE — G8417 CALC BMI ABV UP PARAM F/U: HCPCS | Performed by: NURSE PRACTITIONER

## 2024-12-10 PROCEDURE — 3077F SYST BP >= 140 MM HG: CPT | Performed by: NURSE PRACTITIONER

## 2024-12-10 PROCEDURE — 1036F TOBACCO NON-USER: CPT | Performed by: NURSE PRACTITIONER

## 2024-12-10 PROCEDURE — G8484 FLU IMMUNIZE NO ADMIN: HCPCS | Performed by: NURSE PRACTITIONER

## 2024-12-10 PROCEDURE — 99213 OFFICE O/P EST LOW 20 MIN: CPT | Performed by: NURSE PRACTITIONER

## 2024-12-10 PROCEDURE — G8427 DOCREV CUR MEDS BY ELIG CLIN: HCPCS | Performed by: NURSE PRACTITIONER

## 2024-12-10 PROCEDURE — 3079F DIAST BP 80-89 MM HG: CPT | Performed by: NURSE PRACTITIONER

## 2024-12-10 NOTE — PROGRESS NOTES
Group Lifestyle Balance Follow-up Progress Note      Subjective     Patient is here for group medical appointment for Group Lifestyle Balance weight management program follow-up for the chronic conditions of HTN, MIGEL, GERD, Asthma.  Patient continues on the program and tolerating well.   Total weight loss of 20 lbs to date.  No current issues.    Allergies:  No Known Allergies    Past Medical History:     Past Medical History:   Diagnosis Date    Anxiety     Arthritis     CAD (coronary artery disease)     Fatty liver     Fatty liver disease, nonalcoholic 7/10/2012    Hypertension     Obesity     Unspecified sleep apnea     cpap   .    Past Surgical History:  History reviewed. No pertinent surgical history.    Family History:  Family History   Problem Relation Age of Onset    Arthritis Mother     Diabetes Father     Arthritis Father     Stroke Maternal Grandmother     Heart Disease Maternal Grandmother     Stroke Maternal Grandfather     Heart Disease Maternal Grandfather     Early Death Maternal Grandfather     Stroke Paternal Grandmother     Heart Disease Paternal Grandmother     Stroke Paternal Grandfather     Heart Disease Paternal Grandfather     Early Death Paternal Grandfather        Social History:  Social History     Socioeconomic History    Marital status: Single     Spouse name: Stephen    Number of children: 0    Years of education: 16    Highest education level: Bachelor's degree (e.g., BA, AB, BS)   Occupational History    Not on file   Tobacco Use    Smoking status: Never    Smokeless tobacco: Never   Vaping Use    Vaping status: Never Used   Substance and Sexual Activity    Alcohol use: Never    Drug use: Never    Sexual activity: Yes     Partners: Female   Other Topics Concern    Not on file   Social History Narrative    ** Merged History Encounter **          Social Determinants of Health     Financial Resource Strain: Low Risk  (11/14/2024)    Overall Financial Resource Strain (CARDIA)

## 2025-01-08 ENCOUNTER — HOSPITAL ENCOUNTER (EMERGENCY)
Age: 45
Discharge: HOME | End: 2025-01-08
Attending: EMERGENCY MEDICINE
Payer: MEDICAID

## 2025-01-08 ENCOUNTER — APPOINTMENT (OUTPATIENT)
Dept: GENERAL RADIOLOGY | Age: 45
End: 2025-01-08
Payer: MEDICAID

## 2025-01-08 VITALS
TEMPERATURE: 98.4 F | DIASTOLIC BLOOD PRESSURE: 90 MMHG | RESPIRATION RATE: 16 BRPM | HEART RATE: 72 BPM | OXYGEN SATURATION: 99 % | SYSTOLIC BLOOD PRESSURE: 143 MMHG

## 2025-01-08 DIAGNOSIS — J06.9 ACUTE UPPER RESPIRATORY INFECTION: Primary | ICD-10-CM

## 2025-01-08 PROCEDURE — 71046 X-RAY EXAM CHEST 2 VIEWS: CPT

## 2025-01-08 PROCEDURE — 6370000000 HC RX 637 (ALT 250 FOR IP): Performed by: EMERGENCY MEDICINE

## 2025-01-08 PROCEDURE — 99283 EMERGENCY DEPT VISIT LOW MDM: CPT

## 2025-01-08 RX ORDER — ONDANSETRON 4 MG/1
4 TABLET, ORALLY DISINTEGRATING ORAL ONCE
Status: COMPLETED | OUTPATIENT
Start: 2025-01-08 | End: 2025-01-08

## 2025-01-08 RX ORDER — BENZONATATE 100 MG/1
100 CAPSULE ORAL ONCE
Status: COMPLETED | OUTPATIENT
Start: 2025-01-08 | End: 2025-01-08

## 2025-01-08 RX ORDER — ONDANSETRON 4 MG/1
4 TABLET, ORALLY DISINTEGRATING ORAL 3 TIMES DAILY PRN
Qty: 4 TABLET | Refills: 0 | Status: SHIPPED | OUTPATIENT
Start: 2025-01-08

## 2025-01-08 RX ORDER — BENZONATATE 100 MG/1
100 CAPSULE ORAL 2 TIMES DAILY PRN
Qty: 14 CAPSULE | Refills: 0 | Status: SHIPPED | OUTPATIENT
Start: 2025-01-08 | End: 2025-01-08

## 2025-01-08 RX ORDER — ONDANSETRON 4 MG/1
4 TABLET, ORALLY DISINTEGRATING ORAL 3 TIMES DAILY PRN
Qty: 4 TABLET | Refills: 0 | Status: SHIPPED | OUTPATIENT
Start: 2025-01-08 | End: 2025-01-08

## 2025-01-08 RX ORDER — BENZONATATE 100 MG/1
100 CAPSULE ORAL 2 TIMES DAILY PRN
Qty: 14 CAPSULE | Refills: 0 | Status: SHIPPED | OUTPATIENT
Start: 2025-01-08 | End: 2025-01-15

## 2025-01-08 RX ADMIN — BENZOCAINE AND MENTHOL 1 LOZENGE: 15; 3.6 LOZENGE ORAL at 06:33

## 2025-01-08 RX ADMIN — BENZONATATE 100 MG: 100 CAPSULE ORAL at 06:33

## 2025-01-08 RX ADMIN — ONDANSETRON 4 MG: 4 TABLET, ORALLY DISINTEGRATING ORAL at 06:33

## 2025-01-08 ASSESSMENT — ENCOUNTER SYMPTOMS
VOMITING: 1
SORE THROAT: 1
SHORTNESS OF BREATH: 0
NAUSEA: 1
COUGH: 1

## 2025-01-08 ASSESSMENT — PAIN - FUNCTIONAL ASSESSMENT: PAIN_FUNCTIONAL_ASSESSMENT: NONE - DENIES PAIN

## 2025-01-08 ASSESSMENT — PAIN SCALES - GENERAL: PAINLEVEL_OUTOF10: 0

## 2025-01-08 NOTE — ED NOTES
Pt presents to the Ed with c/o moist cough and N/V.  Pt reports he has had these symptoms for a few days, but denies being around anyone sick.  Pt reports he threw up this morning.  Pt denies pain or fever.    A&Ox4, respirations are even and unlabored, afebrile.

## 2025-01-08 NOTE — ED PROVIDER NOTES
Care of Melisa Castro was assumed from previous attending and is being seen for Cough and Vomiting  .  The patient's initial evaluation and plan have been discussed with the prior provider who initially evaluated the patient.    Handoff taken on the following patient from prior Attending Physician:Sangeeta 7:27 AM EST      Attestation    I was available and discussed any additional care issues that arose and coordinated the management plans with the resident(s) caring for the patient during my duty period. Any areas of disagreement with resident’s documentation of care or procedures are noted on the chart. I was personally present for the key portions of any/all procedures during my duty period. I have documented in the chart those procedures where I was not present during the key portions.      EMERGENCY DEPARTMENT COURSE / MEDICAL DECISION MAKING:       MEDICATIONS GIVEN:  Orders Placed This Encounter   Medications    ondansetron (ZOFRAN-ODT) disintegrating tablet 4 mg    benzocaine-menthol (CEPACOL SORE THROAT) lozenge 1 lozenge    benzonatate (TESSALON) capsule 100 mg       LABS / RADIOLOGY:     Labs Reviewed - No data to display    XR CHEST (2 VW)    Result Date: 1/8/2025  EXAMINATION: TWO XRAY VIEWS OF THE CHEST 1/8/2025 6:32 am COMPARISON: 03/31/2024 HISTORY: ORDERING SYSTEM PROVIDED HISTORY: Cough, post tussive emesis TECHNOLOGIST PROVIDED HISTORY: Cough, post tussive emesis FINDINGS: The lungs are without acute focal process.  There is no effusion or pneumothorax. The cardiomediastinal silhouette is without acute process. The osseous structures are without acute process.     No acute process.       RECENT VITALS:     Temp: 98.4 °F (36.9 °C),  Pulse: 72, Respirations: 16, BP: (!) 143/90, SpO2: 99 %    This patient is a 44 y.o. Male with viral sydnrome now 4 days, needs work note, cxr negative.      OUTSTANDING TASKS / RECOMMENDATIONS:    discharge      Maria Isabel Jewell DO, DO  Attending Emergency 
this time.       ED COURSE      ED Medication Orders (From admission, onward)      Start Ordered     Status Ordering Provider    01/08/25 0630 01/08/25 0627  ondansetron (ZOFRAN-ODT) disintegrating tablet 4 mg  ONCE         Last MAR action: Given - by FREDDY CASTILLO on 01/08/25 at 0633 PASHA RUTHERFORD    01/08/25 0630 01/08/25 0627  benzocaine-menthol (CEPACOL SORE THROAT) lozenge 1 lozenge  ONCE         Last MAR action: Given - by FREDDY CASTILLO on 01/08/25 at 0633 PASHA RUTHERFORD    01/08/25 0630 01/08/25 0627  benzonatate (TESSALON) capsule 100 mg  ONCE         Last MAR action: Given - by FREDDY CASTILLO on 01/08/25 at 0633 PASHA RUTHERFORD            EMERGENCYDEPARTMENT COURSE:  ED Course as of 01/08/25 0656 Wed Jan 08, 2025 0655 Patient care was signed out to the oncoming attending at the end of my shift.  Please see their documentation and resident documentation for additional findings and final disposition. [CK]      ED Course User Index  [CK] Pasha Rutherford MD        PROCEDURES:  None     CONSULTS:  None    CRITICAL CARE:  None      FINAL IMPRESSION      1. Acute upper respiratory infection          DISPOSITION / PLAN     DISPOSITION      Plan for discharge awaiting x-ray, wait for second attending note given signout          PATIENT REFERRED TO:  No follow-up provider specified.    DISCHARGE MEDICATIONS:  Current Discharge Medication List          Pasha Rutherford MD  Emergency Medicine Attending      (Please note that portions of this note were completed with a voice recognition program.  Efforts were made to edit the dictations but occasionallywords are mis-transcribed.)         Pasha Rutherford MD  01/08/25 0656

## 2025-01-08 NOTE — DISCHARGE INSTRUCTIONS
Take medications as prescribed, return to ER for worsening chest pain or shortness of breath, difficulty breathing

## 2025-02-03 ENCOUNTER — APPOINTMENT (OUTPATIENT)
Dept: GENERAL RADIOLOGY | Age: 45
End: 2025-02-03
Payer: MEDICAID

## 2025-02-03 ENCOUNTER — HOSPITAL ENCOUNTER (EMERGENCY)
Age: 45
Discharge: HOME | End: 2025-02-03
Attending: EMERGENCY MEDICINE
Payer: MEDICAID

## 2025-02-03 VITALS
OXYGEN SATURATION: 100 % | HEART RATE: 79 BPM | BODY MASS INDEX: 43.67 KG/M2 | SYSTOLIC BLOOD PRESSURE: 183 MMHG | WEIGHT: 315 LBS | RESPIRATION RATE: 16 BRPM | TEMPERATURE: 98.1 F | DIASTOLIC BLOOD PRESSURE: 112 MMHG

## 2025-02-03 DIAGNOSIS — M25.562 LEFT KNEE PAIN, UNSPECIFIED CHRONICITY: ICD-10-CM

## 2025-02-03 DIAGNOSIS — I1A.0 RESISTANT HYPERTENSION: ICD-10-CM

## 2025-02-03 DIAGNOSIS — M25.462 KNEE EFFUSION, LEFT: Primary | ICD-10-CM

## 2025-02-03 PROCEDURE — 99284 EMERGENCY DEPT VISIT MOD MDM: CPT

## 2025-02-03 PROCEDURE — 96372 THER/PROPH/DIAG INJ SC/IM: CPT

## 2025-02-03 PROCEDURE — 6360000002 HC RX W HCPCS

## 2025-02-03 PROCEDURE — 73564 X-RAY EXAM KNEE 4 OR MORE: CPT

## 2025-02-03 RX ORDER — KETOROLAC TROMETHAMINE 15 MG/ML
15 INJECTION, SOLUTION INTRAMUSCULAR; INTRAVENOUS ONCE
Status: COMPLETED | OUTPATIENT
Start: 2025-02-03 | End: 2025-02-03

## 2025-02-03 RX ORDER — CARVEDILOL 12.5 MG/1
12.5 TABLET ORAL 2 TIMES DAILY
Qty: 180 TABLET | Refills: 1 | Status: SHIPPED | OUTPATIENT
Start: 2025-02-03

## 2025-02-03 RX ADMIN — KETOROLAC TROMETHAMINE 15 MG: 15 INJECTION, SOLUTION INTRAMUSCULAR; INTRAVENOUS at 05:49

## 2025-02-03 ASSESSMENT — ENCOUNTER SYMPTOMS
ABDOMINAL PAIN: 0
SHORTNESS OF BREATH: 0
NAUSEA: 0
VOMITING: 0

## 2025-02-03 ASSESSMENT — PAIN DESCRIPTION - ORIENTATION
ORIENTATION: LEFT

## 2025-02-03 ASSESSMENT — PAIN DESCRIPTION - LOCATION
LOCATION: KNEE

## 2025-02-03 ASSESSMENT — PAIN SCALES - GENERAL
PAINLEVEL_OUTOF10: 8

## 2025-02-03 ASSESSMENT — PAIN - FUNCTIONAL ASSESSMENT: PAIN_FUNCTIONAL_ASSESSMENT: 0-10

## 2025-02-03 ASSESSMENT — PAIN DESCRIPTION - DESCRIPTORS
DESCRIPTORS: ACHING

## 2025-02-03 NOTE — ED PROVIDER NOTES
[facility]   Emergency Department  Faculty Attestation       I performed a history and physical examination of the patient and discussed management with the resident. I reviewed the resident’s note and agree with the documented findings including all diagnostic interpretations and plan of care. Any areas of disagreement are noted on the chart. I was personally present for the key portions of any procedures. I have documented in the chart those procedures where I was not present during the key portions. I have reviewed the emergency nurses triage note. I agree with the chief complaint, past medical history, past surgical history, allergies, medications, social and family history as documented unless otherwise noted below.  For Physician Assistant/ Nurse Practitioner cases/documentation I have personally evaluated this patient and have completed at least one if not all key elements of the E/M (history, physical exam, and MDM). Additional findings are as noted.    Patient Name: Melisa Castro  MRN: [id number]  : 1980  Primary Care Physician: Sandra Franco APRN - CNP    Date of evaluationa: 2/3/2025   Note Started: 5:43 AM EST    Pertinent Comments     Chief Complaint:   Chief Complaint   Patient presents with    Knee Pain     Pt c/o left knee pain. Pt reports pain has been present for the past week, worse after slipping on ice yesterday. Pt able to bear weight on LLE without difficulty. No deformity, redness or swelling noted to left knee.         Initial vitals: (If not listed, please see nursing documentation)  ED Triage Vitals   BP Systolic BP Percentile Diastolic BP Percentile Temp Temp src Pulse Respirations SpO2   25 0538 -- -- 25 0538 -- 25 0538 25 0538 25 0538   (!) 183/112   98.1 °F (36.7 °C)  79 16 100 %      Height Weight - Scale         -- 25 0540          (!) 150.1 kg (331 lb)              HPI/PE/Impression:  This is a 45 y.o. male who

## 2025-02-03 NOTE — DISCHARGE INSTRUCTIONS
You have been evaluated in the Emergency Department at [facility] 2/3/2025 for left knee pain.  Your x-rays did not show any acute fracture or dislocation however there was evidence of a joint effusion and significant osteoarthritis.  We recommend you follow-up with orthopedic surgery for further evaluation and possible consideration of additional imaging modalities    Instructions & Next Steps:  -Findings: Mild left knee effusion, significant osteoarthritis  -Medications: Take Tylenol and Motrin as needed for pain.  Continue to take your other home meds/previously prescribed medications unless directed otherwise.  -Follow-up: Orthopedic surgery  -Schedule an appointment with your primary care provider (PCP) or establish care with a provider if you do not have one.  -Additional Recommendations: Use ice to help with inflammation and pain    When to Seek Immediate Medical Attention:  Return to the Emergency Department if you experience:  -Develop fevers, redness, swelling, warmth to the knee. Inability to tolerate weightbearing  -Worsening or changing symptoms  -Any other urgent health concerns    For questions about your care or further treatment, call the [facility] Emergency Department at [phone].

## 2025-02-11 ENCOUNTER — APPOINTMENT (OUTPATIENT)
Dept: GENERAL RADIOLOGY | Age: 45
End: 2025-02-11
Payer: MEDICAID

## 2025-02-11 ENCOUNTER — HOSPITAL ENCOUNTER (EMERGENCY)
Age: 45
Discharge: HOME | End: 2025-02-11
Attending: EMERGENCY MEDICINE
Payer: MEDICAID

## 2025-02-11 VITALS
BODY MASS INDEX: 46.31 KG/M2 | SYSTOLIC BLOOD PRESSURE: 177 MMHG | DIASTOLIC BLOOD PRESSURE: 110 MMHG | TEMPERATURE: 99.9 F | HEART RATE: 92 BPM | WEIGHT: 315 LBS | OXYGEN SATURATION: 99 % | RESPIRATION RATE: 20 BRPM

## 2025-02-11 DIAGNOSIS — M15.0 PRIMARY OSTEOARTHRITIS INVOLVING MULTIPLE JOINTS: Primary | ICD-10-CM

## 2025-02-11 PROCEDURE — 99283 EMERGENCY DEPT VISIT LOW MDM: CPT

## 2025-02-11 PROCEDURE — 73562 X-RAY EXAM OF KNEE 3: CPT

## 2025-02-11 PROCEDURE — 6370000000 HC RX 637 (ALT 250 FOR IP): Performed by: EMERGENCY MEDICINE

## 2025-02-11 RX ORDER — IBUPROFEN 800 MG/1
800 TABLET, FILM COATED ORAL 2 TIMES DAILY PRN
Qty: 10 TABLET | Refills: 0 | Status: SHIPPED | OUTPATIENT
Start: 2025-02-11 | End: 2025-02-16

## 2025-02-11 RX ORDER — IBUPROFEN 400 MG/1
400 TABLET, FILM COATED ORAL ONCE
Status: COMPLETED | OUTPATIENT
Start: 2025-02-11 | End: 2025-02-11

## 2025-02-11 RX ADMIN — IBUPROFEN 400 MG: 400 TABLET, FILM COATED ORAL at 19:49

## 2025-02-11 ASSESSMENT — LIFESTYLE VARIABLES
HOW MANY STANDARD DRINKS CONTAINING ALCOHOL DO YOU HAVE ON A TYPICAL DAY: PATIENT DOES NOT DRINK
HOW OFTEN DO YOU HAVE A DRINK CONTAINING ALCOHOL: NEVER

## 2025-02-11 ASSESSMENT — PAIN - FUNCTIONAL ASSESSMENT
PAIN_FUNCTIONAL_ASSESSMENT: 0-10
PAIN_FUNCTIONAL_ASSESSMENT: 0-10

## 2025-02-11 ASSESSMENT — PAIN DESCRIPTION - ORIENTATION: ORIENTATION: RIGHT;LEFT

## 2025-02-11 ASSESSMENT — PAIN SCALES - GENERAL
PAINLEVEL_OUTOF10: 2
PAINLEVEL_OUTOF10: 9

## 2025-02-11 ASSESSMENT — PAIN DESCRIPTION - LOCATION: LOCATION: LEG;KNEE

## 2025-02-12 NOTE — ED PROVIDER NOTES
[facility]     Emergency Department     Faculty Attestation    I performed a history and physical examination of the patient and discussed management with the resident. I reviewed the resident’s note and agree with the documented findings including all diagnostic interpretations and plan of care. Any areas of disagreement are noted on the chart. I was personally present for the key portions of any procedures. I have documented in the chart those procedures where I was not present during the key portions. I have reviewed the emergency nurses triage note. I agree with the chief complaint, past medical history, past surgical history, allergies, medications, social and family history as documented unless otherwise noted below. Documentation of the HPI, Physical Exam and Medical Decision Making performed by [email or web address] is based on my personal performance of the HPI, PE and MDM. For Physician Assistant/ Nurse Practitioner cases/documentation I have personally evaluated this patient and have completed at least one if not all key elements of the E/M (history, physical exam, and MDM). Additional findings are as noted.    Primary Care Physician: Sandra Franco APRN - CNP    Note Started: 7:53 PM EST     VITAL SIGNS:   weight is 159.2 kg (351 lb) (abnormal). His temperature is 99.9 °F (37.7 °C). His blood pressure is 177/99 (abnormal) and his pulse is 92. His respiration is 20 and oxygen saturation is 97%.      Medical Decision Making  Knee pain. Ongoing issue, fall recently with negative imaging on left. 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 R knee shows some discomfort with range of motion but no laxity. Neurovasc intact b/l in feet. Will XR R knee, sx treatment, ortho follow up.    Amount and/or Complexity of Data Reviewed  External Data Reviewed: radiology and notes.  Radiology: ordered.    Risk  Prescription drug management.        Pete 
ONDANSETRON (ZOFRAN-ODT) 4 MG DISINTEGRATING TABLET    Take 1 tablet by mouth 3 times daily as needed for Nausea or Vomiting    OXCARBAZEPINE (TRILEPTAL) 150 MG TABLET    Take 1 tablet by mouth Daily    POLYETHYLENE GLYCOL (GLYCOLAX) 17 GM/SCOOP POWDER    Take as directed by physician office for colonoscopy prep    ZOLPIDEM (AMBIEN) 5 MG TABLET    Take 1 tablet by mouth nightly as needed.       Encounter Medications  Orders Placed This Encounter   Medications    ibuprofen (ADVIL;MOTRIN) tablet 400 mg       ALLERGIES     has No Known Allergies.      RECENT VITALS:   Temp: 99.9 °F (37.7 °C),  Pulse: 92, Respirations: 20, BP: (!) 177/99    RADIOLOGY:   XR KNEE RIGHT (3 VIEWS)    (Results Pending)       LABS:  Labs Reviewed - No data to display        PLAN/ TASKS OUTSTANDING    Right knee xray pending. Will discharge f/u ortho.         (Please note that portions of this note were completed with a voice recognition program.  Efforts were made to edit the dictations but occasionally words are mis-transcribed.)    Jeff Maxwell MD, MD,   Attending Emergency Physician       Jeff Maxwell MD  02/11/25 1957    
  melatonin 10 MG CAPS capsule Take 1 capsule by mouth nightly 9/13/24   Hamilton Edmonds MD   hydroCHLOROthiazide (HYDRODIURIL) 25 MG tablet Take 1 tablet by mouth every morning 9/13/24   Hamilton Edmonds MD   acetaminophen (TYLENOL) 500 MG tablet Take 2 tablets by mouth every 8 hours as needed for Pain or Fever 7/24/24   Lucie Jon DO   zolpidem (AMBIEN) 5 MG tablet Take 1 tablet by mouth nightly as needed. 4/23/24   Gilbert Kenyon MD   amLODIPine (NORVASC) 10 MG tablet Take 1 tablet by mouth daily 4/12/24   Sammi Vega MD   lisinopril (PRINIVIL;ZESTRIL) 40 MG tablet Take 1 tablet by mouth daily 4/12/24   Sammi Vega MD   OXcarbazepine (TRILEPTAL) 150 MG tablet Take 1 tablet by mouth Daily 9/26/23   Gilbert Kenyon MD   Cholecalciferol (VITAMIN D3) 125 MCG (5000 UT) TABS Take 1 tablet by mouth daily 7/11/23   Gilbert Kenyon MD   lurasidone (LATUDA) 20 MG TABS tablet Latuda 20 mg tablet    ProviderGilbert MD         REVIEW OF SYSTEMS       Review of Systems   Constitutional:  Negative for chills and fever.   Musculoskeletal:  Positive for arthralgias.       PHYSICAL EXAM      INITIAL VITALS:   BP (!) 177/110   Pulse 92   Temp 99.9 °F (37.7 °C)   Resp 20   Wt (!) 159.2 kg (351 lb)   SpO2 99%   BMI 46.31 kg/m²     Physical Exam  Constitutional:       General: He is not in acute distress.  HENT:      Head: Normocephalic.   Cardiovascular:      Rate and Rhythm: Normal rate and regular rhythm.   Pulmonary:      Effort: Pulmonary effort is normal.   Musculoskeletal:         General: Normal range of motion.      Right lower leg: No edema.      Left lower leg: No edema.      Comments: Negative anterior and posterior Lachman  Tenderness with varus and valgus testing bilaterally but no laxity  Diffuse tenderness to anterior knees bilaterally no overlying erythema or ecchymosis.  No warmth.   Neurological:      General: No focal deficit

## 2025-02-12 NOTE — DISCHARGE INSTRUCTIONS
You were seen today for knee pain.  We did x-rays of the your right knee as you had x-rays of your left knee done on your last visit.  This x-ray shows evidence of osteoarthritis which is what was seen on the other knee as well.    If you notice any concerning symptoms please return to the ER immediately. These can include but are not limited to: fevers, chills, shortness of breath, vomiting, weakness of the extremities, changes in your mental status, numbness, pale extremities, or chest pain.     Wound care: none    Diet: You may resume your normal diet     Activity: resume activity as tolerated.     Medications: Continue taking your home medications as previously directed. For pain You may take tylenol 1,000mg by mouth every 6 hours as needed for pain. Do not exceed 4,000mg per day. If you have liver disease don't take tylenol. You may also take ibuprofen 600mg every 6-8 hours as needed for pain. Do not exceed 2,400 mg per day. If you experience stomach pain or you have a history of kidney disease stop taking ibuprofen. You may alternate application of ice and heat 20 minutes at a time as desired.      Follow up: Please follow up with your primary care doctor within one week or as needed.  Please follow-up with orthopedic surgery.

## 2025-02-12 NOTE — ED NOTES
Patient to ED complaining of right knee pain. Patient states he slid and fell last week and hurts his left leg. Patient think because of that fall his been putting all his weight to right leg and now it's causing his knee to hurt. No recent trauma/injury. Left leg still hurting because of the incident from lat week. V taken and recorded. Plan of care on going.

## 2025-02-19 ENCOUNTER — OFFICE VISIT (OUTPATIENT)
Dept: ORTHOPEDIC SURGERY | Age: 45
End: 2025-02-19
Payer: MEDICAID

## 2025-02-19 VITALS — BODY MASS INDEX: 41.75 KG/M2 | WEIGHT: 315 LBS | HEIGHT: 73 IN

## 2025-02-19 DIAGNOSIS — M25.561 PAIN IN BOTH KNEES, UNSPECIFIED CHRONICITY: Primary | ICD-10-CM

## 2025-02-19 DIAGNOSIS — M25.562 PAIN IN BOTH KNEES, UNSPECIFIED CHRONICITY: Primary | ICD-10-CM

## 2025-02-19 DIAGNOSIS — M17.0 BILATERAL PRIMARY OSTEOARTHRITIS OF KNEE: ICD-10-CM

## 2025-02-19 PROCEDURE — G8417 CALC BMI ABV UP PARAM F/U: HCPCS | Performed by: ORTHOPAEDIC SURGERY

## 2025-02-19 PROCEDURE — 1036F TOBACCO NON-USER: CPT | Performed by: ORTHOPAEDIC SURGERY

## 2025-02-19 PROCEDURE — 99213 OFFICE O/P EST LOW 20 MIN: CPT | Performed by: ORTHOPAEDIC SURGERY

## 2025-02-19 PROCEDURE — G8427 DOCREV CUR MEDS BY ELIG CLIN: HCPCS | Performed by: ORTHOPAEDIC SURGERY

## 2025-02-19 NOTE — PROGRESS NOTES
[facility]  [address]  [place] [address]  Dept: [phone]  Dept Fax: [phone]        Ambulatory   New Patient     Subjective:   Melisa Castro is a 45 year old male who presents to our office today for evaluation of their left knee pain.  Patient has been dealing with chronic bilateral knee pain for many years, however his left knee has worsened over the past several months.  His left knee pain is primary localized to the patella.  He describes his left knee pain as constant and severe, and that he can feel it at all times of the day.  He denies recollection of any recent inciting events or traumas.  He endorses sensations of instability in the left knee and states he is fallen multiple times due to this.  His left knee pain is worse with extended periods of standing and sitting.  He has tried NSAIDs, physical therapy, and corticosteroid injections for his bilateral lateral knees in the past, which have only helped minimally. He is not interested in surgery at this time. Today the patient is interested in beginning physical therapy, which she states has been the most helpful in the past.      He stated he was a bit of a daredevil is a child and would frequently jump off of elevated surfaces and roofs for fun.  He never sustained any significant injuries to his knees though he believes these events may have led to the current state of his knees today. He also previously weighed more than 500 pounds which he also contributes to his knee pain, however he has been on a weight loss journey and has lost around 200 pounds.  He maintains an active lifestyle despite his severe left knee pain.  He works as a  at [employer] store and frequently cares for his elderly mother.      Review of Systems   Constitutional: Negative for Fever and Chills.   HENT: Negative for Congestion.    Eyes: Negative for Blurred Vision and

## 2025-02-25 ENCOUNTER — HOSPITAL ENCOUNTER (OUTPATIENT)
Age: 45
Setting detail: THERAPIES SERIES
Discharge: HOME | End: 2025-02-25
Payer: MEDICAID

## 2025-02-25 ENCOUNTER — TELEPHONE (OUTPATIENT)
Dept: ORTHOPEDIC SURGERY | Age: 45
End: 2025-02-25

## 2025-02-25 PROCEDURE — 97110 THERAPEUTIC EXERCISES: CPT

## 2025-02-25 PROCEDURE — 97161 PT EVAL LOW COMPLEX 20 MIN: CPT

## 2025-02-25 NOTE — CONSULTS
[x] [facility]  Outpatient Physical Therapy  [address]  Phone: [phone]  Fax: [phone] [] [facility] Outpatient Rehabilitation & Therapy  [address]   Phone: [phone]  Fax: [phone]  [] [facility]  [address]   [address]  Phone: [phone]   Fax: [phone]     Physical Therapy Evaluation    Date:  2025  Patient: Melisa Castro  : 1980  MRN: [id number]  Physician: Obi Ferrera DO / Mynor Hou DO      Insurance: [place] [employer]   Medical Diagnosis: Bilateral primary osteoarthritis of knee (M17.0) (L>R) - primarily patellar     Rehab Codes: M25.662, M25.562, R53.1  Onset Date:  25                          Next 's appt: 25    Subjective:   CC:  Patient reports chronic bilateral knee pain with left knee much worse that right. His pain is constant and affects his ability to stand walk, squat and kneel. He works as a  at [employer] and his knee pain limits his hours. He also walks around his neighborhood for 1 hr/daily in order to exercises and stay active. Patient states he has been trying to control his weight and his activity level.   HPI: Patient reports being very rough on his knees when he was younger including daredevil stunts (jumping off roofs). He also played football. He has been morbidly obese for many years. His knee pain began in his 20's. He reports no history of surgery or specific injuries that required treatment. He has history of PT treatment and cortisone injections. He was given a brace from ortho but he states a strap broke.     PMHx/Comorbidities:    Past Medical History:   Diagnosis Date    Anxiety     Arthritis     CAD (coronary artery disease)     Fatty liver     Fatty liver disease, nonalcoholic 7/10/2012    Hypertension     Obesity     Unspecified sleep apnea     cpap       Medications: [x] Refer to full medical record [] None []

## 2025-03-04 ENCOUNTER — HOSPITAL ENCOUNTER (OUTPATIENT)
Age: 45
Setting detail: THERAPIES SERIES
Discharge: HOME | End: 2025-03-04
Payer: MEDICAID

## 2025-03-04 DIAGNOSIS — J31.0 CHRONIC RHINITIS: ICD-10-CM

## 2025-03-04 PROCEDURE — 97110 THERAPEUTIC EXERCISES: CPT

## 2025-03-04 RX ORDER — ALBUTEROL SULFATE 90 UG/1
2 INHALANT RESPIRATORY (INHALATION) EVERY 6 HOURS PRN
Qty: 18 G | Refills: 5 | Status: SHIPPED | OUTPATIENT
Start: 2025-03-04

## 2025-03-04 NOTE — FLOWSHEET NOTE
- 3 sets - 10 reps  - Seated Knee Extension with Resistance  - 1 x daily - 3 x weekly - 3 sets - 10 reps  - Heel Raises with Counter Support  - 1 x daily - 3 x weekly - 3 sets - 10 reps  - Standing Hamstring Curl with Resistance  - 1 x daily - 3 x weekly - 3 sets - 10 reps     Plan: [x] Continue current frequency toward long and short term goals.    [x] Specific Instructions for subsequent treatments: Please include unrestricted active ROM, strengthening, VMO strengthening, and pain and edema reduction as well as any additional modalities you feel would be appropriate not specifically addressed above. Weight bearing status: Weight bearing as tolerated       Time In: 929 am            Time Out: 1011 am    Electronically signed by:  Clair Richmond PTA

## 2025-03-04 NOTE — TELEPHONE ENCOUNTER
..Request for   Requested Prescriptions     Pending Prescriptions Disp Refills    albuterol sulfate HFA (PROVENTIL;VENTOLIN;PROAIR) 108 (90 Base) MCG/ACT inhaler [Pharmacy Med Name: albuterol sulfate HFA 90 mcg/actuation aerosol inhaler] 18 g 5     Sig: INHALE 2 PUFFS INTO THE LUNGS EVERY 6 HOURS AS NEEDED FOR WHEEZING    .      Please review and e-scribe to pharmacy listed in chart if appropriate. Thank you.      Last Visit Date: 4/12/2024  Next Visit Date: Visit date not found    Future Appointments   Date Time Provider Department Center   3/6/2025  9:45 AM Clair Richmond, PTA [facility]   3/11/2025  9:45 AM Viviana Guillen, PTA [facility]   3/13/2025  9:45 AM GuillenViviana griffin, PTA [facility]   4/16/2025  9:40 AM SCHEDULE, [facility]   4/23/2025 11:00 AM Sandra Franco APRN - CNP [facility]   5/8/2025  2:00 PM Nikolas Wang, [facility]       Health Maintenance   Topic Date Due    Colorectal Cancer Screen  Never done    COVID-19 Vaccine (6 - 2024-25 season) 09/01/2024    Flu vaccine (1) 11/14/2025 (Originally 8/1/2024)    A1C test (Diabetic or Prediabetic)  07/16/2025    Depression Monitoring  11/14/2025    DTaP/Tdap/Td vaccine (3 - Td or Tdap) 04/27/2028    Lipids  07/16/2029    Hepatitis B vaccine  Completed    Pneumococcal 0-49 years Vaccine  Completed    Hepatitis C screen  Completed    HIV screen  Completed    Hepatitis A vaccine  Aged Out    Hib vaccine  Aged Out    Polio vaccine  Aged Out    Meningococcal (ACWY) vaccine  Aged Out    Depression Screen  Discontinued       Hemoglobin A1C (%)   Date Value   07/16/2024 5.7   05/30/2023 5.4   01/05/2022 5.5             ( goal A1C is < 7)   No components found for: \"LABMICR\"  No components found for: \"LDLCHOLESTEROL\", \"LDLCALC\"    (goal LDL is <100)   AST (U/L)   Date Value   07/16/2024 24     ALT (U/L)   Date Value   07/16/2024 13     BUN (mg/dL)   Date Value

## 2025-03-06 ENCOUNTER — HOSPITAL ENCOUNTER (OUTPATIENT)
Age: 45
Setting detail: THERAPIES SERIES
Discharge: HOME | End: 2025-03-06
Payer: MEDICAID

## 2025-03-06 NOTE — FLOWSHEET NOTE
[x] [facility]  Outpatient Rehabilitation &  Therapy  [address] [place] [address]  [phone]  [phone]     Therapy Cancel/No Show note    Date: 3/6/2025  Patient: Melisa Castro  : 1980  MRN: [id number]    Cancels/No Shows to date:     For today's appointment patient:    []  Cancelled    [] Rescheduled appointment    [x] No-show     Reason given by patient:    []  Patient ill    []  Conflicting appointment    [] No transportation      [] Conflict with work    [x] No reason given    [] Weather related    [] COVID-19    [] Other:      Comments:        [x] Next appointment was confirmed AT LAST APPT - written schedule issued    Electronically signed by: Clair Richmond PTA

## 2025-03-09 DIAGNOSIS — I1A.0 RESISTANT HYPERTENSION: ICD-10-CM

## 2025-03-10 RX ORDER — MELATONIN 10 MG
CAPSULE ORAL
Qty: 30 CAPSULE | Refills: 5 | OUTPATIENT
Start: 2025-03-10

## 2025-03-10 RX ORDER — HYDROCHLOROTHIAZIDE 25 MG/1
25 TABLET ORAL EVERY MORNING
Qty: 90 TABLET | Refills: 1 | OUTPATIENT
Start: 2025-03-10

## 2025-03-11 ENCOUNTER — HOSPITAL ENCOUNTER (OUTPATIENT)
Age: 45
Setting detail: THERAPIES SERIES
Discharge: HOME | End: 2025-03-11
Payer: MEDICAID

## 2025-03-11 NOTE — FLOWSHEET NOTE
[x] [facility]  Outpatient Rehabilitation &  Therapy  [address] [place] [address]  [phone]  [phone]     Therapy Cancel/No Show note    Date: 3/11/2025  Patient: Melisa Castro  : 1980  MRN: [id number]      This cancellation does not count against the patients attendance record.    Discipline cancelled:    [x] Physical Therapy    [] Occupational Therapy    [] Speech Therapy      Clinic canceled today's appointment due to:    [x]  Therapist ill    []  Other:      Comments:          [] Reached patient via phone    [x] Left message on voicemail    [] Unable to reach patient due to:         Able to confirm next appt:     [x] Yes on VM    [] No        Electronically signed by: CHARIS PRASAD PTA

## 2025-03-13 ENCOUNTER — HOSPITAL ENCOUNTER (OUTPATIENT)
Age: 45
Setting detail: THERAPIES SERIES
Discharge: HOME | End: 2025-03-13
Payer: MEDICAID

## 2025-03-13 DIAGNOSIS — G47.33 OSA ON CPAP: ICD-10-CM

## 2025-03-13 PROCEDURE — 97110 THERAPEUTIC EXERCISES: CPT

## 2025-03-13 NOTE — FLOWSHEET NOTE
[x] [facility]  Outpatient Rehabilitation &  Therapy  [address]  [phone]  [phone]     Physical Therapy Daily Treatment Note    Date:  3/13/2025  Patient Name:  Melisa Castro    :  1980  MRN: [id number]  Physician: Obi Ferrera DO / Mynor Hou DO                                    Insurance: [place] [employer]   Medical Diagnosis: Bilateral primary osteoarthritis of knee (M17.0) (L>R) - primarily patellar                         Rehab Codes: M25.662, M25.562, R53.1  Onset Date:  25                          Next 's appt: 25    Visit# / total visits: 3/12;     Cancels/No Shows: 0/1  Frequency:  2 x/week for 12 visits     Subjective:    Pain:  [x] Yes  [] No               Location: L knee           Pain Rating (0-10 scale):        Current:  10/10   Pain altered Tx:  [x] No  [] Yes  Action:  Comments: Patient arrives after missed appts stating \"in pain the last two days at work\". Reports pain has been 10/10 for the last two days but has been doing HEP as able.    Objective:  Modalities:   Precautions [x] No  [] Yes:   Exercises:  Exercise Reps/ Time Weight/ Level Comments   Scifit 5m L3          Standing      Gastroc stretch 3x20\"     Heel raises 2x10  Cues to push towards great toe to reduce EV   [place]  15x ea  Cues for knee to ceiling   TKE 15x3\" ea [place]    HS curls 15x ea     Lateral step ups 15x ea 4\" Added 3/13         Seated      HS stretch 3x20\"     HS curls 15x ea Blue Added 3/13   LAQ 15x ea  Added 3/13  Cue for quad squeeze          Supine      Clamshells 2x10 Plum    Banded bridge 2x10 Plum    SLR 10x ea     SLR with ER 10x ea           Sidelying      Clamshells   Added 3/13   Hip abduction 2x10           Other:         Treatment Charges: Mins Units   []  Modalities       [x]  Ther Exercise 45 3   []  Neuromuscular Re-ed     []  Gait Training     []  Manual Therapy     []  Ther Activities     []  Aquatics     []  Vasocompression     []

## 2025-03-14 RX ORDER — WATER
LIQUID (ML) MISCELLANEOUS
Qty: 3 EACH | Refills: 2 | Status: SHIPPED | OUTPATIENT
Start: 2025-03-14

## 2025-03-17 RX ORDER — POLYETHYLENE GLYCOL 3350 17 G/17G
POWDER, FOR SOLUTION ORAL
Qty: 289 G | Refills: 0 | Status: SHIPPED | OUTPATIENT
Start: 2025-03-17

## 2025-03-20 ENCOUNTER — HOSPITAL ENCOUNTER (OUTPATIENT)
Age: 45
Setting detail: THERAPIES SERIES
Discharge: HOME | End: 2025-03-20
Payer: MEDICAID

## 2025-03-20 NOTE — FLOWSHEET NOTE
[x] [facility]  Outpatient Rehabilitation &  Therapy  [address]  [phone]  [phone]     Therapy Cancel/No Show note    Date: 3/20/2025  Patient: Melisa Castro  : 1980  MRN: [id number]    Cancels/No Shows to date:     For today's appointment patient:    [x]  Cancelled    [] Rescheduled appointment    [] No-show     Reason given by patient:    []  Patient ill    [x]  Conflicting appointment    [] No transportation      [] Conflict with work    [] No reason given    [] Weather related    [] COVID-19    [x] Other:      Comments:  Patient scheduled for one appt next week.       [x] Next appointment was confirmed     Electronically signed by: Clair Richmond PTA

## 2025-03-26 ENCOUNTER — OFFICE VISIT (OUTPATIENT)
Dept: ORTHOPEDIC SURGERY | Age: 45
End: 2025-03-26

## 2025-03-26 VITALS — WEIGHT: 315 LBS | BODY MASS INDEX: 41.75 KG/M2 | HEIGHT: 73 IN

## 2025-03-26 DIAGNOSIS — M17.12 PRIMARY OSTEOARTHRITIS OF LEFT KNEE: Primary | ICD-10-CM

## 2025-03-26 DIAGNOSIS — I1A.0 RESISTANT HYPERTENSION: ICD-10-CM

## 2025-03-26 PROCEDURE — G8427 DOCREV CUR MEDS BY ELIG CLIN: HCPCS | Performed by: ORTHOPAEDIC SURGERY

## 2025-03-26 PROCEDURE — G8417 CALC BMI ABV UP PARAM F/U: HCPCS | Performed by: ORTHOPAEDIC SURGERY

## 2025-03-26 PROCEDURE — 1036F TOBACCO NON-USER: CPT | Performed by: ORTHOPAEDIC SURGERY

## 2025-03-26 RX ORDER — HYDROCHLOROTHIAZIDE 25 MG/1
25 TABLET ORAL EVERY MORNING
Qty: 90 TABLET | Refills: 1 | OUTPATIENT
Start: 2025-03-26

## 2025-03-26 RX ORDER — METHYLPREDNISOLONE ACETATE 80 MG/ML
80 INJECTION, SUSPENSION INTRA-ARTICULAR; INTRALESIONAL; INTRAMUSCULAR; SOFT TISSUE ONCE
Status: SHIPPED | OUTPATIENT
Start: 2025-03-26

## 2025-03-26 RX ORDER — BUPIVACAINE HYDROCHLORIDE 2.5 MG/ML
2 INJECTION, SOLUTION INFILTRATION; PERINEURAL ONCE
Status: SHIPPED | OUTPATIENT
Start: 2025-03-26

## 2025-03-26 RX ORDER — IBUPROFEN 800 MG/1
800 TABLET, FILM COATED ORAL
Qty: 90 TABLET | Refills: 0 | Status: SHIPPED | OUTPATIENT
Start: 2025-03-26

## 2025-03-26 NOTE — PROGRESS NOTES
[facility] PHYSICIANS [facility] ORTHO SPECIALISTS  [address]  [place] [address]  Dept: [phone]  Dept Fax: [phone]        Established Patient  Follow Up     Subjective:   Melisa Castro is a 45 year old male who presents to our office today for re-evaluation of his left chronic knee pain. He was last seen in our office on 2/24/25 where we referred him for PT. He reports going to a couple sessions and feeling like it flares up his pain more than anything. He continues to have severe pain in his left knee. He is a  and stands on his feet for prolonged periods daily. He endorses continued painful popping, clicking. He last received an injection from Dr. Man 11/7/23.    Denies smoking history. Recent A1C was 5.7% on chart review. Patients BMI today is 45.39. He reports gaining some weight back recently due to the stressors of his life/relationships, but he is working hard to lose more weight.    Review of Systems   Constitutional: Negative for Fever and Chills.   HENT: Negative for Congestion.    Eyes: Negative for Blurred Vision and Double Vision.   Respiratory: Negative for Cough, Shortness of Breath and Wheezing.    Cardiovascular: Negative for Chest Pain and Palpitations.   Gastrointestinal: Negative for Nausea. Negative for Vomiting.   Musculoskeletal: Positive for Myalgias and Extremity pain  Skin: Negative for Itching and Rash.   Neurological: Negative for Dizziness, Sensory Change and Headaches.   Psychiatric/Behavioral: Negative for Depression and Suicidal Ideas.     Objective:   General: AAOx3, NAD.  Ortho Exam  Neuro: Alert. Oriented.  Eyes: Extra-Ocular Muscles Intact.  Mouth: Oral Mucosa Moist. No Perioral Lesions.  Pulm: Respirations Unlabored and Regular.  Skin: Warm, Well Perfused.  Psych: Patient has good fund of knowledge and displays understanding of Exam, Diagnosis, and Plan.  MSK:   LLE   No ecchymoses, abrasions, deformity, or

## 2025-03-27 ENCOUNTER — HOSPITAL ENCOUNTER (OUTPATIENT)
Age: 45
Setting detail: THERAPIES SERIES
Discharge: HOME | End: 2025-03-27
Payer: MEDICAID

## 2025-03-27 NOTE — DISCHARGE SUMMARY
[x] [facility]  Outpatient Rehabilitation &  Therapy  [address]  [phone]  [phone]        Physical Therapy Discharge Note    Date: 3/27/2025      Patient: Melisa Castro  : 1980  MRN: [id number]    Physician: Obi Ferrera DO / Mynor Hou DO                                    Insurance: [place] [employer]   Medical Diagnosis: Bilateral primary osteoarthritis of knee (M17.0) (L>R) - primarily patellar                         Rehab Codes: M25.662, M25.562, R53.1  Onset Date:  25                          Next 's appt: 25     Visit# / total visits: 3/12;                     Cancels/No Shows: 1/2  Frequency:  2 x/week for 12 visits     Date of initial visit: 25                Date of final visit: 3/13/27      Subjective:  Refer to most recent note in Epic.    Objective:  Refer to most recent note in Epic.    Assessment:  Refer to most recent note in Epic.    Treatment to Date:  Refer to most recent note in Epic.     Discharge Status:     [] Pt to continue exercise/home instructions independently.    [] Therapy interrupted due to:    [x] Pt has 2 or more no shows/cancels, is discontinued per our policy.    [] Unknown discharge/failed to continue treatment.    [] Other:         Electronically signed by Clair Richmond PTA on 3/27/2025 at 10:47 AM      If you have any questions or concerns, please don't hesitate to call.  Thank you for your referral.

## 2025-03-27 NOTE — FLOWSHEET NOTE
[x] [facility]  Outpatient Rehabilitation &  Therapy  [address]  [phone]  [phone]     Therapy Cancel/No Show note    Date: 3/27/2025  Patient: Melisa Castro  : 1980  MRN: [id number]    Cancels/No Shows to date:     For today's appointment patient:    []  Cancelled    [] Rescheduled appointment    [x] No-show     Reason given by patient:    []  Patient ill    []  Conflicting appointment    [] No transportation      [] Conflict with work    [] No reason given    [] Weather related    [] COVID-19    [x] Other:      Comments:  Patient called stating would be late due to cab. Did not show for appt. Attempted to call to inform of missed appt, no answer. Will be discharged due to attendance.       [] Next appointment was confirmed     Electronically signed by: Clair Richmond PTA

## 2025-04-16 ENCOUNTER — OFFICE VISIT (OUTPATIENT)
Dept: ORTHOPEDIC SURGERY | Age: 45
End: 2025-04-16
Payer: MEDICAID

## 2025-04-16 VITALS — HEIGHT: 73 IN | BODY MASS INDEX: 45.4 KG/M2

## 2025-04-16 DIAGNOSIS — M17.0 BILATERAL PRIMARY OSTEOARTHRITIS OF KNEE: Primary | ICD-10-CM

## 2025-04-16 PROCEDURE — G8417 CALC BMI ABV UP PARAM F/U: HCPCS | Performed by: STUDENT IN AN ORGANIZED HEALTH CARE EDUCATION/TRAINING PROGRAM

## 2025-04-16 PROCEDURE — 1036F TOBACCO NON-USER: CPT | Performed by: STUDENT IN AN ORGANIZED HEALTH CARE EDUCATION/TRAINING PROGRAM

## 2025-04-16 PROCEDURE — 99213 OFFICE O/P EST LOW 20 MIN: CPT | Performed by: STUDENT IN AN ORGANIZED HEALTH CARE EDUCATION/TRAINING PROGRAM

## 2025-04-16 PROCEDURE — G8427 DOCREV CUR MEDS BY ELIG CLIN: HCPCS | Performed by: STUDENT IN AN ORGANIZED HEALTH CARE EDUCATION/TRAINING PROGRAM

## 2025-04-16 NOTE — PROGRESS NOTES
Lachman 1A. Stable to varus and valgus stress at 0/30 degrees flexion. Pain with varus Chung. Compartments soft. EHL/FHL/TA/GSC motor intact. Sural, Saphenous, Superficial/Deep Peroneal, and Plantar Nerve distribution SILT. Leg warm and well perfused.    Radiology:  No new radiographs in office today     Assessment:   45 y.o. year old male with bilateral knee OA  Plan:   -Patient is doing extremely well after intra-articular cortisone injection left knee.  He will continue formal physical therapy.  He would like to try going to [facility].  This prescription does work at this location.  If he has any issues he will call back.  He has tried bracing however with his body habitus has difficulty in maintaining them on the knee.  He takes anti-inflammatories as needed.  We discussed the possibility of repeat cortisone injections no sooner than 4 months.  We did discuss total knee replacement and risks that accompany that procedure.  We discussed in the event he is ready for total knee replacement that he would need to decrease his BMI under 40 due to increased risk of infection.  We discussed weight loss.  Now that his knee is feeling better he is walking more and staying more active.  All questions and concerns were answered.  Patient will return in no sooner than 4 months for repeat injection or as needed.        Electronically signed by Fran Morales DO PGY-4 on 4/16/2025 at 10:07 AM    This note is created with the assistance of a speech recognition program.  While intending to generate a document that actually reflects the content of the visit, the document can still have some errors including those of syntax and sound a like substitutions which may escape proof reading.  In such instances, actual meaning can be extrapolated by contextual diversion

## 2025-04-23 ENCOUNTER — OFFICE VISIT (OUTPATIENT)
Dept: FAMILY MEDICINE CLINIC | Age: 45
End: 2025-04-23
Payer: MEDICAID

## 2025-04-23 VITALS
DIASTOLIC BLOOD PRESSURE: 94 MMHG | TEMPERATURE: 97.3 F | SYSTOLIC BLOOD PRESSURE: 152 MMHG | OXYGEN SATURATION: 97 % | WEIGHT: 315 LBS | RESPIRATION RATE: 16 BRPM | HEART RATE: 64 BPM | BODY MASS INDEX: 47.9 KG/M2

## 2025-04-23 DIAGNOSIS — G47.33 OSA ON CPAP: ICD-10-CM

## 2025-04-23 DIAGNOSIS — J45.20 MILD INTERMITTENT ASTHMA WITHOUT COMPLICATION: ICD-10-CM

## 2025-04-23 DIAGNOSIS — I10 ESSENTIAL HYPERTENSION: Primary | ICD-10-CM

## 2025-04-23 DIAGNOSIS — H00.014 HORDEOLUM EXTERNUM OF LEFT UPPER EYELID: ICD-10-CM

## 2025-04-23 DIAGNOSIS — R73.09 ELEVATED GLUCOSE: ICD-10-CM

## 2025-04-23 DIAGNOSIS — F33.1 MODERATE EPISODE OF RECURRENT MAJOR DEPRESSIVE DISORDER (HCC): ICD-10-CM

## 2025-04-23 DIAGNOSIS — E55.9 VITAMIN D DEFICIENCY: ICD-10-CM

## 2025-04-23 DIAGNOSIS — Z13.220 SCREENING, LIPID: ICD-10-CM

## 2025-04-23 PROCEDURE — 1036F TOBACCO NON-USER: CPT | Performed by: NURSE PRACTITIONER

## 2025-04-23 PROCEDURE — G8427 DOCREV CUR MEDS BY ELIG CLIN: HCPCS | Performed by: NURSE PRACTITIONER

## 2025-04-23 PROCEDURE — 99215 OFFICE O/P EST HI 40 MIN: CPT | Performed by: NURSE PRACTITIONER

## 2025-04-23 PROCEDURE — 3080F DIAST BP >= 90 MM HG: CPT | Performed by: NURSE PRACTITIONER

## 2025-04-23 PROCEDURE — 3077F SYST BP >= 140 MM HG: CPT | Performed by: NURSE PRACTITIONER

## 2025-04-23 PROCEDURE — G8417 CALC BMI ABV UP PARAM F/U: HCPCS | Performed by: NURSE PRACTITIONER

## 2025-04-23 RX ORDER — ERYTHROMYCIN 5 MG/G
1 OINTMENT OPHTHALMIC NIGHTLY
Qty: 1 G | Refills: 0 | Status: SHIPPED | OUTPATIENT
Start: 2025-04-23

## 2025-04-23 RX ORDER — LISINOPRIL 40 MG/1
40 TABLET ORAL DAILY
Qty: 90 TABLET | Refills: 1 | Status: SHIPPED | OUTPATIENT
Start: 2025-04-23

## 2025-04-23 RX ORDER — AMLODIPINE BESYLATE 10 MG/1
10 TABLET ORAL DAILY
Qty: 90 TABLET | Refills: 1 | Status: SHIPPED | OUTPATIENT
Start: 2025-04-23

## 2025-04-23 SDOH — ECONOMIC STABILITY: FOOD INSECURITY: WITHIN THE PAST 12 MONTHS, YOU WORRIED THAT YOUR FOOD WOULD RUN OUT BEFORE YOU GOT MONEY TO BUY MORE.: NEVER TRUE

## 2025-04-23 SDOH — ECONOMIC STABILITY: FOOD INSECURITY: WITHIN THE PAST 12 MONTHS, THE FOOD YOU BOUGHT JUST DIDN'T LAST AND YOU DIDN'T HAVE MONEY TO GET MORE.: NEVER TRUE

## 2025-04-23 ASSESSMENT — PATIENT HEALTH QUESTIONNAIRE - PHQ9
8. MOVING OR SPEAKING SO SLOWLY THAT OTHER PEOPLE COULD HAVE NOTICED. OR THE OPPOSITE, BEING SO FIGETY OR RESTLESS THAT YOU HAVE BEEN MOVING AROUND A LOT MORE THAN USUAL: NOT AT ALL
SUM OF ALL RESPONSES TO PHQ QUESTIONS 1-9: 5
10. IF YOU CHECKED OFF ANY PROBLEMS, HOW DIFFICULT HAVE THESE PROBLEMS MADE IT FOR YOU TO DO YOUR WORK, TAKE CARE OF THINGS AT HOME, OR GET ALONG WITH OTHER PEOPLE: SOMEWHAT DIFFICULT
1. LITTLE INTEREST OR PLEASURE IN DOING THINGS: SEVERAL DAYS
7. TROUBLE CONCENTRATING ON THINGS, SUCH AS READING THE NEWSPAPER OR WATCHING TELEVISION: NOT AT ALL
4. FEELING TIRED OR HAVING LITTLE ENERGY: NOT AT ALL
SUM OF ALL RESPONSES TO PHQ QUESTIONS 1-9: 5
SUM OF ALL RESPONSES TO PHQ QUESTIONS 1-9: 5
3. TROUBLE FALLING OR STAYING ASLEEP: SEVERAL DAYS
SUM OF ALL RESPONSES TO PHQ QUESTIONS 1-9: 4
2. FEELING DOWN, DEPRESSED OR HOPELESS: SEVERAL DAYS
6. FEELING BAD ABOUT YOURSELF - OR THAT YOU ARE A FAILURE OR HAVE LET YOURSELF OR YOUR FAMILY DOWN: SEVERAL DAYS
9. THOUGHTS THAT YOU WOULD BE BETTER OFF DEAD, OR OF HURTING YOURSELF: SEVERAL DAYS
5. POOR APPETITE OR OVEREATING: NOT AT ALL

## 2025-04-23 ASSESSMENT — ENCOUNTER SYMPTOMS
RHINORRHEA: 0
ABDOMINAL DISTENTION: 0
DIARRHEA: 0
CONSTIPATION: 0
EYE PAIN: 0
CHEST TIGHTNESS: 0
SORE THROAT: 0
BACK PAIN: 0
ABDOMINAL PAIN: 0
COLOR CHANGE: 0
NAUSEA: 0
SHORTNESS OF BREATH: 0
COUGH: 0

## 2025-04-23 NOTE — PROGRESS NOTES
Sandra Franco, APRN-CNP  [facility]  [address], [address]  [place] [facility] [address]  Dept: [phone]  Dept Fax: [phone]       Patient ID: Melisa Castro is a 45 y.o. male.    HPI  Pt here today for f/u on HTN,go over labs and/or diagnostic studies, and medication refills. Pt denies any fever or chills.  Pt today denies any HA, chest pain, or SOB.  Pt denies any N/V/D/C or abdominal pain.    Pt states his BP goes up with a lot of stress with family.   - has a growth on his left eye that came on about a month ago, it doesn't hurt    Otherwise pt doing well on current tx and no other concerns today.     Previous office notes, labs, imaging and hospital records were reviewed prior to and during encounter.    The patient's past medical, surgical, social, and family history as well as his current medications and allergies were reviewed as documented in today's encounter by COLT Curran.      Current Outpatient Medications on File Prior to Visit   Medication Sig Dispense Refill    Distilled Water LIQD Use with cpap machine 3 each 2    albuterol sulfate HFA (PROVENTIL;VENTOLIN;PROAIR) 108 (90 Base) MCG/ACT inhaler INHALE 2 PUFFS INTO THE LUNGS EVERY 6 HOURS AS NEEDED FOR WHEEZING 18 g 5    diclofenac sodium (VOLTAREN) 1 % GEL Apply 4 g topically 4 times daily 480 g 0    ibuprofen (ADVIL;MOTRIN) 800 MG tablet Take 1 tablet by mouth 2 times daily as needed for Pain 10 tablet 0    carvedilol (COREG) 12.5 MG tablet Take 1 tablet by mouth 2 times daily 180 tablet 1    ondansetron (ZOFRAN-ODT) 4 MG disintegrating tablet Take 1 tablet by mouth 3 times daily as needed for Nausea or Vomiting 4 tablet 0    benzocaine-menthol (CEPACOL) 15-4 MG LOZG lozenge Take 1 lozenge by mouth every 2 hours as needed for Sore Throat 168 lozenge 0    fluticasone (FLONASE) 50 MCG/ACT nasal spray 1 spray by Each Nostril route daily 32 g 5    melatonin 10 MG CAPS capsule Take 1 capsule by

## 2025-04-30 ENCOUNTER — HOSPITAL ENCOUNTER (EMERGENCY)
Age: 45
Discharge: HOME | End: 2025-04-30
Attending: EMERGENCY MEDICINE
Payer: MEDICAID

## 2025-04-30 ENCOUNTER — APPOINTMENT (OUTPATIENT)
Dept: GENERAL RADIOLOGY | Age: 45
End: 2025-04-30
Payer: MEDICAID

## 2025-04-30 VITALS
RESPIRATION RATE: 16 BRPM | DIASTOLIC BLOOD PRESSURE: 88 MMHG | OXYGEN SATURATION: 99 % | BODY MASS INDEX: 39.17 KG/M2 | HEART RATE: 63 BPM | SYSTOLIC BLOOD PRESSURE: 134 MMHG | WEIGHT: 315 LBS | HEIGHT: 75 IN | TEMPERATURE: 97.9 F

## 2025-04-30 DIAGNOSIS — R07.89 CHEST WALL PAIN: Primary | ICD-10-CM

## 2025-04-30 LAB
ANION GAP SERPL CALCULATED.3IONS-SCNC: 7 MMOL/L (ref 9–16)
BASOPHILS # BLD: 0.1 K/UL (ref 0–0.2)
BASOPHILS NFR BLD: 1 % (ref 0–2)
BUN SERPL-MCNC: 21 MG/DL (ref 6–20)
CALCIUM SERPL-MCNC: 8.8 MG/DL (ref 8.6–10.4)
CHLORIDE SERPL-SCNC: 108 MMOL/L (ref 98–107)
CO2 SERPL-SCNC: 24 MMOL/L (ref 20–31)
CREAT SERPL-MCNC: 1.1 MG/DL (ref 0.7–1.2)
EOSINOPHIL # BLD: 0.2 K/UL (ref 0–0.4)
EOSINOPHILS RELATIVE PERCENT: 4 % (ref 0–4)
ERYTHROCYTE [DISTWIDTH] IN BLOOD BY AUTOMATED COUNT: 15.6 % (ref 11.5–14.9)
GFR, ESTIMATED: 83 ML/MIN/1.73M2
GLUCOSE SERPL-MCNC: 111 MG/DL (ref 74–99)
HCT VFR BLD AUTO: 38.6 % (ref 41–53)
HGB BLD-MCNC: 13 G/DL (ref 13.5–17.5)
LYMPHOCYTES NFR BLD: 1.5 K/UL (ref 1–4.8)
LYMPHOCYTES RELATIVE PERCENT: 27 % (ref 24–44)
MCH RBC QN AUTO: 30.4 PG (ref 26–34)
MCHC RBC AUTO-ENTMCNC: 33.7 G/DL (ref 31–37)
MCV RBC AUTO: 90.3 FL (ref 80–100)
MONOCYTES NFR BLD: 0.3 K/UL (ref 0.1–1.3)
MONOCYTES NFR BLD: 6 % (ref 1–7)
MYOGLOBIN SERPL-MCNC: 52 NG/ML (ref 28–72)
MYOGLOBIN SERPL-MCNC: 55 NG/ML (ref 28–72)
NEUTROPHILS NFR BLD: 62 % (ref 36–66)
NEUTS SEG NFR BLD: 3.3 K/UL (ref 1.3–9.1)
PLATELET # BLD AUTO: 230 K/UL (ref 150–450)
PMV BLD AUTO: 7.7 FL (ref 6–12)
POTASSIUM SERPL-SCNC: 4.2 MMOL/L (ref 3.7–5.3)
RBC # BLD AUTO: 4.28 M/UL (ref 4.5–5.9)
SODIUM SERPL-SCNC: 139 MMOL/L (ref 136–145)
TROPONIN I SERPL HS-MCNC: 6 NG/L (ref 0–22)
TROPONIN I SERPL HS-MCNC: <6 NG/L (ref 0–22)
WBC OTHER # BLD: 5.4 K/UL (ref 3.5–11)

## 2025-04-30 PROCEDURE — 85025 COMPLETE CBC W/AUTO DIFF WBC: CPT

## 2025-04-30 PROCEDURE — 93005 ELECTROCARDIOGRAM TRACING: CPT | Performed by: EMERGENCY MEDICINE

## 2025-04-30 PROCEDURE — 36415 COLL VENOUS BLD VENIPUNCTURE: CPT

## 2025-04-30 PROCEDURE — 71045 X-RAY EXAM CHEST 1 VIEW: CPT

## 2025-04-30 PROCEDURE — 84484 ASSAY OF TROPONIN QUANT: CPT

## 2025-04-30 PROCEDURE — 83874 ASSAY OF MYOGLOBIN: CPT

## 2025-04-30 PROCEDURE — 99285 EMERGENCY DEPT VISIT HI MDM: CPT

## 2025-04-30 PROCEDURE — 80048 BASIC METABOLIC PNL TOTAL CA: CPT

## 2025-04-30 ASSESSMENT — ENCOUNTER SYMPTOMS
EYE DISCHARGE: 0
NAUSEA: 0
WHEEZING: 0
CONSTIPATION: 0
SORE THROAT: 0
COLOR CHANGE: 0
SINUS PRESSURE: 0
BLOOD IN STOOL: 0
CHEST TIGHTNESS: 0
ABDOMINAL PAIN: 0
COUGH: 0
SHORTNESS OF BREATH: 0
TROUBLE SWALLOWING: 0
VOMITING: 0
BACK PAIN: 0
EYE REDNESS: 0
RHINORRHEA: 0
DIARRHEA: 0
FACIAL SWELLING: 0
EYE PAIN: 0

## 2025-04-30 ASSESSMENT — LIFESTYLE VARIABLES
HOW OFTEN DO YOU HAVE A DRINK CONTAINING ALCOHOL: NEVER
HOW MANY STANDARD DRINKS CONTAINING ALCOHOL DO YOU HAVE ON A TYPICAL DAY: PATIENT DOES NOT DRINK

## 2025-04-30 ASSESSMENT — HEART SCORE: ECG: NORMAL

## 2025-04-30 NOTE — ED TRIAGE NOTES
Mode of arrival (squad #, walk in, police, etc) : EMS        Chief complaint(s): Chest Pain        Arrival Note (brief scenario, treatment PTA, etc).: Pt reports sudden onset chest pain at work. Denies any radiating pain to shoulder/arm. Pt ambulated independently from EMS stretcher to bed. Pt denies any SOB, reports pain upon palpitation         C= \"Have you ever felt that you should Cut down on your drinking?\"  No  A= \"Have people Annoyed you by criticizing your drinking?\"  No  G= \"Have you ever felt bad or Guilty about your drinking?\"  No  E= \"Have you ever had a drink as an Eye-opener first thing in the morning to steady your nerves or to help a hangover?\"  No      Deferred []      Reason for deferring: N/A    *If yes to two or more: probable alcohol abuse.*

## 2025-04-30 NOTE — ED PROVIDER NOTES
eMERGENCY dEPARTMENT eNCOUnter      Pt Name: Melisa Castro  MRN: [id number]  Birthdate 1980  Date of evaluation: 4/30/25      CHIEF COMPLAINT       Chief Complaint   Patient presents with    Chest Pain         HISTORY OF PRESENT ILLNESS    Melisa Castro is a 45 y.o. male who presents complaining of chest pain.  Patient states that he started having chest pain little over an hour ago while he was at work.  Patient states he was not doing anything strenuous when it started.  Patient states its left-sided nonradiating.  Patient denies any shortness of breath or palpitations.  Patient has no pain or swelling in the legs.  Patient denies any recent illnesses.  Patient states he did have pain like this about a week ago very similar.  Patient reportedly has a history of some heart issues.      REVIEW OF SYSTEMS       Review of Systems   Constitutional:  Negative for activity change, appetite change, chills, diaphoresis and fever.   HENT:  Negative for congestion, ear pain, facial swelling, nosebleeds, rhinorrhea, sinus pressure, sore throat and trouble swallowing.    Eyes:  Negative for pain, discharge and redness.   Respiratory:  Negative for cough, chest tightness, shortness of breath and wheezing.    Cardiovascular:  Positive for chest pain. Negative for palpitations and leg swelling.   Gastrointestinal:  Negative for abdominal pain, blood in stool, constipation, diarrhea, nausea and vomiting.   Genitourinary:  Negative for difficulty urinating, dysuria, flank pain, frequency, genital sores and hematuria.   Musculoskeletal:  Negative for arthralgias, back pain, gait problem, joint swelling, myalgias and neck pain.   Skin:  Negative for color change, pallor, rash and wound.   Neurological:  Negative for dizziness, tremors, seizures, syncope, speech difficulty, weakness, numbness and headaches.   Psychiatric/Behavioral:  Negative for confusion, decreased concentration, hallucinations, self-injury, sleep

## 2025-05-01 DIAGNOSIS — G47.33 OSA ON CPAP: ICD-10-CM

## 2025-05-01 DIAGNOSIS — I1A.0 RESISTANT HYPERTENSION: ICD-10-CM

## 2025-05-01 LAB
EKG ATRIAL RATE: 65 BPM
EKG P AXIS: 76 DEGREES
EKG P-R INTERVAL: 170 MS
EKG Q-T INTERVAL: 408 MS
EKG QRS DURATION: 88 MS
EKG QTC CALCULATION (BAZETT): 424 MS
EKG R AXIS: 54 DEGREES
EKG T AXIS: 60 DEGREES
EKG VENTRICULAR RATE: 65 BPM

## 2025-05-02 RX ORDER — WATER
LIQUID (ML) MISCELLANEOUS
Qty: 3 EACH | Refills: 2 | Status: SHIPPED | OUTPATIENT
Start: 2025-05-02

## 2025-05-02 RX ORDER — IBUPROFEN 800 MG/1
800 TABLET, FILM COATED ORAL
Qty: 90 TABLET | Refills: 0 | Status: SHIPPED | OUTPATIENT
Start: 2025-05-02

## 2025-05-02 RX ORDER — HYDROCHLOROTHIAZIDE 25 MG/1
25 TABLET ORAL EVERY MORNING
Qty: 90 TABLET | Refills: 1 | Status: SHIPPED | OUTPATIENT
Start: 2025-05-02

## 2025-05-08 ENCOUNTER — OFFICE VISIT (OUTPATIENT)
Dept: PODIATRY | Age: 45
End: 2025-05-08

## 2025-05-08 VITALS — BODY MASS INDEX: 39.17 KG/M2 | WEIGHT: 315 LBS | HEIGHT: 75 IN

## 2025-05-08 DIAGNOSIS — M76.61 TENDONITIS, ACHILLES, RIGHT: ICD-10-CM

## 2025-05-08 DIAGNOSIS — M79.671 PAIN IN BOTH FEET: ICD-10-CM

## 2025-05-08 DIAGNOSIS — M79.671 RIGHT FOOT PAIN: ICD-10-CM

## 2025-05-08 DIAGNOSIS — M21.41 PES PLANUS OF BOTH FEET: ICD-10-CM

## 2025-05-08 DIAGNOSIS — M21.42 PES PLANUS OF BOTH FEET: ICD-10-CM

## 2025-05-08 DIAGNOSIS — S93.602A FOOT SPRAIN, LEFT, INITIAL ENCOUNTER: ICD-10-CM

## 2025-05-08 DIAGNOSIS — M79.671 FOOT PAIN, BILATERAL: ICD-10-CM

## 2025-05-08 DIAGNOSIS — R26.9 GAIT ABNORMALITY: ICD-10-CM

## 2025-05-08 DIAGNOSIS — I89.0 LYMPHATIC EDEMA: ICD-10-CM

## 2025-05-08 DIAGNOSIS — S86.112A POSTERIOR TIBIAL TENDON TEAR, TRAUMATIC, LEFT, INITIAL ENCOUNTER: ICD-10-CM

## 2025-05-08 DIAGNOSIS — M79.672 PAIN IN BOTH FEET: ICD-10-CM

## 2025-05-08 DIAGNOSIS — L60.0 INGROWN NAIL: ICD-10-CM

## 2025-05-08 DIAGNOSIS — M79.672 FOOT PAIN, BILATERAL: ICD-10-CM

## 2025-05-08 DIAGNOSIS — B35.1 DERMATOPHYTOSIS OF NAIL: Primary | ICD-10-CM

## 2025-05-08 DIAGNOSIS — M54.31 RIGHT SIDED SCIATICA: ICD-10-CM

## 2025-05-08 DIAGNOSIS — L85.3 XEROSIS OF SKIN: ICD-10-CM

## 2025-05-08 NOTE — PROGRESS NOTES
[facility] PODIATRY [address]  [address]  [place] [id number]  Dept: [phone]  Dept Fax: [phone]     PAIN PROGRESS NOTE  Date of patient's visit: 5/8/2025  Patient's Name:  Melisa Castro YOB: 1980            Patient Care Team:  Sandra Franco APRN - CNP as PCP - General (Certified Nurse Practitioner)  Sandra Franco APRN - CNP as PCP - Empaneled Provider  Dale Macias MD as Surgeon (Orthopedic Surgery)  Nikolas Wang DPM as Physician (Podiatry)  Kathia Wang DPM as Physician (Podiatry)      Chief Complaint   Patient presents with    Foot Pain     Bilateral foot    Nail Problem     Toenail trim       Subjective:   This Melisa Castro comes to clinic for foot and nail care.  Pt currently has complaint of thickened, painful, elongated nails that he/she cannot manage by themselves.  Pt. Relates pain to nails with shoe gear.  Pt's primary care physician is Sandra Franco APRN - CNP last seen 5/1/25.    Pt has a new complaint of none.  Patient has been losing weight and has been exercising doing a lot of walking and has greatly reduced his swelling to his lower legs.    Past Medical History:   Diagnosis Date    Anxiety     Arthritis     CAD (coronary artery disease)     Fatty liver     Fatty liver disease, nonalcoholic 7/10/2012    Hypertension     Obesity     Unspecified sleep apnea     cpap       No Known Allergies  Current Outpatient Medications on File Prior to Visit   Medication Sig Dispense Refill    hydroCHLOROthiazide (HYDRODIURIL) 25 MG tablet Take 1 tablet by mouth every morning 90 tablet 1    Distilled Water LIQD Use with cpap machine 3 each 2    ibuprofen (ADVIL;MOTRIN) 800 MG tablet Take 1 tablet by mouth 3 times daily (with meals) 90 tablet 0    amLODIPine (NORVASC) 10 MG tablet Take 1 tablet by mouth daily 90 tablet 1    lisinopril (PRINIVIL;ZESTRIL) 40 MG tablet Take 1 tablet by

## 2025-06-08 ENCOUNTER — APPOINTMENT (OUTPATIENT)
Dept: GENERAL RADIOLOGY | Age: 45
End: 2025-06-08
Payer: MEDICAID

## 2025-06-08 ENCOUNTER — HOSPITAL ENCOUNTER (EMERGENCY)
Age: 45
Discharge: HOME | End: 2025-06-08
Attending: EMERGENCY MEDICINE
Payer: MEDICAID

## 2025-06-08 VITALS
HEART RATE: 71 BPM | DIASTOLIC BLOOD PRESSURE: 100 MMHG | RESPIRATION RATE: 20 BRPM | SYSTOLIC BLOOD PRESSURE: 156 MMHG | OXYGEN SATURATION: 100 % | TEMPERATURE: 97.3 F
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
## 2025-06-08 LAB
ANION GAP SERPL CALCULATED.3IONS-SCNC: 8 MMOL/L (ref 9–16)
BASOPHILS # BLD: <0.03 K/UL (ref 0–0.2)
BASOPHILS NFR BLD: 0 % (ref 0–2)
BUN SERPL-MCNC: 16 MG/DL (ref 6–20)
CALCIUM SERPL-MCNC: 8.9 MG/DL (ref 8.6–10.4)
CHLORIDE SERPL-SCNC: 103 MMOL/L (ref 98–107)
CO2 SERPL-SCNC: 26 MMOL/L (ref 20–31)
CREAT SERPL-MCNC: 1 MG/DL (ref 0.7–1.2)
EOSINOPHIL # BLD: 0.3 K/UL (ref 0–0.44)
EOSINOPHILS RELATIVE PERCENT: 6 % (ref 1–4)
ERYTHROCYTE [DISTWIDTH] IN BLOOD BY AUTOMATED COUNT: 14.4 % (ref 11.8–14.4)
GFR, ESTIMATED: >90 ML/MIN/1.73M2
GLUCOSE SERPL-MCNC: 93 MG/DL (ref 74–99)
HCT VFR BLD AUTO: 37.7 % (ref 40.7–50.3)
HGB BLD-MCNC: 13 G/DL (ref 13–17)
IMM GRANULOCYTES # BLD AUTO: <0.03 K/UL (ref 0–0.3)
IMM GRANULOCYTES NFR BLD: 0 %
LYMPHOCYTES NFR BLD: 1.61 K/UL (ref 1.1–3.7)
LYMPHOCYTES RELATIVE PERCENT: 30 % (ref 24–43)
MCH RBC QN AUTO: 30.5 PG (ref 25.2–33.5)
MCHC RBC AUTO-ENTMCNC: 34.5 G/DL (ref 28.4–34.8)
MCV RBC AUTO: 88.5 FL (ref 82.6–102.9)
MONOCYTES NFR BLD: 0.31 K/UL (ref 0.1–1.2)
MONOCYTES NFR BLD: 6 % (ref 3–12)
NEUTROPHILS NFR BLD: 58 % (ref 36–65)
NEUTS SEG NFR BLD: 3.12 K/UL (ref 1.5–8.1)
NRBC BLD-RTO: 0 PER 100 WBC
PLATELET # BLD AUTO: 246 K/UL (ref 138–453)
PMV BLD AUTO: 9.2 FL (ref 8.1–13.5)
POTASSIUM SERPL-SCNC: 4 MMOL/L (ref 3.7–5.3)
RBC # BLD AUTO: 4.26 M/UL (ref 4.21–5.77)
SODIUM SERPL-SCNC: 137 MMOL/L (ref 136–145)
TROPONIN I SERPL HS-MCNC: <6 NG/L (ref 0–22)
WBC OTHER # BLD: 5.4 K/UL (ref 3.5–11.3)

## 2025-06-08 PROCEDURE — 80048 BASIC METABOLIC PNL TOTAL CA: CPT

## 2025-06-08 PROCEDURE — 71045 X-RAY EXAM CHEST 1 VIEW: CPT

## 2025-06-08 PROCEDURE — 73630 X-RAY EXAM OF FOOT: CPT

## 2025-06-08 PROCEDURE — 6370000000 HC RX 637 (ALT 250 FOR IP)

## 2025-06-08 PROCEDURE — 84484 ASSAY OF TROPONIN QUANT: CPT

## 2025-06-08 PROCEDURE — 99285 EMERGENCY DEPT VISIT HI MDM: CPT

## 2025-06-08 PROCEDURE — 85025 COMPLETE CBC W/AUTO DIFF WBC: CPT

## 2025-06-08 PROCEDURE — 93005 ELECTROCARDIOGRAM TRACING: CPT

## 2025-06-08 RX ORDER — CARVEDILOL 12.5 MG/1
12.5 TABLET ORAL ONCE
Status: COMPLETED | OUTPATIENT
Start: 2025-06-08 | End: 2025-06-08

## 2025-06-08 RX ORDER — AMLODIPINE BESYLATE 10 MG/1
10 TABLET ORAL ONCE
Status: COMPLETED | OUTPATIENT
Start: 2025-06-08 | End: 2025-06-08

## 2025-06-08 RX ORDER — ACETAMINOPHEN 325 MG/1
650 TABLET ORAL ONCE
Status: COMPLETED | OUTPATIENT
Start: 2025-06-08 | End: 2025-06-08

## 2025-06-08 RX ORDER — HYDROCHLOROTHIAZIDE 25 MG/1
25 TABLET ORAL ONCE
Status: COMPLETED | OUTPATIENT
Start: 2025-06-08 | End: 2025-06-08

## 2025-06-08 RX ADMIN — HYDROCHLOROTHIAZIDE 25 MG: 25 TABLET ORAL at 07:56

## 2025-06-08 RX ADMIN — ACETAMINOPHEN 650 MG: 325 TABLET ORAL at 07:56

## 2025-06-08 RX ADMIN — AMLODIPINE BESYLATE 10 MG: 10 TABLET ORAL at 07:56

## 2025-06-08 RX ADMIN — CARVEDILOL 12.5 MG: 12.5 TABLET, FILM COATED ORAL at 07:56

## 2025-06-08 ASSESSMENT — PAIN - FUNCTIONAL ASSESSMENT: PAIN_FUNCTIONAL_ASSESSMENT: 0-10

## 2025-06-08 ASSESSMENT — PAIN DESCRIPTION - DESCRIPTORS: DESCRIPTORS: TENDER

## 2025-06-08 ASSESSMENT — PAIN DESCRIPTION - LOCATION: LOCATION: CHEST

## 2025-06-08 ASSESSMENT — PAIN DESCRIPTION - ORIENTATION: ORIENTATION: MID

## 2025-06-08 ASSESSMENT — PAIN SCALES - GENERAL
PAINLEVEL_OUTOF10: 0
PAINLEVEL_OUTOF10: 8

## 2025-06-08 NOTE — DISCHARGE INSTRUCTIONS
You were seen today in the emergency department for your fall.   We now feel you are safe for discharge home.    Please return to the emergency department immediately if develop any new or worsening concerns including chest pain, shortness of breath, abdominal pain, nausea, vomiting, diarrhea, weakness, loss consciousness, fever, chills, or any other concerns.    Please call your PCP and schedule appointment within the next 24 to 48 hours for follow-up.

## 2025-06-08 NOTE — ED NOTES
Pt. Arrives to ED via walk-in from home for c/o fall on  Thursday.  Pt .states that he was walking down the stairs and fell down approximately 8 steps striking his chest.  Pt. Also c/o bilateral foot pain since the fall.  Pt. Denies hitting head or LOC.  Ambulates to treatment room from triage with steady gait.

## 2025-06-08 NOTE — ED PROVIDER NOTES
left lower jaw that have caused him some pain but no bony tenderness at all.    No abscess of the teeth at all.    Assessment/Plan: Patient with fall with injury to the anterior chest as well as bilateral feet but also did not take his blood pressure medication.   Will begin with chest x-ray as well as x-ray of bilateral feet and attempted symptomatic control but also likely brief cardiac workup  Reevaluate after        EKG Interpretation    Interpreted by emergency department physician    Rhythm: normal sinus   Rate: normal at 67 bpm  Axis: normal  Conduction: normal  ST Segments: normal  T Waves: normal  Q Waves: normal    Clinical Impression: Normal EKG.         Critical Care  None      (Please note that portions of this note were completed with a voice recognition program. Efforts were made to edit the dictations but occasionally words are mis-transcribed. Whenever words are used in this note in any gender, they shall be construed as though they were used in the gender appropriate to the circumstances; and whenever words are used in this note in the singular or plural form, they shall be construed as though they were used in the form appropriate to the circumstances.)    Carl Shea MD [facility]  Attending Emergency Medicine Physician           Carl Shea MD  06/08/25 0751       Carl Shea MD  06/08/25 0753       Carl Shea MD  06/08/25 0757    
COURSE / MDM     Medical Decision Making  Melisa Castro is a 45 y.o. male who presents with fall.  Patient is GCS 15, nontoxic appearing, not in acute distress, speaking full sentences, able to ambulate under their own power.  Chest x-ray, bilateral foot x-ray negative.  Cardiac workup negative.  Pressure improved after home meds, discharge return precautions, follow-up PCP    Amount and/or Complexity of Data Reviewed  Labs: ordered.  Radiology: ordered.  ECG/medicine tests: ordered.    Risk  OTC drugs.  Prescription drug management.        EKG  Sinus rhythm, ventricular rate 67, normal axis, no ST elevation depressions, no T wave versions, normal sinus EKG    All EKG's are interpreted by the Emergency Department Physician who either signs or Co-signs this chart in the absence of a cardiologist.    EMERGENCY DEPARTMENT COURSE:      ED Course as of 06/08/25 1237   Sun Jun 08, 2025   1155 Negative images and laboratory workup.  Discharge return precautions, follow-up PCP. [AK]      ED Course User Index  [AK] Boaz Moreno MD       PROCEDURES:      CONSULTS:  None    CRITICAL CARE:  There was significant risk of life threatening deterioration of patient's condition requiring my direct management. Critical care time  minutes, excluding any documented procedures.    FINAL IMPRESSION      1. Fall, initial encounter          DISPOSITION / PLAN     DISPOSITION Decision To Discharge 06/08/2025 11:55:52 AM   DISPOSITION CONDITION Stable           PATIENT REFERRED TO:  Sandra Franco APRN - CNP  [address]  [facility] [address]  [phone]    Call         DISCHARGE MEDICATIONS:  Discharge Medication List as of 6/8/2025 11:56 AM          Boaz Moreno MD  Emergency Medicine Resident    (Please note that portions of this note were completed with a voice recognition program.  Efforts were made to edit the dictations but occasionally words are mis-transcribed.)

## 2025-06-10 LAB
EKG ATRIAL RATE: 67 BPM
EKG P AXIS: 61 DEGREES
EKG P-R INTERVAL: 162 MS
EKG Q-T INTERVAL: 400 MS
EKG QRS DURATION: 84 MS
EKG QTC CALCULATION (BAZETT): 422 MS
EKG R AXIS: 16 DEGREES
EKG T AXIS: 24 DEGREES
EKG VENTRICULAR RATE: 67 BPM

## 2025-06-10 PROCEDURE — 93010 ELECTROCARDIOGRAM REPORT: CPT | Performed by: INTERNAL MEDICINE

## 2025-07-08 ENCOUNTER — TELEPHONE (OUTPATIENT)
Dept: FAMILY MEDICINE CLINIC | Age: 45
End: 2025-07-08

## 2025-07-08 NOTE — TELEPHONE ENCOUNTER
[facility] representative called in stating that they did a PHQ9 on pt for his care plan and he scored a 14. Pt denies suicidal thoughts or plan. She states that pt is in counseling and she did provide him the Crisis number just in case. Pt is scheduled for follow up with PCP on 8/1 and did not have any concerns to see her sooner. Representative states that she is required to inform of screening results PCP.

## 2025-07-13 DIAGNOSIS — I10 ESSENTIAL HYPERTENSION: ICD-10-CM

## 2025-07-13 DIAGNOSIS — H00.014 HORDEOLUM EXTERNUM OF LEFT UPPER EYELID: ICD-10-CM

## 2025-07-13 DIAGNOSIS — J31.0 CHRONIC RHINITIS: ICD-10-CM

## 2025-07-14 RX ORDER — ERYTHROMYCIN 5 MG/G
1 OINTMENT OPHTHALMIC NIGHTLY
Qty: 1 G | Refills: 0 | Status: SHIPPED | OUTPATIENT
Start: 2025-07-14

## 2025-07-14 RX ORDER — LISINOPRIL 40 MG/1
40 TABLET ORAL DAILY
Qty: 90 TABLET | Refills: 1 | Status: SHIPPED | OUTPATIENT
Start: 2025-07-14

## 2025-07-14 RX ORDER — ALBUTEROL SULFATE 90 UG/1
2 INHALANT RESPIRATORY (INHALATION) EVERY 6 HOURS PRN
Qty: 18 G | Refills: 5 | Status: SHIPPED | OUTPATIENT
Start: 2025-07-14

## 2025-07-14 RX ORDER — AMLODIPINE BESYLATE 10 MG/1
10 TABLET ORAL DAILY
Qty: 90 TABLET | Refills: 1 | Status: SHIPPED | OUTPATIENT
Start: 2025-07-14

## 2025-07-14 NOTE — TELEPHONE ENCOUNTER
Melisa Castro is calling to request a refill on the following medication(s):    Medication Request:  Requested Prescriptions     Pending Prescriptions Disp Refills    albuterol sulfate HFA (PROVENTIL;VENTOLIN;PROAIR) 108 (90 Base) MCG/ACT inhaler 18 g 5     Sig: Inhale 2 puffs into the lungs every 6 hours as needed for Wheezing       Last Visit Date (If Applicable):  Visit date not found    Next Visit Date:    Visit date not found

## 2025-07-15 ENCOUNTER — HOSPITAL ENCOUNTER (OUTPATIENT)
Age: 45
Discharge: HOME | End: 2025-07-15
Payer: MEDICAID

## 2025-07-15 DIAGNOSIS — R73.09 ELEVATED GLUCOSE: ICD-10-CM

## 2025-07-15 DIAGNOSIS — I10 ESSENTIAL HYPERTENSION: ICD-10-CM

## 2025-07-15 LAB
ALBUMIN SERPL-MCNC: 4 G/DL (ref 3.5–5.2)
ALBUMIN/GLOB SERPL: 1.1 {RATIO} (ref 1–2.5)
ALP SERPL-CCNC: 72 U/L (ref 40–129)
ALT SERPL-CCNC: 12 U/L (ref 10–50)
ANION GAP SERPL CALCULATED.3IONS-SCNC: 13 MMOL/L (ref 9–16)
AST SERPL-CCNC: 16 U/L (ref 10–50)
BILIRUB SERPL-MCNC: 0.5 MG/DL (ref 0–1.2)
BUN SERPL-MCNC: 17 MG/DL (ref 6–20)
CALCIUM SERPL-MCNC: 9.4 MG/DL (ref 8.6–10.4)
CHLORIDE SERPL-SCNC: 102 MMOL/L (ref 98–107)
CO2 SERPL-SCNC: 26 MMOL/L (ref 20–31)
CREAT SERPL-MCNC: 0.9 MG/DL (ref 0.7–1.2)
ERYTHROCYTE [DISTWIDTH] IN BLOOD BY AUTOMATED COUNT: 14.2 % (ref 11.8–14.4)
EST. AVERAGE GLUCOSE BLD GHB EST-MCNC: 105 MG/DL
GFR, ESTIMATED: >90 ML/MIN/1.73M2
GLUCOSE SERPL-MCNC: 107 MG/DL (ref 74–99)
HBA1C MFR BLD: 5.3 % (ref 4–6)
HCT VFR BLD AUTO: 41.1 % (ref 40.7–50.3)
HGB BLD-MCNC: 14 G/DL (ref 13–17)
MCH RBC QN AUTO: 30.5 PG (ref 25.2–33.5)
MCHC RBC AUTO-ENTMCNC: 34.1 G/DL (ref 28.4–34.8)
MCV RBC AUTO: 89.5 FL (ref 82.6–102.9)
NRBC BLD-RTO: 0 PER 100 WBC
PLATELET # BLD AUTO: 262 K/UL (ref 138–453)
PMV BLD AUTO: 9.2 FL (ref 8.1–13.5)
POTASSIUM SERPL-SCNC: 3.2 MMOL/L (ref 3.7–5.3)
PROT SERPL-MCNC: 7.5 G/DL (ref 6.6–8.7)
RBC # BLD AUTO: 4.59 M/UL (ref 4.21–5.77)
SODIUM SERPL-SCNC: 141 MMOL/L (ref 136–145)
WBC OTHER # BLD: 6 K/UL (ref 3.5–11.3)

## 2025-07-15 PROCEDURE — 83036 HEMOGLOBIN GLYCOSYLATED A1C: CPT

## 2025-07-15 PROCEDURE — 36415 COLL VENOUS BLD VENIPUNCTURE: CPT

## 2025-07-15 PROCEDURE — 85027 COMPLETE CBC AUTOMATED: CPT

## 2025-07-15 PROCEDURE — 80053 COMPREHEN METABOLIC PANEL: CPT

## 2025-07-16 ENCOUNTER — TELEPHONE (OUTPATIENT)
Dept: FAMILY MEDICINE CLINIC | Age: 45
End: 2025-07-16

## 2025-07-16 DIAGNOSIS — I10 ESSENTIAL HYPERTENSION: Primary | ICD-10-CM

## 2025-07-16 NOTE — TELEPHONE ENCOUNTER
Ally called in requesting pts last BP reading. She also is requesting a script for a BP cuff. Since the pt is 6'4\" and 365lbs, he may need a bariatric cuff. [facility] should have this kind of cuff. Their fax # is [phone], P: [phone], x. [phone]  Ally can be reached back at [phone].

## 2025-07-17 DIAGNOSIS — G47.33 OSA ON CPAP: ICD-10-CM

## 2025-07-17 RX ORDER — WATER
LIQUID (ML) MISCELLANEOUS
Qty: 3 EACH | Refills: 2 | Status: SHIPPED | OUTPATIENT
Start: 2025-07-17

## 2025-07-21 ENCOUNTER — HOSPITAL ENCOUNTER (OUTPATIENT)
Age: 45
Discharge: HOME | End: 2025-07-21
Payer: MEDICAID

## 2025-07-21 DIAGNOSIS — Z13.220 SCREENING, LIPID: ICD-10-CM

## 2025-07-21 DIAGNOSIS — E87.6 HYPOKALEMIA: ICD-10-CM

## 2025-07-21 LAB
CHOLEST SERPL-MCNC: 179 MG/DL (ref 0–199)
CHOLESTEROL/HDL RATIO: 4.4
HDLC SERPL-MCNC: 41 MG/DL
LDLC SERPL CALC-MCNC: 123 MG/DL (ref 0–100)
POTASSIUM SERPL-SCNC: 3.7 MMOL/L (ref 3.7–5.3)
TRIGL SERPL-MCNC: 74 MG/DL
VLDLC SERPL CALC-MCNC: 15 MG/DL (ref 1–30)

## 2025-07-21 PROCEDURE — 36415 COLL VENOUS BLD VENIPUNCTURE: CPT

## 2025-07-21 PROCEDURE — 80061 LIPID PANEL: CPT

## 2025-07-21 PROCEDURE — 84132 ASSAY OF SERUM POTASSIUM: CPT

## 2025-08-01 ENCOUNTER — OFFICE VISIT (OUTPATIENT)
Dept: FAMILY MEDICINE CLINIC | Age: 45
End: 2025-08-01
Payer: MEDICAID

## 2025-08-01 VITALS
WEIGHT: 315 LBS | DIASTOLIC BLOOD PRESSURE: 84 MMHG | OXYGEN SATURATION: 97 % | BODY MASS INDEX: 46.5 KG/M2 | RESPIRATION RATE: 16 BRPM | SYSTOLIC BLOOD PRESSURE: 132 MMHG | TEMPERATURE: 97.6 F | HEART RATE: 92 BPM

## 2025-08-01 DIAGNOSIS — F33.1 MODERATE EPISODE OF RECURRENT MAJOR DEPRESSIVE DISORDER (HCC): ICD-10-CM

## 2025-08-01 DIAGNOSIS — E87.6 HYPOKALEMIA: ICD-10-CM

## 2025-08-01 DIAGNOSIS — I10 ESSENTIAL HYPERTENSION: Primary | ICD-10-CM

## 2025-08-01 DIAGNOSIS — R63.5 WEIGHT GAIN: ICD-10-CM

## 2025-08-01 DIAGNOSIS — Z12.11 COLON CANCER SCREENING: ICD-10-CM

## 2025-08-01 DIAGNOSIS — M79.672 PAIN IN BOTH FEET: ICD-10-CM

## 2025-08-01 DIAGNOSIS — G47.33 OSA ON CPAP: ICD-10-CM

## 2025-08-01 DIAGNOSIS — M79.671 PAIN IN BOTH FEET: ICD-10-CM

## 2025-08-01 DIAGNOSIS — E55.9 VITAMIN D DEFICIENCY: ICD-10-CM

## 2025-08-01 DIAGNOSIS — J45.20 MILD INTERMITTENT ASTHMA WITHOUT COMPLICATION: ICD-10-CM

## 2025-08-01 PROCEDURE — 1036F TOBACCO NON-USER: CPT | Performed by: NURSE PRACTITIONER

## 2025-08-01 PROCEDURE — G8417 CALC BMI ABV UP PARAM F/U: HCPCS | Performed by: NURSE PRACTITIONER

## 2025-08-01 PROCEDURE — 3075F SYST BP GE 130 - 139MM HG: CPT | Performed by: NURSE PRACTITIONER

## 2025-08-01 PROCEDURE — G8427 DOCREV CUR MEDS BY ELIG CLIN: HCPCS | Performed by: NURSE PRACTITIONER

## 2025-08-01 PROCEDURE — 3079F DIAST BP 80-89 MM HG: CPT | Performed by: NURSE PRACTITIONER

## 2025-08-01 PROCEDURE — 99214 OFFICE O/P EST MOD 30 MIN: CPT | Performed by: NURSE PRACTITIONER

## 2025-08-01 RX ORDER — POTASSIUM CHLORIDE 1500 MG/1
20 TABLET, EXTENDED RELEASE ORAL DAILY
Qty: 90 TABLET | Refills: 1 | Status: SHIPPED | OUTPATIENT
Start: 2025-08-01

## 2025-08-01 ASSESSMENT — ENCOUNTER SYMPTOMS
NAUSEA: 0
ABDOMINAL DISTENTION: 0
DIARRHEA: 0
SHORTNESS OF BREATH: 0
COLOR CHANGE: 0
RHINORRHEA: 0
SORE THROAT: 0
CONSTIPATION: 0
ABDOMINAL PAIN: 0
BACK PAIN: 0
EYE PAIN: 0
CHEST TIGHTNESS: 0
COUGH: 0

## 2025-08-01 NOTE — PROGRESS NOTES
anxious moments. Reassurance offered; Non-pharmacological coping methods encouraged  - continue to follow up with psychiatry as scheduled, per pharmacy pt is currently on Lurasidone 20 mg daily with dinner, oxcarbazepine 150 mg daily, and ambien 10 mg at bedtime as needed. List scanned into media.    Vitamin D deficiency  - Stable: Continue increase foods with Vitamin D, which include cheese, eggs, cereals, yogurt, milk, tofu, beef, salmon or tuna    - Continue Vitamin D supplements as ordered    Hordeolum externum of left upper eyelid  - Warm compresses at least 3 times a day, apply erythromycin ointment as prescribed.   - if doesn't improve, recommend going to ophthalmologist.   - call if worsens or doesn't improve    Hypokalemia  - Current regimen of potassium supplements once daily will be continued, with a prescription for 90 tablets provided.  - Recheck of potassium levels is scheduled in 3 months to ensure they do not exceed the normal range.  - Adjustments to the dosage will be made if potassium levels become too high.  Assessment & Plan  Weight gain  - Advised to consume 4 to 6 smaller meals daily, each containing approximately 30 g of protein.  - Portion control strategies discussed, including using smaller plates and dividing meals into appropriate portions of protein, fruits, vegetables, and starches or carbs.  - Encouraged to maintain hydration with water intake.    Health maintenance  - Cologuard test will be ordered for colon cancer screening.    Return in about 3 months (around 11/1/2025) for 3 month f/u with potassium med check.      - pt verbalized understanding plan of care.    Medications, labs, diagnostic studies, consultations and follow-up as documented in this encounter. Rest of systems unchanged, continue current treatments.    On this date 8/1/2025 I have spent greater than 50% of the time reviewing previous notes, test results and face to face with the patient discussing the diagnosis and

## 2025-08-01 NOTE — PATIENT INSTRUCTIONS
- increase protein intake 90 grams a day, smaller portion sizes, 4-6 small meals daily.   - Lifestyle changes: Decrease fats, sugars, carbohydrates, and increase routine exercise, try to get 150 minutes of aerobic activity a week.

## 2025-08-12 DIAGNOSIS — R09.81 NASAL CONGESTION: ICD-10-CM

## 2025-08-13 RX ORDER — FLUTICASONE PROPIONATE 50 MCG
1 SPRAY, SUSPENSION (ML) NASAL DAILY
Qty: 32 G | Refills: 5 | Status: SHIPPED | OUTPATIENT
Start: 2025-08-13

## 2025-08-14 ENCOUNTER — OFFICE VISIT (OUTPATIENT)
Dept: PODIATRY | Age: 45
End: 2025-08-14

## 2025-08-14 VITALS — HEIGHT: 75 IN | BODY MASS INDEX: 39.17 KG/M2 | WEIGHT: 315 LBS

## 2025-08-14 DIAGNOSIS — L60.0 INGROWN NAIL: ICD-10-CM

## 2025-08-14 DIAGNOSIS — B35.1 DERMATOPHYTOSIS OF NAIL: Primary | ICD-10-CM

## 2025-08-14 DIAGNOSIS — S86.112A POSTERIOR TIBIAL TENDON TEAR, TRAUMATIC, LEFT, INITIAL ENCOUNTER: ICD-10-CM

## 2025-08-14 DIAGNOSIS — M79.672 PAIN IN BOTH FEET: ICD-10-CM

## 2025-08-14 DIAGNOSIS — M79.671 FOOT PAIN, BILATERAL: ICD-10-CM

## 2025-08-14 DIAGNOSIS — L85.3 XEROSIS OF SKIN: ICD-10-CM

## 2025-08-14 DIAGNOSIS — I89.0 LYMPHATIC EDEMA: ICD-10-CM

## 2025-08-14 DIAGNOSIS — M54.31 RIGHT SIDED SCIATICA: ICD-10-CM

## 2025-08-14 DIAGNOSIS — S93.602A FOOT SPRAIN, LEFT, INITIAL ENCOUNTER: ICD-10-CM

## 2025-08-14 DIAGNOSIS — M76.61 TENDONITIS, ACHILLES, RIGHT: ICD-10-CM

## 2025-08-14 DIAGNOSIS — M21.42 PES PLANUS OF BOTH FEET: ICD-10-CM

## 2025-08-14 DIAGNOSIS — M79.671 PAIN IN BOTH FEET: ICD-10-CM

## 2025-08-14 DIAGNOSIS — M79.671 RIGHT FOOT PAIN: ICD-10-CM

## 2025-08-14 DIAGNOSIS — R26.9 GAIT ABNORMALITY: ICD-10-CM

## 2025-08-14 DIAGNOSIS — M21.41 PES PLANUS OF BOTH FEET: ICD-10-CM

## 2025-08-14 DIAGNOSIS — M79.672 FOOT PAIN, BILATERAL: ICD-10-CM

## 2025-08-25 RX ORDER — IBUPROFEN 800 MG/1
TABLET, FILM COATED ORAL
Qty: 90 TABLET | Refills: 0 | Status: SHIPPED | OUTPATIENT
Start: 2025-08-25